# Patient Record
Sex: FEMALE | Race: WHITE | NOT HISPANIC OR LATINO | URBAN - METROPOLITAN AREA
[De-identification: names, ages, dates, MRNs, and addresses within clinical notes are randomized per-mention and may not be internally consistent; named-entity substitution may affect disease eponyms.]

---

## 2018-05-01 ENCOUNTER — OUTPATIENT (OUTPATIENT)
Dept: OUTPATIENT SERVICES | Facility: HOSPITAL | Age: 58
LOS: 1 days | End: 2018-05-01

## 2018-05-01 DIAGNOSIS — D25.9 LEIOMYOMA OF UTERUS, UNSPECIFIED: Chronic | ICD-10-CM

## 2018-05-01 DIAGNOSIS — Z98.89 OTHER SPECIFIED POSTPROCEDURAL STATES: Chronic | ICD-10-CM

## 2018-05-01 DIAGNOSIS — K56.60 UNSPECIFIED INTESTINAL OBSTRUCTION: Chronic | ICD-10-CM

## 2018-06-01 DIAGNOSIS — R69 ILLNESS, UNSPECIFIED: ICD-10-CM

## 2019-02-13 ENCOUNTER — OUTPATIENT (OUTPATIENT)
Dept: OUTPATIENT SERVICES | Facility: HOSPITAL | Age: 59
LOS: 1 days | Discharge: HOME | End: 2019-02-13
Payer: MEDICAID

## 2019-02-13 DIAGNOSIS — K56.60 UNSPECIFIED INTESTINAL OBSTRUCTION: Chronic | ICD-10-CM

## 2019-02-13 DIAGNOSIS — D25.9 LEIOMYOMA OF UTERUS, UNSPECIFIED: Chronic | ICD-10-CM

## 2019-02-13 DIAGNOSIS — Z98.89 OTHER SPECIFIED POSTPROCEDURAL STATES: Chronic | ICD-10-CM

## 2019-02-13 DIAGNOSIS — S91.309A UNSPECIFIED OPEN WOUND, UNSPECIFIED FOOT, INITIAL ENCOUNTER: ICD-10-CM

## 2019-02-13 PROCEDURE — G0463: CPT

## 2019-02-28 DIAGNOSIS — M34.9 SYSTEMIC SCLEROSIS, UNSPECIFIED: ICD-10-CM

## 2019-03-25 ENCOUNTER — OUTPATIENT (OUTPATIENT)
Dept: OUTPATIENT SERVICES | Facility: HOSPITAL | Age: 59
LOS: 1 days | Discharge: HOME | End: 2019-03-25
Payer: MEDICAID

## 2019-03-25 DIAGNOSIS — K56.60 UNSPECIFIED INTESTINAL OBSTRUCTION: Chronic | ICD-10-CM

## 2019-03-25 DIAGNOSIS — Z98.89 OTHER SPECIFIED POSTPROCEDURAL STATES: Chronic | ICD-10-CM

## 2019-03-25 DIAGNOSIS — M34.9 SYSTEMIC SCLEROSIS, UNSPECIFIED: ICD-10-CM

## 2019-03-25 DIAGNOSIS — D25.9 LEIOMYOMA OF UTERUS, UNSPECIFIED: Chronic | ICD-10-CM

## 2019-03-25 PROCEDURE — G0463: CPT

## 2019-03-26 DIAGNOSIS — Z79.899 OTHER LONG TERM (CURRENT) DRUG THERAPY: ICD-10-CM

## 2019-03-26 DIAGNOSIS — L97.511 NON-PRESSURE CHRONIC ULCER OF OTHER PART OF RIGHT FOOT LIMITED TO BREAKDOWN OF SKIN: ICD-10-CM

## 2019-03-26 DIAGNOSIS — Z82.49 FAMILY HISTORY OF ISCHEMIC HEART DISEASE AND OTHER DISEASES OF THE CIRCULATORY SYSTEM: ICD-10-CM

## 2019-03-26 DIAGNOSIS — Z88.8 ALLERGY STATUS TO OTHER DRUGS, MEDICAMENTS AND BIOLOGICAL SUBSTANCES: ICD-10-CM

## 2019-03-26 DIAGNOSIS — K59.00 CONSTIPATION, UNSPECIFIED: ICD-10-CM

## 2019-03-26 DIAGNOSIS — M34.9 SYSTEMIC SCLEROSIS, UNSPECIFIED: ICD-10-CM

## 2019-03-26 DIAGNOSIS — E03.9 HYPOTHYROIDISM, UNSPECIFIED: ICD-10-CM

## 2019-03-26 DIAGNOSIS — L97.411 NON-PRESSURE CHRONIC ULCER OF RIGHT HEEL AND MIDFOOT LIMITED TO BREAKDOWN OF SKIN: ICD-10-CM

## 2019-03-26 DIAGNOSIS — J45.909 UNSPECIFIED ASTHMA, UNCOMPLICATED: ICD-10-CM

## 2019-03-26 DIAGNOSIS — Z87.891 PERSONAL HISTORY OF NICOTINE DEPENDENCE: ICD-10-CM

## 2019-03-26 DIAGNOSIS — Z90.49 ACQUIRED ABSENCE OF OTHER SPECIFIED PARTS OF DIGESTIVE TRACT: ICD-10-CM

## 2019-03-26 DIAGNOSIS — L97.311 NON-PRESSURE CHRONIC ULCER OF RIGHT ANKLE LIMITED TO BREAKDOWN OF SKIN: ICD-10-CM

## 2019-03-26 DIAGNOSIS — E66.3 OVERWEIGHT: ICD-10-CM

## 2019-03-26 DIAGNOSIS — Z88.0 ALLERGY STATUS TO PENICILLIN: ICD-10-CM

## 2019-10-08 ENCOUNTER — OUTPATIENT (OUTPATIENT)
Dept: OUTPATIENT SERVICES | Facility: HOSPITAL | Age: 59
LOS: 1 days | End: 2019-10-08
Payer: MEDICAID

## 2019-10-08 VITALS
SYSTOLIC BLOOD PRESSURE: 124 MMHG | RESPIRATION RATE: 16 BRPM | TEMPERATURE: 98 F | OXYGEN SATURATION: 97 % | HEART RATE: 74 BPM | DIASTOLIC BLOOD PRESSURE: 80 MMHG

## 2019-10-08 DIAGNOSIS — M20.11 HALLUX VALGUS (ACQUIRED), RIGHT FOOT: ICD-10-CM

## 2019-10-08 DIAGNOSIS — Z01.818 ENCOUNTER FOR OTHER PREPROCEDURAL EXAMINATION: ICD-10-CM

## 2019-10-08 DIAGNOSIS — K56.60 UNSPECIFIED INTESTINAL OBSTRUCTION: Chronic | ICD-10-CM

## 2019-10-08 DIAGNOSIS — Z98.89 OTHER SPECIFIED POSTPROCEDURAL STATES: Chronic | ICD-10-CM

## 2019-10-08 DIAGNOSIS — M34.9 SYSTEMIC SCLEROSIS, UNSPECIFIED: ICD-10-CM

## 2019-10-08 DIAGNOSIS — Z88.9 ALLERGY STATUS TO UNSPECIFIED DRUGS, MEDICAMENTS AND BIOLOGICAL SUBSTANCES: ICD-10-CM

## 2019-10-08 DIAGNOSIS — J45.909 UNSPECIFIED ASTHMA, UNCOMPLICATED: ICD-10-CM

## 2019-10-08 DIAGNOSIS — M24.574 CONTRACTURE, RIGHT FOOT: ICD-10-CM

## 2019-10-08 DIAGNOSIS — D25.9 LEIOMYOMA OF UTERUS, UNSPECIFIED: Chronic | ICD-10-CM

## 2019-10-08 DIAGNOSIS — M20.5X1 OTHER DEFORMITIES OF TOE(S) (ACQUIRED), RIGHT FOOT: ICD-10-CM

## 2019-10-08 DIAGNOSIS — E03.9 HYPOTHYROIDISM, UNSPECIFIED: ICD-10-CM

## 2019-10-08 DIAGNOSIS — M20.41 OTHER HAMMER TOE(S) (ACQUIRED), RIGHT FOOT: ICD-10-CM

## 2019-10-08 LAB
ANION GAP SERPL CALC-SCNC: 4 MMOL/L — LOW (ref 5–17)
BUN SERPL-MCNC: 10 MG/DL — SIGNIFICANT CHANGE UP (ref 7–23)
CALCIUM SERPL-MCNC: 9.3 MG/DL — SIGNIFICANT CHANGE UP (ref 8.4–10.5)
CHLORIDE SERPL-SCNC: 103 MMOL/L — SIGNIFICANT CHANGE UP (ref 96–108)
CO2 SERPL-SCNC: 35 MMOL/L — HIGH (ref 22–31)
CREAT SERPL-MCNC: 0.48 MG/DL — LOW (ref 0.5–1.3)
GLUCOSE SERPL-MCNC: 94 MG/DL — SIGNIFICANT CHANGE UP (ref 70–99)
HCT VFR BLD CALC: 52.2 % — HIGH (ref 34.5–45)
HGB BLD-MCNC: 17.2 G/DL — HIGH (ref 11.5–15.5)
MCHC RBC-ENTMCNC: 30.7 PG — SIGNIFICANT CHANGE UP (ref 27–34)
MCHC RBC-ENTMCNC: 33 GM/DL — SIGNIFICANT CHANGE UP (ref 32–36)
MCV RBC AUTO: 93.2 FL — SIGNIFICANT CHANGE UP (ref 80–100)
NRBC # BLD: 0 /100 WBCS — SIGNIFICANT CHANGE UP (ref 0–0)
PLATELET # BLD AUTO: 210 K/UL — SIGNIFICANT CHANGE UP (ref 150–400)
POTASSIUM SERPL-MCNC: 3.7 MMOL/L — SIGNIFICANT CHANGE UP (ref 3.5–5.3)
POTASSIUM SERPL-SCNC: 3.7 MMOL/L — SIGNIFICANT CHANGE UP (ref 3.5–5.3)
RBC # BLD: 5.6 M/UL — HIGH (ref 3.8–5.2)
RBC # FLD: 12.8 % — SIGNIFICANT CHANGE UP (ref 10.3–14.5)
SODIUM SERPL-SCNC: 142 MMOL/L — SIGNIFICANT CHANGE UP (ref 135–145)
WBC # BLD: 5.68 K/UL — SIGNIFICANT CHANGE UP (ref 3.8–10.5)
WBC # FLD AUTO: 5.68 K/UL — SIGNIFICANT CHANGE UP (ref 3.8–10.5)

## 2019-10-08 PROCEDURE — 93005 ELECTROCARDIOGRAM TRACING: CPT

## 2019-10-08 PROCEDURE — G0463: CPT

## 2019-10-08 PROCEDURE — 80048 BASIC METABOLIC PNL TOTAL CA: CPT

## 2019-10-08 PROCEDURE — 36415 COLL VENOUS BLD VENIPUNCTURE: CPT

## 2019-10-08 PROCEDURE — 93010 ELECTROCARDIOGRAM REPORT: CPT | Mod: NC

## 2019-10-08 PROCEDURE — 85027 COMPLETE CBC AUTOMATED: CPT

## 2019-10-08 RX ORDER — HYDROMORPHONE HYDROCHLORIDE 2 MG/ML
1 INJECTION INTRAMUSCULAR; INTRAVENOUS; SUBCUTANEOUS
Qty: 0 | Refills: 0 | DISCHARGE

## 2019-10-08 NOTE — H&P PST ADULT - NSICDXPROBLEM_GEN_ALL_CORE_FT
PROBLEM DIAGNOSES  Problem: Hallux valgus, right  Assessment and Plan: Mikal Andriy Right Foot, Tenotomy & Capsulotomy Digits 2,3,4,5 Right Foot scheduled for 10/18/2019  Pre-op instructions given. Pt verbalized understanding   Pepcid & Chlorhexidine wash instructions given   Pending: Medical clearance - Abnormal EKG + requested by surgeon  Pending: Pain Management clearance + instructions on meds to take day of procedure      Problem: Allergy to multiple drugs  Assessment and Plan: see allergy list    Problem: Chronic joint pain  Assessment and Plan: Pain management clearance requested     Problem: Generalized scleroderma  Assessment and Plan: Pt instructed to take meds as prescribed     Problem: Hypothyroid  Assessment and Plan: Pt instructed to take meds as prescribed     Problem: Asthma  Assessment and Plan: Pt instructed to take meds as prescribed

## 2019-10-08 NOTE — H&P PST ADULT - NEGATIVE GENERAL GENITOURINARY SYMPTOMS
no dysuria/no flank pain R/normal urinary frequency/no bladder infections/no renal colic/no urinary hesitancy/no nocturia/no hematuria/no flank pain L

## 2019-10-08 NOTE — H&P PST ADULT - NSICDXPASTSURGICALHX_GEN_ALL_CORE_FT
PAST SURGICAL HISTORY:  Bowel obstruction multiple surgeries for bowel obstruction    Fibroid     H/O ovarian cystectomy     History of myomectomy     History of ovarian cystectomy     S/P small bowel resection

## 2019-10-08 NOTE — H&P PST ADULT - NSANTHOSAYNRD_GEN_A_CORE
neck 14 1/4 inches/No. GILMER screening performed.  STOP BANG Legend: 0-2 = LOW Risk; 3-4 = INTERMEDIATE Risk; 5-8 = HIGH Risk

## 2019-10-08 NOTE — H&P PST ADULT - NEGATIVE GASTROINTESTINAL SYMPTOMS
no vomiting/no constipation/no abdominal pain/no hiccoughs/no nausea/no diarrhea/no melena/no jaundice

## 2019-10-08 NOTE — H&P PST ADULT - NEGATIVE PSYCHIATRIC SYMPTOMS
no depression/no insomnia/no memory loss/no mood swings/no agitation/no suicidal ideation/no paranoia

## 2019-10-08 NOTE — H&P PST ADULT - NSICDXPASTMEDICALHX_GEN_ALL_CORE_FT
PAST MEDICAL HISTORY:  Asthma     GERD (gastroesophageal reflux disease)     High cholesterol Unable to take STATINS for high cholesterol due to genetic disposition    Hypothyroidism     Multiple drug allergies     Scleroderma     Scleroderma     Shingles     Smoker

## 2019-10-08 NOTE — H&P PST ADULT - HISTORY OF PRESENT ILLNESS
57yo female current everyday smoker with medical h/o Systemic Scleroderma, Asthma, Hypothyroid and Asthma. Pt c/o Chronic pain since 5/2012 due to Scleroderma and Right foot bunion and hammertoes, right foot. Pt pr 57yo female came to dept with home health aid, pt h/o current everyday smoker with medical h/o Systemic Scleroderma, Asthma, Hypothyroid and Asthma. Pt c/o Chronic pain since 5/2012 due to Scleroderma and Right foot bunion and hammertoes, right foot. Pt presents today for PST for Mikal Andriy Right Foot, Tenotomy & Capsulotomy Digits 2,3,4,5 Right Foot scheduled for 10/18/2019  NOTE: Pt was very argumentative during H&P and refuses to have height and weight done. Leah Lee (manager made aware)

## 2019-10-08 NOTE — H&P PST ADULT - OTHER CARE PROVIDERS
Aroldo Briseno ([facility]) Aroldo Briseno ([facility]) [phone]

## 2019-10-08 NOTE — H&P PST ADULT - MUSCULOSKELETAL COMMENTS
h/o scleroderma with chronic pain and right foot bunion and hammertoes right foot bunion and hammertoes, Bandage to both heel not removed (pt refuses to have removed)

## 2019-10-08 NOTE — H&P PST ADULT - NEGATIVE NEUROLOGICAL SYMPTOMS
no transient paralysis/no weakness/no vertigo/no loss of sensation/no focal seizures/no loss of consciousness/no paresthesias/no generalized seizures/no tremors/no headache/no syncope

## 2019-10-08 NOTE — H&P PST ADULT - PRO PAIN LIFE ADAPT
inability to concentrate/inability to enjoy life/inability or reluctance to perform ADLs/inability to sleep/decreased activity level

## 2019-11-04 NOTE — H&P PST ADULT - PULMONARY EMBOLUS
Walking - Outdoors allowed/Showering allowed/Walking - Indoors allowed/Bathing allowed/Stairs allowed
no

## 2020-09-03 PROBLEM — F17.200 NICOTINE DEPENDENCE, UNSPECIFIED, UNCOMPLICATED: Chronic | Status: ACTIVE | Noted: 2019-10-08

## 2020-09-03 PROBLEM — Z88.9 ALLERGY STATUS TO UNSPECIFIED DRUGS, MEDICAMENTS AND BIOLOGICAL SUBSTANCES: Chronic | Status: ACTIVE | Noted: 2019-10-08

## 2020-09-15 ENCOUNTER — APPOINTMENT (OUTPATIENT)
Dept: NEUROLOGY | Facility: CLINIC | Age: 60
End: 2020-09-15

## 2020-10-08 ENCOUNTER — APPOINTMENT (OUTPATIENT)
Dept: PAIN MANAGEMENT | Facility: CLINIC | Age: 60
End: 2020-10-08
Payer: MEDICAID

## 2020-10-08 VITALS
HEIGHT: 69.5 IN | DIASTOLIC BLOOD PRESSURE: 83 MMHG | BODY MASS INDEX: 24.61 KG/M2 | HEART RATE: 87 BPM | WEIGHT: 170 LBS | SYSTOLIC BLOOD PRESSURE: 132 MMHG

## 2020-10-08 VITALS — TEMPERATURE: 96.2 F

## 2020-10-08 PROCEDURE — 99204 OFFICE O/P NEW MOD 45 MIN: CPT

## 2020-10-08 RX ORDER — ALBUTEROL 90 MCG
90 AEROSOL (GRAM) INHALATION
Refills: 0 | Status: ACTIVE | COMMUNITY

## 2020-10-08 RX ORDER — CETIRIZINE HCL 10 MG
10 TABLET ORAL
Refills: 0 | Status: ACTIVE | COMMUNITY

## 2020-10-08 RX ORDER — PREDNISONE 10 MG/1
10 TABLET ORAL
Refills: 0 | Status: ACTIVE | COMMUNITY

## 2020-10-08 RX ORDER — ALBUTEROL SULFATE 2.5 MG/.5ML
2.5 SOLUTION RESPIRATORY (INHALATION)
Refills: 0 | Status: ACTIVE | COMMUNITY

## 2020-10-08 RX ORDER — LEVOTHYROXINE SODIUM 125 UG/1
125 TABLET ORAL
Refills: 0 | Status: ACTIVE | COMMUNITY

## 2020-11-10 ENCOUNTER — APPOINTMENT (OUTPATIENT)
Dept: PAIN MANAGEMENT | Facility: CLINIC | Age: 60
End: 2020-11-10
Payer: MEDICAID

## 2020-11-10 ENCOUNTER — APPOINTMENT (OUTPATIENT)
Dept: NEUROLOGY | Facility: CLINIC | Age: 60
End: 2020-11-10
Payer: MEDICAID

## 2020-11-10 VITALS
SYSTOLIC BLOOD PRESSURE: 144 MMHG | HEART RATE: 88 BPM | DIASTOLIC BLOOD PRESSURE: 84 MMHG | HEIGHT: 69 IN | BODY MASS INDEX: 25.18 KG/M2 | WEIGHT: 170 LBS

## 2020-11-10 VITALS — TEMPERATURE: 97.7 F

## 2020-11-10 DIAGNOSIS — F45.42 PAIN DISORDER WITH RELATED PSYCHOLOGICAL FACTORS: ICD-10-CM

## 2020-11-10 PROCEDURE — 90791 PSYCH DIAGNOSTIC EVALUATION: CPT

## 2020-11-10 PROCEDURE — 99214 OFFICE O/P EST MOD 30 MIN: CPT

## 2020-11-10 PROCEDURE — 99072 ADDL SUPL MATRL&STAF TM PHE: CPT

## 2020-11-15 NOTE — ASSESSMENT
[FreeTextEntry1] : Chronic pain syndrome\par Chronic opioid therapy\par We spent 60 minutes with patient and her friend/aide\par Will develop a plan of action

## 2020-11-15 NOTE — HISTORY OF PRESENT ILLNESS
[FreeTextEntry1] : Patient was seen with Pain fellow and Dr. Purcell\par \par She presented her case in detail with our team

## 2020-11-15 NOTE — PHYSICAL EXAM
[General Appearance - Alert] : alert [FreeTextEntry1] : at times became emotional during our interview [Impaired Insight] : insight and judgment were intact [Person] : oriented to person [Place] : oriented to place [Time] : oriented to time [Sclera] : the sclera and conjunctiva were normal [Outer Ear] : the ears and nose were normal in appearance [] : no respiratory distress

## 2020-11-16 PROBLEM — F45.42 PAIN DISORDER ASSOCIATED WITH PSYCHOLOGICAL AND PHYSICAL FACTORS: Status: ACTIVE | Noted: 2020-11-16

## 2020-11-25 ENCOUNTER — APPOINTMENT (OUTPATIENT)
Dept: PAIN MANAGEMENT | Facility: CLINIC | Age: 60
End: 2020-11-25

## 2020-12-07 NOTE — PHYSICAL EXAM
[General Appearance - Alert] : alert [Affect] : the affect was normal [Person] : oriented to person [Place] : oriented to place [Time] : oriented to time [Cranial Nerves Oculomotor (III)] : extraocular motion intact [Sclera] : the sclera and conjunctiva were normal [Outer Ear] : the ears and nose were normal in appearance [Neck Appearance] : the appearance of the neck was normal [Nail Clubbing] : no clubbing  or cyanosis of the fingernails [] : no rash [FreeTextEntry6] : In wheelchair [FreeTextEntry1] : limired ROM in all extremities

## 2020-12-07 NOTE — ASSESSMENT
[Opioids] : Patient was explained in detail about pain control by using opioids. Patient has signed and fully understands our guidelines for medication and drug screening.  Patient understands the side effects of opioids, including, but not limited to, drug tolerance, dependence, potential for addiction. This class of drugs is habit-forming and KHOA regulated. The sedative effects of opioids can be potentiated by taking alcohol or any sleeping pills, along with opioids. The decision to drive is patient’s responsibility, as opioids can affect his/her driving ability and ability to concentrate. The long-term place is not clear, however, patient understands that once the pain control optimizes, the goal will be to wean off the opioids. All the issues regarding opioid treatment have been addressed satisfactorily.  [FreeTextEntry1] : Chronic pain syndrome\par Scleroderma\par Opioid dependency\par Generalized Anxiety Disorder\par Will discuss with pain team prior to any pain treatment. \par UDT\par ISTOP reviewed.\par \par After a detailed discussion  at the Pain Meeting, we recommend the following:\par 1- Taper the daily opioid dose gradually... consider Belbuca\par 2- Initiate non opioid pain medications, ie. Neurontin \par 3- Will contact patient for fu visit\par 3- FU regularly with our pain psychologist for pain management cognitive behavioral therapy\par 4- Will sp w her prior pain physician

## 2020-12-07 NOTE — HISTORY OF PRESENT ILLNESS
[FreeTextEntry1] : Patient dx with systemic scleroderma 16 years ago with resulting ulcerations in extremities presenting with chronic pain. Patient dx with xolar related for asthma. \par ANNABELLA: 10/10\par She has been on chronic opioid therapy here for second opinion. Dr. Briseno wants her to taper off some of the medications\par The pain involved the entire body 24/7 only able to sleep on back for 15 years. Difficulty moving any extremities.\par She wants to come off analgesics but slowly but feels it is way too fast. \par Meds: Xanax 1 mg qid; Dilaudid 4 mgs qid; MS Contin 30 mgs 1 tid; Duragesic 200 mcg q 2 days to 100 mcg1qd \par Advised to dc Xanax however taking tid; On dilaudid tid; MSC ontin dcd but still has  one month prescription.\par Never tried lyrica, neurontin, cymbalta\par Never tried marijuana due to cost.\par \par aLLERGY TO FENTANYL

## 2020-12-14 ENCOUNTER — APPOINTMENT (OUTPATIENT)
Dept: PAIN MANAGEMENT | Facility: CLINIC | Age: 60
End: 2020-12-14
Payer: MEDICAID

## 2020-12-14 VITALS — SYSTOLIC BLOOD PRESSURE: 130 MMHG | DIASTOLIC BLOOD PRESSURE: 78 MMHG | HEART RATE: 83 BPM

## 2020-12-14 PROCEDURE — 99072 ADDL SUPL MATRL&STAF TM PHE: CPT

## 2020-12-14 PROCEDURE — 99214 OFFICE O/P EST MOD 30 MIN: CPT

## 2020-12-17 NOTE — ASSESSMENT
[FreeTextEntry1] : fentanyl 175 and \par aprazolam \par Narcan done\par MSContin 30 tid\par roxicodone stay - brand\par MS 15 decrease to bid

## 2020-12-23 RX ORDER — OXYCODONE HYDROCHLORIDE 30 MG/1
30 TABLET ORAL
Qty: 90 | Refills: 0 | Status: DISCONTINUED | COMMUNITY
Start: 1900-01-01 | End: 2020-12-23

## 2020-12-23 RX ORDER — HYDROMORPHONE HYDROCHLORIDE 2 MG/1
2 TABLET ORAL 3 TIMES DAILY
Qty: 42 | Refills: 0 | Status: DISCONTINUED | COMMUNITY
Start: 2020-12-01 | End: 2020-12-23

## 2020-12-31 RX ORDER — HYDROMORPHONE HYDROCHLORIDE 4 MG/1
4 TABLET ORAL 3 TIMES DAILY
Qty: 42 | Refills: 0 | Status: DISCONTINUED | COMMUNITY
End: 2020-12-31

## 2021-01-14 ENCOUNTER — APPOINTMENT (OUTPATIENT)
Dept: PAIN MANAGEMENT | Facility: CLINIC | Age: 61
End: 2021-01-14
Payer: MEDICAID

## 2021-01-14 VITALS — DIASTOLIC BLOOD PRESSURE: 74 MMHG | HEART RATE: 91 BPM | SYSTOLIC BLOOD PRESSURE: 116 MMHG

## 2021-01-14 VITALS — TEMPERATURE: 97 F

## 2021-01-14 PROCEDURE — 99072 ADDL SUPL MATRL&STAF TM PHE: CPT

## 2021-01-14 PROCEDURE — 99214 OFFICE O/P EST MOD 30 MIN: CPT

## 2021-01-20 NOTE — HISTORY OF PRESENT ILLNESS
[FreeTextEntry1] : Two and half weeks ago, while sitting on bed decided to get up to walker fell forward hitting head. No LOC. Right eye blackened. Refused to go to hospital. CO head, back and arm pain. No weakness.\par Decided not to decreased fentanyl due to fear of AEs. She reports getting a swollen tongue and trouble breathing about 3 hours after placing fentanyl \par \par Still has inability to move causing severe pain. \par SW patient re discussion with [facility].  \par \par Took gabapentin co bad cramps and vomiting. Patient reports SEs after 6-8 hours. Tried it 6 times. \par \par Current pain\par Roxicodone 4 qd; Fentanyl 200 mcg 3 days\par MS ER 30 TID; MSIR 15 MG 2 d\par Xanax 1 mg 2.5 per month\par Baclofen 10 ng 1 tid\par

## 2021-01-20 NOTE — PHYSICAL EXAM
[General Appearance - Alert] : alert [Affect] : the affect was normal [Person] : oriented to person [Place] : oriented to place [Time] : oriented to time [Cranial Nerves Oculomotor (III)] : extraocular motion intact [Sclera] : the sclera and conjunctiva were normal [Outer Ear] : the ears and nose were normal in appearance [Neck Appearance] : the appearance of the neck was normal [] : no respiratory distress [FreeTextEntry1] : sores on feet bilaterally. as per photos provided by friend/aide

## 2021-01-20 NOTE — ASSESSMENT
[Opioids] : Patient was explained in detail about pain control by using opioids. Patient has signed and fully understands our guidelines for medication and drug screening.  Patient understands the side effects of opioids, including, but not limited to, drug tolerance, dependence, potential for addiction. This class of drugs is habit-forming and KHOA regulated. The sedative effects of opioids can be potentiated by taking alcohol or any sleeping pills, along with opioids. The decision to drive is patient’s responsibility, as opioids can affect his/her driving ability and ability to concentrate. The long-term place is not clear, however, patient understands that once the pain control optimizes, the goal will be to wean off the opioids. All the issues regarding opioid treatment have been addressed satisfactorily.  [FreeTextEntry1] : Chronic pain syndrome\par Spoke with patient at length with friend\par \par Provided information re out Addiction Psychiatrist\par In meantime will maintain current analgesics until we have a plan from psychiatrist.\par Our goal is to slowly taper down her analgesics while being maintained on non opioid therapy and guidance from the psychiatrist.\par No signs of toxicity observed and will continue to monitor.\par

## 2021-01-22 ENCOUNTER — RX RENEWAL (OUTPATIENT)
Age: 61
End: 2021-01-22

## 2021-02-11 ENCOUNTER — APPOINTMENT (OUTPATIENT)
Dept: PAIN MANAGEMENT | Facility: CLINIC | Age: 61
End: 2021-02-11
Payer: MEDICAID

## 2021-02-11 VITALS — DIASTOLIC BLOOD PRESSURE: 72 MMHG | SYSTOLIC BLOOD PRESSURE: 120 MMHG | HEART RATE: 90 BPM

## 2021-02-11 VITALS — TEMPERATURE: 95.1 F

## 2021-02-11 PROCEDURE — 99072 ADDL SUPL MATRL&STAF TM PHE: CPT

## 2021-02-11 PROCEDURE — 99214 OFFICE O/P EST MOD 30 MIN: CPT

## 2021-02-11 NOTE — HISTORY OF PRESENT ILLNESS
[FreeTextEntry1] : Patient reports increasing pain with current analgesic regime. She will be unable to obtain her brand type duragesic patch. Unable to see addiction psychiatrist which does not take insurance. We are awaiting approval from insurance co ... addiction psychiatrist.\par \par

## 2021-02-11 NOTE — ASSESSMENT
[FreeTextEntry1] : Will continue with current analgesic regime.\par Once we have psychiatrist, then we will start to make changes in regime.

## 2021-03-23 ENCOUNTER — APPOINTMENT (OUTPATIENT)
Dept: PAIN MANAGEMENT | Facility: CLINIC | Age: 61
End: 2021-03-23
Payer: MEDICAID

## 2021-03-23 PROCEDURE — 99214 OFFICE O/P EST MOD 30 MIN: CPT

## 2021-03-23 PROCEDURE — 99072 ADDL SUPL MATRL&STAF TM PHE: CPT

## 2021-03-24 RX ORDER — FENTANYL 100 UG/H
100 PATCH, EXTENDED RELEASE TRANSDERMAL
Qty: 10 | Refills: 0 | Status: DISCONTINUED | COMMUNITY
End: 2021-03-24

## 2021-03-28 NOTE — ASSESSMENT
[FreeTextEntry1] : Chronic pain syndrome\par Gradually reducing opioids/Benzos\par HO Sarcoidosis\par Dysfunctional\par Depressed. Not suicidal. Has excellent support from long time friend/aid.\par \par I strongly advised her that she MUST get psychiatrist involved so that we can continue to taper off these medications, and help her anxiety as well as her pain. I provided her info re [facility]. I advised that she must yahir me prior to next visit or prior to continued dosing of her opioid medications if she continues to have difficulty. Her friend was also provided information. and will help in this matter. \par No signs of toxicity observed and will continue to monitor.\par \par FU in one month [Opioids] : Patient was explained in detail about pain control by using opioids. Patient has signed and fully understands our guidelines for medication and drug screening.  Patient understands the side effects of opioids, including, but not limited to, drug tolerance, dependence, potential for addiction. This class of drugs is habit-forming and KHOA regulated. The sedative effects of opioids can be potentiated by taking alcohol or any sleeping pills, along with opioids. The decision to drive is patient’s responsibility, as opioids can affect his/her driving ability and ability to concentrate. The long-term place is not clear, however, patient understands that once the pain control optimizes, the goal will be to wean off the opioids. All the issues regarding opioid treatment have been addressed satisfactorily.

## 2021-03-28 NOTE — PHYSICAL EXAM
[General Appearance - Alert] : alert [Affect] : the affect was normal [Person] : oriented to person [Place] : oriented to place [Time] : oriented to time [FreeTextEntry8] : In wheelchair [Sclera] : the sclera and conjunctiva were normal [Outer Ear] : the ears and nose were normal in appearance [Neck Appearance] : the appearance of the neck was normal [] : no respiratory distress [FreeTextEntry1] : scleroderma noted

## 2021-03-28 NOTE — HISTORY OF PRESENT ILLNESS
[FreeTextEntry1] : Patient present with long time aid/friend.\par Patient continues to co back and diffuse body pain. She reports having fentanyl left over from past and can not use the generic formulation which is the only formulation available. She continues to reports having difficulty in getting psych/addiction specialist that accepts her insurance \par  Denies suicidal ideations. Notes another family member has . \par In past- \par 200 mcg fentanyl\par Dilaudid 4 qid\par MS 15 QID\par MS ER 30 BID\par Xanax 1 MG QID\par Roxicodone 30 mgs 7 x per day\par \par Currently- \par 100 MCG 72 hours\par Off Dilaudid; MRIS 3 QD; er 3 qd\par Roxicodone 4 qd\par Xanax 2.5 mg qd/ \par  \par \par

## 2021-05-18 ENCOUNTER — APPOINTMENT (OUTPATIENT)
Dept: PAIN MANAGEMENT | Facility: CLINIC | Age: 61
End: 2021-05-18
Payer: MEDICAID

## 2021-05-18 VITALS — TEMPERATURE: 95 F

## 2021-05-18 VITALS
DIASTOLIC BLOOD PRESSURE: 81 MMHG | SYSTOLIC BLOOD PRESSURE: 129 MMHG | WEIGHT: 160 LBS | HEART RATE: 86 BPM | HEIGHT: 69 IN | BODY MASS INDEX: 23.7 KG/M2

## 2021-05-18 PROCEDURE — 99214 OFFICE O/P EST MOD 30 MIN: CPT

## 2021-05-18 NOTE — PHYSICAL EXAM
[General Appearance - Alert] : alert [Oriented To Time, Place, And Person] : oriented to person, place, and time [Person] : oriented to person [Place] : oriented to place [Time] : oriented to time [Cranial Nerves Oculomotor (III)] : extraocular motion intact [Sclera] : the sclera and conjunctiva were normal [Outer Ear] : the ears and nose were normal in appearance

## 2021-06-03 NOTE — ASSESSMENT
[Opioids] : Patient was explained in detail about pain control by using opioids. Patient has signed and fully understands our guidelines for medication and drug screening.  Patient understands the side effects of opioids, including, but not limited to, drug tolerance, dependence, potential for addiction. This class of drugs is habit-forming and KHOA regulated. The sedative effects of opioids can be potentiated by taking alcohol or any sleeping pills, along with opioids. The decision to drive is patient’s responsibility, as opioids can affect his/her driving ability and ability to concentrate. The long-term place is not clear, however, patient understands that once the pain control optimizes, the goal will be to wean off the opioids. All the issues regarding opioid treatment have been addressed satisfactorily.  [FreeTextEntry1] : This is a case of a 60-year-old female who is suffering from sarcoidosis and intractable chronic pain as well as chronic anxiety.  She is accompanied by her aide.\par No signs of toxicity and will continue to monitor.\par We have made inroads into having patient see a psychiatrist who will also help coordinate and collaborate with me on tapering her benzodiazepines and manage her chronic anxiety through this process in the process of decreasing some of her chronic opiate medications.\par \par We will proceed with the tapering process of opiates the following month.  She agrees with this plan.

## 2021-06-03 NOTE — HISTORY OF PRESENT ILLNESS
[FreeTextEntry1] : Patient have been trying to get in touch with psych services. I sw Emily her  yesterday for about 30 minutes to help patient get off opioids or at least taper the medications. Patient reports having chest scan which revealed multiple nodules. Patient reports being essentially dysfunctional. Cant sleep on bed. Has difficulty moving joints. She can only ambulate with assistance.  \par \par Patient has enough Duragesic 200 mcg to end of July

## 2021-06-23 ENCOUNTER — APPOINTMENT (OUTPATIENT)
Dept: PAIN MANAGEMENT | Facility: CLINIC | Age: 61
End: 2021-06-23
Payer: MEDICAID

## 2021-06-23 VITALS
HEART RATE: 98 BPM | BODY MASS INDEX: 24.44 KG/M2 | WEIGHT: 165 LBS | DIASTOLIC BLOOD PRESSURE: 72 MMHG | SYSTOLIC BLOOD PRESSURE: 109 MMHG | HEIGHT: 69 IN

## 2021-06-23 PROCEDURE — 99214 OFFICE O/P EST MOD 30 MIN: CPT

## 2021-06-25 NOTE — ASSESSMENT
[FreeTextEntry1] : This is a case of a 60-year-old female who is suffering from sarcoidosis and intractable chronic pain as well as chronic anxiety.  She is accompanied by her aide.\par No signs of toxicity and will continue to monitor.\par We have made inroads into having patient see a psychiatrist who will also help coordinate and collaborate with me on tapering her benzodiazepines and manage her chronic anxiety through this process in the process of decreasing some of her chronic opiate medications.\par \par We will proceed with the tapering process of opiates the following month.  She agrees with this plan. [Opioids] : Patient was explained in detail about pain control by using opioids. Patient has signed and fully understands our guidelines for medication and drug screening.  Patient understands the side effects of opioids, including, but not limited to, drug tolerance, dependence, potential for addiction. This class of drugs is habit-forming and KHOA regulated. The sedative effects of opioids can be potentiated by taking alcohol or any sleeping pills, along with opioids. The decision to drive is patient’s responsibility, as opioids can affect his/her driving ability and ability to concentrate. The long-term place is not clear, however, patient understands that once the pain control optimizes, the goal will be to wean off the opioids. All the issues regarding opioid treatment have been addressed satisfactorily.

## 2021-06-25 NOTE — HISTORY OF PRESENT ILLNESS
[FreeTextEntry1] : NP came to house 2 days ago - can deal with xanax and she said will not deal with coping mechjanism. Princess rec now psych services...have no available for anyone > 50. She has noticed only PCP.\par \par mORPHINE er 30 MGS TIFD; msir 15 3 PER CHEIKH/ rOXICODONE 1 QID\par

## 2021-07-22 ENCOUNTER — APPOINTMENT (OUTPATIENT)
Dept: PAIN MANAGEMENT | Facility: CLINIC | Age: 61
End: 2021-07-22
Payer: MEDICAID

## 2021-07-22 PROCEDURE — 99442: CPT

## 2021-07-22 NOTE — HISTORY OF PRESENT ILLNESS
[FreeTextEntry1] : 51 yo female reports sp fall one week ago... while using walker to go to bathroom on floor for 40 minutes. Notes soreness and bruises all over body. Denies any drowsiness leading to fall.  She has been looking for new rheumatologist since Dr. Meza left.\par \par Patient states Elyssa her insurance company who states, now, there are no psychological services for anxiety. \par \par She states the material on Belbuca i gave her last visit ... she does not want to take it..

## 2021-07-22 NOTE — ASSESSMENT
[FreeTextEntry1] : Patient does not want to try Belbuca. \par Will call Elyssa.  [phone]\par Princess \par \par Advised patient to go to [facility]. since she is going to run out of all her patches and does not want to try the other generic patches.

## 2021-08-17 ENCOUNTER — APPOINTMENT (OUTPATIENT)
Dept: PAIN MANAGEMENT | Facility: CLINIC | Age: 61
End: 2021-08-17
Payer: MEDICAID

## 2021-08-17 VITALS
HEART RATE: 95 BPM | BODY MASS INDEX: 24.44 KG/M2 | HEIGHT: 69 IN | DIASTOLIC BLOOD PRESSURE: 80 MMHG | WEIGHT: 165 LBS | SYSTOLIC BLOOD PRESSURE: 120 MMHG

## 2021-08-17 PROCEDURE — 99215 OFFICE O/P EST HI 40 MIN: CPT

## 2021-08-20 NOTE — PHYSICAL EXAM
[General Appearance - Alert] : alert [Person] : oriented to person [Place] : oriented to place [Time] : oriented to time [Sclera] : the sclera and conjunctiva were normal [Outer Ear] : the ears and nose were normal in appearance [] : no rash [FreeTextEntry1] : In wheelchair.  Appears in distress secondary to pain and anxiety.  Often crying during the evaluation.  No signs of toxicity.

## 2021-08-20 NOTE — HISTORY OF PRESENT ILLNESS
[FreeTextEntry1] : Patient reports she is going to try the different patches and will discuss with her PMD next week. Her aide will be present thought it. She still has one years worth, According to aide she has only 3 months worth. \par \par Patient reports no relief with current plan, cant move neck, arms, standing severe pain in hips. \par  \par

## 2021-08-20 NOTE — ASSESSMENT
[Opioids] : Patient was explained in detail about pain control by using opioids. Patient has signed and fully understands our guidelines for medication and drug screening.  Patient understands the side effects of opioids, including, but not limited to, drug tolerance, dependence, potential for addiction. This class of drugs is habit-forming and KHOA regulated. The sedative effects of opioids can be potentiated by taking alcohol or any sleeping pills, along with opioids. The decision to drive is patient’s responsibility, as opioids can affect his/her driving ability and ability to concentrate. The long-term place is not clear, however, patient understands that once the pain control optimizes, the goal will be to wean off the opioids. All the issues regarding opioid treatment have been addressed satisfactorily.  [FreeTextEntry1] : This is a case of a 60-year-old woman with a longstanding history of sarcoidosis chronic pain opiate dependency benzodiazepine dependency.  We have been trying to slowly taper her off opiates and benzodiazepines.  Initially, her insurance company agreed to provide psychiatric/psychological support for the taper of benzodiazepines.  However, despite numerous attempts to make this happen, this was a futile attempt.  Her examination remains unchanged.  She continues to be anxious depressed and in pain.  However, there are no suicidal ideations and she has very good support with her aide/friend.  Upon review of her pain medications, her pain has not increased despite our taper.  She is going to start a trial of different fentanyl patches in order to slowly taper.  This will be done under the guidance of her primary care physician.\par \par At this time we will continue with her current analgesic regimen.  However, patient has been advised along with her aide that we will continue no matter how slow to taper her down since she is not demonstrating any benefit from this high dose of medication.  I have ensured the patient that I will do everything to support and help her pain control during this process primarily with nonopiate measures.  Follow-up in 1 month.\par \par I stop reviewed urine drug toxicology testing performed.

## 2021-09-21 ENCOUNTER — APPOINTMENT (OUTPATIENT)
Dept: PAIN MANAGEMENT | Facility: CLINIC | Age: 61
End: 2021-09-21
Payer: MEDICAID

## 2021-09-21 VITALS
SYSTOLIC BLOOD PRESSURE: 112 MMHG | BODY MASS INDEX: 23.7 KG/M2 | WEIGHT: 160 LBS | HEART RATE: 82 BPM | DIASTOLIC BLOOD PRESSURE: 72 MMHG | HEIGHT: 69 IN

## 2021-09-21 DIAGNOSIS — F41.1 GENERALIZED ANXIETY DISORDER: ICD-10-CM

## 2021-09-21 PROCEDURE — 99215 OFFICE O/P EST HI 40 MIN: CPT

## 2021-09-21 NOTE — ASSESSMENT
[Opioids] : Patient was explained in detail about pain control by using opioids. Patient has signed and fully understands our guidelines for medication and drug screening.  Patient understands the side effects of opioids, including, but not limited to, drug tolerance, dependence, potential for addiction. This class of drugs is habit-forming and KHOA regulated. The sedative effects of opioids can be potentiated by taking alcohol or any sleeping pills, along with opioids. The decision to drive is patient’s responsibility, as opioids can affect his/her driving ability and ability to concentrate. The long-term place is not clear, however, patient understands that once the pain control optimizes, the goal will be to wean off the opioids. All the issues regarding opioid treatment have been addressed satisfactorily.  [FreeTextEntry1] :  60-year-old woman with Scleroderma chronic pain opiate and benzo dependency. Patient with severe pain, impeding on functioning .  \par \par - Her psychiatrist is no longer prescribing benzos.  She is using Alprazolam 1 mg daily left over from old prescription from 2017.  She has a formal appointment w/  psychiatrist at [facility] - October 25th 2021.\par -She started the new fentanyl patches without AE.  She was prescribed 175 mcg but has been taking 200 mcg . The plan continues to be to continue to gradually taper. \par \par \par Patient and aide have been advised to decrease 175 mcg q 3 days since she is not demonstrating any benefit from this high dose of medication.  She must bring any left over fentanyl patches to [facility].  We have ensured the patient that we will do everything to support and help her pain control during this process primarily with nonopiate measures.  \par \par Follow-up in 1 month.\par \par I stop reviewed urine drug toxicology testing performed.\par  [id number]\par \par Rheum 10/25\par Psych 10/25\par \par Current analgesics\par \par 1- Morphine ER 30 mgs tid \par 2- MSIR 15 mgs 1 bid \par 3- Oxycodone 30 mgs  1 qid \par 4- Fentanyl 200mcg q 3 days\par \par Patient provided me name of her PMD with phone number to discuss plan -  [phone]

## 2021-09-21 NOTE — HISTORY OF PRESENT ILLNESS
[FreeTextEntry1] : 59 y/o F with Systemic Scleroderma, chronic pain on chronic opiate and benzo dependency who presents today for follow up. Currently patient reports her Psychiatrist is no longer prescribing benzos for her and has a formal evaluation with another psychiatrist at [facility] - October 25th 2021. \par \par Severe pain - arms She reports she cannot move neck, difficulty moving arms and severe pain in hips. \par \par Current meds: prescribed Fentanyl 100 and 75 mcg patches total 175 mcg but has been taking 2- 100 mcg patches instead. \par  \par Elyssa Anderson - [phone]\par \par  \par

## 2021-10-19 ENCOUNTER — APPOINTMENT (OUTPATIENT)
Dept: PAIN MANAGEMENT | Facility: CLINIC | Age: 61
End: 2021-10-19

## 2021-10-21 NOTE — HISTORY OF PRESENT ILLNESS
[FreeTextEntry1] : Patient reports injuring her low back 2 days ago while in bathroom. She notes while on 175 duragesic q 3 days having difficulty with increase in pain. She tells me [employer] at her insurance company will NOT approve her admission.\par \par I am placing a call into [employer]. To me, I have been promised by them that they would take care of this for her. And prior to this, they assured me she would help patient with benzo.... this has not happened. \par \par I told her to go to ER or to PMD re acute back

## 2021-11-01 ENCOUNTER — OUTPATIENT (OUTPATIENT)
Dept: OUTPATIENT SERVICES | Facility: HOSPITAL | Age: 61
LOS: 1 days | End: 2021-11-01
Payer: MEDICAID

## 2021-11-01 DIAGNOSIS — Z98.89 OTHER SPECIFIED POSTPROCEDURAL STATES: Chronic | ICD-10-CM

## 2021-11-01 DIAGNOSIS — D25.9 LEIOMYOMA OF UTERUS, UNSPECIFIED: Chronic | ICD-10-CM

## 2021-11-01 DIAGNOSIS — K56.60 UNSPECIFIED INTESTINAL OBSTRUCTION: Chronic | ICD-10-CM

## 2021-11-16 ENCOUNTER — APPOINTMENT (OUTPATIENT)
Dept: PAIN MANAGEMENT | Facility: CLINIC | Age: 61
End: 2021-11-16
Payer: MEDICAID

## 2021-11-16 VITALS
DIASTOLIC BLOOD PRESSURE: 81 MMHG | WEIGHT: 160 LBS | HEIGHT: 69 IN | BODY MASS INDEX: 23.7 KG/M2 | HEART RATE: 98 BPM | SYSTOLIC BLOOD PRESSURE: 109 MMHG

## 2021-11-16 PROCEDURE — 99214 OFFICE O/P EST MOD 30 MIN: CPT

## 2021-11-23 DIAGNOSIS — Z71.89 OTHER SPECIFIED COUNSELING: ICD-10-CM

## 2021-11-24 RX ORDER — MORPHINE SULFATE 15 MG/1
15 TABLET ORAL DAILY
Qty: 30 | Refills: 0 | Status: DISCONTINUED | COMMUNITY
Start: 2020-12-18 | End: 2021-11-24

## 2021-12-01 PROCEDURE — G9005: CPT

## 2021-12-14 ENCOUNTER — APPOINTMENT (OUTPATIENT)
Dept: PAIN MANAGEMENT | Facility: CLINIC | Age: 61
End: 2021-12-14
Payer: MEDICAID

## 2021-12-14 VITALS
BODY MASS INDEX: 31.41 KG/M2 | WEIGHT: 160 LBS | SYSTOLIC BLOOD PRESSURE: 149 MMHG | HEIGHT: 60 IN | HEART RATE: 85 BPM | DIASTOLIC BLOOD PRESSURE: 79 MMHG

## 2021-12-14 PROCEDURE — 99214 OFFICE O/P EST MOD 30 MIN: CPT

## 2021-12-14 NOTE — HISTORY OF PRESENT ILLNESS
[FreeTextEntry1] : Patient saw a psychiatrist whom she did not like and is having an intake survey with another psychiatrist. Her other prior psychiatrist continues to Rx Xanax..\par On Duragesic 150 mcg q 3 days. for past last 3 cycles. Feels like she is going thru some withdrawal sxs on second and third day. She notes increasing headaches as the patch wears off. \par Patient had an appointment with a rheumatologist - according to friend and patient. Has new rheum Dec 16 th at 1130 am.\par \par Patient reports last BM one week ago. chronic issue\par \par

## 2021-12-14 NOTE — ASSESSMENT
[Opioids] : Patient was explained in detail about pain control by using opioids. Patient has signed and fully understands our guidelines for medication and drug screening.  Patient understands the side effects of opioids, including, but not limited to, drug tolerance, dependence, potential for addiction. This class of drugs is habit-forming and KHOA regulated. The sedative effects of opioids can be potentiated by taking alcohol or any sleeping pills, along with opioids. The decision to drive is patient’s responsibility, as opioids can affect his/her driving ability and ability to concentrate. The long-term place is not clear, however, patient understands that once the pain control optimizes, the goal will be to wean off the opioids. All the issues regarding opioid treatment have been addressed satisfactorily.  [FreeTextEntry1] : Chronic pain syndrome\par \par She will be seeing Rheum 12/16\par She has not been able to secure an appointment with Psych yet but has been speaking to a psychologist and S/w and working on obtaining appointment with a psychiatrist to rx Alprazolam  and may have to go to ER if unable to obtain rx. \par Dr. Bernal discussed with Emily Lockett from [facility] . \par \par Meds as follows\par  Fentanyl 150 mcg q 72 hours will be decreased to 125 mcg q 72 at next refill \par continue MSER 30 mg BID, MSIR 15 mg 2-3x/day qty 75 tabs, Oxycodone 20 mg 4x/day \par Increase Baclofen 10 mg 4x/day to a total of 50 mg/day. \par \par I-Stop reviewed,  reference #: [id number]\par No signs of aberrant behavior and will continue to monitor for signs of toxicity.\par Reminded to continue to avoid alcohol.\par \par AYLEEN BOSWELL  was seen and examined with Dr. Bernal who agrees with the assessment and plan.\par \par \par \par \par

## 2021-12-14 NOTE — ASSESSMENT
[Opioids] : Patient was explained in detail about pain control by using opioids. Patient has signed and fully understands our guidelines for medication and drug screening.  Patient understands the side effects of opioids, including, but not limited to, drug tolerance, dependence, potential for addiction. This class of drugs is habit-forming and KHOA regulated. The sedative effects of opioids can be potentiated by taking alcohol or any sleeping pills, along with opioids. The decision to drive is patient’s responsibility, as opioids can affect his/her driving ability and ability to concentrate. The long-term place is not clear, however, patient understands that once the pain control optimizes, the goal will be to wean off the opioids. All the issues regarding opioid treatment have been addressed satisfactorily.  [FreeTextEntry1] : Chronic pain syndrome\par No signs of withdrawal or sings of toxicity\par Will recommend tizanidine 2 mg bid Advised of AEs.\par Will try to taper opioids again next month after we sw Rheumatology and psychiatry. \par \par ISTOP reviewed [id number]

## 2021-12-14 NOTE — REVIEW OF SYSTEMS
[Constipation] : constipation [As Noted in HPI] : as noted in HPI [Anxiety] : anxiety [Negative] : Heme/Lymph

## 2021-12-14 NOTE — HISTORY OF PRESENT ILLNESS
[FreeTextEntry1] : 59 y/o F Pmhx Scleroderma, Anxiety with chronic pain on chronic opioids who presents today for follow up. Today she reports no significant change in her pain levels.  She is anxious and reluctant to decrease her opioids. She will be seeing Rheumatologist Dr. Clancy on 12/16 but has not been able to see a psychiatrist formally yet. She is unsure if her prior prescriber will be writing Xanax further. \par \par Current meds: Fentanyl 150 mcg q 72 hours, MSER 30 mg BID, MSIR 15 mg 2-3x/day qty 75 tabs, Oxycodone 20 mg 4x/day , Baclofen 10 mg 4x/day\par \par

## 2021-12-27 ENCOUNTER — NON-APPOINTMENT (OUTPATIENT)
Age: 61
End: 2021-12-27

## 2022-01-11 ENCOUNTER — APPOINTMENT (OUTPATIENT)
Dept: PAIN MANAGEMENT | Facility: CLINIC | Age: 62
End: 2022-01-11
Payer: MEDICAID

## 2022-01-11 PROCEDURE — 99213 OFFICE O/P EST LOW 20 MIN: CPT | Mod: 95

## 2022-01-11 NOTE — REASON FOR VISIT
[Home] : at home, [unfilled] , at the time of the visit. [Medical Office: ([facility])___] : at the medical office located in  [Verbal consent obtained from patient] : the patient, [unfilled]

## 2022-01-27 NOTE — HISTORY OF PRESENT ILLNESS
[FreeTextEntry1] : Since last visit, patient and aide was unable to get appointment with therapist, and other pain doc. Belén advised me weeks ago that this pain team would take care of her pain care.  \par Her pain remains the same 9/10. She went to see rheumatologist who felt she was not a candidate for his treatment. \par Pain medications\par [id number]\par Duragesic 150 \par Morphine ER 30 ,g bid\par MSIR 15 MGS 3 per day\par Oxycodone 20 mg qid.\par Baclofen 10 mgs 5 times per day \par Xanax 1 mgs prn\par \par Advised patient, we will continue to taper the opioid medicines to provide appropriate pain care.\par \par She will begin to taper off MSIR over next few weeks. \par At next Rx, will decrease Duragesic patch to 125 mcg \par I left a message with Belén [phone]\par \par Called again to speak to Belén today Jan 27. LM

## 2022-01-28 RX ORDER — MORPHINE SULFATE 15 MG/1
15 TABLET ORAL
Qty: 90 | Refills: 0 | Status: DISCONTINUED | COMMUNITY
Start: 2020-12-01 | End: 2022-01-28

## 2022-01-28 RX ORDER — MORPHINE SULFATE 15 MG/1
15 TABLET, EXTENDED RELEASE ORAL
Refills: 0 | Status: DISCONTINUED | COMMUNITY
End: 2022-01-28

## 2022-02-08 ENCOUNTER — APPOINTMENT (OUTPATIENT)
Dept: PAIN MANAGEMENT | Facility: CLINIC | Age: 62
End: 2022-02-08

## 2022-02-10 ENCOUNTER — APPOINTMENT (OUTPATIENT)
Dept: PAIN MANAGEMENT | Facility: CLINIC | Age: 62
End: 2022-02-10
Payer: MEDICAID

## 2022-02-10 PROCEDURE — 99442: CPT | Mod: 95

## 2022-02-10 NOTE — HISTORY OF PRESENT ILLNESS
[Home] : at home, [unfilled] , at the time of the visit. [Verbal consent obtained from patient] : the patient, [unfilled] [FreeTextEntry1] : Duragesic  125 \par Morphine 30 mg bid\par Oxycodone 20 ir mgs qid\par MSIR 15 mgs 2 qd. tapering down...has 18  pills \par Baclofen \par Patient reports having difficulty urinating, patient not eating, drinking fluid. Going to see PMD this Tuesday. She went to see a rheum doc, advised no need to see a rhematologist. \par \par Dr. Julieth sher located in [place], [place]\par \par Patient told me all her psych cancels have been cancelled.   \par Will re try Trial of duloxetine 20 mgs qd. \par \par Will be seeing Dr Anderson her psychiatrist. Advised o taper off MSIR over 18 days.\par Patient advised to have internist call me next Tuesday.\par Consider Belbuca \par

## 2022-03-22 ENCOUNTER — APPOINTMENT (OUTPATIENT)
Dept: PAIN MANAGEMENT | Facility: CLINIC | Age: 62
End: 2022-03-22
Payer: MEDICAID

## 2022-03-22 VITALS
DIASTOLIC BLOOD PRESSURE: 87 MMHG | SYSTOLIC BLOOD PRESSURE: 154 MMHG | BODY MASS INDEX: 28.32 KG/M2 | HEART RATE: 88 BPM | WEIGHT: 170 LBS | HEIGHT: 65 IN | RESPIRATION RATE: 18 BRPM

## 2022-03-22 PROCEDURE — 99215 OFFICE O/P EST HI 40 MIN: CPT

## 2022-03-25 NOTE — HISTORY OF PRESENT ILLNESS
[FreeTextEntry1] : Patient reports she continues to try and see new docs - 3 psychiatry via [employer] of insurance company. Saw Dr. Ebony Suárez wo recently did blood work. Visit prior was 6 months ago. Went to see Rheum Dr. Mccauley  in [place]  told her, she can not do anything for scleroderma. Seeing GI doc specialist, Suzanne Yang [phone]  - bleeding from buttock - dark blood for approx 1.5 years  Workup is pending. \par \par Meds: \par Duragesic 125; MSER 30 mgs bid; Baclofen 10 mgs 5 times per day; Oxycodone 20 mgs qid. \par

## 2022-03-25 NOTE — ASSESSMENT
[Opioids] : Patient was explained in detail about pain control by using opioids. Patient has signed and fully understands our guidelines for medication and drug screening.  Patient understands the side effects of opioids, including, but not limited to, drug tolerance, dependence, potential for addiction. This class of drugs is habit-forming and KHOA regulated. The sedative effects of opioids can be potentiated by taking alcohol or any sleeping pills, along with opioids. The decision to drive is patient’s responsibility, as opioids can affect his/her driving ability and ability to concentrate. The long-term place is not clear, however, patient understands that once the pain control optimizes, the goal will be to wean off the opioids. All the issues regarding opioid treatment have been addressed satisfactorily.  [FreeTextEntry1] : Spoke with patient at length and friend, regarding her condition and treatment. \par She finally was able to see a rheumatologist, GI specialist and her PMD. \par \par We discussed appropriate pain medication treatment. This has been an ongoing problem since I inherited her from another pain management doc. We discuused apropriate pain medicine regime which is what we are attempting to achieve. I feel some of her pain is sign triggered by anxiety. She is to continue to try to fu psychiatry.  She pleaded with me NOT to rduce her this month and I agreed.\par \par Will continue to taper slwly following our next appt next month. In meantine, I will try to contact her PMD and Emily\par

## 2022-03-25 NOTE — PHYSICAL EXAM
[General Appearance - Alert] : alert [Person] : oriented to person [Place] : oriented to place [Time] : oriented to time [FreeTextEntry1] : Crying in wheelchair reporting her pain being so severe. However, when I left the room and at the end of the visit, she was quite calm and not in distress.

## 2022-04-26 ENCOUNTER — APPOINTMENT (OUTPATIENT)
Dept: PAIN MANAGEMENT | Facility: CLINIC | Age: 62
End: 2022-04-26
Payer: MEDICAID

## 2022-04-26 PROCEDURE — 99215 OFFICE O/P EST HI 40 MIN: CPT

## 2022-04-28 RX ORDER — FENTANYL 25 UG/H
25 PATCH, EXTENDED RELEASE TRANSDERMAL
Qty: 10 | Refills: 0 | Status: DISCONTINUED | COMMUNITY
Start: 2021-08-20 | End: 2022-04-28

## 2022-05-24 ENCOUNTER — APPOINTMENT (OUTPATIENT)
Dept: PAIN MANAGEMENT | Facility: CLINIC | Age: 62
End: 2022-05-24

## 2022-05-25 ENCOUNTER — APPOINTMENT (OUTPATIENT)
Dept: PAIN MANAGEMENT | Facility: CLINIC | Age: 62
End: 2022-05-25

## 2022-06-28 ENCOUNTER — APPOINTMENT (OUTPATIENT)
Dept: PAIN MANAGEMENT | Facility: CLINIC | Age: 62
End: 2022-06-28

## 2022-06-28 ENCOUNTER — APPOINTMENT (OUTPATIENT)
Dept: PAIN MANAGEMENT | Facility: CLINIC | Age: 62
End: 2022-06-28
Payer: MEDICAID

## 2022-06-28 VITALS
DIASTOLIC BLOOD PRESSURE: 81 MMHG | HEART RATE: 105 BPM | BODY MASS INDEX: 25.16 KG/M2 | WEIGHT: 151 LBS | HEIGHT: 65 IN | SYSTOLIC BLOOD PRESSURE: 147 MMHG

## 2022-06-28 PROCEDURE — 99215 OFFICE O/P EST HI 40 MIN: CPT

## 2022-06-28 RX ORDER — TIZANIDINE 2 MG/1
2 TABLET ORAL
Qty: 60 | Refills: 0 | Status: DISCONTINUED | COMMUNITY
Start: 2021-01-14 | End: 2022-06-28

## 2022-06-28 RX ORDER — GABAPENTIN 100 MG/1
100 CAPSULE ORAL
Qty: 90 | Refills: 0 | Status: DISCONTINUED | COMMUNITY
Start: 2020-12-01 | End: 2022-06-28

## 2022-06-28 RX ORDER — FENTANYL 100 UG/H
100 PATCH, EXTENDED RELEASE TRANSDERMAL
Qty: 5 | Refills: 0 | Status: DISCONTINUED | COMMUNITY
Start: 2021-08-20 | End: 2022-06-28

## 2022-06-28 RX ORDER — MORPHINE SULFATE 30 MG/1
30 TABLET, EXTENDED RELEASE ORAL
Refills: 0 | Status: DISCONTINUED | COMMUNITY
End: 2022-06-28

## 2022-06-28 NOTE — PHYSICAL EXAM
[FreeTextEntry1] : Constitutional: Crying in office  when PA and attending in [facility]. However, when passing by with door opened, no signs of distress. No signs of toxicity. \par Mental Status: Alert and well oriented. Speech fluent. No aphasia. Fund of knowledge intact. \par Psychiatric: anxious, depressed\par Cranial Nerve: PERRLA: No papilledema; No VFC: No Sheree. V1-3 intact. No facial asymmetry, hearing grossly intact; palate elevates symmetrically, tongue midline\par Motor: 4/5 b/l LE and UE \par Gait: in wheeel chair .\par Eyes: no redness or swelling\par HEENT: intact; no signs of trauma.\par Neck: No masses noted\par Pulmonary: no respiratory distress\par Vascular: no temperature, color change or sudomotor changes.; no edema\par

## 2022-06-28 NOTE — ASSESSMENT
[Opioids] : Patient was explained in detail about pain control by using opioids. Patient has signed and fully understands our guidelines for medication and drug screening.  Patient understands the side effects of opioids, including, but not limited to, drug tolerance, dependence, potential for addiction. This class of drugs is habit-forming and KHOA regulated. The sedative effects of opioids can be potentiated by taking alcohol or any sleeping pills, along with opioids. The decision to drive is patient’s responsibility, as opioids can affect his/her driving ability and ability to concentrate. The long-term place is not clear, however, patient understands that once the pain control optimizes, the goal will be to wean off the opioids. All the issues regarding opioid treatment have been addressed satisfactorily.  [FreeTextEntry1] : 62 y/o F with scleroderma chronic pain on long standing opioid therapy. \par \par We extensively discussed the nature of chronic pain and the need to continue to taper down further on opioids. Longer term plan is to have her is to to have one short acting opioid only as well as additional nonopioid therapy.  In view of her significant anxiety and prior reports of tachycardia, we do not feel comfortable writing rx for antidepressants for pain control. She is still very reluctant to taper down medications and we feel that much of her pain is related to her anxiety and poor coping skills. She denies SI/HI and has a support system with her  who accompanies her to her visits and assists with medication administration. She has missed multiple medical appointments as well psychiatric evaluation. \par \par We provided patient with names and contact information of suboxone providers as she expresses increased difficulty with the gradual tapering protocol of her opioids.  \par \par We advised patient  to go ER for impending withdrawal from Xanax as she is taking an older prescription for Xanax from 2008 that was previously prescribed by her former psychiatrist. We recommend she have the ER attending reach directly to Dr. Bernal to discuss case.  Patient stated that she was willing to go to ER but would not be able to go until Thursday.  She reports she has ample supply of medication to hold her over until then despite being old.  \par \par  I-Stop reviewed,  reference #: [id number]\par No signs of aberrant behavior and will continue to monitor for signs of toxicity.\par Reminded to continue to avoid alcohol.\par \par AYLEEN BOSWELL  was seen and examined with Dr. Bernal who agrees with the assessment and plan.\par \par \par \par

## 2022-07-04 NOTE — ASSESSMENT
[FreeTextEntry1] : \par Spoke with patient at length and friend, regarding her condition and treatment. \par We discussed at length about her pain medication regimen, and our plan moving forward. We want to get her down to one long acting medication and one short acting medication. Much of her pain and anxiety is related to her coping skills about coming off / lowering the opioid medication and I want to have her speak with a psychiatrist. She has an appointment next Wednesday. I offered to speak with this psychiatrist before she sees him so that he can have a good history about her case before speaking with her. The patient was agreeable. \par \par The plan is to lower the fentanyl patch from 125mcg down to 100mcg q72h. \par Patient was explained in detail about pain control by using opioids. Patient has signed and fully understands our guidelines for medication and drug screening. Patient understands the side effects of opioids, including, but not limited to, drug tolerance, dependence, potential for addiction. This class of drugs is habit-forming and KHOA regulated. The sedative effects of opioids can be potentiated by taking alcohol or any sleeping pills, along with opioids. The decision to drive is patient’s responsibility, as opioids can affect his/her driving ability and ability to concentrate. All the issues regarding opioid treatment have been addressed satisfactorily.  \par  \par

## 2022-07-04 NOTE — HISTORY OF PRESENT ILLNESS
[FreeTextEntry1] : This is a 61 year old female with complicated history of scleroderma, for which she suffers from a chronic generalized pain condition treated with high dose opioids for many years with a recent plan for opioid taper. Patient reports that her rheumatologist Dr. Clancy told her that there was not much else to be done from a treatment standpoint. Dr. Mccauley  in [place] told her, she can not do anything for scleroderma. The patient states that she had an apt with a psychologist about a week, for which the televisit was abruptly ended after the patient described suicidal ideation. The patient states today that she is not suicidal and just ineffectively communicated at the time. She now has another apt for a new psychiatrist next Wednesday. The patient states her pain is slightly worse now, but complains mainly of nausea and "withdrawal symptoms." We did not adjust the opioid medications on her last visit. \par \par Meds: \par Duragesic 125; MSER 30 mgs bid; Baclofen 10 mgs 5 times per day; Oxycodone 20 mgs qid, fentanyl patch 125mcg q72h. \par \par

## 2022-07-04 NOTE — PHYSICAL EXAM
[FreeTextEntry1] : Constitutional: Appears very distresssed seconday to anxiety and pain. When we walk in to room she begins to cry. When we leave or pass by room, there is no crying. No signs of toxicity. \par Mental Status: Alert and well oriented. Speech fluent. No aphasia. Fund of knowledge intact. \par Psychiatric: anxious, depressed\par Cranial Nerve: PERRLA: No papilledema; No VFC: No Sheree. V1-3 intact. No facial asymmetry, hearing grossly intact; palate elevates symmetrically, tongue midline\par Motor: 4/5 b/l LE and UE \par Sensory: intact to primary and secondary modalities; \par Gait: in wheelchair.\par Eyes: no redness or swelling\par HEENT: intact; no signs of trauma.\par Neck: No masses noted\par Pulmonary: no respiratory distress\par Vascular: no temperature, color change or sudomotor changes.; no edema\par

## 2022-07-26 ENCOUNTER — APPOINTMENT (OUTPATIENT)
Dept: PAIN MANAGEMENT | Facility: CLINIC | Age: 62
End: 2022-07-26

## 2022-07-26 VITALS
HEART RATE: 102 BPM | SYSTOLIC BLOOD PRESSURE: 120 MMHG | WEIGHT: 151 LBS | HEIGHT: 65 IN | BODY MASS INDEX: 25.16 KG/M2 | DIASTOLIC BLOOD PRESSURE: 82 MMHG

## 2022-07-26 PROCEDURE — 99214 OFFICE O/P EST MOD 30 MIN: CPT

## 2022-07-26 RX ORDER — PREGABALIN 25 MG/1
25 CAPSULE ORAL 3 TIMES DAILY
Qty: 90 | Refills: 0 | Status: ACTIVE | COMMUNITY
Start: 2022-07-26 | End: 1900-01-01

## 2022-08-02 RX ORDER — NALOXONE HYDROCHLORIDE 4 MG/.1ML
4 SPRAY NASAL
Qty: 1 | Refills: 1 | Status: ACTIVE | COMMUNITY
Start: 2022-08-02 | End: 1900-01-01

## 2022-08-10 NOTE — HISTORY OF PRESENT ILLNESS
[FreeTextEntry1] : Since last visit, patient reports no suicidal ideation. She notes moving less and less and \par On fentanyl 75 mcg and decided to use 25 mcg that were leftover from my prior Rx. \par \par Patient aide state no one wants to take her because she os on pain medication. I tried to call Dr. Nikole Choi x2 and no answer. And Princess from her insurance company has not returned call. I asked patient to have her call me. \par \par Patient and aide tell me that she cant get an appointment with psychiatrist. \par I continue to tell patient she is on not on appropriate pain therapy \par Patient is going to hopefully see rheumatologist who specializes in scleroderma. - Patient is going to confirm tomorrow. \par \par Patients aide tells me she did not follow through with our suboxone treatment.  \par Patient bleeding prn from rectum. \par \par Baclofen 10 mg s 5 qd; Off Cymbalta\par Lyrica 25 mgs tid \par

## 2022-08-10 NOTE — PHYSICAL EXAM
[FreeTextEntry1] : Constitutional: No signs of toxicity. \par Mental Status: Alert and well oriented. Speech fluent. No aphasia. Fund of knowledge intact. \par Psychiatric: Crying often.\par Gait: in wheelchair\par Eyes: no redness or swelling\par HEENT: intact; no signs of trauma.\par Neck: No masses noted\par Pulmonary: no respiratory distress\par Skin: No rash.\par \par

## 2022-08-23 ENCOUNTER — APPOINTMENT (OUTPATIENT)
Dept: PAIN MANAGEMENT | Facility: CLINIC | Age: 62
End: 2022-08-23

## 2022-09-01 RX ORDER — FENTANYL 75 UG/H
75 PATCH, EXTENDED RELEASE TRANSDERMAL
Qty: 10 | Refills: 0 | Status: DISCONTINUED | COMMUNITY
Start: 2020-12-16 | End: 2022-09-01

## 2022-09-28 ENCOUNTER — APPOINTMENT (OUTPATIENT)
Dept: PAIN MANAGEMENT | Facility: CLINIC | Age: 62
End: 2022-09-28

## 2022-09-28 VITALS
BODY MASS INDEX: 24.49 KG/M2 | SYSTOLIC BLOOD PRESSURE: 131 MMHG | HEIGHT: 65 IN | WEIGHT: 147 LBS | HEART RATE: 92 BPM | DIASTOLIC BLOOD PRESSURE: 88 MMHG | TEMPERATURE: 98 F

## 2022-09-28 PROCEDURE — 99215 OFFICE O/P EST HI 40 MIN: CPT

## 2022-10-04 NOTE — HISTORY OF PRESENT ILLNESS
[FreeTextEntry1] : Since last visit, patient reports doing the same. \par \par Meds: \par Fentanyl 50\par MS 30 MGS BID\par Oxycodone 15 mgs 4 per day\par Pregabalin 25 mgs - 1 tid for one week - trouble speaking, confusion, shaking, trouble breathing. and was discontinued ans symptoms resolved. \par Xanax 1 mgs tid\par \par [facility] from [employer] or Jono from [employer] - no one will take her.  She said she also tried 2 other companies wo success. Private psychiatrists refuse to see her.  \par \par She went to Rheumatologist who sent her for serology at [facility].\par \par Last BM 6 weeks ago. She states she spoke with her internist about it and is very much aware of her condition. I advised them to go to ER and made aware of obstruction. Both aide and patient refuse.  - Continue on mom and senna\par \par

## 2022-10-04 NOTE — ASSESSMENT
[FreeTextEntry1] : Patient and her aide report " its working" in regards to her detox. She notes how grateful she is for having me as a doctor.  \par Patients aide got in touch with a psychiatrist but not sure if they will take insurance and is considering to pay out of pocket.  \par Patient will continue to taper off fentanyl. Will stay on Fentanyl this month and next month reduce to 37.5 mcg next month.\par Advised patient to go to Health system for xanax and anxiety control today at at least by end of week, Her aide assured me that he will make it happen by end of week. .\par Patient and aide made fully aware of the inappropriate use of all these agents and therefore, we are tapering off these medications in combination. Due to her fear and anxiety, I am try to accommodate her by coming down slowly. They are both aware of the plan.Next month we are decreasing the fentanyl by 25 mcg - patient pain intensity, her examination findings including vss has remained relatively the same. \par Continue MSC/fentanyl/oxycodone as directed. \par \par  [Opioids] : Patient was explained in detail about pain control by using opioids. Patient has signed and fully understands our guidelines for medication and drug screening.  Patient understands the side effects of opioids, including, but not limited to, drug tolerance, dependence, potential for addiction. This class of drugs is habit-forming and KHOA regulated. The sedative effects of opioids can be potentiated by taking alcohol or any sleeping pills, along with opioids. The decision to drive is patient’s responsibility, as opioids can affect his/her driving ability and ability to concentrate. The long-term place is not clear, however, patient understands that once the pain control optimizes, the goal will be to wean off the opioids. All the issues regarding opioid treatment have been addressed satisfactorily.

## 2022-10-04 NOTE — PHYSICAL EXAM
[General Appearance - Alert] : alert [FreeTextEntry1] : anxious often teary and rocking in whhelchair back and forth. [Person] : oriented to person [Place] : oriented to place [Time] : oriented to time [Sclera] : the sclera and conjunctiva were normal [Outer Ear] : the ears and nose were normal in appearance [] : no rash

## 2022-10-25 ENCOUNTER — APPOINTMENT (OUTPATIENT)
Dept: PAIN MANAGEMENT | Facility: CLINIC | Age: 62
End: 2022-10-25

## 2022-10-25 ENCOUNTER — OUTPATIENT (OUTPATIENT)
Dept: OUTPATIENT SERVICES | Facility: HOSPITAL | Age: 62
LOS: 1 days | Discharge: HOME | End: 2022-10-25

## 2022-10-25 DIAGNOSIS — D25.9 LEIOMYOMA OF UTERUS, UNSPECIFIED: Chronic | ICD-10-CM

## 2022-10-25 DIAGNOSIS — K56.60 UNSPECIFIED INTESTINAL OBSTRUCTION: Chronic | ICD-10-CM

## 2022-10-25 DIAGNOSIS — Z98.89 OTHER SPECIFIED POSTPROCEDURAL STATES: Chronic | ICD-10-CM

## 2022-10-25 PROCEDURE — 99214 OFFICE O/P EST MOD 30 MIN: CPT

## 2022-11-09 NOTE — HISTORY OF PRESENT ILLNESS
[FreeTextEntry1] : This is a case of a 60 yo female who is accompanied by her aide/friend. She reports the pain has been unchanged since we have decreased the pain. She went to  [facility], at my request, to obtain a psychiatrist for her psychiatric care.  Patient states that she is taking old Xanax from 2009 and is detoxing by taking half a tablet or quarter tablet once or twice a day.  She reports that the doctor at [facility] refused to have her follow-up and will not write any of the Xanax.  She also states that she had seen Dr. Chelsea Romeo a rheumatologist at the [facility] in [place] who also told her she would not write any prescriptions and will not even see her in treatment.  She did not go for any evaluation for Suboxone at this time.\par \par \par Dr. Sanchez and Johnathan dorman [facility]. \par Chelsea Romeo   at [facility] in [place] . \par Dr. Ebony Suárez. [phone] -according to the patient refused to write any Xanax for the patient.\par \par The patient notes that her last bowel movement was approximately 10 weeks ago and is not overly concerned about this because this has been her standard frequency of bowel movements.  When I asked her does her primary care doctor know about her infrequent bowel movements and the potential risks and the patient responded, "my doctor knows about my infrequent bowel movements and is not concerned".  \par \par \par

## 2022-11-09 NOTE — PHYSICAL EXAM
[FreeTextEntry1] : Rocking in wheelchair and becomes quite anxious when asked questions about her pain.\par \par Shiny skin throughout upper and lower extremities.\par Able to lift upper and lower extremities against gravity while sitting in the wheelchair.  .

## 2022-11-09 NOTE — ASSESSMENT
[FreeTextEntry1] : This is a lady who has history of scleroderma and despite multiple attempts by patient and myself she is unable to get psychiatric car,e unable to get rheumatological care, "refuses to see her", and her primary care doctor, as per patient, refuses to continue the Xanax or at least help taper her off this medication.  I find this extremely alarming and not sure what is the actual dose the patient is taking because she is inconsistent with her response as per the number of medications of Xanax she is taking a day.  Her friend/aide was unable to add any more input regarding this matter.\par \par I attempted to call Dr. Crump at [facility], the same date of the visit, and the only response I got was his  relaying a verbal message stating "I am not going to write the Xanax".  When I asked the  to please have the doctor contact me, despite his response, she told me "I have left in the message" and I never received a call.\par \par I attempted to call her primary care doctor and the rheumatologist who are at the same location and was able unable to get through on the phone or leave a message.\par \par I told the patient that I am extremely concerned and have been concerned about her overall care and recommended that she should go to the emergency room if she cannot get psychiatric treatment.  She refused.  I also recommended that she contact her primary care doctor to have her call me in regards to the Xanax and her bowel movement frequency.\par \par The patient is aware that I am going to continue to taper off the fentanyl patch and then will reevaluate and start tapering off one of the immediate release opioid analgesics because she came to me on inappropriate amounts of opioid analgesics.  In my opinion, her pain has not increased even though she states having "a lot of pain" since we have reduced the medication.  It is my strong recommendation to the patient and her friend/aide that she get psychiatric care because I think the underlying anxiety is the generator of her chronic pain in the setting of underlying scleroderma.

## 2022-11-29 ENCOUNTER — APPOINTMENT (OUTPATIENT)
Dept: PAIN MANAGEMENT | Facility: CLINIC | Age: 62
End: 2022-11-29

## 2022-11-29 VITALS
HEART RATE: 102 BPM | WEIGHT: 150 LBS | HEIGHT: 65 IN | RESPIRATION RATE: 16 BRPM | BODY MASS INDEX: 24.99 KG/M2 | SYSTOLIC BLOOD PRESSURE: 120 MMHG | DIASTOLIC BLOOD PRESSURE: 70 MMHG

## 2022-11-29 PROCEDURE — 99215 OFFICE O/P EST HI 40 MIN: CPT

## 2022-11-29 NOTE — HISTORY OF PRESENT ILLNESS
[FreeTextEntry1] : 60 y/o F Pmhx Scleroderma, Anxiety with chronic pain on chronic opioids who presents today for follow up accompanied by her aide/friend. She has been in touch with  at [facility] as she has not been able to find a provider to assist with her psych needs.  Diffuse pain in all joints and legs 5-10/10 on average.  Today she reports that she has been experiencing increased anxiety since decreasing xanax. She now has Xanax 1 mg tabs 28 pills left left over from rx in  and is on 1.25 tabs/day for the last week and feels she has been getting more anxious" getting worked up".  She reports she has no other benzos or old opioids at homes.  \par Decreased appetite, difficulty sleeping, increased anxiety, visual and auditory hallucination - sees  family members, easily startled with shadows.   She was prescribed Baclofen 10 mg 5x/day but only taking 3x/day and throwing away 2 pills per day. + constipation last BM 2.5-3 weeks \par \par She denies SI/HI and has a support system with her  who accompanies her to her visits and assists with medication administration.\par \par Current meds: Fentanyl 25mcg q 72 hours, MSER 30 mg BID,  Oxycodone 20 mg 4x/day , Baclofen 10 mg 3-5x/day, Xanax ? dose as old rx. \par \par

## 2022-11-29 NOTE — ASSESSMENT
[Opioids] : Patient was explained in detail about pain control by using opioids. Patient has signed and fully understands our guidelines for medication and drug screening.  Patient understands the side effects of opioids, including, but not limited to, drug tolerance, dependence, potential for addiction. This class of drugs is habit-forming and KHOA regulated. The sedative effects of opioids can be potentiated by taking alcohol or any sleeping pills, along with opioids. The decision to drive is patient’s responsibility, as opioids can affect his/her driving ability and ability to concentrate. The long-term place is not clear, however, patient understands that once the pain control optimizes, the goal will be to wean off the opioids. All the issues regarding opioid treatment have been addressed satisfactorily.  [FreeTextEntry1] : 60 y/o F with scleroderma chronic pain on long standing opioid therapy as well as benzos.  Patient appears to be tolerating opioid taper well with no evidence of distress or withdrawal at the time of this visit. She reports that she has been unsuccessful in finding a psychiatrist to assist in the management of her symptoms or for medication management of benzodiazepines.  Patient again reports she continues to taper down medications on her own and now taking Xanax 1.25 mg/day total in divided doses from an old rx prescribed in 2008.  Her aide/friend had bottle with him at the time of this visit and reports there were only 28 total pills left, no other additional supply of benzos at home. Again reports having discarded all older opioid medications in drug disposal bin and only has current active prescriptions of opioids. \par \par Case discussed extensively with Dr. Bernal \par -  Continue Fentanyl 25mcg patch q 72 for 1 more month and d/c \par -Continue Oxycodone 20mg 4x/day for now\par -continue MSER 30 mg 2x/day this month and will taper on subsequent visit. \par -Given she has been unsuccessful in finding a provider to assist managing Xanax rx and dosing recommend decreasing dosage gradually over the coming weeks.  Instructed to go to ER for impending withdrawal and patient declined. \par -UDS repeated today \par Opiate agreement was renewed \par I-Stop reviewed,  reference #: [id number]\par Continue to monitor for aberrant  behavior and for signs of toxicity.\par Reminded to continue to avoid alcohol.\par Patient denies other prescribers.

## 2022-11-29 NOTE — PHYSICAL EXAM
[FreeTextEntry1] : Constitutional: Patient appears comfortable, No signs of distress. No signs of toxicity.\par Neck/spine: Examination of the cervical spine reveals normal range of motion in the neck, no midline tenderness, no paraspinal muscle tenderness, no facet tenderness, negative Spurling's bilaterally. Examination of the lumbar spine reveals normal range of motion including flexion, extension and lateral rotation. No midline tenderness, no paraspinal muscle tenderness, negative facet loading,  no tenderness of sciatic notch, no tenderness of bilateral greater trochanters\par MS: Alert and well oriented. Speech fluent. No aphasia. Fund of knowledge intact. \par Psychiatric: Mood stable.\par Motor: Adequate bulk, tone, strength. 4/5 strength\par Sensory: intact to primary and secondary modalities; \par Cerebellar and gait: intact\par Eyes: no redness or swelling\par Pulmonary: no respiratory distress\par Vascular: no temperature,color changes; no edema\par Musculoskeletal:  no joint deformities ,  no scoliosis, lordosis, kyphosis\par Skin: No rash.\par \par \par '\par

## 2022-12-07 RX ORDER — BACLOFEN 10 MG/1
10 TABLET ORAL
Qty: 150 | Refills: 2 | Status: ACTIVE | COMMUNITY
Start: 1900-01-01 | End: 1900-01-01

## 2022-12-15 ENCOUNTER — NON-APPOINTMENT (OUTPATIENT)
Age: 62
End: 2022-12-15

## 2022-12-27 ENCOUNTER — APPOINTMENT (OUTPATIENT)
Dept: PAIN MANAGEMENT | Facility: CLINIC | Age: 62
End: 2022-12-27

## 2022-12-27 PROCEDURE — 99213 OFFICE O/P EST LOW 20 MIN: CPT | Mod: 95

## 2022-12-27 NOTE — HISTORY OF PRESENT ILLNESS
[FreeTextEntry1] : 61 y/o F Pmhx Scleroderma, Anxiety with chronic pain on chronic opioids who presents today for follow up accompanied by her aide/friend.  Today she reports that she has been unwell for the past few days and thinks she may have the flu. She is having fever, chills, headaches, congestion, cough.  She continues to taper down xanax and reports skipping doses from time to time. On her last visit  she reported only 28 tabs left and now reports 5-6 pills left.  She still reports decreased appetite, difficulty sleeping, increased anxiety, visual and auditory hallucination - sees  family members, easily startled with shadows. \par \par  She was prescribed Baclofen 10 mg 5x/day but only taking 3x/day. + constipation last BM today \par \par Current meds: Fentanyl 25mcg q 72 hours, MSER 30 mg BID,  Oxycodone 20 mg 4x/day , Baclofen 10 mg 3-5x/day, Xanax ? dose as old rx. \par \par

## 2022-12-27 NOTE — REASON FOR VISIT
[Follow-Up: _____] : a [unfilled] follow-up visit [Home] : at home, [unfilled] , at the time of the visit. [Medical Office: ([facility])___] : at the medical office located in  [Patient] : the patient

## 2022-12-27 NOTE — ASSESSMENT
[Opioids] : Patient was explained in detail about pain control by using opioids. Patient has signed and fully understands our guidelines for medication and drug screening.  Patient understands the side effects of opioids, including, but not limited to, drug tolerance, dependence, potential for addiction. This class of drugs is habit-forming and KHOA regulated. The sedative effects of opioids can be potentiated by taking alcohol or any sleeping pills, along with opioids. The decision to drive is patient’s responsibility, as opioids can affect his/her driving ability and ability to concentrate. The long-term place is not clear, however, patient understands that once the pain control optimizes, the goal will be to wean off the opioids. All the issues regarding opioid treatment have been addressed satisfactorily.  [FreeTextEntry1] : 61 y/o F with scleroderma chronic pain on long standing opioid therapy as well as benzos.  Opioid have not been tapered the last few months as we are anticipating. She has an appointment later this week with a physician but refused to \par - She and her aide/friend Sam reports there were only 5-6 total pills left, no other additional supply of benzos at home.\par -Given she has been unsuccessful in finding a provider to assist managing Xanax rx and dosing recommend decreasing dosage gradually over the coming weeks.  Instructed to go to ER for impending withdrawal and patient declined. \par -  Continue Fentanyl 25mcg patch q 72 and decrease on next visit \par -Continue Oxycodone 20mg 4x/day for now\par -continue MSER 30 mg 2x/day this month and will taper on subsequent visit. \par -UDS 11/2022 c/w prescribed includeing alprazolam as well as THC.  \par Opiate agreement was renewed 11/29/2022 \par I-Stop reviewed,  reference #: [id number]\par Continue to monitor for aberrant  behavior and for signs of toxicity.\par Reminded to continue to avoid alcohol.\par Patient denies other prescribers.

## 2023-02-03 ENCOUNTER — APPOINTMENT (OUTPATIENT)
Dept: PAIN MANAGEMENT | Facility: CLINIC | Age: 63
End: 2023-02-03
Payer: MEDICAID

## 2023-02-03 ENCOUNTER — NON-APPOINTMENT (OUTPATIENT)
Age: 63
End: 2023-02-03

## 2023-02-03 PROCEDURE — 99213 OFFICE O/P EST LOW 20 MIN: CPT | Mod: 95

## 2023-02-03 RX ORDER — FENTANYL 25 UG/H
25 PATCH, EXTENDED RELEASE TRANSDERMAL
Qty: 10 | Refills: 0 | Status: DISCONTINUED | COMMUNITY
Start: 2022-07-26 | End: 2023-02-03

## 2023-02-03 RX ORDER — ALPRAZOLAM 1 MG/1
1 TABLET ORAL 3 TIMES DAILY
Qty: 75 | Refills: 0 | Status: DISCONTINUED | COMMUNITY
Start: 2021-01-22 | End: 2023-02-03

## 2023-03-03 ENCOUNTER — APPOINTMENT (OUTPATIENT)
Dept: PAIN MANAGEMENT | Facility: CLINIC | Age: 63
End: 2023-03-03

## 2023-03-08 ENCOUNTER — APPOINTMENT (OUTPATIENT)
Dept: PAIN MANAGEMENT | Facility: CLINIC | Age: 63
End: 2023-03-08
Payer: MEDICAID

## 2023-03-08 DIAGNOSIS — G89.4 CHRONIC PAIN SYNDROME: ICD-10-CM

## 2023-03-08 PROCEDURE — 99212 OFFICE O/P EST SF 10 MIN: CPT | Mod: 95

## 2023-03-08 RX ORDER — OXYCODONE 20 MG/1
20 TABLET ORAL 4 TIMES DAILY
Qty: 120 | Refills: 0 | Status: ACTIVE | COMMUNITY
Start: 2020-12-23 | End: 1900-01-01

## 2023-03-08 NOTE — HISTORY OF PRESENT ILLNESS
[FreeTextEntry1] : 63 y/o F Pmhx Scleroderma, Anxiety with chronic pain on chronic opioids who presents today for follow up accompanied by her aide/friend. Today she reports she has not been feeling well since last week, fevers on and off, cough, sputum. Had appointment planned for w/u for multiple lung nodules and is sending her for additional testing, elevated cholesterol as well as Derm for lesion ear that was bx results pending.  She reports she is still taking Xanax from the pill bottle she found in old suitcase at home and states she has plenty left. \par No changes in pain levels. \par \par + constipation BM \par \par Prior meds include: Fentanyl \par Current meds:  MSER 30 mg BID,  Oxycodone 20 mg 4x/day , Baclofen 10 mg 3-5x/day, Xanax ? dose as old rx. \par \par

## 2023-03-08 NOTE — REASON FOR VISIT
[Follow-Up: _____] : a [unfilled] follow-up visit [Home] : at home, [unfilled] , at the time of the visit. [Medical Office: ([facility])___] : at the medical office located in  [Friend] : friend [Patient] : the patient [Self] : self

## 2023-03-08 NOTE — ASSESSMENT
[FreeTextEntry1] : 61 y/o F with scleroderma chronic pain on long standing opioid therapy as well as benzos.  Patient tapered off benzos x 2 weeks but then reports she again found an old bottle of Xanax in an old suitcase and has restarted Xanax \par \par -decrease MSER 30 mg 2x/day to MSER 15mg am and 30 mg q hs and will continue to gradually tapered off of this. \par -Continue Oxycodone 20mg 4x/day for now\par - Advised she she go tto ER given persistent fever, cough. \par -UDS 11/2022 c/w prescribed including alprazolam as well as THC.  \par Opiate agreement was renewed 11/29/2022 \par I-Stop reviewed,  reference #: [id number]\par Continue to monitor for aberrant  behavior and for signs of toxicity.\par Reminded to continue to avoid alcohol.\par Patient denies other prescribers.

## 2023-03-08 NOTE — ASSESSMENT
[FreeTextEntry1] : 61 y/o F with scleroderma chronic pain on long standing opioid therapy as well as benzos.  Patient tapered off benzos x 2 weeks but then reports she again found an old bottle of Xanax in an old suitcase and has restarted Xanax \par \par -decrease MSER 30 mg 2x/day to MSER 15mg am and 30 mg q hs and will continue to gradually tapered off of this. \par -Continue Oxycodone 20mg 4x/day for now\par - Advised she she go tto ER given persistent fever, cough. \par -UDS 11/2022 c/w prescribed including alprazolam as well as THC.  \par Opiate agreement was renewed 11/29/2022 \par I-Stop reviewed,  reference #: [id number]\par Continue to monitor for aberrant  behavior and for signs of toxicity.\par Reminded to continue to avoid alcohol.\par Patient denies other prescribers.  [Opioids] : Patient was explained in detail about pain control by using opioids. Patient has signed and fully understands our guidelines for medication and drug screening.  Patient understands the side effects of opioids, including, but not limited to, drug tolerance, dependence, potential for addiction. This class of drugs is habit-forming and KHOA regulated. The sedative effects of opioids can be potentiated by taking alcohol or any sleeping pills, along with opioids. The decision to drive is patient’s responsibility, as opioids can affect his/her driving ability and ability to concentrate. The long-term place is not clear, however, patient understands that once the pain control optimizes, the goal will be to wean off the opioids. All the issues regarding opioid treatment have been addressed satisfactorily.

## 2023-03-08 NOTE — HISTORY OF PRESENT ILLNESS
[FreeTextEntry1] : 61 y/o F Pmhx Scleroderma, Anxiety with chronic pain on chronic opioids who presents today for follow up accompanied by her aide/friend. She has seen PCP and noted to have multiple lung nodules and is sending her for additional testing, elevated cholesterol as well as Derm for lesion ear that was bx results pending. \par \par She was off Xanax x 2 weeks and reports she found an old pill bottle of xanax that was filled 150 tabs in an old suitcase and restarted Xanax 1.5 tabs/day.  No changes in pain levels. \par \par + constipation BM \par \par Current meds: Fentanyl  MSER 30 mg BID,  Oxycodone 20 mg 4x/day , Baclofen 10 mg 3-5x/day, Xanax ? dose as old rx. \par \par

## 2023-03-09 RX ORDER — MORPHINE SULFATE 15 MG/1
15 TABLET, FILM COATED, EXTENDED RELEASE ORAL
Qty: 90 | Refills: 0 | Status: ACTIVE | COMMUNITY
Start: 2020-12-18 | End: 1900-01-01

## 2023-04-05 ENCOUNTER — APPOINTMENT (OUTPATIENT)
Dept: PAIN MANAGEMENT | Facility: CLINIC | Age: 63
End: 2023-04-05

## 2023-04-07 ENCOUNTER — APPOINTMENT (OUTPATIENT)
Dept: PAIN MANAGEMENT | Facility: CLINIC | Age: 63
End: 2023-04-07

## 2023-05-09 ENCOUNTER — APPOINTMENT (OUTPATIENT)
Dept: PAIN MANAGEMENT | Facility: CLINIC | Age: 63
End: 2023-05-09

## 2023-10-31 NOTE — HISTORY OF PRESENT ILLNESS
[FreeTextEntry1] : 62 y/o F Pmhx Scleroderma, Anxiety with chronic pain on chronic opioids who presents today for follow up. Today she reports that she has been experiencing increased anxiety since decreasing the fentanyl dosage to 75 mcg patch. She continues to be reluctant to decrease her opioids. Diffuse pain in all joints and legs 8-9/10 on average.  \par Decreased appetite, difficulty sleeping, increased anxiety. Reports not being able to attend multiple medical appointments and was unable to keep her appointment for initial psych eval. \par \par Current meds: Fentanyl 75mcg q 72 hours, MSER 30 mg BID,  Oxycodone 20 mg 4x/day , Baclofen 10 mg 4x/day\par \par PCP- Dr. Ebony Suárez [phone] next appointment scheduled for August 2022
Never

## 2024-12-20 ENCOUNTER — INPATIENT (INPATIENT)
Facility: HOSPITAL | Age: 64
LOS: 10 days | Discharge: HOME | DRG: 316 | End: 2024-12-31
Attending: STUDENT IN AN ORGANIZED HEALTH CARE EDUCATION/TRAINING PROGRAM | Admitting: HOSPITALIST
Payer: MEDICARE

## 2024-12-20 ENCOUNTER — RESULT REVIEW (OUTPATIENT)
Age: 64
End: 2024-12-20

## 2024-12-20 VITALS
RESPIRATION RATE: 20 BRPM | TEMPERATURE: 98 F | HEIGHT: 69 IN | WEIGHT: 147.93 LBS | HEART RATE: 110 BPM | SYSTOLIC BLOOD PRESSURE: 118 MMHG | OXYGEN SATURATION: 90 % | DIASTOLIC BLOOD PRESSURE: 73 MMHG

## 2024-12-20 DIAGNOSIS — Z98.89 OTHER SPECIFIED POSTPROCEDURAL STATES: Chronic | ICD-10-CM

## 2024-12-20 DIAGNOSIS — Z79.899 OTHER LONG TERM (CURRENT) DRUG THERAPY: ICD-10-CM

## 2024-12-20 DIAGNOSIS — I31.39 OTHER PERICARDIAL EFFUSION (NONINFLAMMATORY): ICD-10-CM

## 2024-12-20 DIAGNOSIS — K56.60 UNSPECIFIED INTESTINAL OBSTRUCTION: Chronic | ICD-10-CM

## 2024-12-20 DIAGNOSIS — M34.9 SYSTEMIC SCLEROSIS, UNSPECIFIED: ICD-10-CM

## 2024-12-20 DIAGNOSIS — Z29.9 ENCOUNTER FOR PROPHYLACTIC MEASURES, UNSPECIFIED: ICD-10-CM

## 2024-12-20 DIAGNOSIS — E03.9 HYPOTHYROIDISM, UNSPECIFIED: ICD-10-CM

## 2024-12-20 DIAGNOSIS — D25.9 LEIOMYOMA OF UTERUS, UNSPECIFIED: Chronic | ICD-10-CM

## 2024-12-20 DIAGNOSIS — J45.909 UNSPECIFIED ASTHMA, UNCOMPLICATED: ICD-10-CM

## 2024-12-20 DIAGNOSIS — Z98.890 OTHER SPECIFIED POSTPROCEDURAL STATES: Chronic | ICD-10-CM

## 2024-12-20 LAB
ALBUMIN FLD-MCNC: 4.1 G/DL — SIGNIFICANT CHANGE UP
ALBUMIN SERPL ELPH-MCNC: 4.6 G/DL — SIGNIFICANT CHANGE UP (ref 3.3–5)
ALP SERPL-CCNC: 105 U/L — SIGNIFICANT CHANGE UP (ref 40–120)
ALT FLD-CCNC: 15 U/L — SIGNIFICANT CHANGE UP (ref 10–45)
ANION GAP SERPL CALC-SCNC: 12 MMOL/L — SIGNIFICANT CHANGE UP (ref 5–17)
ANION GAP SERPL CALC-SCNC: 13 MMOL/L — SIGNIFICANT CHANGE UP (ref 5–17)
APTT BLD: 31.2 SEC — SIGNIFICANT CHANGE UP (ref 24.5–35.6)
AST SERPL-CCNC: 22 U/L — SIGNIFICANT CHANGE UP (ref 10–40)
B PERT IGG+IGM PNL SER: ABNORMAL
BASOPHILS # BLD AUTO: 0.06 K/UL — SIGNIFICANT CHANGE UP (ref 0–0.2)
BASOPHILS NFR BLD AUTO: 0.7 % — SIGNIFICANT CHANGE UP (ref 0–2)
BILIRUB SERPL-MCNC: 0.5 MG/DL — SIGNIFICANT CHANGE UP (ref 0.2–1.2)
BLD GP AB SCN SERPL QL: NEGATIVE — SIGNIFICANT CHANGE UP
BUN SERPL-MCNC: 14 MG/DL — SIGNIFICANT CHANGE UP (ref 7–23)
BUN SERPL-MCNC: 16 MG/DL — SIGNIFICANT CHANGE UP (ref 7–23)
CALCIUM SERPL-MCNC: 10.2 MG/DL — SIGNIFICANT CHANGE UP (ref 8.4–10.5)
CALCIUM SERPL-MCNC: 9.3 MG/DL — SIGNIFICANT CHANGE UP (ref 8.4–10.5)
CHLORIDE SERPL-SCNC: 102 MMOL/L — SIGNIFICANT CHANGE UP (ref 96–108)
CHLORIDE SERPL-SCNC: 104 MMOL/L — SIGNIFICANT CHANGE UP (ref 96–108)
CO2 SERPL-SCNC: 23 MMOL/L — SIGNIFICANT CHANGE UP (ref 22–31)
CO2 SERPL-SCNC: 26 MMOL/L — SIGNIFICANT CHANGE UP (ref 22–31)
COLOR FLD: ABNORMAL
CREAT SERPL-MCNC: 0.55 MG/DL — SIGNIFICANT CHANGE UP (ref 0.5–1.3)
CREAT SERPL-MCNC: 0.55 MG/DL — SIGNIFICANT CHANGE UP (ref 0.5–1.3)
EGFR: 102 ML/MIN/1.73M2 — SIGNIFICANT CHANGE UP
EGFR: 102 ML/MIN/1.73M2 — SIGNIFICANT CHANGE UP
EOSINOPHIL # BLD AUTO: 0.15 K/UL — SIGNIFICANT CHANGE UP (ref 0–0.5)
EOSINOPHIL NFR BLD AUTO: 1.8 % — SIGNIFICANT CHANGE UP (ref 0–6)
FLUID INTAKE SUBSTANCE CLASS: SIGNIFICANT CHANGE UP
GAS PNL BLDV: SIGNIFICANT CHANGE UP
GLUCOSE FLD-MCNC: 113 MG/DL — SIGNIFICANT CHANGE UP
GLUCOSE SERPL-MCNC: 108 MG/DL — HIGH (ref 70–99)
GLUCOSE SERPL-MCNC: 136 MG/DL — HIGH (ref 70–99)
HCT VFR BLD CALC: 42 % — SIGNIFICANT CHANGE UP (ref 34.5–45)
HCT VFR BLD CALC: 47.2 % — HIGH (ref 34.5–45)
HGB BLD-MCNC: 13.9 G/DL — SIGNIFICANT CHANGE UP (ref 11.5–15.5)
HGB BLD-MCNC: 15.5 G/DL — SIGNIFICANT CHANGE UP (ref 11.5–15.5)
IMM GRANULOCYTES NFR BLD AUTO: 0.2 % — SIGNIFICANT CHANGE UP (ref 0–0.9)
INR BLD: 1.05 RATIO — SIGNIFICANT CHANGE UP (ref 0.85–1.16)
LDH SERPL L TO P-CCNC: 225 U/L — SIGNIFICANT CHANGE UP
LYMPHOCYTES # BLD AUTO: 2.76 K/UL — SIGNIFICANT CHANGE UP (ref 1–3.3)
LYMPHOCYTES # BLD AUTO: 33.4 % — SIGNIFICANT CHANGE UP (ref 13–44)
LYMPHOCYTES # FLD: 45 % — SIGNIFICANT CHANGE UP
MAGNESIUM SERPL-MCNC: 1.8 MG/DL — SIGNIFICANT CHANGE UP (ref 1.6–2.6)
MAGNESIUM SERPL-MCNC: 2 MG/DL — SIGNIFICANT CHANGE UP (ref 1.6–2.6)
MCHC RBC-ENTMCNC: 30.6 PG — SIGNIFICANT CHANGE UP (ref 27–34)
MCHC RBC-ENTMCNC: 30.6 PG — SIGNIFICANT CHANGE UP (ref 27–34)
MCHC RBC-ENTMCNC: 32.8 G/DL — SIGNIFICANT CHANGE UP (ref 32–36)
MCHC RBC-ENTMCNC: 33.1 G/DL — SIGNIFICANT CHANGE UP (ref 32–36)
MCV RBC AUTO: 92.5 FL — SIGNIFICANT CHANGE UP (ref 80–100)
MCV RBC AUTO: 93.1 FL — SIGNIFICANT CHANGE UP (ref 80–100)
MONOCYTES # BLD AUTO: 0.64 K/UL — SIGNIFICANT CHANGE UP (ref 0–0.9)
MONOCYTES NFR BLD AUTO: 7.7 % — SIGNIFICANT CHANGE UP (ref 2–14)
MONOS+MACROS # FLD: 3 % — SIGNIFICANT CHANGE UP
NEUTROPHILS # BLD AUTO: 4.63 K/UL — SIGNIFICANT CHANGE UP (ref 1.8–7.4)
NEUTROPHILS NFR BLD AUTO: 56.2 % — SIGNIFICANT CHANGE UP (ref 43–77)
NEUTROPHILS-BODY FLUID: 52 % — SIGNIFICANT CHANGE UP
NRBC # BLD: 0 /100 WBCS — SIGNIFICANT CHANGE UP (ref 0–0)
NRBC # BLD: 0 /100 WBCS — SIGNIFICANT CHANGE UP (ref 0–0)
NT-PROBNP SERPL-SCNC: 192 PG/ML — SIGNIFICANT CHANGE UP (ref 0–300)
PLATELET # BLD AUTO: 208 K/UL — SIGNIFICANT CHANGE UP (ref 150–400)
PLATELET # BLD AUTO: 263 K/UL — SIGNIFICANT CHANGE UP (ref 150–400)
POTASSIUM SERPL-MCNC: 3.7 MMOL/L — SIGNIFICANT CHANGE UP (ref 3.5–5.3)
POTASSIUM SERPL-MCNC: 4 MMOL/L — SIGNIFICANT CHANGE UP (ref 3.5–5.3)
POTASSIUM SERPL-SCNC: 3.7 MMOL/L — SIGNIFICANT CHANGE UP (ref 3.5–5.3)
POTASSIUM SERPL-SCNC: 4 MMOL/L — SIGNIFICANT CHANGE UP (ref 3.5–5.3)
PROT FLD-MCNC: 5.8 G/DL — SIGNIFICANT CHANGE UP
PROT SERPL-MCNC: 8 G/DL — SIGNIFICANT CHANGE UP (ref 6–8.3)
PROTHROM AB SERPL-ACNC: 12 SEC — SIGNIFICANT CHANGE UP (ref 9.9–13.4)
RBC # BLD: 4.54 M/UL — SIGNIFICANT CHANGE UP (ref 3.8–5.2)
RBC # BLD: 5.07 M/UL — SIGNIFICANT CHANGE UP (ref 3.8–5.2)
RBC # FLD: 12.1 % — SIGNIFICANT CHANGE UP (ref 10.3–14.5)
RBC # FLD: 12.2 % — SIGNIFICANT CHANGE UP (ref 10.3–14.5)
RCV VOL RI: SIGNIFICANT CHANGE UP CELLS/UL
RH IG SCN BLD-IMP: POSITIVE — SIGNIFICANT CHANGE UP
RH IG SCN BLD-IMP: POSITIVE — SIGNIFICANT CHANGE UP
SODIUM SERPL-SCNC: 140 MMOL/L — SIGNIFICANT CHANGE UP (ref 135–145)
SODIUM SERPL-SCNC: 140 MMOL/L — SIGNIFICANT CHANGE UP (ref 135–145)
TOTAL NUCLEATED CELL COUNT, BODY FLUID: 1000 CELLS/UL — SIGNIFICANT CHANGE UP
TROPONIN T, HIGH SENSITIVITY RESULT: 11 NG/L — SIGNIFICANT CHANGE UP (ref 0–51)
TUBE TYPE: SIGNIFICANT CHANGE UP
WBC # BLD: 5.39 K/UL — SIGNIFICANT CHANGE UP (ref 3.8–10.5)
WBC # BLD: 8.26 K/UL — SIGNIFICANT CHANGE UP (ref 3.8–10.5)
WBC # FLD AUTO: 5.39 K/UL — SIGNIFICANT CHANGE UP (ref 3.8–10.5)
WBC # FLD AUTO: 8.26 K/UL — SIGNIFICANT CHANGE UP (ref 3.8–10.5)

## 2024-12-20 PROCEDURE — 88112 CYTOPATH CELL ENHANCE TECH: CPT | Mod: 26

## 2024-12-20 PROCEDURE — 33016 PERICARDIOCENTESIS W/IMAGING: CPT

## 2024-12-20 PROCEDURE — 99223 1ST HOSP IP/OBS HIGH 75: CPT

## 2024-12-20 PROCEDURE — 88305 TISSUE EXAM BY PATHOLOGIST: CPT | Mod: 26

## 2024-12-20 PROCEDURE — 99152 MOD SED SAME PHYS/QHP 5/>YRS: CPT

## 2024-12-20 PROCEDURE — 93308 TTE F-UP OR LMTD: CPT | Mod: 26,77

## 2024-12-20 PROCEDURE — 71045 X-RAY EXAM CHEST 1 VIEW: CPT | Mod: 26,76

## 2024-12-20 PROCEDURE — 99291 CRITICAL CARE FIRST HOUR: CPT

## 2024-12-20 RX ORDER — IPRATROPIUM BROMIDE AND ALBUTEROL SULFATE .5; 2.5 MG/3ML; MG/3ML
3 SOLUTION RESPIRATORY (INHALATION) EVERY 6 HOURS
Refills: 0 | Status: DISCONTINUED | OUTPATIENT
Start: 2024-12-20 | End: 2024-12-31

## 2024-12-20 RX ORDER — SODIUM CHLORIDE 9 MG/ML
250 INJECTION, SOLUTION INTRAMUSCULAR; INTRAVENOUS; SUBCUTANEOUS ONCE
Refills: 0 | Status: COMPLETED | OUTPATIENT
Start: 2024-12-20 | End: 2024-12-20

## 2024-12-20 RX ORDER — MORPHINE SULFATE 15 MG
30 TABLET, EXTENDED RELEASE ORAL
Refills: 0 | Status: DISCONTINUED | OUTPATIENT
Start: 2024-12-20 | End: 2024-12-24

## 2024-12-20 RX ORDER — IPRATROPIUM BROMIDE AND ALBUTEROL SULFATE .5; 2.5 MG/3ML; MG/3ML
3 SOLUTION RESPIRATORY (INHALATION)
Refills: 0 | Status: COMPLETED | OUTPATIENT
Start: 2024-12-20 | End: 2024-12-20

## 2024-12-20 RX ORDER — DIPHENHYDRAMINE HCL 25 MG
25 TABLET ORAL ONCE
Refills: 0 | Status: COMPLETED | OUTPATIENT
Start: 2024-12-20 | End: 2024-12-20

## 2024-12-20 RX ORDER — FENTANYL 75 UG/H
25 PATCH, EXTENDED RELEASE TRANSDERMAL ONCE
Refills: 0 | Status: DISCONTINUED | OUTPATIENT
Start: 2024-12-20 | End: 2024-12-20

## 2024-12-20 RX ORDER — HEPARIN SODIUM 1000 [USP'U]/ML
5000 INJECTION, SOLUTION INTRAVENOUS; SUBCUTANEOUS EVERY 8 HOURS
Refills: 0 | Status: DISCONTINUED | OUTPATIENT
Start: 2024-12-20 | End: 2024-12-30

## 2024-12-20 RX ORDER — POLYETHYLENE GLYCOL 3350 17 G/DOSE
17 POWDER (GRAM) ORAL DAILY
Refills: 0 | Status: DISCONTINUED | OUTPATIENT
Start: 2024-12-20 | End: 2024-12-24

## 2024-12-20 RX ORDER — BACLOFEN 10 MG/1
10 TABLET ORAL
Refills: 0 | Status: DISCONTINUED | OUTPATIENT
Start: 2024-12-20 | End: 2024-12-31

## 2024-12-20 RX ORDER — ACETAMINOPHEN 80 MG/.8ML
1000 SOLUTION/ DROPS ORAL ONCE
Refills: 0 | Status: COMPLETED | OUTPATIENT
Start: 2024-12-20 | End: 2024-12-20

## 2024-12-20 RX ORDER — SODIUM CHLORIDE 9 MG/ML
1000 INJECTION, SOLUTION INTRAMUSCULAR; INTRAVENOUS; SUBCUTANEOUS ONCE
Refills: 0 | Status: DISCONTINUED | OUTPATIENT
Start: 2024-12-20 | End: 2024-12-20

## 2024-12-20 RX ORDER — SODIUM CHLORIDE 9 MG/ML
1000 INJECTION, SOLUTION INTRAVENOUS ONCE
Refills: 0 | Status: COMPLETED | OUTPATIENT
Start: 2024-12-20 | End: 2024-12-20

## 2024-12-20 RX ORDER — SENNOSIDES 8.6 MG/1
2 TABLET, FILM COATED ORAL AT BEDTIME
Refills: 0 | Status: DISCONTINUED | OUTPATIENT
Start: 2024-12-20 | End: 2024-12-31

## 2024-12-20 RX ORDER — LEVOTHYROXINE SODIUM 175 UG/1
112 TABLET ORAL DAILY
Refills: 0 | Status: DISCONTINUED | OUTPATIENT
Start: 2024-12-20 | End: 2024-12-31

## 2024-12-20 RX ORDER — OXYCODONE HCL 15 MG
20 TABLET ORAL
Refills: 0 | Status: DISCONTINUED | OUTPATIENT
Start: 2024-12-20 | End: 2024-12-27

## 2024-12-20 RX ORDER — OXYCODONE HCL 15 MG
20 TABLET ORAL
Refills: 0 | Status: DISCONTINUED | OUTPATIENT
Start: 2024-12-20 | End: 2024-12-20

## 2024-12-20 RX ORDER — METHYLPREDNISOLONE 4 MG/1
125 TABLET ORAL ONCE
Refills: 0 | Status: COMPLETED | OUTPATIENT
Start: 2024-12-20 | End: 2024-12-20

## 2024-12-20 RX ORDER — OXYCODONE HCL 15 MG
1 TABLET ORAL
Refills: 0 | DISCHARGE

## 2024-12-20 RX ADMIN — FENTANYL 25 MICROGRAM(S): 75 PATCH, EXTENDED RELEASE TRANSDERMAL at 18:42

## 2024-12-20 RX ADMIN — BACLOFEN 10 MILLIGRAM(S): 10 TABLET ORAL at 22:36

## 2024-12-20 RX ADMIN — IPRATROPIUM BROMIDE AND ALBUTEROL SULFATE 3 MILLILITER(S): .5; 2.5 SOLUTION RESPIRATORY (INHALATION) at 15:45

## 2024-12-20 RX ADMIN — IPRATROPIUM BROMIDE AND ALBUTEROL SULFATE 3 MILLILITER(S): .5; 2.5 SOLUTION RESPIRATORY (INHALATION) at 16:05

## 2024-12-20 RX ADMIN — FENTANYL 25 MICROGRAM(S): 75 PATCH, EXTENDED RELEASE TRANSDERMAL at 19:42

## 2024-12-20 RX ADMIN — Medication 30 MILLIGRAM(S): at 22:36

## 2024-12-20 RX ADMIN — ACETAMINOPHEN 400 MILLIGRAM(S): 80 SOLUTION/ DROPS ORAL at 19:12

## 2024-12-20 RX ADMIN — SODIUM CHLORIDE 1000 MILLILITER(S): 9 INJECTION, SOLUTION INTRAVENOUS at 16:05

## 2024-12-20 RX ADMIN — ACETAMINOPHEN 1000 MILLIGRAM(S): 80 SOLUTION/ DROPS ORAL at 19:20

## 2024-12-20 RX ADMIN — Medication 20 MILLIGRAM(S): at 20:55

## 2024-12-20 RX ADMIN — FENTANYL 25 MICROGRAM(S): 75 PATCH, EXTENDED RELEASE TRANSDERMAL at 20:12

## 2024-12-20 RX ADMIN — METHYLPREDNISOLONE 125 MILLIGRAM(S): 4 TABLET ORAL at 15:50

## 2024-12-20 RX ADMIN — Medication 20 MILLIGRAM(S): at 20:25

## 2024-12-20 RX ADMIN — SODIUM CHLORIDE 500 MILLILITER(S): 9 INJECTION, SOLUTION INTRAMUSCULAR; INTRAVENOUS; SUBCUTANEOUS at 20:15

## 2024-12-20 RX ADMIN — IPRATROPIUM BROMIDE AND ALBUTEROL SULFATE 3 MILLILITER(S): .5; 2.5 SOLUTION RESPIRATORY (INHALATION) at 15:35

## 2024-12-20 RX ADMIN — Medication 25 MILLIGRAM(S): at 19:35

## 2024-12-20 NOTE — H&P ADULT - NSHPLABSRESULTS_GEN_ALL_CORE
12-20    140  |  104  |  16  ----------------------------<  136[H]  4.0   |  23  |  0.55  12-20    140  |  102  |  14  ----------------------------<  108[H]  3.7   |  26  |  0.55    Ca    9.3      20 Dec 2024 20:13  Ca    10.2      20 Dec 2024 16:03  Mg     1.8     12-20    TPro  8.0  /  Alb  4.6  /  TBili  0.5  /  DBili  x   /  AST  22  /  ALT  15  /  AlkPhos  105  12-20    Magnesium: 1.8 mg/dL (12-20-24 @ 20:13)  Magnesium: 2.0 mg/dL (12-20-24 @ 16:03)        PT/INR - ( 20 Dec 2024 16:04 )   PT: 12.0 sec;   INR: 1.05 ratio         PTT - ( 20 Dec 2024 16:04 )  PTT:31.2 sec              Urinalysis Basic - ( 20 Dec 2024 20:13 )    Color: x / Appearance: x / SG: x / pH: x  Gluc: 136 mg/dL / Ketone: x  / Bili: x / Urobili: x   Blood: x / Protein: x / Nitrite: x   Leuk Esterase: x / RBC: x / WBC x   Sq Epi: x / Non Sq Epi: x / Bacteria: x                          13.9   5.39  )-----------( 208      ( 20 Dec 2024 20:13 )             42.0                         15.5   8.26  )-----------( 263      ( 20 Dec 2024 16:03 )             47.2     CAPILLARY BLOOD GLUCOSE        Blood Gas Source Venous: Venous (12-20-24 @ 16:03) 12-20    140  |  104  |  16  ----------------------------<  136[H]  4.0   |  23  |  0.55  12-20    140  |  102  |  14  ----------------------------<  108[H]  3.7   |  26  |  0.55    Ca    9.3      20 Dec 2024 20:13  Ca    10.2      20 Dec 2024 16:03  Mg     1.8     12-20    TPro  8.0  /  Alb  4.6  /  TBili  0.5  /  DBili  x   /  AST  22  /  ALT  15  /  AlkPhos  105  12-20    Magnesium: 1.8 mg/dL (12-20-24 @ 20:13)  Magnesium: 2.0 mg/dL (12-20-24 @ 16:03)        PT/INR - ( 20 Dec 2024 16:04 )   PT: 12.0 sec;   INR: 1.05 ratio         PTT - ( 20 Dec 2024 16:04 )  PTT:31.2 sec              Urinalysis Basic - ( 20 Dec 2024 20:13 )    Color: x / Appearance: x / SG: x / pH: x  Gluc: 136 mg/dL / Ketone: x  / Bili: x / Urobili: x   Blood: x / Protein: x / Nitrite: x   Leuk Esterase: x / RBC: x / WBC x   Sq Epi: x / Non Sq Epi: x / Bacteria: x                          13.9   5.39  )-----------( 208      ( 20 Dec 2024 20:13 )             42.0                         15.5   8.26  )-----------( 263      ( 20 Dec 2024 16:03 )             47.2     CAPILLARY BLOOD GLUCOSE    Blood Gas Source Venous: Venous (12-20-24 @ 16:03)

## 2024-12-20 NOTE — H&P ADULT - PROBLEM SELECTOR PLAN 2
No wheezing appreciated on exam. Lower suspicion for asthma exacerbation but cannot exclude. Also possibly due to scleroderma (PAH? ILD?) vs pericardial effusion.   - VBG showing mod hypercapnia, appears chronic.   - Will order duoneb q6h for now given patient still feels SOB and on 4LNC.   - Maintain O2 sat 90-93%  - Will hold off on steroids for now pending rheumatology evaluation

## 2024-12-20 NOTE — CHART NOTE - NSCHARTNOTEFT_GEN_A_CORE
HPI: 63 year old female with hx of scleroderma, asthma (with prior intubations), hypothyroidism, presenting with chest pain and shortness of breath. Patient went to see her cardiologist Dr. Aparicio 10 days ago where an in-office TTE was performed which revealed a pericardial effusion. She was recommended  to come to ED for evaluation but did not present at that time due to personal reasons (recent passing of her son). She came to ED today with worsening symptoms. Pt found to be tachycardic, intermittently hypotensive with MAP in 50s, responsive to IVF. POCUS revealed mod-large pericardial effusion with echographic evidence of tamponade physiology. Pt was taken to the cath lab with cardiology for urgent pericardiocentesis, now with pericardial drain in place.  Patient was examined in the [facility]. On arousal, patient was in moderate discomfort/stress saying she feels pain everywhere. Unable to obtain further history from patient; she continues to express wanting to go back to sleep. Pt admitted for further management.    12/20 s/p pericardial drain placement, drained 650 cc in lab, additional 100 cc in [facility]  Patient c/o of pain s/p drain placement, more so in lower midsternal chest area where drain located  States she is unable to take deep breaths due to the pain, O2 sat low 90s- placed on 4LNC  Fent 25 mcg IV given with some relief x2  Resumed oral pain regimen patient takes at home  I-STOP in chart verified  Also given IV Tylenol however a few minutes into infusion, patient c/o itching left leg with small rash- resolved IV Benadryl, IV Tylenol stopped  Patient denies any reaction to Tylenol in the past and takes without issue  Will continue to monitor patient overnight    Yasmani Leo [facility]  Interventional Cardiology  Ext [phone]    Addendum: Patient c/o of pain again this AM, Fent 25 mcg IV x1 and will given PO pain meds early

## 2024-12-20 NOTE — ED PROVIDER NOTE - CLINICAL SUMMARY MEDICAL DECISION MAKING FREE TEXT BOX
63-year-old female history of scleroderma, asthma with prior intubations, presents with progressively worsening chest pain and shortness of breath for the past few weeks.  Went to her cardiologist 10 days ago Dr. Aparicio, with concerns for a pericardial effusion was come to the ED, the patient was unable to at that time as her son had just passed.  Patient presented today for worsening symptoms.  Denies fevers, abdominal pain, nausea, vomiting, lower extremity swelling or edema.    Patient slightly tachycardic to 110, not hypotensive, on 1 L nasal cannula satting greater than 90%.  End expiratory wheezing noted on exam, no lower extremity edema.  Bedside POCUS shows moderate pericardial effusion with concerns for RV collapse.  Concern for tamponade on POCUS, clinically stable at this time.  Also possible asthma exacerbation.  Will obtain labs, chest x-ray, DuoNebs for asthma and will discuss with cardiology.

## 2024-12-20 NOTE — H&P ADULT - NSHPREVIEWOFSYSTEMS_GEN_ALL_CORE
Review of Systems:   CONSTITUTIONAL: No fever, weight loss  RESPIRATORY: No SOB. No cough, wheezing, chills or hemoptysis  CARDIOVASCULAR: +chest pain, no palpitations, dizziness, or leg swelling  GASTROINTESTINAL: No abdominal or epigastric pain. No nausea, vomiting, or hematemesis; No diarrhea or constipation. No melena or hematochezia.  GENITOURINARY: No dysuria  NEUROLOGICAL: No headaches,   MUSCULOSKELETAL: Diffuse pain in legs B/L   HEME/LYMPH: No easy bruising, or bleeding gums

## 2024-12-20 NOTE — ED PROVIDER NOTE - PHYSICAL EXAMINATION
GEN: NAD. A&Ox3. Non-toxic appearing.  HEENT: normocephalic, atraumatic, EOMI, PERRL, moist MM  CARDIAC: tachycardic, regular rhythm, S1, S2, no murmur. Radial pulses present and symmetric b/l  PULM: CTA B/L no wheeze, rhonchi, rales.   ABD: soft NT, ND, no rebound no guarding  MSK: Moving all extremities, no edema  NEURO:  no focal neurological deficits, CN 2-12 grossly intact  SKIN: warm, dry, no rash.

## 2024-12-20 NOTE — ED ADULT NURSE NOTE - OBJECTIVE STATEMENT
63y Female AOx4 with PMH scleroderma, asthma (hx intubation) presents to the ED c/o SOB. Pt endorses SOB and chest pressure ongoing for about the past week. Pt reports she went to her cardiologist 10 days ago and found to have increased pericardial fluid, was told by her doctor to present to the ED immediately for pericardiocentesis, but pt states due to ongoing family issues she could not at that time. Denies N/V, fever/chills. Spontaneous/tachypneic respirations, speaking in short sentences. Side rails up, bed in lowest position, oriented to call bell, safety maintained.

## 2024-12-20 NOTE — PATIENT PROFILE ADULT - FALL HARM RISK - RISK INTERVENTIONS
Assistance OOB with selected safe patient handling equipment/Assistance with ambulation/Communicate Fall Risk and Risk Factors to all staff, patient, and family/Discuss with provider need for PT consult/Monitor for mental status changes/Monitor gait and stability/Provide patient with walking aids - walker, cane, crutches/Reinforce activity limits and safety measures with patient and family/Sit up slowly, dangle for a short time, stand at bedside before walking/Visual Cue: Yellow wristband/Bed in lowest position, wheels locked, appropriate side rails in place/Call bell, personal items and telephone in reach/Instruct patient to call for assistance before getting out of bed or chair/Non-slip footwear when patient is out of bed/[place] to call system/Physically safe environment - no spills, clutter or unnecessary equipment/Purposeful Proactive Rounding/Room/bathroom lighting operational, light cord in reach

## 2024-12-20 NOTE — H&P ADULT - PROBLEM SELECTOR PLAN 5
Unable to verify medications with patient overnight. Will need full med rec in AM.   - Current medications pulled from [facility]  - ISTOP reviewed for opioids

## 2024-12-20 NOTE — CONSULT NOTE ADULT - SUBJECTIVE AND OBJECTIVE BOX
Cardiology Consult Note  ------------------------------------------------------------------------------------------  SUBJECTIVE: Patient is a 63-year-old female history of scleroderma, asthma with prior intubations, who presented to the ED with progressively worsening chest pain and shortness of breath for the past few weeks.  Went to her cardiologist 10 days ago Dr. Aparicio, in-office TTE was done with concerns for a pericardial effusion. Patient was directed to come to the ED, however the patient was unable to at that time as her son had just passed.  Patient presented today for worsening symptoms.  Denies fevers, abdominal pain, nausea, vomiting, lower extremity swelling or edema. Denies recent illness.     In ED Patient slightly tachycardic to 110, transiently hypotensive to MAPs in the 50s (improved w/ IVF) on 1 L nasal cannula satting greater than 90%.  End expiratory wheezing noted on exam, no lower extremity edema.  Bedside POCUS shows moderate pericardial effusion with concerns for RV collapse. Concern for tamponade on POCUS, corroborated w/ limited TTE. Patient brought to cath lab for urgent pericardiocentesis.       - Tele: NSR    -------------------------------------------------------------------------------------------  VS:  T(F): 97.8 (12-20), Max: 97.8 (12-20)  HR: 104 (12-20) (104 - 110)  BP: 107/94 (12-20) (81/49 - 155/83)  RR: 22 (12-20)  SpO2: 96% (12-20)  I&O's Summary    PHYSICAL EXAM:  GENERAL: NAD  HEAD: Atraumatic, Normocephalic.  ENT: Moist mucous membranes.  NECK: Supple, No JVD.  CHEST/LUNG: Clear to auscultation bilaterally; No rales, rhonchi, wheezing, or rubs. Unlabored respirations.  HEART: Regular rate and rhythm; No murmurs, rubs, or gallops.  ABDOMEN: Bowel sounds present; Soft, Nontender, Nondistended.   EXTREMITIES: No edema. 2+ Peripheral Pulses, brisk capillary refill. No clubbing or cyanosis.     -------------------------------------------------------------------------------------------  LABS:                          15.5   8.26  )-----------( 263      ( 20 Dec 2024 16:03 )             47.2     12-20    140  |  102  |  14  ----------------------------<  108[H]  3.7   |  26  |  0.55    Ca    10.2      20 Dec 2024 16:03  Mg     2.0     12-20    TPro  8.0  /  Alb  4.6  /  TBili  0.5  /  DBili  x   /  AST  22  /  ALT  15  /  AlkPhos  105  12-20    PT/INR - ( 20 Dec 2024 16:04 )   PT: 12.0 sec;   INR: 1.05 ratio         PTT - ( 20 Dec 2024 16:04 )  PTT:31.2 sec  CARDIAC MARKERS ( 20 Dec 2024 16:03 )  11 ng/L / x     / x     / x     / x     / x                -------------------------------------------------------------------------------------------  Meds:    -------------------------------------------------------------------------------------------  Cardiovascular Diagnostic Testing:    ECG: sinus tachy    Echo:      1. There is large circumferential pericardial effusion measuring 2.9 cm adjacent to the right atrium and right ventricle. with evidence of hemodynamic compromise (or echocardiographic evidence of cardiac tamponade) with a blood pressure of 155 mmHg/83 mmHg and a heart rate of 110 bpm.   2. Organized fibrinous vs coagulant material is seen in the pericardial space.   3. No recent prior study for comparison.    -------------------------------------------------------------------------------------------

## 2024-12-20 NOTE — H&P ADULT - PROBLEM SELECTOR PLAN 1
On presentation TTE showing large pericardial effusion with RV collapse. Pt intermittently hypotensive. Concern for tamponade. Pt now s/p urgent pericardiocentesis with pericardial drain in place.   - Cardiology recs appreciated  - Monitor pericardial drain output - consider removal when drainage <50cc/24hr  - Monitor on telemetry  - F/u pericardial fluid analysis  - EKG showing NSR, appears sinus tachycardia on telemetry  - repeat TTE tomorrow for evaluation of resolution of effusion

## 2024-12-20 NOTE — H&P ADULT - PROBLEM SELECTOR PLAN 3
Reported hx of scleroderma. unclear history and progression of disease. Patient not able to provide additional history or information at the time of encounter. Pt does not currently seem to be on any medications. Unclear if on steroids currently.   - Diffuse pain possibly scleroderma flare? Physical and lab findings overall unremarkable.  - Patient on chronic opioids for pain. Will continue for now. ISTOP reviewed reference #: [id number]  - Continue morphine 30mg ER, oxycodone IR 20mg, baclofen 10mg  - Rheumatology consult in AM for recs

## 2024-12-20 NOTE — CONSULT NOTE ADULT - ASSESSMENT
Patient is a 63-year-old female history of scleroderma, asthma with prior intubations, who presented to the ED with progressively worsening chest pain and shortness of breath for the past few weeks.  Went to her cardiologist 10 days ago Dr. Aparicio, in-office TTE was done with concerns for a pericardial effusion. Patient was directed to come to the ED, however the patient was unable to at that time as her son had just passed.  Patient presented today for worsening symptoms.  Denies fevers, abdominal pain, nausea, vomiting, lower extremity swelling or edema. Denies recent illness. In ED Patient slightly tachycardic to 110, transiently hypotensive to MAPs in the 50s (improved w/ IVF) on 1 L nasal cannula satting greater than 90%.  End expiratory wheezing noted on exam, no lower extremity edema.  Bedside POCUS shows moderate pericardial effusion with concerns for RV collapse. Concern for tamponade on POCUS, corroborated w/ limited TTE. Patient brought to cath lab for urgent pericardiocentesis.    #Pericardial effusion  #Echocardiographic tamponade  - Patient w/ large pericardial effusion, evidence of echocardiographic tamponade  - Plan for urgent pericardiocentesis in cath lab  - Would obtain rheumatologic consultation re: steroids for possible disease flare  - Obtain repeat limited TTE tomorrow to assess for resolution of effusion  - Monitor drain output, plan to remove when <50 ccs/24 hour Patient is a 63-year-old female history of scleroderma, asthma with prior intubations, who presented to the ED with progressively worsening chest pain and shortness of breath for the past few weeks.  Went to her cardiologist 10 days ago Dr. Aparicio, in-office TTE was done with concerns for a pericardial effusion. Patient was directed to come to the ED, however the patient was unable to at that time as her son had just passed.  Patient presented today for worsening symptoms.  Denies fevers, abdominal pain, nausea, vomiting, lower extremity swelling or edema. Denies recent illness. In ED Patient slightly tachycardic to 110, transiently hypotensive to MAPs in the 50s (improved w/ IVF) on 1 L nasal cannula satting greater than 90%.  End expiratory wheezing noted on exam, no lower extremity edema.  Bedside POCUS shows moderate pericardial effusion with concerns for RV collapse. Concern for tamponade on POCUS, corroborated w/ limited TTE. Patient brought to cath lab for urgent pericardiocentesis.    #Pericardial effusion  #Echocardiographic tamponade  - Patient w/ large pericardial effusion, evidence of echocardiographic tamponade  - Plan for urgent pericardiocentesis in cath lab  - Can consider obtaining non-emergent rheumatologic consultation re: steroids for possible disease flare  - Obtain repeat limited TTE tomorrow to assess for resolution of effusion  - Follow up pericardial fluid cytology  - Recommend age appropriate cancer screening  - Monitor drain output, plan to remove when <50 ccs/24 hour

## 2024-12-20 NOTE — ED ADULT NURSE NOTE - NSFALLRISKINTERV_ED_ALL_ED

## 2024-12-20 NOTE — ED PROVIDER NOTE - PROGRESS NOTE DETAILS
cards consulted Cards at bedside. Patient initially , now at 90. 1L LR hanging, echo tech at bedside.  Junior Mendoza PGY-3 BP improving, now 132/95.  Junior Mendoza PGY-3

## 2024-12-20 NOTE — CHART NOTE - NSCHARTNOTEFT_GEN_A_CORE
Search Terms: Maryann Wen, 1960  Search Date: 12/20/2024 22:23:18 PM  Searching on behalf of: [address] [place] [address]  The Drug Utilization Report below displays all of the controlled substance prescriptions, if any, that your patient has filled in the last twelve months. The information displayed on this report is compiled from pharmacy submissions to the Department, and accurately reflects the information as submitted by the pharmacies.    This report was requested by: Ricardo Guajardo | Reference #: [id number]    Practitioner Count: 1  Pharmacy Count: 1  Current Opioid Prescriptions: 2  Current Benzodiazepine Prescriptions: 0  Current Stimulant Prescriptions: 0      Patient Demographic Information (PDI)       PDI	First Name	Last Name	Birth Date	Gender	Street Address	[facility]	Zip Code  A	Maryann Wen	1960	Female	[address] [facility] [address] [place] [address]    Prescription Information      PDI Filter:    PDI	Current Rx	Drug Type	Rx Written	Rx Dispensed	Drug	Quantity	Days Supply	Prescriber Name	Prescriber KHOA #	Payment Method	Dispenser  A	Y	O	11/27/2024	12/06/2024	morphine sulf er 30 mg tablet	60	30	Guru Crowe MD	[id number]	Medicare	[id number]  A	Y	O	11/27/2024	12/06/2024	oxycodone hcl (ir) 20 mg tab	90	30	Guru Crowe MD	[id number]	Medicare	[id number]  A	N	O	10/31/2024	11/08/2024	morphine sulf er 30 mg tablet	60	30	Guru Crowe MD	[id number]	Medicare	[id number]  A	N	O	10/31/2024	11/08/2024	oxycodone hcl (ir) 20 mg tab	90	30	Guru Crowe MD	[id number]	Medicare	[id number]  A	N	O	10/03/2024	10/09/2024	oxycodone hcl (ir) 20 mg tab	90	30	Guru Crowe MD	[id number]	Medicare	[id number]  A	N	O	10/03/2024	10/09/2024	morphine sulf er 30 mg tablet	60	30	Guru Crowe MD	[id number]	Medicare	[id number]  A	N	O	09/04/2024	09/10/2024	morphine sulf er 30 mg tablet	60	30	Guru Crowe MD	[id number]	Medicare	[id number]  A	N	O	09/04/2024	09/10/2024	oxycodone hcl (ir) 20 mg tab	90	30	Guru Crowe MD	[id number]	Medicare	[id number]  A	N	O	08/01/2024	08/12/2024	morphine sulf er 30 mg tablet	60	30	Guru Crowe MD	[id number]	Medicare	[id number]  A	N	O	08/01/2024	08/12/2024	oxycodone hcl (ir) 20 mg tab	90	30	Guru Crowe MD	[id number]	Medicare	[id number]  A	N	O	07/02/2024	07/14/2024	morphine sulf er 30 mg tablet	60	30	Guru Crowe MD	[id number]	Medicare	[id number]  A	N	O	07/02/2024	07/14/2024	oxycodone hcl (ir) 20 mg tab	90	30	Guru Crowe MD	[id number]	Medicare	[id number]  A	N	O	06/05/2024	06/15/2024	morphine sulf er 30 mg tablet	60	30	Guru Crowe MD	[id number]	Medicare	[id number]  A	N	O	06/05/2024	06/15/2024	oxycodone hcl (ir) 20 mg tab	120	30	Guru Crowe MD	[id number]	Medicare	[id number]  A	N	O	05/14/2024	05/20/2024	morphine sulf er 30 mg tablet	20	30	Guru Crowe MD	[id number]	Medicare	[id number]  A	N	O	05/14/2024	05/17/2024	morphine sulf er 30 mg tablet	40	20	Guru Crowe MD	[id number]	Medicare	[id number]  A	N	O	05/14/2024	05/17/2024	oxycodone hcl (ir) 20 mg tab	120	30	Guru Crowe MD	[id number]	Medicare	[id number]  A	N	O	04/18/2024	04/18/2024	morphine sulf er 30 mg tablet	60	30	Guru Crowe MD	[id number]	Medicare	[id number]  A	N	O	04/18/2024	04/18/2024	oxycodone hcl (ir) 20 mg tab	120	30	Guru Crowe MD	[id number]	Medicare	[id number]  A	N	O	03/14/2024	03/16/2024	oxycodone hcl (ir) 20 mg tab	120	30	Guru Crowe MD	[id number]	Medicare	[id number]  A	N	O	03/14/2024	03/16/2024	morphine sulf er 30 mg tablet	60	30	Guru Crowe MD	[id number]	Medicare	[id number]  A	N	O	02/12/2024	02/16/2024	oxycodone hcl (ir) 20 mg tab	120	30	Guru Crowe MD	[id number]	Medicare	[id number]  A	N	O	02/13/2024	02/16/2024	morphine sulf er 30 mg tablet	60	30	Guru Crowe MD	[id number]	Medicare	[id number]  A	N	O	02/09/2024	02/09/2024	morphine sulf er 30 mg tablet	14	7	Guru Crowe MD	[id number]	Medicare	[id number]  A	N	O	02/09/2024	02/09/2024	oxycodone hcl (ir) 20 mg tab	28	7	Guru Crowe MD	[id number]	Medicare	[id number]  A	N	O	01/11/2024	01/11/2024	oxycodone hcl (ir) 20 mg tab	120	30	Guru Crowe MD	[id number]	Medicare	[id number]  A	N	O	01/11/2024	01/11/2024	morphine sulf er 30 mg tablet	60	30	uGru Crowe MD	[id number]	Medicare	[id number]

## 2024-12-20 NOTE — H&P ADULT - NSHPPHYSICALEXAM_GEN_ALL_CORE
Vital Signs Last 24 Hrs  T(C): 36.6 (20 Dec 2024 18:30), Max: 36.6 (20 Dec 2024 15:01)  T(F): 97.8 (20 Dec 2024 18:30), Max: 97.8 (20 Dec 2024 15:01)  HR: 79 (20 Dec 2024 22:00) (76 - 110)  BP: 123/63 (20 Dec 2024 22:00) (81/49 - 155/83)  BP(mean): 87 (20 Dec 2024 22:00) (52 - 97)  RR: 20 (20 Dec 2024 20:00) (20 - 25)  SpO2: 92% (20 Dec 2024 22:00) (90% - 96%)    Parameters below as of 20 Dec 2024 21:00  Patient On (Oxygen Delivery Method): nasal cannula  O2 Flow (L/min): 3.5      CONSTITUTIONAL: Well-groomed, in moderate distress  EYES: No conjunctival or scleral injection, non-icteric; PERRLA and symmetric  ENMT: No external nasal lesions; nasal mucosa not inflamed, oral mucosa with moist membranes  RESPIRATORY: Breathing comfortably; lungs CTA without wheeze/rhonchi/rales  CARDIOVASCULAR: +S1S2, tachycardic, no M/G/R; trace lower extremity edema, pericardial drain in place with bloody drainage.   GASTROINTESTINAL: No palpable masses or tenderness, +BS throughout, no rebound/guarding   MUSCULOSKELETAL: No digital clubbing or cyanosis; no malalignment of extremities  SKIN: No rashes or ulcers noted; no subcutaneous nodules or induration palpable  NEUROLOGIC: CN grossly intact; sensation intact in LEs b/l to light touch; normal strength and tone  PSYCHIATRIC: A+O x 3; anxious

## 2024-12-20 NOTE — ED PROVIDER NOTE - ATTENDING CONTRIBUTION TO CARE
63-year-old presents to meet with her aide Sam.  She has a history of scleroderma as well as asthma she has had difficult intubations in the past presenting to the emergency department with shortness of breath patient's her cardiologist 10 days Dr. Sosa's who recommended that she present to the emergency department for a pericardial effusion.  Patient refused transportation at that time as her son had just passed away and she needed to make arrangements.  Patient denies any fevers however reports chills, cough nonproductive, pt w/ +cp, +hough, never had this before.   bedside pocus reveals RV collapse,   Const: mild distress  Eyes: no conjunctival injection and no scleral icterus ENMT: Moist mucus membranes, CVS: +S1/S2, radial pulse 2+ bilaterally RESP: b/l expiratory wheezing GI: Nontender/Nondistended soft abdomen, no CVA tenderness MSK: Extremities w/o deformity or ttp, Psych: Awake, Alert, & Orientedx3;  Appropriate mood and affect, cooperative  Plan for labs imaging and reassessment, findings c/f pericardial effusion/tamponade, plan for admission for further management

## 2024-12-20 NOTE — H&P ADULT - HISTORY OF PRESENT ILLNESS
63 year old female with hx of scleroderma, asthma (with prior intubations and difficult airway access), presenting with chest pain and shortness of breath. Patient went to see her cardiologist Dr. Aparicio 10 days ago where an in-office TTE was performed which revealed a pericardial effusion. She was recommended  to come to ED for evaluation but did not present at that time due to personal reasons (recent passing of her son). She came to ED today with worsening symptoms. Pt found to be tachycardic, intermittently hypotensive with MAP in 50s, responsive to IVF. POCUS revealed mod-large pericardial effusion with echographic evidence of tamponade physiology. Pt was taken to the cath lab with cardiology for urgent pericardiocentesis.  63 year old female with hx of scleroderma, asthma (with prior intubations), hypothyroidism, presenting with chest pain and shortness of breath. Patient went to see her cardiologist Dr. Aparicio 10 days ago where an in-office TTE was performed which revealed a pericardial effusion. She was recommended  to come to ED for evaluation but did not present at that time due to personal reasons (recent passing of her son). She came to ED today with worsening symptoms. Pt found to be tachycardic, intermittently hypotensive with MAP in 50s, responsive to IVF. POCUS revealed mod-large pericardial effusion with echographic evidence of tamponade physiology. Pt was taken to the cath lab with cardiology for urgent pericardiocentesis, now with pericardial drain in place.    Patient was examined in the [facility]. On arousal, patient was in moderate discomfort/stress saying she feels pain everywhere. Unable to obtain further history from patient; she continues to express wanting to go back to sleep. Pt admitted for further management.

## 2024-12-20 NOTE — H&P ADULT - NSICDXPASTSURGICALHX_GEN_ALL_CORE_FT
PAST SURGICAL HISTORY:  Bowel obstruction multiple surgeries for bowel obstruction    Fibroid     H/O ovarian cystectomy     History of myomectomy     History of ovarian cystectomy     S/P pericardiocentesis     S/P small bowel resection

## 2024-12-20 NOTE — H&P ADULT - PROBLEM SELECTOR PLAN 6
DVT ppx - lovenox subq  Diet - regular  Dispo - pending DVT ppx - heparin subq  Diet - regular  Dispo - pending

## 2024-12-20 NOTE — H&P ADULT - ASSESSMENT
63 year old female with hx of scleroderma, asthma, hypothyroidism, presenting with chest pain and shortness of breath, found to have moderate-large pericardial effusion with concerns for tamponade. Pt now s/p pericardiocentesis with pericardial drain in place. Pt admitted for further workup and management.

## 2024-12-20 NOTE — ED PROVIDER NOTE - EKG #1 DATE/TIME
May 28, 2018        Evie Montiel  [address]  [place] [id number]      Dear Ms. Montiel,    [facility] is committed to your overall health.  To help you get the most out of each of your visits, we will review your information to make sure you are up to date on all of your recommended tests and/or procedures.      Braydon Robles MD has found that you may be due for   Health Maintenance Due    Colonoscopy     Lipid Panel       If you have had any of the above done at another facility, please bring the records or information with you so that your record at [facility] will be complete.    I will be happy to assist you with scheduling any necessary appointments or you may contact the [facility] appointment desk at [phone] to schedule at your convenience.     Thank you for choosing [facility] for your healthcare needs,    Stella MILTON LPN,   Care Coordination Supervisor  [facility]  [phone]     20-Dec-2024 16:11

## 2024-12-21 DIAGNOSIS — K59.00 CONSTIPATION, UNSPECIFIED: ICD-10-CM

## 2024-12-21 LAB
ANION GAP SERPL CALC-SCNC: 12 MMOL/L — SIGNIFICANT CHANGE UP (ref 5–17)
ANION GAP SERPL CALC-SCNC: 14 MMOL/L — SIGNIFICANT CHANGE UP (ref 5–17)
APTT BLD: 29.7 SEC — SIGNIFICANT CHANGE UP (ref 24.5–35.6)
BASOPHILS # BLD AUTO: 0.01 K/UL — SIGNIFICANT CHANGE UP (ref 0–0.2)
BASOPHILS NFR BLD AUTO: 0.1 % — SIGNIFICANT CHANGE UP (ref 0–2)
BUN SERPL-MCNC: 14 MG/DL — SIGNIFICANT CHANGE UP (ref 7–23)
BUN SERPL-MCNC: 16 MG/DL — SIGNIFICANT CHANGE UP (ref 7–23)
CALCIUM SERPL-MCNC: 9.2 MG/DL — SIGNIFICANT CHANGE UP (ref 8.4–10.5)
CALCIUM SERPL-MCNC: 9.2 MG/DL — SIGNIFICANT CHANGE UP (ref 8.4–10.5)
CHLORIDE SERPL-SCNC: 103 MMOL/L — SIGNIFICANT CHANGE UP (ref 96–108)
CHLORIDE SERPL-SCNC: 104 MMOL/L — SIGNIFICANT CHANGE UP (ref 96–108)
CO2 SERPL-SCNC: 22 MMOL/L — SIGNIFICANT CHANGE UP (ref 22–31)
CO2 SERPL-SCNC: 25 MMOL/L — SIGNIFICANT CHANGE UP (ref 22–31)
CREAT SERPL-MCNC: 0.48 MG/DL — LOW (ref 0.5–1.3)
CREAT SERPL-MCNC: 0.5 MG/DL — SIGNIFICANT CHANGE UP (ref 0.5–1.3)
EGFR: 104 ML/MIN/1.73M2 — SIGNIFICANT CHANGE UP
EGFR: 105 ML/MIN/1.73M2 — SIGNIFICANT CHANGE UP
EOSINOPHIL # BLD AUTO: 0 K/UL — SIGNIFICANT CHANGE UP (ref 0–0.5)
EOSINOPHIL NFR BLD AUTO: 0 % — SIGNIFICANT CHANGE UP (ref 0–6)
GAS PNL BLDV: SIGNIFICANT CHANGE UP
GLUCOSE BLDC GLUCOMTR-MCNC: 152 MG/DL — HIGH (ref 70–99)
GLUCOSE SERPL-MCNC: 140 MG/DL — HIGH (ref 70–99)
GLUCOSE SERPL-MCNC: 148 MG/DL — HIGH (ref 70–99)
GRAM STN FLD: SIGNIFICANT CHANGE UP
HCT VFR BLD CALC: 41.8 % — SIGNIFICANT CHANGE UP (ref 34.5–45)
HCT VFR BLD CALC: 42.4 % — SIGNIFICANT CHANGE UP (ref 34.5–45)
HGB BLD-MCNC: 13.8 G/DL — SIGNIFICANT CHANGE UP (ref 11.5–15.5)
HGB BLD-MCNC: 13.8 G/DL — SIGNIFICANT CHANGE UP (ref 11.5–15.5)
IMM GRANULOCYTES NFR BLD AUTO: 0.9 % — SIGNIFICANT CHANGE UP (ref 0–0.9)
INR BLD: 1.24 RATIO — HIGH (ref 0.85–1.16)
LYMPHOCYTES # BLD AUTO: 0.65 K/UL — LOW (ref 1–3.3)
LYMPHOCYTES # BLD AUTO: 6 % — LOW (ref 13–44)
MAGNESIUM SERPL-MCNC: 1.8 MG/DL — SIGNIFICANT CHANGE UP (ref 1.6–2.6)
MAGNESIUM SERPL-MCNC: 1.9 MG/DL — SIGNIFICANT CHANGE UP (ref 1.6–2.6)
MCHC RBC-ENTMCNC: 29.9 PG — SIGNIFICANT CHANGE UP (ref 27–34)
MCHC RBC-ENTMCNC: 29.9 PG — SIGNIFICANT CHANGE UP (ref 27–34)
MCHC RBC-ENTMCNC: 32.5 G/DL — SIGNIFICANT CHANGE UP (ref 32–36)
MCHC RBC-ENTMCNC: 33 G/DL — SIGNIFICANT CHANGE UP (ref 32–36)
MCV RBC AUTO: 90.5 FL — SIGNIFICANT CHANGE UP (ref 80–100)
MCV RBC AUTO: 92 FL — SIGNIFICANT CHANGE UP (ref 80–100)
MONOCYTES # BLD AUTO: 0.84 K/UL — SIGNIFICANT CHANGE UP (ref 0–0.9)
MONOCYTES NFR BLD AUTO: 7.7 % — SIGNIFICANT CHANGE UP (ref 2–14)
NEUTROPHILS # BLD AUTO: 9.24 K/UL — HIGH (ref 1.8–7.4)
NEUTROPHILS NFR BLD AUTO: 85.3 % — HIGH (ref 43–77)
NIGHT BLUE STAIN TISS: SIGNIFICANT CHANGE UP
NRBC # BLD: 0 /100 WBCS — SIGNIFICANT CHANGE UP (ref 0–0)
NRBC # BLD: 0 /100 WBCS — SIGNIFICANT CHANGE UP (ref 0–0)
PHOSPHATE SERPL-MCNC: 2.8 MG/DL — SIGNIFICANT CHANGE UP (ref 2.5–4.5)
PHOSPHATE SERPL-MCNC: 3.5 MG/DL — SIGNIFICANT CHANGE UP (ref 2.5–4.5)
PLATELET # BLD AUTO: 225 K/UL — SIGNIFICANT CHANGE UP (ref 150–400)
PLATELET # BLD AUTO: 229 K/UL — SIGNIFICANT CHANGE UP (ref 150–400)
POTASSIUM SERPL-MCNC: 3.9 MMOL/L — SIGNIFICANT CHANGE UP (ref 3.5–5.3)
POTASSIUM SERPL-MCNC: 4.2 MMOL/L — SIGNIFICANT CHANGE UP (ref 3.5–5.3)
POTASSIUM SERPL-SCNC: 3.9 MMOL/L — SIGNIFICANT CHANGE UP (ref 3.5–5.3)
POTASSIUM SERPL-SCNC: 4.2 MMOL/L — SIGNIFICANT CHANGE UP (ref 3.5–5.3)
PROTHROM AB SERPL-ACNC: 14.2 SEC — HIGH (ref 9.9–13.4)
RBC # BLD: 4.61 M/UL — SIGNIFICANT CHANGE UP (ref 3.8–5.2)
RBC # BLD: 4.62 M/UL — SIGNIFICANT CHANGE UP (ref 3.8–5.2)
RBC # FLD: 12.2 % — SIGNIFICANT CHANGE UP (ref 10.3–14.5)
RBC # FLD: 12.3 % — SIGNIFICANT CHANGE UP (ref 10.3–14.5)
SODIUM SERPL-SCNC: 139 MMOL/L — SIGNIFICANT CHANGE UP (ref 135–145)
SODIUM SERPL-SCNC: 141 MMOL/L — SIGNIFICANT CHANGE UP (ref 135–145)
SPECIMEN SOURCE: SIGNIFICANT CHANGE UP
SPECIMEN SOURCE: SIGNIFICANT CHANGE UP
T4 AB SER-ACNC: 8.8 UG/DL — SIGNIFICANT CHANGE UP (ref 4.6–12)
TSH SERPL-MCNC: 0.58 UIU/ML — SIGNIFICANT CHANGE UP (ref 0.27–4.2)
WBC # BLD: 10.84 K/UL — HIGH (ref 3.8–10.5)
WBC # BLD: 11.42 K/UL — HIGH (ref 3.8–10.5)
WBC # FLD AUTO: 10.84 K/UL — HIGH (ref 3.8–10.5)
WBC # FLD AUTO: 11.42 K/UL — HIGH (ref 3.8–10.5)

## 2024-12-21 PROCEDURE — 99232 SBSQ HOSP IP/OBS MODERATE 35: CPT

## 2024-12-21 PROCEDURE — 99223 1ST HOSP IP/OBS HIGH 75: CPT | Mod: GC

## 2024-12-21 RX ORDER — IBUPROFEN 200 MG
600 TABLET ORAL EVERY 8 HOURS
Refills: 0 | Status: DISCONTINUED | OUTPATIENT
Start: 2024-12-21 | End: 2024-12-30

## 2024-12-21 RX ORDER — ONDANSETRON 4 MG/1
4 TABLET ORAL ONCE
Refills: 0 | Status: COMPLETED | OUTPATIENT
Start: 2024-12-21 | End: 2024-12-21

## 2024-12-21 RX ORDER — NICOTINE POLACRILEX 4 MG/1
1 LOZENGE ORAL ONCE
Refills: 0 | Status: COMPLETED | OUTPATIENT
Start: 2024-12-21 | End: 2024-12-21

## 2024-12-21 RX ORDER — IBUPROFEN 200 MG
600 TABLET ORAL ONCE
Refills: 0 | Status: COMPLETED | OUTPATIENT
Start: 2024-12-21 | End: 2024-12-21

## 2024-12-21 RX ORDER — OXYCODONE HCL 15 MG
20 TABLET ORAL ONCE
Refills: 0 | Status: DISCONTINUED | OUTPATIENT
Start: 2024-12-21 | End: 2024-12-21

## 2024-12-21 RX ORDER — METOPROLOL TARTRATE 50 MG
2.5 TABLET ORAL ONCE
Refills: 0 | Status: COMPLETED | OUTPATIENT
Start: 2024-12-21 | End: 2024-12-21

## 2024-12-21 RX ORDER — ALPRAZOLAM 0.25 MG/1
0.5 TABLET ORAL ONCE
Refills: 0 | Status: DISCONTINUED | OUTPATIENT
Start: 2024-12-21 | End: 2024-12-21

## 2024-12-21 RX ORDER — LORAZEPAM 1 MG/1
1 TABLET ORAL ONCE
Refills: 0 | Status: DISCONTINUED | OUTPATIENT
Start: 2024-12-21 | End: 2024-12-21

## 2024-12-21 RX ORDER — DIGOXIN 250 MCG
250 TABLET ORAL EVERY 6 HOURS
Refills: 0 | Status: COMPLETED | OUTPATIENT
Start: 2024-12-22 | End: 2024-12-22

## 2024-12-21 RX ORDER — DILTIAZEM HYDROCHLORIDE 300 MG/1
5 CAPSULE, COATED, EXTENDED RELEASE ORAL ONCE
Refills: 0 | Status: COMPLETED | OUTPATIENT
Start: 2024-12-21 | End: 2024-12-21

## 2024-12-21 RX ORDER — DIGOXIN 250 MCG
500 TABLET ORAL ONCE
Refills: 0 | Status: COMPLETED | OUTPATIENT
Start: 2024-12-21 | End: 2024-12-21

## 2024-12-21 RX ORDER — DIGOXIN 250 MCG
500 TABLET ORAL ONCE
Refills: 0 | Status: DISCONTINUED | OUTPATIENT
Start: 2024-12-21 | End: 2024-12-21

## 2024-12-21 RX ORDER — ALPRAZOLAM 0.25 MG/1
0.25 TABLET ORAL ONCE
Refills: 0 | Status: DISCONTINUED | OUTPATIENT
Start: 2024-12-21 | End: 2024-12-21

## 2024-12-21 RX ORDER — COLCHICINE 0.6 MG/1
0.6 CAPSULE ORAL DAILY
Refills: 0 | Status: DISCONTINUED | OUTPATIENT
Start: 2024-12-21 | End: 2024-12-31

## 2024-12-21 RX ORDER — FENTANYL 75 UG/H
25 PATCH, EXTENDED RELEASE TRANSDERMAL ONCE
Refills: 0 | Status: DISCONTINUED | OUTPATIENT
Start: 2024-12-21 | End: 2024-12-21

## 2024-12-21 RX ADMIN — BACLOFEN 10 MILLIGRAM(S): 10 TABLET ORAL at 21:26

## 2024-12-21 RX ADMIN — Medication 600 MILLIGRAM(S): at 22:47

## 2024-12-21 RX ADMIN — Medication 30 MILLIGRAM(S): at 22:47

## 2024-12-21 RX ADMIN — SENNOSIDES 2 TABLET(S): 8.6 TABLET, FILM COATED ORAL at 21:14

## 2024-12-21 RX ADMIN — HEPARIN SODIUM 5000 UNIT(S): 1000 INJECTION, SOLUTION INTRAVENOUS; SUBCUTANEOUS at 21:15

## 2024-12-21 RX ADMIN — ONDANSETRON 4 MILLIGRAM(S): 4 TABLET ORAL at 18:52

## 2024-12-21 RX ADMIN — DILTIAZEM HYDROCHLORIDE 5 MILLIGRAM(S): 300 CAPSULE, COATED, EXTENDED RELEASE ORAL at 18:52

## 2024-12-21 RX ADMIN — Medication 20 MILLIGRAM(S): at 08:21

## 2024-12-21 RX ADMIN — Medication 600 MILLIGRAM(S): at 18:54

## 2024-12-21 RX ADMIN — LORAZEPAM 1 MILLIGRAM(S): 1 TABLET ORAL at 18:52

## 2024-12-21 RX ADMIN — COLCHICINE 0.6 MILLIGRAM(S): 0.6 CAPSULE ORAL at 18:53

## 2024-12-21 RX ADMIN — NICOTINE POLACRILEX 1 PATCH: 4 LOZENGE ORAL at 19:11

## 2024-12-21 RX ADMIN — Medication 600 MILLIGRAM(S): at 21:16

## 2024-12-21 RX ADMIN — IPRATROPIUM BROMIDE AND ALBUTEROL SULFATE 3 MILLILITER(S): .5; 2.5 SOLUTION RESPIRATORY (INHALATION) at 05:13

## 2024-12-21 RX ADMIN — Medication 20 MILLIGRAM(S): at 00:43

## 2024-12-21 RX ADMIN — Medication 600 MILLIGRAM(S): at 18:53

## 2024-12-21 RX ADMIN — Medication 500 MICROGRAM(S): at 19:47

## 2024-12-21 RX ADMIN — ALPRAZOLAM 0.5 MILLIGRAM(S): 0.25 TABLET ORAL at 22:33

## 2024-12-21 RX ADMIN — NICOTINE POLACRILEX 1 PATCH: 4 LOZENGE ORAL at 15:10

## 2024-12-21 RX ADMIN — LEVOTHYROXINE SODIUM 112 MICROGRAM(S): 175 TABLET ORAL at 05:13

## 2024-12-21 RX ADMIN — IPRATROPIUM BROMIDE AND ALBUTEROL SULFATE 3 MILLILITER(S): .5; 2.5 SOLUTION RESPIRATORY (INHALATION) at 18:52

## 2024-12-21 RX ADMIN — Medication 20 MILLIGRAM(S): at 13:17

## 2024-12-21 RX ADMIN — BACLOFEN 10 MILLIGRAM(S): 10 TABLET ORAL at 10:43

## 2024-12-21 RX ADMIN — Medication 20 MILLIGRAM(S): at 13:29

## 2024-12-21 RX ADMIN — DILTIAZEM HYDROCHLORIDE 5 MILLIGRAM(S): 300 CAPSULE, COATED, EXTENDED RELEASE ORAL at 18:51

## 2024-12-21 RX ADMIN — Medication 20 MILLIGRAM(S): at 08:58

## 2024-12-21 RX ADMIN — FENTANYL 25 MICROGRAM(S): 75 PATCH, EXTENDED RELEASE TRANSDERMAL at 05:13

## 2024-12-21 RX ADMIN — IPRATROPIUM BROMIDE AND ALBUTEROL SULFATE 3 MILLILITER(S): .5; 2.5 SOLUTION RESPIRATORY (INHALATION) at 00:47

## 2024-12-21 RX ADMIN — IPRATROPIUM BROMIDE AND ALBUTEROL SULFATE 3 MILLILITER(S): .5; 2.5 SOLUTION RESPIRATORY (INHALATION) at 10:51

## 2024-12-21 RX ADMIN — Medication 30 MILLIGRAM(S): at 21:26

## 2024-12-21 RX ADMIN — Medication 20 MILLIGRAM(S): at 19:47

## 2024-12-21 RX ADMIN — Medication 20 MILLIGRAM(S): at 01:13

## 2024-12-21 RX ADMIN — Medication 30 MILLIGRAM(S): at 11:37

## 2024-12-21 RX ADMIN — Medication 30 MILLIGRAM(S): at 10:51

## 2024-12-21 RX ADMIN — Medication 20 MILLIGRAM(S): at 21:39

## 2024-12-21 NOTE — PROVIDER CONTACT NOTE (CHANGE IN STATUS NOTIFICATION) - NAME OF MD/NP/PA/DO NOTIFIED:
CCR - Please Garth/Maribel Hernandez/Ileana/Kip Non-Triage  Called and left a voicemail for the patient to call the clinic back, office number and hours were provided.     RRT- Tachcardia, tacypnea, hypoxia, CP

## 2024-12-21 NOTE — PROGRESS NOTE ADULT - ASSESSMENT
63F PMH scleroderma, asthma with prior intubations admitted with cardiac tamponade. Initially presented to outpt cardiologist Dr. Aparicio 10 days PTA for general fatigue, pleuritic chest pain, and SOB. in-office TTE was notable for pericardial effusion. Patient deferred presentation to ED at that time. Presented with tachycardia and hypotension, TTE w/ evidence of caridac tamponade. Now s/p pericardiocentesis (12/20) with 650cc bloody fluid removed in the lab. No evidence of malignant effusion on initial evaluation of pericardial fluid, cultures pending. Most likely effusion in setting of patient's known scleroderma      #Pericardial effusion  #Echocardiographic tamponade  - Can consider obtaining non-emergent rheumatologic consultation re: steroids for possible disease flare  - Repeat limited TTE tomorrow to assess for resolution of effusion  - Follow up pericardial fluid cytology  - Recommend age appropriate cancer screening  - Monitor drain output, plan to remove when <50 ccs/24 hour  - start ibuprofen 600mg PO TID, colchicine 0.6mg PO QDay

## 2024-12-21 NOTE — CONSULT NOTE ADULT - SUBJECTIVE AND OBJECTIVE BOX
AYLEEN BOSWELL  [id number]    HISTORY OF PRESENT ILLNESS:  "63 year old female with hx of scleroderma, asthma (with prior intubations), hypothyroidism, presenting with chest pain and shortness of breath. Patient went to see her cardiologist Dr. Aparicio 10 days ago where an in-office TTE was performed which revealed a pericardial effusion. She was recommended  to come to ED for evaluation but did not present at that time due to personal reasons (recent passing of her son). She came to ED today with worsening symptoms. Pt found to be tachycardic, intermittently hypotensive with MAP in 50s, responsive to IVF. POCUS revealed mod-large pericardial effusion with echographic evidence of tamponade physiology. Pt was taken to the cath lab with cardiology for urgent pericardiocentesis, now with pericardial drain in place.    Patient was examined in the [facility]. On arousal, patient was in moderate discomfort/stress saying she feels pain everywhere. Unable to obtain further history from patient; she continues to express wanting to go back to sleep. Pt admitted for further management. "    Found to have pericardial effusion with tamponade, s/p pericardial drain    Rheum ROS    Denies fevers/chills/weight loss/night sweats/alopecia/sinus disease/asthma history/oral ulcers/dysphagia/vision changes/Raynauds/VTE/miscarraiges/sicca symptoms/urinary changes/edema/SOB/joint pain/swelling/jaw claudication/rash/photosensitivity/morning stiffness    Endorses fevers/chills/weight loss/night sweats/alopecia/sinus disease/asthma history/oral ulcers/dysphagia/vision changes/Raynauds/VTE/miscarraiges/sicca symptoms/urinary changes/edema/SOB/joint pain/swelling/jaw claudication/rash/photosensitivity/morning stiffness      MEDICATIONS  (STANDING):  albuterol/ipratropium for Nebulization 3 milliLiter(s) Nebulizer every 6 hours  baclofen 10 milliGRAM(s) Oral <User Schedule>  heparin   Injectable 5000 Unit(s) SubCutaneous every 8 hours  levothyroxine 112 MICROGram(s) Oral daily  morphine ER Tablet 30 milliGRAM(s) Oral <User Schedule>  oxyCODONE    IR 20 milliGRAM(s) Oral <User Schedule>  polyethylene glycol 3350 17 Gram(s) Oral daily  senna 2 Tablet(s) Oral at bedtime    MEDICATIONS  (PRN):      Allergies    ABIDA dye (Short breath; Hives)  Xolair (Unknown)  penicillin (Anaphylaxis; Hives; Other)  Originally Entered as [Unknown] reaction to [BEE STINGS] (Unknown)  statins (Unknown)  penicillin (Unknown)  IV Contrast (Unknown)  iodine (Anaphylaxis)    Intolerances        PERTINENT MEDICATION HISTORY:    SOCIAL HISTORY:  OCCUPATION:  TRAVEL HISTORY:    FAMILY HISTORY:      Vital Signs Last 24 Hrs  T(C): 36.7 (21 Dec 2024 08:25), Max: 36.7 (21 Dec 2024 05:15)  T(F): 98 (21 Dec 2024 08:25), Max: 98.1 (21 Dec 2024 05:15)  HR: 103 (21 Dec 2024 08:25) (76 - 110)  BP: 118/56 (21 Dec 2024 08:25) (81/49 - 155/83)  BP(mean): 76 (21 Dec 2024 08:25) (52 - 97)  RR: 18 (21 Dec 2024 08:25) (18 - 25)  SpO2: 93% (21 Dec 2024 08:25) (90% - 96%)    Parameters below as of 21 Dec 2024 08:25  Patient On (Oxygen Delivery Method): nasal cannula  O2 Flow (L/min): 4      ROS as noted above     Physical Exam:  General: No apparent distress  HEENT: EOMI, MMM  CVS: +S1/S2, RRR, no murmurs/rubs/gallops  Resp: CTA b/l. No crackles/wheezing  GI: Soft, NT/ND +BS  MSK: no swelling/warmth/erythema of the joints of the UE/LE  Neuro: AAOx3  Skin: no visible rashes    LABS:                        13.8   10.84 )-----------( 225      ( 21 Dec 2024 05:18 )             41.8     12-21    141  |  104  |  16  ----------------------------<  148[H]  4.2   |  25  |  0.50    Ca    9.2      21 Dec 2024 05:18  Phos  3.5     12-21  Mg     1.9     12-21    TPro  8.0  /  Alb  4.6  /  TBili  0.5  /  DBili  x   /  AST  22  /  ALT  15  /  AlkPhos  105  12-20    PT/INR - ( 20 Dec 2024 16:04 )   PT: 12.0 sec;   INR: 1.05 ratio         PTT - ( 20 Dec 2024 16:04 )  PTT:31.2 sec  Urinalysis Basic - ( 21 Dec 2024 05:18 )    Color: x / Appearance: x / SG: x / pH: x  Gluc: 148 mg/dL / Ketone: x  / Bili: x / Urobili: x   Blood: x / Protein: x / Nitrite: x   Leuk Esterase: x / RBC: x / WBC x   Sq Epi: x / Non Sq Epi: x / Bacteria: x          Culture - Body Fluid with Gram Stain (collected 12-20-24 @ 18:36)  Source: Body Fluid  Gram Stain (12-21-24 @ 03:01):    polymorphonuclear leukocytes seen    No organisms seen    by cytocentrifuge    Culture - Fungal, Body Fluid (collected 12-20-24 @ 18:36)  Source: Pericardial  Preliminary Report (12-21-24 @ 08:26):    Testing in progress        Rheumatology Work Up              RADIOLOGY & ADDITIONAL STUDIES:  < from: TTE Limited W or WO Ultrasound Enhancing Agent (12.20.24 @ 15:53) >  CONCLUSIONS:      1. There is large circumferential pericardial effusion measuring 2.9 cm adjacent to the right atrium and right ventricle. with evidence of hemodynamic compromise (or echocardiographic evidence of cardiac tamponade) with a blood pressure of 155 mmHg/83 mmHg and a heart rate of 110 bpm.   2. Organized fibrinous vs coagulant material is seen in the pericardial space.   3. No recent prior study for comparison.   4. Findings were discussed with Calixto Alfonso MD on 12/20/2024 at 4:15 pm.    < end of copied text >     AYLEEN DIANECHEMO  [id number]    HISTORY OF PRESENT ILLNESS:  "63 year old female with hx of scleroderma, asthma (with prior intubations), hypothyroidism, presenting with chest pain and shortness of breath. Patient went to see her cardiologist Dr. Aparicio 10 days ago where an in-office TTE was performed which revealed a pericardial effusion. She was recommended  to come to ED for evaluation but did not present at that time due to personal reasons (recent passing of her son). She came to ED today with worsening symptoms. Pt found to be tachycardic, intermittently hypotensive with MAP in 50s, responsive to IVF. POCUS revealed mod-large pericardial effusion with echographic evidence of tamponade physiology. Pt was taken to the cath lab with cardiology for urgent pericardiocentesis, now with pericardial drain in place.    Patient was examined in the [facility]. On arousal, patient was in moderate discomfort/stress saying she feels pain everywhere. Unable to obtain further history from patient; she continues to express wanting to go back to sleep. Pt admitted for further management. "    Rheum hx:  Found to have pericardial effusion with tamponade, s/p pericardial drain. Pt states she was diagnosed with scleroderma 18yrs ago, with skin changes, whole body pain, and dysphagia. Was following Dr Santillan then now Dr Metzger. Unclear what medications she is taking for scleroderma. States she has hearing loss and vision changes. Has hx of asthma, but denies hx of ILD, denies previous pericardial or pleural effusions. Endorses raynauds, dysphagia to solids and liquids with sensation of food getting stuck, and constipation. CP and SOB is improved after treatment. CP is L sided and substernal, radiating to the back and allieviated with leaning forward. Endorses skin lesions.     Rheum ROS  As above      MEDICATIONS  (STANDING):  albuterol/ipratropium for Nebulization 3 milliLiter(s) Nebulizer every 6 hours  baclofen 10 milliGRAM(s) Oral <User Schedule>  heparin   Injectable 5000 Unit(s) SubCutaneous every 8 hours  levothyroxine 112 MICROGram(s) Oral daily  morphine ER Tablet 30 milliGRAM(s) Oral <User Schedule>  oxyCODONE    IR 20 milliGRAM(s) Oral <User Schedule>  polyethylene glycol 3350 17 Gram(s) Oral daily  senna 2 Tablet(s) Oral at bedtime    MEDICATIONS  (PRN):      Allergies    ABIDA dye (Short breath; Hives)  Xolair (Unknown)  penicillin (Anaphylaxis; Hives; Other)  Originally Entered as [Unknown] reaction to [BEE STINGS] (Unknown)  statins (Unknown)  penicillin (Unknown)  IV Contrast (Unknown)  iodine (Anaphylaxis)    Intolerances        PERTINENT MEDICATION HISTORY: unable to obtain    SOCIAL HISTORY: unable to obtain  OCCUPATION: unable to obtain  TRAVEL HISTORY: unable to obtain    FAMILY HISTORY: unable to obtain      Vital Signs Last 24 Hrs  T(C): 36.7 (21 Dec 2024 08:25), Max: 36.7 (21 Dec 2024 05:15)  T(F): 98 (21 Dec 2024 08:25), Max: 98.1 (21 Dec 2024 05:15)  HR: 103 (21 Dec 2024 08:25) (76 - 110)  BP: 118/56 (21 Dec 2024 08:25) (81/49 - 155/83)  BP(mean): 76 (21 Dec 2024 08:25) (52 - 97)  RR: 18 (21 Dec 2024 08:25) (18 - 25)  SpO2: 93% (21 Dec 2024 08:25) (90% - 96%)    Parameters below as of 21 Dec 2024 08:25  Patient On (Oxygen Delivery Method): nasal cannula  O2 Flow (L/min): 4      ROS as noted above     Physical Exam: limited due to patient in pain and asking to be examined later  General: In distress  MSK: sclerodactyly and contractures of fingers, contracture of feet   Skin: erythematous linear appearing rash on forearms, anterior shins    LABS:                        13.8   10.84 )-----------( 225      ( 21 Dec 2024 05:18 )             41.8     12-21    141  |  104  |  16  ----------------------------<  148[H]  4.2   |  25  |  0.50    Ca    9.2      21 Dec 2024 05:18  Phos  3.5     12-21  Mg     1.9     12-21    TPro  8.0  /  Alb  4.6  /  TBili  0.5  /  DBili  x   /  AST  22  /  ALT  15  /  AlkPhos  105  12-20    PT/INR - ( 20 Dec 2024 16:04 )   PT: 12.0 sec;   INR: 1.05 ratio         PTT - ( 20 Dec 2024 16:04 )  PTT:31.2 sec  Urinalysis Basic - ( 21 Dec 2024 05:18 )    Color: x / Appearance: x / SG: x / pH: x  Gluc: 148 mg/dL / Ketone: x  / Bili: x / Urobili: x   Blood: x / Protein: x / Nitrite: x   Leuk Esterase: x / RBC: x / WBC x   Sq Epi: x / Non Sq Epi: x / Bacteria: x          Culture - Body Fluid with Gram Stain (collected 12-20-24 @ 18:36)  Source: Body Fluid  Gram Stain (12-21-24 @ 03:01):    polymorphonuclear leukocytes seen    No organisms seen    by cytocentrifuge    Culture - Fungal, Body Fluid (collected 12-20-24 @ 18:36)  Source: Pericardial  Preliminary Report (12-21-24 @ 08:26):    Testing in progress        Rheumatology Work Up              RADIOLOGY & ADDITIONAL STUDIES:  < from: TTE Limited W or WO Ultrasound Enhancing Agent (12.20.24 @ 15:53) >  CONCLUSIONS:      1. There is large circumferential pericardial effusion measuring 2.9 cm adjacent to the right atrium and right ventricle. with evidence of hemodynamic compromise (or echocardiographic evidence of cardiac tamponade) with a blood pressure of 155 mmHg/83 mmHg and a heart rate of 110 bpm.   2. Organized fibrinous vs coagulant material is seen in the pericardial space.   3. No recent prior study for comparison.   4. Findings were discussed with Calixto Alfonso MD on 12/20/2024 at 4:15 pm.    < end of copied text >     AYLEEN BOSWELL  [id number]    HISTORY OF PRESENT ILLNESS:  "63 year old female with hx of scleroderma, asthma (with prior intubations), hypothyroidism, presenting with chest pain and shortness of breath. Patient went to see her cardiologist Dr. Aparicio 10 days ago where an in-office TTE was performed which revealed a pericardial effusion. She was recommended  to come to ED for evaluation but did not present at that time due to personal reasons (recent passing of her son). She came to ED today with worsening symptoms. Pt found to be tachycardic, intermittently hypotensive with MAP in 50s, responsive to IVF. POCUS revealed mod-large pericardial effusion with echographic evidence of tamponade physiology. Pt was taken to the cath lab with cardiology for urgent pericardiocentesis, now with pericardial drain in place.      Today (rheum eval)   Found to have pericardial effusion with tamponade, s/p pericardial drain. Pt states she was diagnosed with scleroderma 18yrs ago, with skin changes, whole body pain, and dysphagia. Was following Dr Santillan then now Dr Metzger. Unclear what medications she is taking for scleroderma but notes that she was on multiple medications in the past.  States she has hearing loss and vision changes. Has hx of asthma, but denies hx of ILD, denies previous pericardial or pleural effusions. Endorses raynauds, dysphagia to solids and liquids with sensation of food getting stuck, and constipation. CP and SOB is improved after treatment. CP is L sided and substernal, radiating to the back and allieviated with leaning forward. Endorses skin lesions.   Patient requests to end interview early and speak again tommorrow.     Rheum ROS  As above      MEDICATIONS  (STANDING):  albuterol/ipratropium for Nebulization 3 milliLiter(s) Nebulizer every 6 hours  baclofen 10 milliGRAM(s) Oral <User Schedule>  heparin   Injectable 5000 Unit(s) SubCutaneous every 8 hours  levothyroxine 112 MICROGram(s) Oral daily  morphine ER Tablet 30 milliGRAM(s) Oral <User Schedule>  oxyCODONE    IR 20 milliGRAM(s) Oral <User Schedule>  polyethylene glycol 3350 17 Gram(s) Oral daily  senna 2 Tablet(s) Oral at bedtime    MEDICATIONS  (PRN):      Allergies    ABIDA dye (Short breath; Hives)  Xolair (Unknown)  penicillin (Anaphylaxis; Hives; Other)  Originally Entered as [Unknown] reaction to [BEE STINGS] (Unknown)  statins (Unknown)  penicillin (Unknown)  IV Contrast (Unknown)  iodine (Anaphylaxis)    PERTINENT MEDICATION HISTORY: unable to obtain  SOCIAL HISTORY: unable to obtain  -> only child  last week - increased stress  OCCUPATION: unable to obtain  TRAVEL HISTORY: unable to obtain  FAMILY HISTORY: unable to obtain      Vital Signs Last 24 Hrs  T(C): 36.7 (21 Dec 2024 08:25), Max: 36.7 (21 Dec 2024 05:15)  T(F): 98 (21 Dec 2024 08:25), Max: 98.1 (21 Dec 2024 05:15)  HR: 103 (21 Dec 2024 08:25) (76 - 110)  BP: 118/56 (21 Dec 2024 08:25) (81/49 - 155/83)  BP(mean): 76 (21 Dec 2024 08:25) (52 - 97)  RR: 18 (21 Dec 2024 08:25) (18 - 25)  SpO2: 93% (21 Dec 2024 08:25) (90% - 96%)    Parameters below as of 21 Dec 2024 08:25  Patient On (Oxygen Delivery Method): nasal cannula  O2 Flow (L/min): 4      ROS as noted above     Physical Exam: limited due to patient in pain and asking to be examined tommorrow  General: In distress  MSK: sclerodactyly and contractures of fingers, contracture of feet   Skin: erythematous linear appearing rash on forearms, anterior shins    LABS:                        13.8   10.84 )-----------( 225      ( 21 Dec 2024 05:18 )             41.8     12-    141  |  104  |  16  ----------------------------<  148[H]  4.2   |  25  |  0.50    Ca    9.2      21 Dec 2024 05:18  Phos  3.5     12-  Mg     1.9     -    TPro  8.0  /  Alb  4.6  /  TBili  0.5  /  DBili  x   /  AST  22  /  ALT  15  /  AlkPhos  105  12-20    PT/INR - ( 20 Dec 2024 16:04 )   PT: 12.0 sec;   INR: 1.05 ratio       PTT - ( 20 Dec 2024 16:04 )  PTT:31.2 sec  Urinalysis Basic - ( 21 Dec 2024 05:18 )    Color: x / Appearance: x / SG: x / pH: x  Gluc: 148 mg/dL / Ketone: x  / Bili: x / Urobili: x   Blood: x / Protein: x / Nitrite: x   Leuk Esterase: x / RBC: x / WBC x   Sq Epi: x / Non Sq Epi: x / Bacteria: x          Culture - Body Fluid with Gram Stain (collected 24 @ 18:36)  Source: Body Fluid  Gram Stain (24 @ 03:01):    polymorphonuclear leukocytes seen    No organisms seen    by cytocentrifuge    Culture - Fungal, Body Fluid (collected 24 @ 18:36)  Source: Pericardial  Preliminary Report (24 @ 08:26):    Testing in progress    2012:  ANGELA 1:80   RADIOLOGY & ADDITIONAL STUDIES:  < from: TTE Limited W or WO Ultrasound Enhancing Agent (24 @ 15:53) >  CONCLUSIONS:      1. There is large circumferential pericardial effusion measuring 2.9 cm adjacent to the right atrium and right ventricle. With evidence of hemodynamic compromise (or echocardiographic evidence of cardiac tamponade) with a blood pressure of 155 mmHg/83 mmHg and a heart rate of 110 bpm.   2. Organized fibrinous vs coagulant material is seen in the pericardial space.   3. No recent prior study for comparison.   4. Findings were discussed with Calixto Alfonso MD on 2024 at 4:15 pm.    < end of copied text >

## 2024-12-21 NOTE — PROGRESS NOTE ADULT - PROBLEM SELECTOR PLAN 6
DVT ppx - heparin subq  Diet - regular  Dispo - pending DVT ppx - heparin subq  Diet - regular  Dispo - pending  Discussed with ACP Suzanne Rooney

## 2024-12-21 NOTE — CONSULT NOTE ADULT - ATTENDING COMMENTS
Modifications made to fellows note above     Long standing scleroderma and has been on multiple prior medications (pending details) but non currently for scleroderma and with manifestations of Raynaud, contractures, GI dysmotility and skin disease.  Admitted now for pericardial effusion of unclear etiology.  While scleroderma can  be associated with pericardial effusion it is rare, so a full workup for the cause of pericardial effusion should be pursued.   Patient defers full exam today due to chest pain and personal stress - agrees to further history and exam tomorrow.   Initiate workup as above   monitor BP as scleroderma renal crisis is associated with pericardial effusion and should be consider if elevation in BP. Check urine prot/cr and UA and monitor Cr serially.     Dorota Lopes MD  [facility] Division of Rheumatology   [phone]   [email or web address] Modifications made to fellows note above     Long standing scleroderma and has been on multiple prior medications (pending details) but non currently for scleroderma and with manifestations of Raynaud, contractures, GI dysmotility and skin disease.  Admitted now for pericardial effusion of unclear etiology.  While scleroderma can  be associated with pericardial effusion it is rare, so a full workup for the cause of pericardial effusion should be pursued.   Patient defers full exam today due to chest pain and personal stress - agrees to further history and exam in future - will return to re-eval once patient more amenable to re-evaluation.   Initiate workup as above   monitor BP as scleroderma renal crisis is associated with pericardial effusion and should be consider if elevation in BP. Check urine prot/cr and UA and monitor Cr serially.     Dorota Lopes MD  [facility] Division of Rheumatology   [phone]   [email or web address]

## 2024-12-21 NOTE — PROGRESS NOTE ADULT - SUBJECTIVE AND OBJECTIVE BOX
Patient is a 63y old  Female who presents with a chief complaint of     SUBJECTIVE / OVERNIGHT EVENTS: Patient seen and examined at bedside. Today patient reports chest discomfort after drain placement, general body aches and pains, trouble swallowing and constipation.     MEDICATIONS  (STANDING):  albuterol/ipratropium for Nebulization 3 milliLiter(s) Nebulizer every 6 hours  baclofen 10 milliGRAM(s) Oral <User Schedule>  heparin   Injectable 5000 Unit(s) SubCutaneous every 8 hours  levothyroxine 112 MICROGram(s) Oral daily  morphine ER Tablet 30 milliGRAM(s) Oral <User Schedule>  oxyCODONE    IR 20 milliGRAM(s) Oral <User Schedule>  polyethylene glycol 3350 17 Gram(s) Oral daily  senna 2 Tablet(s) Oral at bedtime    MEDICATIONS  (PRN):      Vital Signs Last 24 Hrs  T(C): 36.8 (21 Dec 2024 11:34), Max: 36.8 (21 Dec 2024 10:55)  T(F): 98.2 (21 Dec 2024 11:34), Max: 98.2 (21 Dec 2024 10:55)  HR: 100 (21 Dec 2024 11:34) (76 - 110)  BP: 106/58 (21 Dec 2024 11:34) (81/49 - 155/83)  BP(mean): 72 (21 Dec 2024 10:55) (52 - 97)  RR: 18 (21 Dec 2024 11:34) (16 - 25)  SpO2: 84% (21 Dec 2024 11:34) (84% - 96%)    Parameters below as of 21 Dec 2024 11:34  Patient On (Oxygen Delivery Method): room air      CAPILLARY BLOOD GLUCOSE        I&O's Summary    20 Dec 2024 07:01  -  21 Dec 2024 07:00  --------------------------------------------------------  IN: 0 mL / OUT: 750 mL / NET: -750 mL    21 Dec 2024 07:01  -  21 Dec 2024 14:15  --------------------------------------------------------  IN: 0 mL / OUT: 300 mL / NET: -300 mL        PHYSICAL EXAM:  GENERAL APPEARANCE: Uncomfortable appearing female resting in bed   HEENT:  PERRL, EOMI. hearing grossly intact.  CARDIAC: Normal S1 and S2. no mrg. RRR  LUNGS: End expiratory wheezing  ABDOMEN: Soft , NTND, bowel sounds normal. No guarding or rebound.   MUSCULOSKELETAL: No swelling/reddness over extremities  EXTREMITIES: No edema. Peripheral pulses intact.   NEUROLOGICAL: Non focal.  SKIN: TOMY drain in place, no erythema or tenderness over site  PSYCHIATRIC: AOx3 ,       LABS:                        13.8   10.84 )-----------( 225      ( 21 Dec 2024 05:18 )             41.8     12-21    141  |  104  |  16  ----------------------------<  148[H]  4.2   |  25  |  0.50    Ca    9.2      21 Dec 2024 05:18  Phos  3.5     12-21  Mg     1.9     12-21    TPro  8.0  /  Alb  4.6  /  TBili  0.5  /  DBili  x   /  AST  22  /  ALT  15  /  AlkPhos  105  12-20    PT/INR - ( 20 Dec 2024 16:04 )   PT: 12.0 sec;   INR: 1.05 ratio         PTT - ( 20 Dec 2024 16:04 )  PTT:31.2 sec      Urinalysis Basic - ( 21 Dec 2024 05:18 )    Color: x / Appearance: x / SG: x / pH: x  Gluc: 148 mg/dL / Ketone: x  / Bili: x / Urobili: x   Blood: x / Protein: x / Nitrite: x   Leuk Esterase: x / RBC: x / WBC x   Sq Epi: x / Non Sq Epi: x / Bacteria: x        RADIOLOGY & ADDITIONAL TESTS:    Imaging Personally Reviewed:    Consultant(s) Notes Reviewed:      Care Discussed with Consultants/Other Providers:

## 2024-12-21 NOTE — PROGRESS NOTE ADULT - ATTENDING COMMENTS
64 yo female with scleroderma presenting with hx of SOB, pleuritic chest pain and fatigue. Had been advised to visit ED by her cardiologist  after pericardial effusion on outpatient TTE. Found with cardiac tamponade s/p pericardiocentesis. She is doing well but seems anxious. Has residual chest pain, headache and neck pain. Vitals stable. Repeat TTE last night showed trace effusion. Agree to start colchicine and NSAIDs. Pending fluid cytology. Rheumatology to see. Limited TTE tomorrow to assess effusion. Continue tele and hemodynamic monitoring.    Please call back if any questions or concerns    Tammie Soto MD  Attending Cardiologist     Non-invasive Cardiology/Advanced Cardiac Imaging  [facility]

## 2024-12-21 NOTE — CONSULT NOTE ADULT - ASSESSMENT
63F PMH scleroderma, asthma, hypothyroidism presenting with CP and SOB found to have pericardial effusion with tamponade, s/p pericardial drain. Rheumatology consulted to evaluate for scleroderma.  63F PMH scleroderma, asthma, hypothyroidism presenting with CP and SOB found to have pericardial effusion with tamponade, s/p pericardial drain. Rheumatology consulted to evaluate for scleroderma.     #scleroderma  -Dx 18 yrs ago, with skin involvement, dysphagia, gastric dysmotility, raynauds. Follows with Dr Metzger, unclear what medications pt is on for scleroderma  -First episode of pericardial effusion, pt denies hx of ILD, previous hx of pericardial or pleural effusions, no renal involvement  -TTE showed large pericardial effusion with fibrinous material, now trace effusion s/p pericardiocentesis. Bloody drainage with cell count 1000. Pt states CP and SOB improved s/p procedure  -Pericardial effusion can be from scleroderma, but would also evaluate for other causes including infection (coxsackie, EBV), malignancy, can also be idiopathic. Less likely related to thyroid as TFT is normal  -Would also evaluate for gastric dysmotility as pt states she has not had a bowel movement in 11 days and has a hx of SBO    Recommendations:  -Please obtain ANGELA, scleroderma ab, centromere, RNA tere 3, dsDNA, C3, C4, FREDI, sjogrens, RF, CCP  -Please obtain coxsackie, EBV, RVP, Hepatitis panel, hep B core ab, quant  -Please obtain abdominal XR and consider bowel regimen  -Please obtain Speech and swallow evaluation  -Please maintain blood pressure <110/70 to avoid scleroderma renal crisis. Please avoid steroids, as steroids can precipitate renal crisis    Case discussed with Dr Marianne Rodriguez MD   Rheumatology Fellow 63F PMH scleroderma, asthma, hypothyroidism presenting with CP and SOB found to have pericardial effusion with tamponade, s/p pericardial drain. Rheumatology consulted to evaluate for scleroderma.     #scleroderma  -Dx 18 yrs ago, with skin involvement, dysphagia, gastric dysmotility, raynauds. Follows with Dr Metzger, unclear what medications pt is on for scleroderma  -First episode of pericardial effusion, pt denies hx of ILD, previous hx of pericardial or pleural effusions, no renal involvement  -TTE showed large pericardial effusion with fibrinous material, now trace effusion s/p pericardiocentesis. Bloody drainage with cell count 1000. Pt states CP and SOB improved s/p procedure  -Pericardial effusion can be from scleroderma, but would also evaluate for other causes including infection (coxsackie, EBV), malignancy, can also be idiopathic. Less likely related to thyroid as TFT is normal  -Would also evaluate for gastric dysmotility as pt states she has not had a bowel movement in 11 days and has a hx of SBO    Recommendations:  -Please obtain ANGELA, scleroderma ab, centromere, RNA tere 3, dsDNA, C3, C4, FREDI, sjogrens, RF, CCP  -Please obtain coxsackie, EBV, RVP, Hepatitis panel, hep B core ab, quant  -NSAIDs and colchicine as per cardiology for pericarditis  -Please obtain abdominal XR and consider bowel regimen  -Please obtain Speech and swallow evaluation  -Please maintain blood pressure <110/70 to avoid scleroderma renal crisis. Please avoid steroids, as steroids can precipitate renal crisis    Case discussed with Dr Marianne Rodriguez MD   Rheumatology Fellow 63F PMH scleroderma, asthma, hypothyroidism presenting with CP and SOB found to have pericardial effusion with tamponade, s/p pericardial drain. Rheumatology consulted to evaluate for scleroderma.     #scleroderma  -Dx 18 yrs ago, with skin involvement, dysphagia, gastric dysmotility, raynauds. Follows with Dr Metzger, unclear what medications pt is on for scleroderma  -First episode of pericardial effusion, pt denies hx of ILD, previous hx of pericardial or pleural effusions, no renal involvement  -TTE showed large pericardial effusion with fibrinous material, now trace effusion s/p pericardiocentesis. Bloody drainage with cell count 1000. Pt states CP and SOB improved s/p procedure  -Pericardial effusion can be from scleroderma, but would also evaluate for other causes including infection (coxsackie, EBV), malignancy, can also be idiopathic. Less likely related to thyroid as TFT is normal  -Would also evaluate for gastric dysmotility as pt states she has not had a bowel movement in 11 days and has a hx of SBO    Recommendations:  -Please obtain ANGELA, scleroderma ab, centromere, RNA tere 3, dsDNA, C3, C4, FREDI, sjogrens, RF, CCP  -Please obtain coxsackie, EBV, RVP, Hepatitis panel, hep B core ab, quant  -NSAIDs and colchicine as per cardiology for pericardial effusion  -Please obtain abdominal XR and consider bowel regimen  -Please obtain Speech and swallow evaluation  -Please maintain blood pressure <110/70 to avoid scleroderma renal crisis. Please avoid steroids, as steroids can precipitate renal crisis    Case discussed with Dr Marianne Rodriguez MD   Rheumatology Fellow 63F PMH scleroderma, asthma, hypothyroidism presenting with CP and SOB found to have pericardial effusion with tamponade, s/p pericardial drain. Rheumatology consulted to evaluate for scleroderma.     #scleroderma  -Dx 18 yrs ago, with skin involvement, dysphagia, gastric dysmotility, raynauds. Follows with Dr Metzger, unclear what medications pt is on for scleroderma  -First episode of pericardial effusion, pt denies hx of ILD, previous hx of pericardial or pleural effusions, no renal involvement  -TTE showed large pericardial effusion with fibrinous material, now trace effusion s/p pericardiocentesis. Bloody drainage with cell count 1000. Pt states CP and SOB improved s/p procedure  -Pericardial effusion can be from scleroderma, but would also evaluate for other causes including infection (coxsackie, EBV), malignancy, can also be idiopathic. Less likely related to thyroid as TFT is normal  -Would also evaluate for gastric dysmotility as pt states she has not had a bowel movement in 11 days and has a hx of SBO (multiple surgical interventions in the past)  - While pericardial effusion in scleroderma is rare, it is a known complication of scleroderma.  Please evaluate for infectious causes of pericardial effusion in addition to autoimmune disease workup for scleroderma.     Recommendations:  -Please obtain ANGELA (multiplex), scleroderma ab, centromere, RNA tere 3, dsDNA, C3, C4, FREDI, sjogrens, RF, CCP, UA, Urine prot/cr   -Please obtain coxsackie, EBV, RVP, Hepatitis panel, hep B core ab, quant  -NSAIDs and colchicine as per cardiology for pericardial effusion  -Please obtain abdominal XR and consider bowel regimen  -Please obtain Speech and swallow evaluation and may require further GI workup.   -Please maintain blood pressure <110/70 to avoid scleroderma renal crisis. Please avoid steroids, as steroids can precipitate renal crisis  - cxr with apical granuloma - please obtain chest ct to furher characterize finding on CXR and also to look for ILD which is a known complication of Scleroderma  - during week will need to obtain records from rheum at [facility].     Case discussed with Dr Marianne Rodriguez MD   Rheumatology Fellow

## 2024-12-21 NOTE — PROGRESS NOTE ADULT - SUBJECTIVE AND OBJECTIVE BOX
Overnight Events: pericardial drain placed, 650cc removed in cath lab, additional 100cc after transfer to [facility]    Review Of Systems: improving chest pain and fatigue, reports persistent SOB.      Current Meds:  albuterol/ipratropium for Nebulization 3 milliLiter(s) Nebulizer every 6 hours  baclofen 10 milliGRAM(s) Oral <User Schedule>  heparin   Injectable 5000 Unit(s) SubCutaneous every 8 hours  levothyroxine 112 MICROGram(s) Oral daily  morphine ER Tablet 30 milliGRAM(s) Oral <User Schedule>  oxyCODONE    IR 20 milliGRAM(s) Oral <User Schedule>  polyethylene glycol 3350 17 Gram(s) Oral daily  senna 2 Tablet(s) Oral at bedtime      Vitals:  T(F): 98 (12-21), Max: 98.1 (12-21)  HR: 103 (12-21) (76 - 110)  BP: 118/56 (12-21) (81/49 - 155/83)  RR: 18 (12-21)  SpO2: 93% (12-21)  I&O's Summary    20 Dec 2024 07:01  -  21 Dec 2024 07:00  --------------------------------------------------------  IN: 0 mL / OUT: 750 mL / NET: -750 mL        Physical Exam:  GEN: comfortable appearing, lying in bed in NAD  HEENT: NCAT, MMM  CV: Regular S1, S2, no m/r/g, drain site c/d/i  RESP: CTAB  ABD: Soft, NTND, +BS  EXT: No LE edema, WWP, pulses palpable throughout  NEURO: No focal deficits, AOx3  SKIN:  No rashes                          13.8   10.84 )-----------( 225      ( 21 Dec 2024 05:18 )             41.8     12-21    141  |  104  |  16  ----------------------------<  148[H]  4.2   |  25  |  0.50    Ca    9.2      21 Dec 2024 05:18  Phos  3.5     12-21  Mg     1.9     12-21    TPro  8.0  /  Alb  4.6  /  TBili  0.5  /  DBili  x   /  AST  22  /  ALT  15  /  AlkPhos  105  12-20    PT/INR - ( 20 Dec 2024 16:04 )   PT: 12.0 sec;   INR: 1.05 ratio         PTT - ( 20 Dec 2024 16:04 )  PTT:31.2 sec  CARDIAC MARKERS ( 20 Dec 2024 16:03 )  11 ng/L / x     / x     / x     / x     / x

## 2024-12-21 NOTE — CHART NOTE - NSCHARTNOTEFT_GEN_A_CORE
Brief Cardiology Fellow Note    Called by RRT team for a-fib with RVR.    Patient unable to tolerate diltiazem pushes due to hypotension. BP improved after fluid bolus. Unable to give amiodarone because of anaphylactic allergy to iodine. Recommended loading with digoxin.    Consult team to see in AM follow-up.    Haily Guajardo MD PGY-4  Cardiology Fellow

## 2024-12-21 NOTE — CHART NOTE - NSCHARTNOTEFT_GEN_A_CORE
HPI: 63 year old female with hx of scleroderma, asthma (with prior intubations), hypothyroidism, presenting with chest pain and shortness of breath. Patient went to see her cardiologist Dr. Aparicio 10 days ago where an in-office TTE was performed which revealed a pericardial effusion. She was recommended  to come to ED for evaluation but did not present at that time due to personal reasons (recent passing of her son). She came to ED today with worsening symptoms. Pt found to be tachycardic, intermittently hypotensive with MAP in 50s, responsive to IVF. POCUS revealed mod-large pericardial effusion with echographic evidence of tamponade physiology. Pt was taken to the cath lab with cardiology for urgent pericardiocentesis, now with pericardial drain in place.  Patient was examined in the [facility]. On arousal, patient was in moderate discomfort/stress saying she feels pain everywhere. Unable to obtain further history from patient; she continues to express wanting to go back to sleep. Pt admitted for further management.    12/20 s/p pericardial drain placement, drained 650 cc in lab, additional 100 cc in [facility]  12/21 pt seen and examined at bedside, stable, in no pain or discomfort, vs wnl, drain remains in place and outpt +300cc ( sanguinous) since last night at 8pm.   house cards following, was seen by cards fellow this am Dr Fritz this am   any questions/ concerns please reach out to her via TEAMS preferred.  pt being transferred to [facility] will sign out to medicine ACP     ICU Vital Signs Last 24 Hrs  T(C): 36.7 (21 Dec 2024 08:25), Max: 36.7 (21 Dec 2024 05:15)  T(F): 98 (21 Dec 2024 08:25), Max: 98.1 (21 Dec 2024 05:15)  HR: 103 (21 Dec 2024 08:25) (76 - 110)  BP: 118/56 (21 Dec 2024 08:25) (81/49 - 155/83)  BP(mean): 76 (21 Dec 2024 08:25) (52 - 97)  RR: 18 (21 Dec 2024 08:25) (18 - 25)  SpO2: 93% (21 Dec 2024 08:25) (90% - 96%)    O2 Parameters below as of 21 Dec 2024 08:25  Patient On (Oxygen Delivery Method): nasal cannula  O2 Flow (L/min): 4                            13.8   10.84 )-----------( 225      ( 21 Dec 2024 05:18 )             41.8       12-21    141  |  104  |  16  ----------------------------<  148[H]  4.2   |  25  |  0.50    Ca    9.2      21 Dec 2024 05:18  Phos  3.5     12-21  Mg     1.9     12-21    TPro  8.0  /  Alb  4.6  /  TBili  0.5  /  DBili  x   /  AST  22  /  ALT  15  /  AlkPhos  105  12-20

## 2024-12-21 NOTE — RAPID RESPONSE TEAM SUMMARY - NSSITUATIONBACKGROUNDRRT_GEN_ALL_CORE
63 year old female with hx of scleroderma, asthma, hypothyroidism, presenting with chest pain and shortness of breath, found to have moderate-large pericardial effusion with concerns for tamponade. Pt now s/p pericardiocentesis with pericardial drain in place.     RRT called for tachycardia and chest pain. On arrival temp 98.2, NP 90/47, , RR 24, SpO2 87% on RA, . On arrival patient is severe distress. Patient was thrashing, reporting left sided chest pain, palpitations, shortness of breath, and feeling like she was having a panic attack. On physical exam lungs sound were clear with no appreciable wheezing and good air movement.  Patient was given 1mg of IV ativan with most of her symptoms resolving other than the palpitations. Following the ativan, patient was relaxed enough for an EKG to be obtained. Additionally, drain from pericardiocentesis was assessed and was draining appropriately. EKG showed afib with RVR in the 150s. Given history of asthma requiring intubations, 5mg of IV Diltiazem were given. Afib with RVR did not break with 5mg of Diltiazem; however, patient became hypotensive with systolics in the 80s. Patient given 500cc of IVF with improvement in BP. Attempted another 5mg of Diltiazem, but patient remained in afib with RVR and was given another 500cc of IVF. Spoke with cardiology who recommended amiodarone, but patient with known anaphylaxis to iodine so decision was made to dig load instead. CBC, CMP, coags, vbg with lactate obtained. Following initial dig load patient's BP was 101/55, HR fluctuating between 130-140s with unsustained periods in the 150s, and SpO2 95% on 2L NC. RRT ended. Primary team to f/u labs and complete dig load

## 2024-12-22 DIAGNOSIS — F32.A DEPRESSION, UNSPECIFIED: ICD-10-CM

## 2024-12-22 DIAGNOSIS — F43.21 ADJUSTMENT DISORDER WITH DEPRESSED MOOD: ICD-10-CM

## 2024-12-22 DIAGNOSIS — I48.0 PAROXYSMAL ATRIAL FIBRILLATION: ICD-10-CM

## 2024-12-22 LAB
ANION GAP SERPL CALC-SCNC: 10 MMOL/L — SIGNIFICANT CHANGE UP (ref 5–17)
APPEARANCE UR: ABNORMAL
BACTERIA # UR AUTO: NEGATIVE /HPF — SIGNIFICANT CHANGE UP
BILIRUB UR-MCNC: ABNORMAL
BUN SERPL-MCNC: 15 MG/DL — SIGNIFICANT CHANGE UP (ref 7–23)
CALCIUM SERPL-MCNC: 9.1 MG/DL — SIGNIFICANT CHANGE UP (ref 8.4–10.5)
CAST: 1 /LPF — SIGNIFICANT CHANGE UP (ref 0–4)
CHLORIDE SERPL-SCNC: 101 MMOL/L — SIGNIFICANT CHANGE UP (ref 96–108)
CO2 SERPL-SCNC: 26 MMOL/L — SIGNIFICANT CHANGE UP (ref 22–31)
COLOR SPEC: SIGNIFICANT CHANGE UP
CREAT ?TM UR-MCNC: 221 MG/DL — SIGNIFICANT CHANGE UP
CREAT SERPL-MCNC: 0.48 MG/DL — LOW (ref 0.5–1.3)
D DIMER BLD IA.RAPID-MCNC: 237 NG/ML DDU — HIGH
DIFF PNL FLD: NEGATIVE — SIGNIFICANT CHANGE UP
EGFR: 105 ML/MIN/1.73M2 — SIGNIFICANT CHANGE UP
GLUCOSE SERPL-MCNC: 167 MG/DL — HIGH (ref 70–99)
GLUCOSE UR QL: 100 MG/DL
HCT VFR BLD CALC: 41 % — SIGNIFICANT CHANGE UP (ref 34.5–45)
HGB BLD-MCNC: 13.4 G/DL — SIGNIFICANT CHANGE UP (ref 11.5–15.5)
KETONES UR-MCNC: 40 MG/DL
LEUKOCYTE ESTERASE UR-ACNC: NEGATIVE — SIGNIFICANT CHANGE UP
MAGNESIUM SERPL-MCNC: 2.2 MG/DL — SIGNIFICANT CHANGE UP (ref 1.6–2.6)
MCHC RBC-ENTMCNC: 30.5 PG — SIGNIFICANT CHANGE UP (ref 27–34)
MCHC RBC-ENTMCNC: 32.7 G/DL — SIGNIFICANT CHANGE UP (ref 32–36)
MCV RBC AUTO: 93.4 FL — SIGNIFICANT CHANGE UP (ref 80–100)
NITRITE UR-MCNC: NEGATIVE — SIGNIFICANT CHANGE UP
NRBC # BLD: 0 /100 WBCS — SIGNIFICANT CHANGE UP (ref 0–0)
PH UR: 5.5 — SIGNIFICANT CHANGE UP (ref 5–8)
PHOSPHATE SERPL-MCNC: 2.1 MG/DL — LOW (ref 2.5–4.5)
PLATELET # BLD AUTO: 196 K/UL — SIGNIFICANT CHANGE UP (ref 150–400)
POTASSIUM SERPL-MCNC: 4.3 MMOL/L — SIGNIFICANT CHANGE UP (ref 3.5–5.3)
POTASSIUM SERPL-SCNC: 4.3 MMOL/L — SIGNIFICANT CHANGE UP (ref 3.5–5.3)
PROT ?TM UR-MCNC: 28 MG/DL — HIGH (ref 0–12)
PROT UR-MCNC: SIGNIFICANT CHANGE UP MG/DL
PROT/CREAT UR-RTO: 0.1 RATIO — SIGNIFICANT CHANGE UP (ref 0–0.2)
RBC # BLD: 4.39 M/UL — SIGNIFICANT CHANGE UP (ref 3.8–5.2)
RBC # FLD: 12.3 % — SIGNIFICANT CHANGE UP (ref 10.3–14.5)
RBC CASTS # UR COMP ASSIST: 3 /HPF — SIGNIFICANT CHANGE UP (ref 0–4)
SODIUM SERPL-SCNC: 137 MMOL/L — SIGNIFICANT CHANGE UP (ref 135–145)
SP GR SPEC: 1.03 — SIGNIFICANT CHANGE UP (ref 1–1.03)
SQUAMOUS # UR AUTO: 1 /HPF — SIGNIFICANT CHANGE UP (ref 0–5)
UROBILINOGEN FLD QL: 1 MG/DL — SIGNIFICANT CHANGE UP (ref 0.2–1)
WBC # BLD: 10.8 K/UL — HIGH (ref 3.8–10.5)
WBC # FLD AUTO: 10.8 K/UL — HIGH (ref 3.8–10.5)
WBC UR QL: 1 /HPF — SIGNIFICANT CHANGE UP (ref 0–5)

## 2024-12-22 PROCEDURE — 99232 SBSQ HOSP IP/OBS MODERATE 35: CPT

## 2024-12-22 PROCEDURE — 71045 X-RAY EXAM CHEST 1 VIEW: CPT | Mod: 26

## 2024-12-22 PROCEDURE — 99223 1ST HOSP IP/OBS HIGH 75: CPT | Mod: GC

## 2024-12-22 PROCEDURE — 93010 ELECTROCARDIOGRAM REPORT: CPT

## 2024-12-22 PROCEDURE — 99233 SBSQ HOSP IP/OBS HIGH 50: CPT

## 2024-12-22 RX ORDER — GINKGO BILOBA 40 MG
5 CAPSULE ORAL ONCE
Refills: 0 | Status: COMPLETED | OUTPATIENT
Start: 2024-12-22 | End: 2024-12-22

## 2024-12-22 RX ORDER — ONDANSETRON 4 MG/1
4 TABLET ORAL ONCE
Refills: 0 | Status: COMPLETED | OUTPATIENT
Start: 2024-12-22 | End: 2024-12-22

## 2024-12-22 RX ORDER — MAG HYDROX/ALUMINUM HYD/SIMETH 200-200-20
30 SUSPENSION, ORAL (FINAL DOSE FORM) ORAL EVERY 4 HOURS
Refills: 0 | Status: DISCONTINUED | OUTPATIENT
Start: 2024-12-22 | End: 2024-12-31

## 2024-12-22 RX ORDER — PREDNISONE 5 MG
50 TABLET ORAL ONCE
Refills: 0 | Status: DISCONTINUED | OUTPATIENT
Start: 2024-12-22 | End: 2024-12-22

## 2024-12-22 RX ORDER — COLCHICINE 0.6 MG/1
1.2 CAPSULE ORAL ONCE
Refills: 0 | Status: COMPLETED | OUTPATIENT
Start: 2024-12-22 | End: 2024-12-22

## 2024-12-22 RX ORDER — MAGNESIUM SULFATE 500 MG/ML
1 INJECTION, SOLUTION INTRAMUSCULAR; INTRAVENOUS ONCE
Refills: 0 | Status: COMPLETED | OUTPATIENT
Start: 2024-12-22 | End: 2024-12-22

## 2024-12-22 RX ORDER — METOPROLOL TARTRATE 50 MG
2.5 TABLET ORAL ONCE
Refills: 0 | Status: COMPLETED | OUTPATIENT
Start: 2024-12-22 | End: 2024-12-22

## 2024-12-22 RX ORDER — SOD PHOS DI, MONO/K PHOS MONO 250 MG
2 TABLET ORAL EVERY 6 HOURS
Refills: 0 | Status: COMPLETED | OUTPATIENT
Start: 2024-12-22 | End: 2024-12-23

## 2024-12-22 RX ORDER — DIPHENHYDRAMINE HCL 25 MG
50 TABLET ORAL ONCE
Refills: 0 | Status: COMPLETED | OUTPATIENT
Start: 2024-12-22 | End: 2024-12-22

## 2024-12-22 RX ORDER — LORAZEPAM 1 MG/1
1 TABLET ORAL
Refills: 0 | Status: DISCONTINUED | OUTPATIENT
Start: 2024-12-22 | End: 2024-12-29

## 2024-12-22 RX ORDER — METOPROLOL TARTRATE 50 MG
25 TABLET ORAL EVERY 12 HOURS
Refills: 0 | Status: DISCONTINUED | OUTPATIENT
Start: 2024-12-22 | End: 2024-12-22

## 2024-12-22 RX ORDER — NICOTINE POLACRILEX 4 MG/1
1 LOZENGE ORAL EVERY 24 HOURS
Refills: 0 | Status: DISCONTINUED | OUTPATIENT
Start: 2024-12-22 | End: 2024-12-31

## 2024-12-22 RX ADMIN — IPRATROPIUM BROMIDE AND ALBUTEROL SULFATE 3 MILLILITER(S): .5; 2.5 SOLUTION RESPIRATORY (INHALATION) at 13:02

## 2024-12-22 RX ADMIN — LORAZEPAM 1 MILLIGRAM(S): 1 TABLET ORAL at 22:48

## 2024-12-22 RX ADMIN — Medication 30 MILLIGRAM(S): at 22:45

## 2024-12-22 RX ADMIN — HEPARIN SODIUM 5000 UNIT(S): 1000 INJECTION, SOLUTION INTRAVENOUS; SUBCUTANEOUS at 21:49

## 2024-12-22 RX ADMIN — Medication 20 MILLIGRAM(S): at 13:02

## 2024-12-22 RX ADMIN — COLCHICINE 1.2 MILLIGRAM(S): 0.6 CAPSULE ORAL at 02:27

## 2024-12-22 RX ADMIN — NICOTINE POLACRILEX 1 PATCH: 4 LOZENGE ORAL at 19:00

## 2024-12-22 RX ADMIN — Medication 20 MILLIGRAM(S): at 01:11

## 2024-12-22 RX ADMIN — IPRATROPIUM BROMIDE AND ALBUTEROL SULFATE 3 MILLILITER(S): .5; 2.5 SOLUTION RESPIRATORY (INHALATION) at 17:40

## 2024-12-22 RX ADMIN — HEPARIN SODIUM 5000 UNIT(S): 1000 INJECTION, SOLUTION INTRAVENOUS; SUBCUTANEOUS at 05:11

## 2024-12-22 RX ADMIN — NICOTINE POLACRILEX 1 PATCH: 4 LOZENGE ORAL at 06:48

## 2024-12-22 RX ADMIN — Medication 20 MILLIGRAM(S): at 14:27

## 2024-12-22 RX ADMIN — Medication 5 MILLIGRAM(S): at 02:27

## 2024-12-22 RX ADMIN — Medication 30 MILLIGRAM(S): at 11:44

## 2024-12-22 RX ADMIN — Medication 20 MILLIGRAM(S): at 01:53

## 2024-12-22 RX ADMIN — BACLOFEN 10 MILLIGRAM(S): 10 TABLET ORAL at 10:13

## 2024-12-22 RX ADMIN — Medication 600 MILLIGRAM(S): at 21:49

## 2024-12-22 RX ADMIN — COLCHICINE 0.6 MILLIGRAM(S): 0.6 CAPSULE ORAL at 13:02

## 2024-12-22 RX ADMIN — MAGNESIUM SULFATE 100 GRAM(S): 500 INJECTION, SOLUTION INTRAMUSCULAR; INTRAVENOUS at 10:15

## 2024-12-22 RX ADMIN — Medication 25 MILLIGRAM(S): at 08:44

## 2024-12-22 RX ADMIN — Medication 30 MILLIGRAM(S): at 21:49

## 2024-12-22 RX ADMIN — NICOTINE POLACRILEX 1 PATCH: 4 LOZENGE ORAL at 13:01

## 2024-12-22 RX ADMIN — Medication 20 MILLIGRAM(S): at 21:00

## 2024-12-22 RX ADMIN — Medication 250 MICROGRAM(S): at 13:02

## 2024-12-22 RX ADMIN — Medication 250 MICROGRAM(S): at 06:56

## 2024-12-22 RX ADMIN — Medication 20 MILLIGRAM(S): at 20:35

## 2024-12-22 RX ADMIN — NICOTINE POLACRILEX 1 PATCH: 4 LOZENGE ORAL at 14:27

## 2024-12-22 RX ADMIN — Medication 30 MILLIGRAM(S): at 10:13

## 2024-12-22 RX ADMIN — Medication 20 MILLIGRAM(S): at 09:21

## 2024-12-22 RX ADMIN — Medication 2 TABLET(S): at 17:40

## 2024-12-22 RX ADMIN — IPRATROPIUM BROMIDE AND ALBUTEROL SULFATE 3 MILLILITER(S): .5; 2.5 SOLUTION RESPIRATORY (INHALATION) at 05:11

## 2024-12-22 RX ADMIN — LEVOTHYROXINE SODIUM 112 MICROGRAM(S): 175 TABLET ORAL at 05:12

## 2024-12-22 RX ADMIN — Medication 2.5 MILLIGRAM(S): at 06:31

## 2024-12-22 RX ADMIN — Medication 20 MILLIGRAM(S): at 08:36

## 2024-12-22 RX ADMIN — BACLOFEN 10 MILLIGRAM(S): 10 TABLET ORAL at 21:49

## 2024-12-22 RX ADMIN — IPRATROPIUM BROMIDE AND ALBUTEROL SULFATE 3 MILLILITER(S): .5; 2.5 SOLUTION RESPIRATORY (INHALATION) at 23:09

## 2024-12-22 RX ADMIN — Medication 600 MILLIGRAM(S): at 22:44

## 2024-12-22 RX ADMIN — IPRATROPIUM BROMIDE AND ALBUTEROL SULFATE 3 MILLILITER(S): .5; 2.5 SOLUTION RESPIRATORY (INHALATION) at 01:12

## 2024-12-22 RX ADMIN — ONDANSETRON 4 MILLIGRAM(S): 4 TABLET ORAL at 18:46

## 2024-12-22 RX ADMIN — Medication 250 MICROGRAM(S): at 01:12

## 2024-12-22 RX ADMIN — ONDANSETRON 4 MILLIGRAM(S): 4 TABLET ORAL at 08:44
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
## 2024-12-22 NOTE — BH CONSULTATION LIAISON ASSESSMENT NOTE - DIFFERENTIAL
Generalized anxiety disorder  Can consider adjustment disorder vs MDD in setting of recent death of son with depressed mood that appears to surpass general grief   Can also consider PTSD (need more collateral and info from pt on hx of trauma and reaction to trauma).

## 2024-12-22 NOTE — CHART NOTE - NSCHARTNOTEFT_GEN_A_CORE
Notified by RN; patient with elevated HR and agitation. Pt seen at bedside; agitated and moderately  uncooperative reporting chest pain and back pain. Pt sitting at bedside refusing to lay back in bed/ physical examination/ and repeat blood pressure reading despite blood pressure low 90/60. Pt requested repeatedly to be left alone becoming increasing agitated if redirected.  Pt is hemodynamically stable however -160's. Low threshold for RRT.  Will administer lopressor 2.5mg IVP and re-evaluate. Notified by RN; patient with elevated HR and agitation. Pt seen at bedside; agitated and moderately  uncooperative reporting chest pain and back pain. Pt sitting at bedside refusing to lay back in bed/ physical examination/ and repeat blood pressure reading despite blood pressure low 90/60. Pt requested repeatedly to be left alone becoming increasing agitated if redirected.  Pt is hemodynamically stable however -160's. Low threshold for RRT.  Will administer lopressor 2.5mg IVP and re-evaluate.      Ann Cardenas NP  Department of Medicine   Spectra [phone]

## 2024-12-22 NOTE — BH CONSULTATION LIAISON ASSESSMENT NOTE - NSBHCONSULTMEDANXIETY_PSY_A_CORE FT
Ativan 1 mg PO BID PRN for panic attack/ acute exacerbation in anxiety   Avoid use of excessive benzo use due to potential hx of benzo misuse  Ativan 1 mg PO BID PRN for panic attack/ acute exacerbation in anxiety   Avoid use of excessive benzo use due desire to avoid dependence
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
## 2024-12-22 NOTE — PROGRESS NOTE ADULT - SUBJECTIVE AND OBJECTIVE BOX
Patient seen and examined at bedside.    Overnight Events: afib RVR and agitation overnight    REVIEW OF SYSTEMS:  CONSTITUTIONAL: No weakness, fevers or chills  EYES/ENT: No visual changes;  No dysphagia  NECK: No pain or stiffness  RESPIRATORY: No cough, wheezing, hemoptysis; No shortness of breath  CARDIOVASCULAR: No chest pain or palpitations; No lower extremity edema  GASTROINTESTINAL: No abdominal or epigastric pain. No nausea, vomiting, or hematemesis; No diarrhea or constipation. No melena or hematochezia.  BACK: No back pain  GENITOURINARY: No dysuria, frequency or hematuria  NEUROLOGICAL: No numbness or weakness  SKIN: No itching, burning, rashes, or lesions   All other review of systems is negative unless indicated above.            Current Meds:  albuterol/ipratropium for Nebulization 3 milliLiter(s) Nebulizer every 6 hours  baclofen 10 milliGRAM(s) Oral <User Schedule>  colchicine 0.6 milliGRAM(s) Oral daily  digoxin  Injectable 250 MICROGram(s) IV Push every 6 hours  heparin   Injectable 5000 Unit(s) SubCutaneous every 8 hours  ibuprofen  Tablet. 600 milliGRAM(s) Oral every 8 hours  levothyroxine 112 MICROGram(s) Oral daily  morphine ER Tablet 30 milliGRAM(s) Oral <User Schedule>  oxyCODONE    IR 20 milliGRAM(s) Oral <User Schedule>  polyethylene glycol 3350 17 Gram(s) Oral daily  senna 2 Tablet(s) Oral at bedtime      Vitals:  T(F): 98 (12-22), Max: 98.2 (12-21)  HR: 104 (12-22) (100 - 130)  BP: 103/55 (12-22) (100/65 - 118/56)  RR: 18 (12-22)  SpO2: 90% (12-22)  I&O's Summary    21 Dec 2024 07:01  -  22 Dec 2024 07:00  --------------------------------------------------------  IN: 0 mL / OUT: 850 mL / NET: -850 mL        Physical Exam:  GEN: NAD  HEENT: EOMI, clear sclera  PULM: CTA b/l, no wheeze  CV: irreg, rapid  ABD: S, NT, ND  EXT: WWP, no edema  PSYCH: normal affect  SKIN: No rash                          13.8   11.42 )-----------( 229      ( 21 Dec 2024 18:09 )             42.4     12-21    139  |  103  |  14  ----------------------------<  140[H]  3.9   |  22  |  0.48[L]    Ca    9.2      21 Dec 2024 18:09  Phos  2.8     12-21  Mg     1.8     12-21    TPro  8.0  /  Alb  4.6  /  TBili  0.5  /  DBili  x   /  AST  22  /  ALT  15  /  AlkPhos  105  12-20    PT/INR - ( 21 Dec 2024 18:09 )   PT: 14.2 sec;   INR: 1.24 ratio         PTT - ( 21 Dec 2024 18:09 )  PTT:29.7 sec

## 2024-12-22 NOTE — BH CONSULTATION LIAISON ASSESSMENT NOTE - CURRENT MEDICATION
MEDICATIONS  (STANDING):  albuterol/ipratropium for Nebulization 3 milliLiter(s) Nebulizer every 6 hours  baclofen 10 milliGRAM(s) Oral <User Schedule>  colchicine 0.6 milliGRAM(s) Oral daily  digoxin  Injectable 250 MICROGram(s) IV Push every 6 hours  heparin   Injectable 5000 Unit(s) SubCutaneous every 8 hours  ibuprofen  Tablet. 600 milliGRAM(s) Oral every 8 hours  levothyroxine 112 MICROGram(s) Oral daily  metoprolol tartrate 25 milliGRAM(s) Oral every 12 hours  morphine ER Tablet 30 milliGRAM(s) Oral <User Schedule>  nicotine - 21 mG/24Hr(s) Patch 1 Patch Transdermal every 24 hours  oxyCODONE    IR 20 milliGRAM(s) Oral <User Schedule>  polyethylene glycol 3350 17 Gram(s) Oral daily  senna 2 Tablet(s) Oral at bedtime    MEDICATIONS  (PRN):  aluminum hydroxide/magnesium hydroxide/simethicone Suspension 30 milliLiter(s) Oral every 4 hours PRN Dyspepsia

## 2024-12-22 NOTE — BH CONSULTATION LIAISON ASSESSMENT NOTE - ADDITIONAL DETAILS / COMMENTS
Further assessment needed to ensure pt completely AAOx4 - pt did know date, where she was, and her name; however was lethargic and unable to state days of week backward due to feeling unwell. Would attempt to do more cognitive testing if possible

## 2024-12-22 NOTE — PROGRESS NOTE ADULT - PROBLEM SELECTOR PLAN 6
Will obtain abdominal XR to evaluate for SBO (pt refused last night, will order repeat)   - will place bowel regimen in place  - monitor for BM

## 2024-12-22 NOTE — PROGRESS NOTE ADULT - ASSESSMENT
63 year old female with hx of scleroderma, asthma, hypothyroidism, presenting with chest pain and shortness of breath, found to have moderate-large pericardial effusion with concerns for tamponade. Pt now s/p pericardiocentesis with pericardial drain in place, s/p RRT on 12/21 for afib w/ RVR unresponsive to diltiazem now being loaded with digoxin, pending repeat TTE and further management of sclerodema/pericardial effusion

## 2024-12-22 NOTE — BH CONSULTATION LIAISON ASSESSMENT NOTE - NSBHCONSULTRECOMMENDOTHER_PSY_A_CORE FT
Consider adding Atarax 25 mg PO q6h PRN or antidepressant in the future pending further conversations with pt  - No indication for inpatient psychiatric admission  - Discussed option of an SSRI/SNRI but patient declines  - Can use Ativan 1mg PO BID PRN for anxiety/panic attacks

## 2024-12-22 NOTE — BH CONSULTATION LIAISON ASSESSMENT NOTE - OTHER
Tearful and frustrated at times  Jessenia at bedside Johnny Vargas  Pt rocking back and forth in bed at times; right leg to be shaking at times not assessed

## 2024-12-22 NOTE — BH CONSULTATION LIAISON ASSESSMENT NOTE - NSBHSACONSEQUENCE_PSY_A_CORE FT
Need further assessment of other substances   Consequence - continued anxiety due to management with benzos
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
## 2024-12-22 NOTE — PROGRESS NOTE ADULT - SUBJECTIVE AND OBJECTIVE BOX
Patient is a 63y old  Female who presents with a chief complaint of     SUBJECTIVE / OVERNIGHT EVENTS: Patient seen and examined at bedside. Overnight patient had RRT for afib with RVR which did not resolve with 5mg IV diltiazem x2, this caused hypotension requiring 500 CC of IVF x2, the patient was unable to be given amiodarone given her history of iodine allergy, so she was started on digoxin. This AM patient expressed having a lot of anxiety and stress overnight, and feels she has had palpitations for a while prior to her hospitalization She refused abdominal XRAY yesterday, however still has not had a bowel  movement.  She notes her chest pain is radiating to her back and she is having abdominal discomfort.         MEDICATIONS  (STANDING):  albuterol/ipratropium for Nebulization 3 milliLiter(s) Nebulizer every 6 hours  baclofen 10 milliGRAM(s) Oral <User Schedule>  colchicine 0.6 milliGRAM(s) Oral daily  digoxin  Injectable 250 MICROGram(s) IV Push every 6 hours  heparin   Injectable 5000 Unit(s) SubCutaneous every 8 hours  ibuprofen  Tablet. 600 milliGRAM(s) Oral every 8 hours  levothyroxine 112 MICROGram(s) Oral daily  morphine ER Tablet 30 milliGRAM(s) Oral <User Schedule>  nicotine - 21 mG/24Hr(s) Patch 1 Patch Transdermal every 24 hours  oxyCODONE    IR 20 milliGRAM(s) Oral <User Schedule>  polyethylene glycol 3350 17 Gram(s) Oral daily  senna 2 Tablet(s) Oral at bedtime    MEDICATIONS  (PRN):  aluminum hydroxide/magnesium hydroxide/simethicone Suspension 30 milliLiter(s) Oral every 4 hours PRN Dyspepsia      Vital Signs Last 24 Hrs  T(C): 36.7 (22 Dec 2024 04:15), Max: 36.8 (21 Dec 2024 11:34)  T(F): 98 (22 Dec 2024 04:15), Max: 98.2 (21 Dec 2024 11:34)  HR: 104 (22 Dec 2024 04:29) (100 - 130)  BP: 103/55 (22 Dec 2024 04:29) (100/65 - 117/72)  BP(mean): --  RR: 18 (22 Dec 2024 04:29) (18 - 20)  SpO2: 90% (22 Dec 2024 04:29) (84% - 92%)    Parameters below as of 22 Dec 2024 04:29  Patient On (Oxygen Delivery Method): nasal cannula      CAPILLARY BLOOD GLUCOSE      POCT Blood Glucose.: 152 mg/dL (21 Dec 2024 17:27)    I&O's Summary    21 Dec 2024 07:01  -  22 Dec 2024 07:00  --------------------------------------------------------  IN: 0 mL / OUT: 850 mL / NET: -850 mL    22 Dec 2024 07:01  -  22 Dec 2024 11:32  --------------------------------------------------------  IN: 100 mL / OUT: 0 mL / NET: 100 mL        PHYSICAL EXAM:  GENERAL APPEARANCE: Uncomfortable appearing female resting in bed   HEENT:  PERRL, EOMI. hearing grossly intact.  CARDIAC: Normal S1 and S2. no mrg. RRR  LUNGS: No wheezing noted today, lungs clear to auscultation b/l  ABDOMEN: tenderness on palpation diffusely   MUSCULOSKELETAL: No swelling/reddness over extremities  EXTREMITIES: No edema. Peripheral pulses intact.   NEUROLOGICAL: Non focal.  SKIN: TOMY drain in place, no erythema or tenderness over site, clot noted in the TOMY drain back   PSYCHIATRIC: AOx3         LABS:                        13.8   11.42 )-----------( 229      ( 21 Dec 2024 18:09 )             42.4     12-    139  |  103  |  14  ----------------------------<  140[H]  3.9   |  22  |  0.48[L]    Ca    9.2      21 Dec 2024 18:09  Phos  2.8     12-  Mg     1.8     12-    TPro  8.0  /  Alb  4.6  /  TBili  0.5  /  DBili  x   /  AST  22  /  ALT  15  /  AlkPhos  105  12-20    PT/INR - ( 21 Dec 2024 18:09 )   PT: 14.2 sec;   INR: 1.24 ratio         PTT - ( 21 Dec 2024 18:09 )  PTT:29.7 sec      Urinalysis Basic - ( 22 Dec 2024 10:40 )    Color: Dark Yellow / Appearance: Cloudy / S.030 / pH: x  Gluc: x / Ketone: 40 mg/dL  / Bili: Small / Urobili: 1.0 mg/dL   Blood: x / Protein: Trace mg/dL / Nitrite: Negative   Leuk Esterase: Negative / RBC: 3 /HPF / WBC 1 /HPF   Sq Epi: x / Non Sq Epi: 1 /HPF / Bacteria: Negative /HPF

## 2024-12-22 NOTE — BH CONSULTATION LIAISON ASSESSMENT NOTE - NSBHATTESTCOMMENTATTENDFT_PSY_A_CORE
Patient is a 63 year old single, domiciled alone, woman with a past psychiatric history of generalized anxiety and depression, and a medical history significant for scleroderma on chronic opioids, asthma, and hypothyroidism who is currently admitted for management of cardiac tamponade with drainage. Hospital course is complicated by episodes of a.fib with RVR including episode requiring rapid response yesterday. Psychiatry consulted for assistance managing patient's anxiety, as well as a safety evaluation after patient reportedly stated to nursing that she wants to die. On exam, patient was awake and alert, but minimally cooperative with no eye contact, and the evaluation was abbreviated due to her not feeling well.  She endorses a long history of chronic depression and anxiety, with a remote history of a suicide attempt by overdose and 2 hospitalizations, but reports she's also currently grieving the recent death of her son, who was her only child. She denies having any suicidal ideations, intent, or plan, and her personal aid/friend who is at the bedside also had no concerns for suicidality.  She does have some neurovegetative symptoms of depression, ongoing anxiety with panic attacks at times, and there is likely a trauma component to her presentation. No symptoms of psychosis or xavi.  She has no current outpatient mental health providers and has not been in treatment for 20+ years. She is not interested in taking any antidepressants, reporting she took them decades ago and did not like them, and generally does not want to be on medications, but is open to taking benzodiazepines. Per records and patient report she has taken Xanax in the past until outpatient providers were no longer willing to prescribe it for her.  Based on her current evaluation and available information, patient does not appear to be at imminent risk of harm to self and does not require inpatient psychiatric admission. As she is not interested in medication that provide longer-term treatment for depression/anxiety, would recommend use of Ativan 1mg BID PRN for any panic attacks while inpatient, holding for sedation give chronic opioid pain medications.  Would be wary of increasing the frequency beyond twice daily given desire to avoid dependence.  Also would recommend patient establish ongoing outpatient mental health treatment at discharge, with [facility] as an option.

## 2024-12-22 NOTE — PROGRESS NOTE ADULT - ASSESSMENT
63F PMH scleroderma, asthma with prior intubations admitted with cardiac tamponade. Initially presented to outpt cardiologist Dr. Aparicio 10 days PTA for general fatigue, pleuritic chest pain, and SOB. in-office TTE was notable for pericardial effusion. Patient deferred presentation to ED at that time. Presented with tachycardia and hypotension, TTE w/ evidence of caridac tamponade. Now s/p pericardiocentesis (12/20) with 650cc bloody fluid removed in the lab. No evidence of malignant effusion on initial evaluation of pericardial fluid, cultures pending. Most likely effusion in setting of patient's known scleroderma.     Patient overnight with afib RVR and agitation, now self converted back to sinus rhythm.    Recs:  - s/p digoxin load, would not continue  - start metop 25mg BID, monitor on tele  - plan for eventual A/C once drain is removed  - f/u rheum recs  - Recommend age appropriate cancer screening  - continue ibuprofen 600mg PO TID, colchicine 0.6mg PO QDay   - Follow up pericardial fluid cytology  - Monitor drain output, plan to remove when <50 ccs/24 hour     63F PMH scleroderma, asthma with prior intubations admitted with cardiac tamponade. Initially presented to outpt cardiologist Dr. Aparicio 10 days PTA for general fatigue, pleuritic chest pain, and SOB. in-office TTE was notable for pericardial effusion. Patient deferred presentation to ED at that time. Presented with tachycardia and hypotension, TTE w/ evidence of caridac tamponade. Now s/p pericardiocentesis (12/20) with 650cc bloody fluid removed in the lab. No evidence of malignant effusion on initial evaluation of pericardial fluid, cultures pending. Most likely effusion in setting of patient's known scleroderma.     Patient overnight with afib RVR and agitation, now self converted back to sinus rhythm.    Recs:  - s/p digoxin load  - consider metop 25mg BID if tolerated  - plan for eventual A/C once drain is removed  - f/u rheum recs  - Recommend age appropriate cancer screening  - continue ibuprofen 600mg PO TID, colchicine 0.6mg PO QDay   - Follow up pericardial fluid cytology  - Monitor drain output, plan to remove when <50 ccs/24 hour

## 2024-12-22 NOTE — BH CONSULTATION LIAISON ASSESSMENT NOTE - NSBHMSETHTPROC_PSY_A_CORE
Times where pt did not want to expand on topics/Linear/Thought blocking Times where pt did not want to expand on topics/Linear
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
## 2024-12-22 NOTE — BH CONSULTATION LIAISON ASSESSMENT NOTE - NSBHCHARTREVIEWVS_PSY_A_CORE FT
Vital Signs Last 24 Hrs  T(C): 36.7 (22 Dec 2024 04:15), Max: 36.8 (21 Dec 2024 11:34)  T(F): 98 (22 Dec 2024 04:15), Max: 98.2 (21 Dec 2024 11:34)  HR: 104 (22 Dec 2024 04:29) (100 - 130)  BP: 103/55 (22 Dec 2024 04:29) (100/65 - 117/72)  BP(mean): --  RR: 18 (22 Dec 2024 04:29) (18 - 20)  SpO2: 90% (22 Dec 2024 04:29) (84% - 92%)    Parameters below as of 22 Dec 2024 04:29  Patient On (Oxygen Delivery Method): nasal cannula

## 2024-12-22 NOTE — PROGRESS NOTE ADULT - ATTENDING COMMENTS
64 yo female with scleroderma presenting with hx of SOB, pleuritic chest pain and fatigue. Had been advised to visit ED by her cardiologist  after pericardial effusion on outpatient TTE. Found with cardiac tamponade s/p pericardiocentesis. She is seems better then yesterday. Had episode of Afib with RVR. Her [id number]-CPFm score is at least 2. Now back in sinus. Continue Metoprolol. Hold AC for now given drain in place. She has chest pain and left sided back pain, nausea and constipation. On colchicine and IBU. Needs breakthrough pain and stool softeners. Agree with CT chest. Rheumatology following.  Continue tele and hemodynamic monitoring. Limited TTE tomorrow.    Please call back if any questions or concerns    Tammie Soto MD  Attending Cardiologist     Non-invasive Cardiology/Advanced Cardiac Imaging  [facility] .

## 2024-12-22 NOTE — BH CONSULTATION LIAISON ASSESSMENT NOTE - NSBHCHARTREVIEWINVESTIGATE_PSY_A_CORE FT
< from: 12 Lead ECG (12.20.24 @ 16:11) >      Ventricular Rate 89 BPM    Atrial Rate 89 BPM    P-R Interval 158 ms    QRS Duration 90 ms    Q-T Interval 380 ms    QTC Calculation(Bazett) 462 ms    P Axis 67 degrees    R Axis 70 degrees    T Axis 67 degrees    Diagnosis Line NORMAL SINUS RHYTHM  ANTERIOR INFARCT (CITED ON OR BEFORE 18-JAN-2012)  ABNORMAL ECG  WHEN COMPARED WITH ECG OF 20-DEC-2024 16:11, (UNCONFIRMED)  NO SIGNIFICANT CHANGE WAS FOUND  Confirmed by MD VISHNU, ORLANDO ([id number]) on 12/21/2024 2:25:13 PM    < end of copied text >

## 2024-12-22 NOTE — BH CONSULTATION LIAISON ASSESSMENT NOTE - NSBHCHARTREVIEWLAB_PSY_A_CORE FT
13.8   11.42 )-----------( 229      ( 21 Dec 2024 18:09 )             42.4     12-    139  |  103  |  14  ----------------------------<  140[H]  3.9   |  22  |  0.48[L]    Ca    9.2      21 Dec 2024 18:09  Phos  2.8     12-  Mg     1.8     -    TPro  8.0  /  Alb  4.6  /  TBili  0.5  /  DBili  x   /  AST  22  /  ALT  15  /  AlkPhos  105  12-     LIVER FUNCTIONS - ( 20 Dec 2024 16:03 )  Alb: 4.6 g/dL / Pro: 8.0 g/dL / ALK PHOS: 105 U/L / ALT: 15 U/L / AST: 22 U/L / GGT: x                Urinalysis Basic - ( 22 Dec 2024 10:40 )    Color: Dark Yellow / Appearance: Cloudy / S.030 / pH: x  Gluc: x / Ketone: 40 mg/dL  / Bili: Small / Urobili: 1.0 mg/dL   Blood: x / Protein: Trace mg/dL / Nitrite: Negative   Leuk Esterase: Negative / RBC: 3 /HPF / WBC 1 /HPF   Sq Epi: x / Non Sq Epi: 1 /HPF / Bacteria: Negative /HPF      PT/INR - ( 21 Dec 2024 18:09 )   PT: 14.2 sec;   INR: 1.24 ratio         PTT - ( 21 Dec 2024 18:09 )  PTT:29.7 sec  Lactate Trend        CAPILLARY BLOOD GLUCOSE      POCT Blood Glucose.: 152 mg/dL (21 Dec 2024 17:27)

## 2024-12-23 LAB
ANION GAP SERPL CALC-SCNC: 9 MMOL/L — SIGNIFICANT CHANGE UP (ref 5–17)
ANTI-RIBONUCLEAR PROTEIN: <0.2 AI — SIGNIFICANT CHANGE UP
BUN SERPL-MCNC: 18 MG/DL — SIGNIFICANT CHANGE UP (ref 7–23)
C3 SERPL-MCNC: 104 MG/DL — SIGNIFICANT CHANGE UP (ref 81–157)
C4 SERPL-MCNC: 20 MG/DL — SIGNIFICANT CHANGE UP (ref 13–39)
C4 SERPL-MCNC: 21 MG/DL — SIGNIFICANT CHANGE UP (ref 13–39)
CALCIUM SERPL-MCNC: 9 MG/DL — SIGNIFICANT CHANGE UP (ref 8.4–10.5)
CCP IGG SERPL-ACNC: <8 U/ML — SIGNIFICANT CHANGE UP
CENTROMERE AB SER-ACNC: <0.2 AI — SIGNIFICANT CHANGE UP
CHLORIDE SERPL-SCNC: 102 MMOL/L — SIGNIFICANT CHANGE UP (ref 96–108)
CK SERPL-CCNC: 34 U/L — SIGNIFICANT CHANGE UP (ref 25–170)
CO2 SERPL-SCNC: 28 MMOL/L — SIGNIFICANT CHANGE UP (ref 22–31)
CREAT SERPL-MCNC: 0.49 MG/DL — LOW (ref 0.5–1.3)
DSDNA AB FLD-ACNC: <0.2 AI — SIGNIFICANT CHANGE UP
DSDNA AB SER-ACNC: <1 IU/ML — SIGNIFICANT CHANGE UP
EGFR: 105 ML/MIN/1.73M2 — SIGNIFICANT CHANGE UP
ENA SCL70 AB SER-ACNC: <0.2 AI — SIGNIFICANT CHANGE UP
ENA SM AB FLD QL: <0.2 AI — SIGNIFICANT CHANGE UP
ENA SS-A AB FLD IA-ACNC: <0.2 AI — SIGNIFICANT CHANGE UP
GLUCOSE BLDC GLUCOMTR-MCNC: 164 MG/DL — HIGH (ref 70–99)
GLUCOSE SERPL-MCNC: 113 MG/DL — HIGH (ref 70–99)
HAV IGM SER-ACNC: SIGNIFICANT CHANGE UP
HBV CORE AB SER-ACNC: SIGNIFICANT CHANGE UP
HBV CORE IGM SER-ACNC: SIGNIFICANT CHANGE UP
HBV SURFACE AB SER-ACNC: <3 MIU/ML — LOW
HBV SURFACE AG SER-ACNC: SIGNIFICANT CHANGE UP
HCT VFR BLD CALC: 40.9 % — SIGNIFICANT CHANGE UP (ref 34.5–45)
HCV AB S/CO SERPL IA: 0.09 S/CO — SIGNIFICANT CHANGE UP (ref 0–0.99)
HCV AB SERPL-IMP: SIGNIFICANT CHANGE UP
HGB BLD-MCNC: 13.2 G/DL — SIGNIFICANT CHANGE UP (ref 11.5–15.5)
MAGNESIUM SERPL-MCNC: 1.9 MG/DL — SIGNIFICANT CHANGE UP (ref 1.6–2.6)
MCHC RBC-ENTMCNC: 30.4 PG — SIGNIFICANT CHANGE UP (ref 27–34)
MCHC RBC-ENTMCNC: 32.3 G/DL — SIGNIFICANT CHANGE UP (ref 32–36)
MCV RBC AUTO: 94.2 FL — SIGNIFICANT CHANGE UP (ref 80–100)
NRBC # BLD: 0 /100 WBCS — SIGNIFICANT CHANGE UP (ref 0–0)
PHOSPHATE SERPL-MCNC: 1.7 MG/DL — LOW (ref 2.5–4.5)
PLATELET # BLD AUTO: 190 K/UL — SIGNIFICANT CHANGE UP (ref 150–400)
POTASSIUM SERPL-MCNC: 4.1 MMOL/L — SIGNIFICANT CHANGE UP (ref 3.5–5.3)
POTASSIUM SERPL-SCNC: 4.1 MMOL/L — SIGNIFICANT CHANGE UP (ref 3.5–5.3)
RBC # BLD: 4.34 M/UL — SIGNIFICANT CHANGE UP (ref 3.8–5.2)
RBC # FLD: 12.2 % — SIGNIFICANT CHANGE UP (ref 10.3–14.5)
RF+CCP IGG SER-IMP: NEGATIVE — SIGNIFICANT CHANGE UP
RHEUMATOID FACT SERPL-ACNC: 15 IU/ML — HIGH (ref 0–13)
SODIUM SERPL-SCNC: 139 MMOL/L — SIGNIFICANT CHANGE UP (ref 135–145)
WBC # BLD: 10.03 K/UL — SIGNIFICANT CHANGE UP (ref 3.8–10.5)
WBC # FLD AUTO: 10.03 K/UL — SIGNIFICANT CHANGE UP (ref 3.8–10.5)

## 2024-12-23 PROCEDURE — 99233 SBSQ HOSP IP/OBS HIGH 50: CPT

## 2024-12-23 PROCEDURE — 74018 RADEX ABDOMEN 1 VIEW: CPT | Mod: 26

## 2024-12-23 PROCEDURE — 99233 SBSQ HOSP IP/OBS HIGH 50: CPT | Mod: GC

## 2024-12-23 PROCEDURE — 99232 SBSQ HOSP IP/OBS MODERATE 35: CPT | Mod: GC

## 2024-12-23 PROCEDURE — 71250 CT THORAX DX C-: CPT | Mod: 26

## 2024-12-23 RX ORDER — SODIUM PHOSPHATE, MONOBASIC, MONOHYDRATE AND SODIUM PHOSPHATE, DIBASIC ANHYDROUS 142; 276 MG/ML; MG/ML
15 INJECTION, SOLUTION INTRAVENOUS ONCE
Refills: 0 | Status: COMPLETED | OUTPATIENT
Start: 2024-12-23 | End: 2024-12-23

## 2024-12-23 RX ORDER — METOPROLOL TARTRATE 50 MG
2.5 TABLET ORAL ONCE
Refills: 0 | Status: DISCONTINUED | OUTPATIENT
Start: 2024-12-23 | End: 2024-12-23

## 2024-12-23 RX ORDER — LORAZEPAM 1 MG/1
1 TABLET ORAL ONCE
Refills: 0 | Status: DISCONTINUED | OUTPATIENT
Start: 2024-12-23 | End: 2024-12-23

## 2024-12-23 RX ORDER — MAGNESIUM SULFATE 500 MG/ML
2 INJECTION, SOLUTION INTRAMUSCULAR; INTRAVENOUS ONCE
Refills: 0 | Status: COMPLETED | OUTPATIENT
Start: 2024-12-23 | End: 2024-12-23

## 2024-12-23 RX ORDER — SODIUM CHLORIDE 9 MG/ML
1000 INJECTION, SOLUTION INTRAVENOUS ONCE
Refills: 0 | Status: COMPLETED | OUTPATIENT
Start: 2024-12-23 | End: 2024-12-23

## 2024-12-23 RX ORDER — ONDANSETRON 4 MG/1
4 TABLET ORAL ONCE
Refills: 0 | Status: COMPLETED | OUTPATIENT
Start: 2024-12-23 | End: 2024-12-23

## 2024-12-23 RX ORDER — METOPROLOL TARTRATE 50 MG
25 TABLET ORAL EVERY 12 HOURS
Refills: 0 | Status: DISCONTINUED | OUTPATIENT
Start: 2024-12-23 | End: 2024-12-30

## 2024-12-23 RX ORDER — DIGOXIN 250 MCG
250 TABLET ORAL ONCE
Refills: 0 | Status: COMPLETED | OUTPATIENT
Start: 2024-12-23 | End: 2024-12-23

## 2024-12-23 RX ORDER — METOPROLOL TARTRATE 50 MG
2.5 TABLET ORAL ONCE
Refills: 0 | Status: COMPLETED | OUTPATIENT
Start: 2024-12-23 | End: 2024-12-23

## 2024-12-23 RX ORDER — METOPROLOL TARTRATE 50 MG
5 TABLET ORAL ONCE
Refills: 0 | Status: DISCONTINUED | OUTPATIENT
Start: 2024-12-23 | End: 2024-12-23

## 2024-12-23 RX ADMIN — Medication 20 MILLIGRAM(S): at 22:00

## 2024-12-23 RX ADMIN — IPRATROPIUM BROMIDE AND ALBUTEROL SULFATE 3 MILLILITER(S): .5; 2.5 SOLUTION RESPIRATORY (INHALATION) at 17:57

## 2024-12-23 RX ADMIN — Medication 30 MILLIGRAM(S): at 21:37

## 2024-12-23 RX ADMIN — LEVOTHYROXINE SODIUM 112 MICROGRAM(S): 175 TABLET ORAL at 05:22

## 2024-12-23 RX ADMIN — Medication 30 MILLIGRAM(S): at 22:00

## 2024-12-23 RX ADMIN — LORAZEPAM 1 MILLIGRAM(S): 1 TABLET ORAL at 15:51

## 2024-12-23 RX ADMIN — NICOTINE POLACRILEX 1 PATCH: 4 LOZENGE ORAL at 07:00

## 2024-12-23 RX ADMIN — Medication 20 MILLIGRAM(S): at 02:32

## 2024-12-23 RX ADMIN — Medication 30 MILLIGRAM(S): at 10:56

## 2024-12-23 RX ADMIN — Medication 600 MILLIGRAM(S): at 05:55

## 2024-12-23 RX ADMIN — NICOTINE POLACRILEX 1 PATCH: 4 LOZENGE ORAL at 19:00

## 2024-12-23 RX ADMIN — ONDANSETRON 4 MILLIGRAM(S): 4 TABLET ORAL at 10:10

## 2024-12-23 RX ADMIN — ONDANSETRON 4 MILLIGRAM(S): 4 TABLET ORAL at 21:55

## 2024-12-23 RX ADMIN — Medication 600 MILLIGRAM(S): at 21:13

## 2024-12-23 RX ADMIN — BACLOFEN 10 MILLIGRAM(S): 10 TABLET ORAL at 21:13

## 2024-12-23 RX ADMIN — Medication 600 MILLIGRAM(S): at 14:32

## 2024-12-23 RX ADMIN — Medication 20 MILLIGRAM(S): at 22:20

## 2024-12-23 RX ADMIN — Medication 20 MILLIGRAM(S): at 09:05

## 2024-12-23 RX ADMIN — Medication 600 MILLIGRAM(S): at 05:22

## 2024-12-23 RX ADMIN — Medication 20 MILLIGRAM(S): at 03:00

## 2024-12-23 RX ADMIN — Medication 250 MICROGRAM(S): at 15:51

## 2024-12-23 RX ADMIN — HEPARIN SODIUM 5000 UNIT(S): 1000 INJECTION, SOLUTION INTRAVENOUS; SUBCUTANEOUS at 21:13

## 2024-12-23 RX ADMIN — SODIUM PHOSPHATE, MONOBASIC, MONOHYDRATE AND SODIUM PHOSPHATE, DIBASIC ANHYDROUS 63.75 MILLIMOLE(S): 142; 276 INJECTION, SOLUTION INTRAVENOUS at 13:35

## 2024-12-23 RX ADMIN — Medication 600 MILLIGRAM(S): at 13:34

## 2024-12-23 RX ADMIN — NICOTINE POLACRILEX 1 PATCH: 4 LOZENGE ORAL at 14:32

## 2024-12-23 RX ADMIN — Medication 20 MILLIGRAM(S): at 13:34

## 2024-12-23 RX ADMIN — NICOTINE POLACRILEX 1 PATCH: 4 LOZENGE ORAL at 13:35

## 2024-12-23 RX ADMIN — IPRATROPIUM BROMIDE AND ALBUTEROL SULFATE 3 MILLILITER(S): .5; 2.5 SOLUTION RESPIRATORY (INHALATION) at 13:36

## 2024-12-23 RX ADMIN — Medication 2 TABLET(S): at 13:34

## 2024-12-23 RX ADMIN — Medication 20 MILLIGRAM(S): at 08:32

## 2024-12-23 RX ADMIN — Medication 2.5 MILLIGRAM(S): at 15:52

## 2024-12-23 RX ADMIN — IPRATROPIUM BROMIDE AND ALBUTEROL SULFATE 3 MILLILITER(S): .5; 2.5 SOLUTION RESPIRATORY (INHALATION) at 05:22

## 2024-12-23 RX ADMIN — HEPARIN SODIUM 5000 UNIT(S): 1000 INJECTION, SOLUTION INTRAVENOUS; SUBCUTANEOUS at 05:22

## 2024-12-23 RX ADMIN — Medication 600 MILLIGRAM(S): at 21:50

## 2024-12-23 RX ADMIN — Medication 30 MILLIGRAM(S): at 10:10

## 2024-12-23 RX ADMIN — Medication 25 MILLIGRAM(S): at 15:54

## 2024-12-23 RX ADMIN — MAGNESIUM SULFATE 25 GRAM(S): 500 INJECTION, SOLUTION INTRAMUSCULAR; INTRAVENOUS at 15:51

## 2024-12-23 RX ADMIN — Medication 2 TABLET(S): at 05:22

## 2024-12-23 RX ADMIN — BACLOFEN 10 MILLIGRAM(S): 10 TABLET ORAL at 10:10

## 2024-12-23 RX ADMIN — Medication 20 MILLIGRAM(S): at 14:32

## 2024-12-23 RX ADMIN — SODIUM CHLORIDE 1000 MILLILITER(S): 9 INJECTION, SOLUTION INTRAVENOUS at 15:52

## 2024-12-23 RX ADMIN — COLCHICINE 0.6 MILLIGRAM(S): 0.6 CAPSULE ORAL at 13:34

## 2024-12-23 RX ADMIN — Medication 20 MILLIGRAM(S): at 21:37

## 2024-12-23 NOTE — RAPID RESPONSE TEAM SUMMARY - NSSITUATIONBACKGROUNDRRT_GEN_ALL_CORE
63 year old female with hx of scleroderma, asthma, hypothyroidism, presenting with chest pain and shortness of breath, found to have moderate-large pericardial effusion with concerns for tamponade. Pt now s/p pericardiocentesis with pericardial drain in place.     RRT called for tachycardia. With known history of new afib. Afib RVR to 150s-170s. Patient reporting dizziness, palpitations and anxiety.     #Afib with RVR  Likely ectopy 2/2 pericardiocentesis/drain  s/p digoxin load  - 250 mcg digoxin  - 5 IV lopressor  - 25 mg PO lopressor  - 2 mg IV magnesium  - 1 mg IV ativan given for anxiety  - 1L IVF  - cardiology at bedside, no evidence of tamponade on POCUS  - At end of RRT HR in 80s SR with PACs, /69 at end of RRT.    Sloan Lomas MD  PGY-3   63 year old female with hx of scleroderma, asthma, hypothyroidism, presenting with chest pain and shortness of breath, found to have moderate-large pericardial effusion with concerns for tamponade. Pt now s/p pericardiocentesis with pericardial drain in place.     RRT called for tachycardia. With known history of new afib. Afib RVR to 150s-170s. Patient reporting dizziness, palpitations and anxiety. BP variable from SBP 60s to 110s during RRT.     #Afib with RVR  Likely ectopy 2/2 pericardiocentesis/drain  s/p digoxin load  - 250 mcg digoxin  - 5 IV lopressor  - 25 mg PO lopressor  - 2 mg IV magnesium  - 1 mg IV ativan given for anxiety  - 1L IVF  - cardiology at bedside, no evidence of tamponade on POCUS  - Not on AC  - At end of HR in 80s SR with PACs, /69 at end of RRT.    Sloan Lomas MD  PGY-3

## 2024-12-23 NOTE — PROGRESS NOTE ADULT - ASSESSMENT
63F PMH scleroderma, asthma, hypothyroidism presenting with CP and SOB found to have pericardial effusion with tamponade, s/p pericardial drain. Rheumatology consulted to evaluate for scleroderma.     #Hx Limited systemic sclerosis  #Pericardial effusion  -Dx 18 yrs ago, with skin involvement, dysphagia, gastric dysmotility, raynauds. Follows with Dr Metzger, unclear what medications pt is on for scleroderma however, in most recent progress note, she has likely trialed MTX,and Cellcept in the past. She received dose of cyclophosphamide ~2005. Pericardial effusion is a rare manifestation of scleroderma and typically would arise from longstanding cardiac/lung disease which she denies having the past. Other considerations would include infectious etiologies, malignancies. Will repeat serologic workup, infectious workup and reassess. management of pericardial effusion by cardiology.   -First episode of pericardial effusion, pt denies hx of ILD, previous hx of pericardial or pleural effusions, no renal involvement  -TTE showed large pericardial effusion with fibrinous material, now trace effusion s/p pericardiocentesis. Bloody drainage with cell count 1000. Pt states CP and SOB improved s/p procedure  -Pericardial effusion can be from scleroderma, but would also evaluate for other causes including infection (coxsackie, EBV), malignancy, can also be idiopathic. Less likely related to thyroid as TFT is normal  - Now s/p pericardial drain    #Hx of SBO  #Constipation  #Gastric dysmotility  - Patient reports she has been having increasing difficult with swallowing food. Suspect this is related to her underlying systemic sclerosis. She has never had prior GI evaluation for this process  - Patient with history of multiple abdominal surgeries and reportedly with adhesions. Has not have BM in multiple days  - Exam not concerning for acute abdm at this time  - Would evaluate stool burden and if no evidence of obstructive process, consider bowel regimen      Recommendations:  -Will follow ANGELA (multiplex), scleroderma ab, centromere, RNA tere 3, dsDNA, C3, C4, FREDI, Sjogren's, RF, CCP, UA, Urine prot/cr   -f/u coxsackie, EBV, RVP, Hepatitis panel, hep B core ab, quant  -NSAIDs and colchicine as per cardiology for pericardial effusion  -Obtain abdominal XR and consider bowel regimen given constipation  -Will follow Speech and swallow evaluation and may require further GI workup.   -Please maintain blood pressure <110/70 to avoid scleroderma renal crisis. Please avoid steroids, as steroids can precipitate renal crisis      Case discussed with Dr. Mele Huston MD, PGY-4  Rheumatology Fellow  Reachable on TEAMS

## 2024-12-23 NOTE — SWALLOW BEDSIDE ASSESSMENT ADULT - PHARYNGEAL PHASE
Within functional limits Complaints of pharyngeal stasis; reduced but not eliminated with alternating liquids

## 2024-12-23 NOTE — PROVIDER CONTACT NOTE (CHANGE IN STATUS NOTIFICATION) - ASSESSMENT
Pt felt light headed and complained of L sided chest pain. Vitals Intial:  /68, , O2: 87% HR jumping 130-180s. Pt got anxious as more people entered room.
Pt o2 sat low 80s, -170s, pt feeling crushing L sided CP. Extremely anxious.

## 2024-12-23 NOTE — SWALLOW BEDSIDE ASSESSMENT ADULT - ADDITIONAL RECOMMENDATIONS
long term goal: long term goal: the patient will tolerate the least restrictive PO diet without showing overt s/s of aspiration

## 2024-12-23 NOTE — SWALLOW BEDSIDE ASSESSMENT ADULT - SLP PERTINENT HISTORY OF CURRENT PROBLEM
Pt is a 63 year old female with hx of scleroderma, asthma (prior intubations), hypothyroidism, presenting with chest pain and shortness of breath, found to have moderate-large pericardial effusion with concerns for tamponade. Pt now s/p pericardiocentesis with pericardial drain in place, s/p RRT on  for afib w/ RVR unresponsive to diltiazem now being loaded with digoxin, pending repeat TTE and further management of sclerodema/pericardial effusion. IMAGIN/20 CXR - Interval placement of pericardial drain. No pneumomediastinum or pneumothorax.  CXR - No focal consolidations, minimal retrocardiac atelectasis, new.

## 2024-12-23 NOTE — PROGRESS NOTE ADULT - SUBJECTIVE AND OBJECTIVE BOX
AYLEEN BOSWELL  [id number]    Interval history  Patient with rapid responses due to afib w/ RVR, medications were adjusted and now in sinus rhythm  Patient reports no acute complaints, still with persistent diffuse pain, shortness of breath, palpitations      Rheum ROS  As above      MEDICATIONS  (STANDING):  albuterol/ipratropium for Nebulization 3 milliLiter(s) Nebulizer every 6 hours  baclofen 10 milliGRAM(s) Oral <User Schedule>  heparin   Injectable 5000 Unit(s) SubCutaneous every 8 hours  levothyroxine 112 MICROGram(s) Oral daily  morphine ER Tablet 30 milliGRAM(s) Oral <User Schedule>  oxyCODONE    IR 20 milliGRAM(s) Oral <User Schedule>  polyethylene glycol 3350 17 Gram(s) Oral daily  senna 2 Tablet(s) Oral at bedtime    MEDICATIONS  (PRN):      Objective:  Vital Signs Last 24 Hrs  T(C): 36.5 (23 Dec 2024 13:11), Max: 37.3 (22 Dec 2024 22:22)  T(F): 97.7 (23 Dec 2024 13:11), Max: 99.1 (22 Dec 2024 22:22)  HR: 88 (23 Dec 2024 13:11) (86 - 103)  BP: 109/69 (23 Dec 2024 13:11) (91/63 - 117/67)  BP(mean): --  RR: 17 (23 Dec 2024 13:11) (17 - 18)  SpO2: 92% (23 Dec 2024 13:11) (91% - 94%)    Parameters below as of 23 Dec 2024 13:11  Patient On (Oxygen Delivery Method): nasal cannula  O2 Flow (L/min): 4      Physical Exam: limited due to patient in pain and asking to be examined tommorrow  General: No acute distress  Cardiac: irregularly irregular rhythm, tachycardic  lungs: CTA b/l  MSK: sclerodactyly and contractures of fingers, contracture of feet   Skin: erythematous linear appearing rash on forearms, anterior shins      LABS:  cret                        13.2   10.03 )-----------( 190      ( 23 Dec 2024 06:54 )             40.9     12-23    139  |  102  |  18  ----------------------------<  113[H]  4.1   |  28  |  0.49[L]    Ca    9.0      23 Dec 2024 06:52  Phos  1.7     12-23  Mg     1.9     12-23      Urinalysis Basic - ( 21 Dec 2024 05:18 )    Color: x / Appearance: x / SG: x / pH: x  Gluc: 148 mg/dL / Ketone: x  / Bili: x / Urobili: x   Blood: x / Protein: x / Nitrite: x   Leuk Esterase: x / RBC: x / WBC x   Sq Epi: x / Non Sq Epi: x / Bacteria: x      Culture - Body Fluid with Gram Stain (collected 12-20-24 @ 18:36)  Source: Body Fluid  Gram Stain (12-21-24 @ 03:01):    polymorphonuclear leukocytes seen    No organisms seen    by cytocentrifuge    Culture - Fungal, Body Fluid (collected 12-20-24 @ 18:36)  Source: Pericardial  Preliminary Report (12-21-24 @ 08:26):    Testing in progress    2012:  ANGELA 1:80   RADIOLOGY & ADDITIONAL STUDIES:  < from: TTE Limited W or WO Ultrasound Enhancing Agent (12.20.24 @ 15:53) >  CONCLUSIONS:      1. There is large circumferential pericardial effusion measuring 2.9 cm adjacent to the right atrium and right ventricle. With evidence of hemodynamic compromise (or echocardiographic evidence of cardiac tamponade) with a blood pressure of 155 mmHg/83 mmHg and a heart rate of 110 bpm.   2. Organized fibrinous vs coagulant material is seen in the pericardial space.   3. No recent prior study for comparison.   4. Findings were discussed with Calixto Alfonso MD on 12/20/2024 at 4:15 pm.    < end of copied text >

## 2024-12-23 NOTE — PROGRESS NOTE ADULT - ATTENDING COMMENTS
62yo F pertinent PMHx scleroderma presented from cardiology office for pleuritic chest pain, dyspnea, and fatigue x 10d and found to have pericardial effusion with cardiac tamponade on outpatient TTE. Patient deferred admission and ultimately presented with cardiogenic shock 2/2 tamponade s/p pericardiocentesis with 650cc sanguinous fluid removed and drain left in place. She reports having fever/chills and sick contacts in the days prior to her outpatient cardiology appt.     Though scleroderma is on the differential for her effusion, it is rare and recommend completing evaluation for pericarditis and pericardial effusion including obtaining ESR, CRP, full RVP, age appropriate cancer screening, and following up on pericardial fluid studies. Pericardial drain still with significant output at this time. Tele with frequent PACs but no recurrence of atrial fibrillation. Would opt to not treat with therapeutic AC given this was an isolated episode around time of pericardiocentesis and the initial sanguinous drainage. Remainder of care as per note above.    Steven Chaudhari MD  [facility] Cardiology 64yo F pertinent PMHx scleroderma presented from cardiology office for pleuritic chest pain, dyspnea, and fatigue x 10d and found to have pericardial effusion with cardiac tamponade on outpatient TTE. Patient deferred admission and ultimately presented with cardiogenic shock 2/2 tamponade s/p pericardiocentesis with 650cc sanguinous fluid removed and drain left in place. She reports having fever/chills and sick contacts in the days prior to her outpatient cardiology appt.     Though scleroderma is on the differential for her effusion, it is rare and recommend completing evaluation for pericarditis and pericardial effusion including obtaining ESR, CRP, full RVP, age appropriate cancer screening, and following up on pericardial fluid studies. Pericardial drain still with significant output at this time. Tele with frequent PACs but no recurrence of atrial fibrillation. Would opt to not treat with therapeutic AC given this was an isolated episode around time of pericardiocentesis and the initial sanguinous drainage. Remainder of care as per note above.    Steven Chaudhari MD  [facility] Cardiology    Addendum 12/23/24 2018: Patient had RRT this afternoon for atrial fibrillation with RVR otherwise hemodynamically stable resolved with digoxin and metoprolol. Agree with standing lopressor initiation for rate control and holding off on therapeutic AC while pericardial drain is in.

## 2024-12-23 NOTE — SWALLOW BEDSIDE ASSESSMENT ADULT - COMMENTS
*Speech & Swallow Hx: Seen by this service 1/2012 with recommendations to continue regular diet. "c/o pharyngeal stasis (mostly with food) which is reduced but not eliminated with alternating liquids. Pt. reports episodes of possible laryngeal penetration/aspiration prior to admit. Objective assessment warranted to further assess swallow mechanism." Objective test not completed during this hospitalization.

## 2024-12-23 NOTE — PROVIDER CONTACT NOTE (CHANGE IN STATUS NOTIFICATION) - SITUATION
Pt rang bell was seen to be in distress extremely anxious unable to calm, Pt reported feeling very bad chest pain on L side. Pt was tachypneic and oxygen sat low 80s. BP low and -170sPA came to bedside and rapid was called.
CHADD Harrison found pt on tele in Afib RVR jumping up to 180s. Vitals taken and RRT initiated.

## 2024-12-23 NOTE — SWALLOW BEDSIDE ASSESSMENT ADULT - SLP GENERAL OBSERVATIONS
Patient received upright in bed; AAOx4; 4L O2 via NC; HHA present; able to make wants/needs known and follow commands for the purpose of this evaluation.

## 2024-12-23 NOTE — SWALLOW BEDSIDE ASSESSMENT ADULT - SWALLOW EVAL: DIAGNOSIS
Order received and chart reviewed. Patient currently off the floor at a test In nuclear medicine, per LILLY Rock. Will re-attempt as schedule permits. Patient is a 63 year old female with pmhx of scleroderma and asthma presenting with chest pain and SOB. Patient now presents with a suspected pharyngeal dysphagia as evidenced by c/o pharyngeal stasis which is reduced but not eliminated with alternating liquids. No overt s/s of aspiration across trials. Per patient, received MBS at [place] in 2012 with recommendations for a modified diet. Patient and HHA reporting following recommendation. Offered repeat MBS to further assess swallow mechanism due to c/o pharyngeal stasis, however patient declining at this time. Patient is currently tolerating baseline diet, therefore this service will sign off. Patient is a 63 year old female with pmhx of scleroderma and asthma presenting with chest pain and SOB. Patient now presents with a suspected pharyngeal dysphagia as evidenced by c/o pharyngeal stasis which is reduced but not eliminated with alternating liquids. No overt s/s of aspiration across trials. Per patient, received MBS at [place] in 2012 with recommendations for a modified diet. Patient and HHA reporting following diet recommendation. Offered repeat MBS to further assess swallow mechanism due to c/o pharyngeal stasis, however patient declining at this time. Patient is currently tolerating baseline diet, therefore this service will sign off.

## 2024-12-23 NOTE — PROVIDER CONTACT NOTE (CHANGE IN STATUS NOTIFICATION) - BACKGROUND
63 year old female with hx of scleroderma, asthma, hypothyroidism, presenting with chest pain and shortness of breath, found to have moderate-large pericardial effusion with concerns for tamponade. Pt now s/p pericardiocentesis with pericardial drain in place. Pt admitted for further workup and management
63 year old female with hx of scleroderma, asthma, hypothyroidism, presenting with chest pain and shortness of breath, found to have moderate-large pericardial effusion with concerns for tamponade. Pt now s/p pericardiocentesis with pericardial drain in place. Pt admitted for further workup and management.

## 2024-12-23 NOTE — PROGRESS NOTE ADULT - ASSESSMENT
63F PMH scleroderma, asthma with prior intubations admitted with cardiac tamponade. Initially presented to outpt cardiologist Dr. Aparicio 10 days PTA for general fatigue, pleuritic chest pain, and SOB. in-office TTE was notable for pericardial effusion. Patient deferred presentation to ED at that time. Presented with tachycardia and hypotension, TTE w/ evidence of caridac tamponade. Now s/p pericardiocentesis (12/20) with 650cc bloody fluid removed in the lab. No evidence of malignant effusion on initial evaluation of pericardial fluid, cultures pending. Most likely effusion in setting of patient's known scleroderma.     Patient overnight 12.21 with afib RVR and agitation.    Recs:  - s/p digoxin load  - consider metop tartrate n25mg BID if tolerated  - plan for eventual A/C once drain is removed  - f/u rheum recs  - Recommend age appropriate cancer screening  - continue ibuprofen 600mg PO TID, colchicine 0.6mg PO QDay   - Follow up pericardial fluid cytology  - Monitor drain output, plan to remove when <50 ccs/24 hour    ***recommendations are not final until attending attestation*** 63F PMH scleroderma, asthma with prior intubations admitted with cardiac tamponade. Initially presented to outpt cardiologist Dr. Aparicio 10 days PTA for general fatigue, pleuritic chest pain, and SOB. in-office TTE was notable for pericardial effusion. Patient deferred presentation to ED at that time. Presented with tachycardia and hypotension, TTE w/ evidence of caridac tamponade. Now s/p pericardiocentesis (12/20) with 650cc bloody fluid removed in the lab. No evidence of malignant effusion on initial evaluation of pericardial fluid, cultures/cytology pending.     Patient overnight 12.21 with afib RVR and agitation.    Recs:  - s/p digoxin load  - consider metop tartrate n25mg BID if tolerated  - plan for eventual A/C once drain is removed  - f/u rheum recs  - Recommend age appropriate cancer screening  - continue ibuprofen 600mg PO TID, colchicine 0.6mg PO QDay   - Follow up pericardial fluid cytology  - Monitor drain output, plan to remove when <50 ccs/24 hour    ***recommendations are not final until attending attestation*** 63F PMH scleroderma, asthma with prior intubations admitted with cardiac tamponade. Initially presented to outpt cardiologist Dr. Aparicio 10 days PTA for general fatigue, pleuritic chest pain, and SOB. in-office TTE was notable for pericardial effusion. Patient deferred presentation to ED at that time. Presented with tachycardia and hypotension, TTE w/ evidence of caridac tamponade. Now s/p pericardiocentesis (12/20) with 650cc bloody fluid removed in the lab. No evidence of malignant effusion on initial evaluation of pericardial fluid, cultures/cytology pending.     Patient overnight 12.21 with afib RVR and agitation.    Recs:  - s/p digoxin load  - Start metop tartrate n25mg BID  - plan for eventual A/C once drain is removed  - f/u rheum recs  - Recommend age appropriate cancer screening  - continue ibuprofen 600mg PO TID, colchicine 0.6mg PO QDay   - Follow up pericardial fluid cytology  - Monitor drain output, plan to remove when <50 ccs/24 hour    ***recommendations are not final until attending attestation***

## 2024-12-23 NOTE — PROGRESS NOTE ADULT - SUBJECTIVE AND OBJECTIVE BOX
Cardiology Progress Note  ------------------------------------------------------------------------------------------  SUBJECTIVE:   - Tele:  - No events overnight. Denies CP, SOB or Palpitations. Pericardial drain output noted at ____ q24 hours.     -------------------------------------------------------------------------------------------  VS:  T(F): 97.5 (12-23), Max: 99.1 (12-22)  HR: 86 (12-23) (70 - 103)  BP: 117/67 (12-23) (91/63 - 117/67)  RR: 18 (12-23)  SpO2: 91% (12-23)  I&O's Summary    22 Dec 2024 07:01  -  23 Dec 2024 07:00  --------------------------------------------------------  IN: 200 mL / OUT: 100 mL / NET: 100 mL      PHYSICAL EXAM:  GENERAL: NAD  HEAD: Atraumatic, Normocephalic.  ENT: Moist mucous membranes.  NECK: Supple, No JVD.  CHEST/LUNG: Clear to auscultation bilaterally; No rales, rhonchi, wheezing, or rubs. Unlabored respirations.  HEART: Regular rate and rhythm; No murmurs, rubs, or gallops.  ABDOMEN: Bowel sounds present; Soft, Nontender, Nondistended.   EXTREMITIES: No edema. 2+ Peripheral Pulses, brisk capillary refill. No clubbing or cyanosis.     -------------------------------------------------------------------------------------------  LABS:                          13.4   10.80 )-----------( 196      ( 22 Dec 2024 15:11 )             41.0     12-22    137  |  101  |  15  ----------------------------<  167[H]  4.3   |  26  |  0.48[L]    Ca    9.1      22 Dec 2024 15:11  Phos  2.1     12-22  Mg     2.2     12-22      PT/INR - ( 21 Dec 2024 18:09 )   PT: 14.2 sec;   INR: 1.24 ratio         PTT - ( 21 Dec 2024 18:09 )  PTT:29.7 sec  CARDIAC MARKERS ( 20 Dec 2024 16:03 )  11 ng/L / x     / x     / x     / x     / x                -------------------------------------------------------------------------------------------  Meds:  albuterol/ipratropium for Nebulization 3 milliLiter(s) Nebulizer every 6 hours  aluminum hydroxide/magnesium hydroxide/simethicone Suspension 30 milliLiter(s) Oral every 4 hours PRN  baclofen 10 milliGRAM(s) Oral <User Schedule>  colchicine 0.6 milliGRAM(s) Oral daily  diphenhydrAMINE Injectable 50 milliGRAM(s) IV Push once  heparin   Injectable 5000 Unit(s) SubCutaneous every 8 hours  ibuprofen  Tablet. 600 milliGRAM(s) Oral every 8 hours  levothyroxine 112 MICROGram(s) Oral daily  LORazepam     Tablet 1 milliGRAM(s) Oral two times a day PRN  morphine ER Tablet 30 milliGRAM(s) Oral <User Schedule>  nicotine - 21 mG/24Hr(s) Patch 1 Patch Transdermal every 24 hours  oxyCODONE    IR 20 milliGRAM(s) Oral <User Schedule>  polyethylene glycol 3350 17 Gram(s) Oral daily  potassium phosphate / sodium phosphate Tablet (K-PHOS No. 2) 2 Tablet(s) Oral every 6 hours  senna 2 Tablet(s) Oral at bedtime    -------------------------------------------------------------------------------------------  Cardiovascular Diagnostic Testing:    ECG:     Echo:     Stress Testing:    Cath:    -------------------------------------------------------------------------------------------   Cardiology Progress Note  ------------------------------------------------------------------------------------------  SUBJECTIVE:   - Tele:  - No events overnight. Denies CP, SOB or Palpitations. Pericardial drain output noted at 150 ccs q24 hours.     -------------------------------------------------------------------------------------------  VS:  T(F): 97.5 (12-23), Max: 99.1 (12-22)  HR: 86 (12-23) (70 - 103)  BP: 117/67 (12-23) (91/63 - 117/67)  RR: 18 (12-23)  SpO2: 91% (12-23)  I&O's Summary    22 Dec 2024 07:01  -  23 Dec 2024 07:00  --------------------------------------------------------  IN: 200 mL / OUT: 100 mL / NET: 100 mL      PHYSICAL EXAM:  GENERAL: NAD  HEAD: Atraumatic, Normocephalic.  ENT: Moist mucous membranes.  NECK: Supple, No JVD.  CHEST/LUNG: Clear to auscultation bilaterally; No rales, rhonchi, wheezing, or rubs. Unlabored respirations.  HEART: Regular rate and rhythm; No murmurs, rubs, or gallops.  ABDOMEN: Bowel sounds present; Soft, Nontender, Nondistended.   EXTREMITIES: No edema. 2+ Peripheral Pulses, brisk capillary refill. No clubbing or cyanosis.     -------------------------------------------------------------------------------------------  LABS:                          13.4   10.80 )-----------( 196      ( 22 Dec 2024 15:11 )             41.0     12-22    137  |  101  |  15  ----------------------------<  167[H]  4.3   |  26  |  0.48[L]    Ca    9.1      22 Dec 2024 15:11  Phos  2.1     12-22  Mg     2.2     12-22      PT/INR - ( 21 Dec 2024 18:09 )   PT: 14.2 sec;   INR: 1.24 ratio         PTT - ( 21 Dec 2024 18:09 )  PTT:29.7 sec  CARDIAC MARKERS ( 20 Dec 2024 16:03 )  11 ng/L / x     / x     / x     / x     / x                -------------------------------------------------------------------------------------------  Meds:  albuterol/ipratropium for Nebulization 3 milliLiter(s) Nebulizer every 6 hours  aluminum hydroxide/magnesium hydroxide/simethicone Suspension 30 milliLiter(s) Oral every 4 hours PRN  baclofen 10 milliGRAM(s) Oral <User Schedule>  colchicine 0.6 milliGRAM(s) Oral daily  diphenhydrAMINE Injectable 50 milliGRAM(s) IV Push once  heparin   Injectable 5000 Unit(s) SubCutaneous every 8 hours  ibuprofen  Tablet. 600 milliGRAM(s) Oral every 8 hours  levothyroxine 112 MICROGram(s) Oral daily  LORazepam     Tablet 1 milliGRAM(s) Oral two times a day PRN  morphine ER Tablet 30 milliGRAM(s) Oral <User Schedule>  nicotine - 21 mG/24Hr(s) Patch 1 Patch Transdermal every 24 hours  oxyCODONE    IR 20 milliGRAM(s) Oral <User Schedule>  polyethylene glycol 3350 17 Gram(s) Oral daily  potassium phosphate / sodium phosphate Tablet (K-PHOS No. 2) 2 Tablet(s) Oral every 6 hours  senna 2 Tablet(s) Oral at bedtime    -------------------------------------------------------------------------------------------  Cardiovascular Diagnostic Testing:    ECG:     Echo:     Stress Testing:    Cath:    -------------------------------------------------------------------------------------------

## 2024-12-23 NOTE — PROVIDER CONTACT NOTE (CHANGE IN STATUS NOTIFICATION) - ACTION/TREATMENT ORDERED:
Cardiology stat consult. 1 mg Ativan for anxiety, 1L LR bolus, 2g Magnesium IV, Dig loaded, 5mg Lopressor IVP, 25mg Lopressor PO. Continue monitoring HR and BP.
Rapid initiated. Pt given Ativan to calm. Pt found to be in new Afib RVR. As pt calmed respirations steadied and O2 sat went up. Cardizem and Digoxin given for RVR as pt cannot take beta blockers.

## 2024-12-23 NOTE — PROGRESS NOTE ADULT - SUBJECTIVE AND OBJECTIVE BOX
feels anxious.    GENERAL: No fevers, no chills.  EYES: No blurry vision,  No photophobia  ENT: No sore throat.  No dysphagia  Cardiovascular: No chest pain, palpitations, orthopnea  Pulmonary: No cough, no wheezing. No shortness of breath  Gastrointestinal: No abdominal pain, no diarrhea, no constipation  Dermatology:  No rashes.  Neuro: No Headache.  No vertigo.  No dizziness.  Psych: No anxiety, no depression.  Denies suicidal thoughts.    MEDICATIONS  (STANDING):  albuterol/ipratropium for Nebulization 3 milliLiter(s) Nebulizer every 6 hours  baclofen 10 milliGRAM(s) Oral <User Schedule>  colchicine 0.6 milliGRAM(s) Oral daily  diphenhydrAMINE Injectable 50 milliGRAM(s) IV Push once  heparin   Injectable 5000 Unit(s) SubCutaneous every 8 hours  ibuprofen  Tablet. 600 milliGRAM(s) Oral every 8 hours  levothyroxine 112 MICROGram(s) Oral daily  morphine ER Tablet 30 milliGRAM(s) Oral <User Schedule>  nicotine - 21 mG/24Hr(s) Patch 1 Patch Transdermal every 24 hours  oxyCODONE    IR 20 milliGRAM(s) Oral <User Schedule>  polyethylene glycol 3350 17 Gram(s) Oral daily  potassium phosphate / sodium phosphate Tablet (K-PHOS No. 2) 2 Tablet(s) Oral every 6 hours  senna 2 Tablet(s) Oral at bedtime  sodium phosphate 15 milliMole(s)/250 mL IVPB 15 milliMole(s) IV Intermittent once    MEDICATIONS  (PRN):  aluminum hydroxide/magnesium hydroxide/simethicone Suspension 30 milliLiter(s) Oral every 4 hours PRN Dyspepsia  LORazepam     Tablet 1 milliGRAM(s) Oral two times a day PRN Anxiety    Vital Signs Last 24 Hrs  T(C): 36.4 (23 Dec 2024 04:25), Max: 37.3 (22 Dec 2024 22:22)  T(F): 97.5 (23 Dec 2024 04:25), Max: 99.1 (22 Dec 2024 22:22)  HR: 86 (23 Dec 2024 04:25) (70 - 103)  BP: 117/67 (23 Dec 2024 04:25) (91/63 - 117/67)  BP(mean): --  RR: 18 (23 Dec 2024 04:25) (18 - 18)  SpO2: 91% (23 Dec 2024 04:25) (91% - 94%)    Parameters below as of 23 Dec 2024 04:25  Patient On (Oxygen Delivery Method): nasal cannula  O2 Flow (L/min): 4    PHYSICAL EXAM:  GENERAL APPEARANCE: Uncomfortable appearing female resting in bed   HEENT:  PERRL, EOMI. hearing grossly intact.  CARDIAC: Normal S1 and S2. no mrg. RRR  LUNGS: No wheezing noted today, lungs clear to auscultation b/l  ABDOMEN: tenderness on palpation diffusely   MUSCULOSKELETAL: No swelling/reddness over extremities  EXTREMITIES: No edema. Peripheral pulses intact.   NEUROLOGICAL: Non focal.  SKIN: TOMY drain in place, no erythema or tenderness over site, clot noted in the TOMY drain back   PSYCHIATRIC: AOx3     .  LABS:                         13.2   10.03 )-----------( 190      ( 23 Dec 2024 06:54 )             40.9     12-23    139  |  102  |  18  ----------------------------<  113[H]  4.1   |  28  |  0.49[L]    Ca    9.0      23 Dec 2024 06:52  Phos  1.7     12-23  Mg     1.9     12-23      PT/INR - ( 21 Dec 2024 18:09 )   PT: 14.2 sec;   INR: 1.24 ratio         PTT - ( 21 Dec 2024 18:09 )  PTT:29.7 sec  Urinalysis Basic - ( 23 Dec 2024 06:52 )    Color: x / Appearance: x / SG: x / pH: x  Gluc: 113 mg/dL / Ketone: x  / Bili: x / Urobili: x   Blood: x / Protein: x / Nitrite: x   Leuk Esterase: x / RBC: x / WBC x   Sq Epi: x / Non Sq Epi: x / Bacteria: x            RADIOLOGY, EKG & ADDITIONAL TESTS: Reviewed.

## 2024-12-24 LAB
ANION GAP SERPL CALC-SCNC: 11 MMOL/L — SIGNIFICANT CHANGE UP (ref 5–17)
BUN SERPL-MCNC: 14 MG/DL — SIGNIFICANT CHANGE UP (ref 7–23)
CALCIUM SERPL-MCNC: 8.7 MG/DL — SIGNIFICANT CHANGE UP (ref 8.4–10.5)
CHLORIDE SERPL-SCNC: 101 MMOL/L — SIGNIFICANT CHANGE UP (ref 96–108)
CO2 SERPL-SCNC: 27 MMOL/L — SIGNIFICANT CHANGE UP (ref 22–31)
CREAT SERPL-MCNC: 0.39 MG/DL — LOW (ref 0.5–1.3)
CREATININE, URINE RESULT: 257 MG/DL — SIGNIFICANT CHANGE UP
CRP SERPL-MCNC: 108 MG/L — HIGH (ref 0–4)
EBV DNA SERPL NAA+PROBE-ACNC: SIGNIFICANT CHANGE UP IU/ML
EBVPCR LOG: SIGNIFICANT CHANGE UP LOG10IU/ML
EGFR: 110 ML/MIN/1.73M2 — SIGNIFICANT CHANGE UP
ERYTHROCYTE [SEDIMENTATION RATE] IN BLOOD: 43 MM/HR — HIGH (ref 0–20)
GLUCOSE SERPL-MCNC: 93 MG/DL — SIGNIFICANT CHANGE UP (ref 70–99)
HCT VFR BLD CALC: 40.7 % — SIGNIFICANT CHANGE UP (ref 34.5–45)
HGB BLD-MCNC: 12.8 G/DL — SIGNIFICANT CHANGE UP (ref 11.5–15.5)
MAGNESIUM SERPL-MCNC: 2 MG/DL — SIGNIFICANT CHANGE UP (ref 1.6–2.6)
MCHC RBC-ENTMCNC: 29.8 PG — SIGNIFICANT CHANGE UP (ref 27–34)
MCHC RBC-ENTMCNC: 31.4 G/DL — LOW (ref 32–36)
MCV RBC AUTO: 94.9 FL — SIGNIFICANT CHANGE UP (ref 80–100)
NON-GYNECOLOGICAL CYTOLOGY STUDY: SIGNIFICANT CHANGE UP
NRBC # BLD: 0 /100 WBCS — SIGNIFICANT CHANGE UP (ref 0–0)
PHOSPHATE SERPL-MCNC: 2.2 MG/DL — LOW (ref 2.5–4.5)
PLATELET # BLD AUTO: 212 K/UL — SIGNIFICANT CHANGE UP (ref 150–400)
POTASSIUM SERPL-MCNC: 3.6 MMOL/L — SIGNIFICANT CHANGE UP (ref 3.5–5.3)
POTASSIUM SERPL-SCNC: 3.6 MMOL/L — SIGNIFICANT CHANGE UP (ref 3.5–5.3)
PROT ?TM UR-MCNC: 56 MG/DL — HIGH (ref 0–12)
RBC # BLD: 4.29 M/UL — SIGNIFICANT CHANGE UP (ref 3.8–5.2)
RBC # FLD: 12.2 % — SIGNIFICANT CHANGE UP (ref 10.3–14.5)
SODIUM SERPL-SCNC: 139 MMOL/L — SIGNIFICANT CHANGE UP (ref 135–145)
WBC # BLD: 5.98 K/UL — SIGNIFICANT CHANGE UP (ref 3.8–10.5)
WBC # FLD AUTO: 5.98 K/UL — SIGNIFICANT CHANGE UP (ref 3.8–10.5)

## 2024-12-24 PROCEDURE — 99223 1ST HOSP IP/OBS HIGH 75: CPT

## 2024-12-24 PROCEDURE — 99233 SBSQ HOSP IP/OBS HIGH 50: CPT

## 2024-12-24 PROCEDURE — 99232 SBSQ HOSP IP/OBS MODERATE 35: CPT

## 2024-12-24 RX ORDER — PANTOPRAZOLE 40 MG/1
40 TABLET, DELAYED RELEASE ORAL DAILY
Refills: 0 | Status: DISCONTINUED | OUTPATIENT
Start: 2024-12-24 | End: 2024-12-31

## 2024-12-24 RX ORDER — LIDOCAINE 50 MG/G
1 OINTMENT TOPICAL DAILY
Refills: 0 | Status: DISCONTINUED | OUTPATIENT
Start: 2024-12-24 | End: 2024-12-24

## 2024-12-24 RX ORDER — METHYLNALTREXONE BROMIDE 12 MG/.6ML
12 INJECTION, SOLUTION SUBCUTANEOUS ONCE
Refills: 0 | Status: COMPLETED | OUTPATIENT
Start: 2024-12-24 | End: 2024-12-24

## 2024-12-24 RX ORDER — POTASSIUM PHOSPHATE, MONOBASIC AND POTASSIUM PHOSPHATE, DIBASIC 224; 236 MG/ML; MG/ML
15 INJECTION, SOLUTION INTRAVENOUS ONCE
Refills: 0 | Status: DISCONTINUED | OUTPATIENT
Start: 2024-12-24 | End: 2024-12-31

## 2024-12-24 RX ORDER — MORPHINE SULFATE 15 MG
30 TABLET, EXTENDED RELEASE ORAL
Refills: 0 | Status: DISCONTINUED | OUTPATIENT
Start: 2024-12-24 | End: 2024-12-25

## 2024-12-24 RX ORDER — POLYETHYLENE GLYCOL 3350 17 G/DOSE
17 POWDER (GRAM) ORAL
Refills: 0 | Status: DISCONTINUED | OUTPATIENT
Start: 2024-12-24 | End: 2024-12-31

## 2024-12-24 RX ORDER — MORPHINE SULFATE 15 MG
30 TABLET, EXTENDED RELEASE ORAL ONCE
Refills: 0 | Status: DISCONTINUED | OUTPATIENT
Start: 2024-12-24 | End: 2024-12-24

## 2024-12-24 RX ORDER — POTASSIUM CHLORIDE 600 MG/1
40 TABLET, FILM COATED, EXTENDED RELEASE ORAL ONCE
Refills: 0 | Status: COMPLETED | OUTPATIENT
Start: 2024-12-24 | End: 2024-12-24

## 2024-12-24 RX ORDER — LIDOCAINE 50 MG/G
1 OINTMENT TOPICAL DAILY
Refills: 0 | Status: DISCONTINUED | OUTPATIENT
Start: 2024-12-24 | End: 2024-12-31

## 2024-12-24 RX ORDER — MORPHINE SULFATE 15 MG
30 TABLET, EXTENDED RELEASE ORAL
Refills: 0 | Status: DISCONTINUED | OUTPATIENT
Start: 2024-12-24 | End: 2024-12-26

## 2024-12-24 RX ADMIN — HEPARIN SODIUM 5000 UNIT(S): 1000 INJECTION, SOLUTION INTRAVENOUS; SUBCUTANEOUS at 16:28

## 2024-12-24 RX ADMIN — Medication 25 MILLIGRAM(S): at 18:13

## 2024-12-24 RX ADMIN — SENNOSIDES 2 TABLET(S): 8.6 TABLET, FILM COATED ORAL at 22:58

## 2024-12-24 RX ADMIN — NICOTINE POLACRILEX 1 PATCH: 4 LOZENGE ORAL at 23:11

## 2024-12-24 RX ADMIN — IPRATROPIUM BROMIDE AND ALBUTEROL SULFATE 3 MILLILITER(S): .5; 2.5 SOLUTION RESPIRATORY (INHALATION) at 05:00

## 2024-12-24 RX ADMIN — IPRATROPIUM BROMIDE AND ALBUTEROL SULFATE 3 MILLILITER(S): .5; 2.5 SOLUTION RESPIRATORY (INHALATION) at 23:47

## 2024-12-24 RX ADMIN — LIDOCAINE 1 PATCH: 50 OINTMENT TOPICAL at 23:11

## 2024-12-24 RX ADMIN — Medication 600 MILLIGRAM(S): at 23:27

## 2024-12-24 RX ADMIN — Medication 20 MILLIGRAM(S): at 21:55

## 2024-12-24 RX ADMIN — Medication 30 MILLIGRAM(S): at 11:45

## 2024-12-24 RX ADMIN — Medication 20 MILLIGRAM(S): at 16:28

## 2024-12-24 RX ADMIN — Medication 25 MILLIGRAM(S): at 05:00

## 2024-12-24 RX ADMIN — IPRATROPIUM BROMIDE AND ALBUTEROL SULFATE 3 MILLILITER(S): .5; 2.5 SOLUTION RESPIRATORY (INHALATION) at 18:14

## 2024-12-24 RX ADMIN — Medication 20 MILLIGRAM(S): at 09:51

## 2024-12-24 RX ADMIN — Medication 20 MILLIGRAM(S): at 02:53

## 2024-12-24 RX ADMIN — Medication 30 MILLIGRAM(S): at 18:46

## 2024-12-24 RX ADMIN — IPRATROPIUM BROMIDE AND ALBUTEROL SULFATE 3 MILLILITER(S): .5; 2.5 SOLUTION RESPIRATORY (INHALATION) at 13:30

## 2024-12-24 RX ADMIN — BACLOFEN 10 MILLIGRAM(S): 10 TABLET ORAL at 11:44

## 2024-12-24 RX ADMIN — Medication 20 MILLIGRAM(S): at 08:54

## 2024-12-24 RX ADMIN — LEVOTHYROXINE SODIUM 112 MICROGRAM(S): 175 TABLET ORAL at 05:00

## 2024-12-24 RX ADMIN — Medication 20 MILLIGRAM(S): at 21:25

## 2024-12-24 RX ADMIN — COLCHICINE 0.6 MILLIGRAM(S): 0.6 CAPSULE ORAL at 11:45

## 2024-12-24 RX ADMIN — Medication 30 MILLIGRAM(S): at 18:16

## 2024-12-24 RX ADMIN — POTASSIUM CHLORIDE 40 MILLIEQUIVALENT(S): 600 TABLET, FILM COATED, EXTENDED RELEASE ORAL at 18:12

## 2024-12-24 RX ADMIN — METHYLNALTREXONE BROMIDE 12 MILLIGRAM(S): 12 INJECTION, SOLUTION SUBCUTANEOUS at 13:30

## 2024-12-24 RX ADMIN — LIDOCAINE 1 PATCH: 50 OINTMENT TOPICAL at 18:13

## 2024-12-24 RX ADMIN — LIDOCAINE 1 PATCH: 50 OINTMENT TOPICAL at 16:29

## 2024-12-24 RX ADMIN — Medication 30 MILLIGRAM(S): at 23:27

## 2024-12-24 RX ADMIN — BACLOFEN 10 MILLIGRAM(S): 10 TABLET ORAL at 22:57

## 2024-12-24 RX ADMIN — HEPARIN SODIUM 5000 UNIT(S): 1000 INJECTION, SOLUTION INTRAVENOUS; SUBCUTANEOUS at 22:58

## 2024-12-24 RX ADMIN — Medication 30 MILLIGRAM(S): at 12:34

## 2024-12-24 RX ADMIN — Medication 30 MILLIGRAM(S): at 22:57

## 2024-12-24 RX ADMIN — NICOTINE POLACRILEX 1 PATCH: 4 LOZENGE ORAL at 13:47

## 2024-12-24 RX ADMIN — LORAZEPAM 1 MILLIGRAM(S): 1 TABLET ORAL at 07:49

## 2024-12-24 RX ADMIN — IPRATROPIUM BROMIDE AND ALBUTEROL SULFATE 3 MILLILITER(S): .5; 2.5 SOLUTION RESPIRATORY (INHALATION) at 00:03

## 2024-12-24 RX ADMIN — Medication 600 MILLIGRAM(S): at 22:57

## 2024-12-24 RX ADMIN — HEPARIN SODIUM 5000 UNIT(S): 1000 INJECTION, SOLUTION INTRAVENOUS; SUBCUTANEOUS at 05:00

## 2024-12-24 RX ADMIN — NICOTINE POLACRILEX 1 PATCH: 4 LOZENGE ORAL at 13:30

## 2024-12-24 RX ADMIN — NICOTINE POLACRILEX 1 PATCH: 4 LOZENGE ORAL at 07:52

## 2024-12-24 NOTE — PROGRESS NOTE ADULT - SUBJECTIVE AND OBJECTIVE BOX
Cardiology Progress Note  ------------------------------------------------------------------------------------------  SUBJECTIVE:   - Tele: NSR w/ frequent APCs  - Yesterday RRT called for Afib w/ RVR to HR is 150s to 170s, patient reported concurrent anxiety, palpitations, dizziness with labile BPs but mostly >100 mmHg systolic. She was given 250 mcg dig, 5 mg IV lopressor, 1 mg IV ativan, 1L IVF, 25 mg PO lopressor with conversion back to NSR  - No events overnight. Denies CP, SOB or Palpitations.   - Pericardial drain output noted at _______    -------------------------------------------------------------------------------------------  VS:  T(F): 97.9 (12-24), Max: 98 (12-23)  HR: 77 (12-24) (77 - 89)  BP: 106/64 (12-24) (106/64 - 126/63)  RR: 18 (12-24)  SpO2: 91% (12-24)  I&O's Summary    22 Dec 2024 07:01  -  23 Dec 2024 07:00  --------------------------------------------------------  IN: 200 mL / OUT: 250 mL / NET: -50 mL    23 Dec 2024 07:01  -  24 Dec 2024 06:30  --------------------------------------------------------  IN: 120 mL / OUT: 100 mL / NET: 20 mL      PHYSICAL EXAM:  GENERAL: NAD  HEAD: Atraumatic, Normocephalic.  ENT: Moist mucous membranes.  NECK: Supple, No JVD.  CHEST/LUNG: Clear to auscultation bilaterally; No rales, rhonchi, wheezing, or rubs. Unlabored respirations.  HEART: Regular rate and rhythm; No murmurs, rubs, or gallops.  ABDOMEN: Bowel sounds present; Soft, Nontender, Nondistended.   EXTREMITIES: No edema. 2+ Peripheral Pulses, brisk capillary refill. No clubbing or cyanosis.     -------------------------------------------------------------------------------------------  LABS:                          13.2   10.03 )-----------( 190      ( 23 Dec 2024 06:54 )             40.9     12-23    139  |  102  |  18  ----------------------------<  113[H]  4.1   |  28  |  0.49[L]    Ca    9.0      23 Dec 2024 06:52  Phos  1.7     12-23  Mg     1.9     12-23        CARDIAC MARKERS ( 20 Dec 2024 16:03 )  11 ng/L / x     / x     / x     / x     / x                -------------------------------------------------------------------------------------------  Meds:  albuterol/ipratropium for Nebulization 3 milliLiter(s) Nebulizer every 6 hours  aluminum hydroxide/magnesium hydroxide/simethicone Suspension 30 milliLiter(s) Oral every 4 hours PRN  baclofen 10 milliGRAM(s) Oral <User Schedule>  colchicine 0.6 milliGRAM(s) Oral daily  diphenhydrAMINE Injectable 50 milliGRAM(s) IV Push once  heparin   Injectable 5000 Unit(s) SubCutaneous every 8 hours  ibuprofen  Tablet. 600 milliGRAM(s) Oral every 8 hours  levothyroxine 112 MICROGram(s) Oral daily  LORazepam     Tablet 1 milliGRAM(s) Oral two times a day PRN  metoprolol tartrate 25 milliGRAM(s) Oral every 12 hours  morphine ER Tablet 30 milliGRAM(s) Oral <User Schedule>  nicotine - 21 mG/24Hr(s) Patch 1 Patch Transdermal every 24 hours  oxyCODONE    IR 20 milliGRAM(s) Oral <User Schedule>  polyethylene glycol 3350 17 Gram(s) Oral daily  senna 2 Tablet(s) Oral at bedtime

## 2024-12-24 NOTE — CONSULT NOTE ADULT - SUBJECTIVE AND OBJECTIVE BOX
Chief Complaint:  Patient is a 63y old  Female who presents with a chief complaint of dyspnea (24 Dec 2024 12:12)    HPI:  63F hx of scleroderma, asthma (with prior intubations), hypothyroidism, abdominal surgeries and SBO. Patient at cardiologist 10 days ago, in-office TTE revealed a pericardial effusion and was sent to ED but pt did not present due to recent passing of her son.     12/20 pt returned to ED with worsening symptoms CP, SOB. Pt tachycardic, hypotensive. POCUS revealed mod-large pericardial effusion with tamponade.  12/20 s/p urgent pericardiocentesis with 650cc bloody fluid removed and pericardial drain placement.  12/21 RRT for afib RVR and agitation.  12/23 RRT for afib w/ RVR    Current out- patient pain regimen: MS ER 30 BID, takes Oxy IR 20 QID but it is ordered TID (pt endorses having many pills left over from the past and has been taking these), Baclofen 10 mg BID.    Out Patient Pain Management provider: Dr. Guru Crowe    [facility] Prescription Monitoring Program: Reference #[id number]    Opioid Risk Tool (ORT-OUD) Score: Low    Pain Score: 8/10    Pt seen sitting up in bed, sleeping, easy to rouse. Tearful at times describing her unbearable pain. Lengthy discussion w/ her and her aide/friend. She takes MS ER 30 BID and Oxy IR 20 QID. Her pain doc Rxs the Oxy TID, she has had extra pills from the past and is running out (2 weeks left for the extra dose). She is aware if he knows she's been taking the extra dose a day he may fire her from the practice for breaking contract. Both instructed on tapering down to TID after discharge to avoid withdrawal. Her pain is all over MSK/joint pain 2/2 scleroderma and neuropathic pain. Has failed gabapentinoids and antidepressants for the neuropathic pain. All of her pain is chronic but she states it is MUCH worse now. Pt also educated re: tolerance and risk of difficulty managing pain in the future.      REVIEW OF SYSTEMS:  CONSTITUTIONAL: No fever, weight loss, falls  NEURO: No headaches, memory loss, (+)chronic loss of strength  RESP: (+) shortness of breath, cough  CV: Admitted w/ chest pain  GI: No abdominal pain, nausea, vomiting, diarrhea, (+)constipation until today  : No urinary incontinence/retention  MSK: Chronic joint, extremity pain and generalized pain. Chronic upper or lower motor strength weakness.    PSYCHIATRIC: (+) depression, anxiety      PHYSICAL EXAM  GENERAL: Seen at bedside, moderate distress, well-groomed, well-developed, appears stated age, no signs of toxicity  NEURO: Alert & Oriented X3, Good concentration; Follows commands   HEENT: Head atraumatic, normocephalic; speech clear and fluent  GI: Appetite fair,  last BM today after Relistor  :  Female urinary device LCWS with yellow urine  EXTREMITIES: No cyanosis or edema; moving all extremities, reports chronic decreased ROM and dexterity 2/2 scleroderma, ambulates w/ walker   PSYCH: affect normal; good eye contact; appears anxious and depressed      PAST MEDICAL & SURGICAL HISTORY:  Scleroderma      Asthma      High cholesterol  Unable to take STATINS for high cholesterol due to genetic disposition      Scleroderma      Shingles      Hypothyroidism      GERD (gastroesophageal reflux disease)      Smoker      Multiple drug allergies      S/P small bowel resection      History of myomectomy      History of ovarian cystectomy      Fibroid      Bowel obstruction  multiple surgeries for bowel obstruction      H/O ovarian cystectomy      S/P pericardiocentesis          FAMILY HISTORY:      Allergies    ABIDA dye (Short breath; Hives)  Xolair (Unknown)  penicillin (Anaphylaxis; Hives; Other)  Originally Entered as [Unknown] reaction to [BEE STINGS] (Unknown)  statins (Unknown)  penicillin (Unknown)  IV Contrast (Unknown)  iodine (Anaphylaxis)        MEDICATIONS  (STANDING):  albuterol/ipratropium for Nebulization 3 milliLiter(s) Nebulizer every 6 hours  baclofen 10 milliGRAM(s) Oral <User Schedule>  colchicine 0.6 milliGRAM(s) Oral daily  diphenhydrAMINE Injectable 50 milliGRAM(s) IV Push once  heparin   Injectable 5000 Unit(s) SubCutaneous every 8 hours  ibuprofen  Tablet. 600 milliGRAM(s) Oral every 8 hours  levothyroxine 112 MICROGram(s) Oral daily  lidocaine   4% Patch 1 Patch Transdermal daily  metoprolol tartrate 25 milliGRAM(s) Oral every 12 hours  morphine ER Tablet 30 milliGRAM(s) Oral once  morphine ER Tablet 30 milliGRAM(s) Oral <User Schedule>  nicotine - 21 mG/24Hr(s) Patch 1 Patch Transdermal every 24 hours  oxyCODONE    IR 20 milliGRAM(s) Oral <User Schedule>  polyethylene glycol 3350 17 Gram(s) Oral two times a day  potassium phosphate IVPB 15 milliMole(s) IV Intermittent once  senna 2 Tablet(s) Oral at bedtime    MEDICATIONS  (PRN):  aluminum hydroxide/magnesium hydroxide/simethicone Suspension 30 milliLiter(s) Oral every 4 hours PRN Dyspepsia  LORazepam     Tablet 1 milliGRAM(s) Oral two times a day PRN Anxiety      Vital Signs:  T(C): 36.6 (12-24-24 @ 11:51)  HR: 66 (12-24-24 @ 11:51)  BP: 99/61 (12-24-24 @ 11:51)  RR: 18 (12-24-24 @ 11:51)  SpO2: 94% (12-24-24 @ 11:51)    Pertinent labs/radiology:  Reviewed                          12.8   5.98  )-----------( 212      ( 24 Dec 2024 06:43 )             40.7       12-24    139  |  101  |  14  ----------------------------<  93  3.6   |  27  |  0.39[L]    Ca    8.7      24 Dec 2024 06:43  Phos  2.2     12-24  Mg     2.0     12-24

## 2024-12-24 NOTE — CONSULT NOTE ADULT - ASSESSMENT
63F hx of scleroderma, asthma (with prior intubations), hypothyroidism, abdominal surgeries and SBO. Patient at cardiologist 10 days ago, in-office TTE revealed a pericardial effusion and was sent to ED but pt did not present due to recent passing of her son.     12/20 pt returned to ED with worsening symptoms CP, SOB. Pt tachycardic, hypotensive. POCUS revealed mod-large pericardial effusion with tamponade.  12/20 s/p urgent pericardiocentesis with 650cc bloody fluid removed and pericardial drain placement.  12/21 RRT for afib RVR and agitation.  12/23 RRT for afib w/ RVR    Current out- patient pain regimen: MS ER 30 BID, takes Oxy IR 20 QID but it is ordered TID (pt endorses having many pills left over from the past and has been taking these), Baclofen 10 mg BID.  Out Patient Pain Management provider: Dr. Guru Crowe    Pain Score: 8/10    Pt tearful describing unbearable pain. Lengthy discussion w/ her and her aide/friend. She takes MS ER 30 BID and Oxy IR 20 QID. Her pain doc Rxs the Oxy TID, she has had extra pills from the past and is running out (2 weeks left for the extra dose). She is aware if he knows she's been taking the extra dose a day he may fire her from the practice for breaking contract. Both instructed on tapering down to TID after discharge to avoid withdrawal. Her pain is all over MSK/joint pain 2/2 scleroderma and neuropathic pain. Has failed gabapentinoids and antidepressants for the neuropathic pain. All of her pain is chronic but she states it is much more severe now, could be worsened 2/2 present clinical condition.    Plan discussed with primary team:  Increase MS ER 30 mg to TID x 2 days, will reassess for continuation or taper (CrCl = 150.3)  Continue Oxycodone 20 mg Q6h (PRN but pt takes scheduled so continue as ordered for now)  Continue Baclofen 10 mg BID  Continue Ibuprofen per primary team    Lidoderm to soles of feet while in bed  Warm/cool packs for comfort  Continue Bowel Regimen  PT per primary team  Monitor for sedation, respiratory depression  Consider GAP consult (Geriatric and Palliative Care) for this patient with advanced illness  Follow up with Dr. Crowe for continued pain management after discharge  Narcan Rescue Kit on discharge (Naloxone 4 mg/0.1 ml nasal spray - 1 spray q 2-3 minutes alternating between nostrils)    Time spent on encounter:    75    Minutes    [facility] Pain [facility]  [phone]

## 2024-12-24 NOTE — PROGRESS NOTE ADULT - ASSESSMENT
63F PMH scleroderma, asthma with prior intubations admitted with cardiac tamponade. Initially presented to outpt cardiologist Dr. Aparicio 10 days PTA for general fatigue, pleuritic chest pain, and SOB. in-office TTE was notable for pericardial effusion. Patient deferred presentation to ED at that time. Presented with tachycardia and hypotension, TTE w/ evidence of caridac tamponade. Now s/p pericardiocentesis (12/20) with 650cc bloody fluid removed in the lab. No evidence of malignant effusion on initial evaluation of pericardial fluid, cultures/cytology pending.     Patient overnight 12.21 with afib RVR and agitation. Had RRT called again on 12/23 for afib w/ RVR. Rhythm is presently NSR w/ frequent APCs    #Pericardial Effusion  - S/p pericardiocentesis on 12/20 w/ 650 ccs of bloody output in the lab  - Monitor drain output, plan to remove when <50 ccs/24 hour  - Will need limited TTE once drain output decreases prior to removal  - f/u rheum recs  - continue ibuprofen 600mg PO TID, colchicine 0.6mg PO QDay   - Recommend age appropriate cancer screening    #Afib w/ RVR  - Seemingly new i/s/o pericardial drain placement  - Start metop tartrate n25mg BID  - plan for eventual A/C once drain is removed    ***recommendations are not final until attending attestation***

## 2024-12-24 NOTE — PROGRESS NOTE ADULT - PROBLEM SELECTOR PLAN 6
- abdominal XR negative for obstruction  - plan to give relistor 12/24  - c/w bowel regimen   - monitor for BM

## 2024-12-24 NOTE — PROGRESS NOTE ADULT - ATTENDING COMMENTS
64yo F pertinent PMHx scleroderma presented from cardiology office for pleuritic chest pain, dyspnea, and fatigue x 10d and found to have pericardial effusion with cardiac tamponade on outpatient TTE. Patient deferred admission and ultimately presented with cardiogenic shock 2/2 tamponade s/p pericardiocentesis with 650cc sanguinous fluid removed and drain left in place. She reports having fever/chills and sick contacts in the days prior to her outpatient cardiology appt.     Though scleroderma is on the differential for her effusion, it is rare and recommend completing evaluation for pericarditis and pericardial effusion including obtaining ESR, CRP, full RVP, age appropriate cancer screening, and following up on pericardial fluid studies. Seems likely that this may ultimately be viral in etiology given her symptoms prior to presentation. Pericardial drain still with significant output at this time. I flushed the drain myself and continues to drain without any significant resistance. Continue with colchicine and NSAIDs and add PPI given high dose NSAIDs and ultimately therapeutic anticoagulation for afib.     For her paroxysmal atrial fibrillation, recommend continue with rate control though can consider rhythm control options if this becomes a recurrence issues with symptomatic RVR (RRT yesterday). Hopeful the drain will come out in the coming days which should help with reducing afib burden. Will eventually need therapeutic AC for atrial fibrillation within 1-2 days post drain removal. Remainder of care as per note above.    Steven Chaudhari MD  [facility] Cardiology 62yo F pertinent PMHx scleroderma presented from cardiology office for pleuritic chest pain, dyspnea, and fatigue x 10d and found to have pericardial effusion with cardiac tamponade on outpatient TTE. Patient deferred admission and ultimately presented with cardiogenic shock 2/2 tamponade s/p pericardiocentesis with 650cc sanguinous fluid removed and drain left in place. She reports having fever/chills and sick contacts in the days prior to her outpatient cardiology appt.     Though scleroderma is on the differential for her effusion, it is rare and recommend completing evaluation for pericarditis and pericardial effusion including obtaining ESR, CRP, full RVP, age appropriate cancer screening, and following up on pericardial fluid studies. Seems likely that this may ultimately be viral in etiology given her symptoms prior to presentation. Pericardial drain still with significant output at this time. I flushed the drain myself and continues to drain without any significant resistance. Continue with colchicine and NSAIDs and add PPI given high dose NSAIDs and ultimately therapeutic anticoagulation for afib.     For her paroxysmal atrial fibrillation, recommend continue with rate control though can consider rhythm control options if this becomes a recurrence issues with symptomatic RVR (RRT yesterday). Hopeful the drain will come out in the coming days which should help with reducing afib burden. Her CHADs-VASc is 2 which is indeterminate in females for the necessity of anticoagulation for stroke prevention and will require a further conversation especially in light of her requiring high dose NSAIDs for pericarditis. Remainder of care as per note above.    Steven Chaudhari MD  [facility] Cardiology 64yo F pertinent PMHx scleroderma presented from cardiology office for pleuritic chest pain, dyspnea, and fatigue x 10d and found to have pericardial effusion with cardiac tamponade on outpatient TTE. Patient deferred admission and ultimately presented with cardiogenic shock 2/2 tamponade s/p pericardiocentesis with 650cc sanguinous fluid removed and drain left in place. She reports having fever/chills and sick contacts in the days prior to her outpatient cardiology appt.     Though scleroderma is on the differential for her effusion, it is rare and recommend completing evaluation for pericarditis and pericardial effusion including obtaining ESR, CRP, full RVP, age appropriate cancer screening, and following up on pericardial fluid studies. Seems likely that this may ultimately be viral in etiology given her symptoms prior to presentation. Pericardial drain still with significant output at this time and will need to keep in place till output <50cc and limited TTE shows no significant reaccumulation. I flushed the drain myself and continues to drain without any significant resistance. Continue with colchicine and NSAIDs and add PPI given high dose NSAIDs and ultimately therapeutic anticoagulation for afib.     For her paroxysmal atrial fibrillation, recommend continue with rate control though can consider rhythm control options if this becomes a recurrence issues with symptomatic RVR (RRT yesterday). Hopeful the drain will come out in the coming days which should help with reducing afib burden. Her CHADs-VASc is 2 which is indeterminate in females for the necessity of anticoagulation for stroke prevention and will require a further conversation especially in light of her requiring high dose NSAIDs for pericarditis. Remainder of care as per note above.    Steven Chaudhari MD  [facility] Cardiology

## 2024-12-24 NOTE — PROGRESS NOTE ADULT - SUBJECTIVE AND OBJECTIVE BOX
no new complaints.     GENERAL: No fevers, no chills.  EYES: No blurry vision,  No photophobia  ENT: No sore throat.  No dysphagia  Cardiovascular: No chest pain, palpitations, orthopnea  Pulmonary: No cough, no wheezing. No shortness of breath  Gastrointestinal: No abdominal pain, no diarrhea, no constipation  Dermatology:  No rashes.  Neuro: No Headache.  No vertigo.  No dizziness.  Psych: No anxiety, no depression.  Denies suicidal thoughts.    MEDICATIONS  (STANDING):  albuterol/ipratropium for Nebulization 3 milliLiter(s) Nebulizer every 6 hours  baclofen 10 milliGRAM(s) Oral <User Schedule>  colchicine 0.6 milliGRAM(s) Oral daily  diphenhydrAMINE Injectable 50 milliGRAM(s) IV Push once  heparin   Injectable 5000 Unit(s) SubCutaneous every 8 hours  ibuprofen  Tablet. 600 milliGRAM(s) Oral every 8 hours  levothyroxine 112 MICROGram(s) Oral daily  methylnaltrexone Injectable 12 milliGRAM(s) SubCutaneous once  metoprolol tartrate 25 milliGRAM(s) Oral every 12 hours  morphine ER Tablet 30 milliGRAM(s) Oral <User Schedule>  nicotine - 21 mG/24Hr(s) Patch 1 Patch Transdermal every 24 hours  oxyCODONE    IR 20 milliGRAM(s) Oral <User Schedule>  polyethylene glycol 3350 17 Gram(s) Oral two times a day  potassium phosphate IVPB 15 milliMole(s) IV Intermittent once  senna 2 Tablet(s) Oral at bedtime    MEDICATIONS  (PRN):  aluminum hydroxide/magnesium hydroxide/simethicone Suspension 30 milliLiter(s) Oral every 4 hours PRN Dyspepsia  LORazepam     Tablet 1 milliGRAM(s) Oral two times a day PRN Anxiety    Vital Signs Last 24 Hrs  T(C): 36.6 (24 Dec 2024 11:51), Max: 36.7 (23 Dec 2024 21:52)  T(F): 97.9 (24 Dec 2024 11:51), Max: 98 (23 Dec 2024 21:52)  HR: 66 (24 Dec 2024 11:51) (66 - 89)  BP: 99/61 (24 Dec 2024 11:51) (99/61 - 126/63)  BP(mean): --  RR: 18 (24 Dec 2024 11:51) (17 - 18)  SpO2: 94% (24 Dec 2024 11:51) (91% - 95%)    Parameters below as of 24 Dec 2024 11:51  Patient On (Oxygen Delivery Method): nasal cannula    PHYSICAL EXAM:  GENERAL APPEARANCE: Uncomfortable appearing female resting in bed   HEENT:  PERRL, EOMI. hearing grossly intact.  CARDIAC: Normal S1 and S2. no mrg. RRR  LUNGS: No wheezing noted today, lungs clear to auscultation b/l  ABDOMEN: tenderness on palpation diffusely   MUSCULOSKELETAL: No swelling/reddness over extremities  EXTREMITIES: No edema. Peripheral pulses intact.   NEUROLOGICAL: Non focal.  PSYCHIATRIC: AOx3     .  LABS:                         12.8   5.98  )-----------( 212      ( 24 Dec 2024 06:43 )             40.7     12-24    139  |  101  |  14  ----------------------------<  93  3.6   |  27  |  0.39[L]    Ca    8.7      24 Dec 2024 06:43  Phos  2.2     12-24  Mg     2.0     12-24        Urinalysis Basic - ( 24 Dec 2024 06:43 )    Color: x / Appearance: x / SG: x / pH: x  Gluc: 93 mg/dL / Ketone: x  / Bili: x / Urobili: x   Blood: x / Protein: x / Nitrite: x   Leuk Esterase: x / RBC: x / WBC x   Sq Epi: x / Non Sq Epi: x / Bacteria: x            RADIOLOGY, EKG & ADDITIONAL TESTS: Reviewed.

## 2024-12-25 LAB
ANION GAP SERPL CALC-SCNC: 11 MMOL/L — SIGNIFICANT CHANGE UP (ref 5–17)
BUN SERPL-MCNC: 16 MG/DL — SIGNIFICANT CHANGE UP (ref 7–23)
CALCIUM SERPL-MCNC: 8.6 MG/DL — SIGNIFICANT CHANGE UP (ref 8.4–10.5)
CHLORIDE SERPL-SCNC: 103 MMOL/L — SIGNIFICANT CHANGE UP (ref 96–108)
CO2 SERPL-SCNC: 27 MMOL/L — SIGNIFICANT CHANGE UP (ref 22–31)
CREAT SERPL-MCNC: 0.47 MG/DL — LOW (ref 0.5–1.3)
CULTURE RESULTS: NO GROWTH — SIGNIFICANT CHANGE UP
CV B1 AB TITR FLD: HIGH
CV B2 AB TITR FLD: HIGH
CV B3 AB TITR FLD: HIGH
CV B4 AB TITR FLD: HIGH
CV B5 AB TITR FLD: HIGH
CV B6 AB TITR FLD: HIGH
EGFR: 106 ML/MIN/1.73M2 — SIGNIFICANT CHANGE UP
GLUCOSE SERPL-MCNC: 84 MG/DL — SIGNIFICANT CHANGE UP (ref 70–99)
PHOSPHATE SERPL-MCNC: 2.1 MG/DL — LOW (ref 2.5–4.5)
POTASSIUM SERPL-MCNC: 3.5 MMOL/L — SIGNIFICANT CHANGE UP (ref 3.5–5.3)
POTASSIUM SERPL-SCNC: 3.5 MMOL/L — SIGNIFICANT CHANGE UP (ref 3.5–5.3)
SODIUM SERPL-SCNC: 141 MMOL/L — SIGNIFICANT CHANGE UP (ref 135–145)
SPECIMEN SOURCE: SIGNIFICANT CHANGE UP

## 2024-12-25 PROCEDURE — 99233 SBSQ HOSP IP/OBS HIGH 50: CPT

## 2024-12-25 RX ORDER — LIDOCAINE 50 MG/G
1 OINTMENT TOPICAL DAILY
Refills: 0 | Status: DISCONTINUED | OUTPATIENT
Start: 2024-12-25 | End: 2024-12-31

## 2024-12-25 RX ADMIN — IPRATROPIUM BROMIDE AND ALBUTEROL SULFATE 3 MILLILITER(S): .5; 2.5 SOLUTION RESPIRATORY (INHALATION) at 11:34

## 2024-12-25 RX ADMIN — HEPARIN SODIUM 5000 UNIT(S): 1000 INJECTION, SOLUTION INTRAVENOUS; SUBCUTANEOUS at 13:46

## 2024-12-25 RX ADMIN — HEPARIN SODIUM 5000 UNIT(S): 1000 INJECTION, SOLUTION INTRAVENOUS; SUBCUTANEOUS at 06:18

## 2024-12-25 RX ADMIN — Medication 30 MILLIGRAM(S): at 21:38

## 2024-12-25 RX ADMIN — Medication 20 MILLIGRAM(S): at 11:34

## 2024-12-25 RX ADMIN — Medication 17 GRAM(S): at 17:38

## 2024-12-25 RX ADMIN — NICOTINE POLACRILEX 1 PATCH: 4 LOZENGE ORAL at 13:00

## 2024-12-25 RX ADMIN — Medication 17 GRAM(S): at 06:18

## 2024-12-25 RX ADMIN — LIDOCAINE 1 PATCH: 50 OINTMENT TOPICAL at 19:00

## 2024-12-25 RX ADMIN — Medication 30 MILLIGRAM(S): at 06:47

## 2024-12-25 RX ADMIN — PANTOPRAZOLE 40 MILLIGRAM(S): 40 TABLET, DELAYED RELEASE ORAL at 11:36

## 2024-12-25 RX ADMIN — Medication 600 MILLIGRAM(S): at 06:47

## 2024-12-25 RX ADMIN — HEPARIN SODIUM 5000 UNIT(S): 1000 INJECTION, SOLUTION INTRAVENOUS; SUBCUTANEOUS at 21:39

## 2024-12-25 RX ADMIN — BACLOFEN 10 MILLIGRAM(S): 10 TABLET ORAL at 11:35

## 2024-12-25 RX ADMIN — Medication 20 MILLIGRAM(S): at 23:10

## 2024-12-25 RX ADMIN — Medication 25 MILLIGRAM(S): at 06:18

## 2024-12-25 RX ADMIN — Medication 30 MILLIGRAM(S): at 06:17

## 2024-12-25 RX ADMIN — COLCHICINE 0.6 MILLIGRAM(S): 0.6 CAPSULE ORAL at 11:35

## 2024-12-25 RX ADMIN — NICOTINE POLACRILEX 1 PATCH: 4 LOZENGE ORAL at 19:00

## 2024-12-25 RX ADMIN — Medication 600 MILLIGRAM(S): at 06:17

## 2024-12-25 RX ADMIN — Medication 30 MILLIGRAM(S): at 13:49

## 2024-12-25 RX ADMIN — Medication 20 MILLIGRAM(S): at 23:40

## 2024-12-25 RX ADMIN — LEVOTHYROXINE SODIUM 112 MICROGRAM(S): 175 TABLET ORAL at 06:17

## 2024-12-25 RX ADMIN — BACLOFEN 10 MILLIGRAM(S): 10 TABLET ORAL at 21:39

## 2024-12-25 RX ADMIN — Medication 20 MILLIGRAM(S): at 02:23

## 2024-12-25 RX ADMIN — LIDOCAINE 1 PATCH: 50 OINTMENT TOPICAL at 04:29

## 2024-12-25 RX ADMIN — Medication 20 MILLIGRAM(S): at 17:37

## 2024-12-25 RX ADMIN — Medication 20 MILLIGRAM(S): at 18:07

## 2024-12-25 RX ADMIN — Medication 25 MILLIGRAM(S): at 17:37

## 2024-12-25 RX ADMIN — IPRATROPIUM BROMIDE AND ALBUTEROL SULFATE 3 MILLILITER(S): .5; 2.5 SOLUTION RESPIRATORY (INHALATION) at 06:18

## 2024-12-25 RX ADMIN — Medication 30 MILLIGRAM(S): at 22:08

## 2024-12-25 RX ADMIN — Medication 20 MILLIGRAM(S): at 02:53

## 2024-12-25 RX ADMIN — Medication 20 MILLIGRAM(S): at 12:10

## 2024-12-25 RX ADMIN — NICOTINE POLACRILEX 1 PATCH: 4 LOZENGE ORAL at 07:52

## 2024-12-25 RX ADMIN — IPRATROPIUM BROMIDE AND ALBUTEROL SULFATE 3 MILLILITER(S): .5; 2.5 SOLUTION RESPIRATORY (INHALATION) at 18:39

## 2024-12-25 RX ADMIN — LIDOCAINE 1 PATCH: 50 OINTMENT TOPICAL at 11:35

## 2024-12-25 RX ADMIN — LIDOCAINE 1 PATCH: 50 OINTMENT TOPICAL at 06:00

## 2024-12-25 RX ADMIN — Medication 20 MILLIGRAM(S): at 16:59

## 2024-12-25 RX ADMIN — NICOTINE POLACRILEX 1 PATCH: 4 LOZENGE ORAL at 13:45

## 2024-12-25 NOTE — PROGRESS NOTE ADULT - SUBJECTIVE AND OBJECTIVE BOX
significant pain in back, discomfort along pericardial drain insertion site.     GENERAL: No fevers, no chills.  EYES: No blurry vision,  No photophobia  ENT: No sore throat.  No dysphagia  Cardiovascular: No chest pain, palpitations, orthopnea  Pulmonary: No cough, no wheezing. No shortness of breath  Gastrointestinal: No abdominal pain, no diarrhea, no constipation  Dermatology:  No rashes.  Neuro: No Headache.  No vertigo.  No dizziness.  Psych: No anxiety, no depression.  Denies suicidal thoughts.    MEDICATIONS  (STANDING):  albuterol/ipratropium for Nebulization 3 milliLiter(s) Nebulizer every 6 hours  baclofen 10 milliGRAM(s) Oral <User Schedule>  colchicine 0.6 milliGRAM(s) Oral daily  diphenhydrAMINE Injectable 50 milliGRAM(s) IV Push once  heparin   Injectable 5000 Unit(s) SubCutaneous every 8 hours  ibuprofen  Tablet. 600 milliGRAM(s) Oral every 8 hours  levothyroxine 112 MICROGram(s) Oral daily  lidocaine   4% Patch 1 Patch Transdermal daily  metoprolol tartrate 25 milliGRAM(s) Oral every 12 hours  morphine ER Tablet 30 milliGRAM(s) Oral <User Schedule>  morphine ER Tablet 30 milliGRAM(s) Oral <User Schedule>  nicotine - 21 mG/24Hr(s) Patch 1 Patch Transdermal every 24 hours  oxyCODONE    IR 20 milliGRAM(s) Oral <User Schedule>  pantoprazole    Tablet 40 milliGRAM(s) Oral daily  polyethylene glycol 3350 17 Gram(s) Oral two times a day  potassium phosphate IVPB 15 milliMole(s) IV Intermittent once  senna 2 Tablet(s) Oral at bedtime    MEDICATIONS  (PRN):  aluminum hydroxide/magnesium hydroxide/simethicone Suspension 30 milliLiter(s) Oral every 4 hours PRN Dyspepsia  LORazepam     Tablet 1 milliGRAM(s) Oral two times a day PRN Anxiety    Vital Signs Last 24 Hrs  T(C): 36.7 (25 Dec 2024 04:05), Max: 36.7 (24 Dec 2024 15:00)  T(F): 98 (25 Dec 2024 04:05), Max: 98.1 (24 Dec 2024 15:00)  HR: 65 (25 Dec 2024 04:05) (64 - 71)  BP: 122/75 (25 Dec 2024 04:05) (99/61 - 122/75)  BP(mean): --  RR: 18 (25 Dec 2024 04:05) (18 - 18)  SpO2: 95% (25 Dec 2024 04:05) (92% - 95%)    Parameters below as of 25 Dec 2024 04:05  Patient On (Oxygen Delivery Method): nasal cannula  O2 Flow (L/min): 4    PHYSICAL EXAM:  GENERAL APPEARANCE: Uncomfortable appearing female resting in bed   HEENT:  PERRL, EOMI. hearing grossly intact.  CARDIAC: Normal S1 and S2. no mrg. RRR  LUNGS: No wheezing noted today, lungs clear to auscultation b/l  ABDOMEN: tenderness on palpation diffusely   MUSCULOSKELETAL: No swelling/reddness over extremities  EXTREMITIES: No edema. Peripheral pulses intact.   NEUROLOGICAL: Non focal.  PSYCHIATRIC: AOx3     .  LABS:                         12.8   5.98  )-----------( 212      ( 24 Dec 2024 06:43 )             40.7     12-25    141  |  103  |  16  ----------------------------<  84  3.5   |  27  |  0.47[L]    Ca    8.6      25 Dec 2024 07:06  Phos  2.1     12-25  Mg     2.0     12-24        Urinalysis Basic - ( 25 Dec 2024 07:06 )    Color: x / Appearance: x / SG: x / pH: x  Gluc: 84 mg/dL / Ketone: x  / Bili: x / Urobili: x   Blood: x / Protein: x / Nitrite: x   Leuk Esterase: x / RBC: x / WBC x   Sq Epi: x / Non Sq Epi: x / Bacteria: x            RADIOLOGY, EKG & ADDITIONAL TESTS: Reviewed.

## 2024-12-25 NOTE — PROGRESS NOTE ADULT - PROBLEM SELECTOR PLAN 6
- resolved  - abdominal XR negative for obstruction  - s/p relistor 12/24  - c/w bowel regimen   - monitor for BM

## 2024-12-26 ENCOUNTER — RESULT REVIEW (OUTPATIENT)
Age: 64
End: 2024-12-26

## 2024-12-26 LAB
% GAMMA, URINE: 7.2 % — SIGNIFICANT CHANGE UP
ALBUMIN 24H MFR UR ELPH: 19.3 % — SIGNIFICANT CHANGE UP
ALPHA1 GLOB 24H MFR UR ELPH: 41.9 % — SIGNIFICANT CHANGE UP
ALPHA2 GLOB 24H MFR UR ELPH: 16.1 % — SIGNIFICANT CHANGE UP
B-GLOBULIN 24H MFR UR ELPH: 15.5 % — SIGNIFICANT CHANGE UP
CK MB BLD-MCNC: NEGATIVE TITER — SIGNIFICANT CHANGE UP
COLLECT DURATION TIME UR: 24 HR — SIGNIFICANT CHANGE UP
COXSACKIE TYPE A-24: NEGATIVE TITER — SIGNIFICANT CHANGE UP
CV A24 IGG TITR SER IF: NEGATIVE TITER — SIGNIFICANT CHANGE UP
CV A7 AB SER-ACNC: NEGATIVE TITER — SIGNIFICANT CHANGE UP
CV A9 AB TITR FLD: NEGATIVE TITER — SIGNIFICANT CHANGE UP
EBV EA AB SER IA-ACNC: 9.07 U/ML — HIGH
EBV EA AB TITR SER IF: POSITIVE
EBV EA IGG SER-ACNC: ABNORMAL
EBV NA IGG SER IA-ACNC: >600 U/ML — HIGH
EBV PATRN SPEC IB-IMP: SIGNIFICANT CHANGE UP
EBV VCA IGG AVIDITY SER QL IA: POSITIVE
EBV VCA IGM SER IA-ACNC: 708 U/ML — HIGH
EBV VCA IGM SER IA-ACNC: <10 U/ML — SIGNIFICANT CHANGE UP
EBV VCA IGM TITR FLD: NEGATIVE — SIGNIFICANT CHANGE UP
INTERPRETATION 24H UR IFE-IMP: SIGNIFICANT CHANGE UP
M PROTEIN 24H UR ELPH-MRATE: 0 % — SIGNIFICANT CHANGE UP
M PROTEIN 24H UR ELPH-MRATE: 0 MG/24HR — SIGNIFICANT CHANGE UP (ref 0–0)
M PROTEIN 24H UR ELPH-MRATE: 0 MG/DL — SIGNIFICANT CHANGE UP
PROT ?TM UR-MCNC: 56 MG/DL — HIGH (ref 0–12)
PROT PATTERN 24H UR ELPH-IMP: SIGNIFICANT CHANGE UP
PROTEIN QUANT CALC, URINE: 112 MG/24 H — HIGH (ref 50–100)
RNAP III AB SER-ACNC: 8 UNITS — SIGNIFICANT CHANGE UP (ref 0–19)
TOTAL VOLUME - 24 HOUR: 200 ML — SIGNIFICANT CHANGE UP
URINE CREATININE CALCULATION: 0.5 G/24 H — LOW (ref 0.8–1.8)

## 2024-12-26 PROCEDURE — 99232 SBSQ HOSP IP/OBS MODERATE 35: CPT

## 2024-12-26 PROCEDURE — 71045 X-RAY EXAM CHEST 1 VIEW: CPT | Mod: 26

## 2024-12-26 PROCEDURE — 93308 TTE F-UP OR LMTD: CPT | Mod: 26

## 2024-12-26 PROCEDURE — 99232 SBSQ HOSP IP/OBS MODERATE 35: CPT | Mod: GC

## 2024-12-26 PROCEDURE — 99233 SBSQ HOSP IP/OBS HIGH 50: CPT

## 2024-12-26 PROCEDURE — 99223 1ST HOSP IP/OBS HIGH 75: CPT

## 2024-12-26 RX ORDER — MORPHINE SULFATE 15 MG
30 TABLET, EXTENDED RELEASE ORAL
Refills: 0 | Status: DISCONTINUED | OUTPATIENT
Start: 2024-12-26 | End: 2024-12-27

## 2024-12-26 RX ADMIN — Medication 20 MILLIGRAM(S): at 23:39

## 2024-12-26 RX ADMIN — LIDOCAINE 1 PATCH: 50 OINTMENT TOPICAL at 22:54

## 2024-12-26 RX ADMIN — Medication 20 MILLIGRAM(S): at 09:03

## 2024-12-26 RX ADMIN — Medication 600 MILLIGRAM(S): at 22:37

## 2024-12-26 RX ADMIN — NICOTINE POLACRILEX 1 PATCH: 4 LOZENGE ORAL at 12:17

## 2024-12-26 RX ADMIN — Medication 20 MILLIGRAM(S): at 04:49

## 2024-12-26 RX ADMIN — Medication 30 MILLIGRAM(S): at 06:43

## 2024-12-26 RX ADMIN — Medication 30 MILLIGRAM(S): at 14:19

## 2024-12-26 RX ADMIN — Medication 20 MILLIGRAM(S): at 09:50

## 2024-12-26 RX ADMIN — SENNOSIDES 2 TABLET(S): 8.6 TABLET, FILM COATED ORAL at 22:37

## 2024-12-26 RX ADMIN — Medication 30 MILLIGRAM(S): at 06:37

## 2024-12-26 RX ADMIN — NICOTINE POLACRILEX 1 PATCH: 4 LOZENGE ORAL at 11:47

## 2024-12-26 RX ADMIN — HEPARIN SODIUM 5000 UNIT(S): 1000 INJECTION, SOLUTION INTRAVENOUS; SUBCUTANEOUS at 22:38

## 2024-12-26 RX ADMIN — IPRATROPIUM BROMIDE AND ALBUTEROL SULFATE 3 MILLILITER(S): .5; 2.5 SOLUTION RESPIRATORY (INHALATION) at 17:08

## 2024-12-26 RX ADMIN — NICOTINE POLACRILEX 1 PATCH: 4 LOZENGE ORAL at 16:52

## 2024-12-26 RX ADMIN — LIDOCAINE 1 PATCH: 50 OINTMENT TOPICAL at 12:14

## 2024-12-26 RX ADMIN — COLCHICINE 0.6 MILLIGRAM(S): 0.6 CAPSULE ORAL at 12:16

## 2024-12-26 RX ADMIN — Medication 20 MILLIGRAM(S): at 22:39

## 2024-12-26 RX ADMIN — Medication 25 MILLIGRAM(S): at 06:08

## 2024-12-26 RX ADMIN — LIDOCAINE 1 PATCH: 50 OINTMENT TOPICAL at 00:11

## 2024-12-26 RX ADMIN — PANTOPRAZOLE 40 MILLIGRAM(S): 40 TABLET, DELAYED RELEASE ORAL at 12:15

## 2024-12-26 RX ADMIN — Medication 20 MILLIGRAM(S): at 15:10

## 2024-12-26 RX ADMIN — Medication 30 MILLIGRAM(S): at 06:07

## 2024-12-26 RX ADMIN — BACLOFEN 10 MILLIGRAM(S): 10 TABLET ORAL at 22:37

## 2024-12-26 RX ADMIN — BACLOFEN 10 MILLIGRAM(S): 10 TABLET ORAL at 09:04

## 2024-12-26 RX ADMIN — Medication 20 MILLIGRAM(S): at 16:50

## 2024-12-26 RX ADMIN — Medication 600 MILLIGRAM(S): at 23:37

## 2024-12-26 RX ADMIN — LEVOTHYROXINE SODIUM 112 MICROGRAM(S): 175 TABLET ORAL at 06:08

## 2024-12-26 RX ADMIN — Medication 25 MILLIGRAM(S): at 17:07

## 2024-12-26 RX ADMIN — Medication 20 MILLIGRAM(S): at 04:19

## 2024-12-26 RX ADMIN — HEPARIN SODIUM 5000 UNIT(S): 1000 INJECTION, SOLUTION INTRAVENOUS; SUBCUTANEOUS at 06:08

## 2024-12-26 RX ADMIN — HEPARIN SODIUM 5000 UNIT(S): 1000 INJECTION, SOLUTION INTRAVENOUS; SUBCUTANEOUS at 15:10

## 2024-12-26 NOTE — CHART NOTE - NSCHARTNOTEFT_GEN_A_CORE
Pericardial drain output noted at <50 ccs in the preceding 24 hour period. Pericardial drain was removed at bedside under sterile technique, sterile dressing applied

## 2024-12-26 NOTE — PROGRESS NOTE ADULT - ASSESSMENT
63F hx of scleroderma, asthma (with prior intubations), hypothyroidism, abdominal surgeries and SBO. Patient at cardiologist 10 days ago, in-office TTE revealed a pericardial effusion and was sent to ED but pt did not present due to recent passing of her son.   12/20 pt returned to ED with worsening symptoms CP, SOB. Pt tachycardic, hypotensive. POCUS revealed mod-large pericardial effusion with tamponade.  12/20 s/p urgent pericardiocentesis with 650cc bloody fluid removed and pericardial drain placement.  12/21 RRT for afib RVR and agitation.  12/23 RRT for afib w/ RVR    Current out- patient pain regimen: MS ER 30 BID, takes Oxy IR 20 QID but it is ordered TID (pt endorses having many pills left over from the past and has been taking these), Baclofen 10 mg BID.  Out Patient Pain Management provider: Dr. Guru Crowe    Pain Score: 6/10 down to 1/10.  States pain is better controlled with increase in MS Contin.    Continue MS ER 30 mg Q 8 hours.  Continue Oxycodone 20 mg Q6h (PRN but pt takes scheduled so continue as ordered).  Continue Baclofen 10 mg BID.  Continue Ibuprofen per primary team.  Lidoderm to soles of feet while in bed.    Monitor for sedation, respiratory depression.  Continue Bowel Regimen.  PT/ OOB per primary team.    Follow up with Dr. Crowe for continued pain management after discharge; decrease MS Contin to 30 mg Q 12 hours.  Narcan Rescue Kit on discharge (Naloxone 4 mg/0.1 ml nasal spray - 1 spray q 2-3 minutes alternating between nostrils).      Time spent on encounter:  35 minutes      [facility]  [phone]

## 2024-12-26 NOTE — PROGRESS NOTE ADULT - ASSESSMENT
63F PMH scleroderma, asthma with prior intubations admitted with cardiac tamponade. Initially presented to outpt cardiologist Dr. Aparicio 10 days PTA for general fatigue, pleuritic chest pain, and SOB. in-office TTE was notable for pericardial effusion. Patient deferred presentation to ED at that time. Presented with tachycardia and hypotension, TTE w/ evidence of caridac tamponade. Now s/p pericardiocentesis (12/20) with 650cc bloody fluid removed in the lab. No evidence of malignant effusion on initial evaluation of pericardial fluid, cultures/cytology pending.     Patient overnight 12.21 with afib RVR and agitation. Had RRT called again on 12/23 for afib w/ RVR. Rhythm is presently NSR w/ frequent APCs    #Pericardial Effusion  - S/p pericardiocentesis on 12/20 w/ 650 ccs of bloody output in the lab  - Monitor drain output, plan to remove when <50 ccs/24 hour  - Drain OP noted at 45 cc's/25 hours  - Please obtain limited TTE, if no reaccumulation of fluid will plan for drain removal  - f/u rheum recs  - continue ibuprofen 600mg PO TID, colchicine 0.6mg PO QDay   - Recommend age appropriate cancer screening    #Afib w/ RVR  - Seemingly new i/s/o pericardial drain placement  - C/w metop tartrate 25mg BID  - plan for eventual A/C once drain is removed    ***recommendations are not final until attending attestation*** 63F PMH scleroderma, asthma with prior intubations admitted with cardiac tamponade. Initially presented to outpt cardiologist Dr. Aparicio 10 days PTA for general fatigue, pleuritic chest pain, and SOB. in-office TTE was notable for pericardial effusion. Patient deferred presentation to ED at that time. Presented with tachycardia and hypotension, TTE w/ evidence of caridac tamponade. Now s/p pericardiocentesis (12/20) with 650cc bloody fluid removed in the lab. No evidence of malignant effusion on initial evaluation of pericardial fluid, cultures/cytology pending.     Patient overnight 12.21 with afib RVR and agitation. Had RRT called again on 12/23 for afib w/ RVR. Rhythm is presently NSR w/ frequent APCs    #Pericardial Effusion  - S/p pericardiocentesis on 12/20 w/ 650 ccs of bloody output in the lab  - Monitor drain output, plan to remove when <50 ccs/24 hour  - Drain OP noted at 45 cc's/25 hours  - Please obtain limited TTE, if no reaccumulation of fluid will plan for drain removal  - f/u rheum recs  - continue ibuprofen 600mg PO TID, colchicine 0.6mg PO QDay   - Recommend age appropriate cancer screening    #Afib w/ RVR  - Seemingly new i/s/o pericardial drain placement  - C/w metop tartrate 25mg BID  - plan for eventual A/C once drain is removed    ***recommendations are not final until attending attestation***    This patient was seen and examined personally by me and the plan was discussed with the fellow and/or resident above. Amendments were made as necessary to the above. Agree with the excellent note and plan above. Limited TTE pend.    30 minutes were spent on this encounter for extensive review of medical record details including labs and/or imaging studies and/or adjacent care team and consultant records, as well as review and reconciliation of current medications. Time was spent on obtaining a history, performing physical examination of patient, and answering patient and/or family questions regarding plan of care. Time was also spent discussing plan of care with patient’s other care team members including primary and consulting teams. Time also was spent on documentation of this encounter into the EHR.    Kyle Stanford MD, MPhil, [facility]  Cardiologist, [facility]  ; [facility] and [facility]  Email: [email or web address] on-service contact/call information, go to [email or web address] and use [email or web address]" to login.  Outpatient Cardiology appointments, call [phone] to arrange with a colleague; I do not have outpatient Cardiology clinic.

## 2024-12-26 NOTE — PROGRESS NOTE ADULT - SUBJECTIVE AND OBJECTIVE BOX
Chief Complaint:  Patient is a 63y old  Female who presents with a chief complaint of dyspnea (24 Dec 2024 12:12)    HPI:  63F hx of scleroderma, asthma (with prior intubations), hypothyroidism, abdominal surgeries and SBO. Patient at cardiologist 10 days ago, in-office TTE revealed a pericardial effusion and was sent to ED but pt did not present due to recent passing of her son.   12/20 pt returned to ED with worsening symptoms CP, SOB. Pt tachycardic, hypotensive. POCUS revealed mod-large pericardial effusion with tamponade.  12/20 s/p urgent pericardiocentesis with 650cc bloody fluid removed and pericardial drain placement.  12/21 RRT for afib RVR and agitation.  12/23 RRT for afib w/ RVR    Current out- patient pain regimen: MS ER 30 BID, takes Oxy IR 20 QID but it is ordered TID (pt endorses having many pills left over from the past and has been taking these), Baclofen 10 mg BID.  Out Patient Pain Management provider: Dr. Guru Crowe  [facility] Prescription Monitoring Program: Reference #[id number]    Pain Score: 6/10 down to 1/10  Pain better controlled with increase in MS Contin.      Current in-patient pain therapy:  MEDICATIONS  (STANDING):  albuterol/ipratropium for Nebulization 3 milliLiter(s) Nebulizer every 6 hours  baclofen 10 milliGRAM(s) Oral <User Schedule>  colchicine 0.6 milliGRAM(s) Oral daily  diphenhydrAMINE Injectable 50 milliGRAM(s) IV Push once  heparin   Injectable 5000 Unit(s) SubCutaneous every 8 hours  ibuprofen  Tablet. 600 milliGRAM(s) Oral every 8 hours  levothyroxine 112 MICROGram(s) Oral daily  lidocaine   4% Patch 1 Patch Transdermal daily  lidocaine   4% Patch 1 Patch Transdermal daily  metoprolol tartrate 25 milliGRAM(s) Oral every 12 hours  morphine ER Tablet 30 milliGRAM(s) Oral <User Schedule>  nicotine - 21 mG/24Hr(s) Patch 1 Patch Transdermal every 24 hours  oxyCODONE    IR 20 milliGRAM(s) Oral <User Schedule>  pantoprazole    Tablet 40 milliGRAM(s) Oral daily  polyethylene glycol 3350 17 Gram(s) Oral two times a day  potassium phosphate IVPB 15 milliMole(s) IV Intermittent once  senna 2 Tablet(s) Oral at bedtime    MEDICATIONS  (PRN):  aluminum hydroxide/magnesium hydroxide/simethicone Suspension 30 milliLiter(s) Oral every 4 hours PRN Dyspepsia  LORazepam     Tablet 1 milliGRAM(s) Oral two times a day PRN Anxiety      PHYSICAL EXAM  GENERAL: Seen at bedside, minimal distress; speech clear and fluent  NEURO: Alert & Oriented X3, Good concentration; Follows commands   GI: Abdomen non tender non distended; + BM; appetite good  :  External female urinary device LCWS with yellow urine  EXTREMITIES: BALDERAS; no cyanosis, clubbing or edema; chronic decreased ROM and dexterity 2/2 scleroderma; ambulates w/ walker   PSYCH: affect normal; good eye contact; appears anxious and depressed  T(C): 36.7 (12-26-24 @ 14:01)  HR: 61 (12-26-24 @ 14:01)  BP: 112/62 (12-26-24 @ 14:01)  RR: 17 (12-26-24 @ 14:01)  SpO2: 95% (12-26-24 @ 14:01)      Pertinent labs, radiology, additional studies:  12-25    141  |  103  |  16  ----------------------------<  84  3.5   |  27  |  0.47[L]    Ca    8.6      25 Dec 2024 07:06  Phos  2.1     12-25    < from: CT Chest No Cont (12.23.24 @ 12:23) >  IMPRESSION:  Pulmonary nodules measuring up to 4 mm. According to Fleischner Society   guideline for management of incidental pulmonary nodules, follow-up CT   chest in 12 months is recommended in a high risk patient.  Small bibasilar pleural effusions with atelectasis of lower lobes.  Indeterminant expansile sclerotic lesion in the sternum, which is new   since 2012.

## 2024-12-26 NOTE — CONSULT NOTE ADULT - ASSESSMENT
63 year old female with hx of scleroderma, asthma (with prior intubations), hypothyroidism, presenting with chest pain and shortness of breath.     Patient found to have tamponade likely serositis 2/2 to Cox[place] B Ct with ILD, non specific LAD and nodules. Pulmonary consulted for effusion.     # pleural Effusions  # serositis  # scleroderma  # acute hypoxemic respiratory failure  # Mediastinal LAD  # Abnormal CT chest  - Effusions were small on CT, pocus today with small left effusions and trace right. No chest xray. Size not determined by TTE.  - Likely same process which is occurring in the pericardial most likely 2/2 to viral infection leading to serositis. Stilling having chest pain.   - On Colchicine and NSAID No steroids per rheum 2/2 to scleroderma  - Patient also with hx of smoking and asthma. Can start advair BID. C/w nebs. Will need pulm and PFT on discharge.  - LAD and GGo nonspecific, needs repeat CT in 12 months, likely 2/2 to acute infection. No signs of CTrILD  - Wean o2 as tolerated, OOB to chair. incentive kiran.   - Please order CXR. currently effusion is too small to tap  - F/U quant. Reported may have been on immunosuppression  - Pulmonary will follow.

## 2024-12-26 NOTE — CONSULT NOTE ADULT - SUBJECTIVE AND OBJECTIVE BOX
CHIEF COMPLAINT: SOB    HPI:    63 year old female with hx of scleroderma, asthma (with prior intubations), hypothyroidism, presenting with chest pain and shortness of breath.     The patient was found to have outpatient pericardial effusions, but did not come to the ED as her son passed away recently. No hx of ILD, PHTN from scleroderma which was diagnosed 18 years ago with CREST like symptoms.     Has a lung doctor but does not recall his name or know what meds she is on at home.    Patient in the ED was found to have tampanade, s/p pericardial drain. CT scan without ILD, some LAD and small nodules. Coxsackie B positive. Also with non-specific LAD in the chest as well as small b/l effusion. Still having symptoms of serositis. CT 3 days ago noted for small b/l effusion.     Pulmonary consulted for pleural effusion.     PAST MEDICAL & SURGICAL HISTORY:  Scleroderma      Asthma      High cholesterol  Unable to take STATINS for high cholesterol due to genetic disposition      Scleroderma      Shingles      Hypothyroidism      GERD (gastroesophageal reflux disease)      Smoker      Multiple drug allergies      S/P small bowel resection      History of myomectomy      History of ovarian cystectomy      Fibroid      Bowel obstruction  multiple surgeries for bowel obstruction      H/O ovarian cystectomy      S/P pericardiocentesis          FAMILY HISTORY:      SOCIAL HISTORY:  Smoking: [ x] Former Smoker 15 year pack history, recently restarted after son .   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      Allergies    ABIDA dye (Short breath; Hives)  Xolair (Unknown)  penicillin (Anaphylaxis; Hives; Other)  Originally Entered as [Unknown] reaction to [BEE STINGS] (Unknown)  statins (Unknown)  penicillin (Unknown)  IV Contrast (Unknown)  iodine (Anaphylaxis)    Intolerances        HOME MEDICATIONS:  Home Medications:  albuterol 2.5 mg/3 mL (0.083%) inhalation solution: 2 puff(s) inhaled every 6 hours, As Needed (21 Dec 2024 14:22)  morphine 30 mg oral capsule, extended release:  orally 3 times a day (21 Dec 2024 14:22)  oxyCODONE 20 mg oral tablet: 1 tab(s) orally every 6 hours as needed for  severe pain (20 Dec 2024 22:48)  Synthroid 112 mcg (0.112 mg) oral tablet: 1 tab(s) orally once a day (21 Dec 2024 14:22)  Ventolin 90 mcg/inh inhalation aerosol: 2 puff(s) inhaled every 6 hours, As Needed (21 Dec 2024 14:22)      REVIEW OF SYSTEMS:  Constitutional: [ x negative [ - fevers [-] chills [ - weight loss [-] weight gain  HEENT: [ x negative [ - dry eyes [-] eye irritation [-] postnasal drip [-] nasal congestion  CV: [ x negative  [ - chest pain [-] orthopnea [-] palpitations [ - murmur  Resp: [x] negative [ ] cough [ ] shortness of breath [ ] dyspnea [ ] wheezing [ ] sputum [ ] hemoptysis  GI: [x] negative [ ] nausea [ ] vomiting [ ] diarrhea [ ] constipation [ ] abd pain [ ] dysphagia   : [ x negative [ ] dysuria [ ] nocturia [ ] hematuria [ ] increased urinary frequency  Musculoskeletal: [ x negative [ ] back pain [ ] myalgias [ ] arthralgias [ ] fracture  Skin: [ x negative [ ] rash [ ] itch  Neurological: [ x negative [ ] headache [ ] dizziness [ ] syncope [ ] weakness [ ] numbness  Psychiatric: [x] negative [ ] anxiety [ ] depression  Endocrine: [ x negative [ ] diabetes [ ] thyroid problem  Hematologic/Lymphatic: [x] negative [ ] anemia [ ] bleeding problem  Allergic/Immunologic: [ x negative [ ] itchy eyes [ ] nasal discharge [ ] hives [ ] angioedema  [ x All other systems negative  [ ] Unable to assess ROS because ________    OBJECTIVE:  ICU Vital Signs Last 24 Hrs  T(C): 36.7 (26 Dec 2024 14:01), Max: 36.9 (25 Dec 2024 20:29)  T(F): 98 (26 Dec 2024 14:01), Max: 98.4 (25 Dec 2024 20:29)  HR: 61 (26 Dec 2024 14:) (61 - 67)  BP: 112/62 (26 Dec 2024 14:) (112/62 - 134/69)  BP(mean): --  ABP: --  ABP(mean): --  RR: 17 (26 Dec 2024 14:) (17 - 18)  SpO2: 95% (26 Dec 2024 14:) (92% - 96%)    O2 Parameters below as of 26 Dec 2024 14:01  Patient On (Oxygen Delivery Method): nasal cannula  O2 Flow (L/min): 4            - @ 07:01  -   @ 07:00  --------------------------------------------------------  IN: 720 mL / OUT: 945 mL / NET: -225 mL      CAPILLARY BLOOD GLUCOSE    PHYSICAL EXAM:    Constitutional: well-developed; well-groomed; well-nourished; no distress,  Eyes: PERRL; EOMI  ENMT: Normal oropharnxy,   Neck:  Supple; no JVD  Respiratory: airway patent; breath sounds equal; good air movement, no wheezing, no crackles, no rhonchi. no increase in WOB  Cardiovascular: regular rate and rhythm  no rub , no murmur, no gallops.   Gastrointestinal: soft; no distention, normal BS,  Extremities: no clubbing; no cyanosis; no pedal edema,   Neurological: alert and oriented x 3; responds to pain; responds to verbal commands; sensation intact: CN nerves grossly intact.   Skin: warm and dry; thickend skin  Psych Calm, no SI/HI        LINES:     HOSPITAL MEDICATIONS:  Standing Meds:  albuterol/ipratropium for Nebulization 3 milliLiter(s) Nebulizer every 6 hours  baclofen 10 milliGRAM(s) Oral <User Schedule>  colchicine 0.6 milliGRAM(s) Oral daily  diphenhydrAMINE Injectable 50 milliGRAM(s) IV Push once  heparin   Injectable 5000 Unit(s) SubCutaneous every 8 hours  ibuprofen  Tablet. 600 milliGRAM(s) Oral every 8 hours  levothyroxine 112 MICROGram(s) Oral daily  lidocaine   4% Patch 1 Patch Transdermal daily  lidocaine   4% Patch 1 Patch Transdermal daily  metoprolol tartrate 25 milliGRAM(s) Oral every 12 hours  morphine ER Tablet 30 milliGRAM(s) Oral <User Schedule>  nicotine - 21 mG/24Hr(s) Patch 1 Patch Transdermal every 24 hours  oxyCODONE    IR 20 milliGRAM(s) Oral <User Schedule>  pantoprazole    Tablet 40 milliGRAM(s) Oral daily  polyethylene glycol 3350 17 Gram(s) Oral two times a day  potassium phosphate IVPB 15 milliMole(s) IV Intermittent once  senna 2 Tablet(s) Oral at bedtime      PRN Meds:  aluminum hydroxide/magnesium hydroxide/simethicone Suspension 30 milliLiter(s) Oral every 4 hours PRN  LORazepam     Tablet 1 milliGRAM(s) Oral two times a day PRN      LABS:    Hgb Trend: 12.8<--, 13.2<--, 13.4<--, 13.8<--, 13.8<--  12-25    141  |  103  |  16  ----------------------------<  84  3.5   |  27  |  0.47[L]    Ca    8.6      25 Dec 2024 07:06  Phos  2.1           Creatinine Trend: 0.47<--, 0.39<--, 0.49<--, 0.48<--, 0.48<--, 0.50<--    Urinalysis Basic - ( 25 Dec 2024 07:06 )    Color: x / Appearance: x / SG: x / pH: x  Gluc: 84 mg/dL / Ketone: x  / Bili: x / Urobili: x   Blood: x / Protein: x / Nitrite: x   Leuk Esterase: x / RBC: x / WBC x   Sq Epi: x / Non Sq Epi: x / Bacteria: x            MICROBIOLOGY:       RADIOLOGY:  [ x] Reviewed and interpreted by me    PULMONARY FUNCTION TESTS:    EKG:

## 2024-12-26 NOTE — PROGRESS NOTE ADULT - SUBJECTIVE AND OBJECTIVE BOX
AYLEEN BOSWELL  [id number]    Interval history:  - Patient still reports some dyspnea, diffuse pain is more management after adjustment of pain medication       Rheum ROS  As above      MEDICATIONS  (STANDING):  albuterol/ipratropium for Nebulization 3 milliLiter(s) Nebulizer every 6 hours  baclofen 10 milliGRAM(s) Oral <User Schedule>  heparin   Injectable 5000 Unit(s) SubCutaneous every 8 hours  levothyroxine 112 MICROGram(s) Oral daily  morphine ER Tablet 30 milliGRAM(s) Oral <User Schedule>  oxyCODONE    IR 20 milliGRAM(s) Oral <User Schedule>  polyethylene glycol 3350 17 Gram(s) Oral daily  senna 2 Tablet(s) Oral at bedtime    MEDICATIONS  (PRN):      Objective:  Vital Signs Last 24 Hrs  T(C): 36.7 (26 Dec 2024 04:30), Max: 36.9 (25 Dec 2024 20:29)  T(F): 98 (26 Dec 2024 04:30), Max: 98.4 (25 Dec 2024 20:29)  HR: 67 (26 Dec 2024 04:30) (65 - 67)  BP: 134/69 (26 Dec 2024 04:30) (115/67 - 134/69)  BP(mean): --  RR: 18 (26 Dec 2024 04:30) (18 - 18)  SpO2: 96% (26 Dec 2024 04:30) (92% - 96%)    Parameters below as of 26 Dec 2024 04:30  Patient On (Oxygen Delivery Method): nasal cannula  O2 Flow (L/min): 4      Physical Exam:  General: No acute distress  Cardiac: NSR  Lungs: CTA b/l  MSK: sclerodactyly and contractures of fingers, contracture of feet   Skin: erythematous linear appearing rash on forearms, anterior shins    LABS:  cret    12-25    141  |  103  |  16  ----------------------------<  84  3.5   |  27  |  0.47[L]    Ca    8.6      25 Dec 2024 07:06  Phos  2.1     12-25      Urinalysis Basic - ( 21 Dec 2024 05:18 )    Color: x / Appearance: x / SG: x / pH: x  Gluc: 148 mg/dL / Ketone: x  / Bili: x / Urobili: x   Blood: x / Protein: x / Nitrite: x   Leuk Esterase: x / RBC: x / WBC x   Sq Epi: x / Non Sq Epi: x / Bacteria: x      Culture - Body Fluid with Gram Stain (collected 12-20-24 @ 18:36)  Source: Body Fluid  Gram Stain (12-21-24 @ 03:01):    polymorphonuclear leukocytes seen    No organisms seen    by cytocentrifuge    Culture - Fungal, Body Fluid (collected 12-20-24 @ 18:36)  Source: Pericardial  Preliminary Report (12-21-24 @ 08:26):    Testing in progress    2012:  ANGELA 1:80   RADIOLOGY & ADDITIONAL STUDIES:  < from: TTE Limited W or WO Ultrasound Enhancing Agent (12.20.24 @ 15:53) >  CONCLUSIONS:      1. There is large circumferential pericardial effusion measuring 2.9 cm adjacent to the right atrium and right ventricle. With evidence of hemodynamic compromise (or echocardiographic evidence of cardiac tamponade) with a blood pressure of 155 mmHg/83 mmHg and a heart rate of 110 bpm.   2. Organized fibrinous vs coagulant material is seen in the pericardial space.   3. No recent prior study for comparison.   4. Findings were discussed with Calixto Alfonso MD on 12/20/2024 at 4:15 pm.    < end of copied text >     AYLEEN BOSWELL  [id number]    Interval history:  - Patient still reports some dyspnea, diffuse pain is more management after adjustment of pain medication       Rheum ROS  As above      MEDICATIONS  (STANDING):  albuterol/ipratropium for Nebulization 3 milliLiter(s) Nebulizer every 6 hours  baclofen 10 milliGRAM(s) Oral <User Schedule>  heparin   Injectable 5000 Unit(s) SubCutaneous every 8 hours  levothyroxine 112 MICROGram(s) Oral daily  morphine ER Tablet 30 milliGRAM(s) Oral <User Schedule>  oxyCODONE    IR 20 milliGRAM(s) Oral <User Schedule>  polyethylene glycol 3350 17 Gram(s) Oral daily  senna 2 Tablet(s) Oral at bedtime    MEDICATIONS  (PRN):      Objective:  Vital Signs Last 24 Hrs  T(C): 36.7 (26 Dec 2024 04:30), Max: 36.9 (25 Dec 2024 20:29)  T(F): 98 (26 Dec 2024 04:30), Max: 98.4 (25 Dec 2024 20:29)  HR: 67 (26 Dec 2024 04:30) (65 - 67)  BP: 134/69 (26 Dec 2024 04:30) (115/67 - 134/69)  RR: 18 (26 Dec 2024 04:30) (18 - 18)  SpO2: 96% (26 Dec 2024 04:30) (92% - 96%)    Parameters below as of 26 Dec 2024 04:30  Patient On (Oxygen Delivery Method): nasal cannula  O2 Flow (L/min): 4      Physical Exam:  General: No acute distress  Cardiac: NSR  Lungs: CTA b/l  MSK: sclerodactyly and contractures of fingers, contracture of feet   Skin: erythematous linear appearing rash on forearms, anterior shins    LABS:  cret    12-25    141  |  103  |  16  ----------------------------<  84  3.5   |  27  |  0.47[L]    Ca    8.6      25 Dec 2024 07:06  Phos  2.1     12-25      Urinalysis Basic - ( 21 Dec 2024 05:18 )    Color: x / Appearance: x / SG: x / pH: x  Gluc: 148 mg/dL / Ketone: x  / Bili: x / Urobili: x   Blood: x / Protein: x / Nitrite: x   Leuk Esterase: x / RBC: x / WBC x   Sq Epi: x / Non Sq Epi: x / Bacteria: x      Culture - Body Fluid with Gram Stain (collected 12-20-24 @ 18:36)  Source: Body Fluid  Gram Stain (12-21-24 @ 03:01):    polymorphonuclear leukocytes seen    No organisms seen    by cytocentrifuge    Culture - Fungal, Body Fluid (collected 12-20-24 @ 18:36)  Source: Pericardial  Preliminary Report (12-21-24 @ 08:26):    Testing in progress    2012:  ANGELA 1:80   RADIOLOGY & ADDITIONAL STUDIES:  < from: TTE Limited W or WO Ultrasound Enhancing Agent (12.20.24 @ 15:53) >  CONCLUSIONS:      1. There is large circumferential pericardial effusion measuring 2.9 cm adjacent to the right atrium and right ventricle. With evidence of hemodynamic compromise (or echocardiographic evidence of cardiac tamponade) with a blood pressure of 155 mmHg/83 mmHg and a heart rate of 110 bpm.   2. Organized fibrinous vs coagulant material is seen in the pericardial space.   3. No recent prior study for comparison.   4. Findings were discussed with Calixto Alfonso MD on 12/20/2024 at 4:15 pm.    < end of copied text >

## 2024-12-26 NOTE — PROGRESS NOTE ADULT - SUBJECTIVE AND OBJECTIVE BOX
significant pain in back, discomfort along pericardial drain insertion site.     GENERAL: No fevers, no chills.  EYES: No blurry vision,  No photophobia  ENT: No sore throat.  No dysphagia  Cardiovascular: No chest pain, palpitations, orthopnea  Pulmonary: No cough, no wheezing. No shortness of breath  Gastrointestinal: No abdominal pain, no diarrhea, no constipation  Dermatology:  No rashes.  Neuro: No Headache.  No vertigo.  No dizziness.  Psych: No anxiety, no depression.  Denies suicidal thoughts.    MEDICATIONS  (STANDING):  albuterol/ipratropium for Nebulization 3 milliLiter(s) Nebulizer every 6 hours  baclofen 10 milliGRAM(s) Oral <User Schedule>  colchicine 0.6 milliGRAM(s) Oral daily  diphenhydrAMINE Injectable 50 milliGRAM(s) IV Push once  heparin   Injectable 5000 Unit(s) SubCutaneous every 8 hours  ibuprofen  Tablet. 600 milliGRAM(s) Oral every 8 hours  levothyroxine 112 MICROGram(s) Oral daily  lidocaine   4% Patch 1 Patch Transdermal daily  lidocaine   4% Patch 1 Patch Transdermal daily  metoprolol tartrate 25 milliGRAM(s) Oral every 12 hours  morphine ER Tablet 30 milliGRAM(s) Oral <User Schedule>  nicotine - 21 mG/24Hr(s) Patch 1 Patch Transdermal every 24 hours  oxyCODONE    IR 20 milliGRAM(s) Oral <User Schedule>  pantoprazole    Tablet 40 milliGRAM(s) Oral daily  polyethylene glycol 3350 17 Gram(s) Oral two times a day  potassium phosphate IVPB 15 milliMole(s) IV Intermittent once  senna 2 Tablet(s) Oral at bedtime    MEDICATIONS  (PRN):  aluminum hydroxide/magnesium hydroxide/simethicone Suspension 30 milliLiter(s) Oral every 4 hours PRN Dyspepsia  LORazepam     Tablet 1 milliGRAM(s) Oral two times a day PRN Anxiety    Vital Signs Last 24 Hrs  T(C): 36.7 (26 Dec 2024 04:30), Max: 36.9 (25 Dec 2024 20:29)  T(F): 98 (26 Dec 2024 04:30), Max: 98.4 (25 Dec 2024 20:29)  HR: 67 (26 Dec 2024 04:30) (65 - 69)  BP: 134/69 (26 Dec 2024 04:30) (100/56 - 134/69)  BP(mean): --  RR: 18 (26 Dec 2024 04:30) (18 - 18)  SpO2: 96% (26 Dec 2024 04:30) (90% - 96%)    Parameters below as of 26 Dec 2024 04:30  Patient On (Oxygen Delivery Method): nasal cannula  O2 Flow (L/min): 4    PHYSICAL EXAM:  GENERAL APPEARANCE: Uncomfortable appearing female resting in bed   HEENT:  PERRL, EOMI. hearing grossly intact.  CARDIAC: Normal S1 and S2. no mrg. RRR  LUNGS: No wheezing noted today, lungs clear to auscultation b/l  ABDOMEN: tenderness on palpation diffusely   MUSCULOSKELETAL: No swelling/reddness over extremities  EXTREMITIES: No edema. Peripheral pulses intact.   NEUROLOGICAL: Non focal.  PSYCHIATRIC: AOx3     .  LABS:     12-25    141  |  103  |  16  ----------------------------<  84  3.5   |  27  |  0.47[L]    Ca    8.6      25 Dec 2024 07:06  Phos  2.1     12-25        Urinalysis Basic - ( 25 Dec 2024 07:06 )    Color: x / Appearance: x / SG: x / pH: x  Gluc: 84 mg/dL / Ketone: x  / Bili: x / Urobili: x   Blood: x / Protein: x / Nitrite: x   Leuk Esterase: x / RBC: x / WBC x   Sq Epi: x / Non Sq Epi: x / Bacteria: x            RADIOLOGY, EKG & ADDITIONAL TESTS: Reviewed.

## 2024-12-26 NOTE — PROGRESS NOTE ADULT - ATTENDING COMMENTS
Modifications made to fellows note above   Scleroderma (seronegative)   + cocsackie  + pericardial effusion   ->  continue mgmt of pericardial effusion per cardiology   ->  outpatient rheum follow up with her rheumatologist (non [facility])   ->  monitor BP given scleroderm   -> GI follow up for dysmotility (likely secondary to scleroderma)   Dorota Lopes MD  [facility] Division of Rheumatology   [phone]   [email or web address]

## 2024-12-26 NOTE — PROGRESS NOTE ADULT - SUBJECTIVE AND OBJECTIVE BOX
Cardiology Progress Note  ------------------------------------------------------------------------------------------  SUBJECTIVE:   - Tele: NSR  - No events overnight. Denies CP, SOB or Palpitations.   - Drain OP q24 hours noted at 45 mL    -------------------------------------------------------------------------------------------  VS:  T(F): 98 (12-26), Max: 98.4 (12-25)  HR: 67 (12-26) (65 - 69)  BP: 134/69 (12-26) (100/56 - 134/69)  RR: 18 (12-26)  SpO2: 96% (12-26)  I&O's Summary    25 Dec 2024 07:01  -  26 Dec 2024 07:00  --------------------------------------------------------  IN: 600 mL / OUT: 645 mL / NET: -45 mL      PHYSICAL EXAM:  GENERAL: NAD  HEAD: Atraumatic, Normocephalic.  ENT: Moist mucous membranes.  NECK: Supple, No JVD.  CHEST/LUNG: Clear to auscultation bilaterally; No rales, rhonchi, wheezing, or rubs. Unlabored respirations.  HEART: Regular rate and rhythm; No murmurs, rubs, or gallops.  ABDOMEN: Bowel sounds present; Soft, Nontender, Nondistended.   EXTREMITIES: No edema. 2+ Peripheral Pulses, brisk capillary refill. No clubbing or cyanosis.     -------------------------------------------------------------------------------------------  LABS:      12-25    141  |  103  |  16  ----------------------------<  84  3.5   |  27  |  0.47[L]    Ca    8.6      25 Dec 2024 07:06  Phos  2.1     12-25        CARDIAC MARKERS ( 20 Dec 2024 16:03 )  11 ng/L / x     / x     / x     / x     / x                -------------------------------------------------------------------------------------------  Meds:  albuterol/ipratropium for Nebulization 3 milliLiter(s) Nebulizer every 6 hours  aluminum hydroxide/magnesium hydroxide/simethicone Suspension 30 milliLiter(s) Oral every 4 hours PRN  baclofen 10 milliGRAM(s) Oral <User Schedule>  colchicine 0.6 milliGRAM(s) Oral daily  diphenhydrAMINE Injectable 50 milliGRAM(s) IV Push once  heparin   Injectable 5000 Unit(s) SubCutaneous every 8 hours  ibuprofen  Tablet. 600 milliGRAM(s) Oral every 8 hours  levothyroxine 112 MICROGram(s) Oral daily  lidocaine   4% Patch 1 Patch Transdermal daily  lidocaine   4% Patch 1 Patch Transdermal daily  LORazepam     Tablet 1 milliGRAM(s) Oral two times a day PRN  metoprolol tartrate 25 milliGRAM(s) Oral every 12 hours  morphine ER Tablet 30 milliGRAM(s) Oral <User Schedule>  nicotine - 21 mG/24Hr(s) Patch 1 Patch Transdermal every 24 hours  oxyCODONE    IR 20 milliGRAM(s) Oral <User Schedule>  pantoprazole    Tablet 40 milliGRAM(s) Oral daily  polyethylene glycol 3350 17 Gram(s) Oral two times a day  potassium phosphate IVPB 15 milliMole(s) IV Intermittent once  senna 2 Tablet(s) Oral at bedtime

## 2024-12-26 NOTE — PROGRESS NOTE ADULT - ASSESSMENT
63F PMH scleroderma, asthma, hypothyroidism presenting with CP and SOB found to have pericardial effusion with tamponade, s/p pericardial drain. Rheumatology consulted to evaluate for scleroderma.     #Hx Limited systemic sclerosis  #Pericardial effusion  -Dx 18 yrs ago, with skin involvement, dysphagia, gastric dysmotility, Raynaud's Follows with Dr Metzger, unclear what medications pt is on for scleroderma however, in most recent progress note, she has likely trialed MTX,and Cellcept in the past. She received dose of cyclophosphamide ~2005. Pericardial effusion is a rare manifestation of scleroderma and typically would arise from longstanding cardiac/lung disease which she denies having the past. Workup performed here showed grossly unrevealing autoimmune serologies and positive Coxsackie B titers. Given this, suspecting pericardial effusion most likely due to acute Coxsackie B infection.  - First episode of pericardial effusion, pt denies hx of ILD, previous hx of pericardial or pleural effusions, no renal involvement  - TTE showed large pericardial effusion with fibrinous material, now trace effusion s/p pericardiocentesis. Bloody drainage with cell count 1000. Pt states CP and SOB improved s/p procedure  - Serologies: borderline elevated RF, otherwise negative: CCP, scleroderma ab, dsDNA, lee, RNP, sjogren's ab.   - UA without proteinuria  - Coxakie B titers elevated  - Now s/p pericardial drain    #Hx of SBO  #Constipation  #Gastric dysmotility  - Patient reports she has been having increasing difficult with swallowing food. Suspect this is related to her underlying systemic sclerosis. She has never had prior GI evaluation for this process  - Patient with history of multiple abdominal surgeries and reportedly with adhesions. Has not have BM in multiple days  - Exam not concerning for acute abdm at this time  - abdm XR shows non-obstructive pattern       Recommendations:  Incomplete  -NSAIDs and Colchicine as per cardiology for pericardial effusion  -Would obtain Speech and swallow evaluation and may require further GI workup.   -Please maintain blood pressure <110/70 to avoid scleroderma renal crisis. Please avoid steroids, as steroids can precipitate renal crisis  -On discharge, patient will need close follow up with Rheumatology, Cardiology, and Pulmonology to closely follow scleroderma disease activity    Case discussed with Dr. Marianne Huston MD, PGY-4  Rheumatology Fellow  Reachable on TEAMS 63F PMH scleroderma, asthma, hypothyroidism presenting with CP and SOB found to have pericardial effusion with tamponade, s/p pericardial drain. Rheumatology consulted to evaluate for scleroderma.     #Hx Limited systemic sclerosis  #Pericardial effusion  -Dx 18 yrs ago, with skin involvement, dysphagia, gastric dysmotility, Raynaud's Follows with Dr Metzger, unclear what medications pt is on for scleroderma however, in most recent progress note, she has likely trialed MTX,and Cellcept in the past. She received dose of cyclophosphamide ~2005. Pericardial effusion is a rare manifestation of scleroderma and typically would arise from longstanding cardiac/lung disease which she denies having the past. Workup performed here showed grossly unrevealing autoimmune serologies and positive Coxsackie B titers. Given this, suspecting pericardial effusion most likely due to acute Coxsackie B infection.  - First episode of pericardial effusion, pt denies hx of ILD, previous hx of pericardial or pleural effusions, no renal involvement  - TTE showed large pericardial effusion with fibrinous material, now trace effusion s/p pericardiocentesis. Bloody drainage with cell count 1000. Pt states CP and SOB improved s/p procedure  - Serologies: borderline elevated RF, otherwise negative: CCP, scleroderma ab, dsDNA, lee, RNP, sjogren's ab.   - UA without proteinuria  - Coxakie B titers elevated;, EBV IgG positive  - Now s/p pericardial drain    #Hx of SBO  #Constipation  #Gastric dysmotility  - Patient reports she has been having increasing difficult with swallowing food. Suspect this is related to her underlying systemic sclerosis. She has never had prior GI evaluation for this process  - Patient with history of multiple abdominal surgeries and reportedly with adhesions. Has not have BM in multiple days  - Exam not concerning for acute abdm at this time  - abdm XR shows non-obstructive pattern       Recommendations:  -At this time, would favor infectious etiology of pericardial effusion; NSAIDs and Colchicine as per cardiology/primary  -Please maintain blood pressure <110/70 to avoid scleroderma renal crisis. Please avoid steroids, as steroids can precipitate renal crisis  -On discharge, patient will need close follow up with Rheumatology, Cardiology, Pulmonology and GI to closely follow scleroderma disease activity    Rheumatology service to sign off at this time, please do not hesitate to call back if there are any other questions or concerns    Case discussed with Dr. Marianne Huston MD, PGY-4  Rheumatology Fellow  Reachable on TEAMS 63F PMH scleroderma, asthma, hypothyroidism presenting with CP and SOB found to have pericardial effusion with tamponade, s/p pericardial drain. Rheumatology consulted to evaluate for scleroderma.     #Hx Limited systemic sclerosis  #Pericardial effusion  -Dx 18 yrs ago, with skin involvement, dysphagia, gastric dysmotility, Raynaud's Follows with Dr Metzger, unclear what medications pt is on for scleroderma however, in most recent progress note, she has likely trialed MTX,and Cellcept in the past. She received dose of cyclophosphamide ~2005. Pericardial effusion is a rare manifestation of scleroderma and typically would arise from longstanding cardiac/lung disease which she denies having the past. Workup performed here showed grossly unrevealing autoimmune serologies and positive Coxsackie B titers. Given this, suspecting pericardial effusion most likely due to acute Coxsackie B infection.  - First episode of pericardial effusion, pt denies hx of ILD, previous hx of pericardial or pleural effusions, no renal involvement  - TTE showed large pericardial effusion with fibrinous material, now trace effusion s/p pericardiocentesis. Bloody drainage with cell count 1000. Pt states CP and SOB improved s/p procedure  - Serologies: borderline elevated RF, otherwise negative: CCP, scleroderma ab, dsDNA, lee, RNP, sjogren's ab.   - UA without proteinuria  - Coxakie B titers elevated;, EBV IgG positive  - Now s/p pericardial drain    #Hx of SBO  #Constipation  #Gastric dysmotility  - Patient reports she has been having increasing difficult with swallowing food. Suspect this is related to her underlying systemic sclerosis. She has never had prior GI evaluation for this process  - Patient with history of multiple abdominal surgeries and reportedly with adhesions. Has not have BM in multiple days  - Exam not concerning for acute abdomen at this time  - abdm XR shows non-obstructive pattern       Recommendations:  -At this time, would favor infectious etiology of pericardial effusion; NSAIDs and Colchicine as per cardiology/primary  -Please maintain blood pressure <110/70 to avoid scleroderma renal crisis. Please avoid steroids, as steroids can precipitate renal crisis  -On discharge, patient will need close follow up with Rheumatology, Cardiology, Pulmonology and GI to closely follow scleroderma disease activity    Rheumatology service to sign off at this time, please do not hesitate to call back if there are any other questions or concerns    Case discussed with Dr. Marianne Huston MD, PGY-4  Rheumatology Fellow  Reachable on TEAMS

## 2024-12-27 LAB
GAMMA INTERFERON BACKGROUND BLD IA-ACNC: 0.04 IU/ML — SIGNIFICANT CHANGE UP
GLUCOSE BLDC GLUCOMTR-MCNC: 108 MG/DL — HIGH (ref 70–99)
GLUCOSE BLDC GLUCOMTR-MCNC: 94 MG/DL — SIGNIFICANT CHANGE UP (ref 70–99)
M TB IFN-G BLD-IMP: NEGATIVE — SIGNIFICANT CHANGE UP
M TB IFN-G CD4+ BCKGRND COR BLD-ACNC: 0 IU/ML — SIGNIFICANT CHANGE UP
M TB IFN-G CD4+CD8+ BCKGRND COR BLD-ACNC: 0 IU/ML — SIGNIFICANT CHANGE UP
QUANT TB PLUS MITOGEN MINUS NIL: 1.47 IU/ML — SIGNIFICANT CHANGE UP

## 2024-12-27 PROCEDURE — 99233 SBSQ HOSP IP/OBS HIGH 50: CPT

## 2024-12-27 PROCEDURE — 99232 SBSQ HOSP IP/OBS MODERATE 35: CPT

## 2024-12-27 RX ORDER — OXYCODONE HCL 15 MG
20 TABLET ORAL
Refills: 0 | Status: DISCONTINUED | OUTPATIENT
Start: 2024-12-27 | End: 2024-12-31

## 2024-12-27 RX ORDER — MORPHINE SULFATE 15 MG
30 TABLET, EXTENDED RELEASE ORAL
Refills: 0 | Status: DISCONTINUED | OUTPATIENT
Start: 2024-12-27 | End: 2024-12-31

## 2024-12-27 RX ORDER — ONDANSETRON 4 MG/1
4 TABLET ORAL ONCE
Refills: 0 | Status: COMPLETED | OUTPATIENT
Start: 2024-12-27 | End: 2024-12-27

## 2024-12-27 RX ADMIN — LIDOCAINE 1 PATCH: 50 OINTMENT TOPICAL at 23:16

## 2024-12-27 RX ADMIN — HEPARIN SODIUM 5000 UNIT(S): 1000 INJECTION, SOLUTION INTRAVENOUS; SUBCUTANEOUS at 13:16

## 2024-12-27 RX ADMIN — Medication 30 MILLIGRAM(S): at 01:06

## 2024-12-27 RX ADMIN — Medication 30 MILLIGRAM(S): at 13:55

## 2024-12-27 RX ADMIN — Medication 30 MILLIGRAM(S): at 21:27

## 2024-12-27 RX ADMIN — HEPARIN SODIUM 5000 UNIT(S): 1000 INJECTION, SOLUTION INTRAVENOUS; SUBCUTANEOUS at 21:27

## 2024-12-27 RX ADMIN — Medication 600 MILLIGRAM(S): at 21:27

## 2024-12-27 RX ADMIN — Medication 20 MILLIGRAM(S): at 21:26

## 2024-12-27 RX ADMIN — NICOTINE POLACRILEX 1 PATCH: 4 LOZENGE ORAL at 12:56

## 2024-12-27 RX ADMIN — Medication 20 MILLIGRAM(S): at 13:55

## 2024-12-27 RX ADMIN — Medication 20 MILLIGRAM(S): at 11:18

## 2024-12-27 RX ADMIN — Medication 600 MILLIGRAM(S): at 06:43

## 2024-12-27 RX ADMIN — LIDOCAINE 1 PATCH: 50 OINTMENT TOPICAL at 19:26

## 2024-12-27 RX ADMIN — Medication 20 MILLIGRAM(S): at 02:30

## 2024-12-27 RX ADMIN — NICOTINE POLACRILEX 1 PATCH: 4 LOZENGE ORAL at 12:55

## 2024-12-27 RX ADMIN — Medication 600 MILLIGRAM(S): at 13:55

## 2024-12-27 RX ADMIN — Medication 30 MILLIGRAM(S): at 02:06

## 2024-12-27 RX ADMIN — Medication 30 MILLIGRAM(S): at 22:27

## 2024-12-27 RX ADMIN — Medication 20 MILLIGRAM(S): at 22:27

## 2024-12-27 RX ADMIN — LIDOCAINE 1 PATCH: 50 OINTMENT TOPICAL at 11:35

## 2024-12-27 RX ADMIN — Medication 17 GRAM(S): at 17:41

## 2024-12-27 RX ADMIN — IPRATROPIUM BROMIDE AND ALBUTEROL SULFATE 3 MILLILITER(S): .5; 2.5 SOLUTION RESPIRATORY (INHALATION) at 17:42

## 2024-12-27 RX ADMIN — PANTOPRAZOLE 40 MILLIGRAM(S): 40 TABLET, DELAYED RELEASE ORAL at 11:32

## 2024-12-27 RX ADMIN — HEPARIN SODIUM 5000 UNIT(S): 1000 INJECTION, SOLUTION INTRAVENOUS; SUBCUTANEOUS at 05:43

## 2024-12-27 RX ADMIN — BACLOFEN 10 MILLIGRAM(S): 10 TABLET ORAL at 11:31

## 2024-12-27 RX ADMIN — Medication 600 MILLIGRAM(S): at 13:16

## 2024-12-27 RX ADMIN — Medication 30 MILLIGRAM(S): at 13:16

## 2024-12-27 RX ADMIN — Medication 30 MILLIGRAM(S): at 05:43

## 2024-12-27 RX ADMIN — Medication 20 MILLIGRAM(S): at 11:36

## 2024-12-27 RX ADMIN — Medication 600 MILLIGRAM(S): at 05:43

## 2024-12-27 RX ADMIN — BACLOFEN 10 MILLIGRAM(S): 10 TABLET ORAL at 21:27

## 2024-12-27 RX ADMIN — SENNOSIDES 2 TABLET(S): 8.6 TABLET, FILM COATED ORAL at 21:27

## 2024-12-27 RX ADMIN — COLCHICINE 0.6 MILLIGRAM(S): 0.6 CAPSULE ORAL at 11:33

## 2024-12-27 RX ADMIN — Medication 25 MILLIGRAM(S): at 11:31

## 2024-12-27 RX ADMIN — Medication 600 MILLIGRAM(S): at 22:27

## 2024-12-27 RX ADMIN — Medication 25 MILLIGRAM(S): at 17:42

## 2024-12-27 RX ADMIN — Medication 20 MILLIGRAM(S): at 13:16

## 2024-12-27 RX ADMIN — LEVOTHYROXINE SODIUM 112 MICROGRAM(S): 175 TABLET ORAL at 05:43

## 2024-12-27 RX ADMIN — IPRATROPIUM BROMIDE AND ALBUTEROL SULFATE 3 MILLILITER(S): .5; 2.5 SOLUTION RESPIRATORY (INHALATION) at 11:30

## 2024-12-27 RX ADMIN — NICOTINE POLACRILEX 1 PATCH: 4 LOZENGE ORAL at 19:26

## 2024-12-27 NOTE — PHYSICAL THERAPY INITIAL EVALUATION ADULT - IMPAIRMENTS CONTRIBUTING TO GAIT DEVIATIONS, PT EVAL
impaired balance/decreased flexibility/impaired motor control/impaired sensory feedback/decreased strength

## 2024-12-27 NOTE — PROGRESS NOTE ADULT - ASSESSMENT
63 year old female with hx of scleroderma, asthma (with prior intubations), hypothyroidism, presenting with chest pain and shortness of breath.     Patient found to have tamponade likely serositis 2/2 to Cox[place]ie B Ct without ILD, non specific LAD and nodules. Pulmonary consulted for effusion.     # pleural Effusions  # serositis  # scleroderma  # acute hypoxemic respiratory failure  # Mediastinal LAD  # Abnormal CT chest  - Effusions were small on CT, pocus with small left effusions and trace right. Currently too small for tap.   - Please repeat CXR in the AM.   - Likely same process which is occurring in the pericardium most likely 2/2 to viral infection leading to serositis. Stilling having chest pain.   - On Colchicine and NSAID No steroids per rheum 2/2 to scleroderma  - Patient also with hx of smoking and asthma. Can start advair BID. C/w nebs. Will need pulm and PFT on discharge.  - LAD and GGo nonspecific, needs repeat CT scan as outapatient, likely 2/2 to acute infection. No signs of CTrILD  - Wean o2 as tolerated, OOB to chair. incentive kiran.   - F/U quant. Reported may have been on immunosuppression  - Pulmonary will follow.

## 2024-12-27 NOTE — PROGRESS NOTE ADULT - SUBJECTIVE AND OBJECTIVE BOX
Interval Events: Patient was seen and examined at bedside.    Patient still with signifincant SOB. little effusions on POCUS, too small for tap.   Nausea, vomiting today.     REVIEW OF SYSTEMS:-  Constitutional: [- ] fevers [- ] chills [ ]- weight loss [ ] weight gain  CV: [- ] chest pain [ -] orthopnea [ ]- palpitations [ -] murmur  Resp: [ +] cough [+ ] shortness of breath [+ ] dyspnea [- ] wheezing [-] sputum [- ] hemoptysis  [x ] All other systems negative  [ ] Unable to assess ROS because ________    OBJECTIVE:  ICU Vital Signs Last 24 Hrs  T(C): 36.7 (27 Dec 2024 11:40), Max: 36.7 (27 Dec 2024 11:40)  T(F): 98 (27 Dec 2024 11:40), Max: 98 (27 Dec 2024 11:40)  HR: 59 (27 Dec 2024 11:40) (51 - 59)  BP: 122/70 (27 Dec 2024 11:40) (106/63 - 122/70)  BP(mean): --  ABP: --  ABP(mean): --  RR: 18 (27 Dec 2024 11:40) (17 - 18)  SpO2: 92% (27 Dec 2024 11:40) (92% - 98%)    O2 Parameters below as of 27 Dec 2024 11:40  Patient On (Oxygen Delivery Method): nasal cannula  O2 Flow (L/min): 4            12-26 @ 07:01 - 12-27 @ 07:00  --------------------------------------------------------  IN: 0 mL / OUT: 175 mL / NET: -175 mL    12-27 @ 07:01  -  12-27 @ 16:01  --------------------------------------------------------  IN: 360 mL / OUT: 250 mL / NET: 110 mL      CAPILLARY BLOOD GLUCOSE      POCT Blood Glucose.: 94 mg/dL (27 Dec 2024 12:35)      PHYSICAL EXAM:    Constitutional: well-developed; well-groomed; thin frail appearing; no distress,  Eyes: PERRL; EOMI  ENMT: Normal oropharnxy,   Neck:  Supple; no JVD  Respiratory: airway patent; breath sounds equal; decreased at the bases  Cardiovascular: regular rate and rhythm  no rub , no murmur, no gallops.   Gastrointestinal: soft; no distention, normal BS,  Extremities: no clubbing; no cyanosis; no pedal edema,   Neurological: alert and oriented x 3; responds to pain; responds to verbal commands; sensation intact: CN nerves grossly intact.   Skin: warm and dry; thickend skin  Psych Calm, no SI/HI          HOSPITAL MEDICATIONS:  MEDICATIONS  (STANDING):  albuterol/ipratropium for Nebulization 3 milliLiter(s) Nebulizer every 6 hours  baclofen 10 milliGRAM(s) Oral <User Schedule>  colchicine 0.6 milliGRAM(s) Oral daily  diphenhydrAMINE Injectable 50 milliGRAM(s) IV Push once  heparin   Injectable 5000 Unit(s) SubCutaneous every 8 hours  ibuprofen  Tablet. 600 milliGRAM(s) Oral every 8 hours  levothyroxine 112 MICROGram(s) Oral daily  lidocaine   4% Patch 1 Patch Transdermal daily  lidocaine   4% Patch 1 Patch Transdermal daily  metoprolol tartrate 25 milliGRAM(s) Oral every 12 hours  morphine ER Tablet 30 milliGRAM(s) Oral <User Schedule>  nicotine - 21 mG/24Hr(s) Patch 1 Patch Transdermal every 24 hours  ondansetron Injectable 4 milliGRAM(s) IV Push once  oxyCODONE    IR 20 milliGRAM(s) Oral <User Schedule>  pantoprazole    Tablet 40 milliGRAM(s) Oral daily  polyethylene glycol 3350 17 Gram(s) Oral two times a day  potassium phosphate IVPB 15 milliMole(s) IV Intermittent once  senna 2 Tablet(s) Oral at bedtime    MEDICATIONS  (PRN):  aluminum hydroxide/magnesium hydroxide/simethicone Suspension 30 milliLiter(s) Oral every 4 hours PRN Dyspepsia  LORazepam     Tablet 1 milliGRAM(s) Oral two times a day PRN Anxiety      LABS:    Hgb Trend: 12.8<--, 13.2<--, 13.4<--, 13.8<--, 13.8<--        Creatinine Trend: 0.47<--, 0.39<--, 0.49<--, 0.48<--, 0.48<--, 0.50<--          MICROBIOLOGY:     RADIOLOGY & EKG:  [x ] Reviewed and interpreted by me

## 2024-12-27 NOTE — PROVIDER CONTACT NOTE (OTHER) - ASSESSMENT
patient asleep but arousable, no complaints offered
Pt complain of sob stating in the low 90's on continues pulse ox and back pain. Pt is Sinus tach on tele in the 120's. NP notified and assessed pt at bedside.
Pt VSS and light headedness not present

## 2024-12-27 NOTE — PROGRESS NOTE ADULT - SUBJECTIVE AND OBJECTIVE BOX
pain improving.     GENERAL: No fevers, no chills.  EYES: No blurry vision,  No photophobia  ENT: No sore throat.  No dysphagia  Cardiovascular: No chest pain, palpitations, orthopnea  Pulmonary: No cough, no wheezing. No shortness of breath  Gastrointestinal: No abdominal pain, no diarrhea, no constipation  Dermatology:  No rashes.  Neuro: No Headache.  No vertigo.  No dizziness.  Psych: No anxiety, no depression.  Denies suicidal thoughts.    MEDICATIONS  (STANDING):  albuterol/ipratropium for Nebulization 3 milliLiter(s) Nebulizer every 6 hours  baclofen 10 milliGRAM(s) Oral <User Schedule>  colchicine 0.6 milliGRAM(s) Oral daily  diphenhydrAMINE Injectable 50 milliGRAM(s) IV Push once  heparin   Injectable 5000 Unit(s) SubCutaneous every 8 hours  ibuprofen  Tablet. 600 milliGRAM(s) Oral every 8 hours  levothyroxine 112 MICROGram(s) Oral daily  lidocaine   4% Patch 1 Patch Transdermal daily  lidocaine   4% Patch 1 Patch Transdermal daily  metoprolol tartrate 25 milliGRAM(s) Oral every 12 hours  morphine ER Tablet 30 milliGRAM(s) Oral <User Schedule>  nicotine - 21 mG/24Hr(s) Patch 1 Patch Transdermal every 24 hours  oxyCODONE    IR 20 milliGRAM(s) Oral <User Schedule>  pantoprazole    Tablet 40 milliGRAM(s) Oral daily  polyethylene glycol 3350 17 Gram(s) Oral two times a day  potassium phosphate IVPB 15 milliMole(s) IV Intermittent once  senna 2 Tablet(s) Oral at bedtime    MEDICATIONS  (PRN):  aluminum hydroxide/magnesium hydroxide/simethicone Suspension 30 milliLiter(s) Oral every 4 hours PRN Dyspepsia  LORazepam     Tablet 1 milliGRAM(s) Oral two times a day PRN Anxiety    Vital Signs Last 24 Hrs  T(C): 36.6 (27 Dec 2024 04:58), Max: 36.7 (26 Dec 2024 14:01)  T(F): 97.9 (27 Dec 2024 04:58), Max: 98 (26 Dec 2024 14:01)  HR: 54 (27 Dec 2024 04:58) (51 - 61)  BP: 111/69 (27 Dec 2024 04:58) (106/63 - 122/69)  BP(mean): --  RR: 18 (27 Dec 2024 04:58) (17 - 18)  SpO2: 95% (27 Dec 2024 04:58) (95% - 98%)    Parameters below as of 27 Dec 2024 04:58  Patient On (Oxygen Delivery Method): nasal cannula    PHYSICAL EXAM:  GENERAL APPEARANCE: Uncomfortable appearing female resting in bed   HEENT:  PERRL, EOMI. hearing grossly intact.  CARDIAC: Normal S1 and S2. no mrg. RRR  LUNGS: No wheezing noted today, lungs clear to auscultation b/l  ABDOMEN: tenderness on palpation diffusely   MUSCULOSKELETAL: No swelling/reddness over extremities  EXTREMITIES: No edema. Peripheral pulses intact.   NEUROLOGICAL: Non focal.  PSYCHIATRIC: AOx3     .  LABS:                     RADIOLOGY, EKG & ADDITIONAL TESTS: Reviewed.

## 2024-12-27 NOTE — PHYSICAL THERAPY INITIAL EVALUATION ADULT - PERTINENT HX OF CURRENT PROBLEM, REHAB EVAL
63 year old female with hx of scleroderma, asthma, hypothyroidism, presenting with chest pain and shortness of breath, found to have moderate-large pericardial effusion with concerns for tamponade. Pt now s/p pericardiocentesis with pericardial drain in place. Pt admitted for further workup and management.     Dx	Pericardial effusion; c/f tamponade s/p pericardiocentesis w/ drain 12/20-12/26,   	Hx scleroderma; c/w pain control, rheum following  	Asthma; not in exacerbation,   	New afib w/ RVR;  i/s/o pericardial drain placement, RRT on 12/23    CXR IMPRESSION:Interval placement of pericardial drain. No pneumomediastinum or pneumothorax.

## 2024-12-27 NOTE — PROGRESS NOTE ADULT - ASSESSMENT
63F hx of scleroderma, asthma (with prior intubations), hypothyroidism, abdominal surgeries and SBO. Patient at cardiologist 10 days ago, in-office TTE revealed a pericardial effusion and was sent to ED but pt did not present due to recent passing of her son.   12/20 pt returned to ED with worsening symptoms CP, SOB. Pt tachycardic, hypotensive. POCUS revealed mod-large pericardial effusion with tamponade.  12/20 s/p urgent pericardiocentesis with 650cc bloody fluid removed and pericardial drain placement.  12/21 RRT for afib RVR and agitation.  12/23 RRT for afib w/ RVR    Current out- patient pain regimen: MS ER 30 BID, takes Oxy IR 20 QID but it is ordered TID (pt endorses having many pills left over from the past and has been taking these), Baclofen 10 mg BID.  Out Patient Pain Management provider: Dr. Guru Crowe    Pt reports significant improvement in pain. Agreeable to return to home regimen given improvement in clinical condition. Pt reports taking MS ER 30 BID and Oxy IR 20 QID. Her pain doc Rxs the Oxy TID, she has had extra pills from the past and is running out (2 weeks left for the extra dose). She is aware if he knows she's been taking the extra dose a day he may fire her from the practice for breaking contract. Pt instructed that on day after discharge to alternate the Oxy TID and QID x 1 week then decrease to TID to avoid withdrawal.     Pt w/ chronic, generalized, MSK/joint pain 2/2 scleroderma and neuropathic pain. Has failed gabapentinoids and antidepressants for the neuropathic pain.     Decrease MS ER 30 mg to Q 12 hours.  Continue Oxycodone 20 mg Q6h (PRN but pt takes scheduled so continue as ordered).  Continue Baclofen 10 mg BID.  Continue Ibuprofen per primary team.    Monitor for sedation, respiratory depression.  Continue Bowel Regimen.  PT/ OOB per primary team.    Follow up with Dr. Crowe for continued pain management after discharge  Out patient pain practice list provided as pt would like to find a different provider. Suggested Dr. Inder Rodriguez as he is comfortable w/ high dose opioids.  Narcan Rescue Kit on discharge (Naloxone 4 mg/0.1 ml nasal spray - 1 spray q 2-3 minutes alternating between nostrils).    Time spent on encounter:  35 minutes    Chronic Pain Service  [phone]   63F hx of scleroderma, asthma (with prior intubations), hypothyroidism, abdominal surgeries and SBO. Patient at cardiologist 10 days ago, in-office TTE revealed a pericardial effusion and was sent to ED but pt did not present due to recent passing of her son.   12/20 pt returned to ED with worsening symptoms CP, SOB. Pt tachycardic, hypotensive. POCUS revealed mod-large pericardial effusion with tamponade.  12/20 s/p urgent pericardiocentesis with 650cc bloody fluid removed and pericardial drain placement.  12/21 RRT for afib RVR and agitation.  12/23 RRT for afib w/ RVR    Current out- patient pain regimen: MS ER 30 BID, takes Oxy IR 20 QID but it is ordered TID (pt endorses having many pills left over from the past and has been taking these), Baclofen 10 mg BID.  Out Patient Pain Management provider: Dr. Guru Crowe    Pt reports significant improvement in pain. Agreeable to return to home regimen given improvement in clinical condition. Pt reports taking MS ER 30 BID and Oxy IR 20 QID. Her pain doc Rxs the Oxy TID, she has had extra pills from the past and is running out (2 weeks left for the extra dose). She is aware if he knows she's been taking the extra dose a day he may fire her from the practice for breaking contract. Pt instructed that on day after discharge to alternate the Oxy TID and QID x 1 week then decrease to TID to avoid withdrawal.     Pt w/ chronic, generalized, MSK/joint pain 2/2 scleroderma and neuropathic pain. Has failed gabapentinoids and antidepressants for the neuropathic pain.     Decrease MS ER 30 mg to Q 12 hours.  Continue Oxycodone 20 mg Q6h (PRN but pt takes scheduled so continue as ordered).  Continue Baclofen 10 mg BID.  Continue Ibuprofen per primary team.    Monitor for sedation, respiratory depression.  Continue Bowel Regimen.  PT/ OOB per primary team.    Follow up with Dr. Crowe for continued pain management after discharge  Out patient pain practice list provided as pt would like to find a different provider. Suggested Dr. Inder Rodriguez as he is comfortable w/ high dose opioids.  Narcan Rescue Kit on discharge (Naloxone 4 mg/0.1 ml nasal spray - 1 spray q 2-3 minutes alternating between nostrils).    Signing off, reconsult as needed    Time spent on encounter:  35 minutes    Chronic Pain Service  [phone]

## 2024-12-27 NOTE — PROGRESS NOTE ADULT - SUBJECTIVE AND OBJECTIVE BOX
Chief Complaint:  Patient is a 63y old  Female who presents with a chief complaint of dyspnea (24 Dec 2024 12:12)    Interval HPI:  63F hx of scleroderma, asthma (with prior intubations), hypothyroidism, abdominal surgeries and SBO. Patient at cardiologist 10 days ago, in-office TTE revealed a pericardial effusion and was sent to ED but pt did not present due to recent passing of her son.   12/20 pt returned to ED with worsening symptoms CP, SOB. Pt tachycardic, hypotensive. POCUS revealed mod-large pericardial effusion with tamponade.  12/20 s/p urgent pericardiocentesis with 650cc bloody fluid removed and pericardial drain placement.  12/21 RRT for afib RVR and agitation.  12/23 RRT for afib w/ RVR    Current out- patient pain regimen: MS ER 30 BID, takes Oxy IR 20 QID but it is ordered TID (pt endorses having many pills left over from the past and has been taking these), Baclofen 10 mg BID.  Out Patient Pain Management provider: Dr. Guru Crowe  [facility] Prescription Monitoring Program: Reference #[id number]    Pain Score: 6/10 down to 1/10    Pt seen sitting up in bed, appears much more comfortable and reports significant improvement in pain. Agreeable to return to home regimen given improvement in clinical condition. Pt reports taking MS ER 30 BID and Oxy IR 20 QID. Her pain doc Rxs the Oxy TID, she has had extra pills from the past and is running out (2 weeks left for the extra dose). She is aware if he knows she's been taking the extra dose a day he may fire her from the practice for breaking contract. Pt instructed that on day after discharge to alternate the Oxy TID and QID x 1 week then decrease to TID to avoid withdrawal.     Pt w/ chronic, generalized, MSK/joint pain 2/2 scleroderma and neuropathic pain. Has failed gabapentinoids and antidepressants for the neuropathic pain.     PHYSICAL EXAM  GENERAL: Seen at bedside, NAD, well-groomed, well-developed, appears stated age, no signs of toxicity  NEURO: Alert & Oriented X3, Good concentration; Follows commands   GI: appetite good, last BM 3 days ago  :  External female urinary device LCWS with yellow urine  EXTREMITIES: BALDERAS; no cyanosis, clubbing or edema; chronic decreased ROM and dexterity 2/2 scleroderma; ambulates w/ walker   PSYCH: affect normal; good eye contact; no signs of anxiety or depression      T(C): 36.7 (12-27-24 @ 11:40)  HR: 59 (12-27-24 @ 11:40)  BP: 122/70 (12-27-24 @ 11:40)  RR: 18 (12-27-24 @ 11:40)  SpO2: 92% (12-27-24 @ 11:40)      MEDICATIONS  (STANDING):  albuterol/ipratropium for Nebulization 3 milliLiter(s) Nebulizer every 6 hours  baclofen 10 milliGRAM(s) Oral <User Schedule>  colchicine 0.6 milliGRAM(s) Oral daily  diphenhydrAMINE Injectable 50 milliGRAM(s) IV Push once  heparin   Injectable 5000 Unit(s) SubCutaneous every 8 hours  ibuprofen  Tablet. 600 milliGRAM(s) Oral every 8 hours  levothyroxine 112 MICROGram(s) Oral daily  lidocaine   4% Patch 1 Patch Transdermal daily  lidocaine   4% Patch 1 Patch Transdermal daily  metoprolol tartrate 25 milliGRAM(s) Oral every 12 hours  morphine ER Tablet 30 milliGRAM(s) Oral <User Schedule>  nicotine - 21 mG/24Hr(s) Patch 1 Patch Transdermal every 24 hours  oxyCODONE    IR 20 milliGRAM(s) Oral <User Schedule>  pantoprazole    Tablet 40 milliGRAM(s) Oral daily  polyethylene glycol 3350 17 Gram(s) Oral two times a day  potassium phosphate IVPB 15 milliMole(s) IV Intermittent once  senna 2 Tablet(s) Oral at bedtime    MEDICATIONS  (PRN):  aluminum hydroxide/magnesium hydroxide/simethicone Suspension 30 milliLiter(s) Oral every 4 hours PRN Dyspepsia  LORazepam     Tablet 1 milliGRAM(s) Oral two times a day PRN Anxiety      Allergies    ABIDA dye (Short breath; Hives)  Xolair (Unknown)  penicillin (Anaphylaxis; Hives; Other)  Originally Entered as [Unknown] reaction to [BEE STINGS] (Unknown)  statins (Unknown)  penicillin (Unknown)  IV Contrast (Unknown)  iodine (Anaphylaxis)        Pertinent labs, radiology, additional studies:  Reviewed      < from: CT Chest No Cont (12.23.24 @ 12:23) >  IMPRESSION:  Pulmonary nodules measuring up to 4 mm. According to Fleischner Society   guideline for management of incidental pulmonary nodules, follow-up CT   chest in 12 months is recommended in a high risk patient.  Small bibasilar pleural effusions with atelectasis of lower lobes.  Indeterminant expansile sclerotic lesion in the sternum, which is new   since 2012.       Quality 131: Pain Assessment And Follow-Up: Pain assessment using a standardized tool is documented as negative, no follow-up plan required Detail Level: Detailed Quality 111:Pneumonia Vaccination Status For Older Adults: Pneumococcal Vaccination Previously Received Quality 226: Preventive Care And Screening: Tobacco Use: Screening And Cessation Intervention: Patient screened for tobacco use and is an ex/non-smoker Quality 130: Documentation Of Current Medications In The Medical Record: Current Medications Documented Quality 265: Biopsy Follow-Up: Biopsy results reviewed, communicated, tracked, and documented Quality 431: Preventive Care And Screening: Unhealthy Alcohol Use - Screening: Patient screened for unhealthy alcohol use using a single question and scores less than 2 times per year Quality 110: Preventive Care And Screening: Influenza Immunization: Influenza Immunization previously received during influenza season

## 2024-12-27 NOTE — PROGRESS NOTE ADULT - SUBJECTIVE AND OBJECTIVE BOX
Cardiology Progress Note  ------------------------------------------------------------------------------------------  SUBJECTIVE:   - Tele: NSR, sinus bradycardia to 50s while asleep  - Pericardial drain removed in interval period after significant decline in output  - Pulm consulted due to pleural effusion(s), hypoxia    -------------------------------------------------------------------------------------------  VS:  T(F): 97.9 (12-27), Max: 98 (12-26)  HR: 54 (12-27) (51 - 61)  BP: 111/69 (12-27) (106/63 - 122/69)  RR: 18 (12-27)  SpO2: 95% (12-27)  I&O's Summary    25 Dec 2024 07:01  -  26 Dec 2024 07:00  --------------------------------------------------------  IN: 720 mL / OUT: 945 mL / NET: -225 mL    26 Dec 2024 07:01  -  27 Dec 2024 06:35  --------------------------------------------------------  IN: 0 mL / OUT: 175 mL / NET: -175 mL      PHYSICAL EXAM:  GENERAL: NAD  HEAD: Atraumatic, Normocephalic.  ENT: Moist mucous membranes.  NECK: Supple, No JVD.  CHEST/LUNG: Clear to auscultation bilaterally; No rales, rhonchi, wheezing, or rubs. Unlabored respirations.  HEART: Regular rate and rhythm; No murmurs, rubs, or gallops.  ABDOMEN: Bowel sounds present; Soft, Nontender, Nondistended.   EXTREMITIES: No edema. 2+ Peripheral Pulses, brisk capillary refill. No clubbing or cyanosis.     -------------------------------------------------------------------------------------------  LABS:      12-25    141  |  103  |  16  ----------------------------<  84  3.5   |  27  |  0.47[L]    Ca    8.6      25 Dec 2024 07:06  Phos  2.1     12-25        CARDIAC MARKERS ( 20 Dec 2024 16:03 )  11 ng/L / x     / x     / x     / x     / x                -------------------------------------------------------------------------------------------  Meds:  albuterol/ipratropium for Nebulization 3 milliLiter(s) Nebulizer every 6 hours  aluminum hydroxide/magnesium hydroxide/simethicone Suspension 30 milliLiter(s) Oral every 4 hours PRN  baclofen 10 milliGRAM(s) Oral <User Schedule>  colchicine 0.6 milliGRAM(s) Oral daily  diphenhydrAMINE Injectable 50 milliGRAM(s) IV Push once  heparin   Injectable 5000 Unit(s) SubCutaneous every 8 hours  ibuprofen  Tablet. 600 milliGRAM(s) Oral every 8 hours  levothyroxine 112 MICROGram(s) Oral daily  lidocaine   4% Patch 1 Patch Transdermal daily  lidocaine   4% Patch 1 Patch Transdermal daily  LORazepam     Tablet 1 milliGRAM(s) Oral two times a day PRN  metoprolol tartrate 25 milliGRAM(s) Oral every 12 hours  morphine ER Tablet 30 milliGRAM(s) Oral <User Schedule>  nicotine - 21 mG/24Hr(s) Patch 1 Patch Transdermal every 24 hours  oxyCODONE    IR 20 milliGRAM(s) Oral <User Schedule>  pantoprazole    Tablet 40 milliGRAM(s) Oral daily  polyethylene glycol 3350 17 Gram(s) Oral two times a day  potassium phosphate IVPB 15 milliMole(s) IV Intermittent once  senna 2 Tablet(s) Oral at bedtime

## 2024-12-27 NOTE — PROVIDER CONTACT NOTE (OTHER) - BACKGROUND
Pt admitted for pericardial effusion. Pericardial drain in place, draining by gravity
admitted for pericardial effusion
64 y/o w/ hx of scleroderma, asthma, hypothyroidism, presenting w/ chest pain & SOB, found mod/large pericardial effusion w/ concerns tamponade. Pt s/p pericardiocentesis w/ pericardial drain

## 2024-12-27 NOTE — PROVIDER CONTACT NOTE (OTHER) - ACTION/TREATMENT ORDERED:
Xanax, Colchicine, and melatonin ordered and given
continue to monitor
No new orders. Continue w/ plan of care.

## 2024-12-27 NOTE — PROGRESS NOTE ADULT - ASSESSMENT
63F PMH scleroderma, asthma with prior intubations admitted with cardiac tamponade. Initially presented to outpt cardiologist Dr. Aparicio 10 days PTA for general fatigue, pleuritic chest pain, and SOB. in-office TTE was notable for pericardial effusion. Patient deferred presentation to ED at that time. Presented with tachycardia and hypotension, TTE w/ evidence of caridac tamponade. Now s/p pericardiocentesis (12/20) with 650cc bloody fluid removed in the lab. No evidence of malignant effusion on initial evaluation of pericardial fluid, cultures/cytology pending.     Patient overnight 12.21 with afib RVR and agitation. Had RRT called again on 12/23 for afib w/ RVR. Rhythm is presently NSR w/ frequent APCs    #Pericardial Effusion  - S/p pericardiocentesis on 12/20 w/ 650 ccs of bloody output in the lab  - Pericardial drain removed on 12/26 after output had slowed to ~45ccs in a 36 hour period  - F/u  rheum recs  - continue ibuprofen 600mg PO TID, colchicine 0.6mg PO QDay   - Age appropriate cancer screening    #Afib w/ RVR  - Seemingly new i/s/o pericardial drain placement  - C/w metop tartrate 25mg BID  - plan for eventual A/C once drain is removed    ***recommendations are not final until attending attestation*** 63F PMH scleroderma, asthma with prior intubations admitted with cardiac tamponade. Initially presented to outpt cardiologist Dr. Aparicio 10 days PTA for general fatigue, pleuritic chest pain, and SOB. in-office TTE was notable for pericardial effusion. Patient deferred presentation to ED at that time. Presented with tachycardia and hypotension, TTE w/ evidence of caridac tamponade. Now s/p pericardiocentesis (12/20) with 650cc bloody fluid removed in the lab. No evidence of malignant effusion on initial evaluation of pericardial fluid, cultures/cytology pending.     Patient overnight 12.21 with afib RVR and agitation. Had RRT called again on 12/23 for afib w/ RVR. Rhythm is presently NSR w/ frequent APCs    #Pericardial Effusion  - S/p pericardiocentesis on 12/20 w/ 650 ccs of bloody output in the lab  - Pericardial drain removed on 12/26 after output had slowed to ~45ccs in a 36 hour period  - F/u  rheum recs  - continue ibuprofen 600mg PO TID, colchicine 0.6mg PO QDay   - Age appropriate cancer screening    #Afib w/ RVR  - Seemingly new i/s/o pericardial drain placement  - C/w metop tartrate 25mg BID  - plan for eventual A/C once drain is removed    ***recommendations are not final until attending attestation***    This patient was seen and examined personally by me and the plan was discussed with the fellow and/or resident above. Amendments were made as necessary to the above. Agree with the excellent note and plan above. Rpt TTE monday or sooner if new sx or hemodynamic changes.    30 minutes were spent on this encounter for extensive review of medical record details including labs and/or imaging studies and/or adjacent care team and consultant records, as well as review and reconciliation of current medications. Time was spent on obtaining a history, performing physical examination of patient, and answering patient and/or family questions regarding plan of care. Time was also spent discussing plan of care with patient’s other care team members including primary and consulting teams. Time also was spent on documentation of this encounter into the EHR.    Kyle Stanford MD, MPhil, [facility]  Cardiologist, [facility]  ; [facility] and [facility]  Email: [email or web address] Cardiology and Cardiovascular Surgery on-service contact/call information, go to [email or web address] and use [email or web address]" to login.  Outpatient Cardiology appointments, call [phone] to arrange with a colleague; I do not have outpatient Cardiology clinic. 63F PMH scleroderma, asthma with prior intubations admitted with cardiac tamponade. Initially presented to outpt cardiologist Dr. Aparicio 10 days PTA for general fatigue, pleuritic chest pain, and SOB. in-office TTE was notable for pericardial effusion. Patient deferred presentation to ED at that time. Presented with tachycardia and hypotension, TTE w/ evidence of caridac tamponade. Now s/p pericardiocentesis (12/20) with 650cc bloody fluid removed in the lab. No evidence of malignant effusion on initial evaluation of pericardial fluid, cultures/cytology pending.     Patient overnight 12.21 with afib RVR and agitation. Had RRT called again on 12/23 for afib w/ RVR. Rhythm is presently NSR w/ frequent APCs    #Pericardial Effusion  - S/p pericardiocentesis on 12/20 w/ 650 ccs of bloody output in the lab  - Pericardial drain removed on 12/26 after output had slowed to ~45ccs in a 36 hour period  - Tentatively plan for repeat limited TTE on 12/30. Obtain sooner if there are any hemodynamic changes  - F/u rheum recs  - continue ibuprofen 600mg PO TID, colchicine 0.6mg PO QDay   - Age appropriate cancer screening    #Afib w/ RVR  - Seemingly new i/s/o pericardial drain placement  - C/w metop tartrate 25mg BID  - [id number] score 2 | HAS-BLED score 2. Afib was in the setting of pericardial drain placement. Additionally, patient with history of multiple falls at home. As such, would defer introduction of anticoagulation at this time. Patient should follow up with her cardiologist outpatient for ambulatory cardiac monitoring to assess for recurrence of atrial fibrillation    ***recommendations are not final until attending attestation***    This patient was seen and examined personally by me and the plan was discussed with the fellow and/or resident above. Amendments were made as necessary to the above. Agree with the excellent note and plan above. Rpt TTE monday or sooner if new sx or hemodynamic changes.    30 minutes were spent on this encounter for extensive review of medical record details including labs and/or imaging studies and/or adjacent care team and consultant records, as well as review and reconciliation of current medications. Time was spent on obtaining a history, performing physical examination of patient, and answering patient and/or family questions regarding plan of care. Time was also spent discussing plan of care with patient’s other care team members including primary and consulting teams. Time also was spent on documentation of this encounter into the EHR.    Kyle Stanford MD, MPhil, Located within [facility]  Cardiologist, [facility]  ; [facility] and [facility]  Email: [email or web address] Cardiology and Cardiovascular Surgery on-service contact/call information, go to [email or web address] and use "[email or web address]" to login.  Outpatient Cardiology appointments, call [phone] to arrange with a colleague; I do not have outpatient Cardiology clinic.

## 2024-12-27 NOTE — PHYSICAL THERAPY INITIAL EVALUATION ADULT - GAIT DEVIATIONS NOTED, PT EVAL
decreased kofi/decreased velocity of limb motion/decreased step length/decreased stride length/decreased weight-shifting ability

## 2024-12-27 NOTE — PROVIDER CONTACT NOTE (OTHER) - SITUATION
After RRT, pt found to converted from Afib to SR 80s.
HR down to 45-46
Pt complaining of SOB, back pain and aniety

## 2024-12-28 PROCEDURE — 71045 X-RAY EXAM CHEST 1 VIEW: CPT | Mod: 26

## 2024-12-28 PROCEDURE — 99233 SBSQ HOSP IP/OBS HIGH 50: CPT

## 2024-12-28 RX ADMIN — Medication 600 MILLIGRAM(S): at 07:11

## 2024-12-28 RX ADMIN — Medication 20 MILLIGRAM(S): at 03:56

## 2024-12-28 RX ADMIN — Medication 20 MILLIGRAM(S): at 14:15

## 2024-12-28 RX ADMIN — Medication 600 MILLIGRAM(S): at 23:30

## 2024-12-28 RX ADMIN — SENNOSIDES 2 TABLET(S): 8.6 TABLET, FILM COATED ORAL at 22:30

## 2024-12-28 RX ADMIN — LEVOTHYROXINE SODIUM 112 MICROGRAM(S): 175 TABLET ORAL at 06:11

## 2024-12-28 RX ADMIN — Medication 600 MILLIGRAM(S): at 14:15

## 2024-12-28 RX ADMIN — Medication 20 MILLIGRAM(S): at 20:55

## 2024-12-28 RX ADMIN — NICOTINE POLACRILEX 1 PATCH: 4 LOZENGE ORAL at 09:00

## 2024-12-28 RX ADMIN — HEPARIN SODIUM 5000 UNIT(S): 1000 INJECTION, SOLUTION INTRAVENOUS; SUBCUTANEOUS at 06:11

## 2024-12-28 RX ADMIN — Medication 20 MILLIGRAM(S): at 08:20

## 2024-12-28 RX ADMIN — NICOTINE POLACRILEX 1 PATCH: 4 LOZENGE ORAL at 11:16

## 2024-12-28 RX ADMIN — Medication 25 MILLIGRAM(S): at 06:11

## 2024-12-28 RX ADMIN — LIDOCAINE 1 PATCH: 50 OINTMENT TOPICAL at 22:28

## 2024-12-28 RX ADMIN — IPRATROPIUM BROMIDE AND ALBUTEROL SULFATE 3 MILLILITER(S): .5; 2.5 SOLUTION RESPIRATORY (INHALATION) at 11:09

## 2024-12-28 RX ADMIN — PANTOPRAZOLE 40 MILLIGRAM(S): 40 TABLET, DELAYED RELEASE ORAL at 11:08

## 2024-12-28 RX ADMIN — COLCHICINE 0.6 MILLIGRAM(S): 0.6 CAPSULE ORAL at 11:08

## 2024-12-28 RX ADMIN — Medication 20 MILLIGRAM(S): at 02:56

## 2024-12-28 RX ADMIN — Medication 20 MILLIGRAM(S): at 08:19

## 2024-12-28 RX ADMIN — Medication 600 MILLIGRAM(S): at 22:29

## 2024-12-28 RX ADMIN — NICOTINE POLACRILEX 1 PATCH: 4 LOZENGE ORAL at 18:02

## 2024-12-28 RX ADMIN — Medication 30 MILLIGRAM(S): at 08:18

## 2024-12-28 RX ADMIN — Medication 20 MILLIGRAM(S): at 19:57

## 2024-12-28 RX ADMIN — Medication 600 MILLIGRAM(S): at 06:11

## 2024-12-28 RX ADMIN — NICOTINE POLACRILEX 1 PATCH: 4 LOZENGE ORAL at 11:08

## 2024-12-28 RX ADMIN — HEPARIN SODIUM 5000 UNIT(S): 1000 INJECTION, SOLUTION INTRAVENOUS; SUBCUTANEOUS at 13:48

## 2024-12-28 RX ADMIN — HEPARIN SODIUM 5000 UNIT(S): 1000 INJECTION, SOLUTION INTRAVENOUS; SUBCUTANEOUS at 22:29

## 2024-12-28 RX ADMIN — Medication 30 MILLIGRAM(S): at 20:55

## 2024-12-28 RX ADMIN — Medication 600 MILLIGRAM(S): at 13:47

## 2024-12-28 RX ADMIN — Medication 25 MILLIGRAM(S): at 17:58

## 2024-12-28 RX ADMIN — Medication 30 MILLIGRAM(S): at 19:56

## 2024-12-28 RX ADMIN — Medication 30 MILLIGRAM(S): at 08:20

## 2024-12-28 RX ADMIN — BACLOFEN 10 MILLIGRAM(S): 10 TABLET ORAL at 22:28

## 2024-12-28 RX ADMIN — Medication 20 MILLIGRAM(S): at 13:47

## 2024-12-28 RX ADMIN — BACLOFEN 10 MILLIGRAM(S): 10 TABLET ORAL at 11:08

## 2024-12-28 NOTE — PROGRESS NOTE ADULT - ASSESSMENT
63 year old female with hx of scleroderma, asthma (with prior intubations), hypothyroidism, presenting with chest pain and shortness of breath.     Patient found to have tamponade likely serositis 2/2 to [address] without ILD, non specific LAD and nodules. Pulmonary consulted for effusion. Pleural effusion is small on the left and trace on right.  IT does not appear to be increasing.  Likely secondary to inflammation from viral infection.      Plan:  Continue to monitor symptoms.    No indication for thoracentesis at this time.

## 2024-12-28 NOTE — PROGRESS NOTE ADULT - SUBJECTIVE AND OBJECTIVE BOX
Personal review of data, imaging and discussion with medical team.  [facility] DIVISION OF PULMONARY, CRITICAL CARE and SLEEP MEDICINE  PULMONARY PROGRESS NOTE  OFFICE NUMBER: [phone]    PATIENT INFORMATION:  NAME: AYLEEN BOSWELL:  MRN: [id number]    CHIEF COMPLAINT: Patient is a 64y old  Female who presents with a chief complaint of dyspnea (28 Dec 2024 12:12)      [x] INITIAL CONSULT, H&P, FAMILY HISTORY and PAST MEDICAL AND SURGICAL HISTORY REVIEWED    OVERNIGHT EVENTS or CHANGES TO HPI:   no overnight events  ========================REVIEW OF SYSTEMS========================  CONSTITUTIONAL: feels achy due to weather  CARDIOVASCULAR: denies CP  PULMONARY: denies SOB  [x] REMAINING REVIEW OF SYSTEMS NEGATIVE  [] UNABLE TO OBTAIN REVIEW OF SYSTEMS DUE TO _______________    ========================MEDICATIONS=============================  MEDICATIONS  (STANDING):  albuterol/ipratropium for Nebulization 3 milliLiter(s) Nebulizer every 6 hours  baclofen 10 milliGRAM(s) Oral <User Schedule>  colchicine 0.6 milliGRAM(s) Oral daily  diphenhydrAMINE Injectable 50 milliGRAM(s) IV Push once  heparin   Injectable 5000 Unit(s) SubCutaneous every 8 hours  ibuprofen  Tablet. 600 milliGRAM(s) Oral every 8 hours  levothyroxine 112 MICROGram(s) Oral daily  lidocaine   4% Patch 1 Patch Transdermal daily  lidocaine   4% Patch 1 Patch Transdermal daily  metoprolol tartrate 25 milliGRAM(s) Oral every 12 hours  morphine ER Tablet 30 milliGRAM(s) Oral <User Schedule>  nicotine - 21 mG/24Hr(s) Patch 1 Patch Transdermal every 24 hours  ondansetron Injectable 4 milliGRAM(s) IV Push once  oxyCODONE    IR 20 milliGRAM(s) Oral <User Schedule>  pantoprazole    Tablet 40 milliGRAM(s) Oral daily  polyethylene glycol 3350 17 Gram(s) Oral two times a day  potassium phosphate IVPB 15 milliMole(s) IV Intermittent once  senna 2 Tablet(s) Oral at bedtime      MEDICATIONS  (PRN):  aluminum hydroxide/magnesium hydroxide/simethicone Suspension 30 milliLiter(s) Oral every 4 hours PRN Dyspepsia  LORazepam     Tablet 1 milliGRAM(s) Oral two times a day PRN Anxiety      ========================PHYSICAL EXAM============================    VITALS: ICU Vital Signs Last 24 Hrs  T(C): 36.3 (28 Dec 2024 11:00), Max: 36.7 (28 Dec 2024 04:21)  T(F): 97.4 (28 Dec 2024 11:00), Max: 98 (28 Dec 2024 04:21)  HR: 57 (28 Dec 2024 11:00) (53 - 80)  BP: 128/60 (28 Dec 2024 11:00) (101/63 - 128/60)  BP(mean): --  ABP: --  ABP(mean): --  RR: 18 (28 Dec 2024 11:00) (18 - 18)  SpO2: 96% (28 Dec 2024 11:00) (91% - 97%)    O2 Parameters below as of 28 Dec 2024 11:00  Patient On (Oxygen Delivery Method): nasal cannula  O2 Flow (L/min): 3          INTAKE and OUTPUT: I&O's Summary    27 Dec 2024 07:01  -  28 Dec 2024 07:00  --------------------------------------------------------  IN: 360 mL / OUT: 500 mL / NET: -140 mL        GENERAL: awake, alert appears comfortably  CARDIOVASCULAR: RRR  RESPIRATORY: clear to ausculation bilaterally  EXTREMITIES without edema  PSYCHIATRIC alert, oriented, calm    ========================LABORATORY RESULTS AND IMAGING=============                                                               Creatinine Trend: 0.47<--, 0.39<--, 0.49<--, 0.48<--, 0.48<--, 0.50<--    CXR- 12/28 small pleural effusion on left, essentially unchanged    [x] RADIOLOGY REVIEWED AND INTERPRETED BY ME      THANK YOU FOR ALLOWING US TO PARTICIPATE IN THE CARE OF THIS PATIENT

## 2024-12-28 NOTE — PROGRESS NOTE ADULT - SUBJECTIVE AND OBJECTIVE BOX
no complaints.     GENERAL: No fevers, no chills.  EYES: No blurry vision,  No photophobia  ENT: No sore throat.  No dysphagia  Cardiovascular: No chest pain, palpitations, orthopnea  Pulmonary: No cough, no wheezing. No shortness of breath  Gastrointestinal: No abdominal pain, no diarrhea, no constipation  Dermatology:  No rashes.  Neuro: No Headache.  No vertigo.  No dizziness.  Psych: No anxiety, no depression.  Denies suicidal thoughts.    MEDICATIONS  (STANDING):  albuterol/ipratropium for Nebulization 3 milliLiter(s) Nebulizer every 6 hours  baclofen 10 milliGRAM(s) Oral <User Schedule>  colchicine 0.6 milliGRAM(s) Oral daily  diphenhydrAMINE Injectable 50 milliGRAM(s) IV Push once  heparin   Injectable 5000 Unit(s) SubCutaneous every 8 hours  ibuprofen  Tablet. 600 milliGRAM(s) Oral every 8 hours  levothyroxine 112 MICROGram(s) Oral daily  lidocaine   4% Patch 1 Patch Transdermal daily  lidocaine   4% Patch 1 Patch Transdermal daily  metoprolol tartrate 25 milliGRAM(s) Oral every 12 hours  morphine ER Tablet 30 milliGRAM(s) Oral <User Schedule>  nicotine - 21 mG/24Hr(s) Patch 1 Patch Transdermal every 24 hours  ondansetron Injectable 4 milliGRAM(s) IV Push once  oxyCODONE    IR 20 milliGRAM(s) Oral <User Schedule>  pantoprazole    Tablet 40 milliGRAM(s) Oral daily  polyethylene glycol 3350 17 Gram(s) Oral two times a day  potassium phosphate IVPB 15 milliMole(s) IV Intermittent once  senna 2 Tablet(s) Oral at bedtime    MEDICATIONS  (PRN):  aluminum hydroxide/magnesium hydroxide/simethicone Suspension 30 milliLiter(s) Oral every 4 hours PRN Dyspepsia  LORazepam     Tablet 1 milliGRAM(s) Oral two times a day PRN Anxiety    Vital Signs Last 24 Hrs  T(C): 36.3 (28 Dec 2024 11:00), Max: 36.7 (28 Dec 2024 04:21)  T(F): 97.4 (28 Dec 2024 11:00), Max: 98 (28 Dec 2024 04:21)  HR: 57 (28 Dec 2024 11:00) (53 - 80)  BP: 128/60 (28 Dec 2024 11:00) (101/63 - 128/60)  BP(mean): --  RR: 18 (28 Dec 2024 11:00) (18 - 18)  SpO2: 96% (28 Dec 2024 11:00) (91% - 97%)    Parameters below as of 28 Dec 2024 11:00  Patient On (Oxygen Delivery Method): nasal cannula  O2 Flow (L/min): 3    PHYSICAL EXAM:  GENERAL APPEARANCE: Uncomfortable appearing female resting in bed   HEENT:  PERRL, EOMI. hearing grossly intact.  CARDIAC: Normal S1 and S2. no mrg. RRR  LUNGS: No wheezing noted today, lungs clear to auscultation b/l  ABDOMEN: tenderness on palpation diffusely   MUSCULOSKELETAL: No swelling/reddness over extremities  EXTREMITIES: No edema. Peripheral pulses intact.   NEUROLOGICAL: Non focal.  PSYCHIATRIC: AOx3     .  LABS:                     RADIOLOGY, EKG & ADDITIONAL TESTS: Reviewed.

## 2024-12-29 PROCEDURE — 99233 SBSQ HOSP IP/OBS HIGH 50: CPT

## 2024-12-29 RX ORDER — LORAZEPAM 1 MG/1
1 TABLET ORAL
Refills: 0 | Status: DISCONTINUED | OUTPATIENT
Start: 2024-12-29 | End: 2024-12-31

## 2024-12-29 RX ADMIN — Medication 30 MILLIGRAM(S): at 10:12

## 2024-12-29 RX ADMIN — Medication 20 MILLIGRAM(S): at 21:06

## 2024-12-29 RX ADMIN — Medication 600 MILLIGRAM(S): at 05:16

## 2024-12-29 RX ADMIN — HEPARIN SODIUM 5000 UNIT(S): 1000 INJECTION, SOLUTION INTRAVENOUS; SUBCUTANEOUS at 21:07

## 2024-12-29 RX ADMIN — Medication 600 MILLIGRAM(S): at 07:57

## 2024-12-29 RX ADMIN — NICOTINE POLACRILEX 1 PATCH: 4 LOZENGE ORAL at 10:24

## 2024-12-29 RX ADMIN — HEPARIN SODIUM 5000 UNIT(S): 1000 INJECTION, SOLUTION INTRAVENOUS; SUBCUTANEOUS at 05:17

## 2024-12-29 RX ADMIN — COLCHICINE 0.6 MILLIGRAM(S): 0.6 CAPSULE ORAL at 15:02

## 2024-12-29 RX ADMIN — Medication 20 MILLIGRAM(S): at 15:08

## 2024-12-29 RX ADMIN — Medication 20 MILLIGRAM(S): at 01:52

## 2024-12-29 RX ADMIN — Medication 600 MILLIGRAM(S): at 15:02

## 2024-12-29 RX ADMIN — Medication 20 MILLIGRAM(S): at 16:00

## 2024-12-29 RX ADMIN — HEPARIN SODIUM 5000 UNIT(S): 1000 INJECTION, SOLUTION INTRAVENOUS; SUBCUTANEOUS at 15:08

## 2024-12-29 RX ADMIN — LIDOCAINE 1 PATCH: 50 OINTMENT TOPICAL at 08:22

## 2024-12-29 RX ADMIN — Medication 20 MILLIGRAM(S): at 09:56

## 2024-12-29 RX ADMIN — BACLOFEN 10 MILLIGRAM(S): 10 TABLET ORAL at 09:50

## 2024-12-29 RX ADMIN — LIDOCAINE 1 PATCH: 50 OINTMENT TOPICAL at 21:07

## 2024-12-29 RX ADMIN — Medication 600 MILLIGRAM(S): at 16:00

## 2024-12-29 RX ADMIN — NICOTINE POLACRILEX 1 PATCH: 4 LOZENGE ORAL at 12:53

## 2024-12-29 RX ADMIN — Medication 30 MILLIGRAM(S): at 21:06

## 2024-12-29 RX ADMIN — Medication 20 MILLIGRAM(S): at 10:12

## 2024-12-29 RX ADMIN — Medication 600 MILLIGRAM(S): at 21:06

## 2024-12-29 RX ADMIN — BACLOFEN 10 MILLIGRAM(S): 10 TABLET ORAL at 21:08

## 2024-12-29 RX ADMIN — LIDOCAINE 1 PATCH: 50 OINTMENT TOPICAL at 10:23

## 2024-12-29 RX ADMIN — Medication 30 MILLIGRAM(S): at 09:50

## 2024-12-29 RX ADMIN — NICOTINE POLACRILEX 1 PATCH: 4 LOZENGE ORAL at 18:37

## 2024-12-29 RX ADMIN — LEVOTHYROXINE SODIUM 112 MICROGRAM(S): 175 TABLET ORAL at 05:17

## 2024-12-29 RX ADMIN — NICOTINE POLACRILEX 1 PATCH: 4 LOZENGE ORAL at 08:23

## 2024-12-29 RX ADMIN — Medication 20 MILLIGRAM(S): at 02:50

## 2024-12-29 NOTE — PROGRESS NOTE ADULT - SUBJECTIVE AND OBJECTIVE BOX
no complaints.     GENERAL: No fevers, no chills.  EYES: No blurry vision,  No photophobia  ENT: No sore throat.  No dysphagia  Cardiovascular: No chest pain, palpitations, orthopnea  Pulmonary: No cough, no wheezing. No shortness of breath  Gastrointestinal: No abdominal pain, no diarrhea, no constipation  Dermatology:  No rashes.  Neuro: No Headache.  No vertigo.  No dizziness.  Psych: No anxiety, no depression.  Denies suicidal thoughts.    MEDICATIONS  (STANDING):  albuterol/ipratropium for Nebulization 3 milliLiter(s) Nebulizer every 6 hours  baclofen 10 milliGRAM(s) Oral <User Schedule>  colchicine 0.6 milliGRAM(s) Oral daily  diphenhydrAMINE Injectable 50 milliGRAM(s) IV Push once  heparin   Injectable 5000 Unit(s) SubCutaneous every 8 hours  ibuprofen  Tablet. 600 milliGRAM(s) Oral every 8 hours  levothyroxine 112 MICROGram(s) Oral daily  lidocaine   4% Patch 1 Patch Transdermal daily  lidocaine   4% Patch 1 Patch Transdermal daily  metoprolol tartrate 25 milliGRAM(s) Oral every 12 hours  morphine ER Tablet 30 milliGRAM(s) Oral <User Schedule>  nicotine - 21 mG/24Hr(s) Patch 1 Patch Transdermal every 24 hours  ondansetron Injectable 4 milliGRAM(s) IV Push once  oxyCODONE    IR 20 milliGRAM(s) Oral <User Schedule>  pantoprazole    Tablet 40 milliGRAM(s) Oral daily  polyethylene glycol 3350 17 Gram(s) Oral two times a day  potassium phosphate IVPB 15 milliMole(s) IV Intermittent once  senna 2 Tablet(s) Oral at bedtime    MEDICATIONS  (PRN):  aluminum hydroxide/magnesium hydroxide/simethicone Suspension 30 milliLiter(s) Oral every 4 hours PRN Dyspepsia  LORazepam     Tablet 1 milliGRAM(s) Oral two times a day PRN Anxiety    Vital Signs Last 24 Hrs  T(C): 36.6 (29 Dec 2024 04:30), Max: 36.6 (28 Dec 2024 19:32)  T(F): 97.8 (29 Dec 2024 04:30), Max: 97.8 (28 Dec 2024 19:32)  HR: 57 (29 Dec 2024 04:30) (57 - 58)  BP: 118/62 (29 Dec 2024 04:30) (112/69 - 118/62)  BP(mean): --  RR: 18 (29 Dec 2024 04:30) (18 - 18)  SpO2: 91% (29 Dec 2024 04:30) (91% - 91%)    Parameters below as of 29 Dec 2024 04:30  Patient On (Oxygen Delivery Method): room air    PHYSICAL EXAM:  GENERAL APPEARANCE: NAD  HEENT:  PERRL, EOMI. hearing grossly intact.  CARDIAC: Normal S1 and S2. no mrg. RRR  LUNGS: No wheezing noted today, lungs clear to auscultation b/l  ABDOMEN: tenderness on palpation diffusely   MUSCULOSKELETAL: No swelling/ redness over extremities  EXTREMITIES: No edema. Peripheral pulses intact.   NEUROLOGICAL: Non focal.  PSYCHIATRIC: AOx3     .  LABS:                     RADIOLOGY, EKG & ADDITIONAL TESTS: Reviewed.

## 2024-12-29 NOTE — PROGRESS NOTE ADULT - NSPROGADDITIONALINFOA_GEN_ALL_CORE
disposition: tte pending, plan for drain removal; assess for home o2  discussed with acp  discussed with patients  12/23 and 12/25    Jazzy Burden D.O.  Division of Hospital Medicine  Available on MS Teams
disposition: tte pending 12/30; assess for home o2  discussed with acp  discussed with patients  12/23 and 12/25 and 12/26    Jazzy Burden D.O.  Division of Hospital Medicine  Available on MS Teams
disposition: monitoring drain output; tte and ct chest and abd xr pending
disposition: tte pending 12/30  discussed with acp  discussed with patients  12/23 and 12/25 and 12/26    Jazzy Burden D.O.  Division of Hospital Medicine  Available on MS Teams
disposition: monitoring drain output; tte prior to drain removal; will need home o2  discussed with acp  discussed with patients dena Burden D.O.  Division of Hospital Medicine  Available on MS Teams
disposition: tte pending 12/30, needs home o2-- DC planning 12/31  discussed with acp  discussed with patients  12/23 and 12/25 and 12/26 and 12/28    Jazzy Burden D.O.  Division of Hospital Medicine  Available on MS Teams
disposition: monitoring drain output; tte prior to drain removal; will likely need home o2  discussed with acp  discussed with patients  12/23    Jazzy Burden D.O.  Division of Hospital Medicine  Available on MS Teams

## 2024-12-29 NOTE — PROGRESS NOTE ADULT - REASON FOR ADMISSION
progressive dyspnea
Dyspnea
Pericardial effusion
Tamponade
SOB
dyspnea

## 2024-12-30 ENCOUNTER — TRANSCRIPTION ENCOUNTER (OUTPATIENT)
Age: 64
End: 2024-12-30

## 2024-12-30 ENCOUNTER — RESULT REVIEW (OUTPATIENT)
Age: 64
End: 2024-12-30

## 2024-12-30 PROCEDURE — 99233 SBSQ HOSP IP/OBS HIGH 50: CPT

## 2024-12-30 PROCEDURE — 93308 TTE F-UP OR LMTD: CPT | Mod: 26

## 2024-12-30 PROCEDURE — 99233 SBSQ HOSP IP/OBS HIGH 50: CPT | Mod: GC

## 2024-12-30 RX ORDER — METOPROLOL TARTRATE 50 MG
50 TABLET ORAL
Refills: 0 | Status: DISCONTINUED | OUTPATIENT
Start: 2024-12-30 | End: 2024-12-31

## 2024-12-30 RX ORDER — APIXABAN 5 MG/1
5 TABLET, FILM COATED ORAL EVERY 12 HOURS
Refills: 0 | Status: DISCONTINUED | OUTPATIENT
Start: 2024-12-30 | End: 2024-12-31

## 2024-12-30 RX ADMIN — APIXABAN 5 MILLIGRAM(S): 5 TABLET, FILM COATED ORAL at 18:19

## 2024-12-30 RX ADMIN — IPRATROPIUM BROMIDE AND ALBUTEROL SULFATE 3 MILLILITER(S): .5; 2.5 SOLUTION RESPIRATORY (INHALATION) at 13:41

## 2024-12-30 RX ADMIN — Medication 20 MILLIGRAM(S): at 08:24

## 2024-12-30 RX ADMIN — Medication 600 MILLIGRAM(S): at 13:52

## 2024-12-30 RX ADMIN — LIDOCAINE 1 PATCH: 50 OINTMENT TOPICAL at 07:52

## 2024-12-30 RX ADMIN — NICOTINE POLACRILEX 1 PATCH: 4 LOZENGE ORAL at 13:52

## 2024-12-30 RX ADMIN — Medication 20 MILLIGRAM(S): at 02:24

## 2024-12-30 RX ADMIN — NICOTINE POLACRILEX 1 PATCH: 4 LOZENGE ORAL at 07:53

## 2024-12-30 RX ADMIN — Medication 600 MILLIGRAM(S): at 13:42

## 2024-12-30 RX ADMIN — LIDOCAINE 1 PATCH: 50 OINTMENT TOPICAL at 07:53

## 2024-12-30 RX ADMIN — Medication 25 MILLIGRAM(S): at 10:03

## 2024-12-30 RX ADMIN — PANTOPRAZOLE 40 MILLIGRAM(S): 40 TABLET, DELAYED RELEASE ORAL at 05:00

## 2024-12-30 RX ADMIN — Medication 20 MILLIGRAM(S): at 20:55

## 2024-12-30 RX ADMIN — LIDOCAINE 1 PATCH: 50 OINTMENT TOPICAL at 08:11

## 2024-12-30 RX ADMIN — Medication 20 MILLIGRAM(S): at 14:13

## 2024-12-30 RX ADMIN — Medication 17 GRAM(S): at 05:04

## 2024-12-30 RX ADMIN — Medication 20 MILLIGRAM(S): at 08:55

## 2024-12-30 RX ADMIN — HEPARIN SODIUM 5000 UNIT(S): 1000 INJECTION, SOLUTION INTRAVENOUS; SUBCUTANEOUS at 13:42

## 2024-12-30 RX ADMIN — Medication 30 MILLIGRAM(S): at 08:55

## 2024-12-30 RX ADMIN — Medication 600 MILLIGRAM(S): at 05:00

## 2024-12-30 RX ADMIN — LEVOTHYROXINE SODIUM 112 MICROGRAM(S): 175 TABLET ORAL at 05:00

## 2024-12-30 RX ADMIN — Medication 30 MILLIGRAM(S): at 20:55

## 2024-12-30 RX ADMIN — Medication 20 MILLIGRAM(S): at 21:55

## 2024-12-30 RX ADMIN — HEPARIN SODIUM 5000 UNIT(S): 1000 INJECTION, SOLUTION INTRAVENOUS; SUBCUTANEOUS at 05:00

## 2024-12-30 RX ADMIN — COLCHICINE 0.6 MILLIGRAM(S): 0.6 CAPSULE ORAL at 13:41

## 2024-12-30 RX ADMIN — Medication 30 MILLIGRAM(S): at 08:24

## 2024-12-30 RX ADMIN — NICOTINE POLACRILEX 1 PATCH: 4 LOZENGE ORAL at 13:41

## 2024-12-30 RX ADMIN — Medication 20 MILLIGRAM(S): at 13:44

## 2024-12-30 RX ADMIN — SENNOSIDES 2 TABLET(S): 8.6 TABLET, FILM COATED ORAL at 20:55

## 2024-12-30 RX ADMIN — BACLOFEN 10 MILLIGRAM(S): 10 TABLET ORAL at 10:03

## 2024-12-30 RX ADMIN — IPRATROPIUM BROMIDE AND ALBUTEROL SULFATE 3 MILLILITER(S): .5; 2.5 SOLUTION RESPIRATORY (INHALATION) at 05:04

## 2024-12-30 RX ADMIN — BACLOFEN 10 MILLIGRAM(S): 10 TABLET ORAL at 20:56

## 2024-12-30 RX ADMIN — IPRATROPIUM BROMIDE AND ALBUTEROL SULFATE 3 MILLILITER(S): .5; 2.5 SOLUTION RESPIRATORY (INHALATION) at 18:19

## 2024-12-30 RX ADMIN — Medication 30 MILLIGRAM(S): at 21:55

## 2024-12-30 RX ADMIN — LIDOCAINE 1 PATCH: 50 OINTMENT TOPICAL at 20:53

## 2024-12-30 NOTE — PROGRESS NOTE ADULT - PROBLEM SELECTOR PLAN 3
No wheezing appreciated on exam. Lower suspicion for asthma exacerbation but cannot exclude. Also possibly due to scleroderma (PAH? ILD?) vs pericardial effusion.   - VBG showing mod hypercapnia, appears chronic.   - Will order duoneb q6h for now given patient still feels SOB and on 4LNC.   - Maintain O2 sat 90-93%  - Per rheumatology no steroids due to risk of renal crisis
- not in acute exacerbation  - Maintain O2 sat 90-93%- currently 91% on room air   - Per rheumatology, no steroids due to risk of renal crisis
- not in acute exacerbation  - Maintain O2 sat 90-93%  - Per rheumatology, no steroids due to risk of renal crisis
- not in acute exacerbation  - Maintain O2 sat 90-93%  - Per rheumatology, no steroids due to risk of renal crisis
Reported hx of scleroderma. unclear history and progression of disease. Patient not able to provide additional history or information at the time of encounter. Pt does not currently seem to be on any medications. Unclear if on steroids currently.   - Diffuse pain possibly scleroderma flare? Physical and lab findings overall unremarkable.  - Patient on chronic opioids for pain. Will continue for now. ISTOP reviewed reference #: [id number]  - Continue morphine 30mg ER, oxycodone IR 20mg, baclofen 10mg  - Will obtain ANGELA (multiplex), scleroderma ab, centromere, RNA tere 3, dsDNA, C3, C4, FREDI, sjogrens, RF, CCP, UA, Urine prot/cr   -Will obtain coxsackie, EBV, RVP, Hepatitis panel, hep B core ab, quant  - Speech and swallow eval for reported dysphagia   - will obtain Chest CY to further characterize apical granuloma finding on CXR/evaluate for ILD
- not in acute exacerbation  - Maintain O2 sat 90-93%  - Per rheumatology, no steroids due to risk of renal crisis
- not in acute exacerbation  - Maintain O2 sat 90-93%  - Per rheumatology, no steroids due to risk of renal crisis
- not in acute exacerbation  - Maintain O2 sat 90-93%- will need home o2 at discharge   - Per rheumatology, no steroids due to risk of renal crisis
- not in acute exacerbation    noted to be hypoxic requiring NC likely due to similar process going on in pericardium   - CT chest with pulm nodules, small b/l pleural effusions not amenable to tap per pulm  - repeat CT chest outpatient  - will arrange for home O2
- not in acute exacerbation  - Maintain O2 sat 90-93%  - Per rheumatology, no steroids due to risk of renal crisis

## 2024-12-30 NOTE — PROGRESS NOTE ADULT - PROBLEM SELECTOR PLAN 7
DVT ppx - heparin subq
DVT ppx - on eliquis    Dispo- d/c plan home 12/31 when home O2 set up- discussed with patient. will need close outpatient follow up with cards, rheum, and pain management.
DVT ppx - heparin subq
DVT ppx - heparin subq  Diet - regular  Dispo - pending  Discussed with ACP Edilma Cooley
DVT ppx - heparin subq

## 2024-12-30 NOTE — PROGRESS NOTE ADULT - TIME BILLING
The necessity of the time spent during the encounter on this date of service was due to:     - Ordering, reviewing, and interpreting labs, testing, and imaging.  - Independently obtaining a review of systems and performing a physical exam  - Reviewing prior hospitalization and where necessary, outpatient records.  - Counselling and educating patient and family regarding interpretation of aforementioned items and plan of care.
The necessity of the time spent during the encounter on this date of service was due to:     - Ordering, reviewing, and interpreting labs, testing, and imaging.  - Independently obtaining a review of systems and performing a physical exam  - Reviewing prior hospitalization and where necessary, outpatient records.  - Counselling and educating patient and family regarding interpretation of aforementioned items and plan of care.
Reviewing the EMR, vitals, imaging, medication list, recent labs, prior records and coordinating care with medical providers. This time excludes procedures and teaching.
Personal review of data, imaging and discussion with medical team.
The necessity of the time spent during the encounter on this date of service was due to:     - Ordering, reviewing, and interpreting labs, testing, and imaging.  - Independently obtaining a review of systems and performing a physical exam  - Reviewing prior hospitalization and where necessary, outpatient records.  - Counselling and educating patient and family regarding interpretation of aforementioned items and plan of care.
The necessity of the time spent during the encounter on this date of service was due to:     - Ordering, reviewing, and interpreting labs, testing, and imaging.  - Independently obtaining a review of systems and performing a physical exam  - Reviewing prior hospitalization and where necessary, outpatient records.  - Counselling and educating patient and family regarding interpretation of aforementioned items and plan of care.
reviewing medical record, evaluating the patient, discussing plan of care with patient, documenting in EMR.
The necessity of the time spent during the encounter on this date of service was due to:     - Ordering, reviewing, and interpreting labs, testing, and imaging.  - Independently obtaining a review of systems and performing a physical exam  - Reviewing prior hospitalization and where necessary, outpatient records.  - Counselling and educating patient and family regarding interpretation of aforementioned items and plan of care.

## 2024-12-30 NOTE — PROGRESS NOTE ADULT - PROBLEM SELECTOR PLAN 6
Was the patient seen in the last year in this department? Yes 12/23/19    Does patient have an active prescription for medications requested? Yes    Received Request Via: Patient   Pt stated that Joe forte has Olanzepine-Fluoxetine in a 6-50mg pill and would like it.   - resolved  - abdominal XR negative for obstruction  - s/p relistor 12/24  - c/w bowel regimen   - monitor for BM

## 2024-12-30 NOTE — PROGRESS NOTE ADULT - PROBLEM SELECTOR PROBLEM 6
Constipated
Prophylactic measure
Constipated

## 2024-12-30 NOTE — DISCHARGE NOTE PROVIDER - HOSPITAL COURSE
HPI:  63 year old female with hx of scleroderma, asthma (with prior intubations), hypothyroidism, presenting with chest pain and shortness of breath. Patient went to see her cardiologist Dr. Aparicio 10 days ago where an in-office TTE was performed which revealed a pericardial effusion. She was recommended  to come to ED for evaluation but did not present at that time due to personal reasons (recent passing of her son). She came to ED today with worsening symptoms. Pt found to be tachycardic, intermittently hypotensive with MAP in 50s, responsive to IVF. POCUS revealed mod-large pericardial effusion with echographic evidence of tamponade physiology. Pt was taken to the cath lab with cardiology for urgent pericardiocentesis, now with pericardial drain in place.    Patient was examined in the [facility]. On arousal, patient was in moderate discomfort/stress saying she feels pain everywhere. Unable to obtain further history from patient; she continues to express wanting to go back to sleep. Pt admitted for further management.  (20 Dec 2024 22:11)    Hospital Course: Patient was admitted for further medical management s/p pericardiocentesis with placement of a pericardial drain, which was removed on 12/26. A repeat TTE was obtained on 12/30 to assess the pericardial effusion. She was started on ibuprofen and colchicine for pericarditis, and followed by Cardiology and Rheumatology.Ibuprofen was discontinued and colchicine 0.6mg PO was continued QDay, hough duration of therapy 3 months     The patient also experienced an episode of paroxysmal atrial fibrillation with rapid ventricular response (RVR) that was unresponsive to diltiazem. RRT was called on 12/21, and she was subsequently loaded with digoxin. Her heart rate is now controlled, and she is continuing metoprolol tartrate. [id number] score noted to be 2 | patient noted with runs of pAF even after pericardial drain has been removed. Patient was started on eliquis 5 mg BID; she will follow up with Cardiology as an outpatient.      Her asthma was not in acute exacerbation during this admission. She will require home oxygen to maintain oxygen saturation between 90-93%. Steroids were avoided due to the risk of renal crisis in the setting of her scleroderma.    Her scleroderma contributes to diffuse pain, for which she is on chronic opioid therapy. This was reviewed and will be continued, consisting of morphine, oxycodone, and baclofen. A chest x-ray revealed an apical granuloma, and a chest CT has been ordered to further characterize this finding and assess for ILD - which revealed pulmonary nodules measuring up to 4 mm as well as Indeterminant expansile sclerotic lesion in the sternum, which is new since 2012. Patient is recommending for a follow-up CT chest in 12 months     Patient experienced constipation during her admission, which resolved after treatment with methylnaltrexone (Relistor) and a bowel regimen. An abdominal x-ray was negative for obstruction.    Discharge/Dispo/Med rec discussed with attending Dr. ____. Patient medically cleared for discharge ____ with outpatient follow up  .      Important Medication Changes and Reason:    Active or Pending Issues Requiring Follow-up:  - CT chest revealed pulmonary nodules measuring up to 4 mm as well as Indeterminant expansile sclerotic lesion in the sternum, which is new since 2012. Patient is recommending for a follow-up CT chest in 12 months   - On discharge, patient will need close follow up with Rheumatology, Cardiology, Pulmonology and GI to closely follow scleroderma disease activity  - Follow up with Dr. Crowe for continued pain management after discharge; Out patient pain practice list provided as pt would like to find a different provider. Suggested Dr. Inder Rodriguez as he is comfortable w/ high dose opioids.    Advanced Directives:   [ ] Full code  [ ] DNR  [ ] Hospice    Discharge Diagnoses:         HPI:  63 year old female with hx of scleroderma, asthma (with prior intubations), hypothyroidism, presenting with chest pain and shortness of breath. Patient went to see her cardiologist Dr. Aparicio 10 days ago where an in-office TTE was performed which revealed a pericardial effusion. She was recommended  to come to ED for evaluation but did not present at that time due to personal reasons (recent passing of her son). She came to ED today with worsening symptoms. Pt found to be tachycardic, intermittently hypotensive with MAP in 50s, responsive to IVF. POCUS revealed mod-large pericardial effusion with echographic evidence of tamponade physiology. Pt was taken to the cath lab with cardiology for urgent pericardiocentesis, now with pericardial drain in place.    Patient was examined in the [facility]. On arousal, patient was in moderate discomfort/stress saying she feels pain everywhere. Unable to obtain further history from patient; she continues to express wanting to go back to sleep. Pt admitted for further management.  (20 Dec 2024 22:11)    Hospital Course: Patient was admitted for further medical management s/p pericardiocentesis with placement of a pericardial drain, which was removed on 12/26. A repeat TTE was obtained on 12/30 to assess the pericardial effusion. She was started on ibuprofen and colchicine for pericarditis, and followed by Cardiology and Rheumatology.Ibuprofen was discontinued and colchicine 0.6mg PO was continued QDay, hough duration of therapy 3 months     The patient also experienced an episode of paroxysmal atrial fibrillation with rapid ventricular response (RVR) that was unresponsive to diltiazem. RRT was called on 12/21, and she was subsequently loaded with digoxin. Her heart rate is now controlled, and she is continuing metoprolol tartrate. [id number] score noted to be 2 | patient noted with runs of pAF even after pericardial drain has been removed. Patient was started on eliquis 5 mg BID; she will follow up with Cardiology as an outpatient.      Her asthma was not in acute exacerbation during this admission. She will require home oxygen to maintain oxygen saturation between 90-93%. Steroids were avoided due to the risk of renal crisis in the setting of her scleroderma.    Her scleroderma contributes to diffuse pain, for which she is on chronic opioid therapy. This was reviewed and will be continued, consisting of morphine, oxycodone, and baclofen. A chest x-ray revealed an apical granuloma, and a chest CT has been ordered to further characterize this finding and assess for ILD - which revealed pulmonary nodules measuring up to 4 mm as well as Indeterminant expansile sclerotic lesion in the sternum, which is new since 2012. Patient is recommending for a follow-up CT chest in 12 months     Patient experienced constipation during her admission, which resolved after treatment with methylnaltrexone (Relistor) and a bowel regimen. An abdominal x-ray was negative for obstruction.    Discharge/Dispo/Med rec discussed with attending Dr. Medina. Patient medically cleared for discharge home with outpatient follow up  .      Important Medication Changes and Reason:    Active or Pending Issues Requiring Follow-up:  - CT chest revealed pulmonary nodules measuring up to 4 mm as well as Indeterminant expansile sclerotic lesion in the sternum, which is new since 2012. Patient is recommending for a follow-up CT chest in 12 months   - On discharge, patient will need close follow up with Rheumatology, Cardiology, Pulmonology and GI to closely follow scleroderma disease activity  - Follow up with Dr. Crowe for continued pain management after discharge; Out patient pain practice list provided as pt would like to find a different provider. Suggested Dr. Inder Rodriguez as he is comfortable w/ high dose opioids.    Advanced Directives:   [ x] Full code  [ ] DNR  [ ] Hospice    Discharge Diagnoses:  pericardial tamponade  acute hypoxic respiratory failure  pafib  chronic pain  scleroderma   asthma (with prior intubations)   hypothyroidism     HPI:  63 year old female with hx of scleroderma, asthma (with prior intubations), hypothyroidism, presenting with chest pain and shortness of breath. Patient went to see her cardiologist Dr. Aparicio 10 days ago where an in-office TTE was performed which revealed a pericardial effusion. She was recommended  to come to ED for evaluation but did not present at that time due to personal reasons (recent passing of her son). She came to ED today with worsening symptoms. Pt found to be tachycardic, intermittently hypotensive with MAP in 50s, responsive to IVF. POCUS revealed mod-large pericardial effusion with echographic evidence of tamponade physiology. Pt was taken to the cath lab with cardiology for urgent pericardiocentesis, now with pericardial drain in place.    Patient was examined in the [facility]. On arousal, patient was in moderate discomfort/stress saying she feels pain everywhere. Unable to obtain further history from patient; she continues to express wanting to go back to sleep. Pt admitted for further management.  (20 Dec 2024 22:11)    Hospital Course:     Patient was admitted for further medical management of large pericardial effusion with concerns for tamponade s/p pericardiocentesis with placement of a pericardial drain, which was removed on 12/26. This was felt to be due to viral etiology A repeat TTE was obtained on 12/30 to assess the pericardial effusion which showed stable changes. She was started on ibuprofen and colchicine for pericarditis, and followed by Cardiology and Rheumatology. Ibuprofen was discontinued and colchicine 0.6mg PO was continued daily for 3 months. Steroids were avoided due to the risk of renal crisis in the setting of her scleroderma.    The patient also experienced an episode of paroxysmal atrial fibrillation with rapid ventricular response (RVR) that was unresponsive to diltiazem. RRT was called on 12/21, and she was subsequently loaded with digoxin. Her heart rate is now controlled, and she was continued metoprolol tartrate 50mg BID (will transition to metoprolol succinate 100mg daily on discharge). [id number] score noted to be 2 | patient noted with runs of pAF even after pericardial drain has been removed. Patient was started on eliquis 5 mg BID; she will follow up with her cardiologist as an outpatient.    Her asthma remained stable during this admission. She was noted to be hypoxic likely due to similar process going on in pericardium, seen by pulmonary on consult, and will require home oxygen on discharge.    Patient has chronic, generalized, MSK/joint pain 2/2 scleroderma and neuropathic pain for which she is on chronic opioid therapy. This was reviewed and will be continued, consisting of morphine, oxycodone, and baclofen. A chest x-ray revealed an apical granuloma, and chest CT revealed pulmonary nodules measuring up to 4 mm as well as Indeterminant expansile sclerotic lesion in the sternum, which is new since 2012. Patient should have a follow-up CT chest in 12 months.    Patient experienced constipation during her admission, which resolved after treatment with methylnaltrexone (Relistor) and a bowel regimen. An abdominal x-ray was negative for obstruction.    Discharge/Dispo/Med rec discussed with attending Dr. Medina. Patient medically cleared for discharge home with outpatient follow up  .      Important Medication Changes and Reason:    Active or Pending Issues Requiring Follow-up:  - CT chest revealed pulmonary nodules measuring up to 4 mm as well as Indeterminant expansile sclerotic lesion in the sternum, which is new since 2012. Follow-up CT chest in 12 months.  - On discharge, patient will need close follow up with Rheumatology, Cardiology, Pulmonology and GI to closely follow scleroderma disease activity  - Follow up with Dr. Guru Crowe for continued pain management after discharge; Outpatient pain practice list provided as patient would like to find a different provider. Suggested Dr. Inder Rodriguez as he is comfortable with high dose opioids.    Advanced Directives:   [ x] Full code  [ ] DNR  [ ] Hospice    Discharge Diagnoses:  pericardial effusion with tamponade  acute pericarditis likely due to viral etiology  acute hypoxic respiratory failure  paroxysmal afib with episodes of RVR  chronic pain syndrome  scleroderma  asthma (with prior intubations)  hypothyroidism     HPI:  63 year old female with hx of scleroderma, asthma (with prior intubations), hypothyroidism, presenting with chest pain and shortness of breath. Patient went to see her cardiologist Dr. Aparicio 10 days ago where an in-office TTE was performed which revealed a pericardial effusion. She was recommended  to come to ED for evaluation but did not present at that time due to personal reasons (recent passing of her son). She came to ED today with worsening symptoms. Pt found to be tachycardic, intermittently hypotensive with MAP in 50s, responsive to IVF. POCUS revealed mod-large pericardial effusion with echographic evidence of tamponade physiology. Pt was taken to the cath lab with cardiology for urgent pericardiocentesis, now with pericardial drain in place.    Patient was examined in the [facility]. On arousal, patient was in moderate discomfort/stress saying she feels pain everywhere. Unable to obtain further history from patient; she continues to express wanting to go back to sleep. Pt admitted for further management.  (20 Dec 2024 22:11)    Hospital Course:     Patient was admitted for further medical management of large pericardial effusion with concerns for tamponade s/p pericardiocentesis with placement of a pericardial drain, which was removed on 12/26. This was felt to be due to viral etiology A repeat TTE was obtained on 12/30 to assess the pericardial effusion which showed stable changes. She was started on ibuprofen and colchicine for pericarditis, and followed by Cardiology and Rheumatology. Ibuprofen was discontinued and colchicine 0.6mg PO was continued daily for 3 months. Steroids were avoided due to the risk of renal crisis in the setting of her scleroderma.    The patient also experienced an episode of paroxysmal atrial fibrillation with rapid ventricular response (RVR) that was unresponsive to diltiazem. RRT was called on 12/21, and she was subsequently loaded with digoxin. Her heart rate is now controlled, and she was continued metoprolol tartrate 50mg BID (will transition to metoprolol succinate 100mg daily on discharge). [id number] score noted to be 2 | patient noted with runs of pAF even after pericardial drain has been removed. Patient was started on eliquis 5 mg BID; she will follow up with her cardiologist as an outpatient.    Her asthma remained stable during this admission. She was noted to be hypoxic likely due to similar process going on in pericardium, seen by pulmonary on consult, and will require home oxygen on discharge.    Patient has chronic, generalized, MSK/joint pain 2/2 scleroderma and neuropathic pain for which she is on chronic opioid therapy. This was reviewed and will be continued, consisting of morphine, oxycodone, and baclofen. A chest x-ray revealed an apical granuloma, and chest CT revealed pulmonary nodules measuring up to 4 mm as well as Indeterminant expansile sclerotic lesion in the sternum, which is new since 2012. Patient should have a follow-up CT chest in 12 months.    Patient experienced constipation during her admission, which resolved after treatment with methylnaltrexone (Relistor) and a bowel regimen. An abdominal x-ray was negative for obstruction.    Discharge/Dispo/Med rec discussed with attending Dr. Medina. Patient medically cleared for discharge home with outpatient follow up  .      Important Medication Changes and Reason:  Colchicine 0.6mg daily  Eliquis 5mg BID  Metoprolol succinate 100mg daily      Active or Pending Issues Requiring Follow-up:  - CT chest revealed pulmonary nodules measuring up to 4 mm as well as Indeterminant expansile sclerotic lesion in the sternum, which is new since 2012. Follow-up CT chest in 12 months.  - On discharge, patient will need close follow up with Rheumatology, Cardiology, Pulmonology and GI to closely follow scleroderma disease activity  - Follow up with Dr. Guru Crowe for continued pain management after discharge; Outpatient pain practice list provided as patient would like to find a different provider. Suggested Dr. Inder Rodriguez as he is comfortable with high dose opioids.    Advanced Directives:   [ x] Full code  [ ] DNR  [ ] Hospice    Discharge Diagnoses:  pericardial effusion with tamponade  acute pericarditis likely due to viral etiology  acute hypoxic respiratory failure  paroxysmal afib with episodes of RVR  chronic pain syndrome  scleroderma  asthma (with prior intubations)  hypothyroidism

## 2024-12-30 NOTE — PROGRESS NOTE ADULT - ASSESSMENT
63F PMH scleroderma, asthma with prior intubations admitted with cardiac tamponade. Initially presented to outpt cardiologist Dr. Aparicio 10 days PTA for general fatigue, pleuritic chest pain, and SOB. in-office TTE was notable for pericardial effusion. Patient deferred presentation to ED at that time. Presented with tachycardia and hypotension, TTE w/ evidence of caridac tamponade. Now s/p pericardiocentesis (12/20) with 650cc bloody fluid removed in the lab. No evidence of malignant effusion on initial evaluation of pericardial fluid, cultures/cytology pending.     Patient overnight 12.21 with afib RVR and agitation. Had RRT called again on 12/23 for afib w/ RVR. Rhythm is presently NSR w/ frequent APCs    #Pericardial Effusion  - S/p pericardiocentesis on 12/20 w/ 650 ccs of bloody output in the lab  - Pericardial drain removed on 12/26   - Obtain repeat limited TTE on 12/30  - F/u rheum recs  - continue ibuprofen 600mg PO TID, colchicine 0.6mg PO QDay   - Age appropriate cancer screening    #Afib w/ RVR  - Seemingly new i/s/o pericardial drain placement  - C/w metop tartrate 25mg BID  - [id number] score 2 | patient has now shown runs of pAF even after pericardial drain has been removed    ***recommendations are not final until attending attestation*** 63F PMH scleroderma, asthma with prior intubations admitted with cardiac tamponade. Initially presented to outpt cardiologist Dr. Aparicio 10 days PTA for general fatigue, pleuritic chest pain, and SOB. in-office TTE was notable for pericardial effusion. Patient deferred presentation to ED at that time. Presented with tachycardia and hypotension, TTE w/ evidence of caridac tamponade. Now s/p pericardiocentesis (12/20) with 650cc bloody fluid removed in the lab. No evidence of malignant effusion on initial evaluation of pericardial fluid, cultures/cytology pending.     Patient overnight 12.21 with afib RVR and agitation. Had RRT called again on 12/23 for afib w/ RVR. Rhythm is presently NSR w/ frequent APCs    #Pericardial Effusion  - S/p pericardiocentesis on 12/20 w/ 650 ccs of bloody output in the lab  - Pericardial drain removed on 12/26   - Obtain repeat limited TTE on 12/30  - F/u rheum recs  - Please stop ibuprofen 600mg PO TID  - With NSAIDs D/C'd no indicaiton for PPI from cardiology perspective, though other indications may exist at discretion of primary team  - C/w colchicine 0.6mg PO QDay, duration of therapy 3 months   - Age appropriate cancer screening    #Afib w/ RVR  - Seemingly new i/s/o pericardial drain placement  - Can increase metop tartrate to 50mg BID, hold for HR <50 BPM, SBP <100 mmHg  - [id number] score 2 | patient has now shown runs of pAF even after pericardial drain has been removed  - Please start eliquis 5 mg BID  - Patient advised of increased risk of bleeding with introduction of eliquis    ***recommendations are not final until attending attestation*** 62 yo F PMH scleroderma, asthma with prior intubations. now admitted with cardiac tamponade.   Initially presented to outpt cardiologist, Dr. Aparicio, 10 days PTA for general fatigue, pleuritic chest pain, and SOB.  In-office TTE was notable for pericardial effusion.   Patient deferred presentation to ED at that time. Evenually presented with tachycardia and hypotension, TTE w/ evidence of cardiac tamponade.  Now s/p pericardiocentesis (12/20) with 650cc bloody fluid removed in the lab. No evidence of malignant effusion on initial evaluation of pericardial fluid, cultures/cytology pending.     Patient overnight 12/21 with A. fib RVR and agitation. Had RRT called again on 12/23 for afib w/ RVR.   Rhythm is presently NSR w/ frequent APCs      REC:  #Pericardial Effusion  - S/P pericardiocentesis on 12/20 w/ 650 ccs of bloody output in the lab  - Pericardial drain removed on 12/26   - Obtain repeat limited TTE on 12/30  - F/u rheum recs  - Please stop ibuprofen 600mg PO TID  - With NSAIDs D/C'd no indicaiton for PPI from cardiology perspective, though other indications may exist at discretion of primary team  - C/w colchicine 0.6mg PO QDay, duration of therapy 3 months   - Age appropriate cancer screening    #Afib w/ RVR  - Seemingly new i/s/o pericardial drain placement  - Can increase metop tartrate to 50mg BID, hold for HR <50 BPM, SBP <100 mmHg  - [id number] score 2 | patient has now shown runs of pAF even after pericardial drain has been removed  - Please start Eliquis 5 mg BID  - Patient advised of increased risk of bleeding with introduction of Eliquis      Calixto Alfonso M.D.  Cardiology fellow     Plan discussed with cardiology fellow.  Patient seen and examined.  Hx., exam and labs as above.  I agree with the assessment and recommendations, which I have reviewed and edited where appropriate.  Carlos Marcus M.D.  Cardiology Attending, Consult Service    For Cardiology consults and questions, all Cardiology service information can be found 24/7 on [email or web address] - use password: [email or web address] to log in.

## 2024-12-30 NOTE — PROGRESS NOTE ADULT - PROBLEM SELECTOR PROBLEM 2
Paroxysmal atrial fibrillation
Asthma
Paroxysmal atrial fibrillation

## 2024-12-30 NOTE — PROGRESS NOTE ADULT - PROBLEM SELECTOR PLAN 1
- with pericarditis  - large pericardial effusion s/p urgent pericardiocentesis  - pericardial drain removed 12/26  - per cards, repeat TTE 12/30  - crp 104, ESR 43  - c/w ibuprofen 600mg PO TID, colchicine 0.6mg PO QDay   - cards following  - rheum following
- with pericarditis  - large pericardial effusion s/p urgent pericardiocentesis  - felt to be due to viral etiology  - pericardial drain removed 12/26  - repeat TTE 12/30 noted "There is an echo free space visualized proximal to the right atrial free wall and basal portion of the right ventricular free wall anteriorly which most likely represents a small pericardial effusion (<1.0 cm). There is a minimal to small pericardial effusion posteriorly. Otherwise, elsewhere there is trivial or no pericardial effusion visualized. There is no evidence of RA or RV diastolic collapse....Compared to the transthoracic echocardiogram performed on 12/26/2024, by direct visual comparison, there is probably no substantial change in the size of the pericardial effusion within the technical limitations of both studies."  - d/c ibuprofen 600mg PO TID, continue colchicine 0.6mg PO daily for 3 months  - Per rheumatology, no steroids due to risk of renal crisis  - cards following  - rheum following
On presentation TTE showing large pericardial effusion with RV now s/p urgent pericardiocentesis with pericardial drain in place.   - Cardiology recs appreciated  - Monitor pericardial drain output - consider removal when drainage <50cc/24hr  - c/w ibuprofen 600mg PO TID, colchicine 0.6mg PO QDay   - Monitor on telemetry  - F/u pericardial fluid analysis  - repeat TTE for evaluation of resolution of effusion
- large pericardial effusion with RV now s/p urgent pericardiocentesis with pericardial drain in place  - pericardial drain to be removed once output <50 cc in 24 hours- will repeat TTE prior  - c/w ibuprofen 600mg PO TID, colchicine 0.6mg PO QDay   - cards following
On presentation TTE showing large pericardial effusion with RV collapse. Pt intermittently hypotensive. Concern for tamponade. Pt now s/p urgent pericardiocentesis with pericardial drain in place.   - Cardiology recs appreciated  - Monitor pericardial drain output - consider removal when drainage <50cc/24hr  - start ibuprofen 600mg PO TID, colchicine 0.6mg PO QDay   - Monitor on telemetry  - F/u pericardial fluid analysis  - EKG showing NSR, appears sinus tachycardia on telemetry  - repeat TTE 12/22 for evaluation of resolution of effusion
- large pericardial effusion with RV now s/p urgent pericardiocentesis with pericardial drain in place  - pericardial drain to be removed once output <50 cc in 24 hours- will repeat TTE prior  - c/w ibuprofen 600mg PO TID, colchicine 0.6mg PO QDay   - cards following
- with pericarditis  - large pericardial effusion s/p urgent pericardiocentesis  - pericardial drain removed 12/26  - per cards, repeat TTE 12/30 to f/u for pericardial effusion   - crp 104, ESR 43  - c/w ibuprofen 600mg PO TID, colchicine 0.6mg PO QDay   - cards following  - rheum following
- with pericarditis  - large pericardial effusion s/p urgent pericardiocentesis with pericardial drain in place  - pericardial drain with 45 cc of output, plan for TTE and then likely drain removal   - crp 104, ESR 43  - c/w ibuprofen 600mg PO TID, colchicine 0.6mg PO QDay   - cards following  - rheum following
- with pericarditis  - large pericardial effusion s/p urgent pericardiocentesis with pericardial drain in place  - pericardial drain to be removed once output <50 cc in 24 hours- will repeat TTE prior  - crp 104, ESR 43  - c/w ibuprofen 600mg PO TID, colchicine 0.6mg PO QDay   - cards following  - rheum following
- with pericarditis  - large pericardial effusion s/p urgent pericardiocentesis  - pericardial drain removed 12/26  - per cards, repeat TTE 12/30  - crp 104, ESR 43  - c/w ibuprofen 600mg PO TID, colchicine 0.6mg PO QDay   - cards following  - rheum following

## 2024-12-30 NOTE — DISCHARGE NOTE PROVIDER - PROVIDER TOKENS
PROVIDER:[TOKEN:[[id number]]],PROVIDER:[TOKEN:[[id number]]],PROVIDER:[TOKEN:[[id number]]],PROVIDER:[TOKEN:[[id number]]] PROVIDER:[TOKEN:[[id number]],FOLLOWUP:[2 weeks]],PROVIDER:[TOKEN:[[id number]],FOLLOWUP:[2 weeks]],PROVIDER:[TOKEN:[[id number]],FOLLOWUP:[2 weeks]],PROVIDER:[TOKEN:[[id number]],FOLLOWUP:[1 week]] PROVIDER:[TOKEN:[[id number]],FOLLOWUP:[2 weeks]],PROVIDER:[TOKEN:[[id number]],FOLLOWUP:[2 weeks]],PROVIDER:[TOKEN:[[id number]],FOLLOWUP:[2 weeks]],PROVIDER:[TOKEN:[[id number]],FOLLOWUP:[1 week]],PROVIDER:[TOKEN:[[id number]],FOLLOWUP:[2 weeks]]

## 2024-12-30 NOTE — DISCHARGE NOTE PROVIDER - ATTENDING DISCHARGE PHYSICAL EXAMINATION:
Patient seen and examined. No new complaints. Ready to go home today. Last BM 12/26 after relistor. She has chronic constipation which is not new.     - continue current medical management  - switch metoprolol tartrate to succinate 100mg daily  - home O2 has been arranged

## 2024-12-30 NOTE — DISCHARGE NOTE PROVIDER - NSDCFUADDAPPT_GEN_ALL_CORE_FT
APPTS ARE READY TO BE MADE: [X] YES    Best Family or Patient Contact (if needed):    Additional Information about above appointments (if needed):    1:   2:   3:     Other comments or requests:    APPTS ARE READY TO BE MADE: [X] YES    Best Family or Patient Contact (if needed):    Additional Information about above appointments (if needed):    1: Pulmonary HOME program in 1-3 days  2: Cardiologist Dr. Nina Aparicio at [facility] in [place]  3:     Other comments or requests:    APPTS ARE READY TO BE MADE: [X] YES    Best Family or Patient Contact (if needed):    Additional Information about above appointments (if needed):    1: Pulmonary HOME program in 1-3 days  2: Cardiologist Dr. Nina Aparicio at [facility] in [place]  3:     Other comments or requests:   Patient was outreached and advised they prefer to discuss when their aid is with them. Patient took our number and will return our call once she is back.    Patient was left two additional messages as they did not return our call however, patient did not answer. Voicemails were left for patient to return the call.  APPTS ARE READY TO BE MADE: [X] YES    Best Family or Patient Contact (if needed):    Additional Information about above appointments (if needed):    1: Pulmonary HOME program in 1-3 days  2: Cardiologist Dr. Nina Aparicio at [facility] in [place]  3:     Other comments or requests:   Patient was outreached and advised they prefer to discuss when their aid is with them. Patient took our number and will return our call once she is back.    Patient was left two additional messages as they did not return our call however, patient did not answer. Voicemails were left for patient to return the call.     Provided patient with provider referral information, however patient prefers to schedule the appointments on their own.  Sam her aide makes appointments

## 2024-12-30 NOTE — DISCHARGE NOTE PROVIDER - NSDCMRMEDTOKEN_GEN_ALL_CORE_FT
albuterol 2.5 mg/3 mL (0.083%) inhalation solution: 2 puff(s) inhaled every 6 hours, As Needed  morphine 30 mg oral capsule, extended release:  orally 3 times a day  oxyCODONE 20 mg oral tablet: 1 tab(s) orally every 6 hours as needed for  severe pain  Synthroid 112 mcg (0.112 mg) oral tablet: 1 tab(s) orally once a day  Ventolin 90 mcg/inh inhalation aerosol: 2 puff(s) inhaled every 6 hours, As Needed   apixaban 5 mg oral tablet: 1 tab(s) orally every 12 hours  baclofen 10 mg oral tablet: 1 tab(s) orally 2 times a day  colchicine 0.6 mg oral tablet: 1 tab(s) orally once a day  metoprolol succinate 100 mg oral tablet, extended release: 1 tab(s) orally once a day  morphine 30 mg/8 to 12 hr oral tablet, extended release: 1 tab(s) orally 2 times a day  nicotine 21 mg/24 hr transdermal film, extended release: 1 patch transdermal once a day  oxyCODONE 20 mg oral tablet: 1 tab(s) orally every 6 hours as needed for  severe pain  polyethylene glycol 3350 oral powder for reconstitution: 17 gram(s) orally 2 times a day  senna leaf extract oral tablet: 2 tab(s) orally once a day (at bedtime)  Synthroid 112 mcg (0.112 mg) oral tablet: 1 tab(s) orally once a day  Ventolin 90 mcg/inh inhalation aerosol: 2 puff(s) inhaled every 6 hours, As Needed   Advair Diskus 250 mcg-50 mcg inhalation powder: 1 inhaled 2 times a day  apixaban 5 mg oral tablet: 1 tab(s) orally every 12 hours  baclofen 10 mg oral tablet: 1 tab(s) orally 2 times a day  colchicine 0.6 mg oral tablet: 1 tab(s) orally once a day  metoprolol succinate 100 mg oral tablet, extended release: 1 tab(s) orally once a day  morphine 30 mg/8 to 12 hr oral tablet, extended release: 1 tab(s) orally 2 times a day  Narcan 4 mg/0.1 mL nasal spray: 1 spray(s) intranasally once a day  nicotine 21 mg/24 hr transdermal film, extended release: 1 patch transdermal once a day  oxyCODONE 20 mg oral tablet: 1 tab(s) orally every 6 hours as needed for  severe pain  polyethylene glycol 3350 oral powder for reconstitution: 17 gram(s) orally 2 times a day  senna leaf extract oral tablet: 2 tab(s) orally once a day (at bedtime)  Synthroid 112 mcg (0.112 mg) oral tablet: 1 tab(s) orally once a day  Ventolin 90 mcg/inh inhalation aerosol: 2 puff(s) inhaled every 6 hours, As Needed   Advair Diskus 250 mcg-50 mcg inhalation powder: 1 inhaled 2 times a day  apixaban 5 mg oral tablet: 1 tab(s) orally every 12 hours  baclofen 10 mg oral tablet: 1 tab(s) orally 2 times a day  colchicine 0.6 mg oral tablet: 1 tab(s) orally once a day  LORazepam 1 mg oral tablet: 1 tab(s) orally 2 times a day as needed for Anxiety MDD: 2  metoprolol succinate 100 mg oral tablet, extended release: 1 tab(s) orally once a day  morphine 30 mg/8 to 12 hr oral tablet, extended release: 1 tab(s) orally 2 times a day  Narcan 4 mg/0.1 mL nasal spray: 1 spray(s) intranasally once a day  nicotine 21 mg/24 hr transdermal film, extended release: 1 patch transdermal once a day  oxyCODONE 20 mg oral tablet: 1 tab(s) orally every 6 hours as needed for  severe pain  polyethylene glycol 3350 oral powder for reconstitution: 17 gram(s) orally 2 times a day  senna leaf extract oral tablet: 2 tab(s) orally once a day (at bedtime)  Synthroid 112 mcg (0.112 mg) oral tablet: 1 tab(s) orally once a day  Ventolin 90 mcg/inh inhalation aerosol: 2 puff(s) inhaled every 6 hours, As Needed

## 2024-12-30 NOTE — PROGRESS NOTE ADULT - PROBLEM SELECTOR PROBLEM 5
Hypothyroidism
Constipated
Hypothyroidism

## 2024-12-30 NOTE — PROGRESS NOTE ADULT - SUBJECTIVE AND OBJECTIVE BOX
[facility] Division of Hospital Medicine  Bri Medina MD  Available via MS Teams      SUBJECTIVE / OVERNIGHT EVENTS: patient seen and examined this morning. felt palpitation when she learned that she was nearing discharge. had a run of PAF up to 130's on tele. Breathing is ok and having bowel movement.     ADDITIONAL REVIEW OF SYSTEMS:    MEDICATIONS  (STANDING):  albuterol/ipratropium for Nebulization 3 milliLiter(s) Nebulizer every 6 hours  apixaban 5 milliGRAM(s) Oral every 12 hours  baclofen 10 milliGRAM(s) Oral <User Schedule>  colchicine 0.6 milliGRAM(s) Oral daily  levothyroxine 112 MICROGram(s) Oral daily  lidocaine   4% Patch 1 Patch Transdermal daily  lidocaine   4% Patch 1 Patch Transdermal daily  metoprolol tartrate 50 milliGRAM(s) Oral two times a day  morphine ER Tablet 30 milliGRAM(s) Oral <User Schedule>  nicotine - 21 mG/24Hr(s) Patch 1 Patch Transdermal every 24 hours  oxyCODONE    IR 20 milliGRAM(s) Oral <User Schedule>  pantoprazole    Tablet 40 milliGRAM(s) Oral daily  polyethylene glycol 3350 17 Gram(s) Oral two times a day  potassium phosphate IVPB 15 milliMole(s) IV Intermittent once  senna 2 Tablet(s) Oral at bedtime    MEDICATIONS  (PRN):  aluminum hydroxide/magnesium hydroxide/simethicone Suspension 30 milliLiter(s) Oral every 4 hours PRN Dyspepsia  LORazepam     Tablet 1 milliGRAM(s) Oral two times a day PRN Anxiety      I&O's Summary    29 Dec 2024 07:01  -  30 Dec 2024 07:00  --------------------------------------------------------  IN: 275 mL / OUT: 1000 mL / NET: -725 mL        PHYSICAL EXAM:  Vital Signs Last 24 Hrs  T(C): 36.1 (30 Dec 2024 12:04), Max: 36.7 (30 Dec 2024 04:48)  T(F): 97 (30 Dec 2024 12:04), Max: 98.1 (30 Dec 2024 04:48)  HR: 66 (30 Dec 2024 15:16) (46 - 99)  BP: 117/74 (30 Dec 2024 12:04) (100/57 - 153/85)  BP(mean): --  RR: 18 (30 Dec 2024 13:51) (18 - 18)  SpO2: 88% (30 Dec 2024 15:16) (88% - 99%)    Parameters below as of 30 Dec 2024 15:16  Patient On (Oxygen Delivery Method): room air      CONSTITUTIONAL: NAD, tearful at times  RESPIRATORY: Normal respiratory effort; lungs are clear to auscultation bilaterally  CARDIOVASCULAR: normal S1 and S2; No lower extremity edema  ABDOMEN: Nontender to palpation, normoactive bowel sounds, no rebound/guarding  PSYCH: A+O to person, place, and time; calm, tearful at times        LABS:

## 2024-12-30 NOTE — DISCHARGE NOTE PROVIDER - NPI NUMBER (FOR SYSADMIN USE ONLY) :
[[id number]],[[id number] [[id number]],[[id number]],[[id number]],[[id number] [[id number]],[[id number]],[[id number]],[[id number]],[[id number]]

## 2024-12-30 NOTE — DISCHARGE NOTE PROVIDER - CARE PROVIDER_API CALL
Carlos Marcus  Cardiovascular Disease  [address], [address]  [place] [address]  Phone: [phone]  Fax: [phone]  Follow Up Time:     Sahara Aguilera  Pulmonary Disease  [address], [address]  [place] [address]  Phone: [phone]  Fax: [phone]  Follow Up Time:     Inder Rodriguez  Anesthesiology  [address] [place] [address]  Phone: [phone]  Fax: [phone]  Follow Up Time:     Dorota Lopes  Rheumatology  [address] [facility] [address], [facility] [address]  [place] [address]  Phone: [phone]  Fax: [phone]  Follow Up Time:    Sahara Aguilera  Pulmonary Disease  [address] [facility] [facility], [address]  [place] [address]  Phone: [phone]  Fax: [phone]  Follow Up Time: 2 weeks    Inder Rodriguez  Anesthesiology  [address] [place] [address]  Phone: [phone]  Fax: [phone]  Follow Up Time: 2 weeks    Josselyn Metzger  Rheumatology  Follow Up Time: 2 weeks    Guru Crowe  Pain Medicine  [address] [place] [address], [facility] [address]  [place] [address]  Phone: [phone]  Fax: [phone]  Follow Up Time: 1 week   Sahara Aguilera  Pulmonary Disease  [address] [facility], [address]  [place] [address]  Phone: [phone]  Fax: [phone]  Follow Up Time: 2 weeks    Inder Rodriguez  Anesthesiology  [address] [place] [address]  Phone: [phone]  Fax: [phone]  Follow Up Time: 2 weeks    Josselyn Metzger  Rheumatology  Follow Up Time: 2 weeks    Guru Crowe  Pain Medicine  [address] [facility], [address]  [place] [address]  Phone: [phone]  Fax: [phone]  Follow Up Time: 1 week    Nina Aparicio  Cardiology  [address] [place] [address]  Phone: [phone]  Fax: [phone]  Follow Up Time: 2 weeks

## 2024-12-30 NOTE — PROGRESS NOTE ADULT - ASSESSMENT
63 year old female with hx of scleroderma, asthma, hypothyroidism, chronic pain on opioid, SBO, presented with chest pain and shortness of breath, found to have moderate-large pericardial effusion with concerns for tamponade. Patient underwent pericardiocentesis with pericardial drain (removed 12/26), s/p RRT on 12/21 for new onset afib w/ RVR.

## 2024-12-30 NOTE — PROGRESS NOTE ADULT - PROBLEM SELECTOR PLAN 5
- c/w levothyroxine 112 mcg qd
- c/w levothyroxine 112 mcg qd
- Continue levothyroxine 112 mcg qd  - TSH wnl
- c/w levothyroxine 112 mcg qd
Will obtain abdominal XR to evaluate for SBO  - will place bowel regimen in place  - monitor for BM
- c/w levothyroxine 112 mcg qd

## 2024-12-30 NOTE — DISCHARGE NOTE PROVIDER - NSDCCPCAREPLAN_GEN_ALL_CORE_FT
PRINCIPAL DISCHARGE DIAGNOSIS  Diagnosis: Pericardial effusion with cardiac tamponade  Assessment and Plan of Treatment: s/p pericardiocentesis with placement of a pericardial drain, which was removed on 12/26.  Continue colchicine for pericarditis  Continue close follow up with Cardiology and Rheumatology.        SECONDARY DISCHARGE DIAGNOSES  Diagnosis: Paroxysmal atrial fibrillation  Assessment and Plan of Treatment: During hospital course, you went into an abornal heart rhythm called atrial fibrillation (A-fib) A-fib is a common heart rhythm problem which increases the risk of stroke and heat attack.  It helps if you control your blood pressure, avoid alcohol, cut down on caffeine, get treatment for your thyroid if it is overactive, and perform moderate exercise in consultation with your Primary Care Provider.  Call your doctor if you experience chest tightness/pain, lightheadedness, loss of consciousness, shortness of breath (especially with exercise), feel your heart racing or beating unusually, frequent or abnormal bleeding.  It is important to take all your heart medications as prescribed.      Diagnosis: Imaging abnormality  Assessment and Plan of Treatment: CT chest revealed pulmonary nodules measuring up to 4 mm as well as Indeterminant expansile sclerotic lesion in the sternum, which is new since 2012.   You will need a  follow-up CT chest in 12 months     PRINCIPAL DISCHARGE DIAGNOSIS  Diagnosis: Pericardial effusion with cardiac tamponade  Assessment and Plan of Treatment: s/p pericardiocentesis with placement of a pericardial drain, which was removed on 12/26.  Continue colchicine for pericarditis  Continue close follow up with Cardiology and Rheumatology.        SECONDARY DISCHARGE DIAGNOSES  Diagnosis: Imaging abnormality  Assessment and Plan of Treatment: CT chest revealed pulmonary nodules measuring up to 4 mm as well as Indeterminant expansile sclerotic lesion in the sternum, which is new since 2012.   You will need a  follow-up CT chest in 12 months    Diagnosis: Paroxysmal atrial fibrillation  Assessment and Plan of Treatment: During hospital course, you went into an abornal heart rhythm called atrial fibrillation (A-fib) A-fib is a common heart rhythm problem which increases the risk of stroke and heat attack.  It helps if you control your blood pressure, avoid alcohol, cut down on caffeine, get treatment for your thyroid if it is overactive, and perform moderate exercise in consultation with your Primary Care Provider.  Call your doctor if you experience chest tightness/pain, lightheadedness, loss of consciousness, shortness of breath (especially with exercise), feel your heart racing or beating unusually, frequent or abnormal bleeding.  It is important to take all your heart medications as prescribed.      Diagnosis: Scleroderma  Assessment and Plan of Treatment:     Diagnosis: Acute hypoxic respiratory failure  Assessment and Plan of Treatment:

## 2024-12-30 NOTE — PROGRESS NOTE ADULT - SUBJECTIVE AND OBJECTIVE BOX
Cardiology Progress Note  ------------------------------------------------------------------------------------------  SUBJECTIVE:   - Tele: Predominantly NSR w/ APCs. Had run of pAF this morning  - No events overnight. Denies CP, SOB or Palpitations  - S/p pericardial drain removal on 12/26    -------------------------------------------------------------------------------------------  VS:  T(F): 98.1 (12-30), Max: 98.1 (12-30)  HR: 53 (12-30) (46 - 60)  BP: 153/85 (12-30) (100/57 - 153/85)  RR: 18 (12-30)  SpO2: 99% (12-30)  I&O's Summary    29 Dec 2024 07:01  -  30 Dec 2024 07:00  --------------------------------------------------------  IN: 275 mL / OUT: 1000 mL / NET: -725 mL      PHYSICAL EXAM:  GENERAL: NAD  HEAD: Atraumatic, Normocephalic.  ENT: Moist mucous membranes.  NECK: Supple, No JVD.  CHEST/LUNG: Clear to auscultation bilaterally; No rales, rhonchi, wheezing, or rubs. Unlabored respirations.  HEART: Regular rate and rhythm; No murmurs, rubs, or gallops.  ABDOMEN: Bowel sounds present; Soft, Nontender, Nondistended.   EXTREMITIES: No edema. 2+ Peripheral Pulses, brisk capillary refill. No clubbing or cyanosis.     -------------------------------------------------------------------------------------------  LABS:                        -------------------------------------------------------------------------------------------  Meds:  albuterol/ipratropium for Nebulization 3 milliLiter(s) Nebulizer every 6 hours  aluminum hydroxide/magnesium hydroxide/simethicone Suspension 30 milliLiter(s) Oral every 4 hours PRN  baclofen 10 milliGRAM(s) Oral <User Schedule>  colchicine 0.6 milliGRAM(s) Oral daily  heparin   Injectable 5000 Unit(s) SubCutaneous every 8 hours  ibuprofen  Tablet. 600 milliGRAM(s) Oral every 8 hours  levothyroxine 112 MICROGram(s) Oral daily  lidocaine   4% Patch 1 Patch Transdermal daily  lidocaine   4% Patch 1 Patch Transdermal daily  LORazepam     Tablet 1 milliGRAM(s) Oral two times a day PRN  metoprolol tartrate 25 milliGRAM(s) Oral every 12 hours  morphine ER Tablet 30 milliGRAM(s) Oral <User Schedule>  nicotine - 21 mG/24Hr(s) Patch 1 Patch Transdermal every 24 hours  oxyCODONE    IR 20 milliGRAM(s) Oral <User Schedule>  pantoprazole    Tablet 40 milliGRAM(s) Oral daily  polyethylene glycol 3350 17 Gram(s) Oral two times a day  potassium phosphate IVPB 15 milliMole(s) IV Intermittent once  senna 2 Tablet(s) Oral at bedtime   Cardiology Progress Note  ------------------------------------------------------------------------------------------  SUBJECTIVE:   - Tele: Predominantly NSR w/ APCs. Had run of pAF this morning  - No events overnight. Denies CP, SOB or Palpitations  - S/p pericardial drain removal on 12/26    MEDICATIONS  (STANDING):  albuterol/ipratropium for Nebulization 3 milliLiter(s) Nebulizer every 6 hours  baclofen 10 milliGRAM(s) Oral <User Schedule>  colchicine 0.6 milliGRAM(s) Oral daily  heparin   Injectable 5000 Unit(s) SubCutaneous every 8 hours  ibuprofen  Tablet. 600 milliGRAM(s) Oral every 8 hours  levothyroxine 112 MICROGram(s) Oral daily  lidocaine   4% Patch 1 Patch Transdermal daily  lidocaine   4% Patch 1 Patch Transdermal daily  metoprolol tartrate 25 milliGRAM(s) Oral every 12 hours  morphine ER Tablet 30 milliGRAM(s) Oral <User Schedule>  nicotine - 21 mG/24Hr(s) Patch 1 Patch Transdermal every 24 hours  oxyCODONE    IR 20 milliGRAM(s) Oral <User Schedule>  pantoprazole    Tablet 40 milliGRAM(s) Oral daily  polyethylene glycol 3350 17 Gram(s) Oral two times a day  potassium phosphate IVPB 15 milliMole(s) IV Intermittent once  senna 2 Tablet(s) Oral at bedtime      ROS:  Unchanged    -------------------------------------------------------------------------------------------  VS:  T(F): 98.1 (12-30), Max: 98.1 (12-30)  HR: 53 (12-30) (46 - 60)  BP: 153/85 (12-30) (100/57 - 153/85)  RR: 18 (12-30)  SpO2: 99% (12-30)    I&O's Summary  29 Dec 2024 07:01  -  30 Dec 2024 07:00  --------------------------------------------------------  IN: 275 mL / OUT: 1000 mL / NET: -725 mL      PHYSICAL EXAM:  GENERAL: NAD  HEAD: Atraumatic, Normocephalic.  ENT: Moist mucous membranes.  NECK: Supple, No JVD.  CHEST/LUNG: Clear to auscultation bilaterally; No rales, rhonchi, wheezing, or rubs. Unlabored respirations.  HEART: Regular rate and rhythm; No murmurs, rubs, or gallops.  ABDOMEN: Bowel sounds present; Soft, Nontender, Nondistended.   EXTREMITIES: No edema. 2+ Peripheral Pulses, brisk capillary refill. No clubbing or cyanosis. No

## 2024-12-30 NOTE — DISCHARGE NOTE PROVIDER - NSFOLLOWUPCLINICS_GEN_ALL_ED_FT
Cardiology at [facility]  Cardiology  [address] [place] [address]  Phone: [phone]  Fax:   Follow Up Time: 2 weeks     Home Program  Pulmonary  [place]   Phone:   Fax:   Follow Up Time: 1-3 days

## 2024-12-30 NOTE — PROGRESS NOTE ADULT - PROBLEM SELECTOR PLAN 2
- episodes of PAF with RVR  - increase metoprolol to 50mg BID  - start eliquis 5mg BID per cards
- rate controlled  - s/p digoxin load  - c/w metoprolol tartrate 25mg BID  - plan for possible AC after drain removal, if benefit > risk  - TTE pending- prior to drain removal   - cards following
- rate control improving  - s/p digoxin load  - start metop tartrate 25mg BID  - start AC once drain removed  - TTE pending  - cards following
- rate controlled  - s/p digoxin load  - c/w metoprolol tartrate 25mg BID  - no plan for DOAC per cards, outpatient f/u   - TTE 12/30   - cards following
RRT called 12/21 due to afib with RVR, unresponsive to diltiazem 5mg x2, concomittant hypotension requiring fluid rescuscitation, pt now being loaded with digoxin  - Overnight pt with sinus tachycardia 130s-140s  - Given 1 dose of metoprolol 25 on 12/22, however given history of asthma exacerbations requiring intubation and patient generally not wanting more pills--will hold off on further metoprolol, digoxin should take full effect decreasing requirement for further rate control.  - given magnesium 1g (level 1.8 during rapid)  - if patient goes into afib with RVR can trial diltiazem 5mg again
- rate controlled  - s/p digoxin load  - c/w metoprolol tartrate 25mg BID  - no plan for DOAC per cards, outpatient f/u   - TTE 12/30   - cards following
- rate controlled  - s/p digoxin load  - c/w metoprolol tartrate 25mg BID  - plan for possible AC after drain removal, if benefit > risk  - TTE pending- prior to drain removal   - cards following
No wheezing appreciated on exam. Lower suspicion for asthma exacerbation but cannot exclude. Also possibly due to scleroderma (PAH? ILD?) vs pericardial effusion.   - VBG showing mod hypercapnia, appears chronic.   - Will order duoneb q6h for now given patient still feels SOB and on 4LNC.   - Maintain O2 sat 90-93%  - Per rheumatology no steroids due to risk of renal crisis
- rate controlled  - s/p digoxin load  - c/w metoprolol tartrate 25mg BID  - no plan for DOAC per cards, outpatient f/u   - cards following
- rate controlled  - s/p digoxin load  - c/w metop tartrate 25mg BID  - start AC once drain removed  - TTE pending- prior to drain removal   - cards following

## 2024-12-30 NOTE — DISCHARGE NOTE PROVIDER - CARE PROVIDERS DIRECT ADDRESSES
,[email or web address],DirectAddress_Unknown,[email or web address] ,[email or web address],DirectAddress_Unknown,[email or web address] [email or web address],DirectAddress_Unknown ,[email or web address],DirectAddress_Unknown,[email or web address],DirectAddress_Unknown,[email or web address]

## 2024-12-30 NOTE — PROGRESS NOTE ADULT - PROBLEM SELECTOR PROBLEM 4
Scleroderma
Hypothyroidism
Scleroderma

## 2024-12-31 ENCOUNTER — TRANSCRIPTION ENCOUNTER (OUTPATIENT)
Age: 64
End: 2024-12-31

## 2024-12-31 VITALS
OXYGEN SATURATION: 93 % | RESPIRATION RATE: 18 BRPM | SYSTOLIC BLOOD PRESSURE: 101 MMHG | DIASTOLIC BLOOD PRESSURE: 58 MMHG | TEMPERATURE: 98 F | HEART RATE: 58 BPM

## 2024-12-31 LAB
ANION GAP SERPL CALC-SCNC: 12 MMOL/L — SIGNIFICANT CHANGE UP (ref 5–17)
BASOPHILS # BLD AUTO: 0.01 K/UL — SIGNIFICANT CHANGE UP (ref 0–0.2)
BASOPHILS NFR BLD AUTO: 0.3 % — SIGNIFICANT CHANGE UP (ref 0–2)
BUN SERPL-MCNC: 15 MG/DL — SIGNIFICANT CHANGE UP (ref 7–23)
CALCIUM SERPL-MCNC: 8.9 MG/DL — SIGNIFICANT CHANGE UP (ref 8.4–10.5)
CHLORIDE SERPL-SCNC: 104 MMOL/L — SIGNIFICANT CHANGE UP (ref 96–108)
CO2 SERPL-SCNC: 26 MMOL/L — SIGNIFICANT CHANGE UP (ref 22–31)
CREAT SERPL-MCNC: 0.5 MG/DL — SIGNIFICANT CHANGE UP (ref 0.5–1.3)
EGFR: 103 ML/MIN/1.73M2 — SIGNIFICANT CHANGE UP
EOSINOPHIL # BLD AUTO: 0.07 K/UL — SIGNIFICANT CHANGE UP (ref 0–0.5)
EOSINOPHIL NFR BLD AUTO: 1.9 % — SIGNIFICANT CHANGE UP (ref 0–6)
GLUCOSE SERPL-MCNC: 114 MG/DL — HIGH (ref 70–99)
HCT VFR BLD CALC: 37.2 % — SIGNIFICANT CHANGE UP (ref 34.5–45)
HGB BLD-MCNC: 12.1 G/DL — SIGNIFICANT CHANGE UP (ref 11.5–15.5)
IMM GRANULOCYTES NFR BLD AUTO: 0.3 % — SIGNIFICANT CHANGE UP (ref 0–0.9)
LYMPHOCYTES # BLD AUTO: 1.02 K/UL — SIGNIFICANT CHANGE UP (ref 1–3.3)
LYMPHOCYTES # BLD AUTO: 27 % — SIGNIFICANT CHANGE UP (ref 13–44)
MCHC RBC-ENTMCNC: 30.9 PG — SIGNIFICANT CHANGE UP (ref 27–34)
MCHC RBC-ENTMCNC: 32.5 G/DL — SIGNIFICANT CHANGE UP (ref 32–36)
MCV RBC AUTO: 94.9 FL — SIGNIFICANT CHANGE UP (ref 80–100)
MONOCYTES # BLD AUTO: 0.43 K/UL — SIGNIFICANT CHANGE UP (ref 0–0.9)
MONOCYTES NFR BLD AUTO: 11.4 % — SIGNIFICANT CHANGE UP (ref 2–14)
NEUTROPHILS # BLD AUTO: 2.24 K/UL — SIGNIFICANT CHANGE UP (ref 1.8–7.4)
NEUTROPHILS NFR BLD AUTO: 59.1 % — SIGNIFICANT CHANGE UP (ref 43–77)
NRBC # BLD: 0 /100 WBCS — SIGNIFICANT CHANGE UP (ref 0–0)
PLATELET # BLD AUTO: 216 K/UL — SIGNIFICANT CHANGE UP (ref 150–400)
POTASSIUM SERPL-MCNC: 3.3 MMOL/L — LOW (ref 3.5–5.3)
POTASSIUM SERPL-SCNC: 3.3 MMOL/L — LOW (ref 3.5–5.3)
RBC # BLD: 3.92 M/UL — SIGNIFICANT CHANGE UP (ref 3.8–5.2)
RBC # FLD: 12.6 % — SIGNIFICANT CHANGE UP (ref 10.3–14.5)
RNAP III AB SER-ACNC: 6 UNITS — SIGNIFICANT CHANGE UP (ref 0–19)
SODIUM SERPL-SCNC: 142 MMOL/L — SIGNIFICANT CHANGE UP (ref 135–145)
WBC # BLD: 3.78 K/UL — LOW (ref 3.8–10.5)
WBC # FLD AUTO: 3.78 K/UL — LOW (ref 3.8–10.5)

## 2024-12-31 PROCEDURE — 80048 BASIC METABOLIC PNL TOTAL CA: CPT

## 2024-12-31 PROCEDURE — 86480 TB TEST CELL IMMUN MEASURE: CPT

## 2024-12-31 PROCEDURE — 82435 ASSAY OF BLOOD CHLORIDE: CPT

## 2024-12-31 PROCEDURE — 97162 PT EVAL MOD COMPLEX 30 MIN: CPT

## 2024-12-31 PROCEDURE — 33016 PERICARDIOCENTESIS W/IMAGING: CPT

## 2024-12-31 PROCEDURE — 83605 ASSAY OF LACTIC ACID: CPT

## 2024-12-31 PROCEDURE — 82803 BLOOD GASES ANY COMBINATION: CPT

## 2024-12-31 PROCEDURE — 84156 ASSAY OF PROTEIN URINE: CPT

## 2024-12-31 PROCEDURE — 93308 TTE F-UP OR LMTD: CPT

## 2024-12-31 PROCEDURE — 86255 FLUORESCENT ANTIBODY SCREEN: CPT

## 2024-12-31 PROCEDURE — 82945 GLUCOSE OTHER FLUID: CPT

## 2024-12-31 PROCEDURE — 87015 SPECIMEN INFECT AGNT CONCNTJ: CPT

## 2024-12-31 PROCEDURE — C1769: CPT

## 2024-12-31 PROCEDURE — 89051 BODY FLUID CELL COUNT: CPT

## 2024-12-31 PROCEDURE — 86200 CCP ANTIBODY: CPT

## 2024-12-31 PROCEDURE — 97116 GAIT TRAINING THERAPY: CPT

## 2024-12-31 PROCEDURE — 87205 SMEAR GRAM STAIN: CPT

## 2024-12-31 PROCEDURE — 83735 ASSAY OF MAGNESIUM: CPT

## 2024-12-31 PROCEDURE — 85018 HEMOGLOBIN: CPT

## 2024-12-31 PROCEDURE — 86900 BLOOD TYPING SEROLOGIC ABO: CPT

## 2024-12-31 PROCEDURE — 97530 THERAPEUTIC ACTIVITIES: CPT

## 2024-12-31 PROCEDURE — 71250 CT THORAX DX C-: CPT | Mod: MC

## 2024-12-31 PROCEDURE — 86704 HEP B CORE ANTIBODY TOTAL: CPT

## 2024-12-31 PROCEDURE — 86160 COMPLEMENT ANTIGEN: CPT

## 2024-12-31 PROCEDURE — 86663 EPSTEIN-BARR ANTIBODY: CPT

## 2024-12-31 PROCEDURE — 87799 DETECT AGENT NOS DNA QUANT: CPT

## 2024-12-31 PROCEDURE — 99233 SBSQ HOSP IP/OBS HIGH 50: CPT | Mod: GC

## 2024-12-31 PROCEDURE — 84100 ASSAY OF PHOSPHORUS: CPT

## 2024-12-31 PROCEDURE — 93005 ELECTROCARDIOGRAM TRACING: CPT

## 2024-12-31 PROCEDURE — 85025 COMPLETE CBC W/AUTO DIFF WBC: CPT

## 2024-12-31 PROCEDURE — 85652 RBC SED RATE AUTOMATED: CPT

## 2024-12-31 PROCEDURE — 82947 ASSAY GLUCOSE BLOOD QUANT: CPT

## 2024-12-31 PROCEDURE — 82570 ASSAY OF URINE CREATININE: CPT

## 2024-12-31 PROCEDURE — 84436 ASSAY OF TOTAL THYROXINE: CPT

## 2024-12-31 PROCEDURE — 86140 C-REACTIVE PROTEIN: CPT

## 2024-12-31 PROCEDURE — C1729: CPT

## 2024-12-31 PROCEDURE — 87070 CULTURE OTHR SPECIMN AEROBIC: CPT

## 2024-12-31 PROCEDURE — 86431 RHEUMATOID FACTOR QUANT: CPT

## 2024-12-31 PROCEDURE — 86335 IMMUNFIX E-PHORSIS/URINE/CSF: CPT

## 2024-12-31 PROCEDURE — 80074 ACUTE HEPATITIS PANEL: CPT

## 2024-12-31 PROCEDURE — 83516 IMMUNOASSAY NONANTIBODY: CPT

## 2024-12-31 PROCEDURE — 84484 ASSAY OF TROPONIN QUANT: CPT

## 2024-12-31 PROCEDURE — 71045 X-RAY EXAM CHEST 1 VIEW: CPT

## 2024-12-31 PROCEDURE — 83880 ASSAY OF NATRIURETIC PEPTIDE: CPT

## 2024-12-31 PROCEDURE — 82550 ASSAY OF CK (CPK): CPT

## 2024-12-31 PROCEDURE — 82962 GLUCOSE BLOOD TEST: CPT

## 2024-12-31 PROCEDURE — 84166 PROTEIN E-PHORESIS/URINE/CSF: CPT

## 2024-12-31 PROCEDURE — 80053 COMPREHEN METABOLIC PANEL: CPT

## 2024-12-31 PROCEDURE — 81001 URINALYSIS AUTO W/SCOPE: CPT

## 2024-12-31 PROCEDURE — 86665 EPSTEIN-BARR CAPSID VCA: CPT

## 2024-12-31 PROCEDURE — 82330 ASSAY OF CALCIUM: CPT

## 2024-12-31 PROCEDURE — 99285 EMERGENCY DEPT VISIT HI MDM: CPT

## 2024-12-31 PROCEDURE — 84295 ASSAY OF SERUM SODIUM: CPT

## 2024-12-31 PROCEDURE — 86706 HEP B SURFACE ANTIBODY: CPT

## 2024-12-31 PROCEDURE — 87116 MYCOBACTERIA CULTURE: CPT

## 2024-12-31 PROCEDURE — 87075 CULTR BACTERIA EXCEPT BLOOD: CPT

## 2024-12-31 PROCEDURE — 87206 SMEAR FLUORESCENT/ACID STAI: CPT

## 2024-12-31 PROCEDURE — 86235 NUCLEAR ANTIGEN ANTIBODY: CPT

## 2024-12-31 PROCEDURE — 85610 PROTHROMBIN TIME: CPT

## 2024-12-31 PROCEDURE — 84443 ASSAY THYROID STIM HORMONE: CPT

## 2024-12-31 PROCEDURE — 86901 BLOOD TYPING SEROLOGIC RH(D): CPT

## 2024-12-31 PROCEDURE — 99239 HOSP IP/OBS DSCHRG MGMT >30: CPT

## 2024-12-31 PROCEDURE — 92610 EVALUATE SWALLOWING FUNCTION: CPT

## 2024-12-31 PROCEDURE — 86850 RBC ANTIBODY SCREEN: CPT

## 2024-12-31 PROCEDURE — 85014 HEMATOCRIT: CPT

## 2024-12-31 PROCEDURE — 86664 EPSTEIN-BARR NUCLEAR ANTIGEN: CPT

## 2024-12-31 PROCEDURE — 85730 THROMBOPLASTIN TIME PARTIAL: CPT

## 2024-12-31 PROCEDURE — 87102 FUNGUS ISOLATION CULTURE: CPT

## 2024-12-31 PROCEDURE — 86658 ENTEROVIRUS ANTIBODY: CPT

## 2024-12-31 PROCEDURE — 88112 CYTOPATH CELL ENHANCE TECH: CPT

## 2024-12-31 PROCEDURE — 84132 ASSAY OF SERUM POTASSIUM: CPT

## 2024-12-31 PROCEDURE — 83615 LACTATE (LD) (LDH) ENZYME: CPT

## 2024-12-31 PROCEDURE — 96374 THER/PROPH/DIAG INJ IV PUSH: CPT

## 2024-12-31 PROCEDURE — 74018 RADEX ABDOMEN 1 VIEW: CPT

## 2024-12-31 PROCEDURE — 85027 COMPLETE CBC AUTOMATED: CPT

## 2024-12-31 PROCEDURE — 84157 ASSAY OF PROTEIN OTHER: CPT

## 2024-12-31 PROCEDURE — 36415 COLL VENOUS BLD VENIPUNCTURE: CPT

## 2024-12-31 PROCEDURE — 94640 AIRWAY INHALATION TREATMENT: CPT

## 2024-12-31 PROCEDURE — 88305 TISSUE EXAM BY PATHOLOGIST: CPT

## 2024-12-31 PROCEDURE — 85379 FIBRIN DEGRADATION QUANT: CPT

## 2024-12-31 PROCEDURE — 86225 DNA ANTIBODY NATIVE: CPT

## 2024-12-31 PROCEDURE — 82042 OTHER SOURCE ALBUMIN QUAN EA: CPT

## 2024-12-31 RX ORDER — NICOTINE POLACRILEX 4 MG/1
1 LOZENGE ORAL
Qty: 30 | Refills: 0
Start: 2024-12-31 | End: 2025-01-29

## 2024-12-31 RX ORDER — BACLOFEN 10 MG/1
1 TABLET ORAL
Qty: 60 | Refills: 0
Start: 2024-12-31 | End: 2025-01-29

## 2024-12-31 RX ORDER — APIXABAN 5 MG/1
1 TABLET, FILM COATED ORAL
Qty: 60 | Refills: 0
Start: 2024-12-31 | End: 2025-01-29

## 2024-12-31 RX ORDER — LORAZEPAM 1 MG/1
1 TABLET ORAL
Qty: 10 | Refills: 0
Start: 2024-12-31 | End: 2025-01-04

## 2024-12-31 RX ORDER — MORPHINE SULFATE 15 MG
1 TABLET, EXTENDED RELEASE ORAL
Qty: 0 | Refills: 0 | DISCHARGE
Start: 2024-12-31

## 2024-12-31 RX ORDER — POLYETHYLENE GLYCOL 3350 17 G/DOSE
17 POWDER (GRAM) ORAL
Qty: 0 | Refills: 0 | DISCHARGE
Start: 2024-12-31

## 2024-12-31 RX ORDER — SENNOSIDES 8.6 MG/1
2 TABLET, FILM COATED ORAL
Qty: 0 | Refills: 0 | DISCHARGE
Start: 2024-12-31

## 2024-12-31 RX ORDER — COLCHICINE 0.6 MG/1
1 CAPSULE ORAL
Qty: 80 | Refills: 0
Start: 2024-12-31 | End: 2025-03-20

## 2024-12-31 RX ORDER — NALOXONE HCL 0.4 MG/ML
1 VIAL (ML) INJECTION
Qty: 1 | Refills: 0
Start: 2024-12-31 | End: 2024-12-31

## 2024-12-31 RX ORDER — POTASSIUM CHLORIDE 600 MG/1
40 TABLET, FILM COATED, EXTENDED RELEASE ORAL EVERY 4 HOURS
Refills: 0 | Status: DISCONTINUED | OUTPATIENT
Start: 2024-12-31 | End: 2024-12-31

## 2024-12-31 RX ORDER — FLUTICASONE PROPIONATE AND SALMETEROL 50; 500 UG/1; UG/1
1 POWDER ORAL; RESPIRATORY (INHALATION)
Qty: 1 | Refills: 0
Start: 2024-12-31 | End: 2025-01-29

## 2024-12-31 RX ORDER — METOPROLOL TARTRATE 50 MG
1 TABLET ORAL
Qty: 30 | Refills: 0
Start: 2024-12-31 | End: 2025-01-29

## 2024-12-31 RX ADMIN — NICOTINE POLACRILEX 1 PATCH: 4 LOZENGE ORAL at 07:12

## 2024-12-31 RX ADMIN — Medication 20 MILLIGRAM(S): at 05:14

## 2024-12-31 RX ADMIN — LIDOCAINE 1 PATCH: 50 OINTMENT TOPICAL at 07:12

## 2024-12-31 RX ADMIN — Medication 20 MILLIGRAM(S): at 09:43

## 2024-12-31 RX ADMIN — APIXABAN 5 MILLIGRAM(S): 5 TABLET, FILM COATED ORAL at 05:30

## 2024-12-31 RX ADMIN — BACLOFEN 10 MILLIGRAM(S): 10 TABLET ORAL at 10:18

## 2024-12-31 RX ADMIN — NICOTINE POLACRILEX 1 PATCH: 4 LOZENGE ORAL at 13:38

## 2024-12-31 RX ADMIN — Medication 20 MILLIGRAM(S): at 13:40

## 2024-12-31 RX ADMIN — Medication 30 MILLIGRAM(S): at 09:42

## 2024-12-31 RX ADMIN — Medication 20 MILLIGRAM(S): at 04:14

## 2024-12-31 RX ADMIN — LEVOTHYROXINE SODIUM 112 MICROGRAM(S): 175 TABLET ORAL at 05:29

## 2024-12-31 RX ADMIN — Medication 30 MILLIGRAM(S): at 08:26

## 2024-12-31 RX ADMIN — LIDOCAINE 1 PATCH: 50 OINTMENT TOPICAL at 09:42

## 2024-12-31 RX ADMIN — NICOTINE POLACRILEX 1 PATCH: 4 LOZENGE ORAL at 13:41

## 2024-12-31 RX ADMIN — POTASSIUM CHLORIDE 40 MILLIEQUIVALENT(S): 600 TABLET, FILM COATED, EXTENDED RELEASE ORAL at 13:40

## 2024-12-31 RX ADMIN — Medication 20 MILLIGRAM(S): at 15:11

## 2024-12-31 RX ADMIN — COLCHICINE 0.6 MILLIGRAM(S): 0.6 CAPSULE ORAL at 10:18

## 2024-12-31 RX ADMIN — PANTOPRAZOLE 40 MILLIGRAM(S): 40 TABLET, DELAYED RELEASE ORAL at 05:29

## 2024-12-31 RX ADMIN — Medication 20 MILLIGRAM(S): at 08:26

## 2024-12-31 RX ADMIN — Medication 50 MILLIGRAM(S): at 05:31

## 2024-12-31 NOTE — DISCHARGE NOTE NURSING/CASE MANAGEMENT/SOCIAL WORK - PATIENT PORTAL LINK FT
You can access the FollowMyHealth Patient Portal offered by [facility] by registering at the following website: [email or web address]. By joining [facility]’s FollowMyHealth portal, you will also be able to view your health information using other applications (apps) compatible with our system.

## 2024-12-31 NOTE — DISCHARGE NOTE NURSING/CASE MANAGEMENT/SOCIAL WORK - FINANCIAL ASSISTANCE
[facility] provides services at a reduced cost to those who are determined to be eligible through [facility]’s financial assistance program. Information regarding [facility]’s financial assistance program can be found by going to [email or web address] or by calling [phone].

## 2024-12-31 NOTE — DISCHARGE NOTE NURSING/CASE MANAGEMENT/SOCIAL WORK - NSDCPEEMAIL_GEN_ALL_CORE
[facility] for Tobacco Control email [email or web address]

## 2024-12-31 NOTE — DISCHARGE NOTE NURSING/CASE MANAGEMENT/SOCIAL WORK - NSDCPEFALRISK_GEN_ALL_CORE
For information on Fall & Injury Prevention, visit: [email or web address] OR  [email or web address] OR  https://www.cdc.gov/steadi/patient.html

## 2024-12-31 NOTE — DISCHARGE NOTE NURSING/CASE MANAGEMENT/SOCIAL WORK - NSDCPEWEB_GEN_ALL_CORE
[facility] for Tobacco Control website --- [email or web address] website --- [email or web address]

## 2024-12-31 NOTE — CHART NOTE - NSCHARTNOTEFT_GEN_A_CORE
Necessity for HOme 02      Patient presents with a diagnosis of R91.1/J45.909/J90. Acute exacerbation is currently resolved and patient will require 3 liters of continuous oxygen at home.. Room air at rest oxygen saturation is 93%. OR IF NEEDED - Room air oxygen saturation on ambulation is 88%; oxygen saturation on 3 liters on ambulation is 95%.

## 2024-12-31 NOTE — PROGRESS NOTE ADULT - SUBJECTIVE AND OBJECTIVE BOX
Cardiology Progress Note  ------------------------------------------------------------------------------------------  SUBJECTIVE:   - Tele: NSR, sinus karen overnight in the 40s. Occasional APCs  - No events overnight. Denies CP, SOB or Palpitations.     -------------------------------------------------------------------------------------------  VS:  T(F): 97.8 (12-30), Max: 97.8 (12-30)  HR: 53 (12-31) (53 - 99)  BP: 107/60 (12-31) (101/59 - 172/83)  RR: 20 (12-31)  SpO2: 93% (12-31)  I&O's Summary    30 Dec 2024 07:01  -  31 Dec 2024 07:00  --------------------------------------------------------  IN: 0 mL / OUT: 650 mL / NET: -650 mL      PHYSICAL EXAM:  GENERAL: NAD  HEAD: Atraumatic, Normocephalic.  ENT: Moist mucous membranes.  NECK: Supple, No JVD.  CHEST/LUNG: Clear to auscultation bilaterally; No rales, rhonchi, wheezing, or rubs. Unlabored respirations.  HEART: Regular rate and rhythm; No murmurs, rubs, or gallops.  ABDOMEN: Bowel sounds present; Soft, Nontender, Nondistended.   EXTREMITIES: No edema. 2+ Peripheral Pulses, brisk capillary refill. No clubbing or cyanosis.     -------------------------------------------------------------------------------------------  LABS:                        -------------------------------------------------------------------------------------------  Meds:  albuterol/ipratropium for Nebulization 3 milliLiter(s) Nebulizer every 6 hours  aluminum hydroxide/magnesium hydroxide/simethicone Suspension 30 milliLiter(s) Oral every 4 hours PRN  apixaban 5 milliGRAM(s) Oral every 12 hours  baclofen 10 milliGRAM(s) Oral <User Schedule>  colchicine 0.6 milliGRAM(s) Oral daily  levothyroxine 112 MICROGram(s) Oral daily  lidocaine   4% Patch 1 Patch Transdermal daily  lidocaine   4% Patch 1 Patch Transdermal daily  LORazepam     Tablet 1 milliGRAM(s) Oral two times a day PRN  metoprolol tartrate 50 milliGRAM(s) Oral two times a day  morphine ER Tablet 30 milliGRAM(s) Oral <User Schedule>  nicotine - 21 mG/24Hr(s) Patch 1 Patch Transdermal every 24 hours  oxyCODONE    IR 20 milliGRAM(s) Oral <User Schedule>  pantoprazole    Tablet 40 milliGRAM(s) Oral daily  polyethylene glycol 3350 17 Gram(s) Oral two times a day  potassium phosphate IVPB 15 milliMole(s) IV Intermittent once  senna 2 Tablet(s) Oral at bedtime    -------------------------------------------------------------------------------------------  Cardiovascular Diagnostic Testing:    Echo:   TTE 12/30  1. This wasa limited study to assess for pericardial effusion.   2. Technically difficult image quality.   3. Left ventricular systolic function is grossly normal with an ejection fraction visually estimated at 60 to 65 %. There is poor visualization of the endocardial borders to determine the presence of wall motion abnormalities.   4. Normal right ventricular systolic function.   5. There is an echofree space visualized proximal to the right atrial free wall and basal portion of the right ventricular freewall anteriorly which most likely represents a small pericardial effusion (<1.0 cm). There is a minimal to small pericardial effusion posteriorly. Otherwise, elsewhere there is trivial or no pericardial effusion visualized. There is no evidence of RAor RV diastolic collapse.    -------------------------------------------------------------------------------------------   Cardiology Progress Note  ------------------------------------------------------------------------------------------  SUBJECTIVE:   - No events overnight. Denies CP, SOB or Palpitations.     - Tele: NSR, sinus karen overnight in the 40s. Occasional APCs  -------------------------------------------------------------------------------------------  VS:  T(F): 97.8 (12-30), Max: 97.8 (12-30)  HR: 53 (12-31) (53 - 99)  BP: 107/60 (12-31) (101/59 - 172/83)  RR: 20 (12-31)  SpO2: 93% (12-31)    I&O's Summary  30 Dec 2024 07:01  -  31 Dec 2024 07:00  --------------------------------------------------------  IN: 0 mL / OUT: 650 mL / NET: -650 mL    PHYSICAL EXAM:  GENERAL: NAD  HEAD: Atraumatic, Normocephalic.  ENT: Moist mucous membranes.  NECK: Supple, No JVD.  CHEST/LUNG: Clear to auscultation bilaterally; No rales, rhonchi, wheezing, or rubs. Unlabored respirations.  HEART: Regular rate and rhythm; No murmurs, rubs, or gallops.  ABDOMEN: Bowel sounds present; Soft, Nontender, Nondistended.   EXTREMITIES: No edema. 2+ Peripheral Pulses, brisk capillary refill. No clubbing or cyanosis.     -------------------------------------------------------------------------------------------  Cardiovascular Diagnostic Testing:    Echo:   TTE 12/30  1. This was a limited study to assess for pericardial effusion.  2. Technically difficult image quality.  3. Left ventricular systolic function is grossly normal with an ejection fraction visually estimated at 60 to 65 %. There is poor visualization of the endocardial borders to determine the presence of wall motion abnormalities.  4. Normal right ventricular systolic function.  5. There is an echo free space visualized proximal to the right atrial free wall and basal portion of the right ventricular free wall anteriorly, which most likely represents a small pericardial effusion (<1.0 cm). There is a minimal to small pericardial effusion posteriorly. Otherwise, elsewhere there is trivial or no pericardial effusion visualized. There is no evidence of RAor RV diastolic collapse.

## 2024-12-31 NOTE — PROGRESS NOTE ADULT - PROVIDER SPECIALTY LIST ADULT
Cardiology
Internal Medicine
Cardiology
Rheumatology
Pain Medicine
Pulmonology
Rheumatology
Internal Medicine
Pain Medicine
Pulmonology
Hospitalist
Internal Medicine
Hospitalist

## 2024-12-31 NOTE — PROGRESS NOTE ADULT - ASSESSMENT
64 yo F PMH of pAF, scleroderma, asthma with prior intubations. now admitted with cardiac tamponade.   Initially presented to outpt cardiologist, Dr. Aparicio, 10 days PTA for general fatigue, pleuritic chest pain, and SOB.  In-office TTE was notable for pericardial effusion.   Patient deferred presentation to ED at that time. Evenually presented with tachycardia and hypotension, TTE w/ evidence of cardiac tamponade.  Now s/p pericardiocentesis (12/20) with 650cc bloody fluid removed in the lab. No evidence of malignant effusion on initial evaluation of pericardial fluid, cultures/cytology pending.     Patient overnight 12/21 with A. fib RVR and agitation. Had RRT called again on 12/23 for afib w/ RVR.   Rhythm is presently NSR w/ frequent APCs      REC:  #Pericardial Effusion  - S/P pericardiocentesis on 12/20 w/ 650 ccs of bloody output in the lab  - Pericardial drain removed on 12/26   - F/u rheum recs  - With NSAIDs D/C'd no indicaiton for PPI from cardiology perspective, though other indications may exist at discretion of primary team  - C/w colchicine 0.6mg PO QDay, duration of therapy 3 months   - Age appropriate cancer screening    #Afib w/ RVR  - Seemingly new i/s/o pericardial drain placement  - Can transition metoprolol tartrate 50 mg BID to metoprolol succinate 100 mg QD  - [id number] score 2 | patient has now shown runs of pAF even after pericardial drain has been removed  - C/w Eliquis 5 mg BID  - Patient advised of increased risk of bleeding with introduction of Eliquis      Calixto Alfonso M.D.  Cardiology fellow     ***recommendations are not final until attending attestation*** 64 yo F PMH of pAF, scleroderma, asthma with prior intubations; now admitted with cardiac tamponade.   Initially presented to outpt cardiologist, Dr. Aparicio, 10 days PTA for general fatigue, pleuritic chest pain, and SOB.  In-office TTE was notable for pericardial effusion.   Patient deferred presentation to ED at that time. Evenually presented with tachycardia and hypotension, TTE w/ evidence of cardiac tamponade.  Now s/p pericardiocentesis (12/20) with 650cc bloody fluid removed in the lab. No evidence of malignant effusion on initial evaluation of pericardial fluid, cultures/cytology pending.     Patient overnight 12/21 with A. fib RVR and agitation. Had RRT called again on 12/23 for afib w/ RVR.   Rhythm is presently NSR w/ frequent APCs      REC:  #Pericardial Effusion  - S/P pericardiocentesis on 12/20 w/ 650 ccs of bloody output in the lab  - Pericardial drain removed on 12/26   - F/u rheum recs  - With NSAIDs D/C'd, no indicaiton for PPI from cardiology perspective, though other indications may exist at discretion of primary team  - C/w colchicine 0.6mg PO QDay, duration of therapy 3 months   - Age appropriate cancer screening    #Afib w/ RVR  - Seemingly new i/s/o pericardial drain placement  - Can transition metoprolol tartrate 50 mg BID to metoprolol succinate 100 mg QD  - [id number] score 2 | patient has now shown runs of pAF even after pericardial drain has been removed  - C/w Eliquis 5 mg BID  - Patient advised of increased risk of bleeding with introduction of Eliquis    After discharge, should f/u with her private cardiologist, Dr. Aparicio.    Calixto Alfonso M.D.  Cardiology fellow     Plan discussed with cardiology fellow.  Patient seen and examined.  Hx., exam and labs as above.  I agree with the assessment and recommendations, which I have reviewed and edited where appropriate.  Carlos Marcus M.D.  Cardiology Attending, Consult Service    For Cardiology consults and questions, all Cardiology service information can be found 24/7 on [email or web address] - use password: [email or web address] to log in.

## 2025-01-03 NOTE — CHART NOTE - NSCHARTNOTEFT_GEN_A_CORE
Patient was outreached and advised they prefer to discuss when their aid is with them. Patient took our number and will return our call once she is back.

## 2025-01-06 NOTE — CHART NOTE - NSCHARTNOTESELECT_GEN_ALL_CORE
Event Note
S/p Pericardial Drain Placement/Event Note
dc appt/Event Note
Cardiology
ISTOP/Event Note
Pericardial drain removal/Event Note
dc appt/Event Note

## 2025-01-06 NOTE — CHART NOTE - NSCHARTNOTEFT_GEN_A_CORE
1/3 - Patient was outreached and advised they prefer to discuss when their aid is with them. Patient took our number and will return our call once she is back.    1/6 - Patient advised she would call back on 1/3, however, Patient was outreached again but did not answer. A voicemail was left for the patient to return our call.

## 2025-01-18 LAB
CULTURE RESULTS: SIGNIFICANT CHANGE UP
SPECIMEN SOURCE: SIGNIFICANT CHANGE UP

## 2025-02-13 ENCOUNTER — INPATIENT (INPATIENT)
Facility: HOSPITAL | Age: 65
LOS: 6 days | Discharge: ANOTHER HOSPITAL | DRG: 313 | End: 2025-02-20
Attending: INTERNAL MEDICINE | Admitting: INTERNAL MEDICINE
Payer: MEDICARE

## 2025-02-13 VITALS
SYSTOLIC BLOOD PRESSURE: 122 MMHG | HEIGHT: 69 IN | OXYGEN SATURATION: 96 % | TEMPERATURE: 98 F | WEIGHT: 169.98 LBS | RESPIRATION RATE: 18 BRPM | HEART RATE: 62 BPM | DIASTOLIC BLOOD PRESSURE: 60 MMHG

## 2025-02-13 DIAGNOSIS — Z98.89 OTHER SPECIFIED POSTPROCEDURAL STATES: Chronic | ICD-10-CM

## 2025-02-13 DIAGNOSIS — R07.9 CHEST PAIN, UNSPECIFIED: ICD-10-CM

## 2025-02-13 DIAGNOSIS — D25.9 LEIOMYOMA OF UTERUS, UNSPECIFIED: Chronic | ICD-10-CM

## 2025-02-13 DIAGNOSIS — K56.60 UNSPECIFIED INTESTINAL OBSTRUCTION: Chronic | ICD-10-CM

## 2025-02-13 DIAGNOSIS — Z98.890 OTHER SPECIFIED POSTPROCEDURAL STATES: Chronic | ICD-10-CM

## 2025-02-13 LAB
ALBUMIN SERPL ELPH-MCNC: 4 G/DL — SIGNIFICANT CHANGE UP (ref 3.3–5)
ALP SERPL-CCNC: 112 U/L — SIGNIFICANT CHANGE UP (ref 40–120)
ALT FLD-CCNC: 10 U/L — SIGNIFICANT CHANGE UP (ref 10–45)
ANION GAP SERPL CALC-SCNC: 11 MMOL/L — SIGNIFICANT CHANGE UP (ref 5–17)
APTT BLD: 29.7 SEC — SIGNIFICANT CHANGE UP (ref 24.5–35.6)
AST SERPL-CCNC: 17 U/L — SIGNIFICANT CHANGE UP (ref 10–40)
BASOPHILS # BLD AUTO: 0.03 K/UL — SIGNIFICANT CHANGE UP (ref 0–0.2)
BASOPHILS NFR BLD AUTO: 0.4 % — SIGNIFICANT CHANGE UP (ref 0–2)
BILIRUB SERPL-MCNC: 0.7 MG/DL — SIGNIFICANT CHANGE UP (ref 0.2–1.2)
BUN SERPL-MCNC: 12 MG/DL — SIGNIFICANT CHANGE UP (ref 7–23)
CALCIUM SERPL-MCNC: 9.4 MG/DL — SIGNIFICANT CHANGE UP (ref 8.4–10.5)
CHLORIDE SERPL-SCNC: 91 MMOL/L — LOW (ref 96–108)
CO2 SERPL-SCNC: 28 MMOL/L — SIGNIFICANT CHANGE UP (ref 22–31)
CREAT SERPL-MCNC: 0.48 MG/DL — LOW (ref 0.5–1.3)
CRP SERPL-MCNC: 23 MG/L — HIGH (ref 0–4)
D DIMER BLD IA.RAPID-MCNC: 499 NG/ML DDU — HIGH
EGFR: 104 ML/MIN/1.73M2 — SIGNIFICANT CHANGE UP
EOSINOPHIL # BLD AUTO: 0.17 K/UL — SIGNIFICANT CHANGE UP (ref 0–0.5)
EOSINOPHIL NFR BLD AUTO: 2.3 % — SIGNIFICANT CHANGE UP (ref 0–6)
ERYTHROCYTE [SEDIMENTATION RATE] IN BLOOD: 50 MM/HR — HIGH (ref 0–20)
GAS PNL BLDV: SIGNIFICANT CHANGE UP
GLUCOSE SERPL-MCNC: 105 MG/DL — HIGH (ref 70–99)
HCT VFR BLD CALC: 39.8 % — SIGNIFICANT CHANGE UP (ref 34.5–45)
HGB BLD-MCNC: 13.4 G/DL — SIGNIFICANT CHANGE UP (ref 11.5–15.5)
IMM GRANULOCYTES NFR BLD AUTO: 0.4 % — SIGNIFICANT CHANGE UP (ref 0–0.9)
INR BLD: 1.13 RATIO — SIGNIFICANT CHANGE UP (ref 0.85–1.16)
LYMPHOCYTES # BLD AUTO: 1.01 K/UL — SIGNIFICANT CHANGE UP (ref 1–3.3)
LYMPHOCYTES # BLD AUTO: 13.9 % — SIGNIFICANT CHANGE UP (ref 13–44)
MAGNESIUM SERPL-MCNC: 1.8 MG/DL — SIGNIFICANT CHANGE UP (ref 1.6–2.6)
MCHC RBC-ENTMCNC: 29.6 PG — SIGNIFICANT CHANGE UP (ref 27–34)
MCHC RBC-ENTMCNC: 33.7 G/DL — SIGNIFICANT CHANGE UP (ref 32–36)
MCV RBC AUTO: 88.1 FL — SIGNIFICANT CHANGE UP (ref 80–100)
MONOCYTES # BLD AUTO: 0.52 K/UL — SIGNIFICANT CHANGE UP (ref 0–0.9)
MONOCYTES NFR BLD AUTO: 7.2 % — SIGNIFICANT CHANGE UP (ref 2–14)
NEUTROPHILS # BLD AUTO: 5.49 K/UL — SIGNIFICANT CHANGE UP (ref 1.8–7.4)
NEUTROPHILS NFR BLD AUTO: 75.8 % — SIGNIFICANT CHANGE UP (ref 43–77)
NRBC BLD AUTO-RTO: 0 /100 WBCS — SIGNIFICANT CHANGE UP (ref 0–0)
NT-PROBNP SERPL-SCNC: 193 PG/ML — SIGNIFICANT CHANGE UP (ref 0–300)
PHOSPHATE SERPL-MCNC: 3.8 MG/DL — SIGNIFICANT CHANGE UP (ref 2.5–4.5)
PLATELET # BLD AUTO: 240 K/UL — SIGNIFICANT CHANGE UP (ref 150–400)
POTASSIUM SERPL-MCNC: 4.3 MMOL/L — SIGNIFICANT CHANGE UP (ref 3.5–5.3)
POTASSIUM SERPL-SCNC: 4.3 MMOL/L — SIGNIFICANT CHANGE UP (ref 3.5–5.3)
PROT SERPL-MCNC: 7.4 G/DL — SIGNIFICANT CHANGE UP (ref 6–8.3)
PROTHROM AB SERPL-ACNC: 13 SEC — SIGNIFICANT CHANGE UP (ref 9.9–13.4)
RBC # BLD: 4.52 M/UL — SIGNIFICANT CHANGE UP (ref 3.8–5.2)
RBC # FLD: 12 % — SIGNIFICANT CHANGE UP (ref 10.3–14.5)
SODIUM SERPL-SCNC: 130 MMOL/L — LOW (ref 135–145)
T3 SERPL-MCNC: 89 NG/DL — SIGNIFICANT CHANGE UP (ref 80–200)
T4 AB SER-ACNC: 10.6 UG/DL — SIGNIFICANT CHANGE UP (ref 4.6–12)
TROPONIN T, HIGH SENSITIVITY RESULT: 7 NG/L — SIGNIFICANT CHANGE UP (ref 0–51)
TSH SERPL-MCNC: 0.98 UIU/ML — SIGNIFICANT CHANGE UP (ref 0.27–4.2)
WBC # BLD: 7.25 K/UL — SIGNIFICANT CHANGE UP (ref 3.8–10.5)
WBC # FLD AUTO: 7.25 K/UL — SIGNIFICANT CHANGE UP (ref 3.8–10.5)

## 2025-02-13 PROCEDURE — 99223 1ST HOSP IP/OBS HIGH 75: CPT

## 2025-02-13 PROCEDURE — 99285 EMERGENCY DEPT VISIT HI MDM: CPT | Mod: GC

## 2025-02-13 PROCEDURE — 71045 X-RAY EXAM CHEST 1 VIEW: CPT | Mod: 26

## 2025-02-13 RX ORDER — NICOTINE POLACRILEX 4 MG/1
1 GUM, CHEWING ORAL DAILY
Refills: 0 | Status: DISCONTINUED | OUTPATIENT
Start: 2025-02-13 | End: 2025-02-20

## 2025-02-13 RX ORDER — ALBUTEROL SULFATE 2.5 MG/3ML
2 VIAL, NEBULIZER (ML) INHALATION EVERY 6 HOURS
Refills: 0 | Status: DISCONTINUED | OUTPATIENT
Start: 2025-02-13 | End: 2025-02-20

## 2025-02-13 RX ORDER — IPRATROPIUM BROMIDE AND ALBUTEROL SULFATE .5; 2.5 MG/3ML; MG/3ML
3 SOLUTION RESPIRATORY (INHALATION) ONCE
Refills: 0 | Status: COMPLETED | OUTPATIENT
Start: 2025-02-13 | End: 2025-02-13

## 2025-02-13 RX ORDER — METOPROLOL SUCCINATE 50 MG/1
100 TABLET, EXTENDED RELEASE ORAL DAILY
Refills: 0 | Status: DISCONTINUED | OUTPATIENT
Start: 2025-02-13 | End: 2025-02-17

## 2025-02-13 RX ORDER — LEVOTHYROXINE SODIUM 300 MCG
112 TABLET ORAL DAILY
Refills: 0 | Status: DISCONTINUED | OUTPATIENT
Start: 2025-02-13 | End: 2025-02-20

## 2025-02-13 RX ORDER — ACETAMINOPHEN 500 MG/5ML
1000 LIQUID (ML) ORAL ONCE
Refills: 0 | Status: COMPLETED | OUTPATIENT
Start: 2025-02-13 | End: 2025-02-13

## 2025-02-13 RX ORDER — LIDOCAINE HYDROCHLORIDE 20 MG/ML
1 JELLY TOPICAL ONCE
Refills: 0 | Status: COMPLETED | OUTPATIENT
Start: 2025-02-13 | End: 2025-02-13

## 2025-02-13 RX ORDER — BACLOFEN 10 MG/20ML
5 INJECTION INTRATHECAL EVERY 8 HOURS
Refills: 0 | Status: DISCONTINUED | OUTPATIENT
Start: 2025-02-13 | End: 2025-02-20

## 2025-02-13 RX ORDER — OXYCODONE HYDROCHLORIDE 30 MG/1
20 TABLET ORAL EVERY 8 HOURS
Refills: 0 | Status: DISCONTINUED | OUTPATIENT
Start: 2025-02-13 | End: 2025-02-20

## 2025-02-13 RX ORDER — COLCHICINE 0.6 MG/1
0.6 TABLET, FILM COATED ORAL DAILY
Refills: 0 | Status: DISCONTINUED | OUTPATIENT
Start: 2025-02-13 | End: 2025-02-20

## 2025-02-13 RX ORDER — HYDROMORPHONE/SOD CHLOR,ISO/PF 2 MG/10 ML
1 SYRINGE (ML) INJECTION ONCE
Refills: 0 | Status: DISCONTINUED | OUTPATIENT
Start: 2025-02-13 | End: 2025-02-13

## 2025-02-13 RX ORDER — APIXABAN 2.5 MG/1
5 TABLET, FILM COATED ORAL EVERY 12 HOURS
Refills: 0 | Status: DISCONTINUED | OUTPATIENT
Start: 2025-02-13 | End: 2025-02-16

## 2025-02-13 RX ADMIN — Medication 1 MILLIGRAM(S): at 16:45

## 2025-02-13 RX ADMIN — OXYCODONE HYDROCHLORIDE 20 MILLIGRAM(S): 30 TABLET ORAL at 23:00

## 2025-02-13 RX ADMIN — Medication 1 MILLIGRAM(S): at 17:13

## 2025-02-13 RX ADMIN — Medication 400 MILLIGRAM(S): at 11:07

## 2025-02-13 RX ADMIN — Medication 30 MILLIGRAM(S): at 20:57

## 2025-02-13 RX ADMIN — Medication 30 MILLIGRAM(S): at 23:00

## 2025-02-13 RX ADMIN — OXYCODONE HYDROCHLORIDE 20 MILLIGRAM(S): 30 TABLET ORAL at 20:57

## 2025-02-13 RX ADMIN — LIDOCAINE HYDROCHLORIDE 1 PATCH: 20 JELLY TOPICAL at 19:00

## 2025-02-13 RX ADMIN — LIDOCAINE HYDROCHLORIDE 1 PATCH: 20 JELLY TOPICAL at 23:10

## 2025-02-13 RX ADMIN — LIDOCAINE HYDROCHLORIDE 1 PATCH: 20 JELLY TOPICAL at 11:07

## 2025-02-13 NOTE — CONSULT NOTE ADULT - SUBJECTIVE AND OBJECTIVE BOX
CHIEF COMPLAINT:    HPI:  64F w/ Afib, scleroderma, asthma w/ prior intubation, prior smoker, chronic hypoxic respiratory failure on 3L NC, recent pericardial effusion s/p pericardiocentesis and drainage in setting of coxsackie. Presents w/ L back pain that radiates to front of chest underneath the breast. Worse w/ inspiration and when bending forward. It started yesterday and got better and then got worse overnight and was unrelenting so she came to ED. She feels SOB only because of the pain. No wheezing and no other symptoms of asthma exacerbation. She did not have much pain at the time of the pericardial effusion so it feels worse. No other symptoms otherwise. Pt states she does not muscular pain with scleroderma, which this could be but feels worse than usual and not relieved by pain meds at home.     In ED, pt received tylenol 1 g and lidocaine patch.       PAST MEDICAL & SURGICAL HISTORY:  Scleroderma      Asthma      High cholesterol  Unable to take STATINS for high cholesterol due to genetic disposition      Scleroderma      Shingles      Hypothyroidism      GERD (gastroesophageal reflux disease)      Smoker      Multiple drug allergies      S/P small bowel resection      History of myomectomy      History of ovarian cystectomy      Fibroid      Bowel obstruction  multiple surgeries for bowel obstruction      H/O ovarian cystectomy      S/P pericardiocentesis          FAMILY HISTORY:      SOCIAL HISTORY:  Smoking: former  Substance Use:  EtOH Use:  Occupation:  Recent Travel:  Country of Birth:      Allergies    penicillin (Unknown)  Originally Entered as [Unknown] reaction to [BEE STINGS] (Unknown)  IV Contrast (Unknown)  iodine (Anaphylaxis)  penicillin (Anaphylaxis; Hives; Other)  ABIDA dye (Short breath; Hives)  statins (Unknown)  Xolair (Unknown)    Intolerances        HOME MEDICATIONS:    REVIEW OF SYSTEMS:  Constitutional: No fevers or chills. No weight loss. No fatigue or generalised malaise.  Eyes: No itching or discharge from the eyes  ENT: No ear pain. No ear discharge. No nasal congestion. No post nasal drip. No epistaxis. No throat pain. No sore throat. No difficulty swallowing.   CV: + chest pain. No palpitations. No lightheadedness or dizziness.   Resp: + dyspnea at rest. No dyspnea on exertion. No orthopnea. No wheezing. No cough. No stridor. No sputum production. No chest pain with respiration.  GI: No nausea. No vomiting. No diarrhea.  MSK: No joint pain or pain in any extremities  Integumentary: No skin lesions. No pedal edema.  Neurological: No gross motor weakness. No sensory changes.  [ ] All other systems negative  [ ] Unable to assess ROS because ________    OBJECTIVE:  ICU Vital Signs Last 24 Hrs  T(C): 36.4 (13 Feb 2025 14:25), Max: 36.4 (13 Feb 2025 10:29)  T(F): 97.5 (13 Feb 2025 14:25), Max: 97.6 (13 Feb 2025 10:29)  HR: 50 (13 Feb 2025 14:25) (50 - 62)  BP: 96/60 (13 Feb 2025 14:25) (96/60 - 122/60)  BP(mean): 71 (13 Feb 2025 12:43) (71 - 80)  ABP: --  ABP(mean): --  RR: 18 (13 Feb 2025 14:25) (18 - 21)  SpO2: 95% (13 Feb 2025 14:25) (94% - 96%)    O2 Parameters below as of 13 Feb 2025 14:25  Patient On (Oxygen Delivery Method): nasal cannula  O2 Flow (L/min): 2            CAPILLARY BLOOD GLUCOSE          PHYSICAL EXAM:  General: NAD, non toxic appearing, able to speak in full sentences  HEENT: MMM, EOMI  Neck: supple  Respiratory: rales at L base, otherwise CTA b/l  Cardiovascular: s1s2 RRR, no rubs   Abdomen: soft, non tender, non distended  Extremities: warm, swelling of LLE  Skin: warm, no edema or clubbing  Neurological: no focal deficits  Psychiatry: [place]    \[facility] [place] [facility]\"" MEDICATIONS:  MEDICATIONS  (STANDING):  albuterol/ipratropium for Nebulization. 3 milliLiter(s) Nebulizer once    MEDICATIONS  (PRN):      LABS:                        13.4   7.25  )-----------( 240      ( 13 Feb 2025 11:15 )             39.8     Hgb Trend: 13.4<--  02-13    130[L]  |  91[L]  |  12  ----------------------------<  105[H]  4.3   |  28  |  0.48[L]    Ca    9.4      13 Feb 2025 11:15  Phos  3.8     02-13  Mg     1.8     02-13    TPro  7.4  /  Alb  4.0  /  TBili  0.7  /  DBili  x   /  AST  17  /  ALT  10  /  AlkPhos  112  02-13    PT/INR - ( 13 Feb 2025 11:15 )   PT: 13.0 sec;   INR: 1.13 ratio         PTT - ( 13 Feb 2025 11:15 )  PTT:29.7 sec  Urinalysis Basic - ( 13 Feb 2025 11:15 )    Color: x / Appearance: x / SG: x / pH: x  Gluc: 105 mg/dL / Ketone: x  / Bili: x / Urobili: x   Blood: x / Protein: x / Nitrite: x   Leuk Esterase: x / RBC: x / WBC x   Sq Epi: x / Non Sq Epi: x / Bacteria: x        Venous Blood Gas:  02-13 @ 11:00  7.35/58/37/32/46.0  VBG Lactate: 0.8      MICROBIOLOGY:     RADIOLOGY:  [ ] Reviewed and interpreted by me    Bedside US:    PULMONARY FUNCTION TESTS: CHIEF COMPLAINT:    HPI:  64F w/ Afib, scleroderma, asthma w/ prior intubation, prior smoker, chronic hypoxic respiratory failure on 3L NC, recent pericardial effusion s/p pericardiocentesis and drainage in setting of coxsackie. Presents w/ L back pain that radiates to front of chest underneath the breast. Worse w/ inspiration and when bending forward. It started yesterday and got better and then got worse overnight and was unrelenting so she came to ED. She feels SOB only because of the pain. No wheezing and no other symptoms of asthma exacerbation. She did not have much pain at the time of the pericardial effusion so it feels worse. No other symptoms otherwise. Pt states she does not muscular pain with scleroderma, which this could be but feels worse than usual and not relieved by pain meds at home.     In ED, pt received tylenol 1 g and lidocaine patch.       PAST MEDICAL & SURGICAL HISTORY:  Scleroderma      Asthma      High cholesterol  Unable to take STATINS for high cholesterol due to genetic disposition      Scleroderma      Shingles      Hypothyroidism      GERD (gastroesophageal reflux disease)      Smoker      Multiple drug allergies      S/P small bowel resection      History of myomectomy      History of ovarian cystectomy      Fibroid      Bowel obstruction  multiple surgeries for bowel obstruction      H/O ovarian cystectomy      S/P pericardiocentesis          FAMILY HISTORY:      SOCIAL HISTORY:  Smoking: former  Substance Use:  EtOH Use:  Occupation:  Recent Travel:  Country of Birth:      Allergies    penicillin (Unknown)  Originally Entered as [Unknown] reaction to [BEE STINGS] (Unknown)  IV Contrast (Unknown)  iodine (Anaphylaxis)  penicillin (Anaphylaxis; Hives; Other)  ABIDA dye (Short breath; Hives)  statins (Unknown)  Xolair (Unknown)    Intolerances        HOME MEDICATIONS:    REVIEW OF SYSTEMS:  Constitutional: No fevers or chills. No weight loss. No fatigue or generalised malaise.  Eyes: No itching or discharge from the eyes  ENT: No ear pain. No ear discharge. No nasal congestion. No post nasal drip. No epistaxis. No throat pain. No sore throat. No difficulty swallowing.   CV: + chest pain. No palpitations. No lightheadedness or dizziness.   Resp: + dyspnea at rest. No dyspnea on exertion. No orthopnea. No wheezing. No cough. No stridor. No sputum production. No chest pain with respiration.  GI: No nausea. No vomiting. No diarrhea.  MSK: No joint pain or pain in any extremities  Integumentary: No skin lesions. No pedal edema.  Neurological: No gross motor weakness. No sensory changes.  [ ] All other systems negative  [ ] Unable to assess ROS because ________    OBJECTIVE:  ICU Vital Signs Last 24 Hrs  T(C): 36.4 (13 Feb 2025 14:25), Max: 36.4 (13 Feb 2025 10:29)  T(F): 97.5 (13 Feb 2025 14:25), Max: 97.6 (13 Feb 2025 10:29)  HR: 50 (13 Feb 2025 14:25) (50 - 62)  BP: 96/60 (13 Feb 2025 14:25) (96/60 - 122/60)  BP(mean): 71 (13 Feb 2025 12:43) (71 - 80)  ABP: --  ABP(mean): --  RR: 18 (13 Feb 2025 14:25) (18 - 21)  SpO2: 95% (13 Feb 2025 14:25) (94% - 96%)    O2 Parameters below as of 13 Feb 2025 14:25  Patient On (Oxygen Delivery Method): nasal cannula  O2 Flow (L/min): 2            CAPILLARY BLOOD GLUCOSE          PHYSICAL EXAM:  General: NAD, non toxic appearing, able to speak in full sentences  HEENT: MMM, EOMI  Neck: supple  Respiratory: rales at L base, otherwise CTA b/l  Cardiovascular: s1s2 RRR, no rubs; chest pain is reproducible on exam  Abdomen: soft, non tender, non distended  Extremities: warm, swelling of LLE  Skin: warm, no edema or clubbing  Neurological: no focal deficits  Psychiatry: TERRE[place]3    [facility] MEDICATIONS:  MEDICATIONS  (STANDING):  albuterol/ipratropium for Nebulization. 3 milliLiter(s) Nebulizer once    MEDICATIONS  (PRN):      LABS:                        13.4   7.25  )-----------( 240      ( 13 Feb 2025 11:15 )             39.8     Hgb Trend: 13.4<--  02-13    130[L]  |  91[L]  |  12  ----------------------------<  105[H]  4.3   |  28  |  0.48[L]    Ca    9.4      13 Feb 2025 11:15  Phos  3.8     02-13  Mg     1.8     02-13    TPro  7.4  /  Alb  4.0  /  TBili  0.7  /  DBili  x   /  AST  17  /  ALT  10  /  AlkPhos  112  02-13    PT/INR - ( 13 Feb 2025 11:15 )   PT: 13.0 sec;   INR: 1.13 ratio         PTT - ( 13 Feb 2025 11:15 )  PTT:29.7 sec  Urinalysis Basic - ( 13 Feb 2025 11:15 )    Color: x / Appearance: x / SG: x / pH: x  Gluc: 105 mg/dL / Ketone: x  / Bili: x / Urobili: x   Blood: x / Protein: x / Nitrite: x   Leuk Esterase: x / RBC: x / WBC x   Sq Epi: x / Non Sq Epi: x / Bacteria: x        Venous Blood Gas:  02-13 @ 11:00  7.35/58/37/32/46.0  VBG Lactate: 0.8      MICROBIOLOGY:     RADIOLOGY:  [ ] Reviewed and interpreted by me    Bedside US:    PULMONARY FUNCTION TESTS:

## 2025-02-13 NOTE — PATIENT PROFILE ADULT - FALL HARM RISK - HARM RISK INTERVENTIONS

## 2025-02-13 NOTE — CONSULT NOTE ADULT - ASSESSMENT
64F w/ afib, scleroderma, asthma, chronic hypoxic respiratory failure, pericardial effusion w/ tamponade s/p pericardiocentesis in setting of coxsackie. Presents w/ pleuritic L chest and back pain.     - this is is not asthma exacerbation - she has no signs/symptoms to suggest asthma  - pt is on baseline 3LNC w/ good SpO2  - POCUS shows small pleural effusion w/ consolidation pattern on the L - it is possible that whatever this is can be causing some pleuritic chest pain - consider CT chest and would consider r/o PE  - get LE dopplers at LLE is more swollen than right  - this pain can also be related to recent pericarditis/pericardial effusion - awaiting repeat TTE  - pt needs pain control and incentive spirometer   - can start advair as pt has a hx of smoking for possible COPD; albuterol PRN    Plan of care discussed w/ patient and friend at bedside, all questions answered     64F w/ afib, scleroderma, asthma, chronic hypoxic respiratory failure, pericardial effusion w/ tamponade s/p pericardiocentesis in setting of coxsackie. Presents w/ pleuritic L chest and back pain.     - this is is not asthma exacerbation - she has no signs/symptoms to suggest asthma  - pt is on baseline 3LNC w/ good SpO2  - POCUS shows small pleural effusion w/ consolidation pattern on the L - it is possible that whatever this is can be causing some pleuritic chest pain - consider CT chest and would consider r/o PE  - get LE dopplers at LLE is more swollen than right  - this pain can also be related to recent pericarditis/pericardial effusion - awaiting repeat TTE  - etiology could also be costocondritis  - pt needs pain control and incentive spirometer   - can start advair as pt has a hx of smoking for possible COPD; albuterol PRN    Plan of care discussed w/ patient and friend at bedside, all questions answered

## 2025-02-13 NOTE — ED PROVIDER NOTE - ATTENDING CONTRIBUTION TO CARE
------------ATTENDING NOTE------------  pt w/  brought to ED by EMS c/o 24 hrs of stabbing L chest wall pain, worse w/ bending/twisting, similar recently R chest wall that resolved after several hours, complicated as scleroderma, no new/worse sob/dyspnea (pt on baseline 3L NC), no numbness/weakness, no new/worse dysphagia, pt took Morphine and Oxycodone prior to EMS, awaiting labs/imaging and close reassessments -->  - Yuan Griffin MD   --------------------------------------------------------- ------------ATTENDING NOTE------------  pt w/  brought to ED by EMS c/o 24 hrs of stabbing L chest wall pain, worse w/ bending/twisting, similar recently R chest wall that resolved after several hours, complicated as scleroderma, no new/worse sob/dyspnea (pt on baseline 3L NC), no numbness/weakness, no new/worse dysphagia, pt took Morphine and Oxycodone prior to EMS, awaiting labs/imaging and close reassessments -->  d/w PCP, admitting tele/cards for continued javier, tx, EP, optimize medical mgmt.  - Yuan Griffin MD   --------------------------------------------------------- ------------ATTENDING NOTE------------  pt w/  brought to ED by EMS c/o 24 hrs of stabbing L chest wall pain, worse w/ bending/twisting, similar recently R chest wall that resolved after several hours, complicated as scleroderma, no new/worse sob/dyspnea (pt on baseline 3L NC), no numbness/weakness, no new/worse dysphagia, pt took Morphine and Oxycodone prior to EMS, awaiting labs/imaging and close reassessments -->    - Yuan Griffin MD   ---------------------------------------------------------

## 2025-02-13 NOTE — ED PROVIDER NOTE - OBJECTIVE STATEMENT
64-year-old female past medical history of A-fib, scleroderma, asthma with prior intubations, patient admission for pericardial effusion status post pericardiocentesis with drain on colchicine presents ED complaining of sudden onset left flank pain that woke her from sleep this morning.  Had similar symptoms 1 week ago on right side, now with left flank worse with inspiration with mild shortness of breath.  Took inhaler with no relief prompting visit to ED.  Denies fever chills nausea vomiting diarrhea abdominal pain dysuria hematuria.  Patient on 3 L nasal cannula baseline at home.  Uncomfortable with laying flat.

## 2025-02-13 NOTE — ED PROVIDER NOTE - PHYSICAL EXAMINATION
Gen: AAOx3, non-toxic, uncomfortable appearing   Head: NCAT  HEENT: EOMI, oral mucosa moist, normal conjunctiva  Lung: R sided crackles/wheezing inspiratory/expiratory, no respiratory distress, no wheezes/rhonchi/rales B/L, L lateral chest wall ttp  CV: RRR, no murmurs, rubs or gallops  Abd: soft, NTND, no guarding, L sided CVA ttp,   MSK: no visible deformities  Neuro: No focal sensory or motor deficits  Skin: Warm, well perfused, no rash  Psych: normal affect.

## 2025-02-13 NOTE — H&P ADULT - ASSESSMENT
64-year-old female past medical history of A-fib, scleroderma, asthma with prior intubations, patient admission for pericardial effusion status post pericardiocentesis with drain on colchicine presents ED complaining of sudden onset left flank pain that woke her from sleep this morning.  Had similar symptoms 1 week ago on right side, now with left flank worse with inspiration with mild shortness of breath.  Took inhaler with no relief prompting visit to ED.  Denies fever chills nausea vomiting diarrhea abdominal pain dysuria hematuria.  Patient on 3 L nasal cannula baseline at home.  Uncomfortable with laying flat.     Chest Pain  - p/w left sided chest pain radiating to the back, non-exertional and sometimes worse with inspiration  - Had admission 1 month ago for pericardial effusion s/p drain on home maintenance with colchicine  - ECG NSR  - Trop and BNP wnl  - CXR pending  - Prior TTE showed preserved EF, no WMA, small pericardial effusion; Will repeat.   - Check ESR, CRP  - Patient with contrast allergy therefore unable to urgently get CT r/o PE. Check d-dimer to risk stratify.  - Possible ischemic eval with NST pending the above workup.  - doubt PE as pt is on eliquis and not tachycardic  - check odalys lower ext dopller     Pericardial Effusion  - s/p drain with 650ml removed last admission in December 2024  - OP Cards did TTE which shows small increase from prior  - Repeat TTE pending  - As noted above, check ESR, CRP    Atrial Fibrillation  - c/w Toprol 100mg PO daily  - c/w Eliquis 5mg PO BID  - Will monitor HR closely. Currently karen in 50s. May need dose adjustment of Toprol.  - Monitor on tele.    : Asthma  - Reports lifelong hx  - c/w advair and ventolin

## 2025-02-13 NOTE — H&P ADULT - NSHPLABSRESULTS_GEN_ALL_CORE
LABS:                        13.4   7.25  )-----------( 240      ( 13 Feb 2025 11:15 )             39.8     02-13    130[L]  |  91[L]  |  12  ----------------------------<  105[H]  4.3   |  28  |  0.48[L]    Ca    9.4      13 Feb 2025 11:15  Phos  3.8     02-13  Mg     1.8     02-13    TPro  7.4  /  Alb  4.0  /  TBili  0.7  /  DBili  x   /  AST  17  /  ALT  10  /  AlkPhos  112  02-13    PT/INR - ( 13 Feb 2025 11:15 )   PT: 13.0 sec;   INR: 1.13 ratio         PTT - ( 13 Feb 2025 11:15 )  PTT:29.7 sec  Urinalysis Basic - ( 13 Feb 2025 11:15 )    Color: x / Appearance: x / SG: x / pH: x  Gluc: 105 mg/dL / Ketone: x  / Bili: x / Urobili: x   Blood: x / Protein: x / Nitrite: x   Leuk Esterase: x / RBC: x / WBC x   Sq Epi: x / Non Sq Epi: x / Bacteria: x            RADIOLOGY & ADDITIONAL TESTS:

## 2025-02-13 NOTE — ED PROVIDER NOTE - PROGRESS NOTE DETAILS
SG   62 yo F PMH of pAF, scleroderma, asthma with prior intubations; recent adm dec 2024 for pericardial effusion s/p pericardiocentesis and drain, on colchicine,  a fib rvr, preseenting for l flank pain woke her up from sleep. last week had it on the right side of her chest, but last night desribes it as a sharp pain, worse with inspiration, associated with mild difficulty breathing for which she took her inhaler with no relief. no fevers, n/v. has been taking her colcichine. on arrival on 3L [facility] she is on at home , presents sitting forward with b/l wheezing and L sided crackles on exam, no m/r/g, abdomen soft non tender. bedside US showing small posterior pericardial effusion w no signs of RV ED collapse, IVC 1.76 but with resp variation, concern for asthma exacerbation vs pleurisy from pericardial effusion vs costochondritis given pain worse w movement. pending labs xr, will consult ct surgeon; low suspicion for pe but also on ddx given recent hospitalization and pleuritic cp, if initial work up unremarkable will proceed w ct chest, but prior to that ensure pt is not in sclerodermal renal crisis given contrast load SG cardiology fellow consulted regarding pericardial effusion. pt has had a recent echo post drain removal where she was told she still had an effusion, so unclear if this is interval change. pt at this tiem refusing albuterol treatment. will speak with patient SG spoke with Dr. Keenan who wants CTA for PE, pt has contrast allergy. pt still refusing asthma treatment.

## 2025-02-13 NOTE — ED ADULT NURSE NOTE - NSFALLHARMRISKINTERV_ED_ALL_ED

## 2025-02-13 NOTE — CONSULT NOTE ADULT - SUBJECTIVE AND OBJECTIVE BOX
DATE OF SERVICE: 02-13-25 @ 13:43    CHIEF COMPLAINT:Patient is a 64y old  Female who presents with a chief complaint of chest pain    HISTORY OF PRESENT ILLNESS: 64-year-old female past medical history of A-fib, scleroderma, asthma with prior intubations, patient admission for pericardial effusion status post pericardiocentesis with drain on colchicine presents ED complaining of sudden onset left flank pain that woke her from sleep this morning.  Had similar symptoms 1 week ago on right side, now with left flank worse with inspiration with mild shortness of breath.  Took inhaler with no relief prompting visit to ED.  Denies fever chills nausea vomiting diarrhea abdominal pain dysuria hematuria.  Patient on 3 L nasal cannula baseline at home.  Uncomfortable with laying flat.      PAST MEDICAL & SURGICAL HISTORY:  Scleroderma      Asthma      High cholesterol  Unable to take STATINS for high cholesterol due to genetic disposition      Scleroderma      Shingles      Hypothyroidism      GERD (gastroesophageal reflux disease)      Smoker      Multiple drug allergies      S/P small bowel resection      History of myomectomy      History of ovarian cystectomy      Fibroid      Bowel obstruction  multiple surgeries for bowel obstruction      H/O ovarian cystectomy      S/P pericardiocentesis              MEDICATIONS:      albuterol/ipratropium for Nebulization. 3 milliLiter(s) Nebulizer once  · 	Narcan 4 mg/0.1 mL nasal spray: 1 spray(s) intranasally once a day  · 	Advair Diskus 250 mcg-50 mcg inhalation powder: 1 inhaled 2 times a day  · 	metoprolol succinate 100 mg oral tablet, extended release: 1 tab(s) orally once a day  · 	colchicine 0.6 mg oral tablet: 1 tab(s) orally once a day  · 	nicotine 21 mg/24 hr transdermal film, extended release: 1 patch transdermal once a day  · 	baclofen 10 mg oral tablet: 1 tab(s) orally 2 times a day  · 	apixaban 5 mg oral tablet: 1 tab(s) orally every 12 hours  · 	senna leaf extract oral tablet: 2 tab(s) orally once a day (at bedtime)  · 	polyethylene glycol 3350 oral powder for reconstitution: 17 gram(s) orally 2 times a day  · 	morphine 30 mg/8 to 12 hr oral tablet, extended release: 1 tab(s) orally 2 times a day  · 	Synthroid 112 mcg (0.112 mg) oral tablet: 1 tab(s) orally once a day  · 	oxyCODONE 20 mg oral tablet: 1 tab(s) orally every 6 hours as needed for  severe pain  · 	Ventolin 90 mcg/inh inhalation aerosol: 2 puff(s) inhaled every 6 hours, As Needed            FAMILY HISTORY:      Non-contributory    SOCIAL HISTORY:    [ ] Tobacco  [ ] Drugs  [ ] Alcohol    Allergies    penicillin (Unknown)  Originally Entered as [Unknown] reaction to [BEE STINGS] (Unknown)  IV Contrast (Unknown)  iodine (Anaphylaxis)  penicillin (Anaphylaxis; Hives; Other)  ABIDA dye (Short breath; Hives)  statins (Unknown)  Xolair (Unknown)    Intolerances    	    REVIEW OF SYSTEMS:  CONSTITUTIONAL: No fever  EYES: No eye pain, visual disturbances, or discharge  ENMT:  No difficulty hearing, tinnitus  NECK: No pain or stiffness  RESPIRATORY: No cough, wheezing,  CARDIOVASCULAR:   + chest pain, palpitations, passing out, dizziness, or leg swelling  GASTROINTESTINAL:  No nausea, vomiting, diarrhea or constipation. No melena.  GENITOURINARY: No dysuria, hematuria  NEUROLOGICAL: No stroke like symptoms  SKIN: No burning or lesions   ENDOCRINE: No heat or cold intolerance  MUSCULOSKELETAL: No joint pain or swelling  PSYCHIATRIC: No  anxiety, mood swings  HEME/LYMPH: No bleeding gums  ALLERGY AND IMMUNOLOGIC: No hives or eczema	    All other ROS negative    PHYSICAL EXAM:  T(C): 36.4 (02-13-25 @ 10:29), Max: 36.4 (02-13-25 @ 10:29)  HR: 57 (02-13-25 @ 12:43) (55 - 62)  BP: 100/51 (02-13-25 @ 12:43) (100/51 - 122/60)  RR: 21 (02-13-25 @ 12:43) (18 - 21)  SpO2: 94% (02-13-25 @ 12:43) (94% - 96%)  Wt(kg): --  I&O's Summary      Appearance: Normal	  HEENT:   Normal oral mucosa, EOMI	  Cardiovascular:  S1 S2, No JVD,    Respiratory: Lungs clear to auscultation	  Psychiatry: Alert  Gastrointestinal:  Soft, Non-tender, + BS	  Skin: No rashes   Neurologic: Non-focal  Extremities:  No edema  Vascular: Peripheral pulses palpable    	    	  	  CARDIAC MARKERS:  Labs personally reviewed by me                                  13.4   7.25  )-----------( 240      ( 13 Feb 2025 11:15 )             39.8     02-13    130[L]  |  91[L]  |  12  ----------------------------<  105[H]  4.3   |  28  |  0.48[L]    Ca    9.4      13 Feb 2025 11:15  Phos  3.8     02-13  Mg     1.8     02-13    TPro  7.4  /  Alb  4.0  /  TBili  0.7  /  DBili  x   /  AST  17  /  ALT  10  /  AlkPhos  112  02-13          EKG: Personally reviewed by me -   Radiology: Personally reviewed by me -     < from: TTE Limited W or WO Ultrasound Enhancing Agent (12.30.24 @ 11:03) >   1. This wasa limited study to assess for pericardial effusion.   2. Technically difficult image quality.   3. Left ventricular systolic function is grossly normal with an ejection fraction visually estimated at 60 to 65 %. There is poor visualization of the endocardial borders to determine the presence of wall motion abnormalities.   4. Normal right ventricular systolic function.   5. There is an echofree space visualized proximal to the right atrial free wall and basal portion of the right ventricular freewall anteriorly which most likely represents a small pericardial effusion (<1.0 cm). There is a minimal to small pericardial effusion posteriorly. Otherwise, elsewhere there is trivial or no pericardial effusion visualized. There is no evidence of RAor RV diastolic collapse.   6. Compared to the transthoracic echocardiogram performed on 12/26/2024, by direct visual comparison, there is probably no substantial change in the size of the pericardial effusion within the technical limitations of bothstudies. The IVC is again noted to be dilated. A left pleural effusion is again present.      Assessment /Plan:        64-year-old female past medical history of A-fib, scleroderma, asthma with prior intubations, patient admission for pericardial effusion status post pericardiocentesis with drain on colchicine presents ED complaining of sudden onset left flank pain that woke her from sleep this morning.  Had similar symptoms 1 week ago on right side, now with left flank worse with inspiration with mild shortness of breath.  Took inhaler with no relief prompting visit to ED.  Denies fever chills nausea vomiting diarrhea abdominal pain dysuria hematuria.  Patient on 3 L nasal cannula baseline at home.  Uncomfortable with laying flat.      Differential diagnosis and plan of care discussed with patient after the evaluation. Counseling on diet, nutritional counseling, weight management, exercise and medication compliance was done.   Advanced care planning/advanced directives discussed with patient/family. DNR status including forceful chest compressions to attempt to restart the heart, ventilator support/artificial breathing, electric shock, artificial nutrition, health care proxy, Molst form all discussed with pt. Pt wishes to consider. More than fifteen minutes spent on discussing advanced directives.       Brandon Verduzco DO [facility]  Cardiovascular Medicine  [address] [place] [address] Dr, [address]  Available for call or text via [employer]  Office [phone]   DATE OF SERVICE: 02-13-25 @ 13:43    CHIEF COMPLAINT:Patient is a 64y old  Female who presents with a chief complaint of chest pain    HISTORY OF PRESENT ILLNESS: 64-year-old female past medical history of A-fib, scleroderma, asthma with prior intubations, patient admission for pericardial effusion status post pericardiocentesis with drain on colchicine presents ED complaining of sudden onset left flank pain that woke her from sleep this morning.  Had similar symptoms 1 week ago on right side, now with left flank worse with inspiration with mild shortness of breath.  Took inhaler with no relief prompting visit to ED.  Denies fever chills nausea vomiting diarrhea abdominal pain dysuria hematuria.  Patient on 3 L nasal cannula baseline at home.  Uncomfortable with laying flat.      PAST MEDICAL & SURGICAL HISTORY:  Scleroderma      Asthma      High cholesterol  Unable to take STATINS for high cholesterol due to genetic disposition      Scleroderma      Shingles      Hypothyroidism      GERD (gastroesophageal reflux disease)      Smoker      Multiple drug allergies      S/P small bowel resection      History of myomectomy      History of ovarian cystectomy      Fibroid      Bowel obstruction  multiple surgeries for bowel obstruction      H/O ovarian cystectomy      S/P pericardiocentesis              MEDICATIONS:      albuterol/ipratropium for Nebulization. 3 milliLiter(s) Nebulizer once  · 	Narcan 4 mg/0.1 mL nasal spray: 1 spray(s) intranasally once a day  · 	Advair Diskus 250 mcg-50 mcg inhalation powder: 1 inhaled 2 times a day  · 	metoprolol succinate 100 mg oral tablet, extended release: 1 tab(s) orally once a day  · 	colchicine 0.6 mg oral tablet: 1 tab(s) orally once a day  · 	nicotine 21 mg/24 hr transdermal film, extended release: 1 patch transdermal once a day  · 	baclofen 10 mg oral tablet: 1 tab(s) orally 2 times a day  · 	apixaban 5 mg oral tablet: 1 tab(s) orally every 12 hours  · 	senna leaf extract oral tablet: 2 tab(s) orally once a day (at bedtime)  · 	polyethylene glycol 3350 oral powder for reconstitution: 17 gram(s) orally 2 times a day  · 	morphine 30 mg/8 to 12 hr oral tablet, extended release: 1 tab(s) orally 2 times a day  · 	Synthroid 112 mcg (0.112 mg) oral tablet: 1 tab(s) orally once a day  · 	oxyCODONE 20 mg oral tablet: 1 tab(s) orally every 6 hours as needed for  severe pain  · 	Ventolin 90 mcg/inh inhalation aerosol: 2 puff(s) inhaled every 6 hours, As Needed            FAMILY HISTORY:  + FHX of CAD    Non-contributory    SOCIAL HISTORY:    [- ] Tobacco--> quit 1 month ago, extensive smoking history  [- ] Drugs  [- ] Alcohol    Allergies    penicillin (Unknown)  Originally Entered as [Unknown] reaction to [BEE STINGS] (Unknown)  IV Contrast (Unknown)  iodine (Anaphylaxis)  penicillin (Anaphylaxis; Hives; Other)  ABIDA dye (Short breath; Hives)  statins (Unknown)  Xolair (Unknown)    Intolerances    	    REVIEW OF SYSTEMS:  CONSTITUTIONAL: No fever  EYES: No eye pain, visual disturbances, or discharge  ENMT:  No difficulty hearing, tinnitus  NECK: No pain or stiffness  RESPIRATORY: No cough, wheezing,  CARDIOVASCULAR:   + chest pain, palpitations, passing out, dizziness, or leg swelling  GASTROINTESTINAL:  No nausea, vomiting, diarrhea or constipation. No melena.  GENITOURINARY: No dysuria, hematuria  NEUROLOGICAL: No stroke like symptoms  SKIN: No burning or lesions   ENDOCRINE: No heat or cold intolerance  MUSCULOSKELETAL: No joint pain or swelling  PSYCHIATRIC: No  anxiety, mood swings  HEME/LYMPH: No bleeding gums  ALLERGY AND IMMUNOLOGIC: No hives or eczema	    All other ROS negative    PHYSICAL EXAM:  T(C): 36.4 (02-13-25 @ 10:29), Max: 36.4 (02-13-25 @ 10:29)  HR: 57 (02-13-25 @ 12:43) (55 - 62)  BP: 100/51 (02-13-25 @ 12:43) (100/51 - 122/60)  RR: 21 (02-13-25 @ 12:43) (18 - 21)  SpO2: 94% (02-13-25 @ 12:43) (94% - 96%)  Wt(kg): --  I&O's Summary      Appearance: Normal	  HEENT:   Normal oral mucosa, EOMI	  Cardiovascular:  S1 S2, No JVD,    Respiratory: Lungs clear to auscultation	  Psychiatry: Alert  Gastrointestinal:  Soft, Non-tender, + BS	  Skin: No rashes   Neurologic: Non-focal  Extremities:  No edema  Vascular: Peripheral pulses palpable    	    	  	  CARDIAC MARKERS:  Labs personally reviewed by me                                  13.4   7.25  )-----------( 240      ( 13 Feb 2025 11:15 )             39.8     02-13    130[L]  |  91[L]  |  12  ----------------------------<  105[H]  4.3   |  28  |  0.48[L]    Ca    9.4      13 Feb 2025 11:15  Phos  3.8     02-13  Mg     1.8     02-13    TPro  7.4  /  Alb  4.0  /  TBili  0.7  /  DBili  x   /  AST  17  /  ALT  10  /  AlkPhos  112  02-13          EKG: Personally reviewed by me - NSR  Radiology: Personally reviewed by me -     < from: TTE Limited W or WO Ultrasound Enhancing Agent (12.30.24 @ 11:03) >   1. This wasa limited study to assess for pericardial effusion.   2. Technically difficult image quality.   3. Left ventricular systolic function is grossly normal with an ejection fraction visually estimated at 60 to 65 %. There is poor visualization of the endocardial borders to determine the presence of wall motion abnormalities.   4. Normal right ventricular systolic function.   5. There is an echofree space visualized proximal to the right atrial free wall and basal portion of the right ventricular freewall anteriorly which most likely represents a small pericardial effusion (<1.0 cm). There is a minimal to small pericardial effusion posteriorly. Otherwise, elsewhere there is trivial or no pericardial effusion visualized. There is no evidence of RAor RV diastolic collapse.   6. Compared to the transthoracic echocardiogram performed on 12/26/2024, by direct visual comparison, there is probably no substantial change in the size of the pericardial effusion within the technical limitations of bothstudies. The IVC is again noted to be dilated. A left pleural effusion is again present.      Assessment /Plan:        64-year-old female past medical history of A-fib, scleroderma, asthma with prior intubations, patient admission for pericardial effusion status post pericardiocentesis with drain on colchicine presents ED complaining of sudden onset left flank pain that woke her from sleep this morning.  Had similar symptoms 1 week ago on right side, now with left flank worse with inspiration with mild shortness of breath.    Denies fever chills nausea vomiting diarrhea abdominal pain dysuria hematuria.  Patient on 3 L nasal cannula baseline at home.  Uncomfortable with laying flat.    Problem/Plan #1: Chest Pain  - p/w left sided chest pain radiating to the back, non-exertional and sometimes worse with inspiration  - Had admission 1 month ago for pericardial effusion s/p drain on home maintenance with colchicine  - ECG NSR  - Trop and BNP wnl  - CXR pending  - Prior TTE showed preserved EF, no WMA, small pericardial effusion; Will repeat.   - Check ESR, CRP  - Patient with contrast allergy therefore unable to urgently get CT r/o PE. Check d-dimer to risk stratify.  - Plan for CT cors given extensive smoking history, autoimmune hx and FHX of CAD iso left sided chest pain. (Will need pre-medication)    Problem/Plan #2: Pericardial Effusion  - s/p drain with 650ml removed last admission in December 2024  - OP Cards did TTE which shows small increase from prior  - Repeat TTE pending  - As noted above, check ESR, CRP    Problem/Plan #3: Atrial Fibrillation  - c/w Toprol 100mg PO daily  - c/w Eliquis 5mg PO BID  - Will monitor HR closely. Currently karen in 50s. May need dose adjustment of Toprol.  - Monitor on tele.    Problem/Plan #4: Asthma  - Reports lifelong hx  - c/w advair and ventolin        Differential diagnosis and plan of care discussed with patient after the evaluation. Counseling on diet, nutritional counseling, weight management, exercise and medication compliance was done.   Advanced care planning/advanced directives discussed with patient/family. DNR status including forceful chest compressions to attempt to restart the heart, ventilator support/artificial breathing, electric shock, artificial nutrition, health care proxy, Molst form all discussed with pt. Pt wishes to consider. More than fifteen minutes spent on discussing advanced directives.     Dory Francisco [facility]  Brandon Verduzco, DO [facility]  Cardiovascular Medicine  [address], [address]  Available for call or text via [employer]  Office [phone]   DATE OF SERVICE: 02-13-25 @ 13:43    CHIEF COMPLAINT:Patient is a 64y old  Female who presents with a chief complaint of chest pain    HISTORY OF PRESENT ILLNESS: 64-year-old female past medical history of A-fib, scleroderma, asthma with prior intubations, patient admission for pericardial effusion status post pericardiocentesis with drain on colchicine presents ED complaining of sudden onset left flank pain that woke her from sleep this morning.  Had similar symptoms 1 week ago on right side, now with left flank worse with inspiration with mild shortness of breath.  Took inhaler with no relief prompting visit to ED.  Denies fever chills nausea vomiting diarrhea abdominal pain dysuria hematuria.  Patient on 3 L nasal cannula baseline at home.  Uncomfortable with laying flat.      PAST MEDICAL & SURGICAL HISTORY:  Scleroderma      Asthma      High cholesterol  Unable to take STATINS for high cholesterol due to genetic disposition      Scleroderma      Shingles      Hypothyroidism      GERD (gastroesophageal reflux disease)      Smoker      Multiple drug allergies      S/P small bowel resection      History of myomectomy      History of ovarian cystectomy      Fibroid      Bowel obstruction  multiple surgeries for bowel obstruction      H/O ovarian cystectomy      S/P pericardiocentesis              MEDICATIONS:      albuterol/ipratropium for Nebulization. 3 milliLiter(s) Nebulizer once  · 	Narcan 4 mg/0.1 mL nasal spray: 1 spray(s) intranasally once a day  · 	Advair Diskus 250 mcg-50 mcg inhalation powder: 1 inhaled 2 times a day  · 	metoprolol succinate 100 mg oral tablet, extended release: 1 tab(s) orally once a day  · 	colchicine 0.6 mg oral tablet: 1 tab(s) orally once a day  · 	nicotine 21 mg/24 hr transdermal film, extended release: 1 patch transdermal once a day  · 	baclofen 10 mg oral tablet: 1 tab(s) orally 2 times a day  · 	apixaban 5 mg oral tablet: 1 tab(s) orally every 12 hours  · 	senna leaf extract oral tablet: 2 tab(s) orally once a day (at bedtime)  · 	polyethylene glycol 3350 oral powder for reconstitution: 17 gram(s) orally 2 times a day  · 	morphine 30 mg/8 to 12 hr oral tablet, extended release: 1 tab(s) orally 2 times a day  · 	Synthroid 112 mcg (0.112 mg) oral tablet: 1 tab(s) orally once a day  · 	oxyCODONE 20 mg oral tablet: 1 tab(s) orally every 6 hours as needed for  severe pain  · 	Ventolin 90 mcg/inh inhalation aerosol: 2 puff(s) inhaled every 6 hours, As Needed            FAMILY HISTORY:  + FHX of CAD    Non-contributory    SOCIAL HISTORY:    [- ] Tobacco--> quit 1 month ago, extensive smoking history  [- ] Drugs  [- ] Alcohol    Allergies    penicillin (Unknown)  Originally Entered as [Unknown] reaction to [BEE STINGS] (Unknown)  IV Contrast (Unknown)  iodine (Anaphylaxis)  penicillin (Anaphylaxis; Hives; Other)  ABIDA dye (Short breath; Hives)  statins (Unknown)  Xolair (Unknown)    Intolerances    	    REVIEW OF SYSTEMS:  CONSTITUTIONAL: No fever  EYES: No eye pain, visual disturbances, or discharge  ENMT:  No difficulty hearing, tinnitus  NECK: No pain or stiffness  RESPIRATORY: No cough, wheezing,  CARDIOVASCULAR:   + chest pain, palpitations, passing out, dizziness, or leg swelling  GASTROINTESTINAL:  No nausea, vomiting, diarrhea or constipation. No melena.  GENITOURINARY: No dysuria, hematuria  NEUROLOGICAL: No stroke like symptoms  SKIN: No burning or lesions   ENDOCRINE: No heat or cold intolerance  MUSCULOSKELETAL: No joint pain or swelling  PSYCHIATRIC: No  anxiety, mood swings  HEME/LYMPH: No bleeding gums  ALLERGY AND IMMUNOLOGIC: No hives or eczema	    All other ROS negative    PHYSICAL EXAM:  T(C): 36.4 (02-13-25 @ 10:29), Max: 36.4 (02-13-25 @ 10:29)  HR: 57 (02-13-25 @ 12:43) (55 - 62)  BP: 100/51 (02-13-25 @ 12:43) (100/51 - 122/60)  RR: 21 (02-13-25 @ 12:43) (18 - 21)  SpO2: 94% (02-13-25 @ 12:43) (94% - 96%)  Wt(kg): --  I&O's Summary      Appearance: Normal	  HEENT:   Normal oral mucosa, EOMI	  Cardiovascular:  S1 S2, No JVD,    Respiratory: Lungs clear to auscultation	  Psychiatry: Alert  Gastrointestinal:  Soft, Non-tender, + BS	  Skin: No rashes   Neurologic: Non-focal  Extremities:  No edema  Vascular: Peripheral pulses palpable    	    	  	  CARDIAC MARKERS:  Labs personally reviewed by me                                  13.4   7.25  )-----------( 240      ( 13 Feb 2025 11:15 )             39.8     02-13    130[L]  |  91[L]  |  12  ----------------------------<  105[H]  4.3   |  28  |  0.48[L]    Ca    9.4      13 Feb 2025 11:15  Phos  3.8     02-13  Mg     1.8     02-13    TPro  7.4  /  Alb  4.0  /  TBili  0.7  /  DBili  x   /  AST  17  /  ALT  10  /  AlkPhos  112  02-13          EKG: Personally reviewed by me - NSR  Radiology: Personally reviewed by me -     < from: TTE Limited W or WO Ultrasound Enhancing Agent (12.30.24 @ 11:03) >   1. This wasa limited study to assess for pericardial effusion.   2. Technically difficult image quality.   3. Left ventricular systolic function is grossly normal with an ejection fraction visually estimated at 60 to 65 %. There is poor visualization of the endocardial borders to determine the presence of wall motion abnormalities.   4. Normal right ventricular systolic function.   5. There is an echofree space visualized proximal to the right atrial free wall and basal portion of the right ventricular freewall anteriorly which most likely represents a small pericardial effusion (<1.0 cm). There is a minimal to small pericardial effusion posteriorly. Otherwise, elsewhere there is trivial or no pericardial effusion visualized. There is no evidence of RAor RV diastolic collapse.   6. Compared to the transthoracic echocardiogram performed on 12/26/2024, by direct visual comparison, there is probably no substantial change in the size of the pericardial effusion within the technical limitations of bothstudies. The IVC is again noted to be dilated. A left pleural effusion is again present.      Assessment /Plan:        64-year-old female past medical history of A-fib, scleroderma, asthma with prior intubations, patient admission for pericardial effusion status post pericardiocentesis with drain on colchicine presents ED complaining of sudden onset left flank pain that woke her from sleep this morning.  Had similar symptoms 1 week ago on right side, now with left flank worse with inspiration with mild shortness of breath.    Denies fever chills nausea vomiting diarrhea abdominal pain dysuria hematuria.  Patient on 3 L nasal cannula baseline at home.  Uncomfortable with laying flat.    Problem/Plan #1: Chest Pain  - p/w left sided chest pain radiating to the back, non-exertional and sometimes worse with inspiration  - Had admission 1 month ago for pericardial effusion s/p drain on home maintenance with colchicine  - ECG NSR  - Trop and BNP wnl  - CXR pending  - Prior TTE showed preserved EF, no WMA, small pericardial effusion; Will repeat.   - Check ESR, CRP  - Patient with contrast allergy therefore unable to urgently get CT r/o PE. Check d-dimer to risk stratify.  - If D-Dimer is negtative, will plan for CT cors given extensive smoking history, autoimmune hx and FHX of CAD iso left sided chest pain. (Will need pre-medication)    Problem/Plan #2: Pericardial Effusion  - s/p drain with 650ml removed last admission in December 2024  - OP Cards did TTE which shows small increase from prior  - Repeat TTE pending  - As noted above, check ESR, CRP    Problem/Plan #3: Atrial Fibrillation  - c/w Toprol 100mg PO daily  - c/w Eliquis 5mg PO BID  - Will monitor HR closely. Currently karen in 50s. May need dose adjustment of Toprol.  - Monitor on tele.    Problem/Plan #4: Asthma  - Reports lifelong hx  - c/w advair and ventolin        Differential diagnosis and plan of care discussed with patient after the evaluation. Counseling on diet, nutritional counseling, weight management, exercise and medication compliance was done.   Advanced care planning/advanced directives discussed with patient/family. DNR status including forceful chest compressions to attempt to restart the heart, ventilator support/artificial breathing, electric shock, artificial nutrition, health care proxy, Molst form all discussed with pt. Pt wishes to consider. More than fifteen minutes spent on discussing advanced directives.     Dory Francisco [facility]  Brandon Verduzco, DO [facility]  Cardiovascular Medicine  [address], [address]  Available for call or text via [employer] TEAMs  Office [phone]   DATE OF SERVICE: 02-13-25 @ 13:43    CHIEF COMPLAINT:Patient is a 64y old  Female who presents with a chief complaint of chest pain    HISTORY OF PRESENT ILLNESS: 64-year-old female past medical history of A-fib, scleroderma, asthma with prior intubations, patient admission for pericardial effusion status post pericardiocentesis with drain on colchicine presents ED complaining of sudden onset left flank pain that woke her from sleep this morning.  Had similar symptoms 1 week ago on right side, now with left flank worse with inspiration with mild shortness of breath.  Took inhaler with no relief prompting visit to ED.  Denies fever chills nausea vomiting diarrhea abdominal pain dysuria hematuria.  Patient on 3 L nasal cannula baseline at home.  Uncomfortable with laying flat.      PAST MEDICAL & SURGICAL HISTORY:  Scleroderma      Asthma      High cholesterol  Unable to take STATINS for high cholesterol due to genetic disposition      Scleroderma      Shingles      Hypothyroidism      GERD (gastroesophageal reflux disease)      Smoker      Multiple drug allergies      S/P small bowel resection      History of myomectomy      History of ovarian cystectomy      Fibroid      Bowel obstruction  multiple surgeries for bowel obstruction      H/O ovarian cystectomy      S/P pericardiocentesis              MEDICATIONS:      albuterol/ipratropium for Nebulization. 3 milliLiter(s) Nebulizer once  · 	Narcan 4 mg/0.1 mL nasal spray: 1 spray(s) intranasally once a day  · 	Advair Diskus 250 mcg-50 mcg inhalation powder: 1 inhaled 2 times a day  · 	metoprolol succinate 100 mg oral tablet, extended release: 1 tab(s) orally once a day  · 	colchicine 0.6 mg oral tablet: 1 tab(s) orally once a day  · 	nicotine 21 mg/24 hr transdermal film, extended release: 1 patch transdermal once a day  · 	baclofen 10 mg oral tablet: 1 tab(s) orally 2 times a day  · 	apixaban 5 mg oral tablet: 1 tab(s) orally every 12 hours  · 	senna leaf extract oral tablet: 2 tab(s) orally once a day (at bedtime)  · 	polyethylene glycol 3350 oral powder for reconstitution: 17 gram(s) orally 2 times a day  · 	morphine 30 mg/8 to 12 hr oral tablet, extended release: 1 tab(s) orally 2 times a day  · 	Synthroid 112 mcg (0.112 mg) oral tablet: 1 tab(s) orally once a day  · 	oxyCODONE 20 mg oral tablet: 1 tab(s) orally every 6 hours as needed for  severe pain  · 	Ventolin 90 mcg/inh inhalation aerosol: 2 puff(s) inhaled every 6 hours, As Needed            FAMILY HISTORY:  + FHX of CAD    Non-contributory    SOCIAL HISTORY:    [- ] Tobacco--> quit 1 month ago, extensive smoking history  [- ] Drugs  [- ] Alcohol    Allergies    penicillin (Unknown)  Originally Entered as [Unknown] reaction to [BEE STINGS] (Unknown)  IV Contrast (Unknown)  iodine (Anaphylaxis)  penicillin (Anaphylaxis; Hives; Other)  ABIDA dye (Short breath; Hives)  statins (Unknown)  Xolair (Unknown)    Intolerances    	    REVIEW OF SYSTEMS:  CONSTITUTIONAL: No fever  EYES: No eye pain, visual disturbances, or discharge  ENMT:  No difficulty hearing, tinnitus  NECK: No pain or stiffness  RESPIRATORY: No cough, wheezing,  CARDIOVASCULAR:   + chest pain, palpitations, passing out, dizziness, or leg swelling  GASTROINTESTINAL:  No nausea, vomiting, diarrhea or constipation. No melena.  GENITOURINARY: No dysuria, hematuria  NEUROLOGICAL: No stroke like symptoms  SKIN: No burning or lesions   ENDOCRINE: No heat or cold intolerance  MUSCULOSKELETAL: No joint pain or swelling  PSYCHIATRIC: No  anxiety, mood swings  HEME/LYMPH: No bleeding gums  ALLERGY AND IMMUNOLOGIC: No hives or eczema	    All other ROS negative    PHYSICAL EXAM:  T(C): 36.4 (02-13-25 @ 10:29), Max: 36.4 (02-13-25 @ 10:29)  HR: 57 (02-13-25 @ 12:43) (55 - 62)  BP: 100/51 (02-13-25 @ 12:43) (100/51 - 122/60)  RR: 21 (02-13-25 @ 12:43) (18 - 21)  SpO2: 94% (02-13-25 @ 12:43) (94% - 96%)  Wt(kg): --  I&O's Summary      Appearance: Normal	  HEENT:   Normal oral mucosa, EOMI	  Cardiovascular:  S1 S2, No JVD,    Respiratory: Lungs clear to auscultation	  Psychiatry: Alert  Gastrointestinal:  Soft, Non-tender, + BS	  Skin: No rashes   Neurologic: Non-focal  Extremities:  No edema  Vascular: Peripheral pulses palpable    	    	  	  CARDIAC MARKERS:  Labs personally reviewed by me                                  13.4   7.25  )-----------( 240      ( 13 Feb 2025 11:15 )             39.8     02-13    130[L]  |  91[L]  |  12  ----------------------------<  105[H]  4.3   |  28  |  0.48[L]    Ca    9.4      13 Feb 2025 11:15  Phos  3.8     02-13  Mg     1.8     02-13    TPro  7.4  /  Alb  4.0  /  TBili  0.7  /  DBili  x   /  AST  17  /  ALT  10  /  AlkPhos  112  02-13          EKG: Personally reviewed by me - NSR  Radiology: Personally reviewed by me -     < from: TTE Limited W or WO Ultrasound Enhancing Agent (12.30.24 @ 11:03) >   1. This wasa limited study to assess for pericardial effusion.   2. Technically difficult image quality.   3. Left ventricular systolic function is grossly normal with an ejection fraction visually estimated at 60 to 65 %. There is poor visualization of the endocardial borders to determine the presence of wall motion abnormalities.   4. Normal right ventricular systolic function.   5. There is an echofree space visualized proximal to the right atrial free wall and basal portion of the right ventricular freewall anteriorly which most likely represents a small pericardial effusion (<1.0 cm). There is a minimal to small pericardial effusion posteriorly. Otherwise, elsewhere there is trivial or no pericardial effusion visualized. There is no evidence of RAor RV diastolic collapse.   6. Compared to the transthoracic echocardiogram performed on 12/26/2024, by direct visual comparison, there is probably no substantial change in the size of the pericardial effusion within the technical limitations of bothstudies. The IVC is again noted to be dilated. A left pleural effusion is again present.      Assessment /Plan:        64-year-old female past medical history of A-fib, scleroderma, asthma with prior intubations, patient admission for pericardial effusion status post pericardiocentesis with drain on colchicine presents ED complaining of sudden onset left flank pain that woke her from sleep this morning.  Had similar symptoms 1 week ago on right side, now with left flank worse with inspiration with mild shortness of breath.    Denies fever chills nausea vomiting diarrhea abdominal pain dysuria hematuria.  Patient on 3 L nasal cannula baseline at home.  Uncomfortable with laying flat.    Problem/Plan #1: Chest Pain  - p/w left sided chest pain radiating to the back, non-exertional and sometimes worse with inspiration  - Had admission 1 month ago for pericardial effusion s/p drain on home maintenance with colchicine  - ECG NSR  - Trop and BNP wnl  - CXR pending  - Prior TTE showed preserved EF, no WMA, small pericardial effusion; Will repeat.   - Check ESR, CRP  - Patient with contrast allergy therefore unable to urgently get CT r/o PE. Check d-dimer to risk stratify.  - Possible ischemic eval with NST pending the above workup.    Problem/Plan #2: Pericardial Effusion  - s/p drain with 650ml removed last admission in December 2024  - OP Cards did TTE which shows small increase from prior  - Repeat TTE pending  - As noted above, check ESR, CRP    Problem/Plan #3: Atrial Fibrillation  - c/w Toprol 100mg PO daily  - c/w Eliquis 5mg PO BID  - Will monitor HR closely. Currently karen in 50s. May need dose adjustment of Toprol.  - Monitor on tele.    Problem/Plan #4: Asthma  - Reports lifelong hx  - c/w advair and ventolin        Differential diagnosis and plan of care discussed with patient after the evaluation. Counseling on diet, nutritional counseling, weight management, exercise and medication compliance was done.   Advanced care planning/advanced directives discussed with patient/family. DNR status including forceful chest compressions to attempt to restart the heart, ventilator support/artificial breathing, electric shock, artificial nutrition, health care proxy, Molst form all discussed with pt. Pt wishes to consider. More than fifteen minutes spent on discussing advanced directives.     Dory Francisco [facility]  Brandon Verduzco DO [facility]  Cardiovascular Medicine  [address], [address]  Available for call or text via [employer] TEAMs  Office [phone]   DATE OF SERVICE: 02-13-25 @ 13:43    CHIEF COMPLAINT:Patient is a 64y old  Female who presents with a chief complaint of chest pain    HISTORY OF PRESENT ILLNESS: 64-year-old female past medical history of A-fib, scleroderma, asthma with prior intubations, patient admission for pericardial effusion status post pericardiocentesis with drain on colchicine presents ED complaining of sudden onset left flank pain that woke her from sleep this morning.  Had similar symptoms 1 week ago on right side, now with left flank worse with inspiration with mild shortness of breath.  Took inhaler with no relief prompting visit to ED.  Denies fever chills nausea vomiting diarrhea abdominal pain dysuria hematuria.  Patient on 3 L nasal cannula baseline at home.  Uncomfortable with laying flat.      PAST MEDICAL & SURGICAL HISTORY:  Scleroderma      Asthma      High cholesterol  Unable to take STATINS for high cholesterol due to genetic disposition      Scleroderma      Shingles      Hypothyroidism      GERD (gastroesophageal reflux disease)      Smoker      Multiple drug allergies      S/P small bowel resection      History of myomectomy      History of ovarian cystectomy      Fibroid      Bowel obstruction  multiple surgeries for bowel obstruction      H/O ovarian cystectomy      S/P pericardiocentesis              MEDICATIONS:      albuterol/ipratropium for Nebulization. 3 milliLiter(s) Nebulizer once  · 	Narcan 4 mg/0.1 mL nasal spray: 1 spray(s) intranasally once a day  · 	Advair Diskus 250 mcg-50 mcg inhalation powder: 1 inhaled 2 times a day  · 	metoprolol succinate 100 mg oral tablet, extended release: 1 tab(s) orally once a day  · 	colchicine 0.6 mg oral tablet: 1 tab(s) orally once a day  · 	nicotine 21 mg/24 hr transdermal film, extended release: 1 patch transdermal once a day  · 	baclofen 10 mg oral tablet: 1 tab(s) orally 2 times a day  · 	apixaban 5 mg oral tablet: 1 tab(s) orally every 12 hours  · 	senna leaf extract oral tablet: 2 tab(s) orally once a day (at bedtime)  · 	polyethylene glycol 3350 oral powder for reconstitution: 17 gram(s) orally 2 times a day  · 	morphine 30 mg/8 to 12 hr oral tablet, extended release: 1 tab(s) orally 2 times a day  · 	Synthroid 112 mcg (0.112 mg) oral tablet: 1 tab(s) orally once a day  · 	oxyCODONE 20 mg oral tablet: 1 tab(s) orally every 6 hours as needed for  severe pain  · 	Ventolin 90 mcg/inh inhalation aerosol: 2 puff(s) inhaled every 6 hours, As Needed            FAMILY HISTORY:  + FHX of CAD    Non-contributory    SOCIAL HISTORY:    [- ] Tobacco--> quit 1 month ago, extensive smoking history  [- ] Drugs  [- ] Alcohol    Allergies    penicillin (Unknown)  Originally Entered as [Unknown] reaction to [BEE STINGS] (Unknown)  IV Contrast (Unknown)  iodine (Anaphylaxis)  penicillin (Anaphylaxis; Hives; Other)  ABIDA dye (Short breath; Hives)  statins (Unknown)  Xolair (Unknown)    Intolerances    	    REVIEW OF SYSTEMS:  CONSTITUTIONAL: No fever  EYES: No eye pain, visual disturbances, or discharge  ENMT:  No difficulty hearing, tinnitus  NECK: No pain or stiffness  RESPIRATORY: No cough, wheezing,  CARDIOVASCULAR:   + chest pain, palpitations, passing out, dizziness, or leg swelling  GASTROINTESTINAL:  No nausea, vomiting, diarrhea or constipation. No melena.  GENITOURINARY: No dysuria, hematuria  NEUROLOGICAL: No stroke like symptoms  SKIN: No burning or lesions   ENDOCRINE: No heat or cold intolerance  MUSCULOSKELETAL: No joint pain or swelling  PSYCHIATRIC: No  anxiety, mood swings  HEME/LYMPH: No bleeding gums  ALLERGY AND IMMUNOLOGIC: No hives or eczema	    All other ROS negative    PHYSICAL EXAM:  T(C): 36.4 (02-13-25 @ 10:29), Max: 36.4 (02-13-25 @ 10:29)  HR: 57 (02-13-25 @ 12:43) (55 - 62)  BP: 100/51 (02-13-25 @ 12:43) (100/51 - 122/60)  RR: 21 (02-13-25 @ 12:43) (18 - 21)  SpO2: 94% (02-13-25 @ 12:43) (94% - 96%)  Wt(kg): --  I&O's Summary      Appearance: Normal	  HEENT:   Normal oral mucosa, EOMI	  Cardiovascular:  S1 S2, No JVD,    Respiratory: Lungs clear to auscultation	  Psychiatry: Alert  Gastrointestinal:  Soft, Non-tender, + BS	  Skin: No rashes   Neurologic: Non-focal  Extremities:  No edema  Vascular: Peripheral pulses palpable    	    	  	  CARDIAC MARKERS:  Labs personally reviewed by me                                  13.4   7.25  )-----------( 240      ( 13 Feb 2025 11:15 )             39.8     02-13    130[L]  |  91[L]  |  12  ----------------------------<  105[H]  4.3   |  28  |  0.48[L]    Ca    9.4      13 Feb 2025 11:15  Phos  3.8     02-13  Mg     1.8     02-13    TPro  7.4  /  Alb  4.0  /  TBili  0.7  /  DBili  x   /  AST  17  /  ALT  10  /  AlkPhos  112  02-13          EKG: Personally reviewed by me - NSR  Radiology: Personally reviewed by me -     < from: TTE Limited W or WO Ultrasound Enhancing Agent (12.30.24 @ 11:03) >   1. This wasa limited study to assess for pericardial effusion.   2. Technically difficult image quality.   3. Left ventricular systolic function is grossly normal with an ejection fraction visually estimated at 60 to 65 %. There is poor visualization of the endocardial borders to determine the presence of wall motion abnormalities.   4. Normal right ventricular systolic function.   5. There is an echofree space visualized proximal to the right atrial free wall and basal portion of the right ventricular freewall anteriorly which most likely represents a small pericardial effusion (<1.0 cm). There is a minimal to small pericardial effusion posteriorly. Otherwise, elsewhere there is trivial or no pericardial effusion visualized. There is no evidence of RAor RV diastolic collapse.   6. Compared to the transthoracic echocardiogram performed on 12/26/2024, by direct visual comparison, there is probably no substantial change in the size of the pericardial effusion within the technical limitations of bothstudies. The IVC is again noted to be dilated. A left pleural effusion is again present.      Assessment /Plan:        64-year-old female past medical history of A-fib, scleroderma, asthma with prior intubations, patient admission for pericardial effusion status post pericardiocentesis with drain on colchicine presents ED complaining of sudden onset left flank pain that woke her from sleep this morning.  Had similar symptoms 1 week ago on right side, now with left flank worse with inspiration with mild shortness of breath.    Denies fever chills nausea vomiting diarrhea abdominal pain dysuria hematuria.  Patient on 3 L nasal cannula baseline at home.  Uncomfortable with laying flat.    Problem/Plan #1: Chest Pain  - p/w left sided chest pain radiating to the back, non-exertional and sometimes worse with inspiration  - Had admission 1 month ago for pericardial effusion s/p drain on home maintenance with colchicine  - ECG NSR  - Trop and BNP wnl  - CXR pending  - Prior TTE showed preserved EF, no WMA, small pericardial effusion; Will repeat.   - Check ESR, CRP  - PE less likely as on Eliquis, but will check d-dimer to risk stratify.  - Possible ischemic eval with NST pending the above workup.    Problem/Plan #2: Pericardial Effusion  - s/p drain with 650ml removed last admission in December 2024  - OP Cards did TTE which shows small increase from prior  - Repeat TTE pending  - As noted above, check ESR, CRP    Problem/Plan #3: Atrial Fibrillation  - c/w Toprol 100mg PO daily  - c/w Eliquis 5mg PO BID  - Will monitor HR closely. Currently karen in 50s. May need dose adjustment of Toprol.  - Monitor on tele.    Problem/Plan #4: Asthma  - Reports lifelong hx  - c/w advair and ventolin             Dory Nolan [facility]  Brandon Verduzco, DO [facility]  Cardiovascular Medicine  [address], [address]  Available for call or text via [employer] TE[employer]  Office [phone]

## 2025-02-13 NOTE — PATIENT PROFILE ADULT - NSPROGENBLOODRESTRICT_GEN_A_NUR
Spoke with patient.  She stated that the she does not use the patches anymore because they do not work.  At this time she is only taking Ibuprofen.      Please advise   none

## 2025-02-13 NOTE — ED ADULT NURSE NOTE - OBJECTIVE STATEMENT
63 y/o F with PMH of A-fib, scleroderma, on 3 L NC baseline, asthma with prior intubations, patient admission for pericardial effusion status post pericardiocentesis with drain on colchicine presents to ED complaining flank pain. Pt reports she had a sudden onset of left flank pain that woke her up this morning. Pt reports had a similar situation 1 week ago but now the pain is worse with inspiration. Pt took 20 mg of oxy and 30 mg of extended release morphine at home with an inhaler prior to coming. Upon arrival, pt is A&OX4, satting well on 3 L NC, pt is tachypneic. Left chest/flank is tender to palpation. afebrile orally. Denies headache, dizziness, vision changes, chest pain,  abdominal pain, nausea, vomiting, diarrhea, fevers, chills, dysuria, hematuria, recent illness travel or fall.

## 2025-02-14 ENCOUNTER — RESULT REVIEW (OUTPATIENT)
Age: 65
End: 2025-02-14

## 2025-02-14 LAB
ANION GAP SERPL CALC-SCNC: 13 MMOL/L — SIGNIFICANT CHANGE UP (ref 5–17)
BUN SERPL-MCNC: 10 MG/DL — SIGNIFICANT CHANGE UP (ref 7–23)
CALCIUM SERPL-MCNC: 9 MG/DL — SIGNIFICANT CHANGE UP (ref 8.4–10.5)
CHLORIDE SERPL-SCNC: 94 MMOL/L — LOW (ref 96–108)
CO2 SERPL-SCNC: 23 MMOL/L — SIGNIFICANT CHANGE UP (ref 22–31)
CREAT SERPL-MCNC: 0.41 MG/DL — LOW (ref 0.5–1.3)
EGFR: 108 ML/MIN/1.73M2 — SIGNIFICANT CHANGE UP
ERYTHROCYTE [SEDIMENTATION RATE] IN BLOOD: 44 MM/HR — HIGH (ref 0–20)
FOLATE SERPL-MCNC: 8.9 NG/ML — SIGNIFICANT CHANGE UP
GLUCOSE BLDC GLUCOMTR-MCNC: 110 MG/DL — HIGH (ref 70–99)
GLUCOSE SERPL-MCNC: 106 MG/DL — HIGH (ref 70–99)
HCT VFR BLD CALC: 37 % — SIGNIFICANT CHANGE UP (ref 34.5–45)
HGB BLD-MCNC: 12.5 G/DL — SIGNIFICANT CHANGE UP (ref 11.5–15.5)
MAGNESIUM SERPL-MCNC: 1.7 MG/DL — SIGNIFICANT CHANGE UP (ref 1.6–2.6)
MCHC RBC-ENTMCNC: 30.3 PG — SIGNIFICANT CHANGE UP (ref 27–34)
MCHC RBC-ENTMCNC: 33.8 G/DL — SIGNIFICANT CHANGE UP (ref 32–36)
MCV RBC AUTO: 89.6 FL — SIGNIFICANT CHANGE UP (ref 80–100)
NRBC BLD AUTO-RTO: 0 /100 WBCS — SIGNIFICANT CHANGE UP (ref 0–0)
PHOSPHATE SERPL-MCNC: 3.4 MG/DL — SIGNIFICANT CHANGE UP (ref 2.5–4.5)
PLATELET # BLD AUTO: 206 K/UL — SIGNIFICANT CHANGE UP (ref 150–400)
POTASSIUM SERPL-MCNC: 4.2 MMOL/L — SIGNIFICANT CHANGE UP (ref 3.5–5.3)
POTASSIUM SERPL-SCNC: 4.2 MMOL/L — SIGNIFICANT CHANGE UP (ref 3.5–5.3)
RBC # BLD: 4.13 M/UL — SIGNIFICANT CHANGE UP (ref 3.8–5.2)
RBC # FLD: 12.1 % — SIGNIFICANT CHANGE UP (ref 10.3–14.5)
SODIUM SERPL-SCNC: 130 MMOL/L — LOW (ref 135–145)
TSH SERPL-MCNC: 1.23 UIU/ML — SIGNIFICANT CHANGE UP (ref 0.27–4.2)
VIT B12 SERPL-MCNC: 231 PG/ML — LOW (ref 232–1245)
WBC # BLD: 6.04 K/UL — SIGNIFICANT CHANGE UP (ref 3.8–10.5)
WBC # FLD AUTO: 6.04 K/UL — SIGNIFICANT CHANGE UP (ref 3.8–10.5)

## 2025-02-14 PROCEDURE — 99221 1ST HOSP IP/OBS SF/LOW 40: CPT

## 2025-02-14 PROCEDURE — 99223 1ST HOSP IP/OBS HIGH 75: CPT

## 2025-02-14 PROCEDURE — 93308 TTE F-UP OR LMTD: CPT | Mod: 26

## 2025-02-14 PROCEDURE — 99232 SBSQ HOSP IP/OBS MODERATE 35: CPT

## 2025-02-14 RX ORDER — ACETAMINOPHEN 500 MG/5ML
1000 LIQUID (ML) ORAL ONCE
Refills: 0 | Status: COMPLETED | OUTPATIENT
Start: 2025-02-14 | End: 2025-02-14

## 2025-02-14 RX ORDER — LIDOCAINE HYDROCHLORIDE 20 MG/ML
1 JELLY TOPICAL ONCE
Refills: 0 | Status: COMPLETED | OUTPATIENT
Start: 2025-02-14 | End: 2025-02-14

## 2025-02-14 RX ADMIN — NICOTINE POLACRILEX 1 PATCH: 4 GUM, CHEWING ORAL at 19:00

## 2025-02-14 RX ADMIN — OXYCODONE HYDROCHLORIDE 20 MILLIGRAM(S): 30 TABLET ORAL at 15:56

## 2025-02-14 RX ADMIN — Medication 30 MILLIGRAM(S): at 10:18

## 2025-02-14 RX ADMIN — LIDOCAINE HYDROCHLORIDE 1 PATCH: 20 JELLY TOPICAL at 18:50

## 2025-02-14 RX ADMIN — Medication 30 MILLIGRAM(S): at 10:48

## 2025-02-14 RX ADMIN — OXYCODONE HYDROCHLORIDE 20 MILLIGRAM(S): 30 TABLET ORAL at 05:36

## 2025-02-14 RX ADMIN — Medication 112 MICROGRAM(S): at 05:35

## 2025-02-14 RX ADMIN — OXYCODONE HYDROCHLORIDE 20 MILLIGRAM(S): 30 TABLET ORAL at 15:26

## 2025-02-14 RX ADMIN — OXYCODONE HYDROCHLORIDE 20 MILLIGRAM(S): 30 TABLET ORAL at 06:00

## 2025-02-14 RX ADMIN — APIXABAN 5 MILLIGRAM(S): 2.5 TABLET, FILM COATED ORAL at 18:21

## 2025-02-14 RX ADMIN — Medication 30 MILLIGRAM(S): at 18:23

## 2025-02-14 RX ADMIN — Medication 1000 MILLIGRAM(S): at 21:45

## 2025-02-14 RX ADMIN — METOPROLOL SUCCINATE 100 MILLIGRAM(S): 50 TABLET, EXTENDED RELEASE ORAL at 05:43

## 2025-02-14 RX ADMIN — LIDOCAINE HYDROCHLORIDE 1 PATCH: 20 JELLY TOPICAL at 08:26

## 2025-02-14 RX ADMIN — NICOTINE POLACRILEX 1 PATCH: 4 GUM, CHEWING ORAL at 08:26

## 2025-02-14 RX ADMIN — NICOTINE POLACRILEX 1 PATCH: 4 GUM, CHEWING ORAL at 05:37

## 2025-02-14 RX ADMIN — Medication 400 MILLIGRAM(S): at 20:45

## 2025-02-14 RX ADMIN — COLCHICINE 0.6 MILLIGRAM(S): 0.6 TABLET, FILM COATED ORAL at 13:18

## 2025-02-14 RX ADMIN — APIXABAN 5 MILLIGRAM(S): 2.5 TABLET, FILM COATED ORAL at 05:35

## 2025-02-14 RX ADMIN — LIDOCAINE HYDROCHLORIDE 1 PATCH: 20 JELLY TOPICAL at 06:53

## 2025-02-14 RX ADMIN — Medication 30 MILLIGRAM(S): at 18:53

## 2025-02-14 RX ADMIN — Medication 40 MILLIGRAM(S): at 18:23

## 2025-02-14 NOTE — PROGRESS NOTE ADULT - SUBJECTIVE AND OBJECTIVE BOX
DATE OF SERVICE: 02-14-25 @ 09:43    Patient is a 64y old  Female who presents with a chief complaint of chest pain (14 Feb 2025 08:47)      INTERVAL HISTORY: In no acute distress.     REVIEW OF SYSTEMS:  CONSTITUTIONAL: No weakness  EYES/ENT: No visual changes;  No throat pain   NECK: No pain or stiffness  RESPIRATORY: No cough, wheezing; No shortness of breath  CARDIOVASCULAR: No chest pain or palpitations  GASTROINTESTINAL: No abdominal  pain. No nausea, vomiting, or hematemesis  GENITOURINARY: No dysuria, frequency or hematuria  NEUROLOGICAL: No stroke like symptoms  SKIN: No rashes    TELEMETRY Personally reviewed: SB/SR 50-80  	  MEDICATIONS:  metoprolol succinate  milliGRAM(s) Oral daily        PHYSICAL EXAM:  T(C): 36.7 (02-14-25 @ 05:20), Max: 36.7 (02-13-25 @ 20:25)  HR: 67 (02-14-25 @ 05:20) (50 - 83)  BP: 106/58 (02-14-25 @ 05:20) (96/60 - 122/60)  RR: 18 (02-14-25 @ 05:20) (18 - 21)  SpO2: 99% (02-14-25 @ 05:20) (94% - 99%)  Wt(kg): --  I&O's Summary    13 Feb 2025 07:01  -  14 Feb 2025 07:00  --------------------------------------------------------  IN: 0 mL / OUT: 250 mL / NET: -250 mL      Height (cm): 175.3 (02-13 @ 10:29)  Weight (kg): 77.1 (02-13 @ 10:29)  BMI (kg/m2): 25.1 (02-13 @ 10:29)  BSA (m2): 1.93 (02-13 @ 10:29)    Appearance: In no distress	  HEENT:    PERRL, EOMI	  Cardiovascular:  S1 S2, No JVD  Respiratory: Lungs clear to auscultation	  Gastrointestinal:  Soft, Non-tender, + BS	  Vascularature:  No edema of LE  Psychiatric: Appropriate affect   Neuro: no acute focal deficits                               12.5   6.04  )-----------( 206      ( 14 Feb 2025 07:17 )             37.0     02-14    130[L]  |  94[L]  |  10  ----------------------------<  106[H]  4.2   |  23  |  0.41[L]    Ca    9.0      14 Feb 2025 07:17  Phos  3.4     02-14  Mg     1.7     02-14    TPro  7.4  /  Alb  4.0  /  TBili  0.7  /  DBili  x   /  AST  17  /  ALT  10  /  AlkPhos  112  02-13        Labs personally reviewed      ASSESSMENT/PLAN: 	   64-year-old female past medical history of A-fib, scleroderma, asthma with prior intubations, patient admission for pericardial effusion status post pericardiocentesis with drain on colchicine presents ED complaining of sudden onset left flank pain that woke her from sleep this morning.  Had similar symptoms 1 week ago on right side, now with left flank worse with inspiration with mild shortness of breath.    Denies fever chills nausea vomiting diarrhea abdominal pain dysuria hematuria.  Patient on 3 L nasal cannula baseline at home.  Uncomfortable with laying flat.    Problem/Plan #1: Chest Pain  - p/w left sided chest pain radiating to the back, non-exertional and sometimes worse with inspiration  - Had admission 1 month ago for pericardial effusion s/p drain on home maintenance with colchicine  - ECG NSR  - Trop and BNP wnl  - CXR wnl  - Prior TTE showed preserved EF, no WMA, small pericardial effusion; Repeat pending  - ESR, CRP elevated  - D-dimer elevated.   - Possible ischemic eval with NST pending the above workup.    Problem/Plan #2: Pericardial Effusion  - s/p drain with 650ml removed last admission in December 2024  - OP Cards did TTE which shows small increase from prior  - Repeat TTE pending  - ESR, CRP elevated. Continue colchicine.    Problem/Plan #3: Atrial Fibrillation  - c/w Toprol 100mg PO daily  - c/w Eliquis 5mg PO BID  - Will monitor HR closely. Currently karen in 50s. May need dose adjustment of Toprol.  - Monitor on tele.    Problem/Plan #4: Asthma  - Reports lifelong hx  - c/w advair and giorgio Francisco, AG-NP   Brandon Verduzco, DO [facility]  Cardiovascular Medicine  [address] [place] [address], [address]  Available through call or text on [employer] TE[employer]  Office: [phone]   DATE OF SERVICE: 02-14-25 @ 09:43    Patient is a 64y old  Female who presents with a chief complaint of chest pain (14 Feb 2025 08:47)      INTERVAL HISTORY: In no acute distress.     REVIEW OF SYSTEMS:  CONSTITUTIONAL: No weakness  EYES/ENT: No visual changes;  No throat pain   NECK: No pain or stiffness  RESPIRATORY: No cough, wheezing; No shortness of breath  CARDIOVASCULAR: No chest pain or palpitations  GASTROINTESTINAL: No abdominal  pain. No nausea, vomiting, or hematemesis  GENITOURINARY: No dysuria, frequency or hematuria  NEUROLOGICAL: No stroke like symptoms  SKIN: No rashes    TELEMETRY Personally reviewed: SB/SR 50-80  	  MEDICATIONS:  metoprolol succinate  milliGRAM(s) Oral daily        PHYSICAL EXAM:  T(C): 36.7 (02-14-25 @ 05:20), Max: 36.7 (02-13-25 @ 20:25)  HR: 67 (02-14-25 @ 05:20) (50 - 83)  BP: 106/58 (02-14-25 @ 05:20) (96/60 - 122/60)  RR: 18 (02-14-25 @ 05:20) (18 - 21)  SpO2: 99% (02-14-25 @ 05:20) (94% - 99%)  Wt(kg): --  I&O's Summary    13 Feb 2025 07:01  -  14 Feb 2025 07:00  --------------------------------------------------------  IN: 0 mL / OUT: 250 mL / NET: -250 mL      Height (cm): 175.3 (02-13 @ 10:29)  Weight (kg): 77.1 (02-13 @ 10:29)  BMI (kg/m2): 25.1 (02-13 @ 10:29)  BSA (m2): 1.93 (02-13 @ 10:29)    Appearance: In no distress	  HEENT:    PERRL, EOMI	  Cardiovascular:  S1 S2, No JVD  Respiratory: Lungs clear to auscultation	  Gastrointestinal:  Soft, Non-tender, + BS	  Vascularature:  No edema of LE  Psychiatric: Appropriate affect   Neuro: no acute focal deficits                               12.5   6.04  )-----------( 206      ( 14 Feb 2025 07:17 )             37.0     02-14    130[L]  |  94[L]  |  10  ----------------------------<  106[H]  4.2   |  23  |  0.41[L]    Ca    9.0      14 Feb 2025 07:17  Phos  3.4     02-14  Mg     1.7     02-14    TPro  7.4  /  Alb  4.0  /  TBili  0.7  /  DBili  x   /  AST  17  /  ALT  10  /  AlkPhos  112  02-13        Labs personally reviewed      ASSESSMENT/PLAN: 	   64-year-old female past medical history of A-fib, scleroderma, asthma with prior intubations, patient admission for pericardial effusion status post pericardiocentesis with drain on colchicine presents ED complaining of sudden onset left flank pain that woke her from sleep this morning.  Had similar symptoms 1 week ago on right side, now with left flank worse with inspiration with mild shortness of breath.    Denies fever chills nausea vomiting diarrhea abdominal pain dysuria hematuria.  Patient on 3 L nasal cannula baseline at home.  Uncomfortable with laying flat.    Problem/Plan #1: Chest Pain  - p/w left sided chest pain radiating to the back, non-exertional and sometimes worse with inspiration  - Had admission 1 month ago for pericardial effusion s/p drain on home maintenance with colchicine  - ECG NSR  - Trop and BNP wnl  - CXR wnl  - Prior TTE showed preserved EF, no WMA, small pericardial effusion; Repeat pending  - ESR, CRP elevated  - D-dimer elevated, PE less likely as on Eliquis/full dose AC  - Possible ischemic eval with NST pending the above workup.    Problem/Plan #2: Pericardial Effusion  - ? 2/2 scleroderma   - s/p drain with 650ml removed last admission in December 2024  - OP Cards did TTE which shows small increase from prior  - Repeat TTE with recurrent large effusion, will consult thoracic surgery for window   - ESR, CRP elevated. Continue colchicine.    Problem/Plan #3: Atrial Fibrillation  - c/w Toprol 100mg PO daily  - c/w Eliquis 5mg PO BID  - Will monitor HR closely. Currently karen in 50s. May need dose adjustment of Toprol.  - Monitor on tele.    Problem/Plan #4: Asthma  - Reports lifelong hx  - c/w advair and ventneda Francisco, BRENT-NP   Brandon Verduzco,  [facility]  Cardiovascular Medicine  [address] [place] [address], [address]  Available through call or text on [employer] TE[employer]  Office: [phone]

## 2025-02-14 NOTE — PHYSICAL THERAPY INITIAL EVALUATION ADULT - PLANNED THERAPY INTERVENTIONS, PT EVAL
bed mobility training/gait training balance training/bed mobility training/gait training/transfer training

## 2025-02-14 NOTE — PROGRESS NOTE ADULT - SUBJECTIVE AND OBJECTIVE BOX
Interval Events:    REVIEW OF SYSTEMS:  Constitutional: No fevers or chills. No weight loss. No fatigue or generalised malaise.  Eyes: No itching or discharge from the eyes  ENT: No ear pain. No ear discharge. No nasal congestion. No post nasal drip. No epistaxis. No throat pain. No sore throat. No difficulty swallowing.   CV: No chest pain. No palpitations. No lightheadedness or dizziness.   Resp: No dyspnea at rest. No dyspnea on exertion. No orthopnea. No wheezing. No cough. No stridor. No sputum production. No chest pain with respiration.  GI: No nausea. No vomiting. No diarrhea.  MSK: No joint pain or pain in any extremities  Integumentary: No skin lesions. No pedal edema.  Neurological: No gross motor weakness. No sensory changes.  [ ] All other systems negative  [ ] Unable to assess ROS because ________    OBJECTIVE:  ICU Vital Signs Last 24 Hrs  T(C): 36.7 (14 Feb 2025 05:20), Max: 36.7 (13 Feb 2025 20:25)  T(F): 98.1 (14 Feb 2025 05:20), Max: 98.1 (14 Feb 2025 05:20)  HR: 67 (14 Feb 2025 05:20) (50 - 83)  BP: 106/58 (14 Feb 2025 05:20) (96/60 - 122/60)  BP(mean): 71 (13 Feb 2025 12:43) (71 - 80)  ABP: --  ABP(mean): --  RR: 18 (14 Feb 2025 05:20) (18 - 21)  SpO2: 99% (14 Feb 2025 05:20) (94% - 99%)    O2 Parameters below as of 14 Feb 2025 05:20  Patient On (Oxygen Delivery Method): nasal cannula  O2 Flow (L/min): 3            02-13 @ 07:01  -  02-14 @ 07:00  --------------------------------------------------------  IN: 0 mL / OUT: 250 mL / NET: -250 mL      CAPILLARY BLOOD GLUCOSE          PHYSICAL EXAM:  General:   HEENT:   Neck:   Respiratory:   Cardiovascular:   Abdomen:   Extremities:   Skin:   Neurological:  Psychiatry:    HOSPITAL MEDICATIONS:  MEDICATIONS  (STANDING):  apixaban 5 milliGRAM(s) Oral every 12 hours  colchicine 0.6 milliGRAM(s) Oral daily  levothyroxine 112 MICROGram(s) Oral daily  metoprolol succinate  milliGRAM(s) Oral daily  morphine ER Tablet 30 milliGRAM(s) Oral every 12 hours  nicotine - 21 mG/24Hr(s) Patch 1 Patch Transdermal daily    MEDICATIONS  (PRN):  albuterol    90 MICROgram(s) HFA Inhaler 2 Puff(s) Inhalation every 6 hours PRN Shortness of Breath and/or Wheezing  baclofen 5 milliGRAM(s) Oral every 8 hours PRN Musculoskeletal Pain  oxyCODONE    IR 20 milliGRAM(s) Oral every 8 hours PRN for severe pain      LABS:                        12.5   6.04  )-----------( 206      ( 14 Feb 2025 07:17 )             37.0     Hgb Trend: 12.5<--, 13.4<--  02-14    130[L]  |  94[L]  |  10  ----------------------------<  106[H]  4.2   |  23  |  0.41[L]    Ca    9.0      14 Feb 2025 07:17  Phos  3.4     02-14  Mg     1.7     02-14    TPro  7.4  /  Alb  4.0  /  TBili  0.7  /  DBili  x   /  AST  17  /  ALT  10  /  AlkPhos  112  02-13    PT/INR - ( 13 Feb 2025 11:15 )   PT: 13.0 sec;   INR: 1.13 ratio         PTT - ( 13 Feb 2025 11:15 )  PTT:29.7 sec  Urinalysis Basic - ( 14 Feb 2025 07:17 )    Color: x / Appearance: x / SG: x / pH: x  Gluc: 106 mg/dL / Ketone: x  / Bili: x / Urobili: x   Blood: x / Protein: x / Nitrite: x   Leuk Esterase: x / RBC: x / WBC x   Sq Epi: x / Non Sq Epi: x / Bacteria: x        Venous Blood Gas:  02-13 @ 11:00  7.35/58/37/32/46.0  VBG Lactate: 0.8      MICROBIOLOGY:     RADIOLOGY:  [ ] Reviewed and interpreted by me    Bedside US: Interval Events:  still having pleuritic reproducible chest pain  no other complaints    REVIEW OF SYSTEMS:  Constitutional: No fevers or chills. No weight loss. No fatigue or generalised malaise.  Eyes: No itching or discharge from the eyes  ENT: No ear pain. No ear discharge. No nasal congestion. No post nasal drip. No epistaxis. No throat pain. No sore throat. No difficulty swallowing.   CV: + chest pain. No palpitations. No lightheadedness or dizziness.   Resp: No dyspnea at rest. No dyspnea on exertion. No orthopnea. No wheezing. No cough. No stridor. No sputum production. No chest pain with respiration.  GI: No nausea. No vomiting. No diarrhea.  MSK: No joint pain or pain in any extremities  Integumentary: No skin lesions. No pedal edema.  Neurological: No gross motor weakness. No sensory changes.  [x ] All other systems negative  [ ] Unable to assess ROS because ________    OBJECTIVE:  ICU Vital Signs Last 24 Hrs  T(C): 36.7 (14 Feb 2025 05:20), Max: 36.7 (13 Feb 2025 20:25)  T(F): 98.1 (14 Feb 2025 05:20), Max: 98.1 (14 Feb 2025 05:20)  HR: 67 (14 Feb 2025 05:20) (50 - 83)  BP: 106/58 (14 Feb 2025 05:20) (96/60 - 122/60)  BP(mean): 71 (13 Feb 2025 12:43) (71 - 80)  ABP: --  ABP(mean): --  RR: 18 (14 Feb 2025 05:20) (18 - 21)  SpO2: 99% (14 Feb 2025 05:20) (94% - 99%)    O2 Parameters below as of 14 Feb 2025 05:20  Patient On (Oxygen Delivery Method): nasal cannula  O2 Flow (L/min): 3            02-13 @ 07:01  -  02-14 @ 07:00  --------------------------------------------------------  IN: 0 mL / OUT: 250 mL / NET: -250 mL      CAPILLARY BLOOD GLUCOSE          PHYSICAL EXAM:  General: NAD, non toxic appearing, able to speak in full sentences  HEENT: MMM, EOMI  Neck: supple  Respiratory: rales at L base, otherwise CTA b/l  Cardiovascular: s1s2 RRR, no rubs; chest pain is reproducible on exam  Abdomen: soft, non tender, non distended  Extremities: warm, swelling of LLE  Skin: warm, no edema or clubbing  Neurological: no focal deficits  Psychiatry: [place]      [facility] MEDICATIONS:  MEDICATIONS  (STANDING):  apixaban 5 milliGRAM(s) Oral every 12 hours  colchicine 0.6 milliGRAM(s) Oral daily  levothyroxine 112 MICROGram(s) Oral daily  metoprolol succinate  milliGRAM(s) Oral daily  morphine ER Tablet 30 milliGRAM(s) Oral every 12 hours  nicotine - 21 mG/24Hr(s) Patch 1 Patch Transdermal daily    MEDICATIONS  (PRN):  albuterol    90 MICROgram(s) HFA Inhaler 2 Puff(s) Inhalation every 6 hours PRN Shortness of Breath and/or Wheezing  baclofen 5 milliGRAM(s) Oral every 8 hours PRN Musculoskeletal Pain  oxyCODONE    IR 20 milliGRAM(s) Oral every 8 hours PRN for severe pain      LABS:                        12.5   6.04  )-----------( 206      ( 14 Feb 2025 07:17 )             37.0     Hgb Trend: 12.5<--, 13.4<--  02-14    130[L]  |  94[L]  |  10  ----------------------------<  106[H]  4.2   |  23  |  0.41[L]    Ca    9.0      14 Feb 2025 07:17  Phos  3.4     02-14  Mg     1.7     02-14    TPro  7.4  /  Alb  4.0  /  TBili  0.7  /  DBili  x   /  AST  17  /  ALT  10  /  AlkPhos  112  02-13    PT/INR - ( 13 Feb 2025 11:15 )   PT: 13.0 sec;   INR: 1.13 ratio         PTT - ( 13 Feb 2025 11:15 )  PTT:29.7 sec  Urinalysis Basic - ( 14 Feb 2025 07:17 )    Color: x / Appearance: x / SG: x / pH: x  Gluc: 106 mg/dL / Ketone: x  / Bili: x / Urobili: x   Blood: x / Protein: x / Nitrite: x   Leuk Esterase: x / RBC: x / WBC x   Sq Epi: x / Non Sq Epi: x / Bacteria: x        Venous Blood Gas:  02-13 @ 11:00  7.35/58/37/32/46.0  VBG Lactate: 0.8      MICROBIOLOGY:     RADIOLOGY:  [ ] Reviewed and interpreted by me    Bedside US:

## 2025-02-14 NOTE — CONSULT NOTE ADULT - ATTENDING COMMENTS
64 y.o. with pericardial effusion.    No surgical intervention is warranted at this time.  Repeat CT chest in 4-6 weeks

## 2025-02-14 NOTE — CONSULT NOTE ADULT - ASSESSMENT
64 year old female with scleroderma presenting with chest pain, GI consulted for dysphagia    1. Dysphagia. 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 CT of chest shows uneremarkable esophagus to my eye.  -obtain esophagram (if possible, otherwise can defer to outpatient)  -scleroderma esophagus is exacerbated by GERD given lack of LES tone, therefore high dose PPI is beneficial    2. Scleroderma    3. Constipation  also related to scleroderma  can given miralax 34 BID      Advanced care planning forms were discussed. Code status including forceful chest compressions, defibrillation and intubation were discussed. The risks benefits and alternatives to pertinent gastrointestinal procedures and interventions were discussed in detail and all questions were answered. Duration: 15 Minutes.    [facility], [place]  Arnel Monteiro M.D.   [address] [place] [address] [address] [place] [address]  Office: [phone]

## 2025-02-14 NOTE — CONSULT NOTE ADULT - SUBJECTIVE AND OBJECTIVE BOX
THORACIC SURGERY CONSULT NOTE    HPI:  64F with history of scleroderma, afib on Eliquis, asthma with prior intubations, and pericardial effusion on colchicine outpatient admitted for pleuritic CP/SOB. She was previously admitted in December and underwent pericardiocentesis 12/20/24 for 650mL bloody output. Pericardial drain removed 12/26. Repeat TTE obtained today demonstrating interval increase in pericardial effusion, now moderate-large adjacent to RA, no evidence of tamponade. Thoracic surgery consulted for possible pericardial window. Patient continues to note CP worse with breathing but states it is improved since she was admitted, on her home 3L NC.       PAST MEDICAL HISTORY:  Scleroderma  Asthma  High cholesterol  Scleroderma  Shingles  Hypothyroidism  GERD (gastroesophageal reflux disease)  Smoker  Multiple drug allergies    PAST SURGICAL HISTORY:  S/P small bowel resection  History of myomectomy  History of ovarian cystectomy  Fibroid  Bowel obstruction  H/O ovarian cystectomy  S/P pericardiocentesis    SOCIAL HISTORY:  - Denies EtOH abuse, smoking, IVDA    MEDICATIONS:  albuterol    90 MICROgram(s) HFA Inhaler 2 Puff(s) Inhalation every 6 hours PRN  apixaban 5 milliGRAM(s) Oral every 12 hours  baclofen 5 milliGRAM(s) Oral every 8 hours PRN  colchicine 0.6 milliGRAM(s) Oral daily  levothyroxine 112 MICROGram(s) Oral daily  metoprolol succinate  milliGRAM(s) Oral daily  morphine ER Tablet 30 milliGRAM(s) Oral every 12 hours  nicotine - 21 mG/24Hr(s) Patch 1 Patch Transdermal daily  oxyCODONE    IR 20 milliGRAM(s) Oral every 8 hours PRN  pantoprazole   Suspension 40 milliGRAM(s) Oral two times a day      ALLERGIES:  penicillin (Unknown)  Originally Entered as [Unknown] reaction to [BEE STINGS] (Unknown)  IV Contrast (Unknown)  iodine (Anaphylaxis)  penicillin (Anaphylaxis; Hives; Other)  ABIDA dye (Short breath; Hives)  statins (Unknown)  Xolair (Unknown)      VITALS & I/Os:  Vital Signs Last 24 Hrs  T(C): 36.8 (14 Feb 2025 20:18), Max: 36.8 (14 Feb 2025 20:18)  T(F): 98.2 (14 Feb 2025 20:18), Max: 98.2 (14 Feb 2025 20:18)  HR: 64 (14 Feb 2025 20:18) (62 - 70)  BP: 118/72 (14 Feb 2025 20:18) (100/59 - 118/72)  BP(mean): --  RR: 18 (14 Feb 2025 20:18) (18 - 18)  SpO2: 91% (14 Feb 2025 20:18) (91% - 99%)    Parameters below as of 14 Feb 2025 20:18  Patient On (Oxygen Delivery Method): nasal cannula  O2 Flow (L/min): 3      I&O's Summary    13 Feb 2025 07:01  -  14 Feb 2025 07:00  --------------------------------------------------------  IN: 0 mL / OUT: 250 mL / NET: -250 mL    14 Feb 2025 07:01  -  14 Feb 2025 23:25  --------------------------------------------------------  IN: 540 mL / OUT: 0 mL / NET: 540 mL        PHYSICAL EXAM:  GEN: NAD  CHEST: Nonlabored breathing on 3L NC  CV: Hemodynamically stable, palp radial pulses  EXT: Grossly symmetric, WWP. No significant LE edema  NEURO: AAOx4, no focal neuro deficits    LABS:                        12.5   6.04  )-----------( 206      ( 14 Feb 2025 07:17 )             37.0     02-14    130[L]  |  94[L]  |  10  ----------------------------<  106[H]  4.2   |  23  |  0.41[L]    Ca    9.0      14 Feb 2025 07:17  Phos  3.4     02-14  Mg     1.7     02-14    TPro  7.4  /  Alb  4.0  /  TBili  0.7  /  DBili  x   /  AST  17  /  ALT  10  /  AlkPhos  112  02-13    Lactate:    PT/INR - ( 13 Feb 2025 11:15 )   PT: 13.0 sec;   INR: 1.13 ratio         PTT - ( 13 Feb 2025 11:15 )  PTT:29.7 sec          Urinalysis Basic - ( 14 Feb 2025 07:17 )    Color: x / Appearance: x / SG: x / pH: x  Gluc: 106 mg/dL / Ketone: x  / Bili: x / Urobili: x   Blood: x / Protein: x / Nitrite: x   Leuk Esterase: x / RBC: x / WBC x   Sq Epi: x / Non Sq Epi: x / Bacteria: x        IMAGING:  < from: TTE W or WO Ultrasound Enhancing Agent (02.14.25 @ 10:36) >  CONCLUSIONS:      1. Small pericardial effusion noted adjacent to the posterior left ventricle and moderate to large effusion adjacent to the right atrium with no echocardiographic evidence of tamponade physiology.   2. Compared to the transthoracic echocardiogram performed on 12/30/2024, the effusion is bigger on today's study.    < end of copied text >

## 2025-02-14 NOTE — PHYSICAL THERAPY INITIAL EVALUATION ADULT - GAIT DEVIATIONS NOTED, PT EVAL
forward flexed posture/decreased kofi/increased time in double stance/decreased step length/decreased stride length/decreased weight-shifting ability

## 2025-02-14 NOTE — PHYSICAL THERAPY INITIAL EVALUATION ADULT - ADDITIONAL COMMENTS
Pt lives in a two level townhouse with 4 entry steps (+ rail) & flight to 2nd level (+ rail); pt resides on main level. Pt states that she lives alone however her friend (Sam) does come to visit. Pt has home health aide services 8 hours/day, 7 days/week; no family to assist at this time. PTA pt was receiving home PT & home OT services both 2x/week. Pt states she needs assist for all functional mobility including short household ambulation with RW (bedroom to bathroom). Pt has hospital bed, RW, straight cane, commode, shower chair, & wheelchair. Pt lives in a two level townhouse with 4 entry steps (+ rail) & flight to 2nd level (+ rail); pt resides on main level. Pt states that she lives alone however her friend/aide (Sam) works as her home health aide 8 hours/day, 7 days/week; no family to assist at this time. PTA pt was receiving home PT & home OT services both 2x/week a few months ago. Pt states she needs assist for all functional mobility including short household ambulation with RW (bedroom to bathroom). Pt has hospital bed, RW, straight cane, commode, shower chair, & wheelchair.

## 2025-02-14 NOTE — PROGRESS NOTE ADULT - ASSESSMENT
64F w/ afib, scleroderma, asthma, chronic hypoxic respiratory failure, pericardial effusion w/ tamponade s/p pericardiocentesis in setting of coxsackie. Presents w/ pleuritic L chest and back pain.     - this is is not asthma exacerbation - she has no signs/symptoms to suggest asthma  - pt is on baseline 3LNC w/ good SpO2  - POCUS yesterday showed small pleural effusion w/ consolidation pattern on the L - it is possible that whatever this is can be causing some pleuritic chest pain - consider CT chest and would consider r/o PE  - get LE dopplers at LLE is more swollen than right  - this pain can also be related to recent pericarditis/pericardial effusion - repeat TTE shows persistent pericardial effusion that is larger than prior but no tamponade  - etiology could also be costocondritis  - continue pain control  - incentive spirometer   - can start advair as pt has a hx of smoking for possible COPD; albuterol PRN  - pulmonary to sign off, please call back with any further questions

## 2025-02-14 NOTE — PHYSICAL THERAPY INITIAL EVALUATION ADULT - WORK/LEISURE ACTIVITY, REHAB EVAL
Contacted patient's mother due to missing today's make-up appointment. Pt was 45 minutes late to yesterday's session and a makeup appointment was scheduled for today at 9:30-10:15. Mother told clinician she overslept and missed the appointment. Clinician explained that due to habitually violating the attendance policy with no call/no shows and tardiness Arthur will be taken off the schedule and placed on the waitlist. Mother thought today was the first violation of the attendance policy, but clinician explained that tardiness and missed appointments all count towards violation of the attendance policy. Mother verbalized understanding of all discussed.    needs device and assist

## 2025-02-14 NOTE — CONSULT NOTE ADULT - ASSESSMENT
64F with history of scleroderma, afib on Eliquis, asthma with prior intubations, and pericardial effusion s/p pericardiocentesis 12/20/24 for 650mL, drain removed 12/26, on colchicine outpatient admitted for pleuritic CP/SOB. Repeat TTE 12/14 with interval increase in pericardial effusion, now moderate-large adjacent to RA, no evidence of tamponade. Thoracic surgery consulted for possible pericardial window.     Recs:  - Hemodynamically stable, on home O2, no evidence of tamponade on TTE  - Pending discussion with attending    Thoracic Surgery  [id number] 64F with history of scleroderma, afib on Eliquis, asthma with prior intubations, and pericardial effusion s/p pericardiocentesis 12/20/24 for 650mL, drain removed 12/26, on colchicine outpatient admitted for pleuritic CP/SOB. Repeat TTE 12/14 with interval increase in pericardial effusion, now moderate-large adjacent to RA, no evidence of tamponade. Thoracic surgery consulted for possible pericardial window.     Recs:  - Hemodynamically stable, on home O2, no evidence of tamponade on TTE  - Final recs pending review of imaging and evaluation by attending    Discussed with Dr. Núñez     Thoracic Surgery  [id number]

## 2025-02-14 NOTE — CONSULT NOTE ADULT - ASSESSMENT
64-year-old female, well known to our service from last admission,  past medical history of A-fib, scleroderma, asthma with prior intubations, patient admission for pericardial effusion status post pericardiocentesis with drain on colchicine presents ED complaining of sudden onset left flank pain that woke her from sleep this morning.  Had similar symptoms 1 week ago on right side, now with left flank worse with inspiration with mild shortness of breath.  Took inhaler with no relief prompting visit to ED. Patient on 3 L nasal cannula baseline at home.  Uncomfortable with laying flat.      Current out- patient pain regimen: morphine ER 30 mg Q 12 hours; Oxy IR 20 mg TID PRN; baclofen 5 mg TID PRN  Out Patient Pain Management provider: Guru Crowe MD  [facility] Prescription Monitoring Program: Reference #[id number]    Current Pain Score: 10/10    Pt with chronic pain.    Continue home regimen:  MS Contin 30 mg Q 12 hours.  Oxy IR 20 mg TID PRN.  Baclofen 5 mg Q 8 hours PRN.    Monitor for sedation and respiratory depression.  Bowel regimen.  Incentive spirometer.  OOB/ PT per primary team.    Discharge with a narcan rescue kit (naloxone 4 mg/ 0.1 ml nasal spray- 1 spray Q 2-3 minutes alternating between nostrils).  Follow up with Dr Crowe form continued pain management after discharge.       Minutes spent on total encounter: 45 minutes      Chronic Pain Service  [phone]

## 2025-02-14 NOTE — CONSULT NOTE ADULT - TIME BILLING
Medical management as above, review of results/records, discussion with patient and primary team, documentation.
high complexity of this case

## 2025-02-14 NOTE — PROGRESS NOTE ADULT - ASSESSMENT
64-year-old female past medical history of A-fib, scleroderma, asthma with prior intubations, patient admission for pericardial effusion status post pericardiocentesis with drain on colchicine presents ED complaining of sudden onset left flank pain that woke her from sleep this morning.  Had similar symptoms 1 week ago on right side, now with left flank worse with inspiration with mild shortness of breath.  Took inhaler with no relief prompting visit to ED.  Denies fever chills nausea vomiting diarrhea abdominal pain dysuria hematuria.  Patient on 3 L nasal cannula baseline at home.  Uncomfortable with laying flat.     Chest Pain  - p/w left sided chest pain radiating to the back, non-exertional and sometimes worse with inspiration  - Had admission 1 month ago for pericardial effusion s/p drain on home maintenance with colchicine  - ECG NSR  - Trop and BNP wnl  - CXR wnl  - Prior TTE showed preserved EF, no WMA, small pericardial effusion; Repeat pending  - ESR, CRP elevated  - D-dimer elevated.   - Possible ischemic eval with NST pending the above workup.    - doubt PE as pt is on eliquis and not tachycardic  - check odalys lower ext doppler     Pericardial Effusion  - s/p drain with 650ml removed last admission in December 2024  - OP Cards did TTE which shows small increase from prior  - Repeat TTE pending  - As noted above, check ESR, CRP    Atrial Fibrillation  - c/w Toprol 100mg PO daily  - c/w Eliquis 5mg PO BID  - Will monitor HR closely. Currently karen in 50s. May need dose adjustment of Toprol.  - Monitor on tele.    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

## 2025-02-14 NOTE — CONSULT NOTE ADULT - SUBJECTIVE AND OBJECTIVE BOX
Chief Complaint:  Patient is a 64y old  Female who presents with a chief complaint of chest pain (14 Feb 2025 09:43)      Date of service: 02-14-25 @ 11:02    HPI:    The patient is a 63 yo female with scleroderma who presented with chest pain.  GI is consulted for dysphagia.  She feels it both in her throat and chest.  She has never had an endoscopy as far as she can recall    Allergies:  penicillin (Unknown)  Originally Entered as [Unknown] reaction to [BEE STINGS] (Unknown)  IV Contrast (Unknown)  iodine (Anaphylaxis)  penicillin (Anaphylaxis; Hives; Other)  ABIDA dye (Short breath; Hives)  statins (Unknown)  Xolair (Unknown)      Home Medications:    Hospital Medications:  albuterol    90 MICROgram(s) HFA Inhaler 2 Puff(s) Inhalation every 6 hours PRN  apixaban 5 milliGRAM(s) Oral every 12 hours  baclofen 5 milliGRAM(s) Oral every 8 hours PRN  colchicine 0.6 milliGRAM(s) Oral daily  levothyroxine 112 MICROGram(s) Oral daily  metoprolol succinate  milliGRAM(s) Oral daily  morphine ER Tablet 30 milliGRAM(s) Oral every 12 hours  nicotine - 21 mG/24Hr(s) Patch 1 Patch Transdermal daily  oxyCODONE    IR 20 milliGRAM(s) Oral every 8 hours PRN  pantoprazole   Suspension 40 milliGRAM(s) Oral two times a day      PMHX/PSHX:  Scleroderma    Asthma    High cholesterol    Scleroderma    Shingles    Hypothyroidism    GERD (gastroesophageal reflux disease)    Smoker    Multiple drug allergies    S/P small bowel resection    History of myomectomy    History of ovarian cystectomy    Fibroid    Bowel obstruction    H/O ovarian cystectomy    S/P pericardiocentesis        Family history:  No pertinent family history in first degree relatives        Social History:   Denies ethanol use.  Denies illicit drug use.    ROS:     General:  No wt loss, fevers, chills, night sweats, fatigue,   Eyes:  Good vision, no reported pain  ENT:  No sore throat, pain, runny nose, dysphagia  CV:  No pain, palpitations, hypo/hypertension  Resp:  No dyspnea, cough, tachypnea, wheezing  GI:  See HPI  :  No pain, bleeding, incontinence, nocturia  Muscle:  No pain, weakness  Neuro:  No weakness, tingling, memory problems  Psych:  No fatigue, insomnia, mood problems, depression  Endocrine:  No polyuria, polydipsia, cold/heat intolerance  Heme:  No petechiae, ecchymosis, easy bruisability  Integumentary:  No rash, edema      PHYSICAL EXAM:     GENERAL:  Appears stated age, well-groomed, well-nourished, no distress  HEENT:  NC/AT,  conjunctivae anicteric, clear and pink,   NECK: supple, trachea midline  CHEST:  Full & symmetric excursion, no increased effort, breath sounds clear  HEART:  Regular rhythm, no JVD  ABDOMEN:  Soft, non-tender, non-distended, normoactive bowel sounds,  no masses , no hepatosplenomegaly  EXTREMITIES:  no cyanosis,clubbing or edema  SKIN:  No rash, erythema, or, ecchymoses, no jaundice  NEURO:  Alert, non-focal, no asterixis  PSYCH: Appropriate affect, oriented to place and time  RECTAL: Deferred      Vital Signs:  Vital Signs Last 24 Hrs  T(C): 36.7 (14 Feb 2025 09:30), Max: 36.7 (13 Feb 2025 20:25)  T(F): 98 (14 Feb 2025 09:30), Max: 98.1 (14 Feb 2025 05:20)  HR: 70 (14 Feb 2025 09:30) (50 - 83)  BP: 107/68 (14 Feb 2025 09:30) (96/60 - 109/61)  BP(mean): 71 (13 Feb 2025 12:43) (71 - 80)  RR: 18 (14 Feb 2025 09:30) (18 - 21)  SpO2: 98% (14 Feb 2025 09:30) (94% - 99%)    Parameters below as of 14 Feb 2025 09:30  Patient On (Oxygen Delivery Method): nasal cannula  O2 Flow (L/min): 3    Daily     Daily     LABS: Labs personally reviewed by me:                        12.5   6.04  )-----------( 206      ( 14 Feb 2025 07:17 )             37.0     02-14    130[L]  |  94[L]  |  10  ----------------------------<  106[H]  4.2   |  23  |  0.41[L]    Ca    9.0      14 Feb 2025 07:17  Phos  3.4     02-14  Mg     1.7     02-14    TPro  7.4  /  Alb  4.0  /  TBili  0.7  /  DBili  x   /  AST  17  /  ALT  10  /  AlkPhos  112  02-13    LIVER FUNCTIONS - ( 13 Feb 2025 11:15 )  Alb: 4.0 g/dL / Pro: 7.4 g/dL / ALK PHOS: 112 U/L / ALT: 10 U/L / AST: 17 U/L / GGT: x           PT/INR - ( 13 Feb 2025 11:15 )   PT: 13.0 sec;   INR: 1.13 ratio         PTT - ( 13 Feb 2025 11:15 )  PTT:29.7 sec  Urinalysis Basic - ( 14 Feb 2025 07:17 )    Color: x / Appearance: x / SG: x / pH: x  Gluc: 106 mg/dL / Ketone: x  / Bili: x / Urobili: x   Blood: x / Protein: x / Nitrite: x   Leuk Esterase: x / RBC: x / WBC x   Sq Epi: x / Non Sq Epi: x / Bacteria: x          Imaging personally reviewed by me:           Male

## 2025-02-14 NOTE — PHYSICAL THERAPY INITIAL EVALUATION ADULT - PERTINENT HX OF CURRENT PROBLEM, REHAB EVAL
64-year-old female past medical history of A-fib, scleroderma, asthma with prior intubations, patient admission for pericardial effusion status post pericardiocentesis with drain on colchicine presents ED complaining of sudden onset left flank pain that woke her from sleep this morning.  Had similar symptoms 1 week ago on right side, now with left flank worse with inspiration with mild shortness of breath.  Took inhaler with no relief prompting visit to ED.  Denies fever chills nausea vomiting diarrhea abdominal pain dysuria hematuria.  Patient on 3 L nasal cannula baseline at home.  Uncomfortable with laying flat. Hospital course: (2/13) CXR: No acute pathology. Resolved left pleural effusion. 64-year-old female past medical history of A-fib, scleroderma, asthma with prior intubations, patient admission for pericardial effusion status post pericardiocentesis with drain on colchicine presents ED complaining of sudden onset left flank pain that woke her from sleep this morning.  Had similar symptoms 1 week ago on right side, now with left flank worse with inspiration with mild shortness of breath.  Took inhaler with no relief prompting visit to ED.  Denies fever chills nausea vomiting diarrhea abdominal pain dysuria hematuria.  Patient on 3 L nasal cannula baseline at home.  Uncomfortable with laying flat. Hospital course:   (2/13) CXR: No acute pathology. Resolved left pleural effusion.  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 There is a new peripheral ill-defined 2.4 x 2.2 cm opacity in the left upper lobe, differential includes infection/inflammation/neoplasm.

## 2025-02-14 NOTE — PROGRESS NOTE ADULT - SUBJECTIVE AND OBJECTIVE BOX
DATE OF SERVICE: 02-14-25 @ 13:57    Patient is a 64y old  Female who presents with a chief complaint of chest pain (14 Feb 2025 10:59)      SUBJECTIVE / OVERNIGHT EVENTS:  left sided chest pain. No shortness of breath. . No events overnight. c/o dysphagia    MEDICATIONS  (STANDING):  apixaban 5 milliGRAM(s) Oral every 12 hours  colchicine 0.6 milliGRAM(s) Oral daily  levothyroxine 112 MICROGram(s) Oral daily  metoprolol succinate  milliGRAM(s) Oral daily  morphine ER Tablet 30 milliGRAM(s) Oral every 12 hours  nicotine - 21 mG/24Hr(s) Patch 1 Patch Transdermal daily  pantoprazole   Suspension 40 milliGRAM(s) Oral two times a day    MEDICATIONS  (PRN):  albuterol    90 MICROgram(s) HFA Inhaler 2 Puff(s) Inhalation every 6 hours PRN Shortness of Breath and/or Wheezing  baclofen 5 milliGRAM(s) Oral every 8 hours PRN Musculoskeletal Pain  oxyCODONE    IR 20 milliGRAM(s) Oral every 8 hours PRN for severe pain      Vital Signs Last 24 Hrs  T(C): 36.3 (14 Feb 2025 11:41), Max: 36.7 (13 Feb 2025 20:25)  T(F): 97.3 (14 Feb 2025 11:41), Max: 98.1 (14 Feb 2025 05:20)  HR: 62 (14 Feb 2025 11:41) (50 - 83)  BP: 111/62 (14 Feb 2025 11:41) (96/60 - 111/62)  BP(mean): --  RR: 18 (14 Feb 2025 11:41) (18 - 18)  SpO2: 95% (14 Feb 2025 11:41) (94% - 99%)    Parameters below as of 14 Feb 2025 11:41  Patient On (Oxygen Delivery Method): nasal cannula  O2 Flow (L/min): 3    CAPILLARY BLOOD GLUCOSE        I&O's Summary    13 Feb 2025 07:01  -  14 Feb 2025 07:00  --------------------------------------------------------  IN: 0 mL / OUT: 250 mL / NET: -250 mL    14 Feb 2025 07:01  -  14 Feb 2025 13:57  --------------------------------------------------------  IN: 120 mL / OUT: 0 mL / NET: 120 mL        PHYSICAL EXAM:  GENERAL: NAD, well-developed  HEAD:  Atraumatic, Normocephalic  EYES: EOMI, PERRLA, conjunctiva and sclera clear  NECK: Supple, No JVD  CHEST/LUNG: Clear to auscultation bilaterally; No wheeze  HEART: Regular rate and rhythm; No murmurs, rubs, or gallops  ABDOMEN: Soft, Nontender, Nondistended; Bowel sounds present  EXTREMITIES:  2+ Peripheral Pulses, No clubbing, cyanosis, or edema  PSYCH: AAOx3  NEUROLOGY: non-focal  SKIN: No rashes or lesions    LABS:                        12.5   6.04  )-----------( 206      ( 14 Feb 2025 07:17 )             37.0     02-14    130[L]  |  94[L]  |  10  ----------------------------<  106[H]  4.2   |  23  |  0.41[L]    Ca    9.0      14 Feb 2025 07:17  Phos  3.4     02-14  Mg     1.7     02-14    TPro  7.4  /  Alb  4.0  /  TBili  0.7  /  DBili  x   /  AST  17  /  ALT  10  /  AlkPhos  112  02-13    PT/INR - ( 13 Feb 2025 11:15 )   PT: 13.0 sec;   INR: 1.13 ratio         PTT - ( 13 Feb 2025 11:15 )  PTT:29.7 sec      Urinalysis Basic - ( 14 Feb 2025 07:17 )    Color: x / Appearance: x / SG: x / pH: x  Gluc: 106 mg/dL / Ketone: x  / Bili: x / Urobili: x   Blood: x / Protein: x / Nitrite: x   Leuk Esterase: x / RBC: x / WBC x   Sq Epi: x / Non Sq Epi: x / Bacteria: x    < from: Xray Chest 1 View- PORTABLE-Urgent (Xray Chest 1 View- PORTABLE-Urgent .) (02.13.25 @ 12:29) >  IMPRESSION:  No acute pathology.  Resolved left pleural effusion.      < end of copied text >      RADIOLOGY & ADDITIONAL TESTS:    Imaging Personally Reviewed:    Consultant(s) Notes Reviewed:      Care Discussed with Consultants/Other Providers:

## 2025-02-14 NOTE — PHYSICAL THERAPY INITIAL EVALUATION ADULT - GAIT TRAINING, PT EVAL
GOAL: Patient will ambulate 300 feet independently with SC GOAL: Patient will ambulate 25 feet min assist with SC

## 2025-02-15 DIAGNOSIS — I31.39 OTHER PERICARDIAL EFFUSION (NONINFLAMMATORY): ICD-10-CM

## 2025-02-15 PROCEDURE — 71250 CT THORAX DX C-: CPT | Mod: 26

## 2025-02-15 PROCEDURE — 99232 SBSQ HOSP IP/OBS MODERATE 35: CPT

## 2025-02-15 PROCEDURE — 93970 EXTREMITY STUDY: CPT | Mod: 26

## 2025-02-15 RX ORDER — CYANOCOBALAMIN 1000 UG/ML
1000 INJECTION INTRAMUSCULAR; SUBCUTANEOUS DAILY
Refills: 0 | Status: COMPLETED | OUTPATIENT
Start: 2025-02-15 | End: 2025-02-17

## 2025-02-15 RX ADMIN — Medication 30 MILLIGRAM(S): at 06:07

## 2025-02-15 RX ADMIN — NICOTINE POLACRILEX 1 PATCH: 4 GUM, CHEWING ORAL at 19:00

## 2025-02-15 RX ADMIN — OXYCODONE HYDROCHLORIDE 20 MILLIGRAM(S): 30 TABLET ORAL at 10:30

## 2025-02-15 RX ADMIN — NICOTINE POLACRILEX 1 PATCH: 4 GUM, CHEWING ORAL at 12:02

## 2025-02-15 RX ADMIN — COLCHICINE 0.6 MILLIGRAM(S): 0.6 TABLET, FILM COATED ORAL at 12:02

## 2025-02-15 RX ADMIN — Medication 30 MILLIGRAM(S): at 05:07

## 2025-02-15 RX ADMIN — NICOTINE POLACRILEX 1 PATCH: 4 GUM, CHEWING ORAL at 05:00

## 2025-02-15 RX ADMIN — CYANOCOBALAMIN 1000 MICROGRAM(S): 1000 INJECTION INTRAMUSCULAR; SUBCUTANEOUS at 12:01

## 2025-02-15 RX ADMIN — OXYCODONE HYDROCHLORIDE 20 MILLIGRAM(S): 30 TABLET ORAL at 09:51

## 2025-02-15 RX ADMIN — APIXABAN 5 MILLIGRAM(S): 2.5 TABLET, FILM COATED ORAL at 05:07

## 2025-02-15 RX ADMIN — Medication 40 MILLIGRAM(S): at 17:59

## 2025-02-15 RX ADMIN — Medication 112 MICROGRAM(S): at 05:07

## 2025-02-15 RX ADMIN — Medication 30 MILLIGRAM(S): at 17:59

## 2025-02-15 RX ADMIN — APIXABAN 5 MILLIGRAM(S): 2.5 TABLET, FILM COATED ORAL at 17:59

## 2025-02-15 RX ADMIN — Medication 40 MILLIGRAM(S): at 05:08

## 2025-02-15 NOTE — PROGRESS NOTE ADULT - ASSESSMENT
64-year-old female past medical history of A-fib, scleroderma, asthma with prior intubations, patient admission for pericardial effusion status post pericardiocentesis with drain on colchicine presents ED complaining of sudden onset left flank pain that woke her from sleep this morning.  Had similar symptoms 1 week ago on right side, now with left flank worse with inspiration with mild shortness of breath.  Took inhaler with no relief prompting visit to ED.  Denies fever chills nausea vomiting diarrhea abdominal pain dysuria hematuria.  Patient on 3 L nasal cannula baseline at home.  Uncomfortable with laying flat.     Chest Pain  - p/w left sided chest pain radiating to the back, non-exertional and sometimes worse with inspiration  - Had admission 1 month ago for pericardial effusion s/p drain on home maintenance with colchicine  - ECG NSR  - Trop and BNP wnl  - CXR wnl  - Prior TTE showed preserved EF, no WMA, small pericardial effusion; Repeat pending  - ESR, CRP elevated  - D-dimer elevated.   - Possible ischemic eval with NST pending the above workup.    - doubt PE as pt is on eliquis and not tachycardic  - check odalys lower ext doppler     Pericardial Effusion  - s/p drain with 650ml removed last admission in December 2024  - OP Cards did TTE which shows small increase from prior  - Repeat TTE - effusion is bigger  - ct surg consult saw pt    Atrial Fibrillation  - c/w Toprol 100mg PO daily  - c/w Eliquis 5mg PO BID  - Will monitor HR closely. Currently karen in 50s. May need dose adjustment of Toprol.  - Monitor on tele.    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

## 2025-02-15 NOTE — PROGRESS NOTE ADULT - SUBJECTIVE AND OBJECTIVE BOX
pt seen and examined, no complaints, ROS - .        albuterol    90 MICROgram(s) HFA Inhaler 2 Puff(s) Inhalation every 6 hours PRN  apixaban 5 milliGRAM(s) Oral every 12 hours  baclofen 5 milliGRAM(s) Oral every 8 hours PRN  colchicine 0.6 milliGRAM(s) Oral daily  cyanocobalamin Injectable 1000 MICROGram(s) IntraMuscular daily  levothyroxine 112 MICROGram(s) Oral daily  metoprolol succinate  milliGRAM(s) Oral daily  morphine ER Tablet 30 milliGRAM(s) Oral every 12 hours  nicotine - 21 mG/24Hr(s) Patch 1 Patch Transdermal daily  oxyCODONE    IR 20 milliGRAM(s) Oral every 8 hours PRN  pantoprazole   Suspension 40 milliGRAM(s) Oral two times a day                            12.5   6.04  )-----------( 206      ( 14 Feb 2025 07:17 )             37.0       Hemoglobin: 12.5 g/dL (02-14 @ 07:17)  Hemoglobin: 13.4 g/dL (02-13 @ 11:15)      02-14    130[L]  |  94[L]  |  10  ----------------------------<  106[H]  4.2   |  23  |  0.41[L]    Ca    9.0      14 Feb 2025 07:17  Phos  3.4     02-14  Mg     1.7     02-14      Creatinine Trend: 0.41<--, 0.48<--    COAGS:           T(C): 36.2 (02-15-25 @ 05:30), Max: 36.8 (02-14-25 @ 20:18)  HR: 54 (02-15-25 @ 05:30) (54 - 64)  BP: 106/64 (02-15-25 @ 05:30) (106/64 - 118/72)  RR: 18 (02-15-25 @ 05:30) (18 - 18)  SpO2: 93% (02-15-25 @ 05:30) (91% - 93%)  Wt(kg): --    I&O's Summary    14 Feb 2025 07:01  -  15 Feb 2025 07:00  --------------------------------------------------------  IN: 540 mL / OUT: 300 mL / NET: 240 mL      ROS:     General:  No wt loss, fevers, chills, night sweats, fatigue,   Eyes:  Good vision, no reported pain  ENT:  No sore throat, pain, runny nose, dysphagia  CV:  No pain, palpitations, hypo/hypertension  Resp:  No dyspnea, cough, tachypnea, wheezing  GI:  See HPI  :  No pain, bleeding, incontinence, nocturia  Muscle:  No pain, weakness  Neuro:  No weakness, tingling, memory problems  Psych:  No fatigue, insomnia, mood problems, depression  Endocrine:  No polyuria, polydipsia, cold/heat intolerance  Heme:  No petechiae, ecchymosis, easy bruisability  Integumentary:  No rash, edema      PHYSICAL EXAM:     GENERAL:  Appears stated age, well-groomed, well-nourished, no distress  HEENT:  NC/AT,  conjunctivae anicteric, clear and pink,   NECK: supple, trachea midline  CHEST:  Full & symmetric excursion, no increased effort, breath sounds clear  HEART:  Regular rhythm, no JVD  ABDOMEN:  Soft, non-tender, non-distended, normoactive bowel sounds,  no masses , no hepatosplenomegaly  EXTREMITIES:  no cyanosis,clubbing or edema  SKIN:  No rash, erythema, or, ecchymoses, no jaundice  NEURO:  Alert, non-focal, no asterixis  PSYCH: Appropriate affect, oriented to place and time  RECTAL: Deferred

## 2025-02-15 NOTE — PROGRESS NOTE ADULT - SUBJECTIVE AND OBJECTIVE BOX
VITAL SIGNS    Telemetry:      Vital Signs Last 24 Hrs  T(C): 36.2 (02-15-25 @ 05:30), Max: 36.8 (25 @ 20:18)  T(F): 97.2 (02-15-25 @ 05:30), Max: 98.2 (25 @ 20:18)  HR: 54 (02-15-25 @ 05:30) (54 - 64)  BP: 106/64 (02-15-25 @ 05:30) (106/64 - 118/72)  RR: 18 (02-15-25 @ 05:30) (18 - 18)  SpO2: 93% (02-15-25 @ 05:30) (91% - 95%)                   Daily     Daily Weight in k.4 (15 Feb 2025 05:30)        CAPILLARY BLOOD GLUCOSE      POCT Blood Glucose.: 110 mg/dL (2025 20:38)                        PHYSICAL EXAM  s   some SOB  No CP  Neurology: alert and oriented x 3, moves all extremities with no defecits  CV :  RRR    Lungs:   CTA B/L  Abdomen: soft, nontender, nondistended, positive bowel sound  Extremities:     no edema

## 2025-02-15 NOTE — PROGRESS NOTE ADULT - ASSESSMENT
64 year old female with scleroderma presenting with chest pain, GI consulted for dysphagia    1. Dysphagia. 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 CT of chest shows uneremarkable esophagus to my eye.  -obtain esophagram (if possible, otherwise can defer to outpatient)  -scleroderma esophagus is exacerbated by GERD given lack of LES tone, therefore high dose PPI is beneficial    2. Scleroderma    3. Constipation  also related to scleroderma  can given miralax 34 BID

## 2025-02-15 NOTE — PROGRESS NOTE ADULT - SUBJECTIVE AND OBJECTIVE BOX
DATE OF SERVICE: 02-15-25 @ 10:27    Patient is a 64y old  Female who presents with a chief complaint of chest pain (14 Feb 2025 19:24)      SUBJECTIVE / OVERNIGHT EVENTS:  No chest pain. No shortness of breath. No complaints. No events overnight.     MEDICATIONS  (STANDING):  apixaban 5 milliGRAM(s) Oral every 12 hours  colchicine 0.6 milliGRAM(s) Oral daily  cyanocobalamin Injectable 1000 MICROGram(s) IntraMuscular daily  levothyroxine 112 MICROGram(s) Oral daily  metoprolol succinate  milliGRAM(s) Oral daily  morphine ER Tablet 30 milliGRAM(s) Oral every 12 hours  nicotine - 21 mG/24Hr(s) Patch 1 Patch Transdermal daily  pantoprazole   Suspension 40 milliGRAM(s) Oral two times a day    MEDICATIONS  (PRN):  albuterol    90 MICROgram(s) HFA Inhaler 2 Puff(s) Inhalation every 6 hours PRN Shortness of Breath and/or Wheezing  baclofen 5 milliGRAM(s) Oral every 8 hours PRN Musculoskeletal Pain  oxyCODONE    IR 20 milliGRAM(s) Oral every 8 hours PRN for severe pain      Vital Signs Last 24 Hrs  T(C): 36.2 (15 Feb 2025 05:30), Max: 36.8 (14 Feb 2025 20:18)  T(F): 97.2 (15 Feb 2025 05:30), Max: 98.2 (14 Feb 2025 20:18)  HR: 54 (15 Feb 2025 05:30) (54 - 64)  BP: 106/64 (15 Feb 2025 05:30) (106/64 - 118/72)  BP(mean): --  RR: 18 (15 Feb 2025 05:30) (18 - 18)  SpO2: 93% (15 Feb 2025 05:30) (91% - 95%)    Parameters below as of 15 Feb 2025 05:30  Patient On (Oxygen Delivery Method): nasal cannula  O2 Flow (L/min): 3    CAPILLARY BLOOD GLUCOSE      POCT Blood Glucose.: 110 mg/dL (14 Feb 2025 20:38)    I&O's Summary    14 Feb 2025 07:01  -  15 Feb 2025 07:00  --------------------------------------------------------  IN: 540 mL / OUT: 300 mL / NET: 240 mL        PHYSICAL EXAM:  GENERAL: NAD, well-developed  HEAD:  Atraumatic, Normocephalic  EYES: EOMI, PERRLA, conjunctiva and sclera clear  NECK: Supple, No JVD  CHEST/LUNG: Clear to auscultation bilaterally; No wheeze  HEART: Regular rate and rhythm; No murmurs, rubs, or gallops  ABDOMEN: Soft, Nontender, Nondistended; Bowel sounds present  EXTREMITIES:  2+ Peripheral Pulses, No clubbing, cyanosis, or edema  PSYCH: AAOx3  NEUROLOGY: non-focal  SKIN: No rashes or lesions    LABS:                        12.5   6.04  )-----------( 206      ( 14 Feb 2025 07:17 )             37.0     02-14    130[L]  |  94[L]  |  10  ----------------------------<  106[H]  4.2   |  23  |  0.41[L]    Ca    9.0      14 Feb 2025 07:17  Phos  3.4     02-14  Mg     1.7     02-14    TPro  7.4  /  Alb  4.0  /  TBili  0.7  /  DBili  x   /  AST  17  /  ALT  10  /  AlkPhos  112  02-13    PT/INR - ( 13 Feb 2025 11:15 )   PT: 13.0 sec;   INR: 1.13 ratio         PTT - ( 13 Feb 2025 11:15 )  PTT:29.7 sec      Urinalysis Basic - ( 14 Feb 2025 07:17 )    Color: x / Appearance: x / SG: x / pH: x  Gluc: 106 mg/dL / Ketone: x  / Bili: x / Urobili: x   Blood: x / Protein: x / Nitrite: x   Leuk Esterase: x / RBC: x / WBC x   Sq Epi: x / Non Sq Epi: x / Bacteria: x    < from: TTE W or WO Ultrasound Enhancing Agent (02.14.25 @ 10:36) >    CONCLUSIONS:      1. Small pericardial effusion noted adjacent to the posterior left ventricle and moderate to large effusion adjacent to the right atrium with no echocardiographic evidence of tamponade physiology.   2. Compared to the transthoracic echocardiogram performed on 12/30/2024, the effusion is bigger on today's study.    _________    < end of copied text >      RADIOLOGY & ADDITIONAL TESTS:    Imaging Personally Reviewed:    Consultant(s) Notes Reviewed:      Care Discussed with Consultants/Other Providers:

## 2025-02-15 NOTE — PROGRESS NOTE ADULT - ASSESSMENT
64F with history of scleroderma, afib on Eliquis, asthma with prior intubations, and pericardial effusion on colchicine outpatient admitted for pleuritic CP/SOB. She was previously admitted in December and underwent pericardiocentesis 12/20/24 for 650mL bloody output. Pericardial drain removed 12/26. Repeat TTE obtained today demonstrating interval increase in pericardial effusion, now moderate-large adjacent to RA, no evidence of tamponade. Thoracic surgery consulted for possible pericardial window. Patient continues to note CP worse with breathing but states it is improved since she was admitted, on her home 3L NC.     2/15       vss   SOB   No  CP,  + eliquis

## 2025-02-16 LAB
BLD GP AB SCN SERPL QL: NEGATIVE — SIGNIFICANT CHANGE UP
RH IG SCN BLD-IMP: POSITIVE — SIGNIFICANT CHANGE UP

## 2025-02-16 PROCEDURE — 99233 SBSQ HOSP IP/OBS HIGH 50: CPT

## 2025-02-16 PROCEDURE — 99232 SBSQ HOSP IP/OBS MODERATE 35: CPT

## 2025-02-16 RX ORDER — APIXABAN 2.5 MG/1
5 TABLET, FILM COATED ORAL EVERY 12 HOURS
Refills: 0 | Status: DISCONTINUED | OUTPATIENT
Start: 2025-02-16 | End: 2025-02-18

## 2025-02-16 RX ADMIN — Medication 30 MILLIGRAM(S): at 06:18

## 2025-02-16 RX ADMIN — METOPROLOL SUCCINATE 100 MILLIGRAM(S): 50 TABLET, EXTENDED RELEASE ORAL at 06:18

## 2025-02-16 RX ADMIN — APIXABAN 5 MILLIGRAM(S): 2.5 TABLET, FILM COATED ORAL at 23:33

## 2025-02-16 RX ADMIN — OXYCODONE HYDROCHLORIDE 20 MILLIGRAM(S): 30 TABLET ORAL at 10:37

## 2025-02-16 RX ADMIN — CYANOCOBALAMIN 1000 MICROGRAM(S): 1000 INJECTION INTRAMUSCULAR; SUBCUTANEOUS at 13:11

## 2025-02-16 RX ADMIN — Medication 112 MICROGRAM(S): at 06:18

## 2025-02-16 RX ADMIN — OXYCODONE HYDROCHLORIDE 20 MILLIGRAM(S): 30 TABLET ORAL at 11:15

## 2025-02-16 RX ADMIN — Medication 30 MILLIGRAM(S): at 06:46

## 2025-02-16 RX ADMIN — Medication 30 MILLIGRAM(S): at 19:00

## 2025-02-16 RX ADMIN — APIXABAN 5 MILLIGRAM(S): 2.5 TABLET, FILM COATED ORAL at 13:10

## 2025-02-16 RX ADMIN — NICOTINE POLACRILEX 1 PATCH: 4 GUM, CHEWING ORAL at 08:00

## 2025-02-16 RX ADMIN — NICOTINE POLACRILEX 1 PATCH: 4 GUM, CHEWING ORAL at 13:11

## 2025-02-16 RX ADMIN — NICOTINE POLACRILEX 1 PATCH: 4 GUM, CHEWING ORAL at 13:10

## 2025-02-16 RX ADMIN — OXYCODONE HYDROCHLORIDE 20 MILLIGRAM(S): 30 TABLET ORAL at 23:34

## 2025-02-16 RX ADMIN — NICOTINE POLACRILEX 1 PATCH: 4 GUM, CHEWING ORAL at 19:00

## 2025-02-16 RX ADMIN — COLCHICINE 0.6 MILLIGRAM(S): 0.6 TABLET, FILM COATED ORAL at 13:11

## 2025-02-16 RX ADMIN — Medication 30 MILLIGRAM(S): at 18:13

## 2025-02-16 NOTE — PROGRESS NOTE ADULT - PROBLEM SELECTOR PLAN 1
no surgical intervention,   having pericardial window at this time will not help pts SOB  please have pulmonary follow up as inpt   pt may follow up as oupt in one month with Dr Núñez  Thoracic to follow

## 2025-02-16 NOTE — DISCHARGE NOTE PROVIDER - CARE PROVIDERS DIRECT ADDRESSES
[email or web address],DirectAddress_Unknown

## 2025-02-16 NOTE — DISCHARGE NOTE PROVIDER - HOSPITAL COURSE
HPI:  64-year-old female past medical history of A-fib, scleroderma, asthma with prior intubations, patient admission for pericardial effusion status post pericardiocentesis with drain on colchicine presents ED complaining of sudden onset left flank pain that woke her from sleep this morning.  Had similar symptoms 1 week ago on right side, now with left flank worse with inspiration with mild shortness of breath.  Took inhaler with no relief prompting visit to ED.  Denies fever chills nausea vomiting diarrhea abdominal pain dysuria hematuria.  Patient on 3 L nasal cannula baseline at home.  Uncomfortable with laying flat. (13 Feb 2025 18:20)    Hospital Course:  The patient was admitted and underwent extensive evaluation for her chest pain and dyspnea.    Pericardial Effusion: Repeat TTE showed an interval increase in the size of her pericardial effusion, now moderate-large, adjacent to the right atrium. Notably, there was no evidence of tamponade. Thoracic surgery was consulted, and a pericardial window was considered but deemed not necessary at this time. Close outpatient follow-up with cardiology and thoracic surgery was arranged.  Pleuritic Chest Pain: Differential diagnosis included pulmonary embolism, pneumonia/pleuritis, musculoskeletal pain, and exacerbation of prior pericarditis. CT chest was performed. D-dimer was elevated.  CT Chest showed Left suprahilar mediastinal mass in the prevascular space, extending into the aortopulmonary window and mediastinal adenopathy,   and a  new peripheral ill-defined 2.4 x 2.2 cm opacity in the left upper lobe, differential includes infection/inflammation/neoplasm and Dilated ascending aorta measuring up to 4 cm,   No DVT in either lower extremity  Atrial Fibrillation: Patient's atrial fibrillation remained stable throughout her admission. Eliquis was temporarily held due to consideration of a pericardial window. Toprol dose was adjusted due to bradycardia.  Dysphagia: Evaluated for dysphagia, attributed to scleroderma-related esophageal dysmotility.  Recommendations were made for outpatient GI follow-up. Patient was started on a high-dose PPI for presumed GERD exacerbating her dysmotility.  Constipation: Addressed with Miralax.  Asthma/COPD: Patient did not experience an acute exacerbation during this admission. Advair was started given her smoking history and possible COPD component. Albuterol was prescribed as needed. Pulmonary appreciated.       Important Medication Changes and Reason:    Active or Pending Issues Requiring Follow-up:  Follow up with cardiology and thoracic surgery as directed.  Follow up with gastroenterology for dysphagia.  Follow up with primary care physician.    Advanced Directives:   [ ] Full code  [ ] DNR  [ ] Hospice    Discharge Diagnoses:  Pericardial effusion  Atrial fibrillation  Scleroderma with esophageal dysmotility and constipation  Chronic hypoxic respiratory failure on home oxygen  History of asthma/COPD  Chronic pain         HPI:  64-year-old female past medical history of A-fib, scleroderma, asthma with prior intubations, patient admission for pericardial effusion status post pericardiocentesis with drain on colchicine presents ED complaining of sudden onset left flank pain that woke her from sleep this morning.  Had similar symptoms 1 week ago on right side, now with left flank worse with inspiration with mild shortness of breath.  Took inhaler with no relief prompting visit to ED.  Denies fever chills nausea vomiting diarrhea abdominal pain dysuria hematuria.  Patient on 3 L nasal cannula baseline at home.  Uncomfortable with laying flat. (13 Feb 2025 18:20)    Hospital Course:  The patient was admitted and underwent extensive evaluation for her chest pain and dyspnea.    Pericardial Effusion: Repeat TTE showed an interval increase in the size of her pericardial effusion, now moderate-large, adjacent to the right atrium. Notably, there was no evidence of tamponade. Thoracic surgery was consulted, and a pericardial window was considered but deemed not necessary at this time. Close outpatient follow-up with cardiology and thoracic surgery was arranged.  Pleuritic Chest Pain: Differential diagnosis included pulmonary embolism, pneumonia/pleuritis, musculoskeletal pain, and exacerbation of prior pericarditis. CT chest was performed. D-dimer was elevated.  CT Chest showed Left suprahilar mediastinal mass in the prevascular space, extending into the aortopulmonary window and mediastinal adenopathy,   and a  new peripheral ill-defined 2.4 x 2.2 cm opacity in the left upper lobe, differential includes infection/inflammation/neoplasm and Dilated ascending aorta measuring up to 4 cm,   No DVT in either lower extremity  Atrial Fibrillation: Patient's atrial fibrillation remained stable throughout her admission. Eliquis was temporarily held due to consideration of a pericardial window and resumed once Thoracic  surgery planned for  OP follow up    Toprol dose was adjusted due to bradycardia.  Dysphagia: Evaluated for dysphagia, attributed to scleroderma-related esophageal dysmotility.  Recommendations were made for outpatient GI follow-up. Patient was started on a high-dose PPI for presumed GERD exacerbating her dysmotility.  Constipation: Addressed with Miralax.  Asthma/COPD: Patient did not experience an acute exacerbation during this admission. Advair was started given her smoking history and possible COPD component. Albuterol was prescribed as needed. Pulmonary appreciated.       Important Medication Changes and Reason:    Active or Pending Issues Requiring Follow-up:  Follow up with cardiology  Pulmonary  and thoracic surgery as directed.  Follow up with gastroenterology for dysphagia.  Follow up with primary care physician.    Advanced Directives:   [ ] Full code  [ ] DNR  [ ] Hospice    Discharge Diagnoses:  Pericardial effusion  Atrial fibrillation  Scleroderma with esophageal dysmotility and constipation  Chronic hypoxic respiratory failure on home oxygen  History of asthma/COPD  Chronic pain         HPI:  64-year-old female past medical history of A-fib, scleroderma, asthma with prior intubations, patient admission for pericardial effusion status post pericardiocentesis with drain on colchicine presents ED complaining of sudden onset left flank pain that woke her from sleep this morning.  Had similar symptoms 1 week ago on right side, now with left flank worse with inspiration with mild shortness of breath.  Took inhaler with no relief prompting visit to ED.  Denies fever chills nausea vomiting diarrhea abdominal pain dysuria hematuria.  Patient on 3 L nasal cannula baseline at home.  Uncomfortable with laying flat. (13 Feb 2025 18:20)    Hospital Course:  The patient was admitted and underwent extensive evaluation for her chest pain and dyspnea.    Pericardial Effusion: Repeat TTE showed an interval increase in the size of her pericardial effusion, now moderate-large, adjacent to the right atrium. Notably, there was no evidence of tamponade. Thoracic surgery was consulted, and a pericardial window was considered but deemed not necessary at this time. Close outpatient follow-up with cardiology and thoracic surgery was arranged.  Pleuritic Chest Pain: Differential diagnosis included pulmonary embolism, pneumonia/pleuritis, musculoskeletal pain, and exacerbation of prior pericarditis. CT chest was performed. D-dimer was elevated.  CT Chest showed Left suprahilar mediastinal mass in the prevascular space, extending into the aortopulmonary window and mediastinal adenopathy,   and a  new peripheral ill-defined 2.4 x 2.2 cm opacity in the left upper lobe, differential includes infection/inflammation/neoplasm and Dilated ascending aorta measuring up to 4 cm,   No DVT in either lower extremity  Atrial Fibrillation: Patient's atrial fibrillation remained stable throughout her admission. Eliquis was temporarily held due to consideration of a pericardial window and resumed once Thoracic  surgery planned for  OP follow up    Toprol dose was adjusted due to bradycardia.  Dysphagia: Evaluated for dysphagia, attributed to scleroderma-related esophageal dysmotility.  Recommendations were made for outpatient GI follow-up. Patient was started on a high-dose PPI for presumed GERD exacerbating her dysmotility.  Constipation: Addressed with Miralax.  Asthma/COPD: Patient did not experience an acute exacerbation during this admission. Advair was started given her smoking history and possible COPD component. Albuterol was prescribed as needed. Pulmonary consulted.     Pt is being transferred to [facility] for bronchoscopy/ EBUS/ and hiliar mass biopsy.     Important Medication Changes and Reason:    Active or Pending Issues Requiring Follow-up:  Follow up with cardiology  Pulmonary  and thoracic surgery as directed.  Follow up with gastroenterology for dysphagia.  Follow up with primary care physician.    Advanced Directives:   [ ] Full code  [ ] DNR  [ ] Hospice    Discharge Diagnoses:  Pericardial effusion  Left hiliar mediastinal mass  Atrial fibrillation  Scleroderma with esophageal dysmotility and constipation  Chronic hypoxic respiratory failure on home oxygen  History of asthma/COPD  Chronic pain         HPI:  64-year-old female past medical history of A-fib, scleroderma, asthma with prior intubations, patient admission for pericardial effusion status post pericardiocentesis with drain on colchicine presents ED complaining of sudden onset left flank pain that woke her from sleep this morning.  Had similar symptoms 1 week ago on right side, now with left flank worse with inspiration with mild shortness of breath.  Took inhaler with no relief prompting visit to ED.  Denies fever chills nausea vomiting diarrhea abdominal pain dysuria hematuria.  Patient on 3 L nasal cannula baseline at home.  Uncomfortable with laying flat. (13 Feb 2025 18:20)    Hospital Course:  The patient was admitted and underwent extensive evaluation for her chest pain and dyspnea.    Pericardial Effusion: Repeat TTE showed an interval increase in the size of her pericardial effusion, now moderate-large, adjacent to the right atrium. Notably, there was no evidence of tamponade. Thoracic surgery was consulted, and a pericardial window was considered but deemed not necessary at this time. Close outpatient follow-up with cardiology and thoracic surgery was arranged.  Pleuritic Chest Pain: Differential diagnosis included pulmonary embolism, pneumonia/pleuritis, musculoskeletal pain, and exacerbation of prior pericarditis. CT chest was performed. D-dimer was elevated.  CT Chest showed Left suprahilar mediastinal mass in the prevascular space, extending into the aortopulmonary window and mediastinal adenopathy,   and a  new peripheral ill-defined 2.4 x 2.2 cm opacity in the left upper lobe, differential includes infection/inflammation/neoplasm and Dilated ascending aorta measuring up to 4 cm,   No DVT in either lower extremity  Atrial Fibrillation: Patient's atrial fibrillation remained stable throughout her admission. Eliquis was temporarily held due to consideration of a pericardial window and resumed once Thoracic  surgery planned for  OP follow up    Toprol dose was adjusted due to bradycardia.  Dysphagia: Evaluated for dysphagia, attributed to scleroderma-related esophageal dysmotility.  Recommendations were made for outpatient GI follow-up. Patient was started on a high-dose PPI for presumed GERD exacerbating her dysmotility.  Constipation: Addressed with Miralax.  Asthma/COPD: Patient did not experience an acute exacerbation during this admission. Advair was started given her smoking history and possible COPD component. Albuterol was prescribed as needed. Pulmonary consulted.     Pt is being transferred to [facility] for bronchoscopy/ EBUS/ and hiliar mass biopsy.     Important Medication Changes and Reason:    Active or Pending Issues Requiring Follow-up:  Follow up with cardiology  Pulmonary  and thoracic surgery as directed.  Follow up with gastroenterology for dysphagia.  Follow up with primary care physician.    Advanced Directives:   [ x] Full code  [ ] DNR  [ ] Hospice    Discharge Diagnoses:  Pericardial effusion  Left hiliar mediastinal mass  Atrial fibrillation  Scleroderma with esophageal dysmotility and constipation  Chronic hypoxic respiratory failure on home oxygen  History of asthma/COPD  Chronic pain

## 2025-02-16 NOTE — PROGRESS NOTE ADULT - ASSESSMENT
64-year-old female past medical history of A-fib, scleroderma, asthma with prior intubations, patient admission for pericardial effusion status post pericardiocentesis with drain on colchicine presents ED complaining of sudden onset left flank pain that woke her from sleep this morning.  Had similar symptoms 1 week ago on right side, now with left flank worse with inspiration with mild shortness of breath.  Took inhaler with no relief prompting visit to ED.  Denies fever chills nausea vomiting diarrhea abdominal pain dysuria hematuria.  Patient on 3 L nasal cannula baseline at home.  Uncomfortable with laying flat.     Chest Pain  - p/w left sided chest pain radiating to the back, non-exertional and sometimes worse with inspiration  - Had admission 1 month ago for pericardial effusion s/p drain on home maintenance with colchicine  - ECG NSR  - Trop and BNP wnl  - CXR wnl  - Prior TTE showed preserved EF, no WMA, small pericardial effusion; Repeat pending  - ESR, CRP elevated  - D-dimer elevated.   - Possible ischemic eval with NST pending the above workup.     Pericardial Effusion  - s/p drain with 650ml removed last admission in December 2024  - OP Cards did TTE which shows small increase from prior  - Repeat TTE - effusion is bigger  - CT chest done  - ct surg consult following    Atrial Fibrillation  - c/w Toprol 100mg PO daily  - hold eliquis Eliquis 5mg PO BID for poss surgery  - Will monitor HR closely. Currently karen in 50s. May need dose adjustment of Toprol.  - Monitor on tele.    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

## 2025-02-16 NOTE — DISCHARGE NOTE PROVIDER - NSDCCPCAREPLAN_GEN_ALL_CORE_FT
PRINCIPAL DISCHARGE DIAGNOSIS  Diagnosis: Pleuritic chest pain  Assessment and Plan of Treatment: you were evaluated by pulmonology and cardiology  you had normal ECG and work up was negtive for coronary event        SECONDARY DISCHARGE DIAGNOSES  Diagnosis: Pericardial effusion  Assessment and Plan of Treatment: Repeat TTE showed an interval increase in the size of her pericardial effusion, now moderate-large, adjacent to the right atrium. Notably, there was no evidence of tamponade. Thoracic surgery was consulted, and a pericardial window was considered but deemed not necessary at this time. Close outpatient follow-up with cardiology and thoracic surgery has been arranged.    Diagnosis: Dysphagia  Assessment and Plan of Treatment: Dysphagia  attributed to scleroderma-related esophageal dysmotility.  follow up outpatient for GI follow-up.   you were started on a high-dose PPI for presumed GERD exacerbating her dysmotility.    Diagnosis: Chronic pain  Assessment and Plan of Treatment: you were evaluated by pain management  Continue home regimen:  MS Contin 30 mg Q 12 hours.  Oxy IR 20 mg TID PRN.  Baclofen 5 mg Q 8 hours PRN.  Follow up with pain specialist     PRINCIPAL DISCHARGE DIAGNOSIS  Diagnosis: Pleuritic chest pain  Assessment and Plan of Treatment: you were evaluated by pulmonology and cardiology  you had normal ECG and work up was negtive for coronary event        SECONDARY DISCHARGE DIAGNOSES  Diagnosis: Pericardial effusion  Assessment and Plan of Treatment: Repeat TTE showed an interval increase in the size of her pericardial effusion, now moderate-large, adjacent to the right atrium. Notably, there was no evidence of tamponade. Thoracic surgery was consulted, and a pericardial window was considered but deemed not necessary at this time. Close outpatient follow-up with cardiology and thoracic surgery has been arranged.    Diagnosis: Dysphagia  Assessment and Plan of Treatment: Dysphagia  attributed to scleroderma-related esophageal dysmotility.  follow up outpatient for GI follow-up.   you were started on a high-dose PPI for presumed GERD exacerbating her dysmotility.    Diagnosis: Chronic pain  Assessment and Plan of Treatment: you were evaluated by pain management  Continue home regimen:  MS Contin 30 mg Q 12 hours.  Oxy IR 20 mg TID PRN.  Baclofen 5 mg Q 8 hours PRN.  Follow up with pain specialist    Diagnosis: Mass of upper lobe of left lung  Assessment and Plan of Treatment: CT  scan with  new peripheral ill-defined 2.4 x 2.2 cm opacity in the left upper lobe, differential includes infection/inflammation/neoplasm    No clinical s/s of infection   Please follow up with Outpatient   Pulmonologist  for a referral for    PET scan     PRINCIPAL DISCHARGE DIAGNOSIS  Diagnosis: Pleuritic chest pain  Assessment and Plan of Treatment: you were evaluated by pulmonology and cardiology  you had normal ECG and work up was negtive for coronary event        SECONDARY DISCHARGE DIAGNOSES  Diagnosis: Pericardial effusion  Assessment and Plan of Treatment: Repeat TTE showed an interval increase in the size of her pericardial effusion, now moderate-large, adjacent to the right atrium. Notably, there was no evidence of tamponade. Thoracic surgery was consulted, and a pericardial window was considered but deemed not necessary at this time. Close outpatient follow-up with cardiology and thoracic surgery has been arranged.    Diagnosis: Dysphagia  Assessment and Plan of Treatment: Dysphagia  attributed to scleroderma-related esophageal dysmotility.  follow up outpatient for GI follow-up.   you were started on a high-dose PPI for presumed GERD exacerbating her dysmotility.    Diagnosis: Chronic pain  Assessment and Plan of Treatment: you were evaluated by pain management  Continue home regimen:  MS Contin 30 mg Q 12 hours.  Oxy IR 20 mg TID PRN.  Baclofen 5 mg Q 8 hours PRN.  Follow up with pain specialist    Diagnosis: Mass of upper lobe of left lung  Assessment and Plan of Treatment: CT  scan with  new peripheral ill-defined 2.4 x 2.2 cm opacity in the left upper lobe, differential includes infection/inflammation/neoplasm    No clinical s/s of infection   Pt is being transferred  to [facility] for EBUS

## 2025-02-16 NOTE — PROGRESS NOTE ADULT - ASSESSMENT
64F with history of scleroderma, afib on Eliquis, asthma with prior intubations, and pericardial effusion on colchicine outpatient admitted for pleuritic CP/SOB. She was previously admitted in December and underwent pericardiocentesis 12/20/24 for 650mL bloody output. Pericardial drain removed 12/26. Repeat TTE obtained today demonstrating interval increase in pericardial effusion, now moderate-large adjacent to RA, no evidence of tamponade. Thoracic surgery consulted for possible pericardial window. Patient continues to note CP worse with breathing but states it is improved since she was admitted, on her home 3L NC.     2/15       vss   SOB   No  CP,  + eliquis  2/16  Dr Núñez reviewd inages,  small pericardial effusion slightly increaSEd in size no tamponade

## 2025-02-16 NOTE — PROGRESS NOTE ADULT - SUBJECTIVE AND OBJECTIVE BOX
DATE OF SERVICE: 02-16-25 @ 10:56    Patient is a 64y old  Female who presents with a chief complaint of chest pain (15 Feb 2025 11:40)      SUBJECTIVE / OVERNIGHT EVENTS:  no chest pain, sob unchanged. on 3 l NC. wants to go home before procedure.    MEDICATIONS  (STANDING):  colchicine 0.6 milliGRAM(s) Oral daily  cyanocobalamin Injectable 1000 MICROGram(s) IntraMuscular daily  levothyroxine 112 MICROGram(s) Oral daily  metoprolol succinate  milliGRAM(s) Oral daily  morphine ER Tablet 30 milliGRAM(s) Oral every 12 hours  nicotine - 21 mG/24Hr(s) Patch 1 Patch Transdermal daily  pantoprazole   Suspension 40 milliGRAM(s) Oral two times a day    MEDICATIONS  (PRN):  albuterol    90 MICROgram(s) HFA Inhaler 2 Puff(s) Inhalation every 6 hours PRN Shortness of Breath and/or Wheezing  baclofen 5 milliGRAM(s) Oral every 8 hours PRN Musculoskeletal Pain  oxyCODONE    IR 20 milliGRAM(s) Oral every 8 hours PRN for severe pain      Vital Signs Last 24 Hrs  T(C): 36.6 (16 Feb 2025 06:14), Max: 36.6 (16 Feb 2025 06:14)  T(F): 97.9 (16 Feb 2025 06:14), Max: 97.9 (16 Feb 2025 06:14)  HR: 61 (16 Feb 2025 06:14) (58 - 68)  BP: 109/66 (16 Feb 2025 06:14) (105/64 - 116/75)  BP(mean): --  RR: 18 (16 Feb 2025 06:14) (18 - 18)  SpO2: 99% (16 Feb 2025 06:14) (94% - 99%)    Parameters below as of 16 Feb 2025 06:14  Patient On (Oxygen Delivery Method): nasal cannula  O2 Flow (L/min): 3    CAPILLARY BLOOD GLUCOSE        I&O's Summary    15 Feb 2025 07:01  -  16 Feb 2025 07:00  --------------------------------------------------------  IN: 720 mL / OUT: 250 mL / NET: 470 mL        PHYSICAL EXAM:  GENERAL: NAD, well-developed  HEAD:  Atraumatic, Normocephalic  EYES: EOMI, PERRLA, conjunctiva and sclera clear  NECK: Supple, No JVD  CHEST/LUNG: Clear to auscultation bilaterally; No wheeze  HEART: Regular rate and rhythm; No murmurs, rubs, or gallops  ABDOMEN: Soft, Nontender, Nondistended; Bowel sounds present  EXTREMITIES:  2+ Peripheral Pulses, No clubbing, cyanosis, or edema  PSYCH: AAOx3  NEUROLOGY: non-focal  SKIN: No rashes or lesions    LABS:      < from: [facility] Duplex Lower Ext Vein Scan, Bilat (02.15.25 @ 10:33) >  FINDINGS:    RIGHT:  Normal compressibility of the RIGHT common femoral, femoral and popliteal   veins.  Doppler examination shows normal spontaneous and phasic flow.  No RIGHT calf vein thrombosis is detected.    LEFT:  Normal compressibility of the LEFT common femoral, femoral and popliteal   veins.  Doppler examination shows normal spontaneous and phasic flow.  No LEFT calf vein thrombosis is detected.    IMPRESSION:  No evidence of deep venous thrombosis in either lower extremity.      < end of copied text >  < from: CT Chest No Cont (02.15.25 @ 18:28) >  FINDINGS:    LUNGS :There is a peripheral ill-defined and rounded opacity measuring   2.4 x 2.2 cm in the left upper lobe, which was notseen previously.   Atelectasis is seen in both lower lobes and there is a small left   effusion.  VESSELS: The ascending aorta measures up to 4 cm, similar.  HEART: The heart is normal in size. There are calcifications of the   mitral valve annulus,,and the aortic annulus. There is a small   circumferential pericardial effusion, which is larger on today's study.  MEDIASTINUM AND JEREMIAS: Anterior subcarinal lymph node measures 1.8 cm in   short axis, stable. There is a soft tissue density, measuring up to 4.2   cm in the prevascular space extending into the subcutaneous left main   pulmonary artery region, this is more pronounced on today's study. CHEST   WALL AND LOWER NECK: Within normal limits.  VISUALIZED UPPER ABDOMEN: Cholecystectomy.  BONES: Degenerative changes.    IMPRESSION:    Small circumferential pericardial effusion, mildly increased on today's   study when compared to the prior.    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    There is a new peripheral ill-defined 2.4 x 2.2 cm opacity in the left   upper lobe, differential includes infection/inflammation/neoplasm.    Dilated ascending aorta measuring up to 4 cm, similar to the prior study.    Consider contrast-enhanced CT of the chest if patient is able to tolerate   intravenous contrast.            --- End of Report ---      < end of copied text >                RADIOLOGY & ADDITIONAL TESTS:    Imaging Personally Reviewed:    Consultant(s) Notes Reviewed:      Care Discussed with Consultants/Other Providers:

## 2025-02-16 NOTE — DISCHARGE NOTE PROVIDER - NSDCMRMEDTOKEN_GEN_ALL_CORE_FT
apixaban 5 mg oral tablet: 1 tab(s) orally every 12 hours  baclofen 10 mg oral tablet: 1 tab(s) orally 2 times a day  colchicine 0.6 mg oral tablet: 1 tab(s) orally once a day  hydrocortisone 2.5% rectal cream with applicator: insert rectally as needed  metoprolol succinate 100 mg oral tablet, extended release: 1 tab(s) orally once a day  morphine 30 mg/8 to 12 hr oral tablet, extended release: 1 tab(s) orally 2 times a day  nicotine 21 mg/24 hr transdermal film, extended release: 1 patch transdermal once a day  OTC Zyrtec 10mg Tablet: 1 tablet orally once a day  oxyCODONE 20 mg oral tablet: 1 tab(s) orally every 8 hours as needed for  severe pain  Synthroid 112 mcg (0.112 mg) oral tablet: 1 tab(s) orally once a day  Ventolin 90 mcg/inh inhalation aerosol: 2 puff(s) inhaled every 6 hours, As Needed   albuterol 90 mcg/inh inhalation aerosol: 2 puff(s) inhaled every 6 hours As needed Shortness of Breath and/or Wheezing  baclofen 5 mg oral tablet: 1 tab(s) orally every 8 hours As needed Musculoskeletal Pain  colchicine 0.6 mg oral tablet: 1 tab(s) orally once a day  cyanocobalamin: 1,000 milligram(s) intramuscular once a day for 4 more days  fluticasone-salmeterol: 1 inhaler(s) inhaled 2 times a day  levothyroxine 112 mcg (0.112 mg) oral tablet: 1 tab(s) orally once a day  lidocaine 4% topical film: Apply topically to affected area once a day  metoprolol succinate 100 mg oral tablet, extended release: 1 tab(s) orally once a day  morphine 30 mg/8 to 12 hr oral tablet, extended release: 1 tab(s) orally every 12 hours  naloxegol 25 mg oral tablet: 1 tab(s) orally once a day  nicotine 21 mg/24 hr transdermal film, extended release: 1 patch transdermal once a day  oxyCODONE 20 mg oral tablet: 1 tab(s) orally every 8 hours As needed for severe pain  pantoprazole 40 mg oral delayed release tablet: 1 tab(s) orally every 12 hours  traMADol 50 mg oral tablet: 0.5 tab(s) orally 3 times a day   acetaminophen 325 mg oral tablet: 2 tab(s) orally every 6 hours As needed Temp greater or equal to 38C (100.4F), Mild Pain (1 - 3)  albuterol 90 mcg/inh inhalation aerosol: 2 puff(s) inhaled every 6 hours As needed Shortness of Breath and/or Wheezing  apixaban 5 mg oral tablet: 1 tab(s) orally every 12 hours  baclofen 5 mg oral tablet: 1 tab(s) orally every 8 hours As needed Musculoskeletal Pain  colchicine 0.6 mg oral tablet: 1 tab(s) orally once a day  cyanocobalamin: 1,000 milligram(s) intramuscular once a day for 4 more days  fluticasone-salmeterol: 1 inhaler(s) inhaled 2 times a day  levothyroxine 112 mcg (0.112 mg) oral tablet: 1 tab(s) orally once a day  lidocaine 4% topical film: Apply topically to affected area once a day  morphine 30 mg/8 to 12 hr oral tablet, extended release: 1 tab(s) orally every 12 hours  naloxegol 25 mg oral tablet: 1 tab(s) orally once a day  nicotine 21 mg/24 hr transdermal film, extended release: 1 patch transdermal once a day  oxyCODONE 20 mg oral tablet: 1 tab(s) orally every 8 hours As needed for severe pain  pantoprazole 40 mg oral delayed release tablet: 1 tab(s) orally every 12 hours  traMADol 50 mg oral tablet: 0.5 tab(s) orally 3 times a day

## 2025-02-16 NOTE — DISCHARGE NOTE PROVIDER - NSDCFUADDAPPT_GEN_ALL_CORE_FT
APPTS ARE READY TO BE MADE: [X] YES    Best Family or Patient Contact (if needed):    Additional Information about above appointments (if needed):    1: Please follow up with Pulmonologist : for a   referral  for   Pet scan :    you were found to have   a new  peripheral ill-defined 2.4 x 2.2 cm opacity in the left upper lobe, concerning for inflammation/neoplasm  2:Please follow up with DR Núñez Thoracic  surgeon   in 1 month       3:     Other comments or requests:    APPTS ARE READY TO BE MADE: [X] YES    Best Family or Patient Contact (if needed):    Additional Information about above appointments (if needed):    1: Please follow up with Pulmonologist : for a   referral  for   Pet scan :    you were found to have   a new  peripheral ill-defined 2.4 x 2.2 cm opacity in the left upper lobe, concerning for inflammation/neoplasm  2:Please follow up with DR Núñez Thoracic  surgeon   in 1 month       3: [facility] in 1 week for management of care.     Other comments or requests:    APPTS ARE READY TO BE MADE: [X] YES    Best Family or Patient Contact (if needed):    Additional Information about above appointments (if needed):    1:  Please follow up with  DR Kaufman   for  further management  and plan  for your mediastinal mass    2:Please follow up with DR Núñez Thoracic  surgeon   in 1 month       3: [facility] in 1 week for management of care.     Other comments or requests:    APPTS ARE READY TO BE MADE: [X] YES    Best Family or Patient Contact (if needed):    Additional Information about above appointments (if needed):    1:  Please follow up with  DR Kaufman   for  further management  and plan regarding   your mediastinal mass    2:Please follow up with DR Núñez Thoracic  surgeon   in 1 month       3: [facility] in 1 week for management of care.     Other comments or requests:    APPTS ARE READY TO BE MADE: [X] YES    Best Family or Patient Contact (if needed):    Additional Information about above appointments (if needed):    1:  Please follow up with  DR Kaufman   for  further management  and plan regarding   your mediastinal mass    2:Please follow up with DR Núñez Thoracic  surgeon   in 1 month       3: [facility] in 1 week for management of care.     Other comments or requests:       Patient was outreached but did not answer. A voicemail was left for the patient to return our call.

## 2025-02-16 NOTE — PROGRESS NOTE ADULT - SUBJECTIVE AND OBJECTIVE BOX
VITAL SIGNS      Vital Signs Last 24 Hrs  T(C): 36.9 (02-16-25 @ 12:09), Max: 36.9 (02-16-25 @ 12:09)  T(F): 98.5 (02-16-25 @ 12:09), Max: 98.5 (02-16-25 @ 12:09)  HR: 63 (02-16-25 @ 12:09) (60 - 68)  BP: 122/78 (02-16-25 @ 12:09) (105/64 - 122/78)  RR: 18 (02-16-25 @ 12:09) (18 - 18)  SpO2: 92% (02-16-25 @ 12:09) (92% - 99%)                   Daily     Daily         CAPILLARY BLOOD GLUCOSE                              PHYSICAL EXAM  s  Occasional SOB"  Neurology: alert and oriented x 3, moves all extremities with no defecits  CV :  RRR  Lungs:   CTA B/L  Abdomen: soft, nontender, nondistended, positive bowel sounds,  Extremities:     no edema

## 2025-02-16 NOTE — PROGRESS NOTE ADULT - SUBJECTIVE AND OBJECTIVE BOX
Cardiology Attending Follow-up Note     Patient seen and examined at bedside.    Overnight Events:     no events   seen by TC no intervention   on 3L nc     REVIEW OF SYSTEMS:  Constitutional:     [x ] negative [ ] fevers [ ] chills [ ] weight loss [ ] weight gain  HEENT:                  [x ] negative [ ] dry eyes [ ] eye irritation [ ] postnasal drip [ ] nasal congestion  CV:                         [ x] negative  [ ] chest pain [ ] orthopnea [ ] palpitations [ ] murmur  Resp:                     [ x] negative [ ] cough [ ] shortness of breath [ ] dyspnea [ ] wheezing [ ] sputum [ ]hemoptysis  GI:                          [ x] negative [ ] nausea [ ] vomiting [ ] diarrhea [ ] constipation [ ] abd pain [ ] dysphagia   :                        [ x] negative [ ] dysuria [ ] nocturia [ ] hematuria [ ] increased urinary frequency  Musculoskeletal: [ x] negative [ ] back pain [ ] myalgias [ ] arthralgias [ ] fracture  Skin:                       [ x] negative [ ] rash [ ] itch  Neurological:        [x ] negative [ ] headache [ ] dizziness [ ] syncope [ ] weakness [ ] numbness  Psychiatric:           [ x] negative [ ] anxiety [ ] depression  Endocrine:            [ x] negative [ ] diabetes [ ] thyroid problem  Heme/Lymph:      [ x] negative [ ] anemia [ ] bleeding problem  Allergic/Immune: [ x] negative [ ] itchy eyes [ ] nasal discharge [ ] hives [ ] angioedema    [ x] All other systems negative  [ ] Unable to assess ROS due to    Current Meds:  albuterol    90 MICROgram(s) HFA Inhaler 2 Puff(s) Inhalation every 6 hours PRN  apixaban 5 milliGRAM(s) Oral every 12 hours  baclofen 5 milliGRAM(s) Oral every 8 hours PRN  colchicine 0.6 milliGRAM(s) Oral daily  cyanocobalamin Injectable 1000 MICROGram(s) IntraMuscular daily  fluticasone propionate/ salmeterol 250-50 MICROgram(s) Diskus 1 Dose(s) Inhalation two times a day  levothyroxine 112 MICROGram(s) Oral daily  metoprolol succinate  milliGRAM(s) Oral daily  morphine ER Tablet 30 milliGRAM(s) Oral every 12 hours  nicotine - 21 mG/24Hr(s) Patch 1 Patch Transdermal daily  oxyCODONE    IR 20 milliGRAM(s) Oral every 8 hours PRN  pantoprazole    Tablet 40 milliGRAM(s) Oral every 12 hours      PAST MEDICAL & SURGICAL HISTORY:  Scleroderma      Asthma      High cholesterol  Unable to take STATINS for high cholesterol due to genetic disposition      Scleroderma      Shingles      Hypothyroidism      GERD (gastroesophageal reflux disease)      Smoker      Multiple drug allergies      S/P small bowel resection      History of myomectomy      History of ovarian cystectomy      Fibroid      Bowel obstruction  multiple surgeries for bowel obstruction      H/O ovarian cystectomy      S/P pericardiocentesis          Vitals:  T(F): 97.9 (02-16), Max: 98.5 (02-16)  HR: 60 (02-16) (60 - 68)  BP: 126/66 (02-16) (105/64 - 126/66)  RR: 18 (02-16)  SpO2: 94% (02-16)  I&O's Summary    15 Feb 2025 07:01  -  16 Feb 2025 07:00  --------------------------------------------------------  IN: 720 mL / OUT: 250 mL / NET: 470 mL    16 Feb 2025 07:01  -  16 Feb 2025 22:00  --------------------------------------------------------  IN: 350 mL / OUT: 500 mL / NET: -150 mL        Physical Exam:  Appearance: No acute distress  HENT: No JVD   Cardiovascular: RRR, S1/S2, no murmurs  Respiratory: CTABL  Gastrointestinal: soft, NT ND, +BS  Musculoskeletal: No clubbing, no edema   Neurologic: Non-focal  Skin: No rashes, ecchymoses, or cyanosis                          Cardiovascular Testings:

## 2025-02-16 NOTE — DISCHARGE NOTE PROVIDER - CARE PROVIDER_API CALL
Yuan Núñez  Thoracic Surgery  [address] [address] [place] [address], [address] [place] [address]  Phone: [phone]  Fax: [phone]  Follow Up Time: 1 week    Ebony Suárez  Internal Medicine  [address] [place] [address]  Phone: [phone]  Fax: [phone]  Follow Up Time: 2 weeks    Guru Crowe  Pain Medicine  [address] [place] [address] [address]  [place] [address]  Phone: [phone]  Fax: [phone]  Follow Up Time: 2 weeks   Yuan Núñez  Thoracic Surgery  [address] [address] [place] [address], [address] [place] [address]  Phone: [phone]  Fax: [phone]  Follow Up Time: 1 month    Ebony Suárez  Internal Medicine  [address] [place] [address]  Phone: [phone]  Fax: [phone]  Follow Up Time: 2 weeks    Guru Crowe  Pain Medicine  [address] [place] [address] [address]  [place] [address]  Phone: [phone]  Fax: [phone]  Follow Up Time: 2 weeks   Yuan Núñez  Thoracic Surgery  [address] [place] [address], [address] [place] [address]  Phone: [phone]  Fax: [phone]  Follow Up Time: 1 month    Ebony Suárez  Internal Medicine  [address] [place] [address]  Phone: [phone]  Fax: [phone]  Follow Up Time: 2 weeks    Guru Crowe  Pain Medicine  [address] [place] [address], [address]  [place] [address]  Phone: [phone]  Fax: [phone]  Follow Up Time: 2 weeks    Calin Kaufman  Pulmonary Disease  [address] [place] [address]  Phone: [phone]  Fax: [phone]  Follow Up Time:

## 2025-02-16 NOTE — PROGRESS NOTE ADULT - ASSESSMENT
64-year-old female past medical history of A-fib, scleroderma, asthma with prior intubations, patient admission for pericardial effusion status post pericardiocentesis with drain on colchicine presents ED complaining of sudden onset left flank pain that woke her from sleep this morning.  Had similar symptoms 1 week ago on right side, now with left flank worse with inspiration with mild shortness of breath.    Denies fever chills nausea vomiting diarrhea abdominal pain dysuria hematuria.  Patient on 3 L nasal cannula baseline at home.     Problem/Plan #1: Chest Pain  - p/w left sided chest pain radiating to the back, non-exertional and sometimes worse with inspiration  - Had admission 1 month ago for pericardial effusion s/p drain on home maintenance with colchicine  - ECG NSR  - Trop and BNP wnl  - CXR wnl  - Prior TTE showed preserved EF, no WMA, small pericardial effusion; Repeat pending  - ESR, CRP elevated  - D-dimer elevated, PE less likely as on Eliquis/full dose AC  - Possible ischemic eval with NST pending the above workup, can be done as OP     Problem/Plan #2: Pericardial Effusion  - ? 2/2 scleroderma   - s/p drain with 650ml removed last admission in December 2024  - OP Cards did TTE which shows small increase from prior  - Repeat TTE with recurrent large effusion, CT w/ small circumferential effusion, seen by TC no acute intervention   - ESR, CRP elevated. Continue colchicine.    Problem/Plan #3: Atrial Fibrillation  - c/w Toprol 100mg PO daily  - c/w Eliquis 5mg PO BID  - Will monitor HR closely.   -HR in the 60s     Problem/Plan #4: Asthma  - Reports lifelong hx  - c/w advair and ventolin    Covering for Dr. Dax Steward MD [facility]  Attending Cardiologist, Barbie-NS/EDITH.   [employer] on [employer] Team

## 2025-02-16 NOTE — PROGRESS NOTE ADULT - ASSESSMENT
64 year old female with scleroderma presenting with chest pain, GI consulted for dysphagia    1. Dysphagia.  pt declining workup at this time  continue PPI BID    2. Scleroderma    3. Constipation  also related to scleroderma  can given miralax 34 BID

## 2025-02-16 NOTE — DISCHARGE NOTE PROVIDER - PROVIDER TOKENS
PROVIDER:[[id number]],FOLLOWUP:[1 week]],PROVIDER:[TOKEN:[[id number]],FOLLOWUP:[2 weeks]],PROVIDER:[TOKEN:[[id number]],FOLLOWUP:[2 weeks]] PROVIDER:[TOKEN:[[id number]],FOLLOWUP:[1 month]],PROVIDER:[TOKEN:[[id number]],FOLLOWUP:[2 weeks]],PROVIDER:[TOKEN:[[id number]],FOLLOWUP:[2 weeks]] PROVIDER:[TOKEN:[[id number]],FOLLOWUP:[1 month]],PROVIDER:[TOKEN:[[id number]],FOLLOWUP:[2 weeks]],PROVIDER:[TOKEN:[[id number]],FOLLOWUP:[2 weeks]],PROVIDER:[TOKEN:[[id number]]]

## 2025-02-16 NOTE — CHART NOTE - NSCHARTNOTEFT_GEN_A_CORE
Ct scan of chest with There is a new peripheral ill-defined 2.4 x 2.2 cm opacity in the left upper lobe, differential includes infection/inflammation/neoplasm , reviewed with DR Paradise [place], recommended no antibiotics , Pt with no clinical s/s of active Infection,  advises  Pt to follow up with her Pulmonologist OP for PET scan  .  Pt could not be discharged today ,  resumption of HHA and possible change in O2 system  please follow up in JESSICA Elam NP-C   [id number] Ct scan of chest with  a new peripheral ill-defined 2.4 x 2.2 cm opacity in the left upper lobe, differential includes infection/inflammation/neoplasm , reviewed with DR Paradise [place], recommended no antibiotics , Pt with no clinical s/s of active Infection,  advises  Pt to follow up with her Pulmonologist OP for PET scan  .  Pt could not be discharged today ,  resumption of HHA and possible change in O2 system  please follow up in AM    Taurus Elam NP-C   [id number]

## 2025-02-17 PROCEDURE — 99232 SBSQ HOSP IP/OBS MODERATE 35: CPT

## 2025-02-17 RX ORDER — LIDOCAINE HYDROCHLORIDE 20 MG/ML
1 JELLY TOPICAL DAILY
Refills: 0 | Status: DISCONTINUED | OUTPATIENT
Start: 2025-02-17 | End: 2025-02-20

## 2025-02-17 RX ORDER — TRAMADOL HYDROCHLORIDE 50 MG/1
25 TABLET, FILM COATED ORAL THREE TIMES A DAY
Refills: 0 | Status: DISCONTINUED | OUTPATIENT
Start: 2025-02-17 | End: 2025-02-20

## 2025-02-17 RX ORDER — METOPROLOL SUCCINATE 50 MG/1
100 TABLET, EXTENDED RELEASE ORAL DAILY
Refills: 0 | Status: DISCONTINUED | OUTPATIENT
Start: 2025-02-17 | End: 2025-02-20

## 2025-02-17 RX ADMIN — Medication 40 MILLIGRAM(S): at 17:35

## 2025-02-17 RX ADMIN — OXYCODONE HYDROCHLORIDE 20 MILLIGRAM(S): 30 TABLET ORAL at 11:45

## 2025-02-17 RX ADMIN — APIXABAN 5 MILLIGRAM(S): 2.5 TABLET, FILM COATED ORAL at 09:27

## 2025-02-17 RX ADMIN — Medication 30 MILLIGRAM(S): at 17:35

## 2025-02-17 RX ADMIN — Medication 112 MICROGRAM(S): at 06:30

## 2025-02-17 RX ADMIN — Medication 40 MILLIGRAM(S): at 06:30

## 2025-02-17 RX ADMIN — LIDOCAINE HYDROCHLORIDE 1 PATCH: 20 JELLY TOPICAL at 17:36

## 2025-02-17 RX ADMIN — OXYCODONE HYDROCHLORIDE 20 MILLIGRAM(S): 30 TABLET ORAL at 22:09

## 2025-02-17 RX ADMIN — COLCHICINE 0.6 MILLIGRAM(S): 0.6 TABLET, FILM COATED ORAL at 09:27

## 2025-02-17 RX ADMIN — Medication 30 MILLIGRAM(S): at 06:30

## 2025-02-17 RX ADMIN — NICOTINE POLACRILEX 1 PATCH: 4 GUM, CHEWING ORAL at 09:00

## 2025-02-17 RX ADMIN — LIDOCAINE HYDROCHLORIDE 1 PATCH: 20 JELLY TOPICAL at 19:00

## 2025-02-17 RX ADMIN — OXYCODONE HYDROCHLORIDE 20 MILLIGRAM(S): 30 TABLET ORAL at 00:34

## 2025-02-17 RX ADMIN — NICOTINE POLACRILEX 1 PATCH: 4 GUM, CHEWING ORAL at 19:00

## 2025-02-17 RX ADMIN — APIXABAN 5 MILLIGRAM(S): 2.5 TABLET, FILM COATED ORAL at 22:09

## 2025-02-17 RX ADMIN — Medication 30 MILLIGRAM(S): at 18:05

## 2025-02-17 RX ADMIN — NICOTINE POLACRILEX 1 PATCH: 4 GUM, CHEWING ORAL at 09:29

## 2025-02-17 RX ADMIN — CYANOCOBALAMIN 1000 MICROGRAM(S): 1000 INJECTION INTRAMUSCULAR; SUBCUTANEOUS at 09:28

## 2025-02-17 RX ADMIN — OXYCODONE HYDROCHLORIDE 20 MILLIGRAM(S): 30 TABLET ORAL at 23:10

## 2025-02-17 RX ADMIN — OXYCODONE HYDROCHLORIDE 20 MILLIGRAM(S): 30 TABLET ORAL at 12:15

## 2025-02-17 NOTE — PROGRESS NOTE ADULT - ASSESSMENT
64 year old female with scleroderma presenting with chest pain, GI consulted for dysphagia    1. Dysphagia.  pt declining workup at this time (egd or esophagram)  continue PPI BID    2. Scleroderma    3. Constipation  also related to scleroderma  can given miralax 34 BID

## 2025-02-17 NOTE — PROGRESS NOTE ADULT - SUBJECTIVE AND OBJECTIVE BOX
DATE OF SERVICE: 02-17-25      Patient is a 64y old  Female who presents with a chief complaint of chest pain (17 Feb 2025 18:56)      INTERVAL HISTORY: feels ok    TELEMETRY Personally reviewed: no events  	  MEDICATIONS:  metoprolol succinate  milliGRAM(s) Oral daily        PHYSICAL EXAM:  T(C): 36.7 (02-17-25 @ 20:13), Max: 36.7 (02-17-25 @ 20:13)  HR: 63 (02-17-25 @ 20:13) (54 - 63)  BP: 116/68 (02-17-25 @ 20:13) (97/56 - 116/68)  RR: 18 (02-17-25 @ 20:13) (18 - 18)  SpO2: 95% (02-17-25 @ 20:13) (95% - 99%)  Wt(kg): --  I&O's Summary    16 Feb 2025 07:01  -  17 Feb 2025 07:00  --------------------------------------------------------  IN: 350 mL / OUT: 500 mL / NET: -150 mL    17 Feb 2025 07:01  -  17 Feb 2025 21:28  --------------------------------------------------------  IN: 180 mL / OUT: 200 mL / NET: -20 mL          Appearance: In no distress	  HEENT:    PERRL, EOMI	  Cardiovascular:  S1 S2, No JVD  Respiratory: Lungs clear to auscultation	  Gastrointestinal:  Soft, Non-tender, + BS	  Vascularature:  No edema of LE  Psychiatric: Appropriate affect   Neuro: no acute focal deficits           Labs personally reviewed      Assessment and Plan:      64-year-old female past medical history of A-fib, scleroderma, asthma with prior intubations, patient admission for pericardial effusion status post pericardiocentesis with drain on colchicine presents ED complaining of sudden onset left flank pain that woke her from sleep this morning.  Had similar symptoms 1 week ago on right side, now with left flank worse with inspiration with mild shortness of breath.    Denies fever chills nausea vomiting diarrhea abdominal pain dysuria hematuria.  Patient on 3 L nasal cannula baseline at home.     Problem/Plan #1: Chest Pain  - p/w left sided chest pain radiating to the back, non-exertional and sometimes worse with inspiration  - Had admission 1 month ago for pericardial effusion s/p drain on home maintenance with colchicine  - ECG NSR  - Trop and BNP wnl  - CXR wnl  - Prior TTE showed preserved EF, no WMA, small pericardial effusion  - ESR, CRP elevated  - D-dimer elevated, PE less likely as on Eliquis/full dose AC  - Possible ischemic eval with NST pending the above workup, can be done as OP     Problem/Plan #2: Pericardial Effusion  - ? 2/2 scleroderma   - s/p drain with 650ml removed last admission in December 2024  - OP Cards did TTE which shows small increase from prior  - Repeat TTE with recurrent large effusion, CT w/ small circumferential effusion, seen by TC no acute intervention   - ESR, CRP elevated. Continue colchicine.    Problem/Plan #3: Atrial Fibrillation  - c/w Toprol 100mg PO daily  - c/w Eliquis 5mg PO BID  - Will monitor HR closely.   -HR in the 60s     Problem/Plan #4: Asthma  - Reports lifelong hx  - c/w advair and giorgio Verduzco,  [facility]  Cardiovascular Medicine  [address] [place] [address], [address]  Office: [phone]  Available via Text/call on [employer] Teams

## 2025-02-17 NOTE — PROGRESS NOTE ADULT - ASSESSMENT
64F with history of scleroderma, afib on Eliquis, asthma with prior intubations, and pericardial effusion on colchicine outpatient admitted for pleuritic CP/SOB. She was previously admitted in December and underwent pericardiocentesis 12/20/24 for 650mL bloody output. Pericardial drain removed 12/26. Repeat TTE obtained today demonstrating interval increase in pericardial effusion, now moderate-large adjacent to RA, no evidence of tamponade. Thoracic surgery consulted for possible pericardial window. Patient continues to note CP worse with breathing but states it is improved since she was admitted, on her home 3L NC.     2/15       vss   SOB   No  CP,  + eliquis  2/16  Dr Núñez reviewd inages,  small pericardial effusion slightly increaSEd in size no tamponade  2/17     vss   Thoracic signed off 64F with history of scleroderma, afib on Eliquis, asthma with prior intubations, and pericardial effusion on colchicine outpatient admitted for pleuritic CP/SOB. She was previously admitted in December and underwent pericardiocentesis 12/20/24 for 650mL bloody output. Pericardial drain removed 12/26. Repeat TTE obtained today demonstrating interval increase in pericardial effusion, now moderate-large adjacent to RA, no evidence of tamponade. Thoracic surgery consulted for possible pericardial window. Patient continues to note CP worse with breathing but states it is improved since she was admitted, on her home 3L NC.     2/15       vss   SOB   No  CP,  + eliquis  2/16  Dr Núñez reviewd inages,  small pericardial effusion slightly increaSEd in size no tamponade  2/17     vss    Chest CT : suggest IR cslt for biopsy of lt lung mass and or subcarinal Lymph node .

## 2025-02-17 NOTE — PROGRESS NOTE ADULT - ASSESSMENT
64-year-old female past medical history of A-fib, scleroderma, asthma with prior intubations, patient admission for pericardial effusion status post pericardiocentesis with drain on colchicine presents ED complaining of sudden onset left flank pain that woke her from sleep this morning.  Had similar symptoms 1 week ago on right side, now with left flank worse with inspiration with mild shortness of breath.  Took inhaler with no relief prompting visit to ED.  Denies fever chills nausea vomiting diarrhea abdominal pain dysuria hematuria.  Patient on 3 L nasal cannula baseline at home.  Uncomfortable with laying flat.     Chest Pain.   - : ptn con' t to complain of Left sided CP, Left sided upper back pain, has a pleuritic component. CT CHEST from 2/15: new DEISI peripheral 2.4 cm x2.2 cm mass, left suprahilar mediastinal mass w mediastinal adenopathy, pericardial effusion, left pleural effusion, dilated ascending aorta 4 cm, this was d/w thoracic, recommended IR consult for biopsy. this was d/w ptn, will cont pain control:  Lidocaine patch, cont Morphine ER, prn oxycodone, prn baclofen,  - Had admission 1 month ago for pericardial effusion s/p drain on home maintenance with colchicine  - ECG NSR  - Trop and BNP wnl  - CXR wnl  - Prior TTE showed preserved EF, no WMA, small pericardial effusion; Repeat pending  - ESR, CRP elevated, h/o scleroderma  - D-dimer elevated.        Pericardial Effusion  - s/p drain with 650ml removed last admission in December 2024  - OP Cards did TTE which shows small increase from prior  - Repeat TTE - effusion is bigger  - CT chest done  - ct surg consult following    Atrial Fibrillation  - c/w Toprol 100mg PO daily  - hold eliquis Eliquis 5mg PO BID for poss surgery  - Will monitor HR closely. Currently karen in 50s. May need dose adjustment of Toprol.  - Monitor on tele.    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

## 2025-02-17 NOTE — CHART NOTE - NSCHARTNOTEFT_GEN_A_CORE
64-year-old female, well known to our service from last admission,  past medical history of A-fib, scleroderma, asthma with prior intubations, patient admission for pericardial effusion status post pericardiocentesis with drain on colchicine presents ED complaining of sudden onset left flank pain that woke her from sleep this morning.  Had similar symptoms 1 week ago on right side, now with left flank worse with inspiration with mild shortness of breath.  Took inhaler with no relief prompting visit to ED. Patient on 3 L nasal cannula baseline at home.  Uncomfortable with laying flat.      Current out- patient pain regimen: morphine ER 30 mg Q 12 hours; Oxy IR 20 mg TID PRN; baclofen 5 mg TID PRN  Out Patient Pain Management provider: Guru Crowe MD  [facility] Prescription Monitoring Program: Reference #[id number]    Current Pain Score: 8/10 down to 0/10.  Pt with chronic pain.    Continue home regimen:  MS Contin 30 mg Q 12 hours.  Oxy IR 20 mg TID PRN.  Baclofen 5 mg Q 8 hours PRN.    Monitor for sedation and respiratory depression.  Bowel regimen.  Incentive spirometer.  OOB/ PT per primary team.    Pending discharge home.  Discharge with a narcan rescue kit (naloxone 4 mg/ 0.1 ml nasal spray- 1 spray Q 2-3 minutes alternating between nostrils).  Follow up with Dr Crowe form continued pain management after discharge.     Signing off.      Chronic Pain Service  [phone]

## 2025-02-17 NOTE — PROGRESS NOTE ADULT - PROBLEM SELECTOR PLAN 1
no surgical intervention,   having pericardial window at this time will not help pts SOB  please have pulmonary follow up as inpt   pt may follow up as oupt in one month with Dr Núñez  thoracic signing off no surgical intervention,   having pericardial window at this time will not help pts SOB  please have pulmonary follow up as inpt   Suggest IR cslt for left lung opacity/ mediastinal subcarinal  Lymph node   pt may follow up as oupt in one month with Dr Núñez  thoracic signing off

## 2025-02-17 NOTE — PROGRESS NOTE ADULT - TIME BILLING
[place]
Medical management as above, review of results/records, discussion with patient and primary team, documentation.
[place]

## 2025-02-17 NOTE — PROGRESS NOTE ADULT - SUBJECTIVE AND OBJECTIVE BOX
VITAL SI    Vital Signs Last 24 Hrs  T(C): 36.5 (02-17-25 @ 12:02), Max: 36.6 (02-16-25 @ 20:52)  T(F): 97.7 (02-17-25 @ 12:02), Max: 97.9 (02-16-25 @ 20:52)  HR: 58 (02-17-25 @ 12:02) (54 - 60)  BP: 99/60 (02-17-25 @ 12:02) (97/56 - 126/66)  RR: 18 (02-17-25 @ 12:02) (18 - 18)  SpO2: 97% (02-17-25 @ 12:02) (94% - 99%)                   Daily     Daily         CAPILLARY BLOOD GLUCOSE                             PHYSICAL EXAM  s  no sob  occasional Lt side pain"  Neurology: alert and oriented x 3, moves all extremities with no defecits  CV :  RRR    Lungs:   CTA B/L  Abdomen: soft, nontender, nondistended, positive bowel sounds,   Extremities:     no edema

## 2025-02-17 NOTE — PROGRESS NOTE ADULT - SUBJECTIVE AND OBJECTIVE BOX
Patient is a 64y old  Female who presents with a chief complaint of chest pain (17 Feb 2025 16:28)      SUBJECTIVE / OVERNIGHT EVENTS: ptn con' t to complain of Left sided CP, Left sided upper back pain, has a pleuritic component. CT CHEST from 2/15: new DEISI peripheral 2.4 cm x2.2 cm mass, left suprahilar mediastinal mass w mediastinal adenopathy, pericardial effusion, left pleural effusion, dilated ascending aorta 4 cm, this was d/w thoracic, recommended IR consult for biopsy. this was d/w ptn, will cont pain control:  Lidocaine patch, cont Morphine ER, prn oxycodone, prn baclofen,     MEDICATIONS  (STANDING):  apixaban 5 milliGRAM(s) Oral every 12 hours  colchicine 0.6 milliGRAM(s) Oral daily  fluticasone propionate/ salmeterol 250-50 MICROgram(s) Diskus 1 Dose(s) Inhalation two times a day  levothyroxine 112 MICROGram(s) Oral daily  lidocaine   4% Patch 1 Patch Transdermal daily  metoprolol succinate  milliGRAM(s) Oral daily  morphine ER Tablet 30 milliGRAM(s) Oral every 12 hours  nicotine - 21 mG/24Hr(s) Patch 1 Patch Transdermal daily  pantoprazole    Tablet 40 milliGRAM(s) Oral every 12 hours  traMADol 25 milliGRAM(s) Oral three times a day    MEDICATIONS  (PRN):  albuterol    90 MICROgram(s) HFA Inhaler 2 Puff(s) Inhalation every 6 hours PRN Shortness of Breath and/or Wheezing  baclofen 5 milliGRAM(s) Oral every 8 hours PRN Musculoskeletal Pain  oxyCODONE    IR 20 milliGRAM(s) Oral every 8 hours PRN for severe pain      Vital Signs Last 24 Hrs  T(F): 97.7 (02-17-25 @ 12:02), Max: 97.9 (02-16-25 @ 20:52)  HR: 58 (02-17-25 @ 12:02) (54 - 60)  BP: 99/60 (02-17-25 @ 12:02) (97/56 - 126/66)  RR: 18 (02-17-25 @ 12:02) (18 - 18)  SpO2: 97% (02-17-25 @ 12:02) (94% - 99%)  Telemetry:   CAPILLARY BLOOD GLUCOSE        I&O's Summary    16 Feb 2025 07:01  -  17 Feb 2025 07:00  --------------------------------------------------------  IN: 350 mL / OUT: 500 mL / NET: -150 mL    17 Feb 2025 07:01  -  17 Feb 2025 18:56  --------------------------------------------------------  IN: 180 mL / OUT: 200 mL / NET: -20 mL        PHYSICAL EXAM:  GENERAL: NAD, well-developed  HEAD:  Atraumatic, Normocephalic  EYES: EOMI, PERRLA, conjunctiva and sclera clear  NECK: Supple, No JVD  CHEST/LUNG: Clear to auscultation bilaterally; No wheeze  HEART: Regular rate and rhythm; No murmurs, rubs, or gallops  ABDOMEN: Soft, Nontender, Nondistended; Bowel sounds present  EXTREMITIES:  2+ Peripheral Pulses, No clubbing, cyanosis, or edema  PSYCH: AAOx3  NEUROLOGY: non-focal  SKIN: No rashes or lesions    LABS:                    RADIOLOGY & ADDITIONAL TESTS:    Imaging Personally Reviewed:    Consultant(s) Notes Reviewed:      Care Discussed with Consultants/Other Providers:

## 2025-02-17 NOTE — CONSULT NOTE ADULT - ASSESSMENT
-----------------------------------------------------------  Interventional Radiology Brief Consult Note  -----------------------------------------------------------    Reason for Referral: hilar mass biopsy    Clinical Summary: 64y Female with history of scleroderma, afib on Eliquis, asthma with prior intubations, and pericardial effusion requiring pericardial drain placement who is now admitted for pleuritic chest pain. Patient is found to have a hilar mass that IR is consulted to biopsy.     Vitals:  T(F): 97.7 (02-17-25 @ 12:02), Max: 97.9 (02-16-25 @ 20:52)  HR: 58 (02-17-25 @ 12:02) (54 - 60)  BP: 99/60 (02-17-25 @ 12:02) (97/56 - 126/66)  RR: 18 (02-17-25 @ 12:02) (18 - 18)  SpO2: 97% (02-17-25 @ 12:02) (94% - 99%)    Labs:           12.5  6.04)-----(206     (02-14-25 @ 07:17)         37.0     130 | 94 | 10  --------------------< 106     (02-14-25 @ 07:17)  4.2 | 23 | 0.41       PT: 13.0 02-13-25 @ 11:15  aPTT: 29.7 02-13-25 @ 11:15   INR: 1.13 02-13-25 @ 11:15    Imaging: CT chest 2/15/25 was reviewed      Assessment: 64y Female with history of afib on Eliquis and pericardial effusion who presented with sob/cp and was found to have a hilar mass that IR is consulted to biopsy.     Recommendations:  -  The mediastinal lymph nodes are inaccessible for biopsy. The hilar mass is central in location and percutaneous biopsy of it would be a very high risk procedure.   - Recommend interventional pulmonary consult.     --  Abril Varner MD  Interventional Radiology Resident (PGY-6)  Available on [employer] TEAMS    For EMERGENT inquiries/questions:  IR Pager ([facility]): [phone]  IR Pager ([facility]): [phone] ; [phone]    For non-emergent consults/questions:   Please place a sunrise order "Consult- Interventional Radiology" with an appropriate callback number    For questions about scheduling during appropriate work hours, call IR :  [facility]: [phone]  [facility]: [phone]    For outpatient IR booking:  [facility]: [phone]  [facility]: [phone]

## 2025-02-18 PROCEDURE — 99232 SBSQ HOSP IP/OBS MODERATE 35: CPT | Mod: GC

## 2025-02-18 RX ORDER — NALOXEGOL OXALATE 12.5 MG/1
12.5 TABLET, FILM COATED ORAL DAILY
Refills: 0 | Status: DISCONTINUED | OUTPATIENT
Start: 2025-02-18 | End: 2025-02-20

## 2025-02-18 RX ORDER — ONDANSETRON HCL/PF 4 MG/2 ML
4 VIAL (ML) INJECTION ONCE
Refills: 0 | Status: COMPLETED | OUTPATIENT
Start: 2025-02-18 | End: 2025-02-18

## 2025-02-18 RX ADMIN — LIDOCAINE HYDROCHLORIDE 1 PATCH: 20 JELLY TOPICAL at 13:31

## 2025-02-18 RX ADMIN — APIXABAN 5 MILLIGRAM(S): 2.5 TABLET, FILM COATED ORAL at 09:06

## 2025-02-18 RX ADMIN — NICOTINE POLACRILEX 1 PATCH: 4 GUM, CHEWING ORAL at 06:30

## 2025-02-18 RX ADMIN — Medication 30 MILLIGRAM(S): at 06:38

## 2025-02-18 RX ADMIN — METOPROLOL SUCCINATE 100 MILLIGRAM(S): 50 TABLET, EXTENDED RELEASE ORAL at 05:36

## 2025-02-18 RX ADMIN — OXYCODONE HYDROCHLORIDE 20 MILLIGRAM(S): 30 TABLET ORAL at 21:00

## 2025-02-18 RX ADMIN — LIDOCAINE HYDROCHLORIDE 1 PATCH: 20 JELLY TOPICAL at 05:47

## 2025-02-18 RX ADMIN — NICOTINE POLACRILEX 1 PATCH: 4 GUM, CHEWING ORAL at 09:07

## 2025-02-18 RX ADMIN — OXYCODONE HYDROCHLORIDE 20 MILLIGRAM(S): 30 TABLET ORAL at 21:40

## 2025-02-18 RX ADMIN — Medication 30 MILLIGRAM(S): at 05:35

## 2025-02-18 RX ADMIN — NALOXEGOL OXALATE 12.5 MILLIGRAM(S): 12.5 TABLET, FILM COATED ORAL at 13:30

## 2025-02-18 RX ADMIN — TRAMADOL HYDROCHLORIDE 25 MILLIGRAM(S): 50 TABLET, FILM COATED ORAL at 23:26

## 2025-02-18 RX ADMIN — COLCHICINE 0.6 MILLIGRAM(S): 0.6 TABLET, FILM COATED ORAL at 09:06

## 2025-02-18 RX ADMIN — Medication 30 MILLIGRAM(S): at 18:01

## 2025-02-18 RX ADMIN — NICOTINE POLACRILEX 1 PATCH: 4 GUM, CHEWING ORAL at 19:30

## 2025-02-18 RX ADMIN — LIDOCAINE HYDROCHLORIDE 1 PATCH: 20 JELLY TOPICAL at 19:30

## 2025-02-18 RX ADMIN — TRAMADOL HYDROCHLORIDE 25 MILLIGRAM(S): 50 TABLET, FILM COATED ORAL at 22:26

## 2025-02-18 RX ADMIN — TRAMADOL HYDROCHLORIDE 25 MILLIGRAM(S): 50 TABLET, FILM COATED ORAL at 14:00

## 2025-02-18 RX ADMIN — TRAMADOL HYDROCHLORIDE 25 MILLIGRAM(S): 50 TABLET, FILM COATED ORAL at 13:30

## 2025-02-18 RX ADMIN — Medication 112 MICROGRAM(S): at 05:36

## 2025-02-18 RX ADMIN — Medication 40 MILLIGRAM(S): at 18:02

## 2025-02-18 RX ADMIN — NICOTINE POLACRILEX 1 PATCH: 4 GUM, CHEWING ORAL at 09:00

## 2025-02-18 RX ADMIN — Medication 40 MILLIGRAM(S): at 05:35

## 2025-02-18 RX ADMIN — Medication 4 MILLIGRAM(S): at 06:26

## 2025-02-18 RX ADMIN — TRAMADOL HYDROCHLORIDE 25 MILLIGRAM(S): 50 TABLET, FILM COATED ORAL at 07:24

## 2025-02-18 RX ADMIN — Medication 30 MILLIGRAM(S): at 18:31

## 2025-02-18 NOTE — PROGRESS NOTE ADULT - SUBJECTIVE AND OBJECTIVE BOX
INTERVAL HPI/OVERNIGHT EVENTS:    +constipation    MEDICATIONS  (STANDING):  apixaban 5 milliGRAM(s) Oral every 12 hours  colchicine 0.6 milliGRAM(s) Oral daily  fluticasone propionate/ salmeterol 250-50 MICROgram(s) Diskus 1 Dose(s) Inhalation two times a day  levothyroxine 112 MICROGram(s) Oral daily  lidocaine   4% Patch 1 Patch Transdermal daily  metoprolol succinate  milliGRAM(s) Oral daily  morphine ER Tablet 30 milliGRAM(s) Oral every 12 hours  naloxegol 12.5 milliGRAM(s) Oral daily  nicotine - 21 mG/24Hr(s) Patch 1 Patch Transdermal daily  pantoprazole    Tablet 40 milliGRAM(s) Oral every 12 hours  traMADol 25 milliGRAM(s) Oral three times a day    MEDICATIONS  (PRN):  albuterol    90 MICROgram(s) HFA Inhaler 2 Puff(s) Inhalation every 6 hours PRN Shortness of Breath and/or Wheezing  baclofen 5 milliGRAM(s) Oral every 8 hours PRN Musculoskeletal Pain  oxyCODONE    IR 20 milliGRAM(s) Oral every 8 hours PRN for severe pain      Allergies    penicillin (Unknown)  Originally Entered as [Unknown] reaction to [BEE STINGS] (Unknown)  IV Contrast (Unknown)  iodine (Anaphylaxis)  penicillin (Anaphylaxis; Hives; Other)  ABIDA dye (Short breath; Hives)  statins (Unknown)  Xolair (Unknown)    Intolerances        Review of Systems:    General:  No wt loss, fevers, chills, night sweats, fatigue   Eyes:  Good vision, no reported pain  ENT:  No sore throat, pain, runny nose, dysphagia  CV:  No pain, palpitations, hypo/hypertension  Resp:  No dyspnea, cough, tachypnea, wheezing  GI:  No pain, No nausea, No vomiting, No diarrhea, No constipation, No weight loss, No fever, No pruritis, No rectal bleeding, No melena, No dysphagia  :  No pain, bleeding, incontinence, nocturia  Muscle:  No pain, weakness  Neuro:  No weakness, tingling, memory problems  Psych:  No fatigue, insomnia, mood problems, depression  Endocrine:  No polyuria, polydypsia, cold/heat intolerance  Heme:  No petechiae, ecchymosis, easy bruisability  Skin:  No rash, tattoos, scars, edema      Vital Signs Last 24 Hrs  T(C): 36.3 (18 Feb 2025 04:00), Max: 36.7 (17 Feb 2025 20:13)  T(F): 97.4 (18 Feb 2025 04:00), Max: 98 (17 Feb 2025 20:13)  HR: 62 (18 Feb 2025 04:00) (58 - 63)  BP: 110/56 (18 Feb 2025 04:00) (99/60 - 116/68)  BP(mean): --  RR: 18 (18 Feb 2025 04:00) (18 - 18)  SpO2: 96% (18 Feb 2025 04:00) (95% - 97%)    Parameters below as of 18 Feb 2025 04:00  Patient On (Oxygen Delivery Method): nasal cannula  O2 Flow (L/min): 3      PHYSICAL EXAM:    Constitutional: NAD  HEENT: EOMI, throat clear  Neck: No LAD, supple  Respiratory: CTA and P  Cardiovascular: S1 and S2, RRR, no M  Gastrointestinal: BS+, soft, NT/ND, neg HSM,  Extremities: No peripheral edema, neg clubbing, cyanosis  Vascular: 2+ peripheral pulses  Neurological: A/O   Psychiatric: Normal mood, normal affect  Skin: No rashes      LABS:                RADIOLOGY & ADDITIONAL TESTS:

## 2025-02-18 NOTE — PROGRESS NOTE ADULT - ASSESSMENT
64-year-old female past medical history of A-fib, scleroderma, asthma with prior intubations, patient admission for pericardial effusion status post pericardiocentesis with drain on colchicine presents ED complaining of sudden onset left flank pain that woke her from sleep this morning.  Had similar symptoms 1 week ago on right side, now with left flank worse with inspiration with mild shortness of breath.  Took inhaler with no relief prompting visit to ED.  Denies fever chills nausea vomiting diarrhea abdominal pain dysuria hematuria.  Patient on 3 L nasal cannula baseline at home.  Uncomfortable with laying flat.     Chest Pain.   - : ptn con' t to complain of Left sided CP, Left sided upper back pain, pleuritic CP has subsided. CT CHEST from 2/15: new DEISI peripheral 2.4 cm x2.2 cm mass, left suprahilar mediastinal mass w mediastinal adenopathy, pericardial effusion, left pleural effusion, dilated ascending aorta 4 cm, this was d/w thoracic, recommended IR consult for biopsy. this was d/w ptn, will cont pain control:  Lidocaine patch, cont Morphine ER, prn oxycodone, prn baclofen,  - 2/18: ptn c/o Left upper CP, states started having hemoptysis today. HGB is stable, the nurse was not aware of this, IR consult reviewed recommend Interventional Pulm consult , will hold ELIQUIS  - Had admission 1 month ago for pericardial effusion s/p drain on home maintenance with colchicine  - ECG NSR  - Trop and BNP wnl  - CXR wnl  - Prior TTE showed preserved EF, no WMA, small pericardial effusion; Repeat pending  - ESR, CRP elevated, h/o scleroderma  - D-dimer elevated.        Pericardial Effusion  - s/p drain with 650ml removed last admission in December 2024  - OP Cards did TTE which shows small increase from prior  - Repeat TTE - effusion is bigger  - CT chest done  - ct surg consult following    Atrial Fibrillation  - c/w Toprol 100mg PO daily  - hold eliquis Eliquis 5mg PO BID for poss surgery  - Will monitor HR closely. Currently karen in 50s. May need dose adjustment of Toprol.  - Monitor on tele.    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

## 2025-02-18 NOTE — CONSULT NOTE ADULT - ASSESSMENT
64F w/ scleroderma, Afib on eliquis (last dose 2/18 AM), asthma previously requiring intubation, pericardial effusion s/p pericardial drain, admitted w/ pleuritic chest pain. Interventional pulmonary consulted to evaluate patient for hilar mass biopsy. Course c/b hemoptysis.     CT chest - L suprahilar mediastinal mass, mediastinal adenopathy; 2 x 2cm DEISI opacity   Describes hemoptysis as sputum streaked with dark red; no bright red blood, or clots    Recommendations:   - hold eliquis  - pls initiate transfer to [facility] for bronchoscopy, EBUS, hilar mass biopsy with IP  - procedure will be scheduled and booked once patient arrives at [facility]   - patient is agreeable to transfer, once completed, can return to [facility]  - pls quantify hemoptysis --> have patient expectorate every time into a graded cup to quantify hemoptysis.   - please notify pulmonary or MICU for increase in amount of blood or increase in respiratory symptoms. There is not any established definition of massive hemoptysis; any level of hemoptysis can result in airway compromise, pls monitor for changes in quality or amount of hemoptysis     D/w Dr. Kaufman

## 2025-02-18 NOTE — CONSULT NOTE ADULT - CONSULT REQUESTED DATE/TIME
14-Feb-2025 18:00
13-Feb-2025 13:43
14-Feb-2025 11:02
13-Feb-2025 16:35
18-Feb-2025 17:11
14-Feb-2025 17:49
17-Feb-2025 16:29

## 2025-02-18 NOTE — CONSULT NOTE ADULT - SUBJECTIVE AND OBJECTIVE BOX
INTERVENTIONAL PULMONARY CONSULTATION NOTE    NAME: AYLEEN BOSWELL  MEDICAL RECORD NUMBER: [id number]    CHIEF COMPLAINT: Patient is a 64y old  Female who presents with a chief complaint of chest pain (18 Feb 2025 15:44)      HISTORY OF PRESENT ILLNESS:   HPI:  64-year-old female past medical history of A-fib, scleroderma, asthma with prior intubations, patient admission for pericardial effusion status post pericardiocentesis with drain on colchicine presents ED complaining of sudden onset left flank pain that woke her from sleep this morning.  Had similar symptoms 1 week ago on right side, now with left flank worse with inspiration with mild shortness of breath.  Took inhaler with no relief prompting visit to ED.  Denies fever chills nausea vomiting diarrhea abdominal pain dysuria hematuria.  Patient on 3 L nasal cannula baseline at home.  Uncomfortable with laying flat. (13 Feb 2025 18:20)      ====================BACKGROUND INFORMATION====================    FAMILY HISTORY: FAMILY HISTORY:      PAST MEDICAL AND SURGICAL HISTORY: PAST MEDICAL & SURGICAL HISTORY:  Scleroderma      Asthma      High cholesterol  Unable to take STATINS for high cholesterol due to genetic disposition      Scleroderma      Shingles      Hypothyroidism      GERD (gastroesophageal reflux disease)      Smoker      Multiple drug allergies      S/P small bowel resection      History of myomectomy      History of ovarian cystectomy      Fibroid      Bowel obstruction  multiple surgeries for bowel obstruction      H/O ovarian cystectomy      S/P pericardiocentesis          ALLERGIES:Allergies    penicillin (Unknown)  Originally Entered as [Unknown] reaction to [BEE STINGS] (Unknown)  IV Contrast (Unknown)  iodine (Anaphylaxis)  penicillin (Anaphylaxis; Hives; Other)  ABIDA dye (Short breath; Hives)  statins (Unknown)  Xolair (Unknown)    Intolerances        HOME MEDICATIONS:     OUTPATIENT PULMONARY DOCTOR:     PFTs:     SOCIAL HISTORY:  TOBACCO USE:  ALCOHOL:  DRUGS:  MARITAL STATUS:  EMPLOYMENT:  EXPOSURES:  RECENT TRAVELS:  PETS:  CODE STATUS:    ========================REVIEW OF SYSTEMS========================  [x] ROS negative except as per HPI    ========================MEDICATIONS=============================  NEURO  morphine ER Tablet 30 milliGRAM(s) Oral every 12 hours  traMADol 25 milliGRAM(s) Oral three times a day    CARDIO  metoprolol succinate  milliGRAM(s) Oral daily    PULM  fluticasone propionate/ salmeterol 250-50 MICROgram(s) Diskus 1 Dose(s) Inhalation two times a day    FEN/GI  naloxegol 12.5 milliGRAM(s) Oral daily  pantoprazole    Tablet 40 milliGRAM(s) Oral every 12 hours    HEME/ONC  apixaban 5 milliGRAM(s) Oral every 12 hours    ANTIMICROBIALS    ENDO  colchicine 0.6 milliGRAM(s) Oral daily  levothyroxine 112 MICROGram(s) Oral daily    OTHER  lidocaine   4% Patch 1 Patch Transdermal daily  nicotine - 21 mG/24Hr(s) Patch 1 Patch Transdermal daily      PRN      Drips      ========================PHYSICAL EXAM============================    VITALS: Vital Signs Last 24 Hrs  T(C): 36.4 (18 Feb 2025 11:40), Max: 36.7 (17 Feb 2025 20:13)  T(F): 97.6 (18 Feb 2025 11:40), Max: 98 (17 Feb 2025 20:13)  HR: 63 (18 Feb 2025 11:40) (62 - 63)  BP: 107/61 (18 Feb 2025 11:40) (107/61 - 116/68)  BP(mean): --  RR: 18 (18 Feb 2025 11:40) (18 - 18)  SpO2: 95% (18 Feb 2025 11:40) (95% - 96%)    Parameters below as of 18 Feb 2025 11:40  Patient On (Oxygen Delivery Method): room air        INTAKE and OUTPUT: I&O's Detail    17 Feb 2025 07:01  -  18 Feb 2025 07:00  --------------------------------------------------------  IN:    Oral Fluid: 680 mL  Total IN: 680 mL    OUT:    Voided (mL): 625 mL  Total OUT: 625 mL    Total NET: 55 mL          WEIGHT    VENTILATOR SETTINGS:     Physical Exam  CONST:       ENMT:         RESP :         CHEST:        CARDS:      VASC:           ABD:            MSK:            NEURO:       SKIN:           ======================LABORATORY RESULTS AND IMAGING=============                                         Ref-range: <3 normal, >9 elevated, >18 very elevated    ABG Trend    VBG Trend  02-13-25 @ 11:00 FiO2--  - 7.35/58/37/32/46.0 Lactate 0.8  12-21-24 @ 18:12 FiO2--  - 7.38/48/53/28/83.1 Lactate 1.1  12-20-24 @ 16:03 FiO2--  - 7.38/56/35/33/60.5 Lactate 1.5      Creatinine Trend: 0.41<--, 0.48<--    [x] RADIOLOGY REVIEWED AND INTERPRETED BY ME     INTERVENTIONAL PULMONARY CONSULTATION NOTE    NAME: AYLEEN BOSWELL  MEDICAL RECORD NUMBER: [id number]    CHIEF COMPLAINT: Patient is a 64y old  Female who presents with a chief complaint of chest pain (18 Feb 2025 15:44)      HISTORY OF PRESENT ILLNESS:   HPI:  64-year-old female past medical history of A-fib, scleroderma, asthma with prior intubations, patient admission for pericardial effusion status post pericardiocentesis with drain on colchicine presents ED complaining of sudden onset left flank pain that woke her from sleep this morning.  Had similar symptoms 1 week ago on right side, now with left flank worse with inspiration with mild shortness of breath.  Took inhaler with no relief prompting visit to ED.  Denies fever chills nausea vomiting diarrhea abdominal pain dysuria hematuria.  Patient on 3 L nasal cannula baseline at home.  Uncomfortable with laying flat. (13 Feb 2025 18:20)      ====================BACKGROUND INFORMATION====================    FAMILY HISTORY: FAMILY HISTORY:      PAST MEDICAL AND SURGICAL HISTORY: PAST MEDICAL & SURGICAL HISTORY:  Scleroderma      Asthma      High cholesterol  Unable to take STATINS for high cholesterol due to genetic disposition      Scleroderma      Shingles      Hypothyroidism      GERD (gastroesophageal reflux disease)      Smoker      Multiple drug allergies      S/P small bowel resection      History of myomectomy      History of ovarian cystectomy      Fibroid      Bowel obstruction  multiple surgeries for bowel obstruction      H/O ovarian cystectomy      S/P pericardiocentesis          ALLERGIES:Allergies    penicillin (Unknown)  Originally Entered as [Unknown] reaction to [BEE STINGS] (Unknown)  IV Contrast (Unknown)  iodine (Anaphylaxis)  penicillin (Anaphylaxis; Hives; Other)  ABIDA dye (Short breath; Hives)  statins (Unknown)  Xolair (Unknown)    Intolerances        HOME MEDICATIONS:     OUTPATIENT PULMONARY DOCTOR:     PFTs:     SOCIAL HISTORY:  TOBACCO USE:  ALCOHOL:  DRUGS:  MARITAL STATUS:  EMPLOYMENT:  EXPOSURES:  RECENT TRAVELS:  PETS:  CODE STATUS:    ========================REVIEW OF SYSTEMS========================  [x] ROS negative except as per HPI    ========================MEDICATIONS=============================  NEURO  morphine ER Tablet 30 milliGRAM(s) Oral every 12 hours  traMADol 25 milliGRAM(s) Oral three times a day    CARDIO  metoprolol succinate  milliGRAM(s) Oral daily    PULM  fluticasone propionate/ salmeterol 250-50 MICROgram(s) Diskus 1 Dose(s) Inhalation two times a day    FEN/GI  naloxegol 12.5 milliGRAM(s) Oral daily  pantoprazole    Tablet 40 milliGRAM(s) Oral every 12 hours    HEME/ONC  apixaban 5 milliGRAM(s) Oral every 12 hours    ANTIMICROBIALS    ENDO  colchicine 0.6 milliGRAM(s) Oral daily  levothyroxine 112 MICROGram(s) Oral daily    OTHER  lidocaine   4% Patch 1 Patch Transdermal daily  nicotine - 21 mG/24Hr(s) Patch 1 Patch Transdermal daily      PRN      Drips      ========================PHYSICAL EXAM============================    VITALS: Vital Signs Last 24 Hrs  T(C): 36.4 (18 Feb 2025 11:40), Max: 36.7 (17 Feb 2025 20:13)  T(F): 97.6 (18 Feb 2025 11:40), Max: 98 (17 Feb 2025 20:13)  HR: 63 (18 Feb 2025 11:40) (62 - 63)  BP: 107/61 (18 Feb 2025 11:40) (107/61 - 116/68)  BP(mean): --  RR: 18 (18 Feb 2025 11:40) (18 - 18)  SpO2: 95% (18 Feb 2025 11:40) (95% - 96%)    Parameters below as of 18 Feb 2025 11:40  Patient On (Oxygen Delivery Method): room air        INTAKE and OUTPUT: I&O's Detail    17 Feb 2025 07:01  -  18 Feb 2025 07:00  --------------------------------------------------------  IN:    Oral Fluid: 680 mL  Total IN: 680 mL    OUT:    Voided (mL): 625 mL  Total OUT: 625 mL    Total NET: 55 mL          WEIGHT    VENTILATOR SETTINGS:     Physical Exam  CONST:     resting comfortably  ENMT:       MMM  RESP :       normal respiratory effort, no accessory muscle use, decreased BS b/l bases, no supplemental O2  CARDS:    RRR, no m/g/r  VASC:         no LE edema              NEURO:     AOx3, answering questions appropriately         ======================LABORATORY RESULTS AND IMAGING=============                                         Ref-range: <3 normal, >9 elevated, >18 very elevated    ABG Trend    VBG Trend  02-13-25 @ 11:00 FiO2--  - 7.35/58/37/32/46.0 Lactate 0.8  12-21-24 @ 18:12 FiO2--  - 7.38/48/53/28/83.1 Lactate 1.1  12-20-24 @ 16:03 FiO2--  - 7.38/56/35/33/60.5 Lactate 1.5      Creatinine Trend: 0.41<--, 0.48<--    [x] RADIOLOGY REVIEWED AND INTERPRETED BY ME

## 2025-02-18 NOTE — POST DISCHARGE NOTE - NOTIFICATION:
Notified Dr. Terry Keenan of patient's CT findings.  Place an order in [place] to "[facility]" for assistance in outpatient care.

## 2025-02-18 NOTE — CONSULT NOTE ADULT - CONSULT REASON
hilar mass bx
chest pain
Chest Pain, Pericardial Effusion
chronic pain
hilar mass
Dysphagia
pericardial window

## 2025-02-18 NOTE — PROGRESS NOTE ADULT - SUBJECTIVE AND OBJECTIVE BOX
Patient is a 64y old  Female who presents with a chief complaint of chest pain (18 Feb 2025 10:54)      SUBJECTIVE / OVERNIGHT EVENTS: ptn c/o Left upper CP, states started having hemoptysis today. HGB is stable, the nurse was not aware of this, awaiting IR consult, will hold ELIQUIS    MEDICATIONS  (STANDING):  apixaban 5 milliGRAM(s) Oral every 12 hours  colchicine 0.6 milliGRAM(s) Oral daily  fluticasone propionate/ salmeterol 250-50 MICROgram(s) Diskus 1 Dose(s) Inhalation two times a day  levothyroxine 112 MICROGram(s) Oral daily  lidocaine   4% Patch 1 Patch Transdermal daily  metoprolol succinate  milliGRAM(s) Oral daily  morphine ER Tablet 30 milliGRAM(s) Oral every 12 hours  naloxegol 12.5 milliGRAM(s) Oral daily  nicotine - 21 mG/24Hr(s) Patch 1 Patch Transdermal daily  pantoprazole    Tablet 40 milliGRAM(s) Oral every 12 hours  traMADol 25 milliGRAM(s) Oral three times a day    MEDICATIONS  (PRN):  albuterol    90 MICROgram(s) HFA Inhaler 2 Puff(s) Inhalation every 6 hours PRN Shortness of Breath and/or Wheezing  baclofen 5 milliGRAM(s) Oral every 8 hours PRN Musculoskeletal Pain  oxyCODONE    IR 20 milliGRAM(s) Oral every 8 hours PRN for severe pain      Vital Signs Last 24 Hrs  T(F): 97.6 (02-18-25 @ 11:40), Max: 98 (02-17-25 @ 20:13)  HR: 63 (02-18-25 @ 11:40) (62 - 63)  BP: 107/61 (02-18-25 @ 11:40) (107/61 - 116/68)  RR: 18 (02-18-25 @ 11:40) (18 - 18)  SpO2: 95% (02-18-25 @ 11:40) (95% - 96%)  Telemetry:   CAPILLARY BLOOD GLUCOSE        I&O's Summary    17 Feb 2025 07:01  -  18 Feb 2025 07:00  --------------------------------------------------------  IN: 680 mL / OUT: 625 mL / NET: 55 mL        PHYSICAL EXAM:  GENERAL: NAD, well-developed  HEAD:  Atraumatic, Normocephalic  EYES: EOMI, PERRLA, conjunctiva and sclera clear  NECK: Supple, No JVD  CHEST/LUNG: Clear to auscultation bilaterally; No wheeze  HEART: Regular rate and rhythm; No murmurs, rubs, or gallops  ABDOMEN: Soft, Nontender, Nondistended; Bowel sounds present  EXTREMITIES:  2+ Peripheral Pulses, No clubbing, cyanosis, or edema  PSYCH: AAOx3  NEUROLOGY: non-focal  SKIN: No rashes or lesions    LABS:                    RADIOLOGY & ADDITIONAL TESTS:    Imaging Personally Reviewed:    Consultant(s) Notes Reviewed:      Care Discussed with Consultants/Other Providers:

## 2025-02-18 NOTE — CONSULT NOTE ADULT - PROVIDER SPECIALTY LIST ADULT
[facility] Pulmonology
Pulmonology
Thoracic Surgery
Cardiology
Pain Medicine
Gastroenterology
[facility] Radiology

## 2025-02-18 NOTE — PROGRESS NOTE ADULT - ASSESSMENT
64 year old female with scleroderma presenting with chest pain, GI consulted for dysphagia    1. Dysphagia.  pt declining workup at this time (egd or esophagram)  continue PPI BID    2. Scleroderma    3. Constipation  also related to scleroderma and opioid use  can given miralax 34 BID  Movantik started - cont while on chronic opioids (can increase to 25mg if needed)

## 2025-02-18 NOTE — PROGRESS NOTE ADULT - SUBJECTIVE AND OBJECTIVE BOX
DATE OF SERVICE: 02-18-25      Patient is a 64y old  Female who presents with a chief complaint of chest pain (18 Feb 2025 17:11)      INTERVAL HISTORY: feels ok    TELEMETRY Personally reviewed: no events  	  MEDICATIONS:  metoprolol succinate  milliGRAM(s) Oral daily        PHYSICAL EXAM:  T(C): 36.4 (02-18-25 @ 11:40), Max: 36.4 (02-18-25 @ 11:40)  HR: 68 (02-18-25 @ 18:05) (62 - 68)  BP: 128/70 (02-18-25 @ 18:05) (107/61 - 128/70)  RR: 18 (02-18-25 @ 18:05) (18 - 18)  SpO2: 95% (02-18-25 @ 18:05) (95% - 96%)  Wt(kg): --  I&O's Summary    17 Feb 2025 07:01  -  18 Feb 2025 07:00  --------------------------------------------------------  IN: 680 mL / OUT: 625 mL / NET: 55 mL          Appearance: In no distress	  HEENT:    PERRL, EOMI	  Cardiovascular:  S1 S2, No JVD  Respiratory: Lungs clear to auscultation	  Gastrointestinal:  Soft, Non-tender, + BS	  Vascularature:  No edema of LE  Psychiatric: Appropriate affect   Neuro: no acute focal deficits             Labs personally reviewed      Assessment and Plan:      64-year-old female past medical history of A-fib, scleroderma, asthma with prior intubations, patient admission for pericardial effusion status post pericardiocentesis with drain on colchicine presents ED complaining of sudden onset left flank pain that woke her from sleep this morning.  Had similar symptoms 1 week ago on right side, now with left flank worse with inspiration with mild shortness of breath.    Denies fever chills nausea vomiting diarrhea abdominal pain dysuria hematuria.  Patient on 3 L nasal cannula baseline at home.     Problem/Plan #1: Chest Pain  - p/w left sided chest pain radiating to the back, non-exertional and sometimes worse with inspiration  - Had admission 1 month ago for pericardial effusion s/p drain on home maintenance with colchicine  - ECG NSR  - Trop and BNP wnl  - CXR wnl  - Prior TTE showed preserved EF, no WMA, small pericardial effusion  - ESR, CRP elevated  - D-dimer elevated, PE less likely as on Eliquis/full dose AC     Problem/Plan #2: Pericardial Effusion  - ? 2/2 scleroderma   - s/p drain with 650ml removed last admission in December 2024  - OP Cards did TTE which shows small increase from prior  - Repeat TTE with recurrent large effusion, CT w/ small circumferential effusion, seen by TC no acute intervention   - ESR, CRP elevated. Continue colchicine.    Problem/Plan #3: Atrial Fibrillation  - c/w Toprol 100mg PO daily  - c/w Eliquis 5mg PO BID  - Will monitor HR closely.   -HR in the 60s     Problem/Plan #4: Hilar lymph nodes  - Planned for bronchoscopy at [facility]  Cardiovascular Medicine  [address] [place] [address], [address]  Office: [phone]  Available via Text/call on [employer] Teams

## 2025-02-19 PROCEDURE — 99232 SBSQ HOSP IP/OBS MODERATE 35: CPT | Mod: GC

## 2025-02-19 RX ADMIN — OXYCODONE HYDROCHLORIDE 20 MILLIGRAM(S): 30 TABLET ORAL at 08:34

## 2025-02-19 RX ADMIN — TRAMADOL HYDROCHLORIDE 25 MILLIGRAM(S): 50 TABLET, FILM COATED ORAL at 22:30

## 2025-02-19 RX ADMIN — TRAMADOL HYDROCHLORIDE 25 MILLIGRAM(S): 50 TABLET, FILM COATED ORAL at 14:20

## 2025-02-19 RX ADMIN — OXYCODONE HYDROCHLORIDE 20 MILLIGRAM(S): 30 TABLET ORAL at 10:00

## 2025-02-19 RX ADMIN — TRAMADOL HYDROCHLORIDE 25 MILLIGRAM(S): 50 TABLET, FILM COATED ORAL at 13:58

## 2025-02-19 RX ADMIN — Medication 1 DOSE(S): at 18:46

## 2025-02-19 RX ADMIN — Medication 30 MILLIGRAM(S): at 06:12

## 2025-02-19 RX ADMIN — NICOTINE POLACRILEX 1 PATCH: 4 GUM, CHEWING ORAL at 16:42

## 2025-02-19 RX ADMIN — OXYCODONE HYDROCHLORIDE 20 MILLIGRAM(S): 30 TABLET ORAL at 16:29

## 2025-02-19 RX ADMIN — OXYCODONE HYDROCHLORIDE 20 MILLIGRAM(S): 30 TABLET ORAL at 17:30

## 2025-02-19 RX ADMIN — NICOTINE POLACRILEX 1 PATCH: 4 GUM, CHEWING ORAL at 13:59

## 2025-02-19 RX ADMIN — Medication 40 MILLIGRAM(S): at 06:13

## 2025-02-19 RX ADMIN — TRAMADOL HYDROCHLORIDE 25 MILLIGRAM(S): 50 TABLET, FILM COATED ORAL at 21:52

## 2025-02-19 RX ADMIN — Medication 112 MICROGRAM(S): at 06:13

## 2025-02-19 RX ADMIN — COLCHICINE 0.6 MILLIGRAM(S): 0.6 TABLET, FILM COATED ORAL at 13:16

## 2025-02-19 RX ADMIN — Medication 1 DOSE(S): at 06:13

## 2025-02-19 RX ADMIN — METOPROLOL SUCCINATE 100 MILLIGRAM(S): 50 TABLET, EXTENDED RELEASE ORAL at 06:13

## 2025-02-19 RX ADMIN — TRAMADOL HYDROCHLORIDE 25 MILLIGRAM(S): 50 TABLET, FILM COATED ORAL at 07:10

## 2025-02-19 RX ADMIN — LIDOCAINE HYDROCHLORIDE 1 PATCH: 20 JELLY TOPICAL at 13:16

## 2025-02-19 RX ADMIN — Medication 30 MILLIGRAM(S): at 18:06

## 2025-02-19 RX ADMIN — Medication 40 MILLIGRAM(S): at 16:28

## 2025-02-19 RX ADMIN — LIDOCAINE HYDROCHLORIDE 1 PATCH: 20 JELLY TOPICAL at 16:17

## 2025-02-19 RX ADMIN — NICOTINE POLACRILEX 1 PATCH: 4 GUM, CHEWING ORAL at 07:43

## 2025-02-19 RX ADMIN — Medication 30 MILLIGRAM(S): at 07:12

## 2025-02-19 RX ADMIN — TRAMADOL HYDROCHLORIDE 25 MILLIGRAM(S): 50 TABLET, FILM COATED ORAL at 06:12

## 2025-02-19 RX ADMIN — Medication 30 MILLIGRAM(S): at 18:46

## 2025-02-19 RX ADMIN — NICOTINE POLACRILEX 1 PATCH: 4 GUM, CHEWING ORAL at 14:20

## 2025-02-19 NOTE — PROGRESS NOTE ADULT - ASSESSMENT
64-year-old female past medical history of A-fib, scleroderma, asthma with prior intubations, patient admission for pericardial effusion status post pericardiocentesis with drain on colchicine presents ED complaining of sudden onset left flank pain that woke her from sleep this morning.  Had similar symptoms 1 week ago on right side, now with left flank worse with inspiration with mild shortness of breath.  Took inhaler with no relief prompting visit to ED.  Denies fever chills nausea vomiting diarrhea abdominal pain dysuria hematuria.  Patient on 3 L nasal cannula baseline at home.  Uncomfortable with laying flat.     L suprahilar mediastinal mass, mediastinal adenopathy; 2 x 2cm DEISI opacity   - Describes hemoptysis as sputum streaked with dark red; no bright red blood, or clots  - hold eliquis  - pls initiate transfer to [facility] for bronchoscopy, EBUS, hilar mass biopsy with IP  - procedure will be scheduled and booked once patient arrives at [facility]   - patient is agreeable to transfer, once completed, can return to [facility]  - pls quantify hemoptysis --> have patient expectorate every time into a graded cup to quantify hemoptysis.   - please notify pulmonary or MICU for increase in amount of blood or increase in respiratory symptoms. There is not any established definition of massive hemoptysis; any level of hemoptysis can result in airway compromise, pls monitor for changes in quality or amount of hemoptysis      Chest Pain.   - Had admission 1 month ago for pericardial effusion s/p drain on home maintenance with colchicine  - ECG NSR  - Trop and BNP wnl  - CXR wnl  - Prior TTE showed preserved EF, no WMA, small pericardial effusion; Repeat pending  - ESR, CRP elevated, h/o scleroderma  - D-dimer elevated.        Pericardial Effusion  - s/p drain with 650ml removed last admission in December 2024  - OP Cards did TTE which shows small increase from prior  - Repeat TTE - effusion is bigger  - CT chest done  - ct surg consult following    Atrial Fibrillation  - c/w Toprol 100mg PO daily  - hold eliquis Eliquis 5mg PO BID for poss surgery  - Will monitor HR closely. Currently karen in 50s. May need dose adjustment of Toprol.  - Monitor on tele.    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

## 2025-02-19 NOTE — PROGRESS NOTE ADULT - SUBJECTIVE AND OBJECTIVE BOX
INTERVENTIONAL PULMONARY PROGRESS NOTE    PATIENT INFORMATION:  NAME: AYLEEN BOSWELL:  MRN: [id number]    CHIEF COMPLAINT: Patient is a 64y old  Female who presents with a chief complaint of chest pain (19 Feb 2025 14:04)      [x] INITIAL CONSULT, H&P, FAMILY HISTORY and PAST MEDICAL AND SURGICAL HISTORY REVIEWED    OVERNIGHT EVENTS, CHANGES TO HPI, SUBJECTIVE:   - Patient seen and examined at bedside  - No overnight events  Still w/ blood streaked sputum  Amenable to transfer to [facility]     ========================REVIEW OF SYSTEMS========================  [x] ROS negative except as per HPI    ========================MEDICATIONS=============================  NEURO  morphine ER Tablet 30 milliGRAM(s) Oral every 12 hours  traMADol 25 milliGRAM(s) Oral three times a day    CARDIO  metoprolol succinate  milliGRAM(s) Oral daily    PULM  fluticasone propionate/ salmeterol 250-50 MICROgram(s) Diskus 1 Dose(s) Inhalation two times a day    FEN/GI  naloxegol 12.5 milliGRAM(s) Oral daily  pantoprazole    Tablet 40 milliGRAM(s) Oral every 12 hours    HEME/ONC    ANTIMICROBIALS    ENDO  colchicine 0.6 milliGRAM(s) Oral daily  levothyroxine 112 MICROGram(s) Oral daily    OTHER  lidocaine   4% Patch 1 Patch Transdermal daily  nicotine - 21 mG/24Hr(s) Patch 1 Patch Transdermal daily      PRN      Drips      ========================PHYSICAL EXAM============================    VITALS: Vital Signs Last 24 Hrs  T(C): 37 (19 Feb 2025 20:16), Max: 37 (19 Feb 2025 20:16)  T(F): 98.6 (19 Feb 2025 20:16), Max: 98.6 (19 Feb 2025 20:16)  HR: 59 (19 Feb 2025 20:16) (54 - 76)  BP: 115/74 (19 Feb 2025 20:16) (105/64 - 178/78)  BP(mean): --  RR: 18 (19 Feb 2025 20:16) (18 - 18)  SpO2: 95% (19 Feb 2025 20:16) (94% - 100%)    Parameters below as of 19 Feb 2025 20:16  Patient On (Oxygen Delivery Method): nasal cannula  O2 Flow (L/min): 3      INTAKE and OUTPUT: I&O's Detail    18 Feb 2025 07:01  -  19 Feb 2025 07:00  --------------------------------------------------------  IN:    Oral Fluid: 200 mL  Total IN: 200 mL    OUT:    Voided (mL): 300 mL  Total OUT: 300 mL    Total NET: -100 mL      19 Feb 2025 07:01  -  19 Feb 2025 21:15  --------------------------------------------------------  IN:    Oral Fluid: 240 mL  Total IN: 240 mL    OUT:    Voided (mL): 250 mL  Total OUT: 250 mL    Total NET: -10 mL          VENTILATOR SETTINGS:     Physical Exam  CONST:     resting comfortably  ENMT:       MMM  RESP :       normal respiratory effort, no accessory muscle use, decreased BS b/l bases, no supplemental O2  CARDS:    RRR, no m/g/r  VASC:         no LE edema              NEURO:     AOx3, answering questions appropriately         ======================LABORATORY RESULTS AND IMAGING=============                                         Ref-range: <3 normal, >9 elevated, >18 very elevated          ABG Trend    VBG Trend  02-13-25 @ 11:00 FiO2--  - 7.35/58/37/32/46.0 Lactate 0.8  12-21-24 @ 18:12 FiO2--  - 7.38/48/53/28/83.1 Lactate 1.1  12-20-24 @ 16:03 FiO2--  - 7.38/56/35/33/60.5 Lactate 1.5      Creatinine Trend: 0.41<--, 0.48<--    [x] RADIOLOGY REVIEWED AND INTERPRETED BY ME

## 2025-02-19 NOTE — PROGRESS NOTE ADULT - ASSESSMENT
64 year old female with scleroderma presenting with chest pain, GI consulted for dysphagia    1. Dysphagia.  pt declining workup at this time (egd or esophagram)  continue PPI BID    2. Scleroderma    3. Constipation - improving   multifactorial; scleroderma and opioid use  cont miralax 34 BID  cont Movantik while on chronic opioids (can increase to 25mg if needed)

## 2025-02-19 NOTE — PROGRESS NOTE ADULT - SUBJECTIVE AND OBJECTIVE BOX
DATE OF SERVICE: 02-19-25 @ 14:04    Patient is a 64y old  Female who presents with a chief complaint of chest pain (19 Feb 2025 11:13)      SUBJECTIVE / OVERNIGHT EVENTS:  No chest pain. No shortness of breath. No complaints. No events overnight.     MEDICATIONS  (STANDING):  colchicine 0.6 milliGRAM(s) Oral daily  fluticasone propionate/ salmeterol 250-50 MICROgram(s) Diskus 1 Dose(s) Inhalation two times a day  levothyroxine 112 MICROGram(s) Oral daily  lidocaine   4% Patch 1 Patch Transdermal daily  metoprolol succinate  milliGRAM(s) Oral daily  morphine ER Tablet 30 milliGRAM(s) Oral every 12 hours  naloxegol 12.5 milliGRAM(s) Oral daily  nicotine - 21 mG/24Hr(s) Patch 1 Patch Transdermal daily  pantoprazole    Tablet 40 milliGRAM(s) Oral every 12 hours  traMADol 25 milliGRAM(s) Oral three times a day    MEDICATIONS  (PRN):  albuterol    90 MICROgram(s) HFA Inhaler 2 Puff(s) Inhalation every 6 hours PRN Shortness of Breath and/or Wheezing  baclofen 5 milliGRAM(s) Oral every 8 hours PRN Musculoskeletal Pain  oxyCODONE    IR 20 milliGRAM(s) Oral every 8 hours PRN for severe pain      Vital Signs Last 24 Hrs  T(C): 36.4 (19 Feb 2025 12:16), Max: 36.5 (18 Feb 2025 20:30)  T(F): 97.5 (19 Feb 2025 12:16), Max: 97.7 (18 Feb 2025 20:30)  HR: 76 (19 Feb 2025 12:16) (54 - 76)  BP: 113/73 (19 Feb 2025 12:16) (105/64 - 128/70)  BP(mean): --  RR: 18 (19 Feb 2025 12:16) (18 - 18)  SpO2: 94% (19 Feb 2025 12:16) (94% - 96%)    Parameters below as of 19 Feb 2025 12:16  Patient On (Oxygen Delivery Method): room air      CAPILLARY BLOOD GLUCOSE        I&O's Summary    18 Feb 2025 07:01  -  19 Feb 2025 07:00  --------------------------------------------------------  IN: 200 mL / OUT: 300 mL / NET: -100 mL    19 Feb 2025 07:01  -  19 Feb 2025 14:04  --------------------------------------------------------  IN: 120 mL / OUT: 250 mL / NET: -130 mL        PHYSICAL EXAM:  GENERAL: NAD, well-developed  HEAD:  Atraumatic, Normocephalic  EYES: EOMI, PERRLA, conjunctiva and sclera clear  NECK: Supple, No JVD  CHEST/LUNG: Clear to auscultation bilaterally; No wheeze  HEART: Regular rate and rhythm; No murmurs, rubs, or gallops  ABDOMEN: Soft, Nontender, Nondistended; Bowel sounds present  EXTREMITIES:  2+ Peripheral Pulses, No clubbing, cyanosis, or edema  PSYCH: AAOx3  NEUROLOGY: non-focal  SKIN: No rashes or lesions    LABS:        < from: TTE W or WO Ultrasound Enhancing Agent (02.14.25 @ 10:36) >     CONCLUSIONS:      1. Small pericardial effusion noted adjacent to the posterior left ventricle and moderate to large effusion adjacent to the right atrium with no echocardiographic evidence of tamponade physiology.   2. Compared to the transthoracic echocardiogram performed on 12/30/2024, the effusion is bigger on today's study.    ________________________________________________________________________________________    < end of copied text >              RADIOLOGY & ADDITIONAL TESTS:    Imaging Personally Reviewed:    Consultant(s) Notes Reviewed:      Care Discussed with Consultants/Other Providers:

## 2025-02-19 NOTE — PROGRESS NOTE ADULT - SUBJECTIVE AND OBJECTIVE BOX
INTERVAL HPI/OVERNIGHT EVENTS:    events noted, hemoptysis, pending transfer to [facility]   (+) Bowel Movement yesterday evening     MEDICATIONS  (STANDING):  colchicine 0.6 milliGRAM(s) Oral daily  fluticasone propionate/ salmeterol 250-50 MICROgram(s) Diskus 1 Dose(s) Inhalation two times a day  levothyroxine 112 MICROGram(s) Oral daily  lidocaine   4% Patch 1 Patch Transdermal daily  metoprolol succinate  milliGRAM(s) Oral daily  morphine ER Tablet 30 milliGRAM(s) Oral every 12 hours  naloxegol 12.5 milliGRAM(s) Oral daily  nicotine - 21 mG/24Hr(s) Patch 1 Patch Transdermal daily  pantoprazole    Tablet 40 milliGRAM(s) Oral every 12 hours  traMADol 25 milliGRAM(s) Oral three times a day    MEDICATIONS  (PRN):  albuterol    90 MICROgram(s) HFA Inhaler 2 Puff(s) Inhalation every 6 hours PRN Shortness of Breath and/or Wheezing  baclofen 5 milliGRAM(s) Oral every 8 hours PRN Musculoskeletal Pain  oxyCODONE    IR 20 milliGRAM(s) Oral every 8 hours PRN for severe pain      Allergies    penicillin (Unknown)  Originally Entered as [Unknown] reaction to [BEE STINGS] (Unknown)  IV Contrast (Unknown)  iodine (Anaphylaxis)  penicillin (Anaphylaxis; Hives; Other)  ABIDA dye (Short breath; Hives)  statins (Unknown)  Xolair (Unknown)    Intolerances        Review of Systems:    General:  No wt loss, fevers, chills, night sweats, fatigue   Eyes:  Good vision, no reported pain  ENT:  No sore throat, pain, runny nose, dysphagia  CV:  No pain, palpitations, hypo/hypertension  Resp:  No dyspnea, cough, tachypnea, wheezing  GI:  No pain, No nausea, No vomiting, No diarrhea, No constipation, No weight loss, No fever, No pruritis, No rectal bleeding, No melena, No dysphagia  :  No pain, bleeding, incontinence, nocturia  Muscle:  No pain, weakness  Neuro:  No weakness, tingling, memory problems  Psych:  No fatigue, insomnia, mood problems, depression  Endocrine:  No polyuria, polydypsia, cold/heat intolerance  Heme:  No petechiae, ecchymosis, easy bruisability  Skin:  No rash, tattoos, scars, edema      Vital Signs Last 24 Hrs  T(C): 36.3 (19 Feb 2025 04:38), Max: 36.5 (18 Feb 2025 20:30)  T(F): 97.3 (19 Feb 2025 04:38), Max: 97.7 (18 Feb 2025 20:30)  HR: 54 (19 Feb 2025 09:50) (54 - 68)  BP: 105/64 (19 Feb 2025 04:38) (105/64 - 128/70)  BP(mean): --  RR: 18 (19 Feb 2025 09:50) (18 - 18)  SpO2: 96% (19 Feb 2025 09:50) (95% - 96%)    Parameters below as of 19 Feb 2025 09:50  Patient On (Oxygen Delivery Method): nasal cannula  O2 Flow (L/min): 3      PHYSICAL EXAM:    Constitutional: NAD  HEENT: EOMI, throat clear  Neck: No LAD, supple  Respiratory: CTA and P  Cardiovascular: S1 and S2, RRR, no M  Gastrointestinal: BS+, soft, NT/ND, neg HSM,  Extremities: No peripheral edema, neg clubbing, cyanosis  Vascular: 2+ peripheral pulses  Neurological: A/O   Psychiatric: Normal mood, normal affect  Skin: No rashes      LABS:                RADIOLOGY & ADDITIONAL TESTS:

## 2025-02-19 NOTE — PROGRESS NOTE ADULT - SUBJECTIVE AND OBJECTIVE BOX
DATE OF SERVICE: 02-19-25      Patient is a 64y old  Female who presents with a chief complaint of chest pain (19 Feb 2025 21:14)      INTERVAL HISTORY: feels ok    TELEMETRY Personally reviewed: no events  	  MEDICATIONS:  metoprolol succinate  milliGRAM(s) Oral daily        PHYSICAL EXAM:  T(C): 37 (02-19-25 @ 20:16), Max: 37 (02-19-25 @ 20:16)  HR: 59 (02-19-25 @ 20:16) (54 - 76)  BP: 115/74 (02-19-25 @ 20:16) (105/64 - 178/78)  RR: 18 (02-19-25 @ 20:16) (18 - 18)  SpO2: 95% (02-19-25 @ 20:16) (94% - 100%)  Wt(kg): --  I&O's Summary    18 Feb 2025 07:01  -  19 Feb 2025 07:00  --------------------------------------------------------  IN: 200 mL / OUT: 300 mL / NET: -100 mL    19 Feb 2025 07:01  -  19 Feb 2025 22:04  --------------------------------------------------------  IN: 240 mL / OUT: 250 mL / NET: -10 mL          Appearance: In no distress	  HEENT:    PERRL, EOMI	  Cardiovascular:  S1 S2, No JVD  Respiratory: Lungs clear to auscultation	  Gastrointestinal:  Soft, Non-tender, + BS	  Vascularature:  No edema of LE  Psychiatric: Appropriate affect   Neuro: no acute focal deficits            Labs personally reviewed      Assessment and Plan:      64-year-old female past medical history of A-fib, scleroderma, asthma with prior intubations, patient admission for pericardial effusion status post pericardiocentesis with drain on colchicine presents ED complaining of sudden onset left flank pain that woke her from sleep this morning.  Had similar symptoms 1 week ago on right side, now with left flank worse with inspiration with mild shortness of breath.    Denies fever chills nausea vomiting diarrhea abdominal pain dysuria hematuria.  Patient on 3 L nasal cannula baseline at home.     Problem/Plan #1: Chest Pain  - p/w left sided chest pain radiating to the back, non-exertional and sometimes worse with inspiration  - Had admission 1 month ago for pericardial effusion s/p drain on home maintenance with colchicine  - ECG NSR  - Trop and BNP wnl  - CXR wnl  - Prior TTE showed preserved EF, no WMA, small pericardial effusion  - ESR, CRP elevated  - D-dimer elevated, PE less likely as on Eliquis/full dose AC     Problem/Plan #2: Pericardial Effusion  - ? 2/2 scleroderma   - s/p drain with 650ml removed last admission in December 2024  - OP Cards did TTE which shows small increase from prior  - Repeat TTE with recurrent large effusion, CT w/ small circumferential effusion, seen by TC no acute intervention   - ESR, CRP elevated. Continue colchicine.    Problem/Plan #3: Atrial Fibrillation  - c/w Toprol 100mg PO daily  - c/w Eliquis 5mg PO BID  - Will monitor HR closely.   -HR in the 60s     Problem/Plan #4: Hilar lymph nodes  - Planned for bronchoscopy at EDITH Verduzco DO [facility]  Cardiovascular Medicine  [address] [place] [address], [address]  Office: [phone]  Available via Text/call on Microsoft Teams

## 2025-02-19 NOTE — PROGRESS NOTE ADULT - ASSESSMENT
64F w/ scleroderma, Afib on eliquis (last dose 2/18 AM), asthma previously requiring intubation, pericardial effusion s/p pericardial drain, admitted w/ pleuritic chest pain. Interventional pulmonary consulted to evaluate patient for hilar mass biopsy. Course c/b hemoptysis.     CT chest - L suprahilar mediastinal mass, mediastinal adenopathy; 2 x 2cm DEISI opacity   Describes hemoptysis as sputum streaked with dark red; no bright red blood, or clots    Recommendations:   - hold eliquis - last dose 2/18 AM, can use heparin gtt bridge if needed  - pls initiate transfer to [facility] for bronchoscopy, EBUS, hilar mass biopsy with IP  - procedure will be scheduled and booked once patient arrives at [facility]   - patient is agreeable to transfer, once completed, can return to [facility]  - pls quantify hemoptysis --> have patient expectorate every time into a graded cup to quantify hemoptysis  - please notify pulmonary or MICU for increase in amount of blood or increase in respiratory symptoms. There is not any established definition of massive hemoptysis; any level of hemoptysis can result in airway compromise, pls monitor for changes in quality or amount of hemoptysis     D/w Dr. Kaufman

## 2025-02-20 ENCOUNTER — INPATIENT (INPATIENT)
Facility: HOSPITAL | Age: 65
LOS: 3 days | Discharge: HOME | End: 2025-02-24
Attending: INTERNAL MEDICINE | Admitting: INTERNAL MEDICINE
Payer: MEDICARE

## 2025-02-20 VITALS
TEMPERATURE: 99 F | DIASTOLIC BLOOD PRESSURE: 97 MMHG | OXYGEN SATURATION: 95 % | RESPIRATION RATE: 17 BRPM | HEART RATE: 70 BPM | SYSTOLIC BLOOD PRESSURE: 143 MMHG

## 2025-02-20 VITALS
HEART RATE: 57 BPM | SYSTOLIC BLOOD PRESSURE: 140 MMHG | TEMPERATURE: 97 F | RESPIRATION RATE: 20 BRPM | DIASTOLIC BLOOD PRESSURE: 73 MMHG | OXYGEN SATURATION: 94 %

## 2025-02-20 DIAGNOSIS — Z98.89 OTHER SPECIFIED POSTPROCEDURAL STATES: Chronic | ICD-10-CM

## 2025-02-20 DIAGNOSIS — K56.60 UNSPECIFIED INTESTINAL OBSTRUCTION: Chronic | ICD-10-CM

## 2025-02-20 DIAGNOSIS — C38.3 MALIGNANT NEOPLASM OF MEDIASTINUM, PART UNSPECIFIED: ICD-10-CM

## 2025-02-20 DIAGNOSIS — Z98.890 OTHER SPECIFIED POSTPROCEDURAL STATES: Chronic | ICD-10-CM

## 2025-02-20 DIAGNOSIS — D25.9 LEIOMYOMA OF UTERUS, UNSPECIFIED: Chronic | ICD-10-CM

## 2025-02-20 PROCEDURE — 96374 THER/PROPH/DIAG INJ IV PUSH: CPT

## 2025-02-20 PROCEDURE — 84436 ASSAY OF TOTAL THYROXINE: CPT

## 2025-02-20 PROCEDURE — 84443 ASSAY THYROID STIM HORMONE: CPT

## 2025-02-20 PROCEDURE — 84480 ASSAY TRIIODOTHYRONINE (T3): CPT

## 2025-02-20 PROCEDURE — 85014 HEMATOCRIT: CPT

## 2025-02-20 PROCEDURE — 84295 ASSAY OF SERUM SODIUM: CPT

## 2025-02-20 PROCEDURE — 86900 BLOOD TYPING SEROLOGIC ABO: CPT

## 2025-02-20 PROCEDURE — 86140 C-REACTIVE PROTEIN: CPT

## 2025-02-20 PROCEDURE — 84484 ASSAY OF TROPONIN QUANT: CPT

## 2025-02-20 PROCEDURE — 83605 ASSAY OF LACTIC ACID: CPT

## 2025-02-20 PROCEDURE — 97530 THERAPEUTIC ACTIVITIES: CPT

## 2025-02-20 PROCEDURE — 83880 ASSAY OF NATRIURETIC PEPTIDE: CPT

## 2025-02-20 PROCEDURE — 85730 THROMBOPLASTIN TIME PARTIAL: CPT

## 2025-02-20 PROCEDURE — 85610 PROTHROMBIN TIME: CPT

## 2025-02-20 PROCEDURE — 84100 ASSAY OF PHOSPHORUS: CPT

## 2025-02-20 PROCEDURE — 82330 ASSAY OF CALCIUM: CPT

## 2025-02-20 PROCEDURE — 85652 RBC SED RATE AUTOMATED: CPT

## 2025-02-20 PROCEDURE — 71045 X-RAY EXAM CHEST 1 VIEW: CPT

## 2025-02-20 PROCEDURE — 85379 FIBRIN DEGRADATION QUANT: CPT

## 2025-02-20 PROCEDURE — 99285 EMERGENCY DEPT VISIT HI MDM: CPT

## 2025-02-20 PROCEDURE — 93308 TTE F-UP OR LMTD: CPT

## 2025-02-20 PROCEDURE — 86901 BLOOD TYPING SEROLOGIC RH(D): CPT

## 2025-02-20 PROCEDURE — 71250 CT THORAX DX C-: CPT | Mod: MC

## 2025-02-20 PROCEDURE — 80053 COMPREHEN METABOLIC PANEL: CPT

## 2025-02-20 PROCEDURE — 82435 ASSAY OF BLOOD CHLORIDE: CPT

## 2025-02-20 PROCEDURE — 93970 EXTREMITY STUDY: CPT

## 2025-02-20 PROCEDURE — 97112 NEUROMUSCULAR REEDUCATION: CPT

## 2025-02-20 PROCEDURE — 80048 BASIC METABOLIC PNL TOTAL CA: CPT

## 2025-02-20 PROCEDURE — 97161 PT EVAL LOW COMPLEX 20 MIN: CPT

## 2025-02-20 PROCEDURE — 82607 VITAMIN B-12: CPT

## 2025-02-20 PROCEDURE — 83735 ASSAY OF MAGNESIUM: CPT

## 2025-02-20 PROCEDURE — 36415 COLL VENOUS BLD VENIPUNCTURE: CPT

## 2025-02-20 PROCEDURE — 94640 AIRWAY INHALATION TREATMENT: CPT

## 2025-02-20 PROCEDURE — 97116 GAIT TRAINING THERAPY: CPT

## 2025-02-20 PROCEDURE — 82803 BLOOD GASES ANY COMBINATION: CPT

## 2025-02-20 PROCEDURE — 85025 COMPLETE CBC W/AUTO DIFF WBC: CPT

## 2025-02-20 PROCEDURE — 82746 ASSAY OF FOLIC ACID SERUM: CPT

## 2025-02-20 PROCEDURE — 82962 GLUCOSE BLOOD TEST: CPT

## 2025-02-20 PROCEDURE — 86850 RBC ANTIBODY SCREEN: CPT

## 2025-02-20 PROCEDURE — 85018 HEMOGLOBIN: CPT

## 2025-02-20 PROCEDURE — 82947 ASSAY GLUCOSE BLOOD QUANT: CPT

## 2025-02-20 PROCEDURE — 84132 ASSAY OF SERUM POTASSIUM: CPT

## 2025-02-20 PROCEDURE — 85027 COMPLETE CBC AUTOMATED: CPT

## 2025-02-20 RX ORDER — NALOXEGOL OXALATE 12.5 MG/1
25 TABLET, FILM COATED ORAL DAILY
Refills: 0 | Status: DISCONTINUED | OUTPATIENT
Start: 2025-02-21 | End: 2025-02-24

## 2025-02-20 RX ORDER — NALOXEGOL OXALATE 12.5 MG/1
1 TABLET, FILM COATED ORAL
Qty: 0 | Refills: 0 | DISCHARGE
Start: 2025-02-20

## 2025-02-20 RX ORDER — NALOXEGOL OXALATE 12.5 MG/1
25 TABLET, FILM COATED ORAL DAILY
Refills: 0 | Status: DISCONTINUED | OUTPATIENT
Start: 2025-02-20 | End: 2025-02-20

## 2025-02-20 RX ORDER — OXYCODONE HYDROCHLORIDE 30 MG/1
1 TABLET ORAL
Qty: 0 | Refills: 0 | DISCHARGE
Start: 2025-02-20

## 2025-02-20 RX ORDER — HYDROCORTISONE 10 MG/G
0 CREAM TOPICAL
Refills: 0 | DISCHARGE

## 2025-02-20 RX ORDER — NICOTINE POLACRILEX 4 MG/1
1 GUM, CHEWING ORAL DAILY
Refills: 0 | Status: DISCONTINUED | OUTPATIENT
Start: 2025-02-20 | End: 2025-02-24

## 2025-02-20 RX ORDER — COLCHICINE 0.6 MG/1
1 TABLET, FILM COATED ORAL
Qty: 0 | Refills: 0 | DISCHARGE
Start: 2025-02-20

## 2025-02-20 RX ORDER — CYANOCOBALAMIN 1000 UG/ML
1000 INJECTION INTRAMUSCULAR; SUBCUTANEOUS
Qty: 0 | Refills: 0 | DISCHARGE
Start: 2025-02-20

## 2025-02-20 RX ORDER — OXYCODONE HYDROCHLORIDE 30 MG/1
10 TABLET ORAL EVERY 8 HOURS
Refills: 0 | Status: DISCONTINUED | OUTPATIENT
Start: 2025-02-20 | End: 2025-02-21

## 2025-02-20 RX ORDER — COLCHICINE 0.6 MG/1
0.6 TABLET, FILM COATED ORAL DAILY
Refills: 0 | Status: DISCONTINUED | OUTPATIENT
Start: 2025-02-21 | End: 2025-02-24

## 2025-02-20 RX ORDER — ALBUTEROL SULFATE 2.5 MG/3ML
2 VIAL, NEBULIZER (ML) INHALATION
Qty: 0 | Refills: 0 | DISCHARGE
Start: 2025-02-20

## 2025-02-20 RX ORDER — LEVOTHYROXINE SODIUM 300 MCG
112 TABLET ORAL DAILY
Refills: 0 | Status: DISCONTINUED | OUTPATIENT
Start: 2025-02-20 | End: 2025-02-24

## 2025-02-20 RX ORDER — LEVOTHYROXINE SODIUM 300 MCG
1 TABLET ORAL
Qty: 0 | Refills: 0 | DISCHARGE
Start: 2025-02-20

## 2025-02-20 RX ORDER — BACLOFEN 10 MG/20ML
1 INJECTION INTRATHECAL
Qty: 0 | Refills: 0 | DISCHARGE
Start: 2025-02-20

## 2025-02-20 RX ORDER — CYANOCOBALAMIN 1000 UG/ML
1000 INJECTION INTRAMUSCULAR; SUBCUTANEOUS DAILY
Refills: 0 | Status: DISCONTINUED | OUTPATIENT
Start: 2025-02-21 | End: 2025-02-24

## 2025-02-20 RX ORDER — ALBUTEROL SULFATE 2.5 MG/3ML
2 VIAL, NEBULIZER (ML) INHALATION EVERY 6 HOURS
Refills: 0 | Status: DISCONTINUED | OUTPATIENT
Start: 2025-02-20 | End: 2025-02-24

## 2025-02-20 RX ORDER — TRAMADOL HYDROCHLORIDE 50 MG/1
0.5 TABLET, FILM COATED ORAL
Qty: 0 | Refills: 0 | DISCHARGE
Start: 2025-02-20

## 2025-02-20 RX ORDER — CYANOCOBALAMIN 1000 UG/ML
1000 INJECTION INTRAMUSCULAR; SUBCUTANEOUS DAILY
Refills: 0 | Status: DISCONTINUED | OUTPATIENT
Start: 2025-02-20 | End: 2025-02-20

## 2025-02-20 RX ORDER — LIDOCAINE HYDROCHLORIDE 20 MG/ML
1 JELLY TOPICAL
Qty: 0 | Refills: 0 | DISCHARGE
Start: 2025-02-20

## 2025-02-20 RX ORDER — BACLOFEN 10 MG/20ML
5 INJECTION INTRATHECAL EVERY 8 HOURS
Refills: 0 | Status: DISCONTINUED | OUTPATIENT
Start: 2025-02-20 | End: 2025-02-24

## 2025-02-20 RX ORDER — METOPROLOL SUCCINATE 50 MG/1
1 TABLET, EXTENDED RELEASE ORAL
Qty: 0 | Refills: 0 | DISCHARGE
Start: 2025-02-20

## 2025-02-20 RX ORDER — NICOTINE POLACRILEX 4 MG/1
1 GUM, CHEWING ORAL
Qty: 0 | Refills: 0 | DISCHARGE
Start: 2025-02-20

## 2025-02-20 RX ORDER — METOPROLOL SUCCINATE 50 MG/1
100 TABLET, EXTENDED RELEASE ORAL DAILY
Refills: 0 | Status: DISCONTINUED | OUTPATIENT
Start: 2025-02-21 | End: 2025-02-24

## 2025-02-20 RX ADMIN — LIDOCAINE HYDROCHLORIDE 1 PATCH: 20 JELLY TOPICAL at 11:16

## 2025-02-20 RX ADMIN — LIDOCAINE HYDROCHLORIDE 1 PATCH: 20 JELLY TOPICAL at 01:00

## 2025-02-20 RX ADMIN — COLCHICINE 0.6 MILLIGRAM(S): 0.6 TABLET, FILM COATED ORAL at 11:15

## 2025-02-20 RX ADMIN — OXYCODONE HYDROCHLORIDE 20 MILLIGRAM(S): 30 TABLET ORAL at 00:41

## 2025-02-20 RX ADMIN — TRAMADOL HYDROCHLORIDE 25 MILLIGRAM(S): 50 TABLET, FILM COATED ORAL at 06:00

## 2025-02-20 RX ADMIN — Medication 30 MILLIGRAM(S): at 06:00

## 2025-02-20 RX ADMIN — NALOXEGOL OXALATE 25 MILLIGRAM(S): 12.5 TABLET, FILM COATED ORAL at 11:43

## 2025-02-20 RX ADMIN — Medication 112 MICROGRAM(S): at 05:26

## 2025-02-20 RX ADMIN — TRAMADOL HYDROCHLORIDE 25 MILLIGRAM(S): 50 TABLET, FILM COATED ORAL at 05:26

## 2025-02-20 RX ADMIN — Medication 1 DOSE(S): at 05:27

## 2025-02-20 RX ADMIN — OXYCODONE HYDROCHLORIDE 20 MILLIGRAM(S): 30 TABLET ORAL at 01:12

## 2025-02-20 RX ADMIN — CYANOCOBALAMIN 1000 MICROGRAM(S): 1000 INJECTION INTRAMUSCULAR; SUBCUTANEOUS at 11:15

## 2025-02-20 RX ADMIN — Medication 30 MILLIGRAM(S): at 18:11

## 2025-02-20 RX ADMIN — Medication 30 MILLIGRAM(S): at 05:26

## 2025-02-20 RX ADMIN — Medication 30 MILLIGRAM(S): at 19:00

## 2025-02-20 RX ADMIN — NICOTINE POLACRILEX 1 PATCH: 4 GUM, CHEWING ORAL at 11:15

## 2025-02-20 RX ADMIN — METOPROLOL SUCCINATE 100 MILLIGRAM(S): 50 TABLET, EXTENDED RELEASE ORAL at 05:30

## 2025-02-20 RX ADMIN — Medication 40 MILLIGRAM(S): at 05:26

## 2025-02-20 NOTE — PROGRESS NOTE ADULT - SUBJECTIVE AND OBJECTIVE BOX
DATE OF SERVICE: 02-20-25 @ 12:42    Patient is a 64y old  Female who presents with a chief complaint of chest pain (20 Feb 2025 12:26)      INTERVAL HISTORY: in no acute distress, awaiting transfer to [facility] for bronchoscopy     REVIEW OF SYSTEMS:  CONSTITUTIONAL: No weakness  EYES/ENT: No visual changes;  No throat pain   NECK: No pain or stiffness  RESPIRATORY: No cough, wheezing; No shortness of breath; + hemoptysis  CARDIOVASCULAR: No chest pain or palpitations  GASTROINTESTINAL: No abdominal  pain. No nausea, vomiting, or hematemesis  GENITOURINARY: No dysuria, frequency or hematuria  NEUROLOGICAL: No stroke like symptoms  SKIN: No rashes    TELEMETRY Personally reviewed: SB 45 - 55, PVCs & PACs  	  MEDICATIONS:  metoprolol succinate  milliGRAM(s) Oral daily        PHYSICAL EXAM:  T(C): 36.6 (02-20-25 @ 11:36), Max: 37 (02-19-25 @ 20:16)  HR: 72 (02-20-25 @ 11:36) (58 - 72)  BP: 125/69 (02-20-25 @ 11:36) (115/74 - 178/78)  RR: 18 (02-20-25 @ 11:36) (18 - 18)  SpO2: 94% (02-20-25 @ 11:36) (94% - 100%)  Wt(kg): --  I&O's Summary    19 Feb 2025 07:01  -  20 Feb 2025 07:00  --------------------------------------------------------  IN: 740 mL / OUT: 550 mL / NET: 190 mL    20 Feb 2025 07:01  -  20 Feb 2025 12:42  --------------------------------------------------------  IN: 180 mL / OUT: 0 mL / NET: 180 mL          Appearance: In no distress	  HEENT:    PERRL, EOMI	  Cardiovascular:  S1 S2, No JVD  Respiratory: Lungs clear to auscultation	  Gastrointestinal:  Soft, Non-tender, + BS	  Vascularature:  No edema of LE  Psychiatric: Appropriate affect   Neuro: no acute focal deficits                     Labs personally reviewed      ASSESSMENT/PLAN: 	       64-year-old female past medical history of A-fib, scleroderma, asthma with prior intubations, patient admission for pericardial effusion status post pericardiocentesis with drain on colchicine presents ED complaining of sudden onset left flank pain that woke her from sleep this morning.  Had similar symptoms 1 week ago on right side, now with left flank worse with inspiration with mild shortness of breath.    Denies fever chills nausea vomiting diarrhea abdominal pain dysuria hematuria.  Patient on 3 L nasal cannula baseline at home.     Problem/Plan #1: Chest Pain  - p/w left sided chest pain radiating to the back, non-exertional and sometimes worse with inspiration  - Had admission 1 month ago for pericardial effusion s/p drain on home maintenance with colchicine  - ECG NSR  - Trop and BNP wnl  - CXR wnl  - Prior TTE showed preserved EF, no WMA, small pericardial effusion  - ESR, CRP elevated  - D-dimer elevated, PE less likely as on Eliquis/full dose AC     Problem/Plan #2: Pericardial Effusion  - ? 2/2 scleroderma   - s/p drain with 650ml removed last admission in December 2024  - OP Cards did TTE which shows small increase from prior  - Repeat TTE with recurrent large effusion, CT w/ small circumferential effusion, seen by TC no acute intervention   - ESR, CRP elevated. Continue colchicine.    Problem/Plan #3: Atrial Fibrillation  - c/w Toprol 100mg PO daily  - c/w Eliquis 5mg PO BID  - Will monitor HR closely.   -HR in the 60s     Problem/Plan #4: Hilar lymph nodes  - Planned for bronchoscopy at [facility] - awaiting transfer        VICKIE Rubin DO [facility]  Cardiovascular Medicine  [address], [address]  Available through call or text on [employer] TEAMs  Office: [phone]

## 2025-02-20 NOTE — PROGRESS NOTE ADULT - ASSESSMENT
64 year old female with scleroderma presenting with chest pain, GI consulted for dysphagia    1. Dysphagia.  pt declining workup at this time (egd or esophagram)  continue PPI BID    2. Scleroderma    3. Constipation - improving   multifactorial; scleroderma and opioid use  cont miralax 34 BID  cont Movantik while on chronic opioids

## 2025-02-20 NOTE — PROGRESS NOTE ADULT - NS ATTEND BILL GEN_ALL_CORE
Attending to bill
Quality 131: Pain Assessment And Follow-Up: Pain assessment using a standardized tool is documented as negative, no follow-up plan required
Quality 130: Documentation Of Current Medications In The Medical Record: Current Medications Documented
Quality 474: Zoster Vaccination Status: Shingrix Vaccination Administered or Previously Received
Quality 111:Pneumonia Vaccination Status For Older Adults: Pneumococcal Vaccination Previously Received
Quality 110: Preventive Care And Screening: Influenza Immunization: Influenza Immunization Administered during Influenza season
Detail Level: Detailed
Quality 137: Melanoma: Continuity Of Care - Recall System: Patient information entered into a recall system that includes: target date for the next exam specified AND a process to follow up with patients regarding missed or unscheduled appointments

## 2025-02-20 NOTE — PROGRESS NOTE ADULT - SUBJECTIVE AND OBJECTIVE BOX
DATE OF SERVICE: 02-20-25 @ 12:26    Patient is a 64y old  Female who presents with a chief complaint of chest pain (19 Feb 2025 21:14)      SUBJECTIVE / OVERNIGHT EVENTS:  No chest pain. No shortness of breath. No complaints. No events overnight.     MEDICATIONS  (STANDING):  colchicine 0.6 milliGRAM(s) Oral daily  cyanocobalamin Injectable 1000 MICROGram(s) IntraMuscular daily  fluticasone propionate/ salmeterol 250-50 MICROgram(s) Diskus 1 Dose(s) Inhalation two times a day  levothyroxine 112 MICROGram(s) Oral daily  lidocaine   4% Patch 1 Patch Transdermal daily  metoprolol succinate  milliGRAM(s) Oral daily  morphine ER Tablet 30 milliGRAM(s) Oral every 12 hours  naloxegol 25 milliGRAM(s) Oral daily  nicotine - 21 mG/24Hr(s) Patch 1 Patch Transdermal daily  pantoprazole    Tablet 40 milliGRAM(s) Oral every 12 hours  traMADol 25 milliGRAM(s) Oral three times a day    MEDICATIONS  (PRN):  albuterol    90 MICROgram(s) HFA Inhaler 2 Puff(s) Inhalation every 6 hours PRN Shortness of Breath and/or Wheezing  baclofen 5 milliGRAM(s) Oral every 8 hours PRN Musculoskeletal Pain  oxyCODONE    IR 20 milliGRAM(s) Oral every 8 hours PRN for severe pain      Vital Signs Last 24 Hrs  T(C): 36.6 (20 Feb 2025 11:36), Max: 37 (19 Feb 2025 20:16)  T(F): 97.8 (20 Feb 2025 11:36), Max: 98.6 (19 Feb 2025 20:16)  HR: 72 (20 Feb 2025 11:36) (58 - 72)  BP: 125/69 (20 Feb 2025 11:36) (115/74 - 178/78)  BP(mean): --  RR: 18 (20 Feb 2025 11:36) (18 - 18)  SpO2: 94% (20 Feb 2025 11:36) (94% - 100%)    Parameters below as of 20 Feb 2025 11:36  Patient On (Oxygen Delivery Method): nasal cannula      CAPILLARY BLOOD GLUCOSE        I&O's Summary    19 Feb 2025 07:01  -  20 Feb 2025 07:00  --------------------------------------------------------  IN: 740 mL / OUT: 550 mL / NET: 190 mL    20 Feb 2025 07:01  -  20 Feb 2025 12:26  --------------------------------------------------------  IN: 180 mL / OUT: 0 mL / NET: 180 mL        PHYSICAL EXAM:  GENERAL: NAD, well-developed  HEAD:  Atraumatic, Normocephalic  EYES: EOMI, PERRLA, conjunctiva and sclera clear  NECK: Supple, No JVD  CHEST/LUNG: Clear to auscultation bilaterally; No wheeze  HEART: Regular rate and rhythm; No murmurs, rubs, or gallops  ABDOMEN: Soft, Nontender, Nondistended; Bowel sounds present  EXTREMITIES:  2+ Peripheral Pulses, No clubbing, cyanosis, or edema  PSYCH: AAOx3  NEUROLOGY: non-focal  SKIN: No rashes or lesions    LABS:                    RADIOLOGY & ADDITIONAL TESTS:    Imaging Personally Reviewed:    Consultant(s) Notes Reviewed:      Care Discussed with Consultants/Other Providers:

## 2025-02-20 NOTE — PROGRESS NOTE ADULT - SUBJECTIVE AND OBJECTIVE BOX
INTERVAL HPI/OVERNIGHT EVENTS:    seen earlier this morning, offered no gi complaints     MEDICATIONS  (STANDING):  colchicine 0.6 milliGRAM(s) Oral daily  cyanocobalamin Injectable 1000 MICROGram(s) IntraMuscular daily  fluticasone propionate/ salmeterol 250-50 MICROgram(s) Diskus 1 Dose(s) Inhalation two times a day  levothyroxine 112 MICROGram(s) Oral daily  lidocaine   4% Patch 1 Patch Transdermal daily  metoprolol succinate  milliGRAM(s) Oral daily  morphine ER Tablet 30 milliGRAM(s) Oral every 12 hours  naloxegol 25 milliGRAM(s) Oral daily  nicotine - 21 mG/24Hr(s) Patch 1 Patch Transdermal daily  pantoprazole    Tablet 40 milliGRAM(s) Oral every 12 hours  traMADol 25 milliGRAM(s) Oral three times a day    MEDICATIONS  (PRN):  albuterol    90 MICROgram(s) HFA Inhaler 2 Puff(s) Inhalation every 6 hours PRN Shortness of Breath and/or Wheezing  baclofen 5 milliGRAM(s) Oral every 8 hours PRN Musculoskeletal Pain  oxyCODONE    IR 20 milliGRAM(s) Oral every 8 hours PRN for severe pain      Allergies    penicillin (Unknown)  Originally Entered as [Unknown] reaction to [BEE STINGS] (Unknown)  IV Contrast (Unknown)  iodine (Anaphylaxis)  penicillin (Anaphylaxis; Hives; Other)  ABIDA dye (Short breath; Hives)  statins (Unknown)  Xolair (Unknown)    Intolerances        Review of Systems:    General:  No wt loss, fevers, chills, night sweats, fatigue   Eyes:  Good vision, no reported pain  ENT:  No sore throat, pain, runny nose, dysphagia  CV:  No pain, palpitations, hypo/hypertension  Resp:  No dyspnea, cough, tachypnea, wheezing  GI:  No pain, No nausea, No vomiting, No diarrhea, No constipation, No weight loss, No fever, No pruritis, No rectal bleeding, No melena, No dysphagia  :  No pain, bleeding, incontinence, nocturia  Muscle:  No pain, weakness  Neuro:  No weakness, tingling, memory problems  Psych:  No fatigue, insomnia, mood problems, depression  Endocrine:  No polyuria, polydypsia, cold/heat intolerance  Heme:  No petechiae, ecchymosis, easy bruisability  Skin:  No rash, tattoos, scars, edema      Vital Signs Last 24 Hrs  T(C): 36.3 (20 Feb 2025 13:00), Max: 37 (19 Feb 2025 20:16)  T(F): 97.4 (20 Feb 2025 13:00), Max: 98.6 (19 Feb 2025 20:16)  HR: 57 (20 Feb 2025 13:00) (57 - 72)  BP: 140/73 (20 Feb 2025 13:00) (115/74 - 178/78)  BP(mean): --  RR: 20 (20 Feb 2025 13:00) (18 - 20)  SpO2: 94% (20 Feb 2025 13:00) (94% - 100%)    Parameters below as of 20 Feb 2025 13:00  Patient On (Oxygen Delivery Method): room air        PHYSICAL EXAM:    Constitutional: NAD  HEENT: EOMI, throat clear  Neck: No LAD, supple  Respiratory: CTA and P  Cardiovascular: S1 and S2, RRR, no M  Gastrointestinal: BS+, soft, NT/ND, neg HSM,  Extremities: No peripheral edema, neg clubbing, cyanosis  Vascular: 2+ peripheral pulses  Neurological: A/O   Psychiatric: Normal mood, normal affect  Skin: No rashes      LABS:                RADIOLOGY & ADDITIONAL TESTS:

## 2025-02-20 NOTE — PROGRESS NOTE ADULT - NS ATTEND AMEND GEN_ALL_CORE FT
Patient care and plan discussed and reviewed with Advanced Care Provider. Plan as outlined above edited by me to reflect our discussion.   In addition, I participated in    - Ordering, reviewing, and interpreting labs, testing, and imaging.  - Reviewing prior hospitalization and where necessary, outpatient records.  - Counselling and educating patient and/or family regarding interpretation of aforementioned items and plan of care.  - Communicating with other health professionals (when not separately reported), and documenting clinical information in the electronic health record.
Patient care and plan discussed and reviewed with Advanced Care Provider. Plan as outlined above edited by me to reflect our discussion.   In addition, I participated in    - Ordering, reviewing, and interpreting labs, testing, and imaging.  - Reviewing prior hospitalization and where necessary, outpatient records.  - Counselling and educating patient and/or family regarding interpretation of aforementioned items and plan of care.
Patient care and plan discussed and reviewed with Advanced Care Provider. Plan as outlined above edited by me to reflect our discussion.   In addition, I participated in    - Ordering, reviewing, and interpreting labs, testing, and imaging.  - Reviewing prior hospitalization and where necessary, outpatient records.  - Counselling and educating patient and/or family regarding interpretation of aforementioned items and plan of care.  - Communicating with other health professionals (when not separately reported), and documenting clinical information in the electronic health record.

## 2025-02-20 NOTE — H&P ADULT - VTE RISK ASSESSMENT
Postop Progress Note    Subjective    Jose A Bingham presents to the office for postop follow up. No bleeding since hysteroscopy, D&C, polypectomy  Objective    Vitals:    06/02/22 0818   BP: (!) 179/79   Pulse: 74     General: alert, cooperative and no distress      Assessment  Doing well postoperatively. Plan  1. Continue any current medications    3. Pt is to increase activities as tolerated  4. Usual diet  5.  Follow up: as needed    Electronically signed by Addis Dowling MD on 6/2/2022 at 8:30 AM <<--- Click to launch

## 2025-02-20 NOTE — PROGRESS NOTE ADULT - PROVIDER SPECIALTY LIST ADULT
Cardiology
Cardiology
Internal Medicine
Intervent Pulmonology
Pulmonology
Thoracic Surgery
Cardiology
Gastroenterology
Gastroenterology
Internal Medicine
Internal Medicine
Thoracic Surgery
Cardiology
Gastroenterology
Gastroenterology
Internal Medicine
Gastroenterology
Gastroenterology
Internal Medicine
Thoracic Surgery

## 2025-02-20 NOTE — PROGRESS NOTE ADULT - REASON FOR ADMISSION
chest pain
tsering eff

## 2025-02-20 NOTE — CHART NOTE - NSCHARTNOTEFT_GEN_A_CORE
Patient scheduled for tomorrow Flexible Bronchoscopy with EBUS, please ensure;  NPO after midnight  Coags, CBC, CMP in AM  Hold DVT prophylaxis, continue to hold Eliquis

## 2025-02-20 NOTE — PATIENT PROFILE ADULT - FALL HARM RISK - HARM RISK INTERVENTIONS
Cardiology Initial Consultation Note    Date of note:    6/12/2023    Primary Care Provider: Alfredo Gomez M.D.  Referring Provider: Alfredo Gomez M*    Patient Name: Jamilah Olivares   YOB: 1970  MRN:              [id number]    Chief Complaint   Patient presents with    Palpitations       History of Present Illness: Ms. Jamilah Martinez is a 53-year-old woman with past medical history significant for anxiety who presents to the cardiovascular office for further evaluation of palpitations.    Patient has been having palpitations for the past 1-2 years. Palpitations are intermittent in nature.  Will last several seconds to a minute before resolving on its own.  With palpitations she has associated shortness of breath/dyspnea.  Can also have associated lightheadedness/dizziness but denies having episodes of syncope.  No episodes associated chest pain. She was seen by her PCP in June 2022 for palpitations. Was subsequently referred to cardiology but never evaluated until today.    She states that these symptoms tend to occur when she is resting or relaxing.  Has never experienced this with exercise.  She goes on frequent walks and does Gama on a regular basis.  Denies having exertional chest pain, palpitations or dyspnea with exercise.  Denies having lower extremity edema, orthopnea.  She has no other major complaints today    Of note, patient states that she has had an echocardiogram performed in [place] in the past.  She was noted to have aortic valve sclerosis and diastolic dysfunction.     From a social history standpoint, she denies alcohol use, tobacco use.  Drinks about 2 cups of coffee a day.  She has been trying to cut back on caffeine consumption and has been drinking decaffeinated coffee which appears to have helped with some of her palpitation symptoms. Family history is negative for premature CAD in first-degree relatives.  However, she notes that her mother has a history  of suspected dilated cardiomyopathy that was diagnosed in her mid to late 50s.    Cardiovascular Risk Factors:  1. Smoking status: Denies  2. Type II Diabetes Mellitus: Denies   Lab Results   Component Value Date/Time    HBA1C 5.6 07/03/2021 08:58 AM     3. Hypertension: Denies  4. Dyslipidemia: Not on medications.   Cholesterol,Tot   Date Value Ref Range Status   07/15/2022 182 100 - 199 mg/dL Final     LDL   Date Value Ref Range Status   07/15/2022 117 (H) <100 mg/dL Final     HDL   Date Value Ref Range Status   07/15/2022 55 >=40 mg/dL Final     Triglycerides   Date Value Ref Range Status   07/15/2022 52 0 - 149 mg/dL Final     5. Family history of early Coronary Artery Disease in a first degree relative (Male less than 55 years of age; Female less than 65 years of age): Denies      Review of Systems:  As per HPI. All other systems reviewed and are negative.      Past Medical History:   Diagnosis Date    Carpal tunnel syndrome     Depression 5/25/2021    GERD (gastroesophageal reflux disease)     at times with greasy food, tums or zantac,    Heart burn     Indigestion     Pure hypercholesterolemia 8/23/2016         Past Surgical History:   Procedure Laterality Date    CO ANTER COLPORRHAPHY,BLAD/VAGINA N/A 12/3/2021    Procedure: COLPORRHAPHY, ANTERIOR;  Surgeon: Micaela Valdes M.D.;  Location: SURGERY SAME DAY [facility];  Service: Gynecology    CO SLING OPER STRES INCONTINENCE N/A 12/3/2021    Procedure: BLADDER SLING, FEMALE - RECTROPUBIC SLING;  Surgeon: Micaela Valdes M.D.;  Location: SURGERY SAME DAY [facility];  Service: Gynecology    CO CYSTOURETHROSCOPY N/A 12/3/2021    Procedure: CYSTOSCOPY;  Surgeon: Micaela Valdes M.D.;  Location: SURGERY SAME DAY [facility];  Service: Gynecology    HYSTERECTOMY, VAGINAL  3/2015    Marilee with BSO.  heavy menses with ovarian cysts=benign    CHOLECYSTECTOMY      PELVISCOPY      endometriosis         Current Outpatient Medications   Medication Sig Dispense  "Refill    sertraline (ZOLOFT) 50 MG Tab TAKE ONE TABLET BY MOUTH DAILY 90 Tablet 0    hydrOXYzine HCl (ATARAX) 25 MG Tab Take 1 Tablet by mouth 3 times a day as needed for Anxiety. 30 Tablet 0    estradiol (ESTRACE) 0.1 MG/GM vaginal cream        No current facility-administered medications for this visit.         No Known Allergies      Family History   Problem Relation Age of Onset    Heart Disease Mother         CHF/MIx2    Hyperlipidemia Father     Cancer Sister         BR CA    Heart Disease Maternal Grandmother         pacemaker=placed very late in life     Other Maternal Grandfather         parkinson    Stroke Maternal Grandfather     Cancer Paternal Grandfather         bone CA         Social History     Socioeconomic History    Marital status:      Spouse name: Not on file    Number of children: Not on file    Years of education: Not on file    Highest education level: Not on file   Occupational History    Not on file   Tobacco Use    Smoking status: Never    Smokeless tobacco: Never   Vaping Use    Vaping Use: Never used   Substance and Sexual Activity    Alcohol use: No     Alcohol/week: 0.0 oz    Drug use: No    Sexual activity: Never     Partners: Male     Comment:  x 28 years   Other Topics Concern    Not on file   Social History Narrative    Not on file     Social Determinants of Health     Financial Resource Strain: Not on file   Food Insecurity: Not on file   Transportation Needs: Not on file   Physical Activity: Not on file   Stress: Not on file   Social Connections: Not on file   Intimate Partner Violence: Not on file   Housing Stability: Not on file         Physical Exam:  Ambulatory Vitals  /80 (BP Location: Left arm, Patient Position: Sitting, BP Cuff Size: Adult)   Pulse 64   Resp 12   Ht 1.626 m (5' 4\")   Wt 70.8 kg (156 lb)   SpO2 96%    Oxygen Therapy:  Pulse Oximetry: 96 %  BP Readings from Last 4 Encounters:   06/12/23 122/80   06/23/22 110/68   06/06/22 118/80 "   02/17/22 120/78       Weight/BMI: Body mass index is 26.78 kg/m².  Wt Readings from Last 4 Encounters:   06/12/23 70.8 kg (156 lb)   06/23/22 69.9 kg (154 lb)   06/06/22 71.7 kg (158 lb)   04/27/22 69.9 kg (154 lb)         General: Not in acute distress, appears comfortable  HEENT: OP clear   Neck:  No carotid bruits, No JVD appreciated  CVS:  RRR, Normal S1, S2. No murmurs, rubs or gallops  Resp: Normal respiratory effort, lungs CTA bilaterally. No rales or rhonchi  Abdomen: Soft, non-distended, non-tender to palpation, no guarding or rigidity  Skin: No obvious rashes, no cyanosis  Neurological: Alert and oriented x3, moves all extremities, no focal neurologic deficits  Psychiatric: Appropriate affect  Extremities:   Extremities warm, pulses intact, no edema      Lab Data Review:  Lab Results   Component Value Date/Time    CHOLSTRLTOT 182 07/15/2022 11:29 AM     (H) 07/15/2022 11:29 AM    HDL 55 07/15/2022 11:29 AM    TRIGLYCERIDE 52 07/15/2022 11:29 AM       Lab Results   Component Value Date/Time    SODIUM 140 11/23/2021 12:04 PM    POTASSIUM 4.2 11/23/2021 12:04 PM    CHLORIDE 100 11/23/2021 12:04 PM    CO2 28 11/23/2021 12:04 PM    GLUCOSE 72 11/23/2021 12:04 PM    BUN 12 11/23/2021 12:04 PM    CREATININE 0.69 11/23/2021 12:04 PM     Lab Results   Component Value Date/Time    ALKPHOSPHAT 64 11/23/2021 12:04 PM    ASTSGOT 18 11/23/2021 12:04 PM    ALTSGPT 21 11/23/2021 12:04 PM    TBILIRUBIN 0.3 11/23/2021 12:04 PM      Lab Results   Component Value Date/Time    WBC 2.6 (L) 07/15/2022 11:29 AM     Lab Results   Component Value Date/Time    HBA1C 5.6 07/03/2021 08:58 AM         Cardiac Imaging and Procedures Review:    EKG dated 6/12/23:   My personal interpretation is sinus bradycardia rate 59 bpm, normal intervals, no ST-T changes suggestive of ischemia.      Assessment & Plan     1. Palpitations  EKG    Cardiac Event Monitor    EC-ECHOCARDIOGRAM COMPLETE W/O CONT      2. Shortness of breath  Cardiac  Event Monitor    EC-ECHOCARDIOGRAM COMPLETE W/O CONT      3. Aortic valve sclerosis  EC-ECHOCARDIOGRAM COMPLETE W/O CONT      4. Dyslipidemia              Medical Decision Making:  Ms. Jamilah Martinez is a 53-year-old woman with past medical history significant for anxiety who presents to the cardiovascular office for further evaluation of palpitations.    1. Palpitations  2. Shortness of breath  - Patient's symptoms of palpitations w/associated dyspnea have been ongoing for the 1-2  years. Has not had red flag symptoms of exertional chest pain or syncope.  Overall she appears stable from a cardiovascular standpoint.  No abnormal exam findings.  Suspect that her symptoms may be due to increased ectopy i.e. PACs/PVCs or short runs of paroxysmal SVT.  I have counseled her on cutting back on caffeine consumption.  Arrangements for outpatient cardiac monitoring will be made to rule out any sustained tachyarrhythmias.  Check echocardiogram to rule out major structural abnormalities.    3. Aortic valve sclerosis  -Has been told of prior aortic valve sclerosis as well as diastolic dysfunction on prior echocardiogram in [place].  At this time, we will check repeat echo to ensure no progression in valve disease or structural abnormalities.    4. Dyslipidemia  -Last lipid panel shows mildly elevated LDL.  She is not currently on lipid-lowering therapy.  Calculation of her 10-year ASCVD risk score is only 1.6%.  At this time, hold off on statin therapy.  Encouraged lifestyle modifications including 150 minutes of moderate intensity activity on a weekly basis and dietary changes to help encourage weight loss including AHA/Mediterranean/heart healthy diet.    It was a pleasure seeing Ms. Jamilah Martinez in the office today. Return if symptoms worsen or fail to improve. Patient is aware to call the cardiology clinic with any questions or concerns.      A total of 46 minutes of time was spent on day of encounter reviewing medical  record, performing history and examination, counseling, ordering medication/test/consults and documentation.      Giovanni Zuluaga MD, [facility]  Cardiologist, [facility], [facility]  [address] [place] [address]  [place] [address]  Phone: [phone]  Fax: [phone]    Please note that this dictation was created using voice recognition software. I have made every reasonable attempt to correct obvious errors, but it is possible there are errors of grammar and possibly content that I did not discover before finalizing the note.   Assistance with ambulation/Assistance OOB with selected safe patient handling equipment/Communicate Risk of Fall with Harm to all staff/Discuss with provider need for PT consult/Monitor gait and stability/Provide patient with walking aids - walker, cane, crutches/Reinforce activity limits and safety measures with patient and family/Tailored Fall Risk Interventions/Visual Cue: Yellow wristband and red socks/Bed in lowest position, wheels locked, appropriate side rails in place/Call bell, personal items and telephone in reach/Instruct patient to call for assistance before getting out of bed or chair/Non-slip footwear when patient is out of bed/[place] to call system/Physically safe environment - no spills, clutter or unnecessary equipment/Purposeful Proactive Rounding/Room/bathroom lighting operational, light cord in reach

## 2025-02-20 NOTE — PROGRESS NOTE ADULT - ASSESSMENT
64-year-old female past medical history of A-fib, scleroderma, asthma with prior intubations, patient admission for pericardial effusion status post pericardiocentesis with drain on colchicine presents ED complaining of sudden onset left flank pain that woke her from sleep this morning.  Had similar symptoms 1 week ago on right side, now with left flank worse with inspiration with mild shortness of breath.  Took inhaler with no relief prompting visit to ED.  Denies fever chills nausea vomiting diarrhea abdominal pain dysuria hematuria.  Patient on 3 L nasal cannula baseline at home.  Uncomfortable with laying flat.     L suprahilar mediastinal mass, mediastinal adenopathy; 2 x 2cm DEISI opacity   - Describes hemoptysis as sputum streaked with dark red; no bright red blood, or clots  - hold eliquis  - pls initiate transfer to [facility] for bronchoscopy, EBUS, hilar mass biopsy with IP  - procedure will be scheduled and booked once patient arrives at [facility]   - patient is agreeable to transfer, once completed, can return to NS  - pls quantify hemoptysis --> have patient expectorate every time into a graded cup to quantify hemoptysis.   - please notify pulmonary or MICU for increase in amount of blood or increase in respiratory symptoms. There is not any established definition of massive hemoptysis; any level of hemoptysis can result in airway compromise, pls monitor for changes in quality or amount of hemoptysis      Chest Pain.   - Had admission 1 month ago for pericardial effusion s/p drain on home maintenance with colchicine  - ECG NSR  - Trop and BNP wnl  - CXR wnl  - Prior TTE showed preserved EF, no WMA, small pericardial effusion; Repeat pending  - ESR, CRP elevated, h/o scleroderma  - D-dimer elevated.        Pericardial Effusion  - s/p drain with 650ml removed last admission in December 2024  - OP Cards did TTE which shows small increase from prior  - Repeat TTE - effusion is bigger  - CT chest done  - ct surg consult following    Atrial Fibrillation  - c/w Toprol 100mg PO daily  - hold eliquis Eliquis 5mg PO BID for poss surgery  - Will monitor HR closely. Currently karen in 50s. May need dose adjustment of Toprol.  - Monitor on tele.    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

## 2025-02-20 NOTE — H&P ADULT - ASSESSMENT
64-year-old female past medical history of A-fib, scleroderma, asthma with prior intubations, patient admission for pericardial effusion status post pericardiocentesis with drain on colchicine presents ED complaining of sudden onset left flank pain that woke her from sleep this morning.  Had similar symptoms 1 week ago on right side, now with left flank worse with inspiration with mild shortness of breath.  Took inhaler with no relief prompting visit to ED.  Denies fever chills nausea vomiting diarrhea abdominal pain dysuria hematuria.  Patient on 3 L nasal cannula baseline at home.  Uncomfortable with laying flat.     L suprahilar mediastinal mass, mediastinal adenopathy; 2 x 2cm DEISI opacity   - Describes hemoptysis as sputum streaked with dark red; no bright red blood, or clots  - hold eliquis  - pls initiate transfer to [facility] for bronchoscopy, EBUS, hilar mass biopsy with IP  - procedure will be scheduled and booked once patient arrives at [facility]   - patient is agreeable to transfer, once completed, can return to NS  - pls quantify hemoptysis --> have patient expectorate every time into a graded cup to quantify hemoptysis.   - please notify pulmonary or MICU for increase in amount of blood or increase in respiratory symptoms. There is not any established definition of massive hemoptysis; any level of hemoptysis can result in airway compromise, pls monitor for changes in quality or amount of hemoptysis      Chest Pain.   - Had admission 1 month ago for pericardial effusion s/p drain on home maintenance with colchicine  - ECG NSR  - Trop and BNP wnl  - CXR wnl  - Prior TTE showed preserved EF, no WMA, small pericardial effusion; Repeat pending  - ESR, CRP elevated, h/o scleroderma  - D-dimer elevated.        Pericardial Effusion  - s/p drain with 650ml removed last admission in December 2024  - OP Cards did TTE which shows small increase from prior  - Repeat TTE - effusion is bigger  - CT chest done  - ct surg consult following    Atrial Fibrillation  - c/w Toprol 100mg PO daily  - hold eliquis Eliquis 5mg PO BID for poss surgery  - Will monitor HR closely. Currently karen in 50s. May need dose adjustment of Toprol.  - Monitor on tele.    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

## 2025-02-21 LAB
ALBUMIN SERPL ELPH-MCNC: 3.6 G/DL — SIGNIFICANT CHANGE UP (ref 3.3–5)
ALP SERPL-CCNC: 97 U/L — SIGNIFICANT CHANGE UP (ref 40–120)
ALT FLD-CCNC: 11 U/L — SIGNIFICANT CHANGE UP (ref 4–33)
ANION GAP SERPL CALC-SCNC: 12 MMOL/L — SIGNIFICANT CHANGE UP (ref 7–14)
APTT BLD: 28.7 SEC — SIGNIFICANT CHANGE UP (ref 24.5–35.6)
AST SERPL-CCNC: 22 U/L — SIGNIFICANT CHANGE UP (ref 4–32)
B PERT IGG+IGM PNL SER: ABNORMAL
BILIRUB SERPL-MCNC: 0.3 MG/DL — SIGNIFICANT CHANGE UP (ref 0.2–1.2)
BUN SERPL-MCNC: 10 MG/DL — SIGNIFICANT CHANGE UP (ref 7–23)
CALCIUM SERPL-MCNC: 9.1 MG/DL — SIGNIFICANT CHANGE UP (ref 8.4–10.5)
CHLORIDE SERPL-SCNC: 95 MMOL/L — LOW (ref 98–107)
CO2 SERPL-SCNC: 26 MMOL/L — SIGNIFICANT CHANGE UP (ref 22–31)
COLOR FLD: ABNORMAL
CREAT SERPL-MCNC: 0.47 MG/DL — LOW (ref 0.5–1.3)
EGFR: 105 ML/MIN/1.73M2 — SIGNIFICANT CHANGE UP
EOSINOPHIL # FLD: 0 % — SIGNIFICANT CHANGE UP
FLUID INTAKE SUBSTANCE CLASS: SIGNIFICANT CHANGE UP
FOLATE+VIT B12 SERBLD-IMP: 0 % — SIGNIFICANT CHANGE UP
GLUCOSE SERPL-MCNC: 92 MG/DL — SIGNIFICANT CHANGE UP (ref 70–99)
HCT VFR BLD CALC: 36.5 % — SIGNIFICANT CHANGE UP (ref 34.5–45)
HGB BLD-MCNC: 12 G/DL — SIGNIFICANT CHANGE UP (ref 11.5–15.5)
INR BLD: 1 RATIO — SIGNIFICANT CHANGE UP (ref 0.85–1.16)
LYMPHOCYTES # FLD: 6 % — SIGNIFICANT CHANGE UP
MAGNESIUM SERPL-MCNC: 1.9 MG/DL — SIGNIFICANT CHANGE UP (ref 1.6–2.6)
MCHC RBC-ENTMCNC: 29.4 PG — SIGNIFICANT CHANGE UP (ref 27–34)
MCHC RBC-ENTMCNC: 32.9 G/DL — SIGNIFICANT CHANGE UP (ref 32–36)
MCV RBC AUTO: 89.5 FL — SIGNIFICANT CHANGE UP (ref 80–100)
MESOTHL CELL # FLD: 0 % — SIGNIFICANT CHANGE UP
MONOS+MACROS # FLD: 69 % — SIGNIFICANT CHANGE UP
NEUTROPHILS-BODY FLUID: 13 % — SIGNIFICANT CHANGE UP
NRBC # BLD AUTO: 0 K/UL — SIGNIFICANT CHANGE UP (ref 0–0)
NRBC # FLD: 0 K/UL — SIGNIFICANT CHANGE UP (ref 0–0)
NRBC BLD AUTO-RTO: 0 /100 WBCS — SIGNIFICANT CHANGE UP (ref 0–0)
OTHER CELLS FLD MANUAL: 12 % — SIGNIFICANT CHANGE UP
PHOSPHATE SERPL-MCNC: 4.1 MG/DL — SIGNIFICANT CHANGE UP (ref 2.5–4.5)
PLATELET # BLD AUTO: 277 K/UL — SIGNIFICANT CHANGE UP (ref 150–400)
POTASSIUM SERPL-MCNC: 4.3 MMOL/L — SIGNIFICANT CHANGE UP (ref 3.5–5.3)
POTASSIUM SERPL-SCNC: 4.3 MMOL/L — SIGNIFICANT CHANGE UP (ref 3.5–5.3)
PROT SERPL-MCNC: 6.6 G/DL — SIGNIFICANT CHANGE UP (ref 6–8.3)
PROTHROM AB SERPL-ACNC: 11.6 SEC — SIGNIFICANT CHANGE UP (ref 9.9–13.4)
RBC # BLD: 4.08 M/UL — SIGNIFICANT CHANGE UP (ref 3.8–5.2)
RBC # FLD: 12.2 % — SIGNIFICANT CHANGE UP (ref 10.3–14.5)
RCV VOL RI: SIGNIFICANT CHANGE UP CELLS/UL (ref 0–5)
SODIUM SERPL-SCNC: 133 MMOL/L — LOW (ref 135–145)
SPECIMEN SOURCE FLD: SIGNIFICANT CHANGE UP
TOTAL CELLS COUNTED, BODY FLUID: 100 CELLS — SIGNIFICANT CHANGE UP
TOTAL NUCLEATED CELL COUNT, BODY FLUID: SIGNIFICANT CHANGE UP CELLS/UL (ref 0–5)
TUBE TYPE: SIGNIFICANT CHANGE UP
WBC # BLD: 5.2 K/UL — SIGNIFICANT CHANGE UP (ref 3.8–10.5)
WBC # FLD AUTO: 5.2 K/UL — SIGNIFICANT CHANGE UP (ref 3.8–10.5)

## 2025-02-21 PROCEDURE — 88108 CYTOPATH CONCENTRATE TECH: CPT | Mod: 26,59

## 2025-02-21 PROCEDURE — 88173 CYTOPATH EVAL FNA REPORT: CPT | Mod: 26

## 2025-02-21 PROCEDURE — 88341 IMHCHEM/IMCYTCHM EA ADD ANTB: CPT | Mod: 26,59

## 2025-02-21 PROCEDURE — 31624 DX BRONCHOSCOPE/LAVAGE: CPT | Mod: GC

## 2025-02-21 PROCEDURE — 88342 IMHCHEM/IMCYTCHM 1ST ANTB: CPT | Mod: 26,59

## 2025-02-21 PROCEDURE — 99233 SBSQ HOSP IP/OBS HIGH 50: CPT | Mod: GC,25

## 2025-02-21 PROCEDURE — 88360 TUMOR IMMUNOHISTOCHEM/MANUAL: CPT | Mod: 26

## 2025-02-21 PROCEDURE — 31628 BRONCHOSCOPY/LUNG BX EACH: CPT | Mod: GC

## 2025-02-21 PROCEDURE — 31625 BRONCHOSCOPY W/BIOPSY(S): CPT | Mod: GC,59

## 2025-02-21 PROCEDURE — 31653 BRONCH EBUS SAMPLNG 3/> NODE: CPT | Mod: GC

## 2025-02-21 PROCEDURE — 31645 BRNCHSC W/THER ASPIR 1ST: CPT | Mod: GC

## 2025-02-21 PROCEDURE — 88305 TISSUE EXAM BY PATHOLOGIST: CPT | Mod: 26

## 2025-02-21 PROCEDURE — 88112 CYTOPATH CELL ENHANCE TECH: CPT | Mod: 26,59

## 2025-02-21 PROCEDURE — 88189 FLOWCYTOMETRY/READ 16 & >: CPT | Mod: 59

## 2025-02-21 PROCEDURE — 71045 X-RAY EXAM CHEST 1 VIEW: CPT | Mod: 26

## 2025-02-21 PROCEDURE — 31629 BRONCHOSCOPY/NEEDLE BX EACH: CPT | Mod: GC

## 2025-02-21 RX ORDER — FENTANYL CITRATE-0.9 % NACL/PF 100MCG/2ML
25 SYRINGE (ML) INTRAVENOUS
Refills: 0 | Status: DISCONTINUED | OUTPATIENT
Start: 2025-02-21 | End: 2025-02-21

## 2025-02-21 RX ORDER — OXYCODONE HYDROCHLORIDE 30 MG/1
20 TABLET ORAL EVERY 8 HOURS
Refills: 0 | Status: DISCONTINUED | OUTPATIENT
Start: 2025-02-21 | End: 2025-02-24

## 2025-02-21 RX ORDER — ONDANSETRON HCL/PF 4 MG/2 ML
4 VIAL (ML) INJECTION ONCE
Refills: 0 | Status: DISCONTINUED | OUTPATIENT
Start: 2025-02-21 | End: 2025-02-21

## 2025-02-21 RX ORDER — ACETAMINOPHEN 500 MG/5ML
650 LIQUID (ML) ORAL EVERY 6 HOURS
Refills: 0 | Status: DISCONTINUED | OUTPATIENT
Start: 2025-02-21 | End: 2025-02-24

## 2025-02-21 RX ADMIN — Medication 30 MILLIGRAM(S): at 06:31

## 2025-02-21 RX ADMIN — Medication 30 MILLIGRAM(S): at 18:31

## 2025-02-21 RX ADMIN — OXYCODONE HYDROCHLORIDE 20 MILLIGRAM(S): 30 TABLET ORAL at 03:25

## 2025-02-21 RX ADMIN — COLCHICINE 0.6 MILLIGRAM(S): 0.6 TABLET, FILM COATED ORAL at 12:00

## 2025-02-21 RX ADMIN — Medication 30 MILLIGRAM(S): at 19:00

## 2025-02-21 RX ADMIN — Medication 30 MILLIGRAM(S): at 07:30

## 2025-02-21 RX ADMIN — METOPROLOL SUCCINATE 100 MILLIGRAM(S): 50 TABLET, EXTENDED RELEASE ORAL at 06:30

## 2025-02-21 RX ADMIN — OXYCODONE HYDROCHLORIDE 20 MILLIGRAM(S): 30 TABLET ORAL at 11:50

## 2025-02-21 RX ADMIN — OXYCODONE HYDROCHLORIDE 20 MILLIGRAM(S): 30 TABLET ORAL at 21:49

## 2025-02-21 RX ADMIN — OXYCODONE HYDROCHLORIDE 20 MILLIGRAM(S): 30 TABLET ORAL at 02:25

## 2025-02-21 RX ADMIN — Medication 40 MILLIGRAM(S): at 06:30

## 2025-02-21 RX ADMIN — OXYCODONE HYDROCHLORIDE 20 MILLIGRAM(S): 30 TABLET ORAL at 22:49

## 2025-02-21 RX ADMIN — CYANOCOBALAMIN 1000 MICROGRAM(S): 1000 INJECTION INTRAMUSCULAR; SUBCUTANEOUS at 12:01

## 2025-02-21 RX ADMIN — NICOTINE POLACRILEX 1 PATCH: 4 GUM, CHEWING ORAL at 19:30

## 2025-02-21 RX ADMIN — NICOTINE POLACRILEX 1 PATCH: 4 GUM, CHEWING ORAL at 17:28

## 2025-02-21 RX ADMIN — OXYCODONE HYDROCHLORIDE 20 MILLIGRAM(S): 30 TABLET ORAL at 10:50

## 2025-02-21 RX ADMIN — Medication 112 MICROGRAM(S): at 07:10

## 2025-02-21 NOTE — ASU PREOP CHECKLIST - DENTURES
dental implant cemented in, cannot be removed/no lower dental implant cemented in, cannot be removed/no

## 2025-02-21 NOTE — CONSULT NOTE ADULT - ASSESSMENT
64F w/ scleroderma, Afib on eliquis (last dose 2/18 AM), asthma previously requiring intubation, pericardial effusion s/p pericardial drain, admitted w/ pleuritic chest pain. Interventional pulmonary consulted to evaluate patient for hilar mass biopsy. Course c/b hemoptysis. Patient transferred to [facility] for Flexible bronchoscopy for assesment of L lung suprahilar opacity.     #L suprahilar opacity s/p Flex bronch with EBUS of LN and lesion   Recommendations:   - Can restart Eliquis tomorrow morning if no significant hemoptysis   - If mild hemoptysis can start TXA nebs 500mg TID  - Please quantify hemoptysis --> have patient expectorate every time into a graded cup to quantify hemoptysis  - please notify pulmonary or MICU for increase in amount of blood or increase in respiratory symptoms. There is not any established definition of massive hemoptysis; any level of hemoptysis can result in airway compromise, pls monitor for changes in quality or amount of hemoptysis   - Follow up pathology, micro and cytology from procedure

## 2025-02-21 NOTE — ASU PREOP CHECKLIST - 3.
Unable to fully assess skin on back, patient refusing to turn due to pain with repositioning. All other areas intact, dryness noted.

## 2025-02-21 NOTE — BRIEF OPERATIVE NOTE - OPERATION/FINDINGS
Flexible bronchoscope inserted through ETT. Inspection of airway revealed DEISI endobronchial lesion, forceps biopsy performed with friable mucosa. EBUS performed with TBNA. EBUS of 7LN, 4L, 4R performed. DEISI BAL performed

## 2025-02-21 NOTE — CONSULT NOTE ADULT - ATTENDING COMMENTS
Patient seen and examined with team at bedside during rounds after lab data, medical records and radiology reports reviewed. I have read and agreeable in general with the documented note, assessment, and management plan which reflects my opinions from bedside rounds. Agree with note above and will highlight the followin 4 female with scleroderma complicated by limited neck mobility A-fib on Eliquis and history of recurrent intubations secondary to?  Asthma recent pericardial effusion requiring drain last year now with pleuritic chest pain.  IP consulted for evaluation of CT regularities.  And hemoptysis.      CT reviewed demonstrating left suprahilar opacity as well as left upper lobe consolidative mass.  Review of airway demonstrates patent upper airway and trachea  as well as major primary and secondary airways.  Esophagus is patulous and she has  history of poor GI motility.  I discussed the increased risk of sedation and intubation with the anesthesiologist and  her friend Sam who is her primary   Care assistant.  Her hemoptysis is decreased.  Will plan for bronchoscopic evaluation and biopsy of airways today plan for bronchoscopic intubation    I have personally provided 52 minutes of non-critical care time concurrently with the resident/fellow/NP/PA. This time excludes time spent on separate procedures and time spent teaching. I have reviewed the resident/fellow/NP/PA’s documentation and I agree with the assessment and plan of care. I have personally reviewed laboratory data, radiology results, discussion with primary team\patient, discussion with consulting services, and monitoring for potential decompensation. Interventional Pulmonology services provided to the patient were separate from general pulmonary service due to complexity of issues and interventions required.  Complex patient requiring advanced services.    Sam Webster MD  Interventional Pulmonology & Critical Care Medicine

## 2025-02-21 NOTE — PROVIDER CONTACT NOTE (OTHER) - ACTION/TREATMENT ORDERED:
no new orders given at this time
ACP made aware, plan of care ongoing.
no new orders given at this time

## 2025-02-22 LAB
ANION GAP SERPL CALC-SCNC: 11 MMOL/L — SIGNIFICANT CHANGE UP (ref 7–14)
BUN SERPL-MCNC: 12 MG/DL — SIGNIFICANT CHANGE UP (ref 7–23)
CALCIUM SERPL-MCNC: 8.8 MG/DL — SIGNIFICANT CHANGE UP (ref 8.4–10.5)
CHLORIDE SERPL-SCNC: 95 MMOL/L — LOW (ref 98–107)
CO2 SERPL-SCNC: 25 MMOL/L — SIGNIFICANT CHANGE UP (ref 22–31)
CREAT SERPL-MCNC: 0.44 MG/DL — LOW (ref 0.5–1.3)
EGFR: 107 ML/MIN/1.73M2 — SIGNIFICANT CHANGE UP
GLUCOSE SERPL-MCNC: 106 MG/DL — HIGH (ref 70–99)
GRAM STN FLD: SIGNIFICANT CHANGE UP
HCT VFR BLD CALC: 34.1 % — LOW (ref 34.5–45)
HGB BLD-MCNC: 11.4 G/DL — LOW (ref 11.5–15.5)
MAGNESIUM SERPL-MCNC: 1.7 MG/DL — SIGNIFICANT CHANGE UP (ref 1.6–2.6)
MCHC RBC-ENTMCNC: 29.5 PG — SIGNIFICANT CHANGE UP (ref 27–34)
MCHC RBC-ENTMCNC: 33.4 G/DL — SIGNIFICANT CHANGE UP (ref 32–36)
MCV RBC AUTO: 88.3 FL — SIGNIFICANT CHANGE UP (ref 80–100)
NIGHT BLUE STAIN TISS: SIGNIFICANT CHANGE UP
NRBC # BLD AUTO: 0 K/UL — SIGNIFICANT CHANGE UP (ref 0–0)
NRBC # FLD: 0 K/UL — SIGNIFICANT CHANGE UP (ref 0–0)
NRBC BLD AUTO-RTO: 0 /100 WBCS — SIGNIFICANT CHANGE UP (ref 0–0)
PHOSPHATE SERPL-MCNC: 3.5 MG/DL — SIGNIFICANT CHANGE UP (ref 2.5–4.5)
PLATELET # BLD AUTO: 281 K/UL — SIGNIFICANT CHANGE UP (ref 150–400)
POTASSIUM SERPL-MCNC: 4.3 MMOL/L — SIGNIFICANT CHANGE UP (ref 3.5–5.3)
POTASSIUM SERPL-SCNC: 4.3 MMOL/L — SIGNIFICANT CHANGE UP (ref 3.5–5.3)
RBC # BLD: 3.86 M/UL — SIGNIFICANT CHANGE UP (ref 3.8–5.2)
RBC # FLD: 11.8 % — SIGNIFICANT CHANGE UP (ref 10.3–14.5)
SODIUM SERPL-SCNC: 131 MMOL/L — LOW (ref 135–145)
SPECIMEN SOURCE: SIGNIFICANT CHANGE UP
SPECIMEN SOURCE: SIGNIFICANT CHANGE UP
WBC # BLD: 5.14 K/UL — SIGNIFICANT CHANGE UP (ref 3.8–10.5)
WBC # FLD AUTO: 5.14 K/UL — SIGNIFICANT CHANGE UP (ref 3.8–10.5)

## 2025-02-22 RX ORDER — LIDOCAINE HYDROCHLORIDE 20 MG/ML
1 JELLY TOPICAL ONCE
Refills: 0 | Status: COMPLETED | OUTPATIENT
Start: 2025-02-22 | End: 2025-02-22

## 2025-02-22 RX ORDER — APIXABAN 2.5 MG/1
5 TABLET, FILM COATED ORAL EVERY 12 HOURS
Refills: 0 | Status: DISCONTINUED | OUTPATIENT
Start: 2025-02-22 | End: 2025-02-24

## 2025-02-22 RX ADMIN — NICOTINE POLACRILEX 1 PATCH: 4 GUM, CHEWING ORAL at 17:06

## 2025-02-22 RX ADMIN — NICOTINE POLACRILEX 1 PATCH: 4 GUM, CHEWING ORAL at 07:30

## 2025-02-22 RX ADMIN — NICOTINE POLACRILEX 1 PATCH: 4 GUM, CHEWING ORAL at 19:02

## 2025-02-22 RX ADMIN — APIXABAN 5 MILLIGRAM(S): 2.5 TABLET, FILM COATED ORAL at 17:09

## 2025-02-22 RX ADMIN — OXYCODONE HYDROCHLORIDE 20 MILLIGRAM(S): 30 TABLET ORAL at 22:04

## 2025-02-22 RX ADMIN — OXYCODONE HYDROCHLORIDE 20 MILLIGRAM(S): 30 TABLET ORAL at 14:18

## 2025-02-22 RX ADMIN — OXYCODONE HYDROCHLORIDE 20 MILLIGRAM(S): 30 TABLET ORAL at 06:14

## 2025-02-22 RX ADMIN — LIDOCAINE HYDROCHLORIDE 1 PATCH: 20 JELLY TOPICAL at 19:02

## 2025-02-22 RX ADMIN — OXYCODONE HYDROCHLORIDE 20 MILLIGRAM(S): 30 TABLET ORAL at 14:10

## 2025-02-22 RX ADMIN — Medication 30 MILLIGRAM(S): at 19:02

## 2025-02-22 RX ADMIN — OXYCODONE HYDROCHLORIDE 20 MILLIGRAM(S): 30 TABLET ORAL at 07:30

## 2025-02-22 RX ADMIN — Medication 30 MILLIGRAM(S): at 07:30

## 2025-02-22 RX ADMIN — BACLOFEN 5 MILLIGRAM(S): 10 INJECTION INTRATHECAL at 01:26

## 2025-02-22 RX ADMIN — Medication 30 MILLIGRAM(S): at 06:14

## 2025-02-22 RX ADMIN — COLCHICINE 0.6 MILLIGRAM(S): 0.6 TABLET, FILM COATED ORAL at 11:23

## 2025-02-22 RX ADMIN — Medication 40 MILLIGRAM(S): at 06:15

## 2025-02-22 RX ADMIN — METOPROLOL SUCCINATE 100 MILLIGRAM(S): 50 TABLET, EXTENDED RELEASE ORAL at 06:14

## 2025-02-22 RX ADMIN — Medication 30 MILLIGRAM(S): at 18:46

## 2025-02-22 RX ADMIN — NALOXEGOL OXALATE 25 MILLIGRAM(S): 12.5 TABLET, FILM COATED ORAL at 11:23

## 2025-02-22 RX ADMIN — Medication 112 MICROGRAM(S): at 04:48

## 2025-02-22 RX ADMIN — LIDOCAINE HYDROCHLORIDE 1 PATCH: 20 JELLY TOPICAL at 07:08

## 2025-02-22 RX ADMIN — CYANOCOBALAMIN 1000 MICROGRAM(S): 1000 INJECTION INTRAMUSCULAR; SUBCUTANEOUS at 11:23

## 2025-02-23 LAB
ANION GAP SERPL CALC-SCNC: 12 MMOL/L — SIGNIFICANT CHANGE UP (ref 7–14)
BUN SERPL-MCNC: 12 MG/DL — SIGNIFICANT CHANGE UP (ref 7–23)
CALCIUM SERPL-MCNC: 8.3 MG/DL — LOW (ref 8.4–10.5)
CHLORIDE SERPL-SCNC: 94 MMOL/L — LOW (ref 98–107)
CO2 SERPL-SCNC: 27 MMOL/L — SIGNIFICANT CHANGE UP (ref 22–31)
CREAT SERPL-MCNC: 0.59 MG/DL — SIGNIFICANT CHANGE UP (ref 0.5–1.3)
CULTURE RESULTS: SIGNIFICANT CHANGE UP
EGFR: 99 ML/MIN/1.73M2 — SIGNIFICANT CHANGE UP
GLUCOSE SERPL-MCNC: 92 MG/DL — SIGNIFICANT CHANGE UP (ref 70–99)
HCT VFR BLD CALC: 30.5 % — LOW (ref 34.5–45)
HGB BLD-MCNC: 10.3 G/DL — LOW (ref 11.5–15.5)
MAGNESIUM SERPL-MCNC: 1.6 MG/DL — SIGNIFICANT CHANGE UP (ref 1.6–2.6)
MCHC RBC-ENTMCNC: 29.9 PG — SIGNIFICANT CHANGE UP (ref 27–34)
MCHC RBC-ENTMCNC: 33.8 G/DL — SIGNIFICANT CHANGE UP (ref 32–36)
MCV RBC AUTO: 88.4 FL — SIGNIFICANT CHANGE UP (ref 80–100)
NRBC # BLD AUTO: 0 K/UL — SIGNIFICANT CHANGE UP (ref 0–0)
NRBC # FLD: 0 K/UL — SIGNIFICANT CHANGE UP (ref 0–0)
NRBC BLD AUTO-RTO: 0 /100 WBCS — SIGNIFICANT CHANGE UP (ref 0–0)
PHOSPHATE SERPL-MCNC: 3.4 MG/DL — SIGNIFICANT CHANGE UP (ref 2.5–4.5)
PLATELET # BLD AUTO: 235 K/UL — SIGNIFICANT CHANGE UP (ref 150–400)
POTASSIUM SERPL-MCNC: 4 MMOL/L — SIGNIFICANT CHANGE UP (ref 3.5–5.3)
POTASSIUM SERPL-SCNC: 4 MMOL/L — SIGNIFICANT CHANGE UP (ref 3.5–5.3)
RBC # BLD: 3.45 M/UL — LOW (ref 3.8–5.2)
RBC # FLD: 11.9 % — SIGNIFICANT CHANGE UP (ref 10.3–14.5)
SODIUM SERPL-SCNC: 133 MMOL/L — LOW (ref 135–145)
SPECIMEN SOURCE: SIGNIFICANT CHANGE UP
WBC # BLD: 6.02 K/UL — SIGNIFICANT CHANGE UP (ref 3.8–10.5)
WBC # FLD AUTO: 6.02 K/UL — SIGNIFICANT CHANGE UP (ref 3.8–10.5)

## 2025-02-23 RX ORDER — LIDOCAINE HYDROCHLORIDE 20 MG/ML
1 JELLY TOPICAL ONCE
Refills: 0 | Status: COMPLETED | OUTPATIENT
Start: 2025-02-23 | End: 2025-02-23

## 2025-02-23 RX ADMIN — Medication 1 DOSE(S): at 20:04

## 2025-02-23 RX ADMIN — Medication 30 MILLIGRAM(S): at 05:40

## 2025-02-23 RX ADMIN — CYANOCOBALAMIN 1000 MICROGRAM(S): 1000 INJECTION INTRAMUSCULAR; SUBCUTANEOUS at 11:53

## 2025-02-23 RX ADMIN — OXYCODONE HYDROCHLORIDE 20 MILLIGRAM(S): 30 TABLET ORAL at 09:11

## 2025-02-23 RX ADMIN — OXYCODONE HYDROCHLORIDE 20 MILLIGRAM(S): 30 TABLET ORAL at 20:03

## 2025-02-23 RX ADMIN — APIXABAN 5 MILLIGRAM(S): 2.5 TABLET, FILM COATED ORAL at 17:30

## 2025-02-23 RX ADMIN — COLCHICINE 0.6 MILLIGRAM(S): 0.6 TABLET, FILM COATED ORAL at 11:54

## 2025-02-23 RX ADMIN — Medication 112 MICROGRAM(S): at 05:39

## 2025-02-23 RX ADMIN — OXYCODONE HYDROCHLORIDE 20 MILLIGRAM(S): 30 TABLET ORAL at 08:11

## 2025-02-23 RX ADMIN — APIXABAN 5 MILLIGRAM(S): 2.5 TABLET, FILM COATED ORAL at 05:39

## 2025-02-23 RX ADMIN — NICOTINE POLACRILEX 1 PATCH: 4 GUM, CHEWING ORAL at 17:31

## 2025-02-23 RX ADMIN — Medication 40 MILLIGRAM(S): at 06:22

## 2025-02-23 RX ADMIN — Medication 1 DOSE(S): at 08:03

## 2025-02-23 RX ADMIN — LIDOCAINE HYDROCHLORIDE 1 PATCH: 20 JELLY TOPICAL at 12:03

## 2025-02-23 RX ADMIN — Medication 30 MILLIGRAM(S): at 17:31

## 2025-02-23 RX ADMIN — NALOXEGOL OXALATE 25 MILLIGRAM(S): 12.5 TABLET, FILM COATED ORAL at 11:54

## 2025-02-24 ENCOUNTER — TRANSCRIPTION ENCOUNTER (OUTPATIENT)
Age: 65
End: 2025-02-24

## 2025-02-24 VITALS
HEART RATE: 70 BPM | DIASTOLIC BLOOD PRESSURE: 73 MMHG | OXYGEN SATURATION: 97 % | SYSTOLIC BLOOD PRESSURE: 126 MMHG | TEMPERATURE: 98 F | RESPIRATION RATE: 16 BRPM

## 2025-02-24 LAB
ANION GAP SERPL CALC-SCNC: 11 MMOL/L — SIGNIFICANT CHANGE UP (ref 7–14)
BUN SERPL-MCNC: 11 MG/DL — SIGNIFICANT CHANGE UP (ref 7–23)
CALCIUM SERPL-MCNC: 8.7 MG/DL — SIGNIFICANT CHANGE UP (ref 8.4–10.5)
CHLORIDE SERPL-SCNC: 93 MMOL/L — LOW (ref 98–107)
CO2 SERPL-SCNC: 25 MMOL/L — SIGNIFICANT CHANGE UP (ref 22–31)
CREAT SERPL-MCNC: 0.45 MG/DL — LOW (ref 0.5–1.3)
EGFR: 106 ML/MIN/1.73M2 — SIGNIFICANT CHANGE UP
GLUCOSE SERPL-MCNC: 86 MG/DL — SIGNIFICANT CHANGE UP (ref 70–99)
HCT VFR BLD CALC: 34.2 % — LOW (ref 34.5–45)
HGB BLD-MCNC: 11.4 G/DL — LOW (ref 11.5–15.5)
MAGNESIUM SERPL-MCNC: 1.7 MG/DL — SIGNIFICANT CHANGE UP (ref 1.6–2.6)
MCHC RBC-ENTMCNC: 29.5 PG — SIGNIFICANT CHANGE UP (ref 27–34)
MCHC RBC-ENTMCNC: 33.3 G/DL — SIGNIFICANT CHANGE UP (ref 32–36)
MCV RBC AUTO: 88.6 FL — SIGNIFICANT CHANGE UP (ref 80–100)
NRBC # BLD AUTO: 0 K/UL — SIGNIFICANT CHANGE UP (ref 0–0)
NRBC # FLD: 0 K/UL — SIGNIFICANT CHANGE UP (ref 0–0)
NRBC BLD AUTO-RTO: 0 /100 WBCS — SIGNIFICANT CHANGE UP (ref 0–0)
PATHOLOGIST REVIEW: SIGNIFICANT CHANGE UP
PHOSPHATE SERPL-MCNC: 3.3 MG/DL — SIGNIFICANT CHANGE UP (ref 2.5–4.5)
PLATELET # BLD AUTO: 259 K/UL — SIGNIFICANT CHANGE UP (ref 150–400)
POTASSIUM SERPL-MCNC: 3.9 MMOL/L — SIGNIFICANT CHANGE UP (ref 3.5–5.3)
POTASSIUM SERPL-SCNC: 3.9 MMOL/L — SIGNIFICANT CHANGE UP (ref 3.5–5.3)
RBC # BLD: 3.86 M/UL — SIGNIFICANT CHANGE UP (ref 3.8–5.2)
RBC # FLD: 12.1 % — SIGNIFICANT CHANGE UP (ref 10.3–14.5)
SODIUM SERPL-SCNC: 129 MMOL/L — LOW (ref 135–145)
WBC # BLD: 5.6 K/UL — SIGNIFICANT CHANGE UP (ref 3.8–10.5)
WBC # FLD AUTO: 5.6 K/UL — SIGNIFICANT CHANGE UP (ref 3.8–10.5)

## 2025-02-24 PROCEDURE — 93010 ELECTROCARDIOGRAM REPORT: CPT

## 2025-02-24 PROCEDURE — 99233 SBSQ HOSP IP/OBS HIGH 50: CPT

## 2025-02-24 RX ORDER — APIXABAN 2.5 MG/1
1 TABLET, FILM COATED ORAL
Qty: 0 | Refills: 0 | DISCHARGE
Start: 2025-02-24

## 2025-02-24 RX ORDER — LIDOCAINE HYDROCHLORIDE 20 MG/ML
1 JELLY TOPICAL
Qty: 14 | Refills: 0
Start: 2025-02-24 | End: 2025-03-09

## 2025-02-24 RX ORDER — ACETAMINOPHEN 500 MG/5ML
2 LIQUID (ML) ORAL
Qty: 0 | Refills: 0 | DISCHARGE
Start: 2025-02-24

## 2025-02-24 RX ORDER — LIDOCAINE HYDROCHLORIDE 20 MG/ML
1 JELLY TOPICAL DAILY
Refills: 0 | Status: DISCONTINUED | OUTPATIENT
Start: 2025-02-24 | End: 2025-02-24

## 2025-02-24 RX ADMIN — LIDOCAINE HYDROCHLORIDE 1 PATCH: 20 JELLY TOPICAL at 15:05

## 2025-02-24 RX ADMIN — CYANOCOBALAMIN 1000 MICROGRAM(S): 1000 INJECTION INTRAMUSCULAR; SUBCUTANEOUS at 11:53

## 2025-02-24 RX ADMIN — COLCHICINE 0.6 MILLIGRAM(S): 0.6 TABLET, FILM COATED ORAL at 11:52

## 2025-02-24 RX ADMIN — Medication 30 MILLIGRAM(S): at 15:04

## 2025-02-24 RX ADMIN — OXYCODONE HYDROCHLORIDE 20 MILLIGRAM(S): 30 TABLET ORAL at 10:16

## 2025-02-24 RX ADMIN — NALOXEGOL OXALATE 25 MILLIGRAM(S): 12.5 TABLET, FILM COATED ORAL at 11:53

## 2025-02-24 RX ADMIN — BACLOFEN 5 MILLIGRAM(S): 10 INJECTION INTRATHECAL at 00:53

## 2025-02-24 RX ADMIN — APIXABAN 5 MILLIGRAM(S): 2.5 TABLET, FILM COATED ORAL at 05:04

## 2025-02-24 RX ADMIN — Medication 40 MILLIGRAM(S): at 06:05

## 2025-02-24 RX ADMIN — OXYCODONE HYDROCHLORIDE 20 MILLIGRAM(S): 30 TABLET ORAL at 10:45

## 2025-02-24 RX ADMIN — Medication 112 MICROGRAM(S): at 05:03

## 2025-02-24 NOTE — DISCHARGE NOTE NURSING/CASE MANAGEMENT/SOCIAL WORK - PATIENT PORTAL LINK FT
You can access the FollowMyHealth Patient Portal offered by [facility] by registering at the following website: [email or web address]. By joining [employer]’s FollowMyHealth portal, you will also be able to view your health information using other applications (apps) compatible with our system.

## 2025-02-24 NOTE — DISCHARGE NOTE PROVIDER - NSDCCPCAREPLAN_GEN_ALL_CORE_FT
PRINCIPAL DISCHARGE DIAGNOSIS  Diagnosis: Mediastinal mass  Assessment and Plan of Treatment: Pleuritic chest pain likely in setting of mediastinal mass. You had bronchoscopy and biopsy with interventional pulmonary team. Follow up next week for biopsy results - an appointment will be scheduled for you.      SECONDARY DISCHARGE DIAGNOSES  Diagnosis: Pericardial effusion  Assessment and Plan of Treatment: No intervention required in the hopsital but you will need to follow up with cardiology and CT surgery for close monitoring- follow up with Dr. Núñez in 1 month.    Diagnosis: Chronic atrial fibrillation  Assessment and Plan of Treatment: COntinue Eliquis but HOLD metoprolol given slow heart rate in 50s - follow up with cardiology as outpatient in 1 week to determine if Metoprolol can be resumed at adjusted dose

## 2025-02-24 NOTE — PROVIDER CONTACT NOTE (OTHER) - BACKGROUND
Patient admitted for malignant neoplasm of mediastinum. PMH scleroderma, hypothyroidism, asthma.
63y/o female admitted 4 days ago admitted for malignant neoplasm of mediastinum
Patient admitted for malignant neoplasm of mediastinum. PMH scleroderma, hypothyroidism, asthma.
direct admit from [facility]

## 2025-02-24 NOTE — PROVIDER CONTACT NOTE (OTHER) - SITUATION
/51
Pt. c/o chest pain
patient's heart rhythm and rate sinus karen 50s
Pt. reports small amount of blood per rectum during last bowel movement. States that it happens frequently and is related to her hemorrhoids.

## 2025-02-24 NOTE — DISCHARGE NOTE PROVIDER - NSDCFUADDAPPT_GEN_ALL_CORE_FT
Follow up with your primary care physician for further monitoring in 1-2 weeks. Please call to arrange appointment.     Follow up with interventional pulmonary next week to review biopsy results - an appointment will be scheduled for you and the office will be calling you     Follow up with cardiology in 1 week    Follow up with CT surgery Dr. Núñez in 1 month - please call to make an appointment

## 2025-02-24 NOTE — DISCHARGE NOTE NURSING/CASE MANAGEMENT/SOCIAL WORK - NSDCPEPTCAREGIVEDUMATLIST _GEN_ALL_CORE
Influenza Vaccination/Apixaban/Eliquis I have personally performed a face to face diagnostic evaluation on this patient. I have reviewed the ACP note and agree with the history, exam and plan of care, except as noted.

## 2025-02-24 NOTE — PROVIDER CONTACT NOTE (OTHER) - ASSESSMENT
Pt. reports small amount of blood per rectum during last bowel movement. States that it happens frequently and is related to her hemorrhoids.
patient asymptomatic
T 97.5. P 57. /51. R 16. O2 96%. patient asymptomatic.
On assessment, pt. is c/o 8 out of 10 pain- originally described the pain as chest pain, then stated that it feels like abdominal cramps. EKG taken and placed in chart.

## 2025-02-24 NOTE — DISCHARGE NOTE PROVIDER - NSDCMRMEDTOKEN_GEN_ALL_CORE_FT
acetaminophen 325 mg oral tablet: 2 tab(s) orally every 6 hours As needed Temp greater or equal to 38C (100.4F), Mild Pain (1 - 3)  albuterol 90 mcg/inh inhalation aerosol: 2 puff(s) inhaled every 6 hours As needed Shortness of Breath and/or Wheezing  apixaban 5 mg oral tablet: 1 tab(s) orally every 12 hours  baclofen 5 mg oral tablet: 1 tab(s) orally every 8 hours As needed Musculoskeletal Pain  colchicine 0.6 mg oral tablet: 1 tab(s) orally once a day  cyanocobalamin: 1,000 milligram(s) intramuscular once a day for 4 more days  fluticasone-salmeterol: 1 inhaler(s) inhaled 2 times a day  levothyroxine 112 mcg (0.112 mg) oral tablet: 1 tab(s) orally once a day  lidocaine 4% topical film: Apply topically to affected area once a day  morphine 30 mg/8 to 12 hr oral tablet, extended release: 1 tab(s) orally every 12 hours  naloxegol 25 mg oral tablet: 1 tab(s) orally once a day  nicotine 21 mg/24 hr transdermal film, extended release: 1 patch transdermal once a day  oxyCODONE 20 mg oral tablet: 1 tab(s) orally every 8 hours As needed for severe pain  pantoprazole 40 mg oral delayed release tablet: 1 tab(s) orally every 12 hours  traMADol 50 mg oral tablet: 0.5 tab(s) orally 3 times a day

## 2025-02-24 NOTE — DISCHARGE NOTE NURSING/CASE MANAGEMENT/SOCIAL WORK - FINANCIAL ASSISTANCE
[facility] provides services at a reduced cost to those who are determined to be eligible through [facility]’s financial assistance program. Information regarding [facility]’s financial assistance program can be found by going to [email or web address] or by calling [phone].

## 2025-02-24 NOTE — DISCHARGE NOTE PROVIDER - NSDCQMPCI_CARD_ALL_CORE
Physical Therapy                 Therapy Communication Note    Patient Name: Kalie Ramos  MRN: [id number]  Today's Date: 12/15/2023     Discipline: Physical Therapy    Missed Visit Reason: Missed Visit Reason: Patient placed on medical hold (pt admitted with acute right femoral neck fx; For probable surgery 12/15/23; Wait for post-op orders and proceed as appropriate.)    Missed Time: Cancel    Comment:   No

## 2025-02-24 NOTE — DISCHARGE NOTE PROVIDER - CARE PROVIDER_API CALL
Sam Webster  Critical Care Medicine  [address], [address]  [place] [address]  Phone: [phone]  Fax: [phone]  Follow Up Time:     Brandon Verduzco  Cardiovascular Disease  [address] [place] [address], [address]  [place] [address]  Phone: [phone]  Fax: [phone]  Follow Up Time:     Yuan Núñez  Thoracic Surgery  [address] [place] [address], [address] [place] [address]  Phone: [phone]  Fax: [phone]  Follow Up Time:

## 2025-02-24 NOTE — DISCHARGE NOTE PROVIDER - PROVIDER TOKENS
PROVIDER:[TOKEN:[[id number]]],PROVIDER:[TOKEN:[[id number]]],PROVIDER:[TOKEN:[[id number]]]

## 2025-02-24 NOTE — PROGRESS NOTE ADULT - ASSESSMENT
64-year-old female past medical history of A-fib, scleroderma, asthma with prior intubations, patient admission for pericardial effusion status post pericardiocentesis with drain on colchicine presents ED complaining of sudden onset left flank pain that woke her from sleep this morning.  Had similar symptoms 1 week ago on right side, now with left flank worse with inspiration with mild shortness of breath.  Took inhaler with no relief prompting visit to ED.  Denies fever chills nausea vomiting diarrhea abdominal pain dysuria hematuria.  Patient on 3 L nasal cannula baseline at home.  Uncomfortable with laying flat.     L suprahilar mediastinal mass, mediastinal adenopathy; 2 x 2cm DEISI opacity   - Describes hemoptysis as sputum streaked with dark red; no bright red blood, or clots  - hold eliquis  - pls initiate transfer to [facility] for bronchoscopy, EBUS, hilar mass biopsy with IP  - procedure will be scheduled and booked once patient arrives at [facility]   - patient is agreeable to transfer, once completed, can return to NS  - pls quantify hemoptysis --> have patient expectorate every time into a graded cup to quantify hemoptysis.   - please notify pulmonary or MICU for increase in amount of blood or increase in respiratory symptoms. There is not any established definition of massive hemoptysis; any level of hemoptysis can result in airway compromise, pls monitor for changes in quality or amount of hemoptysis    - for ebus today     Chest Pain.   - Had admission 1 month ago for pericardial effusion s/p drain on home maintenance with colchicine  - ECG NSR  - Trop and BNP wnl  - CXR wnl  - Prior TTE showed preserved EF, no WMA, small pericardial effusion; Repeat pending  - ESR, CRP elevated, h/o scleroderma  - D-dimer elevated.     Pericardial Effusion  - s/p drain with 650ml removed last admission in December 2024  - OP Cards did TTE which shows small increase from prior  - Repeat TTE - effusion is bigger  - CT chest done  - ct surg consult following    Atrial Fibrillation  - c/w Toprol 100mg PO daily  - hold eliquis Eliquis 5mg PO BID for poss surgery  - Will monitor HR closely. Currently karen in 50s. May need dose adjustment of Toprol.  - Monitor on tele.    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    
64-year-old female past medical history of A-fib, scleroderma, asthma with prior intubations, patient admission for pericardial effusion status post pericardiocentesis with drain on colchicine presents ED complaining of sudden onset left flank pain that woke her from sleep this morning.  Had similar symptoms 1 week ago on right side, now with left flank worse with inspiration with mild shortness of breath.  Took inhaler with no relief prompting visit to ED.  Denies fever chills nausea vomiting diarrhea abdominal pain dysuria hematuria.  Patient on 3 L nasal cannula baseline at home.  Uncomfortable with laying flat.     L suprahilar mediastinal mass, mediastinal adenopathy; 2 x 2cm DEISI opacity   - Describes hemoptysis as sputum streaked with dark red; no bright red blood, or clots  - hold eliquis  - pls initiate transfer to [facility] for bronchoscopy, EBUS, hilar mass biopsy with IP  - procedure will be scheduled and booked once patient arrives at [facility]   - patient is agreeable to transfer, once completed, can return to NS  - pls quantify hemoptysis --> have patient expectorate every time into a graded cup to quantify hemoptysis.   - please notify pulmonary or MICU for increase in amount of blood or increase in respiratory symptoms. There is not any established definition of massive hemoptysis; any level of hemoptysis can result in airway compromise, pls monitor for changes in quality or amount of hemoptysis    - for ebus today     Chest Pain.   - Had admission 1 month ago for pericardial effusion s/p drain on home maintenance with colchicine  - ECG NSR  - Trop and BNP wnl  - CXR wnl  - Prior TTE showed preserved EF, no WMA, small pericardial effusion; Repeat pending  - ESR, CRP elevated, h/o scleroderma  - D-dimer elevated.     Pericardial Effusion- s/p drain with 650ml removed last admission in December 2024  - OP Cards did TTE which shows small increase from prior  - Repeat TTE - effusion is bigger  - CT chest done  - ct surg consult following    Atrial Fibrillation  - c/w Toprol 100mg PO daily  - hold eliquis Eliquis 5mg PO BID for poss surgery  - Will monitor HR closely. Currently karen in 50s. May need dose adjustment of Toprol.  - Monitor on tele.    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  
64-year-old female past medical history of A-fib, scleroderma, asthma with prior intubations, patient admission for pericardial effusion status post pericardiocentesis with drain on colchicine presents ED complaining of sudden onset left flank pain that woke her from sleep this morning.  Had similar symptoms 1 week ago on right side, now with left flank worse with inspiration with mild shortness of breath.  Took inhaler with no relief prompting visit to ED.  Denies fever chills nausea vomiting diarrhea abdominal pain dysuria hematuria.  Patient on 3 L nasal cannula baseline at home.  Uncomfortable with laying flat.     L suprahilar mediastinal mass, mediastinal adenopathy; 2 x 2cm DEISI opacity   - Describes hemoptysis as sputum streaked with dark red; no bright red blood, or clots  - hold eliquis  - pls initiate transfer to [facility] for bronchoscopy, EBUS, hilar mass biopsy with IP  - procedure will be scheduled and booked once patient arrives at [facility]   - patient is agreeable to transfer, once completed, can return to NS  - pls quantify hemoptysis --> have patient expectorate every time into a graded cup to quantify hemoptysis.   - please notify pulmonary or MICU for increase in amount of blood or increase in respiratory symptoms. There is not any established definition of massive hemoptysis; any level of hemoptysis can result in airway compromise, pls monitor for changes in quality or amount of hemoptysis    - for ebus today     Chest Pain.   - Had admission 1 month ago for pericardial effusion s/p drain on home maintenance with colchicine  - ECG NSR- CXR wnl  - Prior TTE showed preserved EF, no WMA, small pericardial effusion; Repeat pending  - ESR, CRP elevated, h/o scleroderma  - D-dimer elevated.     Pericardial Effusion- s/p drain with 650ml removed last admission in December 2024  - OP Cards did TTE which shows small increase from prior  - Repeat TTE - effusion is bigger  - CT chest done  - ct surg consult following    Atrial Fibrillation  - c/w Toprol 100mg PO daily  - hold eliquis Eliquis 5mg PO BID for poss surgery  - Will monitor HR closely. Currently karen in 50s. May need dose adjustment of Toprol.  - Monitor on tele.    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  
64-year-old female past medical history of A-fib, scleroderma, asthma with prior intubations, patient admission for pericardial effusion status post pericardiocentesis with drain on colchicine presents ED complaining of sudden onset left flank pain that woke her from sleep this morning.  Had similar symptoms 1 week ago on right side, now with left flank worse with inspiration with mild shortness of breath.  Took inhaler with no relief prompting visit to ED.  Denies fever chills nausea vomiting diarrhea abdominal pain dysuria hematuria.  Patient on 3 L nasal cannula baseline at home.  Uncomfortable with laying flat.     L suprahilar mediastinal mass, mediastinal adenopathy; 2 x 2cm DEISI opacity   - Describes hemoptysis as sputum streaked with dark red; no bright red blood, or clots  - hold eliquis  - pls initiate transfer to [facility] for bronchoscopy, EBUS, hilar mass biopsy with IP  - procedure will be scheduled and booked once patient arrives at [facility]   - patient is agreeable to transfer, once completed, can return to NS  - pls quantify hemoptysis --> have patient expectorate every time into a graded cup to quantify hemoptysis.   - please notify pulmonary or MICU for increase in amount of blood or increase in respiratory symptoms. There is not any established definition of massive hemoptysis; any level of hemoptysis can result in airway compromise, pls monitor for changes in quality or amount of hemoptysis    - for ebus today  - 2/22 s/p EBUS     Chest Pain.   - Had admission 1 month ago for pericardial effusion s/p drain on home maintenance with colchicine  - ECG NSR  - Trop and BNP wnl  - CXR wnl  - Prior TTE showed preserved EF, no WMA, small pericardial effusion; Repeat pending  - ESR, CRP elevated, h/o scleroderma  - D-dimer elevated.    Pericardial Effusion  - s/p drain with 650ml removed last admission in December 2024  - OP Cards did TTE which shows small increase from prior  - Repeat TTE - effusion is bigger  - CT chest done  - ct surg consult following    Atrial Fibrillation  - c/w Toprol 100mg PO daily  - hold eliquis Eliquis 5mg PO BID for poss surgery  - Will monitor HR closely. Currently karen in 50s. May need dose adjustment of Toprol.  - Monitor on tele.    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 CT of chest shows uneremarkable esophagus   -obtain esophagram (if possible, otherwise can defer to outpatient)  -scleroderma esophagus is exacerbated by GERD given lack of LES tone, therefore high dose PPI is beneficial  - per GI refusing workup at this time      : Asthma  - Reports lifelong hx  - c/w advair and ventolin    chronic pain  - c/w home pain meds  - pt has been on same meds for many years    cab ne discharged when cleared by pulmonary

## 2025-02-24 NOTE — CONSULT NOTE ADULT - ASSESSMENT
64F w/ scleroderma, Afib on eliquis (last dose 2/18 AM), asthma previously requiring intubation, pericardial effusion s/p pericardial drain, admitted w/ pleuritic chest pain, found to have hilar mass and mediastinal LAD, s/p bronchoscoic evaluation s/p EBUS and TBBx of lymph nodes as well as endobronchial biopsy of lesion at the distal LMSB. 64F w/ scleroderma, Afib on eliquis (last dose 2/18 AM), asthma previously requiring intubation, pericardial effusion s/p pericardial drain, admitted w/ pleuritic chest pain, found to have hilar mass and mediastinal LAD, s/p bronchoscoic evaluation s/p EBUS and TBBx of lymph nodes as well as endobronchial biopsy of lesion at the distal LMSB.    #Mediastinal mass  #Endobronchial lesion  #Hemoptysis  - patient with hemoptysis prior to EBUS and bronchoscopic intervention  - reports improved hemoptysis with more brown tinged and light red mucous  - has been on AC for > 48 hours without bernadine hemoptysis   - educated on strict return to hospital if she has bernadine hemoptysis, coughs up blood clots or worsening shortness of breath    Will plan for follow up interventional pulmonary  next week to review biopsy results.

## 2025-02-24 NOTE — PROGRESS NOTE ADULT - SUBJECTIVE AND OBJECTIVE BOX
DATE OF SERVICE: 02-22-25 @ 12:25    Patient is a 64y old  Female who presents with a chief complaint of chest pain and sob (21 Feb 2025 14:25)      SUBJECTIVE / OVERNIGHT EVENTS:  No chest pain. No shortness of breath. No complaints. No events overnight.     MEDICATIONS  (STANDING):  apixaban 5 milliGRAM(s) Oral every 12 hours  colchicine 0.6 milliGRAM(s) Oral daily  cyanocobalamin Injectable 1000 MICROGram(s) IntraMuscular daily  fluticasone propionate/ salmeterol 250-50 MICROgram(s) Diskus 1 Dose(s) Inhalation two times a day  levothyroxine 112 MICROGram(s) Oral daily  metoprolol succinate  milliGRAM(s) Oral daily  morphine ER Tablet 30 milliGRAM(s) Oral every 12 hours  naloxegol 25 milliGRAM(s) Oral daily  nicotine - 21 mG/24Hr(s) Patch 1 Patch Transdermal daily  pantoprazole    Tablet 40 milliGRAM(s) Oral before breakfast    MEDICATIONS  (PRN):  acetaminophen     Tablet .. 650 milliGRAM(s) Oral every 6 hours PRN Temp greater or equal to 38C (100.4F), Mild Pain (1 - 3)  albuterol    90 MICROgram(s) HFA Inhaler 2 Puff(s) Inhalation every 6 hours PRN Shortness of Breath and/or Wheezing  baclofen 5 milliGRAM(s) Oral every 8 hours PRN Musculoskeletal Pain  oxyCODONE    IR 20 milliGRAM(s) Oral every 8 hours PRN for severe pain      Vital Signs Last 24 Hrs  T(C): 36.6 (22 Feb 2025 06:00), Max: 36.9 (21 Feb 2025 21:28)  T(F): 97.9 (22 Feb 2025 06:00), Max: 98.4 (21 Feb 2025 21:28)  HR: 60 (22 Feb 2025 06:00) (58 - 76)  BP: 120/68 (22 Feb 2025 06:00) (92/52 - 120/68)  BP(mean): --  RR: 18 (22 Feb 2025 06:00) (16 - 18)  SpO2: 93% (22 Feb 2025 06:00) (93% - 99%)    Parameters below as of 22 Feb 2025 06:00  Patient On (Oxygen Delivery Method): room air      CAPILLARY BLOOD GLUCOSE        I&O's Summary    21 Feb 2025 07:01  -  22 Feb 2025 07:00  --------------------------------------------------------  IN: 0 mL / OUT: 400 mL / NET: -400 mL        PHYSICAL EXAM:  GENERAL: NAD, well-developed  HEAD:  Atraumatic, Normocephalic  EYES: EOMI, PERRLA, conjunctiva and sclera clear  NECK: Supple, No JVD  CHEST/LUNG: Clear to auscultation bilaterally; No wheeze  HEART: Regular rate and rhythm; No murmurs, rubs, or gallops  ABDOMEN: Soft, Nontender, Nondistended; Bowel sounds present  EXTREMITIES:  2+ Peripheral Pulses, No clubbing, cyanosis, or edema  PSYCH: AAOx3  NEUROLOGY: non-focal  SKIN: No rashes or lesions    LABS:                        11.4   5.14  )-----------( 281      ( 22 Feb 2025 05:41 )             34.1     02-22    131[L]  |  95[L]  |  12  ----------------------------<  106[H]  4.3   |  25  |  0.44[L]    Ca    8.8      22 Feb 2025 05:41  Phos  3.5     02-22  Mg     1.70     02-22    TPro  6.6  /  Alb  3.6  /  TBili  0.3  /  DBili  x   /  AST  22  /  ALT  11  /  AlkPhos  97  02-21    PT/INR - ( 21 Feb 2025 06:30 )   PT: 11.6 sec;   INR: 1.00 ratio         PTT - ( 21 Feb 2025 06:30 )  PTT:28.7 sec      Urinalysis Basic - ( 22 Feb 2025 05:41 )    Color: x / Appearance: x / SG: x / pH: x  Gluc: 106 mg/dL / Ketone: x  / Bili: x / Urobili: x   Blood: x / Protein: x / Nitrite: x   Leuk Esterase: x / RBC: x / WBC x   Sq Epi: x / Non Sq Epi: x / Bacteria: x        RADIOLOGY & ADDITIONAL TESTS:    Imaging Personally Reviewed:    Consultant(s) Notes Reviewed:      Care Discussed with Consultants/Other Providers:  
DATE OF SERVICE: 02-21-25 @ 11:38    Patient is a 64y old  Female who presents with a chief complaint of chest pain and sob (20 Feb 2025 15:43)      SUBJECTIVE / OVERNIGHT EVENTS:  No chest pain. No shortness of breath. No complaints. No events overnight.     MEDICATIONS  (STANDING):  colchicine 0.6 milliGRAM(s) Oral daily  cyanocobalamin Injectable 1000 MICROGram(s) IntraMuscular daily  fluticasone propionate/ salmeterol 250-50 MICROgram(s) Diskus 1 Dose(s) Inhalation two times a day  levothyroxine 112 MICROGram(s) Oral daily  metoprolol succinate  milliGRAM(s) Oral daily  morphine ER Tablet 30 milliGRAM(s) Oral every 12 hours  naloxegol 25 milliGRAM(s) Oral daily  nicotine - 21 mG/24Hr(s) Patch 1 Patch Transdermal daily  pantoprazole    Tablet 40 milliGRAM(s) Oral before breakfast    MEDICATIONS  (PRN):  acetaminophen     Tablet .. 650 milliGRAM(s) Oral every 6 hours PRN Temp greater or equal to 38C (100.4F), Mild Pain (1 - 3)  albuterol    90 MICROgram(s) HFA Inhaler 2 Puff(s) Inhalation every 6 hours PRN Shortness of Breath and/or Wheezing  baclofen 5 milliGRAM(s) Oral every 8 hours PRN Musculoskeletal Pain  oxyCODONE    IR 20 milliGRAM(s) Oral every 8 hours PRN for severe pain      Vital Signs Last 24 Hrs  T(C): 36.5 (21 Feb 2025 06:30), Max: 37.1 (20 Feb 2025 15:00)  T(F): 97.7 (21 Feb 2025 06:30), Max: 98.8 (20 Feb 2025 15:00)  HR: 60 (21 Feb 2025 06:30) (57 - 70)  BP: 122/67 (21 Feb 2025 06:30) (108/51 - 143/97)  BP(mean): --  RR: 16 (21 Feb 2025 06:30) (16 - 20)  SpO2: 96% (21 Feb 2025 06:30) (94% - 96%)    Parameters below as of 21 Feb 2025 06:30  Patient On (Oxygen Delivery Method): nasal cannula  O2 Flow (L/min): 3    CAPILLARY BLOOD GLUCOSE        I&O's Summary      PHYSICAL EXAM:  GENERAL: NAD, well-developed  HEAD:  Atraumatic, Normocephalic  EYES: EOMI, PERRLA, conjunctiva and sclera clear  NECK: Supple, No JVD  CHEST/LUNG: Clear to auscultation bilaterally; No wheeze  HEART: Regular rate and rhythm; No murmurs, rubs, or gallops  ABDOMEN: Soft, Nontender, Nondistended; Bowel sounds present  EXTREMITIES:  2+ Peripheral Pulses, No clubbing, cyanosis, or edema  PSYCH: AAOx3  NEUROLOGY: non-focal  SKIN: No rashes or lesions    LABS:                        12.0   5.20  )-----------( 277      ( 21 Feb 2025 06:30 )             36.5     02-21    133[L]  |  95[L]  |  10  ----------------------------<  92  4.3   |  26  |  0.47[L]    Ca    9.1      21 Feb 2025 06:30  Phos  4.1     02-21  Mg     1.90     02-21    TPro  6.6  /  Alb  3.6  /  TBili  0.3  /  DBili  x   /  AST  22  /  ALT  11  /  AlkPhos  97  02-21    PT/INR - ( 21 Feb 2025 06:30 )   PT: 11.6 sec;   INR: 1.00 ratio         PTT - ( 21 Feb 2025 06:30 )  PTT:28.7 sec      Urinalysis Basic - ( 21 Feb 2025 06:30 )    Color: x / Appearance: x / SG: x / pH: x  Gluc: 92 mg/dL / Ketone: x  / Bili: x / Urobili: x   Blood: x / Protein: x / Nitrite: x   Leuk Esterase: x / RBC: x / WBC x   Sq Epi: x / Non Sq Epi: x / Bacteria: x        RADIOLOGY & ADDITIONAL TESTS:    Imaging Personally Reviewed:    Consultant(s) Notes Reviewed:      Care Discussed with Consultants/Other Providers:  
Patient is a 64y old  Female who presents with a chief complaint of chest pain and sob (22 Feb 2025 12:25)    Date of servie : 02-23-25 @ 13:42  INTERVAL HPI/OVERNIGHT EVENTS:  T(C): 36.7 (02-23-25 @ 11:45), Max: 36.8 (02-22-25 @ 21:48)  HR: 55 (02-23-25 @ 11:45) (55 - 69)  BP: 100/78 (02-23-25 @ 11:45) (100/78 - 122/70)  RR: 16 (02-23-25 @ 11:45) (16 - 18)  SpO2: 96% (02-23-25 @ 11:45) (94% - 96%)  Wt(kg): --  I&O's Summary    22 Feb 2025 07:01  -  23 Feb 2025 07:00  --------------------------------------------------------  IN: 0 mL / OUT: 950 mL / NET: -950 mL        LABS:                        10.3   6.02  )-----------( 235      ( 23 Feb 2025 04:24 )             30.5     02-23    133[L]  |  94[L]  |  12  ----------------------------<  92  4.0   |  27  |  0.59    Ca    8.3[L]      23 Feb 2025 04:24  Phos  3.4     02-23  Mg     1.60     02-23        Urinalysis Basic - ( 23 Feb 2025 04:24 )    Color: x / Appearance: x / SG: x / pH: x  Gluc: 92 mg/dL / Ketone: x  / Bili: x / Urobili: x   Blood: x / Protein: x / Nitrite: x   Leuk Esterase: x / RBC: x / WBC x   Sq Epi: x / Non Sq Epi: x / Bacteria: x      CAPILLARY BLOOD GLUCOSE            Urinalysis Basic - ( 23 Feb 2025 04:24 )    Color: x / Appearance: x / SG: x / pH: x  Gluc: 92 mg/dL / Ketone: x  / Bili: x / Urobili: x   Blood: x / Protein: x / Nitrite: x   Leuk Esterase: x / RBC: x / WBC x   Sq Epi: x / Non Sq Epi: x / Bacteria: x        MEDICATIONS  (STANDING):  apixaban 5 milliGRAM(s) Oral every 12 hours  colchicine 0.6 milliGRAM(s) Oral daily  cyanocobalamin Injectable 1000 MICROGram(s) IntraMuscular daily  fluticasone propionate/ salmeterol 250-50 MICROgram(s) Diskus 1 Dose(s) Inhalation two times a day  levothyroxine 112 MICROGram(s) Oral daily  metoprolol succinate  milliGRAM(s) Oral daily  morphine ER Tablet 30 milliGRAM(s) Oral every 12 hours  naloxegol 25 milliGRAM(s) Oral daily  nicotine - 21 mG/24Hr(s) Patch 1 Patch Transdermal daily  pantoprazole    Tablet 40 milliGRAM(s) Oral before breakfast    MEDICATIONS  (PRN):  acetaminophen     Tablet .. 650 milliGRAM(s) Oral every 6 hours PRN Temp greater or equal to 38C (100.4F), Mild Pain (1 - 3)  albuterol    90 MICROgram(s) HFA Inhaler 2 Puff(s) Inhalation every 6 hours PRN Shortness of Breath and/or Wheezing  baclofen 5 milliGRAM(s) Oral every 8 hours PRN Musculoskeletal Pain  oxyCODONE    IR 20 milliGRAM(s) Oral every 8 hours PRN for severe pain          PHYSICAL EXAM:  GENERAL: NAD, well-groomed, well-developed  HEAD:  Atraumatic, Normocephalic  CHEST/LUNG: Clear to percussion bilaterally; No rales, rhonchi, wheezing, or rubs  HEART: Regular rate and rhythm; No murmurs, rubs, or gallops  ABDOMEN: Soft, Nontender, Nondistended; Bowel sounds present  EXTREMITIES:  2+ Peripheral Pulses, No clubbing, cyanosis, or edema  LYMPH: No lymphadenopathy noted  SKIN: No rashes or lesions    Care Discussed with Consultants/Other Providers [ ] YES  [ ] NO
Patient is a 64y old  Female who presents with a chief complaint of chest pain and sob (24 Feb 2025 14:17)    Date of servie : 02-24-25 @ 14:57  INTERVAL HPI/OVERNIGHT EVENTS:  T(C): 36.6 (02-24-25 @ 11:30), Max: 36.8 (02-23-25 @ 21:00)  HR: 61 (02-24-25 @ 11:30) (57 - 66)  BP: 109/67 (02-24-25 @ 11:30) (106/70 - 118/75)  RR: 18 (02-24-25 @ 11:30) (17 - 18)  SpO2: 97% (02-24-25 @ 11:30) (95% - 97%)  Wt(kg): --  I&O's Summary    23 Feb 2025 07:01  -  24 Feb 2025 07:00  --------------------------------------------------------  IN: 620 mL / OUT: 875 mL / NET: -255 mL        LABS:                        11.4   5.60  )-----------( 259      ( 24 Feb 2025 04:35 )             34.2     02-24    129[L]  |  93[L]  |  11  ----------------------------<  86  3.9   |  25  |  0.45[L]    Ca    8.7      24 Feb 2025 04:35  Phos  3.3     02-24  Mg     1.70     02-24        Urinalysis Basic - ( 24 Feb 2025 04:35 )    Color: x / Appearance: x / SG: x / pH: x  Gluc: 86 mg/dL / Ketone: x  / Bili: x / Urobili: x   Blood: x / Protein: x / Nitrite: x   Leuk Esterase: x / RBC: x / WBC x   Sq Epi: x / Non Sq Epi: x / Bacteria: x      CAPILLARY BLOOD GLUCOSE            Urinalysis Basic - ( 24 Feb 2025 04:35 )    Color: x / Appearance: x / SG: x / pH: x  Gluc: 86 mg/dL / Ketone: x  / Bili: x / Urobili: x   Blood: x / Protein: x / Nitrite: x   Leuk Esterase: x / RBC: x / WBC x   Sq Epi: x / Non Sq Epi: x / Bacteria: x        MEDICATIONS  (STANDING):  apixaban 5 milliGRAM(s) Oral every 12 hours  colchicine 0.6 milliGRAM(s) Oral daily  cyanocobalamin Injectable 1000 MICROGram(s) IntraMuscular daily  fluticasone propionate/ salmeterol 250-50 MICROgram(s) Diskus 1 Dose(s) Inhalation two times a day  levothyroxine 112 MICROGram(s) Oral daily  metoprolol succinate  milliGRAM(s) Oral daily  morphine ER Tablet 30 milliGRAM(s) Oral every 12 hours  morphine ER Tablet 30 milliGRAM(s) Oral once  naloxegol 25 milliGRAM(s) Oral daily  nicotine - 21 mG/24Hr(s) Patch 1 Patch Transdermal daily  pantoprazole    Tablet 40 milliGRAM(s) Oral before breakfast    MEDICATIONS  (PRN):  acetaminophen     Tablet .. 650 milliGRAM(s) Oral every 6 hours PRN Temp greater or equal to 38C (100.4F), Mild Pain (1 - 3)  albuterol    90 MICROgram(s) HFA Inhaler 2 Puff(s) Inhalation every 6 hours PRN Shortness of Breath and/or Wheezing  baclofen 5 milliGRAM(s) Oral every 8 hours PRN Musculoskeletal Pain  lidocaine   4% Patch 1 Patch Transdermal daily PRN back pain  oxyCODONE    IR 20 milliGRAM(s) Oral every 8 hours PRN for severe pain          PHYSICAL EXAM:  GENERAL: NAD, well-groomed, well-developed  HEAD:  Atraumatic, Normocephalic  CHEST/LUNG: Clear to percussion bilaterally; No rales, rhonchi, wheezing, or rubs  HEART: Regular rate and rhythm; No murmurs, rubs, or gallops  ABDOMEN: Soft, Nontender, Nondistended; Bowel sounds present  EXTREMITIES:  2+ Peripheral Pulses, No clubbing, cyanosis, or edema  LYMPH: No lymphadenopathy noted  SKIN: No rashes or lesions    Care Discussed with Consultants/Other Providers [ ] YES  [ ] NO

## 2025-02-24 NOTE — PROVIDER CONTACT NOTE (OTHER) - REASON
/51
Pt. c/o chest pain
sinus bradycardia
Pt. described small amount of blood in most recent bowel movent

## 2025-02-24 NOTE — DISCHARGE NOTE PROVIDER - NSFOLLOWUPCLINICS_GEN_ALL_ED_FT
[facility]  Pulmonology  [address] [place] [address], [place] [address]  Phone: [phone]  Fax:

## 2025-02-24 NOTE — DISCHARGE NOTE PROVIDER - HOSPITAL COURSE
64F w/ scleroderma, Afib on eliquis (last dose 2/18 AM), asthma previously requiring intubation, pericardial effusion s/p pericardial drain, admitted w/ pleuritic chest pain, found to have hilar mass and mediastinal LAD, s/p bronchoscoic evaluation s/p EBUS and TBBx of lymph nodes as well as endobronchial biopsy of lesion at the distal LMSB.    #Mediastinal mass  #Endobronchial lesion  #Hemoptysis  - patient with hemoptysis prior to EBUS and bronchoscopic intervention  - reports improved hemoptysis with more brown tinged and light red mucous  - has been on AC for > 48 hours without bernadine hemoptysis   - educated on strict return to hospital if she has bernadine hemoptysis, coughs up blood clots or worsening shortness of breath    Will plan for follow up interventional pulmonary next week to review biopsy results - appt to be arranged by pulmonary  Discussed case with Dr. Huggins and with pulm, pt is cleared for discharge home today 64F w/ scleroderma, Afib on eliquis (last dose 2/18 AM), asthma previously requiring intubation, pericardial effusion s/p pericardial drain, admitted w/ pleuritic chest pain, found to have hilar mass and mediastinal LAD, s/p bronchoscoic evaluation s/p EBUS and TBBx of lymph nodes as well as endobronchial biopsy of lesion at the distal LMSB.    #Mediastinal mass  #Endobronchial lesion  #Hemoptysis  - patient with hemoptysis prior to EBUS and bronchoscopic intervention  - reports improved hemoptysis with more brown tinged and light red mucous  - has been on AC for > 48 hours without bernadine hemoptysis   - educated on strict return to hospital if she has bernadine hemoptysis, coughs up blood clots or worsening shortness of breath    Will plan for follow up interventional pulmonary next week to review biopsy results - appt to be arranged by pulmonary  Discussed case with Dr. Huggins and with pulm, pt is cleared for discharge home today    Pt's HE has been sinus karen to 50s - pt is asymptomatic - denies dizziness, lightheadedness. Pt has not received Metoprolol last 2 days given hold parameters.   Discussed with Dr. Huggins - will hold metoprolol on discharge and recommend outpatient cardiology follow up

## 2025-02-24 NOTE — DISCHARGE NOTE PROVIDER - CARE PROVIDERS DIRECT ADDRESSES
[email or web address]

## 2025-02-25 LAB — TM INTERPRETATION: SIGNIFICANT CHANGE UP

## 2025-03-04 LAB — NON-GYNECOLOGICAL CYTOLOGY STUDY: SIGNIFICANT CHANGE UP

## 2025-03-06 ENCOUNTER — APPOINTMENT (OUTPATIENT)
Dept: PULMONOLOGY | Facility: CLINIC | Age: 65
End: 2025-03-06
Payer: MEDICAID

## 2025-03-06 ENCOUNTER — NON-APPOINTMENT (OUTPATIENT)
Age: 65
End: 2025-03-06

## 2025-03-06 PROCEDURE — 99214 OFFICE O/P EST MOD 30 MIN: CPT

## 2025-03-06 PROCEDURE — 99204 OFFICE O/P NEW MOD 45 MIN: CPT | Mod: 95

## 2025-03-07 PROBLEM — C80.1 SMALL CELL CARCINOMA: Status: ACTIVE | Noted: 2025-03-07

## 2025-03-10 ENCOUNTER — OUTPATIENT (OUTPATIENT)
Dept: OUTPATIENT SERVICES | Facility: HOSPITAL | Age: 65
LOS: 1 days | Discharge: HOME | End: 2025-03-10

## 2025-03-10 DIAGNOSIS — Z98.890 OTHER SPECIFIED POSTPROCEDURAL STATES: Chronic | ICD-10-CM

## 2025-03-10 DIAGNOSIS — K56.60 UNSPECIFIED INTESTINAL OBSTRUCTION: Chronic | ICD-10-CM

## 2025-03-10 DIAGNOSIS — D25.9 LEIOMYOMA OF UTERUS, UNSPECIFIED: Chronic | ICD-10-CM

## 2025-03-10 DIAGNOSIS — C34.90 MALIGNANT NEOPLASM OF UNSPECIFIED PART OF UNSPECIFIED BRONCHUS OR LUNG: ICD-10-CM

## 2025-03-10 DIAGNOSIS — Z98.89 OTHER SPECIFIED POSTPROCEDURAL STATES: Chronic | ICD-10-CM

## 2025-03-11 ENCOUNTER — APPOINTMENT (OUTPATIENT)
Dept: HEMATOLOGY ONCOLOGY | Facility: CLINIC | Age: 65
End: 2025-03-11
Payer: MEDICAID

## 2025-03-11 ENCOUNTER — NON-APPOINTMENT (OUTPATIENT)
Age: 65
End: 2025-03-11

## 2025-03-11 ENCOUNTER — INPATIENT (INPATIENT)
Facility: HOSPITAL | Age: 65
LOS: 19 days | Discharge: NURSING, REHAB OR LONG-TERM CARE | End: 2025-03-31
Attending: STUDENT IN AN ORGANIZED HEALTH CARE EDUCATION/TRAINING PROGRAM | Admitting: STUDENT IN AN ORGANIZED HEALTH CARE EDUCATION/TRAINING PROGRAM
Payer: MEDICARE

## 2025-03-11 VITALS
HEART RATE: 98 BPM | WEIGHT: 139.99 LBS | RESPIRATION RATE: 16 BRPM | DIASTOLIC BLOOD PRESSURE: 81 MMHG | SYSTOLIC BLOOD PRESSURE: 140 MMHG | OXYGEN SATURATION: 95 % | HEIGHT: 69 IN | TEMPERATURE: 98 F

## 2025-03-11 VITALS
DIASTOLIC BLOOD PRESSURE: 75 MMHG | SYSTOLIC BLOOD PRESSURE: 118 MMHG | HEIGHT: 69 IN | TEMPERATURE: 97 F | HEART RATE: 83 BPM | OXYGEN SATURATION: 94 % | WEIGHT: 154 LBS | BODY MASS INDEX: 22.81 KG/M2 | RESPIRATION RATE: 16 BRPM

## 2025-03-11 DIAGNOSIS — I48.91 UNSPECIFIED ATRIAL FIBRILLATION: ICD-10-CM

## 2025-03-11 DIAGNOSIS — K56.60 UNSPECIFIED INTESTINAL OBSTRUCTION: Chronic | ICD-10-CM

## 2025-03-11 DIAGNOSIS — C34.90 MALIGNANT NEOPLASM OF UNSPECIFIED PART OF UNSPECIFIED BRONCHUS OR LUNG: ICD-10-CM

## 2025-03-11 DIAGNOSIS — R07.9 CHEST PAIN, UNSPECIFIED: ICD-10-CM

## 2025-03-11 DIAGNOSIS — R13.10 DYSPHAGIA, UNSPECIFIED: ICD-10-CM

## 2025-03-11 DIAGNOSIS — M34.9 SYSTEMIC SCLEROSIS, UNSPECIFIED: ICD-10-CM

## 2025-03-11 DIAGNOSIS — F17.200 NICOTINE DEPENDENCE, UNSPECIFIED, UNCOMPLICATED: ICD-10-CM

## 2025-03-11 DIAGNOSIS — J45.909 UNSPECIFIED ASTHMA, UNCOMPLICATED: ICD-10-CM

## 2025-03-11 DIAGNOSIS — Z98.890 OTHER SPECIFIED POSTPROCEDURAL STATES: Chronic | ICD-10-CM

## 2025-03-11 DIAGNOSIS — E03.9 HYPOTHYROIDISM, UNSPECIFIED: ICD-10-CM

## 2025-03-11 DIAGNOSIS — Z98.89 OTHER SPECIFIED POSTPROCEDURAL STATES: Chronic | ICD-10-CM

## 2025-03-11 DIAGNOSIS — C80.1 MALIGNANT (PRIMARY) NEOPLASM, UNSPECIFIED: ICD-10-CM

## 2025-03-11 DIAGNOSIS — Z29.9 ENCOUNTER FOR PROPHYLACTIC MEASURES, UNSPECIFIED: ICD-10-CM

## 2025-03-11 DIAGNOSIS — Z87.39 PERSONAL HISTORY OF OTHER DISEASES OF THE MUSCULOSKELETAL SYSTEM AND CONNECTIVE TISSUE: ICD-10-CM

## 2025-03-11 DIAGNOSIS — G89.29 OTHER CHRONIC PAIN: ICD-10-CM

## 2025-03-11 DIAGNOSIS — I31.39 OTHER PERICARDIAL EFFUSION (NONINFLAMMATORY): ICD-10-CM

## 2025-03-11 DIAGNOSIS — D25.9 LEIOMYOMA OF UTERUS, UNSPECIFIED: Chronic | ICD-10-CM

## 2025-03-11 LAB
ALBUMIN SERPL ELPH-MCNC: 4 G/DL — SIGNIFICANT CHANGE UP (ref 3.3–5)
ALP SERPL-CCNC: 100 U/L — SIGNIFICANT CHANGE UP (ref 40–120)
ALT FLD-CCNC: 8 U/L — SIGNIFICANT CHANGE UP (ref 4–33)
ANION GAP SERPL CALC-SCNC: 11 MMOL/L — SIGNIFICANT CHANGE UP (ref 7–14)
APTT BLD: 37.5 SEC — HIGH (ref 24.5–35.6)
AST SERPL-CCNC: 18 U/L — SIGNIFICANT CHANGE UP (ref 4–32)
BASOPHILS # BLD AUTO: 0.04 K/UL — SIGNIFICANT CHANGE UP (ref 0–0.2)
BASOPHILS NFR BLD AUTO: 0.9 % — SIGNIFICANT CHANGE UP (ref 0–2)
BILIRUB SERPL-MCNC: 0.3 MG/DL — SIGNIFICANT CHANGE UP (ref 0.2–1.2)
BLOOD GAS VENOUS COMPREHENSIVE RESULT: SIGNIFICANT CHANGE UP
BUN SERPL-MCNC: 9 MG/DL — SIGNIFICANT CHANGE UP (ref 7–23)
CALCIUM SERPL-MCNC: 9.4 MG/DL — SIGNIFICANT CHANGE UP (ref 8.4–10.5)
CHLORIDE SERPL-SCNC: 92 MMOL/L — LOW (ref 98–107)
CO2 SERPL-SCNC: 28 MMOL/L — SIGNIFICANT CHANGE UP (ref 22–31)
CREAT SERPL-MCNC: 0.47 MG/DL — LOW (ref 0.5–1.3)
EGFR: 105 ML/MIN/1.73M2 — SIGNIFICANT CHANGE UP
EGFR: 105 ML/MIN/1.73M2 — SIGNIFICANT CHANGE UP
EOSINOPHIL # BLD AUTO: 0.13 K/UL — SIGNIFICANT CHANGE UP (ref 0–0.5)
EOSINOPHIL NFR BLD AUTO: 3 % — SIGNIFICANT CHANGE UP (ref 0–6)
GLUCOSE SERPL-MCNC: 116 MG/DL — HIGH (ref 70–99)
HCT VFR BLD CALC: 37.4 % — SIGNIFICANT CHANGE UP (ref 34.5–45)
HGB BLD-MCNC: 12.8 G/DL — SIGNIFICANT CHANGE UP (ref 11.5–15.5)
IANC: 2.22 K/UL — SIGNIFICANT CHANGE UP (ref 1.8–7.4)
IMM GRANULOCYTES NFR BLD AUTO: 0 % — SIGNIFICANT CHANGE UP (ref 0–0.9)
INR BLD: 1.3 RATIO — HIGH (ref 0.85–1.16)
LYMPHOCYTES # BLD AUTO: 1.48 K/UL — SIGNIFICANT CHANGE UP (ref 1–3.3)
LYMPHOCYTES # BLD AUTO: 34.4 % — SIGNIFICANT CHANGE UP (ref 13–44)
MAGNESIUM SERPL-MCNC: 1.9 MG/DL — SIGNIFICANT CHANGE UP (ref 1.6–2.6)
MCHC RBC-ENTMCNC: 29.9 PG — SIGNIFICANT CHANGE UP (ref 27–34)
MCHC RBC-ENTMCNC: 34.2 G/DL — SIGNIFICANT CHANGE UP (ref 32–36)
MCV RBC AUTO: 87.4 FL — SIGNIFICANT CHANGE UP (ref 80–100)
MONOCYTES # BLD AUTO: 0.43 K/UL — SIGNIFICANT CHANGE UP (ref 0–0.9)
MONOCYTES NFR BLD AUTO: 10 % — SIGNIFICANT CHANGE UP (ref 2–14)
NEUTROPHILS # BLD AUTO: 2.22 K/UL — SIGNIFICANT CHANGE UP (ref 1.8–7.4)
NEUTROPHILS NFR BLD AUTO: 51.7 % — SIGNIFICANT CHANGE UP (ref 43–77)
NRBC # BLD AUTO: 0 K/UL — SIGNIFICANT CHANGE UP (ref 0–0)
NRBC # FLD: 0 K/UL — SIGNIFICANT CHANGE UP (ref 0–0)
NRBC BLD AUTO-RTO: 0 /100 WBCS — SIGNIFICANT CHANGE UP (ref 0–0)
NT-PROBNP SERPL-SCNC: 658 PG/ML — HIGH
PLATELET # BLD AUTO: 379 K/UL — SIGNIFICANT CHANGE UP (ref 150–400)
POTASSIUM SERPL-MCNC: 4.2 MMOL/L — SIGNIFICANT CHANGE UP (ref 3.5–5.3)
POTASSIUM SERPL-SCNC: 4.2 MMOL/L — SIGNIFICANT CHANGE UP (ref 3.5–5.3)
PROT SERPL-MCNC: 7.2 G/DL — SIGNIFICANT CHANGE UP (ref 6–8.3)
PROTHROM AB SERPL-ACNC: 15.4 SEC — HIGH (ref 9.9–13.4)
RBC # BLD: 4.28 M/UL — SIGNIFICANT CHANGE UP (ref 3.8–5.2)
RBC # FLD: 12.2 % — SIGNIFICANT CHANGE UP (ref 10.3–14.5)
SODIUM SERPL-SCNC: 131 MMOL/L — LOW (ref 135–145)
TROPONIN T, HIGH SENSITIVITY RESULT: 10 NG/L — SIGNIFICANT CHANGE UP
WBC # BLD: 4.3 K/UL — SIGNIFICANT CHANGE UP (ref 3.8–10.5)
WBC # FLD AUTO: 4.3 K/UL — SIGNIFICANT CHANGE UP (ref 3.8–10.5)

## 2025-03-11 PROCEDURE — 99223 1ST HOSP IP/OBS HIGH 75: CPT

## 2025-03-11 PROCEDURE — G0316 PROLONG INPT EVAL ADD15 M: CPT

## 2025-03-11 PROCEDURE — 99205 OFFICE O/P NEW HI 60 MIN: CPT

## 2025-03-11 PROCEDURE — 99285 EMERGENCY DEPT VISIT HI MDM: CPT | Mod: GC

## 2025-03-11 PROCEDURE — 99222 1ST HOSP IP/OBS MODERATE 55: CPT | Mod: GC

## 2025-03-11 PROCEDURE — 99245 OFF/OP CONSLTJ NEW/EST HI 55: CPT

## 2025-03-11 RX ORDER — LEVOTHYROXINE SODIUM 300 MCG
112 TABLET ORAL DAILY
Refills: 0 | Status: DISCONTINUED | OUTPATIENT
Start: 2025-03-11 | End: 2025-03-31

## 2025-03-11 RX ORDER — COLCHICINE 0.6 MG/1
0.6 TABLET, FILM COATED ORAL DAILY
Refills: 0 | Status: DISCONTINUED | OUTPATIENT
Start: 2025-03-11 | End: 2025-03-31

## 2025-03-11 RX ORDER — NALOXEGOL OXALATE 12.5 MG/1
25 TABLET, FILM COATED ORAL DAILY
Refills: 0 | Status: DISCONTINUED | OUTPATIENT
Start: 2025-03-11 | End: 2025-03-31

## 2025-03-11 RX ORDER — ACETAMINOPHEN 500 MG/5ML
650 LIQUID (ML) ORAL EVERY 6 HOURS
Refills: 0 | Status: DISCONTINUED | OUTPATIENT
Start: 2025-03-11 | End: 2025-03-12

## 2025-03-11 RX ORDER — ONDANSETRON HCL/PF 4 MG/2 ML
4 VIAL (ML) INJECTION EVERY 8 HOURS
Refills: 0 | Status: DISCONTINUED | OUTPATIENT
Start: 2025-03-11 | End: 2025-03-12

## 2025-03-11 RX ORDER — OXYCODONE HYDROCHLORIDE 30 MG/1
20 TABLET ORAL EVERY 8 HOURS
Refills: 0 | Status: DISCONTINUED | OUTPATIENT
Start: 2025-03-11 | End: 2025-03-12

## 2025-03-11 RX ORDER — APIXABAN 2.5 MG/1
5 TABLET, FILM COATED ORAL EVERY 12 HOURS
Refills: 0 | Status: DISCONTINUED | OUTPATIENT
Start: 2025-03-11 | End: 2025-03-20

## 2025-03-11 RX ORDER — LIDOCAINE HYDROCHLORIDE 20 MG/ML
1 JELLY TOPICAL DAILY
Refills: 0 | Status: DISCONTINUED | OUTPATIENT
Start: 2025-03-11 | End: 2025-03-21

## 2025-03-11 RX ORDER — TRAMADOL HYDROCHLORIDE 50 MG/1
25 TABLET, FILM COATED ORAL THREE TIMES A DAY
Refills: 0 | Status: DISCONTINUED | OUTPATIENT
Start: 2025-03-11 | End: 2025-03-12

## 2025-03-11 RX ORDER — MAGNESIUM, ALUMINUM HYDROXIDE 200-200 MG
30 TABLET,CHEWABLE ORAL EVERY 4 HOURS
Refills: 0 | Status: DISCONTINUED | OUTPATIENT
Start: 2025-03-11 | End: 2025-03-31

## 2025-03-11 RX ORDER — MELATONIN 5 MG
3 TABLET ORAL AT BEDTIME
Refills: 0 | Status: DISCONTINUED | OUTPATIENT
Start: 2025-03-11 | End: 2025-03-31

## 2025-03-11 RX ORDER — NICOTINE POLACRILEX 4 MG/1
1 GUM, CHEWING ORAL DAILY
Refills: 0 | Status: DISCONTINUED | OUTPATIENT
Start: 2025-03-11 | End: 2025-03-12

## 2025-03-11 RX ORDER — BACLOFEN 10 MG/20ML
5 INJECTION INTRATHECAL EVERY 8 HOURS
Refills: 0 | Status: DISCONTINUED | OUTPATIENT
Start: 2025-03-11 | End: 2025-03-19

## 2025-03-11 RX ORDER — ALBUTEROL SULFATE 2.5 MG/3ML
2 VIAL, NEBULIZER (ML) INHALATION EVERY 6 HOURS
Refills: 0 | Status: DISCONTINUED | OUTPATIENT
Start: 2025-03-11 | End: 2025-03-12

## 2025-03-11 RX ADMIN — Medication 40 MILLIGRAM(S): at 22:33

## 2025-03-11 RX ADMIN — TRAMADOL HYDROCHLORIDE 25 MILLIGRAM(S): 50 TABLET, FILM COATED ORAL at 22:29

## 2025-03-11 NOTE — CONSULT NOTE ADULT - SUBJECTIVE AND OBJECTIVE BOX
Ms. Wen is a 65 yo woman with a new diagnosis of small cell lung cancer who presented to the ER for SOB and persistent back pain.   She was admitted for SOB in December and was found to have a pericardial effusion which drained 650 mL. Cytopathology was negative for malignant cells. She was also recently discharged on 2/24/25 for similar symptoms.   A CT Chest on 2/16/2025 showed a 2.4 cm left upper lobe mass as well as a 4.2 cm suprahilar/mediastinal mass and a 1.8 cm subcarinal lymph node. The pericardial effusion was again noted, although smaller than in December.    She underwent a bronchoscopy and EBUS on 2/21/2025 which confirmed small cell lung cancer in the left upper lobe mass, level 7 LN and 4L lymph node.     She currently still has some SOB but is comfortable on 3 L NC. Her back pain has improved somewhat with her pain medication.     PAST MEDICAL & SURGICAL HISTORY:  Scleroderma  Asthma  High cholesterol  Unable to take STATINS for high cholesterol due to genetic disposition  Shingles  Hypothyroidism  GERD (gastroesophageal reflux disease)  Smoker  Multiple drug allergies  S/P small bowel resection  History of myomectomy  History of ovarian cystectomy  Fibroid  Bowel obstruction  multiple surgeries for bowel obstruction  H/O ovarian cystectomy  S/P pericardiocentesis    Allergies  penicillin (Unknown)  penicillin (Anaphylaxis; Hives; Other)  iodine (Anaphylaxis)  statins (Unknown)  Xolair (Unknown)  ABIDA dye (Short breath; Hives)  IV Contrast (Unknown)  Originally Entered as [Unknown] reaction to [BEE STINGS] (Unknown)    ROS: Negative except for above    OBJECTIVE  ICU Vital Signs Last 24 Hrs  T(C): 36.8 (11 Mar 2025 16:45), Max: 36.8 (11 Mar 2025 16:45)  T(F): 98.3 (11 Mar 2025 16:45), Max: 98.3 (11 Mar 2025 16:45)  HR: 98 (11 Mar 2025 16:45) (98 - 98)  BP: 140/81 (11 Mar 2025 16:45) (140/81 - 140/81)  RR: 16 (11 Mar 2025 16:45) (16 - 16)  SpO2: 95% (11 Mar 2025 16:45) (95% - 95%), 3L NC    KPS 70    CONSTITUTIONAL: examined lying in bed, awake but fatigued  EYES: PERRLA and symmetric, No conjunctival or scleral injection, non-icteric  RESP: No respiratory distress, no use of accessory muscles  CV: normal pusles, no JVD; no peripheral edema  GI: Soft, nontender, nondistended  MSK: Normal range of motion in all extremities, mild weakness in bilateral shoulders, spine TTP around mid back but difficult to pinpoint  SKIN: No rashes or ulcers noted; no subcutaneous nodules or induration palpable  NEURO: no focal deficits   PSYCH: A+O x 3

## 2025-03-11 NOTE — ED ADULT NURSE NOTE - NSFALLRISKINTERV_ED_ALL_ED

## 2025-03-11 NOTE — H&P ADULT - PROBLEM SELECTOR PLAN 4
Has smoked 1 PPD since the age of 17  As per patient she quit in her 30s and 40s for at least 11 months and in  she was able to quit for 7 months before restarting since her son .     Discussed cessation in length Has smoked 1 PPD since the age of 17  As per patient she quit in her 30s and 40s for at least 11 months and in  she was able to quit for 7 months before restarting since her son .     Discussed cessation in length  Nicotine patch

## 2025-03-11 NOTE — H&P ADULT - PROBLEM SELECTOR PLAN 10
DVT prophylaxis: Eliquis  GI prophylaxis: Pantoprazole  Diet: Regular  Ethics: Full code  Disposition: Pending clinical course

## 2025-03-11 NOTE — H&P ADULT - PROBLEM SELECTOR PLAN 9
DVT prophylaxis: Eliquis  GI prophylaxis: Pantoprazole  Diet: Regular  Ethics: Full code  Disposition: Pending clinical course Has history of increasing difficulty with swallowing food  Has prior history of multiple abdominal surgeries and with possible adhesions   Evaluated by SLP in 12/2024 who recommended repeat MBSS which the patient refused    Aspiration precautions Has history of increasing difficulty with swallowing food  Has prior history of multiple abdominal surgeries and with possible adhesions   Evaluated by SLP in 12/2024 who recommended repeat MBSS which the patient refused  Evaluated by GI in 02/2025 who recommended to start high dose PPI as 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

## 2025-03-11 NOTE — ED PROVIDER NOTE - PROGRESS NOTE DETAILS
Khurram Kay PGY3:  Spoke with [facility]. Discussed plan with Onc fellow who evaluated Pt at bedside. Khurram Kay PGY3:  bedside pocus does not show any b lines bilat, Pt has a pericardial effusion which is known. There is no sign of tamponade physiology.

## 2025-03-11 NOTE — CONSULT NOTE ADULT - SUBJECTIVE AND OBJECTIVE BOX
64F presents for SOB, now pathologic confirmed new small cell lung cancer. Pt reports continued SOB, CP, back pain (taking pain meds).    Pt initially presented to [facility] for SOB and chest pain - transferred to [facility] (discharged 2/24/25) for left suprahilar mediastinal mass in the prevascular space extending into the aortopulmonary window and mediastinal adenopathy, new peripheral ill-defined 2.4 x 2.2 cm opacity in the left upper lobe. Pt underwent TBNA + EBUS (results below), pathology subsequently showing small cell lung cancer. Pt also found to have mediastinal LA, and a moderate-large pericardial effusion (not drained; 650cc drained in 12/2024 prior).    Oncology following for new small cell lung cancer.        PMH: A-fib (Eliquis), scleroderma, pericardial effusion s/p pericardiocentesis (12/2024) and asthma  Surgical history: Bowel obstruction s/p surgery (1986), fibroid tumors removed many times, ovarian cyst removal, cholecystectomy (approx 2018)    Medications: synthroid 112 mcg, eliquis 5 mg BID, colchicine 0.6 mg, baclofen 10 mg TID, morphine, oxycodone, prednisone, ventolin, albuterol  Pain management for Scleroderma: oxycontin 30 mg TID for 17 years, morphine 30 mg BID    Smoking Status: smoking since age 17, 1PPD until 12/19/2024  Social: Lives with partner, used to work in [employer]        Allergies    penicillin (Unknown)  penicillin (Anaphylaxis; Hives; Other)  iodine (Anaphylaxis)  statins (Unknown)  Xolair (Unknown)  ABIDA dye (Short breath; Hives)  IV Contrast (Unknown)  Originally Entered as [Unknown] reaction to [BEE STINGS] (Unknown)    Intolerances        REVIEW OF SYSTEMS:  CONSTITUTIONAL: No weakness, fevers, chills, sick contacts, or unintended weight loss  EYES: No visual changes or vertigo  ENT: No throat pain, rhinorrhea, or hearing loss   NECK: No pain or stiffness  RESPIRATORY: No cough, wheezing, hemoptysis; No shortness of breath  CARDIOVASCULAR: No chest pain or palpitations  GASTROINTESTINAL: No abdominal or epigastric pain. No nausea, vomiting, or hematemesis; No diarrhea or constipation. No melena or hematochezia.  GENITOURINARY: No dysuria, frequency or hematuria  NEUROLOGICAL: No numbness or weakness  SKIN: No itching, rashes, or bruises  Psych: Good mood, no substance use      OBJECTIVE:  ICU Vital Signs Last 24 Hrs  T(C): 36.8 (11 Mar 2025 16:45), Max: 36.8 (11 Mar 2025 16:45)  T(F): 98.3 (11 Mar 2025 16:45), Max: 98.3 (11 Mar 2025 16:45)  HR: 98 (11 Mar 2025 16:45) (98 - 98)  BP: 140/81 (11 Mar 2025 16:45) (140/81 - 140/81)  BP(mean): --  ABP: --  ABP(mean): --  RR: 16 (11 Mar 2025 16:45) (16 - 16)  SpO2: 95% (11 Mar 2025 16:45) (95% - 95%)    O2 Parameters below as of 11 Mar 2025 16:45  Patient On (Oxygen Delivery Method): nasal cannula  O2 Flow (L/min): 3          GENERAL: NAD, lying in bed comfortably  HEAD:  Atraumatic, Normocephalic  EYES: EOMI, conjunctiva and sclera clear  ENT: Moist mucous membranes  NECK: Supple  CHEST/LUNG: Unlabored respirations; no wheezing  HEART: Regular rate and rhythm  ABDOMEN: Soft, nontender, nondistended  EXTREMITIES:  No cyanosis or edema  NERVOUS SYSTEM:  A&Ox3, no focal deficits   SKIN: No rashes or lesions  Psych: Normal speech, normal behavior, normal affect        Cytopathology (12/20/24) pericardial fluid: negative for malignant cells  ?  Pathology Report 2/21/2025 Endobronchial lung lesion in left upper lobe; mediastinal LAD.  ?  Specimen(s) Submitted  1. LUNG, LEFT UPPER LOBE, ENDOBRONCHIAL FORCEPS BIOPSY AND TRANSBRONCHIAL FNA  2. LYMPH NODE, LEVEL 7, EBUS-GUIDED FNA  3. LYMPH NODE, 4L, EBUS-GUIDED FNA  4. LUNG, LEFT UPPER LOBE, BRONCHOALVEOLAR LAVAGE  ?  Final Diagnosis  1. LUNG, LEFT UPPER LOBE, ENDOBRONCHIAL FORCEPS BIOPSY AND TRANSBRONCHIAL  POSITIVE FOR MALIGNANT CELLS.  Small cell carcinoma.  ?  Touch Imprint slide, cell block and core biopsy show a cellular specimen composed of groups and numerous single-lying hyperchromatic cells with  irregular nuclear membranes, nuclear molding and scanty cytoplasm. Crush artifact and mitotic figures present. Immunocytochemical studies : The neoplastic cells are positive for Cam 5.2.hrmogranin, synaptophysin, INSM-1, TTF-1 while negative for CD45 and P40. Ki-67 reveal a proliferation index of 90%.  ?  2. LYMPH NODE, LEVEL 7, EBUS-GUIDED FNA SUSPICIOUS FOR MALIGNANCY.  Suspicious for small cell carcinoma.  Cytology smears and cell block display a hypocellular specimen composed of rare groups and few single-lying hyperchromatic cells with irregular  nuclear membranes, nuclear molding and scanty cytoplasm with crush artifact, in a background of rare lymphocytes.  ?  3. LYMPH NODE, 4L, EBUS-GUIDED FNA POSITIVE FOR MALIGNANT CELLS.  Small cell carcinoma.  ?  Cytology smears and cell block show a cellular specimen composed of groups and single-lying hyperchromatic cells with irregular nuclear  membranes, nuclear molding and scanty cytoplasm, in a background of rare lymphocytes. Immunocytochemical studies: The neoplastic cells are positive for Cam 5.2, Chromogranin, synaptophysin, INSM-1, TTF-1 while negative for CD45  and P40.Ki-67 reveals a proliferative index of 90%.  ?  In summary the cytomorphology and immunophenotype are consistent with small cell carcinoma.  ?  4. LUNG, LEFT UPPER LOBE, BRONCHOALVEOLAR LAVAGE  ATYPICAL FINDINGS  Cytology slide and cell block show hyperchromatic groups of poorly preserved cells among reactive ciliated bronchial cells and alveolar macrophages.             64F presents for SOB, now pathologic confirmed new small cell lung cancer. Pt reports continued SOB, CP, back pain (taking pain meds).    Pt initially presented to [facility] for SOB and chest pain - transferred to [facility] (discharged 2/24/25) for left suprahilar mediastinal mass in the prevascular space extending into the aortopulmonary window and mediastinal adenopathy, new peripheral ill-defined 2.4 x 2.2 cm opacity in the left upper lobe. Pt underwent TBNA + EBUS (results below), pathology subsequently showing small cell lung cancer. Pt also found to have mediastinal LA, and a moderate-large pericardial effusion (not drained; 650cc drained in 12/2024 prior).    Oncology following for new small cell lung cancer.        PMH: A-fib (Eliquis), scleroderma, pericardial effusion s/p pericardiocentesis (12/2024) and asthma  Surgical history: Bowel obstruction s/p surgery (1986), fibroid tumors removed many times, ovarian cyst removal, cholecystectomy (approx 2018)    Medications: synthroid 112 mcg, eliquis 5 mg BID, colchicine 0.6 mg, baclofen 10 mg TID, morphine, oxycodone, prednisone, ventolin, albuterol  Pain management for Scleroderma: oxycontin 30 mg TID for 17 years, morphine 30 mg BID    Smoking Status: smoking since age 17, 1PPD until 12/19/2024  Social: Lives with partner, used to work in [employer]        Allergies    penicillin (Unknown)  penicillin (Anaphylaxis; Hives; Other)  iodine (Anaphylaxis)  statins (Unknown)  Xolair (Unknown)  ABIDA dye (Short breath; Hives)  IV Contrast (Unknown)  Originally Entered as [Unknown] reaction to [BEE STINGS] (Unknown)    Intolerances        REVIEW OF SYSTEMS:  CONSTITUTIONAL: No weakness, fevers, chills, sick contacts, or unintended weight loss  EYES: No visual changes or vertigo  ENT: No throat pain, rhinorrhea, or hearing loss   NECK: No pain or stiffness  RESPIRATORY: +SOB; no cough  CARDIOVASCULAR: No chest pain or palpitations  GASTROINTESTINAL: No abdominal or epigastric pain. No nausea, vomiting, or hematemesis; No diarrhea or constipation. No melena or hematochezia.  GENITOURINARY: No dysuria, frequency or hematuria  NEUROLOGICAL: No numbness or weakness  SKIN: No itching, rashes, or bruises  Psych: Good mood, no substance use      OBJECTIVE:  ICU Vital Signs Last 24 Hrs  T(C): 36.8 (11 Mar 2025 16:45), Max: 36.8 (11 Mar 2025 16:45)  T(F): 98.3 (11 Mar 2025 16:45), Max: 98.3 (11 Mar 2025 16:45)  HR: 98 (11 Mar 2025 16:45) (98 - 98)  BP: 140/81 (11 Mar 2025 16:45) (140/81 - 140/81)  BP(mean): --  ABP: --  ABP(mean): --  RR: 16 (11 Mar 2025 16:45) (16 - 16)  SpO2: 95% (11 Mar 2025 16:45) (95% - 95%)    O2 Parameters below as of 11 Mar 2025 16:45  Patient On (Oxygen Delivery Method): nasal cannula  O2 Flow (L/min): 3          GENERAL: NAD, lying in bed comfortably on RA  HEAD:  Atraumatic, Normocephalic  EYES: EOMI, conjunctiva and sclera clear  ENT: Moist mucous membranes  NECK: Supple  CHEST/LUNG: Unlabored respirations; fine expiratory wheezes with fine crackles on L  HEART: +tachycardia  ABDOMEN: Soft, nontender, nondistended  EXTREMITIES:  No cyanosis or edema  NERVOUS SYSTEM:  A&Ox3, proximal muscles (shoulder) strength reduced, R hand fine motor reduced (when writing)  SKIN: No rashes or lesions  Psych: Normal speech, normal behavior, normal affect        Cytopathology (12/20/24) pericardial fluid: negative for malignant cells  ?  Pathology Report 2/21/2025 Endobronchial lung lesion in left upper lobe; mediastinal LAD.  ?  Specimen(s) Submitted  1. LUNG, LEFT UPPER LOBE, ENDOBRONCHIAL FORCEPS BIOPSY AND TRANSBRONCHIAL FNA  2. LYMPH NODE, LEVEL 7, EBUS-GUIDED FNA  3. LYMPH NODE, 4L, EBUS-GUIDED FNA  4. LUNG, LEFT UPPER LOBE, BRONCHOALVEOLAR LAVAGE  ?  Final Diagnosis  1. LUNG, LEFT UPPER LOBE, ENDOBRONCHIAL FORCEPS BIOPSY AND TRANSBRONCHIAL  POSITIVE FOR MALIGNANT CELLS.  Small cell carcinoma.  ?  Touch Imprint slide, cell block and core biopsy show a cellular specimen composed of groups and numerous single-lying hyperchromatic cells with  irregular nuclear membranes, nuclear molding and scanty cytoplasm. Crush artifact and mitotic figures present. Immunocytochemical studies : The neoplastic cells are positive for Cam 5.2.hrmogranin, synaptophysin, INSM-1, TTF-1 while negative for CD45 and P40. Ki-67 reveal a proliferation index of 90%.  ?  2. LYMPH NODE, LEVEL 7, EBUS-GUIDED FNA SUSPICIOUS FOR MALIGNANCY.  Suspicious for small cell carcinoma.  Cytology smears and cell block display a hypocellular specimen composed of rare groups and few single-lying hyperchromatic cells with irregular  nuclear membranes, nuclear molding and scanty cytoplasm with crush artifact, in a background of rare lymphocytes.  ?  3. LYMPH NODE, 4L, EBUS-GUIDED FNA POSITIVE FOR MALIGNANT CELLS.  Small cell carcinoma.  ?  Cytology smears and cell block show a cellular specimen composed of groups and single-lying hyperchromatic cells with irregular nuclear  membranes, nuclear molding and scanty cytoplasm, in a background of rare lymphocytes. Immunocytochemical studies: The neoplastic cells are positive for Cam 5.2, Chromogranin, synaptophysin, INSM-1, TTF-1 while negative for CD45  and P40.Ki-67 reveals a proliferative index of 90%.  ?  In summary the cytomorphology and immunophenotype are consistent with small cell carcinoma.  ?  4. LUNG, LEFT UPPER LOBE, BRONCHOALVEOLAR LAVAGE  ATYPICAL FINDINGS  Cytology slide and cell block show hyperchromatic groups of poorly preserved cells among reactive ciliated bronchial cells and alveolar macrophages.

## 2025-03-11 NOTE — ED ADULT NURSE NOTE - HOW OFTEN DO YOU HAVE A DRINK CONTAINING ALCOHOL?
Wrists b/l R.L ulcers with necrotic eschars with drainage with minimal erythema. Hands contrated with edema right
Never

## 2025-03-11 NOTE — H&P ADULT - NSHPREVIEWOFSYSTEMS_GEN_ALL_CORE
CONSTITUTIONAL: No weakness, fevers or chills  EYES/ENT: No visual changes; No dysphagia; No sore throat; No rhinorrhea; No sinus pain/pressure  NECK: No pain or stiffness  RESPIRATORY: No cough, wheezing, hemoptysis; SOB+  CARDIOVASCULAR:  No lower extremity edema. No chest pain   GASTROINTESTINAL: No abdominal or epigastric pain. No nausea, vomiting, or hematemesis; No diarrhea or constipation. No melena or hematochezia.  GENITOURINARY: No dysuria, frequency or hematuria  NEUROLOGICAL: No numbness, paresthesias, or weakness; No HA; No LH/dizziness     SKIN: No itching, burning, rashes, or lesions

## 2025-03-11 NOTE — H&P ADULT - NSHPPHYSICALEXAM_GEN_ALL_CORE
General: Patient sitting upright in the bed comfortably. Is in no acute distress  Eyes: PERRL, EOMI  Resp: Diminished breath sounds in left basilar region. Saturating 95% on 3 L   CVS: Tachycardic +. S1 & S2+. No murmurs, rubs or gallops  GI: Soft, NDNT. Bowel sounds x 4 quadrants +  Extremities: No edema, cyanosis or clubbing  Neuro: Alert and oriented x 3. Motor and sensory intact.

## 2025-03-11 NOTE — H&P ADULT - PROBLEM SELECTOR PLAN 8
DVT prophylaxis: Eliquis  GI prophylaxis: Pantoprazole  Diet: Regular  Ethics: Full code  Disposition: Pending clinical course Has history of increasing difficulty with swallowing food  Has prior history of multiple abdominal surgeries and with possible adhesions   Evaluated by SLP in 12/2024 who recommended repeat MBSS which the patient refused Synthroid 112 mcg daily

## 2025-03-11 NOTE — H&P ADULT - PROBLEM SELECTOR PLAN 6
Hx of pericardial effusion with tamponade in 12/2024   Evaluated by Cardiology   S/p pericardiocentesis with removal of 650 cc of sanguinous fluid in 12/2024 with drain  Started on NSAIDs and colchicine     Colchicine

## 2025-03-11 NOTE — H&P ADULT - PROBLEM SELECTOR PLAN 2
Dx 18 years ago with skin involvement, dysphagia, gastric dysmotility and Raynaud's  Follows with Dr. Metzger   Evaluated by Rheumatology in 12/2024 who recommended further workup     Rheumatology consult in the AM Dx 18 years ago with skin involvement, dysphagia, gastric dysmotility and Raynaud's  Follows with Dr. Metzger   Evaluated by Rheumatology in 12/2024 who recommended further workup     Rheumatology consult in the AM ( If extensive stage, would warrant addition of immunotherapy which may increase risk of immune related AEs in this patient)

## 2025-03-11 NOTE — ED PROVIDER NOTE - CLINICAL SUMMARY MEDICAL DECISION MAKING FREE TEXT BOX
65 yo female with hx of asthma, scleroderma, hypothyroidism, pericardial effusion s/p pericardiocentesis (12/2024) with drain on colchicine presented to the ED due to SOB. 65 yo female with hx of asthma, scleroderma, hypothyroidism, pericardial effusion s/p pericardiocentesis (12/2024) comes into the ED for 63 yo female with hx of asthma, scleroderma, hypothyroidism, a fib, pericardial effusion s/p pericardiocentesis (12/2024), new diagnosis of small cell lung cancer comes into the ED for SOB, worsening chest and left sided back pain. She reports she has been having some increased shortness of breath and cough. She is on O2 at home at baseline. On exam, she is able to speak in full sentences, POCUS shows a pericardial effusion, there were no b lines in the lung fields or pleural effusions bilat. Pt was seen by Onc fellow at bedside. Pt reports they were planning to do a PET scan. Onc reports plan to do an MRI of brain, chest abd/pelvis and plan for admission for further work up and management. 65 yo female with hx of asthma, scleroderma, hypothyroidism, a fib, pericardial effusion s/p pericardiocentesis (12/2024), new diagnosis of small cell lung cancer comes into the ED for SOB, worsening chest and left sided back pain. She reports she has been having some increased shortness of breath and cough. She is on O2 at home at baseline. On exam, she is able to speak in full sentences, POCUS shows a pericardial effusion, there were no b lines in the lung fields or pleural effusions bilat. Pt was seen by Onc fellow at bedside. Pt reports they were planning to do a PET scan. Onc reports plan to do an MRI of brain, chest abd/pelvis and plan for admission for further work up and management. In ED, begin eval for ACS, HF. back pain and chest pain noted, however back pain is chronic, BP is acceptable, pulses equal, no neuro symptoms to suggest dissection. PE. follow up imaging by medicine team.

## 2025-03-11 NOTE — ED ADULT TRIAGE NOTE - CHIEF COMPLAINT QUOTE
Pt history of small cell lung ca, scheduled to start chemo soon, referred to ED by oncologist for shortness of breath, chest pain radiating to back accompanied with dyspnea, pt is tachypneic, placed on 3L NC, spo2 >95%. Oncologist requesting to page Oncology Fellow.

## 2025-03-11 NOTE — ED ADULT NURSE NOTE - OBJECTIVE STATEMENT
Pili RN: Received pt lying in room 1. alert and oriented x4, ambulates with a cane. comes I c/o pain in her chest an SOB,pt has small cell carcinoma of her lung. oncologist send her in to start chemo tomorrow. Labs sent. 20 G SL placed in the right forearm. Pt is resting. On oxygen 3L via NC. Awaits lab results. Offers no other complaints.

## 2025-03-11 NOTE — ED PROVIDER NOTE - PHYSICAL EXAMINATION
GENERAL: Not in acute distress, non-toxic appearing  HEAD: normocephalic, atraumatic  HEENT: EOMI, normal conjunctiva, oral mucosa moist, neck supple  CARDIAC: regular rate and rhythm, normal S1 and S2,  no appreciable murmurs  PULM: + increased work of breathing, speaking in full sentences, clear to auscultation bilat  GI: abdomen nondistended, soft, nontender, no guarding or rebound tenderness  NEURO: alert and oriented x 3, normal speech, moving all 4 extremities   MSK: No visible deformities, no calf tenderness/redness/swelling  SKIN: No visible rashes, dry, well-perfused  PSYCH: appropriate mood and affect

## 2025-03-11 NOTE — H&P ADULT - HISTORY OF PRESENT ILLNESS
65 y/o F with PMHx of AF, scleroderma, asthma with prior intubations, pericardial effusion s/p pericardiocentesis (12/2024) with drain on colchicine presented to the ED due to SOB. She initially presented to [facility] for SOB and chest pain and was transferred to [facility] (was discharged on 02/24/2025) presented to the ED for further workup.  63 y/o F with PMHx of AF, scleroderma, asthma with prior intubations, pericardial effusion s/p pericardiocentesis (12/2024) with drain on colchicine presented to the ED due to SOB, chest pain, and back pain.  63 y/o F with PMHx of AF, scleroderma, asthma with prior intubations, hypothyroidism, pericardial effusion s/p pericardiocentesis (12/2024) and drain on colchicine presented to the ED due to SOB, chest pain, and back pain.  65 y/o F with PMHx of AF, scleroderma, asthma with prior intubations, hypothyroidism, pericardial effusion s/p pericardiocentesis (12/2024) and drain on colchicine presented to the ED due to SOB, chest pain, and back pain.   Was admitted in 12/2024 due to chest pain and SOB and found to have a pericardial effusion with evidence of tamponade physiology. She underwent urgent pericardiocentesis with placement of drain.  63 y/o F with PMHx of AF, scleroderma, asthma with prior intubations, hypothyroidism, pericardial effusion s/p pericardiocentesis (12/2024) and drain on colchicine presented to the ED due to SOB, chest pain, and back pain. Oncology evaluated her and recommended MRI brain and MRI chest/abdomen/pelvis with contrast to rule out metastasis.   Was previously admitted in 12/2024 due to chest pain and SOB and found to have a pericardial effusion with evidence of tamponade physiology. She underwent urgent pericardiocentesis with placement of drain which was removed on 12/26. She was started on colchicine and NSAIDs. Hospital course was complicated by AF with RVR which was new onset and was suspected to be due to drain placement. After drain was removed patient continued to have pAF and was started on Eliquis. Presented to the ED on 02/13/25 for left sided chest pain radiating to the back. Repeat TTE demonstrated interval increase in pericardial effusion (moderate-large adjacent to RA with no evidence of tamponade) and CTS was consulted for possible pericardial window and recommended no acute intervention. 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 Pulmonary consulted and recommended transfer to [facility] for bronchoscopy, EBUS and hilar mass biopsy. 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

## 2025-03-11 NOTE — H&P ADULT - NS ATTEST RISK PROBLEM GEN_ALL_CORE FT
Small cell lung cancer  Scleroderma  Asthma  Current smoker  Chronic pain   Pericardial effusion   Dysphagia

## 2025-03-11 NOTE — ED PROVIDER NOTE - ATTENDING CONTRIBUTION TO CARE
Nimco Barajas (Rodriguez), DO   I have personally performed a face to face medical and diagnostic evaluation of the patient. I have discussed with and reviewed the resident's note and agree with the History, ROS, Physical Exam and MDM and made any necessary edits to reflect my clinical interpretation, impression and plan. I actively participated in the comanagement of this patient with the resident. I have personally reviewed all orders, study/imaging results, medication orders. I discussed indications for consultant evaluation and consultant recommendations with the resident when applicable, and have discussed the disposition plan with the resident.

## 2025-03-11 NOTE — CONSULT NOTE ADULT - ASSESSMENT
please obtain    - MR Brain w/IV Contrast >> expedite  - Standing weight/height obtain  - TLS labs with CBC, CMP, Coags, T&S: LDH, uric acid, K, Ca, Phos      I am aware of patient, will see shortly.      Elver Grullon, PGY4  Hematology & Oncology Fellow  MS Teams - Call or Text      incomplete note please obtain    - MR Brain + Chest/Abd/Pelvis w/IV Contrast (anaphylactic rxn to iodinated contrast prior) >> expedite  - Standing weight/height obtain  - TLS labs with CBC, CMP, Coags, T&S: LDH, uric acid, K, Ca, Phos      I am aware of patient, will see shortly.      Elver Grullon, PGY4  Hematology & Oncology Fellow  MS Teams - Call or Text      incomplete note Assessment:         please obtain  - MR Brain + Chest/Abd/Pelvis w/IV Contrast (anaphylactic rxn to iodinated contrast prior) >> expedite  - Standing weight/height obtain  - TLS labs with CBC, CMP, Coags, T&S: LDH, uric acid, K, Ca, Phos      I am aware of patient, will see shortly.      Elver Grullon, PGY4  Hematology & Oncology Fellow  MS Teams - Call or Text      incomplete note Assessment: 64F presents for SOB, now pathologic confirmed new small cell lung cancer. Pt reports continued SOB, CP, back pain (taking pain meds).      #Small cell lung cancer, new  - Smoking status: smoking since age 17, 1PPD until 2024  - ECO  - Stage: - pending MR -      Recommendations:  - MR Brain + Chest/Abd/Pelvis w/IV Contrast (anaphylactic rxn to iodinated contrast prior) with premedication for anxiety >> expedite  - Standing weight/height obtain  - TLS labs daily: LDH, uric acid, K, Ca, Phos  - IVF, 125cc/hr  - Radiation Oncology consult  - Palliative care for pain optimization  - Rheumatology consult (scleroderma, seronegative - recent evaluation at [facility]; If extensive stage, would warrant addition of immunotherapy which may increase risk of immune related AEs in this patient)  - [facility], Sam Love to complete HCP forms      Carboplatin + Etoposide planned, consented today by myself. MR needed to complete staging. PICC line is not needed for carboplatin/etoposide, can be given via PIV.  Oncology to follow with you.      Please message me on MS teams with any additional questions.    Elver Grullon, PGY4  Hematology & Oncology Fellow  MS Teams - Call or Text Assessment: 64F presents for SOB, now pathologic confirmed new small cell lung cancer. Pt reports continued SOB, CP, back pain (taking pain meds).      #Small cell lung cancer, new  - Smoking status: smoking since age 17, 1PPD until 2024  - ECO  - Stage: - pending MR -      Recommendations:  - MR Brain + Chest/Abd/Pelvis w/IV Contrast (anaphylactic rxn to iodinated contrast prior) with premedication for anxiety >> expedite  - Standing weight/height obtain  - TLS labs daily: LDH, uric acid, K, Ca, Phos  - IVF, maintenance  - Radiation Oncology consult  - Palliative care for pain optimization  - Rheumatology consult (scleroderma, seronegative - recent evaluation at [facility]; If extensive stage, would warrant addition of immunotherapy which may increase risk of immune related AEs in this patient)  - [facility], Sam Love to complete HCP forms      Carboplatin + Etoposide planned, consented today by myself. MR needed to complete staging. PICC line is not needed for carboplatin/etoposide, can be given via PIV.  Oncology to follow with you.      Please message me on MS teams with any additional questions.    Elver Grullon, PGY4  Hematology & Oncology Fellow  MS Teams - Call or Text Assessment: 64F presents for SOB, now pathologic confirmed new small cell lung cancer. Pt reports continued SOB, CP, back pain (taking pain meds).      #Small cell lung cancer, new  - Smoking status: smoking since age 17, 1PPD until 2024  - ECO  - Stage: - pending MR -      Recommendations:  - MR Brain + Chest/Abd/Pelvis w/IV Contrast (anaphylactic rxn to iodinated contrast prior) with premedication for anxiety >> expedite  - Standing weight/height obtain  - TLS labs daily: LDH, uric acid, K, Ca, Phos  - IVF, maintenance  - Radiation Oncology consult (I emailed)  - Palliative care for pain optimization  - Rheumatology consult (scleroderma, seronegative - recent evaluation at [facility]; If extensive stage, would warrant addition of immunotherapy which may increase risk of immune related AEs in this patient)  - [facility], Sam Love to complete HCP forms      Carboplatin + Etoposide planned, consented today by myself. MR needed to complete staging. PICC line is not needed for carboplatin/etoposide, can be given via PIV.  Oncology to follow with you.      Please message me on MS teams with any additional questions.    Elver Grullon, PGY4  Hematology & Oncology Fellow  MS Teams - Call or Text Assessment: 64F presents for SOB, now pathologic confirmed new small cell lung cancer. Pt reports continued SOB, CP, back pain (taking pain meds).      #Small cell lung cancer, new  - Smoking status: smoking since age 17, 1PPD until 2024  - ECO  - Stage: - pending MR -      Recommendations:  - MR Brain + Chest/Abd/Pelvis w/IV Contrast (anaphylactic rxn to iodinated contrast prior) with premedication for anxiety >> expedite  - Standing weight/height obtain  - TLS labs daily: LDH, uric acid, K, Ca, Phos  - IVF, maintenance  - Pain control: please continue the regimen she was on most recently during her admission  - Radiation Oncology consult (I emailed)  - Palliative care for pain optimization  - Rheumatology consult (scleroderma, seronegative - recent evaluation at [facility]; If extensive stage, would warrant addition of immunotherapy which may increase risk of immune related AEs in this patient)  - [facility], Sam Love to complete HCP forms      Carboplatin + Etoposide planned, consented today by myself. MR needed to complete staging. PICC line is not needed for carboplatin/etoposide, can be given via PIV.  Oncology to follow with you.      Please message me on MS teams with any additional questions.    Elver Grullon, PGY4  Hematology & Oncology Fellow  MS Teams - Call or Text

## 2025-03-11 NOTE — H&P ADULT - TIME BILLING
I had a face to face encounter with this patient. I spent 90 total minutes on the bedside interview and examination, coordination of care, counseling, chart review, order placement and documentation for this patient.

## 2025-03-11 NOTE — H&P ADULT - PROBLEM SELECTOR PLAN 3
Not in acute exacerbation     Albuterol PRN   Advair Not in acute exacerbation   Hx of prior intubations     Albuterol PRN   Advair

## 2025-03-11 NOTE — H&P ADULT - PROBLEM SELECTOR PLAN 5
Evaluated by pain management in 12/2024 who provided recommendations   Likely due to scleroderma and neuropathic pain   Previously failed gabapentin and antidepressants for neuropathic pain    Morphine 30 mg BID  Oxycodone 20 mg q 6 PRN  Baclofen 5 mg q 8 PRN

## 2025-03-11 NOTE — H&P ADULT - PROBLEM SELECTOR PLAN 1
MR Brain + Chest/Abd/Pelvis w/IV Contrast (anaphylactic rxn to iodinated contrast prior) with premedication for anxiety >> expedite  - Standing weight/height obtain  - TLS labs daily: LDH, uric acid, K, Ca, Phos  - IVF, maintenance  - Pain control: please continue the regimen she was on most recently during her admission  - Radiation Oncology consult (I emailed)  - Palliative care for pain optimization  - Rheumatology consult (scleroderma, seronegative - recent evaluation at [facility]; If extensive stage, would warrant addition of immunotherapy which may increase risk of immune related AEs in this patient)  - [facility], Sam Love to complete HCP forms CT chest (12/2024) demonstrated pulmonary nodules measuring up to 4 mm  CT chest (022025) demonstrated left suprahilar mediastinal mass and new peripheral ill-defined 2.4 x 2.2 cm opacity in the left upper lobe  Underwent EBUS on 02/21/25 by Pulmonary   Cytopathology report (02/21/25) demonstrated small cell carcinoma     MRI brain + Chest/Abd/Pelvis w/IV Contrast (anaphylactic rxn to iodinated contrast prior) with premedication for anxiety - pending as patient is refusing contrast study even with pre-medication   TLS labs daily: LDH, Uric acid, K, Ca, Phos  Maintenance IVF    Pain regimen as per recent admission   Palliative care for pain optimization consulted   Oncology recommendations appreciated   Radonc recommendations appreciated Presented with SOB and chest pain   CT chest (12/2024) demonstrated pulmonary nodules measuring up to 4 mm  CT chest (022025) demonstrated left suprahilar mediastinal mass and new peripheral ill-defined 2.4 x 2.2 cm opacity in the left upper lobe  Underwent EBUS on 02/21/25 by Pulmonary   Cytopathology report (02/21/25) demonstrated small cell carcinoma     MRI brain + Chest/Abd/Pelvis w/IV Contrast (anaphylactic rxn to iodinated contrast prior) with premedication for anxiety - pending as patient is refusing contrast study even with pre-medication   TLS labs daily: LDH, Uric acid, K, Ca, Phos  Maintenance IVF    Pain regimen as per recent admission   Palliative care for pain optimization consulted   Oncology recommendations appreciated   Radonc recommendations appreciated

## 2025-03-11 NOTE — H&P ADULT - TIME-BASED BILLING (NON-CRITICAL CARE)
Quality 226: Preventive Care And Screening: Tobacco Use: Screening And Cessation Intervention: Patient screened for tobacco use and is an ex/non-smoker Detail Level: Detailed Time-based billing (NON-critical care)

## 2025-03-11 NOTE — CONSULT NOTE ADULT - ASSESSMENT
Ms. Wen is a 65 yo woman with a new diagnosis of small cell lung cancer, at least T1cN2 if not higher, who presented to the ER for SOB. She underwent a bronchoscopy and EBUS on 2/21/2025 which confirmed small cell lung cancer in the left upper lobe mass, level 7 LN and 4L lymph nodes. Her workup was notable for a 4.2 cm suprahilar/mediastinal mass and a pericardial effusion seen on CT on 2/16/2025. Previous cytopathology from the pericardial effusion in December 2024 was negative for malignancy.   The rest of her staging workup is still pending, including an MRI brain and CT C/A/P. The extent and timing of the radiation therapy will depend on the results of the imaging. If it is confirmed that she has limited stage disease, she would be a candidate for definitive concurrent chemoradiation. Medical Oncology plans to start chemotherapy as soon as possible noted. The radiation would ideally start by cycle 2 of the chemotherapy and would likely be 60 Gy in 30 fx.  We will continue to follow for the results of her imaging and final recommendations will be made then. Please let us know of any discharge plans so that we may coordinate radiation appropriately.     Case discussed with Dr. Leon, radiation medicine attending on call. Final recommendations to follow in her attestation.

## 2025-03-12 ENCOUNTER — RESULT REVIEW (OUTPATIENT)
Age: 65
End: 2025-03-12

## 2025-03-12 DIAGNOSIS — J96.01 ACUTE RESPIRATORY FAILURE WITH HYPOXIA: ICD-10-CM

## 2025-03-12 DIAGNOSIS — R11.2 NAUSEA WITH VOMITING, UNSPECIFIED: ICD-10-CM

## 2025-03-12 DIAGNOSIS — Z71.89 OTHER SPECIFIED COUNSELING: ICD-10-CM

## 2025-03-12 DIAGNOSIS — Z51.5 ENCOUNTER FOR PALLIATIVE CARE: ICD-10-CM

## 2025-03-12 DIAGNOSIS — G89.3 NEOPLASM RELATED PAIN (ACUTE) (CHRONIC): ICD-10-CM

## 2025-03-12 LAB
ADD ON TEST-SPECIMEN IN LAB: SIGNIFICANT CHANGE UP
ALBUMIN SERPL ELPH-MCNC: 3.5 G/DL — SIGNIFICANT CHANGE UP (ref 3.3–5)
ALP SERPL-CCNC: 85 U/L — SIGNIFICANT CHANGE UP (ref 40–120)
ALT FLD-CCNC: 7 U/L — SIGNIFICANT CHANGE UP (ref 4–33)
ANION GAP SERPL CALC-SCNC: 9 MMOL/L — SIGNIFICANT CHANGE UP (ref 7–14)
AST SERPL-CCNC: 17 U/L — SIGNIFICANT CHANGE UP (ref 4–32)
BILIRUB SERPL-MCNC: 0.3 MG/DL — SIGNIFICANT CHANGE UP (ref 0.2–1.2)
BUN SERPL-MCNC: 10 MG/DL — SIGNIFICANT CHANGE UP (ref 7–23)
CALCIUM SERPL-MCNC: 8.9 MG/DL — SIGNIFICANT CHANGE UP (ref 8.4–10.5)
CHLORIDE SERPL-SCNC: 96 MMOL/L — LOW (ref 98–107)
CHLORIDE UR-SCNC: 79 MMOL/L — SIGNIFICANT CHANGE UP
CO2 SERPL-SCNC: 25 MMOL/L — SIGNIFICANT CHANGE UP (ref 22–31)
CREAT SERPL-MCNC: 0.37 MG/DL — LOW (ref 0.5–1.3)
EGFR: 111 ML/MIN/1.73M2 — SIGNIFICANT CHANGE UP
EGFR: 111 ML/MIN/1.73M2 — SIGNIFICANT CHANGE UP
GLUCOSE SERPL-MCNC: 97 MG/DL — SIGNIFICANT CHANGE UP (ref 70–99)
HCT VFR BLD CALC: 32.5 % — LOW (ref 34.5–45)
HGB BLD-MCNC: 11 G/DL — LOW (ref 11.5–15.5)
LDH SERPL L TO P-CCNC: 151 U/L — SIGNIFICANT CHANGE UP (ref 135–225)
MAGNESIUM SERPL-MCNC: 1.8 MG/DL — SIGNIFICANT CHANGE UP (ref 1.6–2.6)
MCHC RBC-ENTMCNC: 29.9 PG — SIGNIFICANT CHANGE UP (ref 27–34)
MCHC RBC-ENTMCNC: 33.8 G/DL — SIGNIFICANT CHANGE UP (ref 32–36)
MCV RBC AUTO: 88.3 FL — SIGNIFICANT CHANGE UP (ref 80–100)
NRBC # BLD AUTO: 0 K/UL — SIGNIFICANT CHANGE UP (ref 0–0)
NRBC # FLD: 0 K/UL — SIGNIFICANT CHANGE UP (ref 0–0)
NRBC BLD AUTO-RTO: 0 /100 WBCS — SIGNIFICANT CHANGE UP (ref 0–0)
OSMOLALITY SERPL: 279 MOSM/KG — SIGNIFICANT CHANGE UP (ref 275–295)
OSMOLALITY UR: 632 MOSM/KG — SIGNIFICANT CHANGE UP (ref 50–1200)
PHOSPHATE SERPL-MCNC: 3.9 MG/DL — SIGNIFICANT CHANGE UP (ref 2.5–4.5)
PLATELET # BLD AUTO: 319 K/UL — SIGNIFICANT CHANGE UP (ref 150–400)
POTASSIUM SERPL-MCNC: 3.5 MMOL/L — SIGNIFICANT CHANGE UP (ref 3.5–5.3)
POTASSIUM SERPL-SCNC: 3.5 MMOL/L — SIGNIFICANT CHANGE UP (ref 3.5–5.3)
POTASSIUM UR-SCNC: 40.2 MMOL/L — SIGNIFICANT CHANGE UP
PROT SERPL-MCNC: 6.2 G/DL — SIGNIFICANT CHANGE UP (ref 6–8.3)
RBC # BLD: 3.68 M/UL — LOW (ref 3.8–5.2)
RBC # FLD: 12.3 % — SIGNIFICANT CHANGE UP (ref 10.3–14.5)
SODIUM SERPL-SCNC: 130 MMOL/L — LOW (ref 135–145)
SODIUM UR-SCNC: 98 MMOL/L — SIGNIFICANT CHANGE UP
T4 FREE SERPL-MCNC: 1.6 NG/DL — SIGNIFICANT CHANGE UP (ref 0.9–1.7)
TSH SERPL-MCNC: 1.01 UIU/ML — SIGNIFICANT CHANGE UP (ref 0.27–4.2)
URATE SERPL-MCNC: 2.4 MG/DL — LOW (ref 2.5–7)
WBC # BLD: 4.13 K/UL — SIGNIFICANT CHANGE UP (ref 3.8–10.5)
WBC # FLD AUTO: 4.13 K/UL — SIGNIFICANT CHANGE UP (ref 3.8–10.5)

## 2025-03-12 PROCEDURE — 99223 1ST HOSP IP/OBS HIGH 75: CPT

## 2025-03-12 PROCEDURE — 99233 SBSQ HOSP IP/OBS HIGH 50: CPT

## 2025-03-12 PROCEDURE — 99497 ADVNCD CARE PLAN 30 MIN: CPT | Mod: 25

## 2025-03-12 PROCEDURE — 74018 RADEX ABDOMEN 1 VIEW: CPT | Mod: 26

## 2025-03-12 PROCEDURE — 93308 TTE F-UP OR LMTD: CPT | Mod: 26,GC

## 2025-03-12 PROCEDURE — 71045 X-RAY EXAM CHEST 1 VIEW: CPT | Mod: 26

## 2025-03-12 RX ORDER — ACETAMINOPHEN 500 MG/5ML
650 LIQUID (ML) ORAL ONCE
Refills: 0 | Status: COMPLETED | OUTPATIENT
Start: 2025-03-12 | End: 2025-03-12

## 2025-03-12 RX ORDER — HYDROMORPHONE/SOD CHLOR,ISO/PF 2 MG/10 ML
1 SYRINGE (ML) INJECTION EVERY 4 HOURS
Refills: 0 | Status: DISCONTINUED | OUTPATIENT
Start: 2025-03-12 | End: 2025-03-13

## 2025-03-12 RX ORDER — ACETAMINOPHEN 500 MG/5ML
1000 LIQUID (ML) ORAL ONCE
Refills: 0 | Status: COMPLETED | OUTPATIENT
Start: 2025-03-12 | End: 2025-03-12

## 2025-03-12 RX ORDER — ONDANSETRON HCL/PF 4 MG/2 ML
4 VIAL (ML) INJECTION EVERY 6 HOURS
Refills: 0 | Status: DISCONTINUED | OUTPATIENT
Start: 2025-03-12 | End: 2025-03-31

## 2025-03-12 RX ORDER — DIPHENHYDRAMINE HCL 12.5MG/5ML
25 ELIXIR ORAL ONCE
Refills: 0 | Status: COMPLETED | OUTPATIENT
Start: 2025-03-12 | End: 2025-03-12

## 2025-03-12 RX ORDER — INFLUENZA A VIRUS A/IDAHO/07/2018 (H1N1) ANTIGEN (MDCK CELL DERIVED, PROPIOLACTONE INACTIVATED, INFLUENZA A VIRUS A/INDIANA/08/2018 (H3N2) ANTIGEN (MDCK CELL DERIVED, PROPIOLACTONE INACTIVATED), INFLUENZA B VIRUS B/SINGAPORE/INFTT-16-0610/2016 ANTIGEN (MDCK CELL DERIVED, PROPIOLACTONE INACTIVATED), INFLUENZA B VIRUS B/IOWA/06/2017 ANTIGEN (MDCK CELL DERIVED, PROPIOLACTONE INACTIVATED) 15; 15; 15; 15 UG/.5ML; UG/.5ML; UG/.5ML; UG/.5ML
0.5 INJECTION, SUSPENSION INTRAMUSCULAR ONCE
Refills: 0 | Status: DISCONTINUED | OUTPATIENT
Start: 2025-03-12 | End: 2025-03-31

## 2025-03-12 RX ORDER — NICOTINE POLACRILEX 4 MG/1
1 GUM, CHEWING ORAL DAILY
Refills: 0 | Status: COMPLETED | OUTPATIENT
Start: 2025-03-12 | End: 2025-03-25

## 2025-03-12 RX ORDER — IPRATROPIUM BROMIDE AND ALBUTEROL SULFATE .5; 2.5 MG/3ML; MG/3ML
3 SOLUTION RESPIRATORY (INHALATION) EVERY 6 HOURS
Refills: 0 | Status: DISCONTINUED | OUTPATIENT
Start: 2025-03-12 | End: 2025-03-26

## 2025-03-12 RX ORDER — OXYCODONE HYDROCHLORIDE 30 MG/1
20 TABLET ORAL EVERY 4 HOURS
Refills: 0 | Status: DISCONTINUED | OUTPATIENT
Start: 2025-03-12 | End: 2025-03-12

## 2025-03-12 RX ORDER — OXYCODONE HYDROCHLORIDE 30 MG/1
5 TABLET ORAL ONCE
Refills: 0 | Status: DISCONTINUED | OUTPATIENT
Start: 2025-03-12 | End: 2025-03-12

## 2025-03-12 RX ORDER — SENNA 187 MG
2 TABLET ORAL AT BEDTIME
Refills: 0 | Status: DISCONTINUED | OUTPATIENT
Start: 2025-03-12 | End: 2025-03-31

## 2025-03-12 RX ORDER — LORAZEPAM 4 MG/ML
1 VIAL (ML) INJECTION ONCE
Refills: 0 | Status: DISCONTINUED | OUTPATIENT
Start: 2025-03-12 | End: 2025-03-12

## 2025-03-12 RX ORDER — LORAZEPAM 4 MG/ML
0.25 VIAL (ML) INJECTION EVERY 8 HOURS
Refills: 0 | Status: DISCONTINUED | OUTPATIENT
Start: 2025-03-12 | End: 2025-03-13

## 2025-03-12 RX ORDER — POLYETHYLENE GLYCOL 3350 17 G/17G
17 POWDER, FOR SOLUTION ORAL DAILY
Refills: 0 | Status: DISCONTINUED | OUTPATIENT
Start: 2025-03-12 | End: 2025-03-26

## 2025-03-12 RX ORDER — OXYCODONE HYDROCHLORIDE 30 MG/1
2.5 TABLET ORAL ONCE
Refills: 0 | Status: DISCONTINUED | OUTPATIENT
Start: 2025-03-12 | End: 2025-03-12

## 2025-03-12 RX ORDER — METHYLNALTREXONE BROMIDE 12 MG/.6ML
12 INJECTION, SOLUTION SUBCUTANEOUS ONCE
Refills: 0 | Status: DISCONTINUED | OUTPATIENT
Start: 2025-03-12 | End: 2025-03-31

## 2025-03-12 RX ADMIN — Medication 1 APPLICATION(S): at 14:28

## 2025-03-12 RX ADMIN — Medication 650 MILLIGRAM(S): at 18:00

## 2025-03-12 RX ADMIN — NALOXEGOL OXALATE 25 MILLIGRAM(S): 12.5 TABLET, FILM COATED ORAL at 13:31

## 2025-03-12 RX ADMIN — Medication 112 MICROGRAM(S): at 05:40

## 2025-03-12 RX ADMIN — Medication 1000 MILLIGRAM(S): at 21:50

## 2025-03-12 RX ADMIN — Medication 1 MILLIGRAM(S): at 14:30

## 2025-03-12 RX ADMIN — APIXABAN 5 MILLIGRAM(S): 2.5 TABLET, FILM COATED ORAL at 08:59

## 2025-03-12 RX ADMIN — TRAMADOL HYDROCHLORIDE 25 MILLIGRAM(S): 50 TABLET, FILM COATED ORAL at 05:39

## 2025-03-12 RX ADMIN — OXYCODONE HYDROCHLORIDE 20 MILLIGRAM(S): 30 TABLET ORAL at 01:28

## 2025-03-12 RX ADMIN — Medication 40 MILLIEQUIVALENT(S): at 08:59

## 2025-03-12 RX ADMIN — Medication 75 MILLILITER(S): at 05:41

## 2025-03-12 RX ADMIN — Medication 25 MILLIGRAM(S): at 10:56

## 2025-03-12 RX ADMIN — TRAMADOL HYDROCHLORIDE 25 MILLIGRAM(S): 50 TABLET, FILM COATED ORAL at 06:39

## 2025-03-12 RX ADMIN — Medication 40 MILLIGRAM(S): at 05:40

## 2025-03-12 RX ADMIN — NICOTINE POLACRILEX 1 PATCH: 4 GUM, CHEWING ORAL at 20:17

## 2025-03-12 RX ADMIN — Medication 4 MILLIGRAM(S): at 11:49

## 2025-03-12 RX ADMIN — IPRATROPIUM BROMIDE AND ALBUTEROL SULFATE 3 MILLILITER(S): .5; 2.5 SOLUTION RESPIRATORY (INHALATION) at 15:32

## 2025-03-12 RX ADMIN — Medication 1 MILLIGRAM(S): at 13:30

## 2025-03-12 RX ADMIN — OXYCODONE HYDROCHLORIDE 20 MILLIGRAM(S): 30 TABLET ORAL at 02:28

## 2025-03-12 RX ADMIN — COLCHICINE 0.6 MILLIGRAM(S): 0.6 TABLET, FILM COATED ORAL at 13:31

## 2025-03-12 RX ADMIN — OXYCODONE HYDROCHLORIDE 20 MILLIGRAM(S): 30 TABLET ORAL at 09:25

## 2025-03-12 RX ADMIN — Medication 30 MILLIGRAM(S): at 05:40

## 2025-03-12 RX ADMIN — Medication 30 MILLIGRAM(S): at 20:15

## 2025-03-12 RX ADMIN — NICOTINE POLACRILEX 1 PATCH: 4 GUM, CHEWING ORAL at 14:26

## 2025-03-12 RX ADMIN — Medication 1 GRAM(S): at 17:44

## 2025-03-12 RX ADMIN — Medication 400 MILLIGRAM(S): at 21:35

## 2025-03-12 RX ADMIN — Medication 1 MILLIGRAM(S): at 20:58

## 2025-03-12 RX ADMIN — NICOTINE POLACRILEX 1 PATCH: 4 GUM, CHEWING ORAL at 11:52

## 2025-03-12 RX ADMIN — Medication 40 MILLIGRAM(S): at 17:44

## 2025-03-12 RX ADMIN — NICOTINE POLACRILEX 1 PATCH: 4 GUM, CHEWING ORAL at 14:25

## 2025-03-12 RX ADMIN — APIXABAN 5 MILLIGRAM(S): 2.5 TABLET, FILM COATED ORAL at 17:44

## 2025-03-12 RX ADMIN — OXYCODONE HYDROCHLORIDE 5 MILLIGRAM(S): 30 TABLET ORAL at 23:46

## 2025-03-12 RX ADMIN — Medication 650 MILLIGRAM(S): at 17:00

## 2025-03-12 RX ADMIN — Medication 30 MILLIGRAM(S): at 17:43

## 2025-03-12 RX ADMIN — Medication 30 MILLIGRAM(S): at 06:40

## 2025-03-12 RX ADMIN — Medication 0.25 MILLIGRAM(S): at 20:08

## 2025-03-12 RX ADMIN — OXYCODONE HYDROCHLORIDE 20 MILLIGRAM(S): 30 TABLET ORAL at 10:25

## 2025-03-12 NOTE — PATIENT PROFILE ADULT - FALL HARM RISK - HARM RISK INTERVENTIONS

## 2025-03-12 NOTE — CONSULT NOTE ADULT - SUBJECTIVE AND OBJECTIVE BOX
HPI:  63 y/o F with PMHx of AF, scleroderma, asthma with prior intubations, hypothyroidism, pericardial effusion s/p pericardiocentesis (12/2024) and drain on colchicine presented to the ED due to SOB, chest pain, and back pain. Oncology evaluated her and recommended MRI brain and MRI chest/abdomen/pelvis with contrast to rule out metastasis.   Was previously admitted in 12/2024 due to chest pain and SOB and found to have a pericardial effusion with evidence of tamponade physiology. She underwent urgent pericardiocentesis with placement of drain which was removed on 12/26. She was started on colchicine and NSAIDs. Hospital course was complicated by AF with RVR which was new onset and was suspected to be due to drain placement. After drain was removed patient continued to have pAF and was started on Eliquis. Presented to the ED on 02/13/25 for left sided chest pain radiating to the back. Repeat TTE demonstrated interval increase in pericardial effusion (moderate-large adjacent to RA with no evidence of tamponade) and CTS was consulted for possible pericardial window and recommended no acute intervention. 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 Pulmonary consulted and recommended transfer to [facility] for bronchoscopy, EBUS and hilar mass biopsy. 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  (11 Mar 2025 20:25)    PERTINENT PM/SXH:   Scleroderma    Asthma    High cholesterol    Scleroderma    Shingles    Hypothyroidism    GERD (gastroesophageal reflux disease)    Smoker    Multiple drug allergies      S/P small bowel resection    History of myomectomy    History of ovarian cystectomy    Fibroid    Bowel obstruction    H/O ovarian cystectomy    S/P pericardiocentesis      FAMILY HISTORY:    ------------------------------------------------------------------------------------------------------------  ITEMS NOT CHECKED ARE NOT PRESENT    SOCIAL HISTORY:   Living Situation: [ ]Home  [ ]Long term care  [ ]Rehab [ ]Other  Support:     Substance hx:  [ ]   Tobacco hx:  [ ]   Alcohol hx: [ ]   Family Hx substance abuse [ ]yes [ ]no    Jew/Spirituality:  PCSSQ[Palliative Care Spiritual Screening Question]   Severity (0-10): 0  Score of 4 or > indicate consideration of Chaplaincy referral.  Chaplaincy Referral: [ ] yes [X ] refused [ ] following    Anticipatory Grief present?:  [ ] yes [X ] no  [ ] Deferred                      Other Referrals:    [ ]Hospice   [ ]Caregiver [place] Support  [ ]Child Life    [ ]Patient & Family Centered Care Referral  [ ]Holistic Therapy     ------------------------------------------------------------------------------------------------------------    PRESENT SYMPTOMS:     [ ] Unable to self-report      [ ] PAINADS     [ ] RDOS    [ ] No     [ ] Yes     Source if other than patient:  [ ]Family   [ ]Team     PAIN:   If blank, patient unable to specify     [ ]yes [ ]no    1. Location-   2. Radiation-    3. Quality-   4. Timing-   5. Minimal acceptable level/pain goal-   6. Aggravating factors-   7. QOL impact-     SYMPTOMS:   Dyspnea:                           [ ]Mild [ ]Moderate [ ]Severe  Anxiety:                             [ ]Mild [ ]Moderate [ ]Severe  Fatigue:                             [ ]Mild [ ]Moderate [ ]Severe  Nausea/Vomiting:              [ ]Mild [ ]Moderate [ ]Severe  Loss of appetite:                [ ]Mild [ ]Moderate [ ]Severe  Constipation:                     [ ]Mild [ ]Moderate [ ]Severe    Other Symptoms:  [X ]All other review of systems negative     ------------------------------------------------------------------------------------------------------------      I-Stop Reference No:     ------------------------------------------------------------------------------------------------------------    FUNCTIONAL STATUS:     Baseline ADL (prior to admission):  [ ] Independent  [ ] Moderate Assistance [ ] Dependent    Palliative Performance Score (current):     [ ]PPSV2 < or = to 30%   [ ]artificial nutrition      NUTRITIONAL STATUS:     Protein Calorie Malnutrition Present:   [ ]mild   [ ]moderate or severe    Weight:   [ ]underweight (BMI 18.5 or less)   [ ]morbid obesity (BMI 30 or higher)   [ ]anasarca  [ ]significant weight loss     Height (cm): 175.3 (03-11-25 @ 16:45), 175.26 (03-11-25 @ 11:01), 175.3 (02-21-25 @ 12:46)  Weight (kg): 63.5 (03-11-25 @ 16:45), 69.85 (03-11-25 @ 11:01), 65.9 (02-21-25 @ 12:46)  BMI (kg/m2): 20.7 (03-11-25 @ 16:45), 22.7 (03-11-25 @ 11:01), 21.4 (02-21-25 @ 12:46)    ------------------------------------------------------------------------------------------------------------    PRIOR ADVANCE DIRECTIVES:    [ ] DNR/MOLST    [ ] Living Will    [ ] Health Care Proxy(s)    DECISION MAKER(s):  [ ] Patient    [ ] Surrogate(s)  [ ] HCP   [ ] Guardian             ------------------------------------------------------------------------------------------------------------  PHYSICAL EXAM:  Vital Signs Last 24 Hrs  T(C): 36.7 (12 Mar 2025 08:30), Max: 36.8 (11 Mar 2025 16:45)  T(F): 98.1 (12 Mar 2025 08:30), Max: 98.3 (11 Mar 2025 16:45)  HR: 77 (12 Mar 2025 08:30) (76 - 98)  BP: 118/65 (12 Mar 2025 08:30) (117/66 - 140/81)  BP(mean): --  RR: 18 (12 Mar 2025 08:30) (16 - 20)  SpO2: 95% (12 Mar 2025 08:30) (95% - 100%)    Parameters below as of 12 Mar 2025 08:30  Patient On (Oxygen Delivery Method): room air     I&O's Summary    11 Mar 2025 07:01  -  12 Mar 2025 07:00  --------------------------------------------------------  IN: 300 mL / OUT: 250 mL / NET: 50 mL    12 Mar 2025 07:01  -  12 Mar 2025 12:32  --------------------------------------------------------  IN: 75 mL / OUT: 0 mL / NET: 75 mL        GENERAL:  [ ]Cachexia  [ ] Frail  [ ]Awake  [ ]Oriented   [ ]Lethargic  [ ]Unarousable  [ ]Verbal  [ ]Non-Verbal    BEHAVIORAL:   [ ] Anxiety  [ ] Delirium [ ] Agitation [ ] Other    HEENT:   [ ]Normal   [ ]Dry mouth   [ ]ET Tube/Trach  [ ]Oral lesions    PULMONARY:   [ ]Clear              [ ]Tachypnea  [ ]Audible excessive secretions   [ ]Rhonchi        [ ]Right [ ]Left [ ]Bilateral  [ ]Crackles        [ ]Right [ ]Left [ ]Bilateral  [ ]Wheezing     [ ]Right [ ]Left [ ]Bilateral  [ ]Diminished breath sounds [ ]right [ ]left [ ]bilateral    CARDIOVASCULAR:    [ ]Regular [ ]Irregular [ ]Tachy  [ ]Ridge [ ]Murmur [ ]Other    GASTROINTESTINAL:  [ ]Soft  [ ]Distended   [ ]+BS  [ ]Non tender [ ]Tender  [ ]Other [ ]PEG [ ]OGT/ NGT      GENITOURINARY:  [ ]Normal [ ] Incontinent   [ ]Oliguria/Anuria   [ ]Camargo    MUSCULOSKELETAL:   [ ]Normal   [ ]Weakness  [ ]Bed/Wheelchair bound [ ]Edema    NEUROLOGIC:   [ ]No focal deficits  [ ]Cognitive impairment  [ ]Dysphagia [ ]Dysarthria [ ]Paresis [ ]Other     SKIN:   [ ]Normal  [ ]Rash  [ ]Other  [ ]Pressure ulcer(s)       Present on admission [ ]y [ ]n    ------------------------------------------------------------------------------------------------------------    LABS:                        11.0   4.13  )-----------( 319      ( 12 Mar 2025 06:20 )             32.5   03-12    130[L]  |  96[L]  |  10  ----------------------------<  97  3.5   |  25  |  0.37[L]    Ca    8.9      12 Mar 2025 06:20  Phos  3.9     03-12  Mg     1.80     03-12    TPro  6.2  /  Alb  3.5  /  TBili  0.3  /  DBili  x   /  AST  17  /  ALT  7   /  AlkPhos  85  03-12  PT/INR - ( 11 Mar 2025 20:40 )   PT: 15.4 sec;   INR: 1.30 ratio         PTT - ( 11 Mar 2025 20:40 )  PTT:37.5 sec    Urinalysis Basic - ( 12 Mar 2025 06:20 )    Color: x / Appearance: x / SG: x / pH: x  Gluc: 97 mg/dL / Ketone: x  / Bili: x / Urobili: x   Blood: x / Protein: x / Nitrite: x   Leuk Esterase: x / RBC: x / WBC x   Sq Epi: x / Non Sq Epi: x / Bacteria: x        ------------------------------------------------------------------------------------------------------------    CRITICAL CARE:  [ ]Shock Present  [ ]Septic [ ]Cardiogenic [ ]Neurologic [ ]Hypovolemic [ ] Undifferentiated/Mixed   [ ]Vasopressors [ ]Inotropes     [ ]Respiratory failure present: [ ]Acute  [ ]Chronic [ ]Hypoxic  [ ]Hypercarbic   [ ]Mechanical ventilation   [ ]Trach collar   [ ]Non-invasive ventilatory support   [ ]High flow    [ ]Non-rebreather/Venti     [ ]Other organ failure     ------------------------------------------------------------------------------------------------------------    RADIOLOGY & ADDITIONAL STUDIES:      ------------------------------------------------------------------------------------------------------------    ALLERGIES:  Allergies    penicillin (Unknown)  penicillin (Anaphylaxis; Hives; Other)  iodine (Anaphylaxis)  statins (Unknown)  Xolair (Unknown)  ABIDA dye (Short breath; Hives)  IV Contrast (Unknown)  Originally Entered as [Unknown] reaction to [BEE STINGS] (Unknown)    Intolerances        MEDICATIONS  (STANDING):  albuterol/ipratropium for Nebulization 3 milliLiter(s) Nebulizer every 6 hours  apixaban 5 milliGRAM(s) Oral every 12 hours  chlorhexidine 2% Cloths 1 Application(s) Topical <User Schedule>  colchicine 0.6 milliGRAM(s) Oral daily  fluticasone propionate/ salmeterol 100-50 MICROgram(s) Diskus 1 Dose(s) Inhalation two times a day  influenza  Vaccine (HIGH DOSE) 0.5 milliLiter(s) IntraMuscular once  levothyroxine 112 MICROGram(s) Oral daily  lidocaine   4% Patch 1 Patch Transdermal daily  morphine ER Tablet 30 milliGRAM(s) Oral every 12 hours  naloxegol 25 milliGRAM(s) Oral daily  nicotine - 21 mG/24Hr(s) Patch 1 Patch Transdermal daily  nicotine - 21 mG/24Hr(s) Patch 1 Patch Transdermal daily  pantoprazole    Tablet 40 milliGRAM(s) Oral every 12 hours  polyethylene glycol 3350 17 Gram(s) Oral daily  senna 2 Tablet(s) Oral at bedtime  sodium chloride 1 Gram(s) Oral two times a day    MEDICATIONS  (PRN):  aluminum hydroxide/magnesium hydroxide/simethicone Suspension 30 milliLiter(s) Oral every 4 hours PRN Dyspepsia  baclofen 5 milliGRAM(s) Oral every 8 hours PRN Musculoskeletal Pain  melatonin 3 milliGRAM(s) Oral at bedtime PRN Insomnia  ondansetron Injectable 4 milliGRAM(s) IV Push every 8 hours PRN Nausea and/or Vomiting  oxyCODONE    IR 20 milliGRAM(s) Oral every 4 hours PRN for severe pain           HPI:  63 y/o F with PMHx of AF, scleroderma, asthma with prior intubations, hypothyroidism, pericardial effusion s/p pericardiocentesis (12/2024) and drain on colchicine presented to the ED due to SOB, chest pain, and back pain. Oncology evaluated her and recommended MRI brain and MRI chest/abdomen/pelvis with contrast to rule out metastasis. Was previously admitted in 12/2024 due to chest pain and SOB and found to have a pericardial effusion with evidence of tamponade physiology. She underwent urgent pericardiocentesis with placement of drain which was removed on 12/26. She was started on colchicine and NSAIDs. Hospital course was complicated by AF with RVR which was new onset and was suspected to be due to drain placement. After drain was removed patient continued to have pAF and was started on Eliquis. Presented to the ED on 02/13/25 for left sided chest pain radiating to the back. Repeat TTE demonstrated interval increase in pericardial effusion (moderate-large adjacent to RA with no evidence of tamponade) and CTS was consulted for possible pericardial window and recommended no acute intervention. 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 Pulmonary consulted and recommended transfer to [facility] for bronchoscopy, EBUS and hilar mass biopsy. Patient transferred to [facility] on 02/20/25 and underwent EBUS on 02/21/25. 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  (11 Mar 2025 20:25)    PERTINENT PM/SXH:   Scleroderma    Asthma    High cholesterol    Scleroderma    Shingles    Hypothyroidism    GERD (gastroesophageal reflux disease)    Smoker    Multiple drug allergies      S/P small bowel resection    History of myomectomy    History of ovarian cystectomy    Fibroid    Bowel obstruction    H/O ovarian cystectomy    S/P pericardiocentesis      FAMILY HISTORY:    ------------------------------------------------------------------------------------------------------------  ITEMS NOT CHECKED ARE NOT PRESENT    SOCIAL HISTORY:   Living Situation: [ ]Home  [ ]Long term care  [ ]Rehab [ ]Other  Support:     Substance hx:  [ ]   Tobacco hx:  [ ]   Alcohol hx: [ ]   Family Hx substance abuse [ ]yes [ ]no    Anglican/Spirituality:  PCSSQ[Palliative Care Spiritual Screening Question]   Severity (0-10): 0  Score of 4 or > indicate consideration of Chaplaincy referral.  Chaplaincy Referral: [ ] yes [X ] refused [ ] following    Anticipatory Grief present?:  [ ] yes [X ] no  [ ] Deferred                      Other Referrals:    [ ]Hospice   [ ]Caregiver [place] Support  [ ]Child Life    [ ]Patient & Family Centered Care Referral  [ ]Holistic Therapy     ------------------------------------------------------------------------------------------------------------    PRESENT SYMPTOMS:     [ ] Unable to self-report      [ ] PAINADS     [ ] RDOS    [ ] No     [ ] Yes     Source if other than patient:  [ ]Family   [ ]Team     PAIN:   If blank, patient unable to specify     [ ]yes [ ]no    1. Location-   2. Radiation-    3. Quality-   4. Timing-   5. Minimal acceptable level/pain goal-   6. Aggravating factors-   7. QOL impact-     SYMPTOMS:   Dyspnea:                           [ ]Mild [ ]Moderate [ ]Severe  Anxiety:                             [ ]Mild [ ]Moderate [ ]Severe  Fatigue:                             [ ]Mild [ ]Moderate [ ]Severe  Nausea/Vomiting:              [ ]Mild [ ]Moderate [ ]Severe  Loss of appetite:                [ ]Mild [ ]Moderate [ ]Severe  Constipation:                     [ ]Mild [ ]Moderate [ ]Severe    Other Symptoms:  [X ]All other review of systems negative     ------------------------------------------------------------------------------------------------------------      I-Stop Reference No:     ------------------------------------------------------------------------------------------------------------    FUNCTIONAL STATUS:     Baseline ADL (prior to admission):  [ ] Independent  [ ] Moderate Assistance [ ] Dependent    Palliative Performance Score (current):     [ ]PPSV2 < or = to 30%   [ ]artificial nutrition      NUTRITIONAL STATUS:     Protein Calorie Malnutrition Present:   [ ]mild   [ ]moderate or severe    Weight:   [ ]underweight (BMI 18.5 or less)   [ ]morbid obesity (BMI 30 or higher)   [ ]anasarca  [ ]significant weight loss     Height (cm): 175.3 (03-11-25 @ 16:45), 175.26 (03-11-25 @ 11:01), 175.3 (02-21-25 @ 12:46)  Weight (kg): 63.5 (03-11-25 @ 16:45), 69.85 (03-11-25 @ 11:01), 65.9 (02-21-25 @ 12:46)  BMI (kg/m2): 20.7 (03-11-25 @ 16:45), 22.7 (03-11-25 @ 11:01), 21.4 (02-21-25 @ 12:46)    ------------------------------------------------------------------------------------------------------------    PRIOR ADVANCE DIRECTIVES:    [ ] DNR/MOLST    [ ] Living Will    [ ] Health Care Proxy(s)    DECISION MAKER(s):  [ ] Patient    [ ] Surrogate(s)  [ ] HCP   [ ] Guardian             ------------------------------------------------------------------------------------------------------------  PHYSICAL EXAM:  Vital Signs Last 24 Hrs  T(C): 36.7 (12 Mar 2025 08:30), Max: 36.8 (11 Mar 2025 16:45)  T(F): 98.1 (12 Mar 2025 08:30), Max: 98.3 (11 Mar 2025 16:45)  HR: 77 (12 Mar 2025 08:30) (76 - 98)  BP: 118/65 (12 Mar 2025 08:30) (117/66 - 140/81)  BP(mean): --  RR: 18 (12 Mar 2025 08:30) (16 - 20)  SpO2: 95% (12 Mar 2025 08:30) (95% - 100%)    Parameters below as of 12 Mar 2025 08:30  Patient On (Oxygen Delivery Method): room air     I&O's Summary    11 Mar 2025 07:01  -  12 Mar 2025 07:00  --------------------------------------------------------  IN: 300 mL / OUT: 250 mL / NET: 50 mL    12 Mar 2025 07:01  -  12 Mar 2025 12:32  --------------------------------------------------------  IN: 75 mL / OUT: 0 mL / NET: 75 mL        GENERAL:  [ ]Cachexia  [ ] Frail  [ ]Awake  [ ]Oriented   [ ]Lethargic  [ ]Unarousable  [ ]Verbal  [ ]Non-Verbal    BEHAVIORAL:   [ ] Anxiety  [ ] Delirium [ ] Agitation [ ] Other    HEENT:   [ ]Normal   [ ]Dry mouth   [ ]ET Tube/Trach  [ ]Oral lesions    PULMONARY:   [ ]Clear              [ ]Tachypnea  [ ]Audible excessive secretions   [ ]Rhonchi        [ ]Right [ ]Left [ ]Bilateral  [ ]Crackles        [ ]Right [ ]Left [ ]Bilateral  [ ]Wheezing     [ ]Right [ ]Left [ ]Bilateral  [ ]Diminished breath sounds [ ]right [ ]left [ ]bilateral    CARDIOVASCULAR:    [ ]Regular [ ]Irregular [ ]Tachy  [ ]Ridge [ ]Murmur [ ]Other    GASTROINTESTINAL:  [ ]Soft  [ ]Distended   [ ]+BS  [ ]Non tender [ ]Tender  [ ]Other [ ]PEG [ ]OGT/ NGT      GENITOURINARY:  [ ]Normal [ ] Incontinent   [ ]Oliguria/Anuria   [ ]Camargo    MUSCULOSKELETAL:   [ ]Normal   [ ]Weakness  [ ]Bed/Wheelchair bound [ ]Edema    NEUROLOGIC:   [ ]No focal deficits  [ ]Cognitive impairment  [ ]Dysphagia [ ]Dysarthria [ ]Paresis [ ]Other     SKIN:   [ ]Normal  [ ]Rash  [ ]Other  [ ]Pressure ulcer(s)       Present on admission [ ]y [ ]n    ------------------------------------------------------------------------------------------------------------    LABS:                        11.0   4.13  )-----------( 319      ( 12 Mar 2025 06:20 )             32.5   03-12    130[L]  |  96[L]  |  10  ----------------------------<  97  3.5   |  25  |  0.37[L]    Ca    8.9      12 Mar 2025 06:20  Phos  3.9     03-12  Mg     1.80     03-12    TPro  6.2  /  Alb  3.5  /  TBili  0.3  /  DBili  x   /  AST  17  /  ALT  7   /  AlkPhos  85  03-12  PT/INR - ( 11 Mar 2025 20:40 )   PT: 15.4 sec;   INR: 1.30 ratio         PTT - ( 11 Mar 2025 20:40 )  PTT:37.5 sec    Urinalysis Basic - ( 12 Mar 2025 06:20 )    Color: x / Appearance: x / SG: x / pH: x  Gluc: 97 mg/dL / Ketone: x  / Bili: x / Urobili: x   Blood: x / Protein: x / Nitrite: x   Leuk Esterase: x / RBC: x / WBC x   Sq Epi: x / Non Sq Epi: x / Bacteria: x        ------------------------------------------------------------------------------------------------------------    CRITICAL CARE:  [ ]Shock Present  [ ]Septic [ ]Cardiogenic [ ]Neurologic [ ]Hypovolemic [ ] Undifferentiated/Mixed   [ ]Vasopressors [ ]Inotropes     [ ]Respiratory failure present: [ ]Acute  [ ]Chronic [ ]Hypoxic  [ ]Hypercarbic   [ ]Mechanical ventilation   [ ]Trach collar   [ ]Non-invasive ventilatory support   [ ]High flow    [ ]Non-rebreather/Venti     [ ]Other organ failure     ------------------------------------------------------------------------------------------------------------    RADIOLOGY & ADDITIONAL STUDIES:      ------------------------------------------------------------------------------------------------------------    ALLERGIES:  Allergies    penicillin (Unknown)  penicillin (Anaphylaxis; Hives; Other)  iodine (Anaphylaxis)  statins (Unknown)  Xolair (Unknown)  ABIDA dye (Short breath; Hives)  IV Contrast (Unknown)  Originally Entered as [Unknown] reaction to [BEE STINGS] (Unknown)    Intolerances        MEDICATIONS  (STANDING):  albuterol/ipratropium for Nebulization 3 milliLiter(s) Nebulizer every 6 hours  apixaban 5 milliGRAM(s) Oral every 12 hours  chlorhexidine 2% Cloths 1 Application(s) Topical <User Schedule>  colchicine 0.6 milliGRAM(s) Oral daily  fluticasone propionate/ salmeterol 100-50 MICROgram(s) Diskus 1 Dose(s) Inhalation two times a day  influenza  Vaccine (HIGH DOSE) 0.5 milliLiter(s) IntraMuscular once  levothyroxine 112 MICROGram(s) Oral daily  lidocaine   4% Patch 1 Patch Transdermal daily  morphine ER Tablet 30 milliGRAM(s) Oral every 12 hours  naloxegol 25 milliGRAM(s) Oral daily  nicotine - 21 mG/24Hr(s) Patch 1 Patch Transdermal daily  nicotine - 21 mG/24Hr(s) Patch 1 Patch Transdermal daily  pantoprazole    Tablet 40 milliGRAM(s) Oral every 12 hours  polyethylene glycol 3350 17 Gram(s) Oral daily  senna 2 Tablet(s) Oral at bedtime  sodium chloride 1 Gram(s) Oral two times a day    MEDICATIONS  (PRN):  aluminum hydroxide/magnesium hydroxide/simethicone Suspension 30 milliLiter(s) Oral every 4 hours PRN Dyspepsia  baclofen 5 milliGRAM(s) Oral every 8 hours PRN Musculoskeletal Pain  melatonin 3 milliGRAM(s) Oral at bedtime PRN Insomnia  ondansetron Injectable 4 milliGRAM(s) IV Push every 8 hours PRN Nausea and/or Vomiting  oxyCODONE    IR 20 milliGRAM(s) Oral every 4 hours PRN for severe pain           HPI:  63 y/o F with PMHx of AF, scleroderma, asthma with prior intubations, hypothyroidism, pericardial effusion s/p pericardiocentesis (12/2024) and drain on colchicine presented to the ED due to SOB, chest pain, and back pain. Oncology evaluated her and recommended MRI brain and MRI chest/abdomen/pelvis with contrast to rule out metastasis. Was previously admitted in 12/2024 due to chest pain and SOB and found to have a pericardial effusion with evidence of tamponade physiology. She underwent urgent pericardiocentesis with placement of drain which was removed on 12/26. She was started on colchicine and NSAIDs. Hospital course was complicated by AF with RVR which was new onset and was suspected to be due to drain placement. After drain was removed patient continued to have pAF and was started on Eliquis. Presented to the ED on 02/13/25 for left sided chest pain radiating to the back. Repeat TTE demonstrated interval increase in pericardial effusion (moderate-large adjacent to RA with no evidence of tamponade) and CTS was consulted for possible pericardial window and recommended no acute intervention. 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 Pulmonary consulted and recommended transfer to [facility] for bronchoscopy, EBUS and hilar mass biopsy. Patient transferred to [facility] on 02/20/25 and underwent EBUS on 02/21/25. 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     Patient seen this AM. Patient found wretching, with minimal output. Patient improved on own, and subsequently received IV zofran. Patient then soon after found to be hypoxic on O2 sat going down to 70s. Patient states she has been chronically SOB      PERTINENT PM/SXH:   Scleroderma    Asthma    High cholesterol    Scleroderma    Shingles    Hypothyroidism    GERD (gastroesophageal reflux disease)    Smoker    Multiple drug allergies      S/P small bowel resection    History of myomectomy    History of ovarian cystectomy    Fibroid    Bowel obstruction    H/O ovarian cystectomy    S/P pericardiocentesis      FAMILY HISTORY:    ------------------------------------------------------------------------------------------------------------  ITEMS NOT CHECKED ARE NOT PRESENT    SOCIAL HISTORY:   Living Situation: [ ]Home  [ ]Long term care  [ ]Rehab [ ]Other  Support:     Substance hx:  [ ]   Tobacco hx:  [ ]   Alcohol hx: [ ]   Family Hx substance abuse [ ]yes [ ]no    Jainism/Spirituality:  PCSSQ[Palliative Care Spiritual Screening Question]   Severity (0-10): 0  Score of 4 or > indicate consideration of Chaplaincy referral.  Chaplaincy Referral: [ ] yes [X ] refused [ ] following    Anticipatory Grief present?:  [ ] yes [X ] no  [ ] Deferred                      Other Referrals:    [ ]Hospice   [ ]Caregiver [place] Support  [ ]Child Life    [ ]Patient & Family Centered Care Referral  [ ]Holistic Therapy     ------------------------------------------------------------------------------------------------------------    PRESENT SYMPTOMS:     [ ] Unable to self-report      [ ] PAINADS     [ ] RDOS    [ ] No     [ ] Yes     Source if other than patient:  [ ]Family   [ ]Team     PAIN:   If blank, patient unable to specify     [ ]yes [ ]no    1. Location-   2. Radiation-    3. Quality-   4. Timing-   5. Minimal acceptable level/pain goal-   6. Aggravating factors-   7. QOL impact-     SYMPTOMS:   Dyspnea:                           [ ]Mild [ ]Moderate [ ]Severe  Anxiety:                             [ ]Mild [ ]Moderate [ ]Severe  Fatigue:                             [ ]Mild [ ]Moderate [ ]Severe  Nausea/Vomiting:              [ ]Mild [ ]Moderate [ ]Severe  Loss of appetite:                [ ]Mild [ ]Moderate [ ]Severe  Constipation:                     [ ]Mild [ ]Moderate [ ]Severe    Other Symptoms:  [X ]All other review of systems negative     ------------------------------------------------------------------------------------------------------------      I-Stop Reference No:     ------------------------------------------------------------------------------------------------------------    FUNCTIONAL STATUS:     Baseline ADL (prior to admission):  [ ] Independent  [ ] Moderate Assistance [ ] Dependent    Palliative Performance Score (current):     [ ]PPSV2 < or = to 30%   [ ]artificial nutrition      NUTRITIONAL STATUS:     Protein Calorie Malnutrition Present:   [ ]mild   [ ]moderate or severe    Weight:   [ ]underweight (BMI 18.5 or less)   [ ]morbid obesity (BMI 30 or higher)   [ ]anasarca  [ ]significant weight loss     Height (cm): 175.3 (03-11-25 @ 16:45), 175.26 (03-11-25 @ 11:01), 175.3 (02-21-25 @ 12:46)  Weight (kg): 63.5 (03-11-25 @ 16:45), 69.85 (03-11-25 @ 11:01), 65.9 (02-21-25 @ 12:46)  BMI (kg/m2): 20.7 (03-11-25 @ 16:45), 22.7 (03-11-25 @ 11:01), 21.4 (02-21-25 @ 12:46)    ------------------------------------------------------------------------------------------------------------    PRIOR ADVANCE DIRECTIVES:    [ ] DNR/MOLST    [ ] Living Will    [ ] Health Care Proxy(s)    DECISION MAKER(s):  [ ] Patient    [ ] Surrogate(s)  [ ] HCP   [ ] Guardian             ------------------------------------------------------------------------------------------------------------  PHYSICAL EXAM:  Vital Signs Last 24 Hrs  T(C): 36.7 (12 Mar 2025 08:30), Max: 36.8 (11 Mar 2025 16:45)  T(F): 98.1 (12 Mar 2025 08:30), Max: 98.3 (11 Mar 2025 16:45)  HR: 77 (12 Mar 2025 08:30) (76 - 98)  BP: 118/65 (12 Mar 2025 08:30) (117/66 - 140/81)  BP(mean): --  RR: 18 (12 Mar 2025 08:30) (16 - 20)  SpO2: 95% (12 Mar 2025 08:30) (95% - 100%)    Parameters below as of 12 Mar 2025 08:30  Patient On (Oxygen Delivery Method): room air     I&O's Summary    11 Mar 2025 07:01  -  12 Mar 2025 07:00  --------------------------------------------------------  IN: 300 mL / OUT: 250 mL / NET: 50 mL    12 Mar 2025 07:01  -  12 Mar 2025 12:32  --------------------------------------------------------  IN: 75 mL / OUT: 0 mL / NET: 75 mL        GENERAL:  [ ]Cachexia  [ ] Frail  [ ]Awake  [ ]Oriented   [ ]Lethargic  [ ]Unarousable  [ ]Verbal  [ ]Non-Verbal    BEHAVIORAL:   [ ] Anxiety  [ ] Delirium [ ] Agitation [ ] Other    HEENT:   [ ]Normal   [ ]Dry mouth   [ ]ET Tube/Trach  [ ]Oral lesions    PULMONARY:   [ ]Clear              [ ]Tachypnea  [ ]Audible excessive secretions   [ ]Rhonchi        [ ]Right [ ]Left [ ]Bilateral  [ ]Crackles        [ ]Right [ ]Left [ ]Bilateral  [ ]Wheezing     [ ]Right [ ]Left [ ]Bilateral  [ ]Diminished breath sounds [ ]right [ ]left [ ]bilateral    CARDIOVASCULAR:    [ ]Regular [ ]Irregular [ ]Tachy  [ ]Ridge [ ]Murmur [ ]Other    GASTROINTESTINAL:  [ ]Soft  [ ]Distended   [ ]+BS  [ ]Non tender [ ]Tender  [ ]Other [ ]PEG [ ]OGT/ NGT      GENITOURINARY:  [ ]Normal [ ] Incontinent   [ ]Oliguria/Anuria   [ ]Camargo    MUSCULOSKELETAL:   [ ]Normal   [ ]Weakness  [ ]Bed/Wheelchair bound [ ]Edema    NEUROLOGIC:   [ ]No focal deficits  [ ]Cognitive impairment  [ ]Dysphagia [ ]Dysarthria [ ]Paresis [ ]Other     SKIN:   [ ]Normal  [ ]Rash  [ ]Other  [ ]Pressure ulcer(s)       Present on admission [ ]y [ ]n    ------------------------------------------------------------------------------------------------------------    LABS:                        11.0   4.13  )-----------( 319      ( 12 Mar 2025 06:20 )             32.5   03-12    130[L]  |  96[L]  |  10  ----------------------------<  97  3.5   |  25  |  0.37[L]    Ca    8.9      12 Mar 2025 06:20  Phos  3.9     03-12  Mg     1.80     03-12    TPro  6.2  /  Alb  3.5  /  TBili  0.3  /  DBili  x   /  AST  17  /  ALT  7   /  AlkPhos  85  03-12  PT/INR - ( 11 Mar 2025 20:40 )   PT: 15.4 sec;   INR: 1.30 ratio         PTT - ( 11 Mar 2025 20:40 )  PTT:37.5 sec    Urinalysis Basic - ( 12 Mar 2025 06:20 )    Color: x / Appearance: x / SG: x / pH: x  Gluc: 97 mg/dL / Ketone: x  / Bili: x / Urobili: x   Blood: x / Protein: x / Nitrite: x   Leuk Esterase: x / RBC: x / WBC x   Sq Epi: x / Non Sq Epi: x / Bacteria: x        ------------------------------------------------------------------------------------------------------------    CRITICAL CARE:  [ ]Shock Present  [ ]Septic [ ]Cardiogenic [ ]Neurologic [ ]Hypovolemic [ ] Undifferentiated/Mixed   [ ]Vasopressors [ ]Inotropes     [ ]Respiratory failure present: [ ]Acute  [ ]Chronic [ ]Hypoxic  [ ]Hypercarbic   [ ]Mechanical ventilation   [ ]Trach collar   [ ]Non-invasive ventilatory support   [ ]High flow    [ ]Non-rebreather/Venti     [ ]Other organ failure     ------------------------------------------------------------------------------------------------------------    RADIOLOGY & ADDITIONAL STUDIES:      ------------------------------------------------------------------------------------------------------------    ALLERGIES:  Allergies    penicillin (Unknown)  penicillin (Anaphylaxis; Hives; Other)  iodine (Anaphylaxis)  statins (Unknown)  Xolair (Unknown)  ABIDA dye (Short breath; Hives)  IV Contrast (Unknown)  Originally Entered as [Unknown] reaction to [BEE STINGS] (Unknown)    Intolerances        MEDICATIONS  (STANDING):  albuterol/ipratropium for Nebulization 3 milliLiter(s) Nebulizer every 6 hours  apixaban 5 milliGRAM(s) Oral every 12 hours  chlorhexidine 2% Cloths 1 Application(s) Topical <User Schedule>  colchicine 0.6 milliGRAM(s) Oral daily  fluticasone propionate/ salmeterol 100-50 MICROgram(s) Diskus 1 Dose(s) Inhalation two times a day  influenza  Vaccine (HIGH DOSE) 0.5 milliLiter(s) IntraMuscular once  levothyroxine 112 MICROGram(s) Oral daily  lidocaine   4% Patch 1 Patch Transdermal daily  morphine ER Tablet 30 milliGRAM(s) Oral every 12 hours  naloxegol 25 milliGRAM(s) Oral daily  nicotine - 21 mG/24Hr(s) Patch 1 Patch Transdermal daily  nicotine - 21 mG/24Hr(s) Patch 1 Patch Transdermal daily  pantoprazole    Tablet 40 milliGRAM(s) Oral every 12 hours  polyethylene glycol 3350 17 Gram(s) Oral daily  senna 2 Tablet(s) Oral at bedtime  sodium chloride 1 Gram(s) Oral two times a day    MEDICATIONS  (PRN):  aluminum hydroxide/magnesium hydroxide/simethicone Suspension 30 milliLiter(s) Oral every 4 hours PRN Dyspepsia  baclofen 5 milliGRAM(s) Oral every 8 hours PRN Musculoskeletal Pain  melatonin 3 milliGRAM(s) Oral at bedtime PRN Insomnia  ondansetron Injectable 4 milliGRAM(s) IV Push every 8 hours PRN Nausea and/or Vomiting  oxyCODONE    IR 20 milliGRAM(s) Oral every 4 hours PRN for severe pain           HPI:  63 y/o F with PMHx of AF, scleroderma, asthma with prior intubations, hypothyroidism, pericardial effusion s/p pericardiocentesis (12/2024) and drain on colchicine presented to the ED due to SOB, chest pain, and back pain. Oncology evaluated her and recommended MRI brain and MRI chest/abdomen/pelvis with contrast to rule out metastasis. Was previously admitted in 12/2024 due to chest pain and SOB and found to have a pericardial effusion with evidence of tamponade physiology. She underwent urgent pericardiocentesis with placement of drain which was removed on 12/26. She was started on colchicine and NSAIDs. Hospital course was complicated by AF with RVR which was new onset and was suspected to be due to drain placement. After drain was removed patient continued to have pAF and was started on Eliquis. Presented to the ED on 02/13/25 for left sided chest pain radiating to the back. Repeat TTE demonstrated interval increase in pericardial effusion (moderate-large adjacent to RA with no evidence of tamponade) and CTS was consulted for possible pericardial window and recommended no acute intervention. 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 Pulmonary consulted and recommended transfer to [facility] for bronchoscopy, EBUS and hilar mass biopsy. Patient transferred to [facility] on 02/20/25 and underwent EBUS on 02/21/25. 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     Patient seen this AM. Patient found wretching, with minimal output. Patient improved on own, and subsequently received IV zofran. Patient then soon after found to be hypoxic on O2 sat going down to 70s. Patient states she has been chronically SOB      PERTINENT PM/SXH:   Scleroderma    Asthma    High cholesterol    Scleroderma    Shingles    Hypothyroidism    GERD (gastroesophageal reflux disease)    Smoker    Multiple drug allergies      S/P small bowel resection    History of myomectomy    History of ovarian cystectomy    Fibroid    Bowel obstruction    H/O ovarian cystectomy    S/P pericardiocentesis      FAMILY HISTORY:    ------------------------------------------------------------------------------------------------------------  ITEMS NOT CHECKED ARE NOT PRESENT    SOCIAL HISTORY:   Living Situation: [ ]Home  [ ]Long term care  [ ]Rehab [ ]Other  Patient worked as a [employer]. Enjoys to read, do art     Substance hx:  [ ]   Tobacco hx:  [ ]   Alcohol hx: [ ]   Family Hx substance abuse [ ]yes [ ]no    Moravian/Spirituality:  PCSSQ[Palliative Care Spiritual Screening Question]   Severity (0-10): 0  Score of 4 or > indicate consideration of Chaplaincy referral.  Chaplaincy Referral: [ ] yes [X ] refused [ ] following    Anticipatory Grief present?:  [ ] yes [X ] no  [ ] Deferred                      Other Referrals:    [ ]Hospice   [ ]Caregiver [place] Support  [ ]Child Life    [ ]Patient & Family Centered Care Referral  [ ]Holistic Therapy     ------------------------------------------------------------------------------------------------------------    PRESENT SYMPTOMS:     [ ] Unable to self-report      [ ] PAINADS     [ ] RDOS    [ ] No     [ ] Yes     Source if other than patient:  [ ]Family   [ ]Team     PAIN:   If blank, patient unable to specify     [ ]yes [ ]no    1. Location-   2. Radiation-    3. Quality-   4. Timing-   5. Minimal acceptable level/pain goal-   6. Aggravating factors-   7. QOL impact-     SYMPTOMS:   Dyspnea:                           [ ]Mild [ ]Moderate [ ]Severe  Anxiety:                             [ ]Mild [ ]Moderate [ ]Severe  Fatigue:                             [ ]Mild [ ]Moderate [ ]Severe  Nausea/Vomiting:              [ ]Mild [ ]Moderate [ ]Severe  Loss of appetite:                [ ]Mild [ ]Moderate [ ]Severe  Constipation:                     [ ]Mild [ ]Moderate [ ]Severe    Other Symptoms:  [X ]All other review of systems negative     ------------------------------------------------------------------------------------------------------------      I-Stop Reference No: [id number]    Prescription Information      PDI Filter:    PDI	Current Rx	Drug Type	Rx Written	Rx Dispensed	Drug	Quantity	Days Supply	Prescriber Name	Prescriber KHOA #	Payment Method	Dispenser  A	Y	O	02/27/2025	03/08/2025	morphine sulf er 30 mg tablet	60	30	Guru Crowe MD	[id number]	Medicare	[id number]  A	Y	O	02/27/2025	03/08/2025	oxycodone hcl (ir) 20 mg tab	90	30	Guru Crowe MD	[id number]	Medicare	[id number]  A	N	O	01/30/2025	02/06/2025	morphine sulf er 30 mg tablet	60	30	Guru Crowe MD	[id number]	Medicare	[id number]  A	N	O	01/30/2025	02/06/2025	oxycodone hcl (ir) 20 mg tab	90	30	Guru Crowe MD	[id number]	Medicare	[id number]  A	N	O	01/07/2025	01/08/2025	morphine sulf er 30 mg tablet	60	30	Guru Crowe MD	[id number]	Medicare	[id number]  A	N	O	01/07/2025	01/08/2025	oxycodone hcl (ir) 20 mg tab	90	30	Guru Crowe MD	[id number]	Medicare	[id number]  A	N	O	11/27/2024	12/06/2024	morphine sulf er 30 mg tablet	60	30	Guru Crowe MD	[id number]	Medicare	[id number]  A	N	O	11/27/2024	12/06/2024	oxycodone hcl (ir) 20 mg tab	90	30	Guru Crowe MD	[id number]	Medicare	[id number]  A	N	O	10/31/2024	11/08/2024	morphine sulf er 30 mg tablet	60	30	Guru Crowe MD	[id number]	Medicare	[id number]  A	N	O	10/31/2024	11/08/2024	oxycodone hcl (ir) 20 mg tab	90	30	Guru Crowe MD	[id number]	Medicare	[id number]  A	N	O	10/03/2024	10/09/2024	oxycodone hcl (ir) 20 mg tab	90	30	Guru Crowe MD	[id number]	Medicare	[id number]    ------------------------------------------------------------------------------------------------------------    FUNCTIONAL STATUS:     Baseline ADL (prior to admission):  [X ] Independent  [ ] Moderate Assistance [ ] Dependent    Palliative Performance Score (current): 40%     [ ]PPSV2 < or = to 30%   [ ]artificial nutrition      NUTRITIONAL STATUS:     Protein Calorie Malnutrition Present:   [ ]mild   [ ]moderate or severe    Weight:   [ ]underweight (BMI 18.5 or less)   [ ]morbid obesity (BMI 30 or higher)   [ ]anasarca  [ ]significant weight loss     Height (cm): 175.3 (03-11-25 @ 16:45), 175.26 (03-11-25 @ 11:01), 175.3 (02-21-25 @ 12:46)  Weight (kg): 63.5 (03-11-25 @ 16:45), 69.85 (03-11-25 @ 11:01), 65.9 (02-21-25 @ 12:46)  BMI (kg/m2): 20.7 (03-11-25 @ 16:45), 22.7 (03-11-25 @ 11:01), 21.4 (02-21-25 @ 12:46)    ------------------------------------------------------------------------------------------------------------    PRIOR ADVANCE DIRECTIVES:    [ ] DNR/MOLST    [ ] Living Will    [ ] Health Care Proxy(s)    DECISION MAKER(s):  [X ] Patient    [ ] Surrogate(s)  [ ] HCP   [ ] Guardian             ------------------------------------------------------------------------------------------------------------  PHYSICAL EXAM:  Vital Signs Last 24 Hrs  T(C): 36.7 (12 Mar 2025 08:30), Max: 36.8 (11 Mar 2025 16:45)  T(F): 98.1 (12 Mar 2025 08:30), Max: 98.3 (11 Mar 2025 16:45)  HR: 77 (12 Mar 2025 08:30) (76 - 98)  BP: 118/65 (12 Mar 2025 08:30) (117/66 - 140/81)  BP(mean): --  RR: 18 (12 Mar 2025 08:30) (16 - 20)  SpO2: 95% (12 Mar 2025 08:30) (95% - 100%)    Parameters below as of 12 Mar 2025 08:30  Patient On (Oxygen Delivery Method): room air     I&O's Summary    11 Mar 2025 07:01  -  12 Mar 2025 07:00  --------------------------------------------------------  IN: 300 mL / OUT: 250 mL / NET: 50 mL    12 Mar 2025 07:01  -  12 Mar 2025 12:32  --------------------------------------------------------  IN: 75 mL / OUT: 0 mL / NET: 75 mL      GENERAL:  [ ]Cachexia  [ ] Frail  [X ]Awake  [X ]Oriented   [ ]Lethargic  [ ]Unarousable  [ ]Verbal  [ ]Non-Verbal    BEHAVIORAL:   [ ] Anxiety  [ ] Delirium [ ] Agitation [ ] Other    HEENT:   [X ]Normal   [ ]Dry mouth   [ ]ET Tube/Trach  [ ]Oral lesions    PULMONARY:   [X ]Clear              [ ]Tachypnea  [ ]Audible excessive secretions   [ ]Rhonchi        [ ]Right [ ]Left [ ]Bilateral  [ ]Crackles        [ ]Right [ ]Left [ ]Bilateral  [ ]Wheezing     [ ]Right [ ]Left [ ]Bilateral  [ ]Diminished breath sounds [ ]right [ ]left [ ]bilateral    CARDIOVASCULAR:    [X ]Regular [ ]Irregular [ ]Tachy  [ ]Ridge [ ]Murmur [ ]Other    GASTROINTESTINAL:  [X ]Soft  [ ]Distended   [X ]+BS  [X ]Non tender [ ]Tender  [ ]Other [ ]PEG [ ]OGT/ NGT      GENITOURINARY:  [X ]Normal [ ] Incontinent   [ ]Oliguria/Anuria   [ ]Camargo    MUSCULOSKELETAL:   [X ]Normal   [ ]Weakness  [ ]Bed/Wheelchair bound [ ]Edema    NEUROLOGIC:   [X ]No focal deficits  [ ]Cognitive impairment  [ ]Dysphagia [ ]Dysarthria [ ]Paresis [ ]Other     SKIN:   [X ]Normal  [ ]Rash  [ ]Other  [ ]Pressure ulcer(s)       Present on admission [ ]y [ ]n    ------------------------------------------------------------------------------------------------------------    LABS:                        11.0   4.13  )-----------( 319      ( 12 Mar 2025 06:20 )             32.5   03-12    130[L]  |  96[L]  |  10  ----------------------------<  97  3.5   |  25  |  0.37[L]    Ca    8.9      12 Mar 2025 06:20  Phos  3.9     03-12  Mg     1.80     03-12    TPro  6.2  /  Alb  3.5  /  TBili  0.3  /  DBili  x   /  AST  17  /  ALT  7   /  AlkPhos  85  03-12  PT/INR - ( 11 Mar 2025 20:40 )   PT: 15.4 sec;   INR: 1.30 ratio       PTT - ( 11 Mar 2025 20:40 )  PTT:37.5 sec    Urinalysis Basic - ( 12 Mar 2025 06:20 )    Color: x / Appearance: x / SG: x / pH: x  Gluc: 97 mg/dL / Ketone: x  / Bili: x / Urobili: x   Blood: x / Protein: x / Nitrite: x   Leuk Esterase: x / RBC: x / WBC x   Sq Epi: x / Non Sq Epi: x / Bacteria: x    ------------------------------------------------------------------------------------------------------------    CRITICAL CARE:  [ ]Shock Present  [ ]Septic [ ]Cardiogenic [ ]Neurologic [ ]Hypovolemic [ ] Undifferentiated/Mixed   [ ]Vasopressors [ ]Inotropes     [ ]Respiratory failure present: [ ]Acute  [ ]Chronic [ ]Hypoxic  [ ]Hypercarbic   [ ]Mechanical ventilation   [ ]Trach collar   [ ]Non-invasive ventilatory support   [ ]High flow    [ ]Non-rebreather/Venti     [ ]Other organ failure     ------------------------------------------------------------------------------------------------------------    RADIOLOGY & ADDITIONAL STUDIES:    < from: Xray Chest 1 View-PORTABLE IMMEDIATE (Xray Chest 1 View-PORTABLE IMMEDIATE .) (03.12.25 @ 13:48) >    IMPRESSION:  Bilateral upper lobe opacities with 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  No acute pulmonary or cardiovascular disease.    < end of copied text >    ------------------------------------------------------------------------------------------------------------    ALLERGIES:  Allergies    penicillin (Unknown)  penicillin (Anaphylaxis; Hives; Other)  iodine (Anaphylaxis)  statins (Unknown)  Xolair (Unknown)  ABIDA dye (Short breath; Hives)  IV Contrast (Unknown)  Originally Entered as [Unknown] reaction to [BEE STINGS] (Unknown)    Intolerances    MEDICATIONS  (STANDING):  albuterol/ipratropium for Nebulization 3 milliLiter(s) Nebulizer every 6 hours  apixaban 5 milliGRAM(s) Oral every 12 hours  chlorhexidine 2% Cloths 1 Application(s) Topical <User Schedule>  colchicine 0.6 milliGRAM(s) Oral daily  fluticasone propionate/ salmeterol 100-50 MICROgram(s) Diskus 1 Dose(s) Inhalation two times a day  influenza  Vaccine (HIGH DOSE) 0.5 milliLiter(s) IntraMuscular once  levothyroxine 112 MICROGram(s) Oral daily  lidocaine   4% Patch 1 Patch Transdermal daily  morphine ER Tablet 30 milliGRAM(s) Oral every 12 hours  naloxegol 25 milliGRAM(s) Oral daily  nicotine - 21 mG/24Hr(s) Patch 1 Patch Transdermal daily  nicotine - 21 mG/24Hr(s) Patch 1 Patch Transdermal daily  pantoprazole    Tablet 40 milliGRAM(s) Oral every 12 hours  polyethylene glycol 3350 17 Gram(s) Oral daily  senna 2 Tablet(s) Oral at bedtime  sodium chloride 1 Gram(s) Oral two times a day    MEDICATIONS  (PRN):  aluminum hydroxide/magnesium hydroxide/simethicone Suspension 30 milliLiter(s) Oral every 4 hours PRN Dyspepsia  baclofen 5 milliGRAM(s) Oral every 8 hours PRN Musculoskeletal Pain  melatonin 3 milliGRAM(s) Oral at bedtime PRN Insomnia  ondansetron Injectable 4 milliGRAM(s) IV Push every 8 hours PRN Nausea and/or Vomiting  oxyCODONE    IR 20 milliGRAM(s) Oral every 4 hours PRN for severe pain           HPI:  65 y/o F with PMHx of AF, scleroderma, asthma with prior intubations, hypothyroidism, pericardial effusion s/p pericardiocentesis (12/2024) and drain on colchicine presented to the ED due to SOB, chest pain, and back pain. Oncology evaluated her and recommended MRI brain and MRI chest/abdomen/pelvis with contrast to rule out metastasis. Was previously admitted in 12/2024 due to chest pain and SOB and found to have a pericardial effusion with evidence of tamponade physiology. She underwent urgent pericardiocentesis with placement of drain which was removed on 12/26. She was started on colchicine and NSAIDs. Hospital course was complicated by AF with RVR which was new onset and was suspected to be due to drain placement. After drain was removed patient continued to have pAF and was started on Eliquis. Presented to the ED on 02/13/25 for left sided chest pain radiating to the back. Repeat TTE demonstrated interval increase in pericardial effusion (moderate-large adjacent to RA with no evidence of tamponade) and CTS was consulted for possible pericardial window and recommended no acute intervention. 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 Pulmonary consulted and recommended transfer to [facility] for bronchoscopy, EBUS and hilar mass biopsy. Patient transferred to [facility] on 02/20/25 and underwent EBUS on 02/21/25. 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     Patient seen this AM. Patient found wretching, with minimal output. Patient improved on own, and subsequently received IV zofran. Patient then soon after found to be hypoxic on O2 sat going down to 70s. Patient states she has been chronically SOB but placed on NRB with improvement of O2 sat and symptoms. Discussed patient's pain. She has hx of chronic opioid use for pain related to scleroderma which is in her feet and legs, can be burning and aching. 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 She states oxycodone 20 mg PRN not helping for chest pain, agreed to try IV dilaudid.       PERTINENT PM/SXH:   Scleroderma    Asthma    High cholesterol    Scleroderma    Shingles    Hypothyroidism    GERD (gastroesophageal reflux disease)    Smoker    Multiple drug allergies      S/P small bowel resection    History of myomectomy    History of ovarian cystectomy    Fibroid    Bowel obstruction    H/O ovarian cystectomy    S/P pericardiocentesis      FAMILY HISTORY:    ------------------------------------------------------------------------------------------------------------  ITEMS NOT CHECKED ARE NOT PRESENT    SOCIAL HISTORY:   Living Situation: [ ]Home  [ ]Long term care  [ ]Rehab [ ]Other  Patient worked as a [employer]. Enjoys to read, do art     Substance hx:  [ ]   Tobacco hx:  [ ]   Alcohol hx: [ ]   Family Hx substance abuse [ ]yes [ ]no    Buddhist/Spirituality:  PCSSQ[Palliative Care Spiritual Screening Question]   Severity (0-10): 0  Score of 4 or > indicate consideration of Chaplaincy referral.  Chaplaincy Referral: [ ] yes [X ] refused [ ] following    Anticipatory Grief present?:  [ ] yes [X ] no  [ ] Deferred                      Other Referrals:    [ ]Hospice   [ ]Caregiver [place] Support  [ ]Child Life    [ ]Patient & Family Centered Care Referral  [ ]Holistic Therapy     ------------------------------------------------------------------------------------------------------------    PRESENT SYMPTOMS:     [ ] Unable to self-report      [ ] PAINADS     [ ] RDOS    [ ] No     [X ] Yes     Source if other than patient:  [ ]Family   [ ]Team     PAIN:   If blank, patient unable to specify     [X ]yes [ ]no    1. Location- Left chest   2. Radiation-  Left arm  3. Quality- Sharp   4. Timing- Constant   5. Minimal acceptable level/pain goal- 4/10   6. Aggravating factors-   7. QOL impact- Severe     SYMPTOMS:   Dyspnea:                           [ ]Mild [X ]Moderate [ ]Severe  Anxiety:                             [ ]Mild [ ]Moderate [ ]Severe  Fatigue:                             [ ]Mild [ ]Moderate [ ]Severe  Nausea/Vomiting:              [ ]Mild [ ]Moderate [ ]Severe  Loss of appetite:                [ ]Mild [ ]Moderate [ ]Severe  Constipation:                     [ ]Mild [ ]Moderate [ ]Severe    Other Symptoms:  [X ]All other review of systems negative     ------------------------------------------------------------------------------------------------------------      I-Stop Reference No: [id number]    Prescription Information      PDI Filter:    PDI	Current Rx	Drug Type	Rx Written	Rx Dispensed	Drug	Quantity	Days Supply	Prescriber Name	Prescriber KHOA #	Payment Method	Dispenser  A	Y	O	02/27/2025	03/08/2025	morphine sulf er 30 mg tablet	60	30	SebastienGuru MD	[id number]	Medicare	[id number]  A	Y	O	02/27/2025	03/08/2025	oxycodone hcl (ir) 20 mg tab	90	30	SebasitenGuru MD	[id number]	Medicare	[id number]  A	N	O	01/30/2025	02/06/2025	morphine sulf er 30 mg tablet	60	30	SebastienGuru MD	[id number]	Medicare	[id number]  A	N	O	01/30/2025	02/06/2025	oxycodone hcl (ir) 20 mg tab	90	30	Guru Crowe MD	[id number]	Medicare	[id number]  A	N	O	01/07/2025	01/08/2025	morphine sulf er 30 mg tablet	60	30	SebastienGuru MD	[id number]	Medicare	[id number]  A	N	O	01/07/2025	01/08/2025	oxycodone hcl (ir) 20 mg tab	90	30	Sebastien Guru ROSS	[id number]	Medicare	[id number]  A	N	O	11/27/2024	12/06/2024	morphine sulf er 30 mg tablet	60	30	Sebastien Guru ROSS	[id number]	Medicare	[id number]  A	N	O	11/27/2024	12/06/2024	oxycodone hcl (ir) 20 mg tab	90	30	Sebastien Guru ROSS	[id number]	Medicare	[id number]  A	N	O	10/31/2024	11/08/2024	morphine sulf er 30 mg tablet	60	30	Sebastien Guru ROSS	[id number]	Medicare	[id number]  A	N	O	10/31/2024	11/08/2024	oxycodone hcl (ir) 20 mg tab	90	30	Sebastien Guru ROSS	[id number]	Medicare	[id number]  A	N	O	10/03/2024	10/09/2024	oxycodone hcl (ir) 20 mg tab	90	30	Sebastien Guru ROSS	[id number]	Medicare	[id number]    ------------------------------------------------------------------------------------------------------------    FUNCTIONAL STATUS:     Baseline ADL (prior to admission):  [X ] Independent  [ ] Moderate Assistance [ ] Dependent    Palliative Performance Score (current): 40%     [ ]PPSV2 < or = to 30%   [ ]artificial nutrition      NUTRITIONAL STATUS:     Protein Calorie Malnutrition Present:   [ ]mild   [ ]moderate or severe    Weight:   [ ]underweight (BMI 18.5 or less)   [ ]morbid obesity (BMI 30 or higher)   [ ]anasarca  [ ]significant weight loss     Height (cm): 175.3 (03-11-25 @ 16:45), 175.26 (03-11-25 @ 11:01), 175.3 (02-21-25 @ 12:46)  Weight (kg): 63.5 (03-11-25 @ 16:45), 69.85 (03-11-25 @ 11:01), 65.9 (02-21-25 @ 12:46)  BMI (kg/m2): 20.7 (03-11-25 @ 16:45), 22.7 (03-11-25 @ 11:01), 21.4 (02-21-25 @ 12:46)    ------------------------------------------------------------------------------------------------------------    PRIOR ADVANCE DIRECTIVES:    [ ] DNR/MOLST    [ ] Living Will    [ ] Health Care Proxy(s)    DECISION MAKER(s):  [X ] Patient    [ ] Surrogate(s)  [ ] HCP   [ ] Guardian             ------------------------------------------------------------------------------------------------------------  PHYSICAL EXAM:  Vital Signs Last 24 Hrs  T(C): 36.7 (12 Mar 2025 08:30), Max: 36.8 (11 Mar 2025 16:45)  T(F): 98.1 (12 Mar 2025 08:30), Max: 98.3 (11 Mar 2025 16:45)  HR: 77 (12 Mar 2025 08:30) (76 - 98)  BP: 118/65 (12 Mar 2025 08:30) (117/66 - 140/81)  BP(mean): --  RR: 18 (12 Mar 2025 08:30) (16 - 20)  SpO2: 95% (12 Mar 2025 08:30) (95% - 100%)    Parameters below as of 12 Mar 2025 08:30  Patient On (Oxygen Delivery Method): room air     I&O's Summary    11 Mar 2025 07:01  -  12 Mar 2025 07:00  --------------------------------------------------------  IN: 300 mL / OUT: 250 mL / NET: 50 mL    12 Mar 2025 07:01  -  12 Mar 2025 12:32  --------------------------------------------------------  IN: 75 mL / OUT: 0 mL / NET: 75 mL      GENERAL:  [ ]Cachexia  [ ] Frail  [X ]Awake  [X ]Oriented   [ ]Lethargic  [ ]Unarousable  [ ]Verbal  [ ]Non-Verbal    BEHAVIORAL:   [ ] Anxiety  [ ] Delirium [ ] Agitation [ ] Other    HEENT:   [X ]Normal   [ ]Dry mouth   [ ]ET Tube/Trach  [ ]Oral lesions    PULMONARY:   [X ]Clear              [ ]Tachypnea  [ ]Audible excessive secretions   [ ]Rhonchi        [ ]Right [ ]Left [ ]Bilateral  [ ]Crackles        [ ]Right [ ]Left [ ]Bilateral  [ ]Wheezing     [ ]Right [ ]Left [ ]Bilateral  [ ]Diminished breath sounds [ ]right [ ]left [ ]bilateral    CARDIOVASCULAR:    [X ]Regular [ ]Irregular [ ]Tachy  [ ]Ridge [ ]Murmur [ ]Other    GASTROINTESTINAL:  [X ]Soft  [ ]Distended   [X ]+BS  [X ]Non tender [ ]Tender  [ ]Other [ ]PEG [ ]OGT/ NGT      GENITOURINARY:  [X ]Normal [ ] Incontinent   [ ]Oliguria/Anuria   [ ]Camargo    MUSCULOSKELETAL:   [X ]Normal   [ ]Weakness  [ ]Bed/Wheelchair bound [ ]Edema    NEUROLOGIC:   [X ]No focal deficits  [ ]Cognitive impairment  [ ]Dysphagia [ ]Dysarthria [ ]Paresis [ ]Other     SKIN:   [X ]Normal  [ ]Rash  [ ]Other  [ ]Pressure ulcer(s)       Present on admission [ ]y [ ]n    ------------------------------------------------------------------------------------------------------------    LABS:                        11.0   4.13  )-----------( 319      ( 12 Mar 2025 06:20 )             32.5   03-12    130[L]  |  96[L]  |  10  ----------------------------<  97  3.5   |  25  |  0.37[L]    Ca    8.9      12 Mar 2025 06:20  Phos  3.9     03-12  Mg     1.80     03-12    TPro  6.2  /  Alb  3.5  /  TBili  0.3  /  DBili  x   /  AST  17  /  ALT  7   /  AlkPhos  85  03-12  PT/INR - ( 11 Mar 2025 20:40 )   PT: 15.4 sec;   INR: 1.30 ratio       PTT - ( 11 Mar 2025 20:40 )  PTT:37.5 sec    Urinalysis Basic - ( 12 Mar 2025 06:20 )    Color: x / Appearance: x / SG: x / pH: x  Gluc: 97 mg/dL / Ketone: x  / Bili: x / Urobili: x   Blood: x / Protein: x / Nitrite: x   Leuk Esterase: x / RBC: x / WBC x   Sq Epi: x / Non Sq Epi: x / Bacteria: x    ------------------------------------------------------------------------------------------------------------    CRITICAL CARE:  [ ]Shock Present  [ ]Septic [ ]Cardiogenic [ ]Neurologic [ ]Hypovolemic [ ] Undifferentiated/Mixed   [ ]Vasopressors [ ]Inotropes     [ ]Respiratory failure present: [ ]Acute  [ ]Chronic [ ]Hypoxic  [ ]Hypercarbic   [ ]Mechanical ventilation   [ ]Trach collar   [ ]Non-invasive ventilatory support   [ ]High flow    [ ]Non-rebreather/Venti     [ ]Other organ failure     ------------------------------------------------------------------------------------------------------------    RADIOLOGY & ADDITIONAL STUDIES:    < from: Xray Chest 1 View-PORTABLE IMMEDIATE (Xray Chest 1 View-PORTABLE IMMEDIATE .) (03.12.25 @ 13:48) >    IMPRESSION:  Bilateral upper lobe opacities with 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  No acute pulmonary or cardiovascular disease.    < end of copied text >    ------------------------------------------------------------------------------------------------------------    ALLERGIES:  Allergies    penicillin (Unknown)  penicillin (Anaphylaxis; Hives; Other)  iodine (Anaphylaxis)  statins (Unknown)  Xolair (Unknown)  ABIDA dye (Short breath; Hives)  IV Contrast (Unknown)  Originally Entered as [Unknown] reaction to [BEE STINGS] (Unknown)    Intolerances    MEDICATIONS  (STANDING):  albuterol/ipratropium for Nebulization 3 milliLiter(s) Nebulizer every 6 hours  apixaban 5 milliGRAM(s) Oral every 12 hours  chlorhexidine 2% Cloths 1 Application(s) Topical <User Schedule>  colchicine 0.6 milliGRAM(s) Oral daily  fluticasone propionate/ salmeterol 100-50 MICROgram(s) Diskus 1 Dose(s) Inhalation two times a day  influenza  Vaccine (HIGH DOSE) 0.5 milliLiter(s) IntraMuscular once  levothyroxine 112 MICROGram(s) Oral daily  lidocaine   4% Patch 1 Patch Transdermal daily  morphine ER Tablet 30 milliGRAM(s) Oral every 12 hours  naloxegol 25 milliGRAM(s) Oral daily  nicotine - 21 mG/24Hr(s) Patch 1 Patch Transdermal daily  nicotine - 21 mG/24Hr(s) Patch 1 Patch Transdermal daily  pantoprazole    Tablet 40 milliGRAM(s) Oral every 12 hours  polyethylene glycol 3350 17 Gram(s) Oral daily  senna 2 Tablet(s) Oral at bedtime  sodium chloride 1 Gram(s) Oral two times a day    MEDICATIONS  (PRN):  aluminum hydroxide/magnesium hydroxide/simethicone Suspension 30 milliLiter(s) Oral every 4 hours PRN Dyspepsia  baclofen 5 milliGRAM(s) Oral every 8 hours PRN Musculoskeletal Pain  melatonin 3 milliGRAM(s) Oral at bedtime PRN Insomnia  ondansetron Injectable 4 milliGRAM(s) IV Push every 8 hours PRN Nausea and/or Vomiting  oxyCODONE    IR 20 milliGRAM(s) Oral every 4 hours PRN for severe pain           HPI:  65 y/o F with PMHx of AF, scleroderma, asthma with prior intubations, hypothyroidism, pericardial effusion s/p pericardiocentesis (12/2024) and drain on colchicine presented to the ED due to SOB, chest pain, and back pain. Oncology evaluated her and recommended MRI brain and MRI chest/abdomen/pelvis with contrast to rule out metastasis. Was previously admitted in 12/2024 due to chest pain and SOB and found to have a pericardial effusion with evidence of tamponade physiology. She underwent urgent pericardiocentesis with placement of drain which was removed on 12/26. She was started on colchicine and NSAIDs. Hospital course was complicated by AF with RVR which was new onset and was suspected to be due to drain placement. After drain was removed patient continued to have pAF and was started on Eliquis. Presented to the ED on 02/13/25 for left sided chest pain radiating to the back. Repeat TTE demonstrated interval increase in pericardial effusion (moderate-large adjacent to RA with no evidence of tamponade) and CTS was consulted for possible pericardial window and recommended no acute intervention. 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 Pulmonary consulted and recommended transfer to [facility] for bronchoscopy, EBUS and hilar mass biopsy. Patient transferred to [facility] on 02/20/25 and underwent EBUS on 02/21/25. 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     Patient seen this AM. Patient found wretching, with minimal output. Patient improved on own, and subsequently received IV zofran. Patient then soon after found to be hypoxic on O2 sat going down to 70s. Patient states she has been chronically SOB but placed on NRB with improvement of O2 sat and symptoms. Discussed patient's pain. She has hx of chronic opioid use for pain related to scleroderma which is in her feet and legs, can be burning and aching. 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 She states oxycodone 20 mg PRN not helping for chest pain, agreed to try IV dilaudid.       PERTINENT PM/SXH:   Scleroderma    Asthma    High cholesterol    Scleroderma    Shingles    Hypothyroidism    GERD (gastroesophageal reflux disease)    Smoker    Multiple drug allergies      S/P small bowel resection    History of myomectomy    History of ovarian cystectomy    Fibroid    Bowel obstruction    H/O ovarian cystectomy    S/P pericardiocentesis      FAMILY HISTORY:    ------------------------------------------------------------------------------------------------------------  ITEMS NOT CHECKED ARE NOT PRESENT    SOCIAL HISTORY:   Living Situation: [X ]Home  [ ]Long term care  [ ]Rehab [ ]Other  Patient worked as a [employer]. Enjoys to read, do art     Substance hx:  [ ]   Tobacco hx:  [ ]   Alcohol hx: [ ]   Family Hx substance abuse [ ]yes [ ]no    Confucianism/Spirituality:  PCSSQ[Palliative Care Spiritual Screening Question]   Severity (0-10): 0  Score of 4 or > indicate consideration of Chaplaincy referral.  Chaplaincy Referral: [ ] yes [X ] refused [ ] following    Anticipatory Grief present?:  [ ] yes [X ] no  [ ] Deferred                      Other Referrals:    [ ]Hospice   [ ]Caregiver [place] Support  [ ]Child Life    [ ]Patient & Family Centered Care Referral  [ ]Holistic Therapy     ------------------------------------------------------------------------------------------------------------    PRESENT SYMPTOMS:     [ ] Unable to self-report      [ ] PAINADS     [ ] RDOS    [ ] No     [X ] Yes     Source if other than patient:  [ ]Family   [ ]Team     PAIN:   If blank, patient unable to specify     [X ]yes [ ]no    1. Location- Left chest   2. Radiation-  Left arm  3. Quality- Sharp   4. Timing- Constant   5. Minimal acceptable level/pain goal- 4/10   6. Aggravating factors-   7. QOL impact- Severe     SYMPTOMS:   Dyspnea:                           [ ]Mild [X ]Moderate [ ]Severe  Anxiety:                             [ ]Mild [ ]Moderate [ ]Severe  Fatigue:                             [ ]Mild [ ]Moderate [ ]Severe  Nausea/Vomiting:              [ ]Mild [ ]Moderate [ ]Severe  Loss of appetite:                [ ]Mild [ ]Moderate [ ]Severe  Constipation:                     [ ]Mild [ ]Moderate [ ]Severe    Other Symptoms:  [X ]All other review of systems negative     ------------------------------------------------------------------------------------------------------------      I-Stop Reference No: [id number]    Prescription Information      PDI Filter:    PDI	Current Rx	Drug Type	Rx Written	Rx Dispensed	Drug	Quantity	Days Supply	Prescriber Name	Prescriber KHOA #	Payment Method	Dispenser  A	Y	O	02/27/2025	03/08/2025	morphine sulf er 30 mg tablet	60	30	Guru Crowe MD	[id number]	Medicare	[id number]  A	Y	O	02/27/2025	03/08/2025	oxycodone hcl (ir) 20 mg tab	90	30	Guru Crowe MD	[id number]	Medicare	[facility]  A	N	O	01/30/2025	02/06/2025	morphine sulf er 30 mg tablet	60	30	SebastienGuru MD	[id number]	Medicare	[facility]  A	N	O	01/30/2025	02/06/2025	oxycodone hcl (ir) 20 mg tab	90	30	Guru Crowe MD	[id number]	Medicare	[facility]  A	N	O	01/07/2025	01/08/2025	morphine sulf er 30 mg tablet	60	30	Guru Crowe MD	[id number]	Medicare	[facility]  A	N	O	01/07/2025	01/08/2025	oxycodone hcl (ir) 20 mg tab	90	30	Sebastien Guru ROSS	[id number]	Medicare	[facility]  A	N	O	11/27/2024	12/06/2024	morphine sulf er 30 mg tablet	60	30	Sebastien Guru ROSS	[id number]	Medicare	[facility]  A	N	O	11/27/2024	12/06/2024	oxycodone hcl (ir) 20 mg tab	90	30	Sebastien uGru ROSS	[id number]	Medicare	[facility]  A	N	O	10/31/2024	11/08/2024	morphine sulf er 30 mg tablet	60	30	Sebastien Guru ROSS	[id number]	Medicare	[facility]  A	N	O	10/31/2024	11/08/2024	oxycodone hcl (ir) 20 mg tab	90	30	Sebastien Guru ROSS	[id number]	Medicare	[facility]  A	N	O	10/03/2024	10/09/2024	oxycodone hcl (ir) 20 mg tab	90	30	Sebastien Guru ROSS	[id number]	Medicare	[facility]    ------------------------------------------------------------------------------------------------------------    FUNCTIONAL STATUS:     Baseline ADL (prior to admission):  [X ] Independent  [ ] Moderate Assistance [ ] Dependent    Palliative Performance Score (current): 40%     [ ]PPSV2 < or = to 30%   [ ]artificial nutrition      NUTRITIONAL STATUS:     Protein Calorie Malnutrition Present:   [ ]mild   [ ]moderate or severe    Weight:   [ ]underweight (BMI 18.5 or less)   [ ]morbid obesity (BMI 30 or higher)   [ ]anasarca  [ ]significant weight loss     Height (cm): 175.3 (03-11-25 @ 16:45), 175.26 (03-11-25 @ 11:01), 175.3 (02-21-25 @ 12:46)  Weight (kg): 63.5 (03-11-25 @ 16:45), 69.85 (03-11-25 @ 11:01), 65.9 (02-21-25 @ 12:46)  BMI (kg/m2): 20.7 (03-11-25 @ 16:45), 22.7 (03-11-25 @ 11:01), 21.4 (02-21-25 @ 12:46)    ------------------------------------------------------------------------------------------------------------    PRIOR ADVANCE DIRECTIVES:    [ ] DNR/MOLST    [ ] Living Will    [ ] Health Care Proxy(s)    DECISION MAKER(s):  [X ] Patient    [ ] Surrogate(s)  [ ] HCP   [ ] Guardian             ------------------------------------------------------------------------------------------------------------  PHYSICAL EXAM:  Vital Signs Last 24 Hrs  T(C): 36.7 (12 Mar 2025 08:30), Max: 36.8 (11 Mar 2025 16:45)  T(F): 98.1 (12 Mar 2025 08:30), Max: 98.3 (11 Mar 2025 16:45)  HR: 77 (12 Mar 2025 08:30) (76 - 98)  BP: 118/65 (12 Mar 2025 08:30) (117/66 - 140/81)  BP(mean): --  RR: 18 (12 Mar 2025 08:30) (16 - 20)  SpO2: 95% (12 Mar 2025 08:30) (95% - 100%)    Parameters below as of 12 Mar 2025 08:30  Patient On (Oxygen Delivery Method): room air     I&O's Summary    11 Mar 2025 07:01  -  12 Mar 2025 07:00  --------------------------------------------------------  IN: 300 mL / OUT: 250 mL / NET: 50 mL    12 Mar 2025 07:01  -  12 Mar 2025 12:32  --------------------------------------------------------  IN: 75 mL / OUT: 0 mL / NET: 75 mL      GENERAL:  [ ]Cachexia  [ ] Frail  [X ]Awake  [X ]Oriented   [ ]Lethargic  [ ]Unarousable  [ ]Verbal  [ ]Non-Verbal    BEHAVIORAL:   [ ] Anxiety  [ ] Delirium [ ] Agitation [ ] Other    HEENT:   [X ]Normal   [ ]Dry mouth   [ ]ET Tube/Trach  [ ]Oral lesions    PULMONARY:   [X ]Clear              [ ]Tachypnea  [ ]Audible excessive secretions   [ ]Rhonchi        [ ]Right [ ]Left [ ]Bilateral  [ ]Crackles        [ ]Right [ ]Left [ ]Bilateral  [ ]Wheezing     [ ]Right [ ]Left [ ]Bilateral  [ ]Diminished breath sounds [ ]right [ ]left [ ]bilateral    CARDIOVASCULAR:    [X ]Regular [ ]Irregular [ ]Tachy  [ ]Ridge [ ]Murmur [ ]Other    GASTROINTESTINAL:  [X ]Soft  [ ]Distended   [X ]+BS  [X ]Non tender [ ]Tender  [ ]Other [ ]PEG [ ]OGT/ NGT      GENITOURINARY:  [X ]Normal [ ] Incontinent   [ ]Oliguria/Anuria   [ ]Camargo    MUSCULOSKELETAL:   [X ]Normal   [ ]Weakness  [ ]Bed/Wheelchair bound [ ]Edema    NEUROLOGIC:   [X ]No focal deficits  [ ]Cognitive impairment  [ ]Dysphagia [ ]Dysarthria [ ]Paresis [ ]Other     SKIN:   [X ]Normal  [ ]Rash  [ ]Other  [ ]Pressure ulcer(s)       Present on admission [ ]y [ ]n    ------------------------------------------------------------------------------------------------------------    LABS:                        11.0   4.13  )-----------( 319      ( 12 Mar 2025 06:20 )             32.5   03-12    130[L]  |  96[L]  |  10  ----------------------------<  97  3.5   |  25  |  0.37[L]    Ca    8.9      12 Mar 2025 06:20  Phos  3.9     03-12  Mg     1.80     03-12    TPro  6.2  /  Alb  3.5  /  TBili  0.3  /  DBili  x   /  AST  17  /  ALT  7   /  AlkPhos  85  03-12  PT/INR - ( 11 Mar 2025 20:40 )   PT: 15.4 sec;   INR: 1.30 ratio       PTT - ( 11 Mar 2025 20:40 )  PTT:37.5 sec    Urinalysis Basic - ( 12 Mar 2025 06:20 )    Color: x / Appearance: x / SG: x / pH: x  Gluc: 97 mg/dL / Ketone: x  / Bili: x / Urobili: x   Blood: x / Protein: x / Nitrite: x   Leuk Esterase: x / RBC: x / WBC x   Sq Epi: x / Non Sq Epi: x / Bacteria: x    ------------------------------------------------------------------------------------------------------------    CRITICAL CARE:  [ ]Shock Present  [ ]Septic [ ]Cardiogenic [ ]Neurologic [ ]Hypovolemic [ ] Undifferentiated/Mixed   [ ]Vasopressors [ ]Inotropes     [ ]Respiratory failure present: [ ]Acute  [ ]Chronic [ ]Hypoxic  [ ]Hypercarbic   [ ]Mechanical ventilation   [ ]Trach collar   [ ]Non-invasive ventilatory support   [ ]High flow    [ ]Non-rebreather/Venti     [ ]Other organ failure     ------------------------------------------------------------------------------------------------------------    RADIOLOGY & ADDITIONAL STUDIES:    < from: Xray Chest 1 View-PORTABLE IMMEDIATE (Xray Chest 1 View-PORTABLE IMMEDIATE .) (03.12.25 @ 13:48) >    IMPRESSION:  Bilateral upper lobe opacities with 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  No acute pulmonary or cardiovascular disease.    < end of copied text >    ------------------------------------------------------------------------------------------------------------    ALLERGIES:  Allergies    penicillin (Unknown)  penicillin (Anaphylaxis; Hives; Other)  iodine (Anaphylaxis)  statins (Unknown)  Xolair (Unknown)  ABIDA dye (Short breath; Hives)  IV Contrast (Unknown)  Originally Entered as [Unknown] reaction to [BEE STINGS] (Unknown)    Intolerances    MEDICATIONS  (STANDING):  albuterol/ipratropium for Nebulization 3 milliLiter(s) Nebulizer every 6 hours  apixaban 5 milliGRAM(s) Oral every 12 hours  chlorhexidine 2% Cloths 1 Application(s) Topical <User Schedule>  colchicine 0.6 milliGRAM(s) Oral daily  fluticasone propionate/ salmeterol 100-50 MICROgram(s) Diskus 1 Dose(s) Inhalation two times a day  influenza  Vaccine (HIGH DOSE) 0.5 milliLiter(s) IntraMuscular once  levothyroxine 112 MICROGram(s) Oral daily  lidocaine   4% Patch 1 Patch Transdermal daily  morphine ER Tablet 30 milliGRAM(s) Oral every 12 hours  naloxegol 25 milliGRAM(s) Oral daily  nicotine - 21 mG/24Hr(s) Patch 1 Patch Transdermal daily  nicotine - 21 mG/24Hr(s) Patch 1 Patch Transdermal daily  pantoprazole    Tablet 40 milliGRAM(s) Oral every 12 hours  polyethylene glycol 3350 17 Gram(s) Oral daily  senna 2 Tablet(s) Oral at bedtime  sodium chloride 1 Gram(s) Oral two times a day    MEDICATIONS  (PRN):  aluminum hydroxide/magnesium hydroxide/simethicone Suspension 30 milliLiter(s) Oral every 4 hours PRN Dyspepsia  baclofen 5 milliGRAM(s) Oral every 8 hours PRN Musculoskeletal Pain  melatonin 3 milliGRAM(s) Oral at bedtime PRN Insomnia  ondansetron Injectable 4 milliGRAM(s) IV Push every 8 hours PRN Nausea and/or Vomiting  oxyCODONE    IR 20 milliGRAM(s) Oral every 4 hours PRN for severe pain

## 2025-03-12 NOTE — CONSULT NOTE ADULT - PROBLEM SELECTOR RECOMMENDATION 6
For pain management     In the event of worsening symptoms, please contact the Palliative Medicine team via pager (if the patient is at [facility] #[phone] or if the patient is at [facility] #[phone]) The Geriatric and Palliative Medicine service has coverage 24 hours a day/ 7 days a week to provide medical recommendations regarding symptom management needs via telephone - 3/12- See [facility]. Patient appointed and completed HCP, named long time friend Sam Oswald

## 2025-03-12 NOTE — CHART NOTE - NSCHARTNOTEFT_GEN_A_CORE
At 1220p RN endorsed that pt was hypoxic to 76% on 6L NC.  Stat ABG, CXR, and neb ordered for further eval. At 1220p RN endorsed that pt was hypoxic to 76% on 6L NC.  PA to perform urgent bedside assessment.  Stat ABG, CXR, and neb ordered for further eval.  Maintaining low threshold for V/Q scan.

## 2025-03-12 NOTE — CONSULT NOTE ADULT - PROBLEM SELECTOR RECOMMENDATION 9
Assessment-   - Source:   - Home regimen if any: MS Contin 30 mg BID and oxycodone 20 mg q4h PRN (long term dose)   - PRN use in past 24 hours from 8 AM to 8 AM:   - Pain currently   [ ] improved or controlled   [X ] uncontrolled     Plan and Recommendations:   - non-opioid analgesics:   - adjuvants:   - opioids:   > Discontinue oxycodone 20 mg q4h PRN for severe pain   > Start IV dilaudid 1 mg q4h PRN for severe pain. If no improvement, can increase to 1.5 mg   > c/w MS Contin 30 mg BID   - Bowel regimen to prevent opioid induced constipation, goal BM every 2 to 3 days, hold for loose stool Assessment-   - Source:   - Home regimen if any: MS Contin 30 mg BID and oxycodone 20 mg q4h PRN (long term dose)   - Pain currently   [ ] improved or controlled   [X ] uncontrolled     Plan and Recommendations:   - opioids:   > Discontinue oxycodone 20 mg q4h PRN for severe pain   > Start IV dilaudid 1 mg q4h PRN for severe pain. If no improvement, can increase to 1.5 mg q4h PRN   > c/w MS Contin 30 mg BID   - Bowel regimen to prevent opioid induced constipation, goal BM every 2 to 3 days, hold for loose stool Assessment-   - Source: Chest pain from mediastinal mass   - Home regimen if any: MS Contin 30 mg BID and oxycodone 20 mg q4h PRN (long term dose) given by outpatient pain specialist originally for scleroderma pain  - Pain currently   [ ] improved or controlled   [X ] uncontrolled     Plan and Recommendations:   - opioids:   > Discontinue oxycodone 20 mg q4h PRN for severe pain   > Start IV dilaudid 1 mg q4h PRN for severe pain. If no improvement, can increase to 1.5 mg q4h PRN   > c/w MS Contin 30 mg BID   - Bowel regimen to prevent opioid induced constipation, goal BM every 2 to 3 days, hold for loose stool

## 2025-03-12 NOTE — CONSULT NOTE ADULT - PROBLEM SELECTOR RECOMMENDATION 4
- f/u rheum - recently diagnosed  - Pending staging scans  - CT chest on 2/15- MEDIASTINUM AND JEREMIAS: Anterior subcarinal lymph node measures 1.8 cm in short axis, stable. There is a soft tissue density, measuring up to 4.2 cm in the prevascular space extending into the subcutaneous left main pulmonary artery region, this is more pronounced on today's study.   - Per onc, plan is to start inpatient treatment - recently diagnosed  - Pending MRI A/P and brain   - CT chest on 2/15- MEDIASTINUM AND JEREMIAS: Anterior subcarinal lymph node measures 1.8 cm in short axis, stable. There is a soft tissue density, measuring up to 4.2 cm in the prevascular space extending into the subcutaneous left main pulmonary artery region, this is more pronounced on today's study.   - Per onc, plan is to start inpatient chemo treatment and RT   - Follows with Dr. Hall at [facility]

## 2025-03-12 NOTE — CONSULT NOTE ADULT - PROBLEM SELECTOR RECOMMENDATION 7
For pain management     In the event of worsening symptoms, please contact the Palliative Medicine team via pager (if the patient is at [facility] #[phone] or if the patient is at [facility] #[phone]) The Geriatric and Palliative Medicine service has coverage 24 hours a day/ 7 days a week to provide medical recommendations regarding symptom management needs via telephone

## 2025-03-12 NOTE — CONSULT NOTE ADULT - ASSESSMENT
65 y/o F with PMHx of AF, scleroderma, asthma with prior intubations, hypothyroidism, pericardial effusion s/p pericardiocentesis (12/2024) and drain on colchicine presented to the ED due to SOB, chest pain, and back pain. Oncology evaluated her and recommended MRI brain and MRI chest/abdomen/pelvis with contrast to rule out metastasis. Was previously admitted in 12/2024 due to chest pain and SOB and found to have a pericardial effusion with evidence of tamponade physiology. She underwent urgent pericardiocentesis with placement of drain which was removed on 12/26. She was started on colchicine and NSAIDs. Hospital course was complicated by AF with RVR which was new onset and was suspected to be due to drain placement. After drain was removed patient continued to have pAF and was started on Eliquis. Presented to the ED on 02/13/25 for left sided chest pain radiating to the back. Repeat TTE demonstrated interval increase in pericardial effusion (moderate-large adjacent to RA with no evidence of tamponade) and CTS was consulted for possible pericardial window and recommended no acute intervention. 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 Pulmonary consulted and recommended transfer to [facility] for bronchoscopy, EBUS and hilar mass biopsy. Patient transferred to [facility] on 02/20/25 and underwent EBUS on 02/21/25. 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

## 2025-03-12 NOTE — PROGRESS NOTE ADULT - SUBJECTIVE AND OBJECTIVE BOX
SOLID TUMOR ONCOLOGY HOSPITALIST PROGRESS NOTE    S: No acute events overnight.  Pt complained of acute-on-chronic L sided chest pain that radiates down her arm and is occasionally a/w palpitations. She denied feeling short of breath.  She confirmed that she takes Morphine ER twice daily as well as Oxy IR for breakthrough pain.  She also indicated that she has an allergy to iodinated dye and has had shortness of breath/anaphylaxis after receiving it.  She admitted that she originally refused MRI a/p and brain because she wan't sure if she was going to receive iodine or not, but was amenable to undergoing the test upon learning that gadolinium is the contrast agent used.  She also reported that she is claustrophobic and takes Xanax prn at home.  She also mentioned that she hasn't had a bm in ~13 days, which is normal for her; she indicated that she's received Relistor in the past which worked well for her, and also indicated that she has a h/o bowel obstructions (from before she was dx with Scleroderma).    CURRENT MEDICATIONS  MEDICATIONS  (STANDING):  albuterol/ipratropium for Nebulization 3 milliLiter(s) Nebulizer every 6 hours  apixaban 5 milliGRAM(s) Oral every 12 hours  chlorhexidine 2% Cloths 1 Application(s) Topical <User Schedule>  colchicine 0.6 milliGRAM(s) Oral daily  fluticasone propionate/ salmeterol 100-50 MICROgram(s) Diskus 1 Dose(s) Inhalation two times a day  influenza  Vaccine (HIGH DOSE) 0.5 milliLiter(s) IntraMuscular once  levothyroxine 112 MICROGram(s) Oral daily  lidocaine   4% Patch 1 Patch Transdermal daily  morphine ER Tablet 30 milliGRAM(s) Oral every 12 hours  naloxegol 25 milliGRAM(s) Oral daily  nicotine - 21 mG/24Hr(s) Patch 1 Patch Transdermal daily  nicotine - 21 mG/24Hr(s) Patch 1 Patch Transdermal daily  pantoprazole    Tablet 40 milliGRAM(s) Oral every 12 hours  MEDICATIONS  (PRN):  aluminum hydroxide/magnesium hydroxide/simethicone Suspension 30 milliLiter(s) Oral every 4 hours PRN Dyspepsia  baclofen 5 milliGRAM(s) Oral every 8 hours PRN Musculoskeletal Pain  melatonin 3 milliGRAM(s) Oral at bedtime PRN Insomnia  ondansetron Injectable 4 milliGRAM(s) IV Push every 8 hours PRN Nausea and/or Vomiting  oxyCODONE    IR 20 milliGRAM(s) Oral every 4 hours PRN for severe pain      PHYSICAL EXAM  T(C): 36.7 (03-12-25 @ 08:30), Max: 36.8 (03-11-25 @ 16:45)  HR: 77 (03-12-25 @ 08:30) (76 - 98)  BP: 118/65 (03-12-25 @ 08:30) (117/66 - 140/81)  RR: 18 (03-12-25 @ 08:30) (16 - 20)  SpO2: 95% (03-12-25 @ 08:30) (95% - 100%)    03-11-25 @ 07:01  -  03-12-25 @ 07:00  --------------------------------------------------------  IN: 300 mL / OUT: 250 mL / NET: 50 mL    03-12-25 @ 07:01  -  03-12-25 @ 11:43  --------------------------------------------------------  IN: 75 mL / OUT: 0 mL / NET: 75 mL  Non-toxic-appearing woman, sitting up in bed, mildly distressed, nad  Answering all questions appropriately with some conversational dyspnea, following commands  Anicteric sclera, no oral lesions/thrush  RRR, no m/r/g, heart sounds faint  Breaths somewhat labored, but not tachypnea; trace RLB crackles, no w/r  Abd soft/nt/nd, hypoactive bowel sounds  No peripheral edema  CN 2-12 grossly intact; no gross focal neuro deficits; pt moves all extremities spontaneously      LABS                        11.0   4.13  )-----------( 319      ( 12 Mar 2025 06:20 )             32.5     03-12    130[L]  |  96[L]  |  10  ----------------------------<  97  3.5   |  25  |  0.37[L]    Ca    8.9      12 Mar 2025 06:20  Phos  3.9     03-12  Mg     1.80     03-12    TPro  6.2  /  Alb  3.5  /  TBili  0.3  /  DBili  x   /  AST  17  /  ALT  7   /  AlkPhos  85  03-12    PT/INR - ( 11 Mar 2025 20:40 )   PT: 15.4 sec;   INR: 1.30 ratio    PTT - ( 11 Mar 2025 20:40 )  PTT:37.5 sec      ADDITIONAL LABS  Dylan 98  Uosm 632  uric acid 2.4    PATHOLOGY  2/21 LN biopsies  1. LUNG, LEFT UPPER LOBE, ENDOBRONCHIAL FORCEPS BIOPSY AND TRANSBRONCHIAL   FNA   POSITIVE FOR MALIGNANT CELLS.   Small cell carcinoma.   Touch Imprint slide, cell block and core biopsy show a cellular specimen   composed of groups and numerous single-lying hyperchromatic cells with   irregular nuclear membranes, nuclear molding and scanty cytoplasm. Crush   artifact and mitotic figures present.   Immunocytochemical studies : The neoplastic cells are positive for Cam   5.2.hrmogranin, synaptophysin, INSM-1, TTF-1 while negative for CD45 and   P40. Ki-67 reveal a proliferation index of 90%.   Case discussed at the daily cytopathology  conference on   03/04/2025.   Case reported to Tumor Registry.   Dr. Webster was informed of the diagnosis via encrypted email on 03/04/2025.   2. LYMPH NODE, LEVEL 7, EBUS-GUIDED FNA   SUSPICIOUS FOR MALIGNANCY.   Suspicious for small cell carcinoma.   Cytology smears and cell block display a hypocellular specimen composed   of rare groups and few single-lying hyperchromatic cells with irregular   nuclear membranes, nuclear molding and scanty cytoplasm with crush   artifact, in a background of rare lymphocytes.   Case discussed at the daily cytopathology  conference on   03/04/2025.   3. LYMPH NODE, 4L, EBUS-GUIDED FNA   POSITIVE FOR MALIGNANT CELLS.   S mall cell carcinoma.   Cytology smears and cell block show a cellular specimen composed of   groups and single-lying hyperchromatic cells with irregular nuclear   membranes, nuclear molding and scanty cytoplasm, in a background of rare   lymphocytes.   Immunocytochemical studies: The neoplastic cells are positive for Cam   5.2, Chromogranin, synaptophysin, INSM-1, TTF-1 while negative for CD45   and P40.Ki-67 reveals a proliferative index of 90%.   In summary the cytomorphology and immunophenotype are consistent with   small cell carcinoma.   4. LUNG, LEFT UPPER LOBE, BRONCHOALVEOLAR LAVAGE   ATYPICAL FINDINGS   Cytology slide and cell block show hyperchromatic groups of poorly   preserved cells among reactive ciliated bronchial cells and alveolar   macrophages.   12/20/24 pericardial fluid cytology negative for malignant cells      MICROBIOLOGY  Abx: none on this admission    Cx Data  12/23 Coxsackie B titers positive 1:100-1:1000  12/23 Coxsackie A titers negative  12/23 EBV IgG positive  12/23 viral hep panel non-reactive  12/22 Quant Gold negative      PERTINENT RADIOLOGY  MRI Brain w/without contrast: pending  MRI a/p w/without contrast: pending  2/21 CXR: No complications post bronchoscopy.  Increased left upper lobe opacity (pneumonia?)  2/15 NC CT chest: Small circumferential pericardial effusion, mildly increased on today's   study when compared to the prior. Left suprahilar mediastinal mass in the prevascular space, extending into the aortopulmonary window, mediastinal adenopathy, appears more   pronounced on today's study though resolution is limited without   intravenous contrast. There is a new peripheral ill-defined 2.4 x 2.2 cm opacity in the left   upper lobe, differential includes infection/inflammation/neoplasm.  Dilated ascending aorta measuring up to 4 cm, similar to the prior study.  Consider contrast-enhanced CT of the chest if patient is able to tolerate   intravenous contrast.  2/15 B/L LE dopplers: negative for DVT.

## 2025-03-12 NOTE — CONSULT NOTE ADULT - CONVERSATION DETAILS
Discussed with patient HCP form. Explained HCP was a document in which patient can appoint someone they trust to make medical decisions on patient's behalf in the event patient is incapacitated and unable to communicate treatment preferences. Explained HCP should be someone who patient discusses their medical issues and treatment preferences with so that proxy is aware and able to make medical decisions based on patient's wishes in the future.   Patient has no partner. Her son  recently. Her siblings are either  or moved back home to [place], has minimal contact with them. She appoints her long time friend Sam Margret as her HCP. She reports that he visits daily and aware of her medical issues.  HCP signed by patient.

## 2025-03-12 NOTE — CONSULT NOTE ADULT - PROBLEM SELECTOR RECOMMENDATION 5
- 3/12- See [place] [facility]. Patient appointed and completed HCP, named long time friend Sam Oswald - f/u rheum

## 2025-03-12 NOTE — CHART NOTE - NSCHARTNOTEFT_GEN_A_CORE
[facility] MEDICINE NIGHT COVERAGE - Medicine Subsequent Hospital Care Note    CC: Anxiety  HPI/Subjective: Patient is a 65 y/o F w/ 47ppy smoking hx (quit 12/2024), PMHx A. Fib (Eliquis), Hypothyroidism, Scleroderma, Pericardial effusion s/p pericardiocentesis (12/2024, on Colchicine), Asthma, and Newly dx SCLC, p/w worsening SOB and expedited onc workup w/ plan for inpatient chemo.  C/b chest discomfort w/ distant heart sounds, repeat Echo showed global pericardial effusion w/o e/o tamponade. C/c/b hyponatremia c/w SIADH, started salt tabs and fluid restrictions. Staging scans MR H/abd/pelv pending.  Pall care consulted for pain management, cont MS Contin, switched Oxy to Dilaudid PRN.    Overnight patient noted to have woken up with a panic and crying. Patient anxious, rocking back and forth in bed. Patient reports that she is scared of her diagnosis and is tired of treatment. She reports chest pain from the anxiety. Patient denies fever, chills, diaphoresis, malaise, night sweats, generalized weakness, headache, changes in vision, dizziness, cough, sputum production, wheezing, hemoptysis,  PND, dysphagia, new abdominal pain, nausea, vomiting, diarrhea, constipation, melena, hematochezia, dysuria, increased urinary frequency/urgency, hematuria, nocturia, numbness/weakness/tingling, any other complaints.    Vital Signs Last 24 Hrs  T(C): 36.3 (03-13-25 @ 05:38), Max: 36.8 (03-12-25 @ 23:30)  T(F): 97.4 (03-13-25 @ 05:38), Max: 98.2 (03-12-25 @ 23:30)  HR: 63 (03-13-25 @ 05:38) (63 - 80)  BP: 154/85 (03-13-25 @ 05:38) (118/65 - 154/85)  RR: 18 (03-13-25 @ 05:38) (18 - 18)  SpO2: 100% (03-13-25 @ 05:38) (94% - 100%) on (O2)    PHYSICAL EXAM:  General: Anxious. Rocking back and forth in bed   Respiratory: Hyperventilating.   Cardiovascular: Tender on palpation. regular rate and rhythm, S1 and S2, no murmurs, rubs or gallops, no edema, 2+ peripheral pulses  Gastrointestinal: soft, nontender, nondistended, +bowel sounds, no hernia  Skin: warm, dry, no rash  Neurologic: sensation grossly intact, CN grossly intact, non-focal exam  Musculoskeletal: no clubbing, no cyanosis, no joint swelling  Psychiatric: A&Ox3    LABS:                        11.0   4.13  )-----------( 319      ( 12 Mar 2025 06:20 )             32.5     03-12    130[L]  |  96[L]  |  10  ----------------------------<  97  3.5   |  25  |  0.37[L]    Ca    8.9      12 Mar 2025 06:20  Phos  3.9     03-12  Mg     1.80     03-12    TPro  6.2  /  Alb  3.5  /  TBili  0.3  /  DBili  x   /  AST  17  /  ALT  7   /  AlkPhos  85  03-12    PT/INR: PT: 15.4 sec | INR: 1.30 ratio (03-11-25 @ 20:40)  PTT: 37.5 sec (03-11-25 @ 20:40)  PT/INR: PT: 11.6 sec | INR: 1.00 ratio (02-21-25 @ 06:30)  PTT: 28.7 sec (02-21-25 @ 06:30)  PT/INR: PT: 13.0 sec | INR: 1.13 ratio (02-13-25 @ 11:15)  PTT: 29.7 sec (02-13-25 @ 11:15)    CARDIAC ENZYMES            Serum Pro-Brain Natriuretic Peptide:   D-Dimer Assay: 499 ng/mL DDU (02-13-25 @ 15:08)      Urinanalysis Basic (03-13-25 @ 06:09):  Color: -- / Appearance: -- / SG: -- / pH: --  Gluc: 97 / Ketone: -- / Bili: -- / Urobili: --  Blood: -- / Protein: -- / Nitrite: --  Leuk Esterase: -- / RBC: -- / WBC: --  Sq Epi: -- / Non Sq Epi: -- / Bacteria: --      Blood Gas Venous (03-13-25 @ 06:09):  pH: 7.32 | HCO3: 30 | pCO2: 59 | pO2: 26 | Lactate: 1.1  pH: 7.35 | HCO3: 32 | pCO2: 58 | pO2: 37 | Lactate: 0.8      Blood Gas Arterial (03-13-25 @ 06:09):      CAPILLARY BLOOD GLUCOSE:  POCT Blood Glucose: 110 mg/dL (02-14-25 @ 20:38)      COVID PCR:      RADIOLOGY:    MEDICATIONS  (STANDING):  albuterol/ipratropium for Nebulization 3 milliLiter(s) Nebulizer every 6 hours  apixaban 5 milliGRAM(s) Oral every 12 hours  chlorhexidine 2% Cloths 1 Application(s) Topical <User Schedule>  colchicine 0.6 milliGRAM(s) Oral daily  fluticasone propionate/ salmeterol 100-50 MICROgram(s) Diskus 1 Dose(s) Inhalation two times a day  influenza  Vaccine (HIGH DOSE) 0.5 milliLiter(s) IntraMuscular once  levothyroxine 112 MICROGram(s) Oral daily  lidocaine   4% Patch 1 Patch Transdermal daily  methylnaltrexone Injectable 12 milliGRAM(s) SubCutaneous once  morphine ER Tablet 30 milliGRAM(s) Oral every 12 hours  naloxegol 25 milliGRAM(s) Oral daily  nicotine - 21 mG/24Hr(s) Patch 1 Patch Transdermal daily  pantoprazole    Tablet 40 milliGRAM(s) Oral every 12 hours  polyethylene glycol 3350 17 Gram(s) Oral daily  senna 2 Tablet(s) Oral at bedtime  sodium chloride 1 Gram(s) Oral two times a day    MEDICATIONS  (PRN):  aluminum hydroxide/magnesium hydroxide/simethicone Suspension 30 milliLiter(s) Oral every 4 hours PRN Dyspepsia  baclofen 5 milliGRAM(s) Oral every 8 hours PRN Musculoskeletal Pain  HYDROmorphone  Injectable 1 milliGRAM(s) IV Push every 4 hours PRN Severe Pain (7 - 10)  LORazepam     Tablet 0.25 milliGRAM(s) Oral every 8 hours PRN Anxiety  melatonin 3 milliGRAM(s) Oral at bedtime PRN Insomnia  ondansetron Injectable 4 milliGRAM(s) IV Push every 6 hours PRN Nausea and/or Vomiting    I&O's Summary    11 Mar 2025 07:01  -  12 Mar 2025 07:00  --------------------------------------------------------  IN: 300 mL / OUT: 250 mL / NET: 50 mL    12 Mar 2025 07:01  -  13 Mar 2025 06:09  --------------------------------------------------------  IN: 575 mL / OUT: 600 mL / NET: -25 mL      I reviewed the above labs, radiology, medications, tests, telemetry, and EKG interpretation.    ASSESSMENT/PLAN: 65 y/o F former smoker, A. Fib, Scleroderma, Pericardial effusion on Colchicine, Asthma, and Newly dx SCLC p/w dyspnea. Pending expedited onc workup and inpatient chemo.  Overnight patient crying and having an anxiety attack.    Plan  -1mg Ativan IVP given x1  -1G Tylenol IVP given x1  - Vitals q4hrs  - Consider behavioral health consult in AM    - Clinical findings, labs, tests, telemetry, and ekg reviewed. Will monitor patient closely.  Emotional support given to patient.    Evelyn Aguilar PA-C  Department of Medicine [phone]

## 2025-03-12 NOTE — CHART NOTE - NSCHARTNOTEFT_GEN_A_CORE
Spoke with MRI technician who agreed with plan of premedication for MRI contrast study and to inform them once pre-medication is started.   Discussed with patient in length the importance of MRI brain and MRI chest, abdomen and pelvis with contrast and the process premedication. She stated understanding but repeatedly refused. Importance of the imaging was again re-iterated to the patient and she stated understanding and refused.   Will inform Oncology in the AM. Spoke with MRI technician who agreed with plan for MRI contrast study and to inform them once pre-medication is started.   Discussed with patient in length the importance of MRI brain and MRI chest, abdomen and pelvis with contrast and the process of premedication. She stated understanding but repeatedly refused. Importance of the imaging was again re-iterated to the patient and she stated understanding and again refused.   Will inform Oncology in the AM.

## 2025-03-12 NOTE — PATIENT PROFILE ADULT - PATIENT’S MOTHER’S MAIDEN LAST NAME (INFO USED BY THE IMMUNIZATION REGISTRY):
Pain over suprapubic area, negative CVA tenderness.   - continue with oral antibiotics  - f/u 3-5 days or sooner with worsening pain   joint limitation left... na

## 2025-03-12 NOTE — PROGRESS NOTE ADULT - ASSESSMENT
63 yo woman, former smoker (47py; quit 12/2024) with h/o pAfib (on Eliquis), Hypothyroidism, Scleroderma, h/o pericardial effusion s/p pericardiocentesis (dx in 12/2024; ? viral vs inflammatory, cytology negative for malignant cells, on Colchicine), Asthma/suspected COPD, and recently-dx SCLCa > dx in 2/2025, staging javier not yet completed and pt is tx naive and referred to [facility] from Med Onc clinic for expedited onc workup and urgent initiation of inpatient chemotherapy.    ACTIVE PROBLEMS  Recently dx SCLCa  Hyponatremia 2/2 SIADH  h/o Pericardial effusion (viral with positive Coxsackie B titers in 12/2024)  Cancer related pain  Scleroderma  h/o Asthma/COPD (former smoker)  Hypothyroidism  pAfib  Hypercoag state  Immunocompromised 2/2 cancer, autoimmune disease    Recently dx SCLCa  Staging w/u in progress- MRI Brain and a/p w/without contrast ordered for further eval (CT deferred as pt has h/o anaphylaxis to iodinated contrast)  Options for cancer tx TBD- pt likely to begin Carbo/Etop inpatient (without immunotherapy given pt's h/o AID)  Rad Onc also made aware of pt- plan is for projected 3-week course of RT to the lung mass  Long-term prognosis: gaurded; pt is full code    Hyponatremia 2/2 SIADH  SIADH confirmed by 3/12 urine lytes  Pt is relatively asymptomatic  Starting 1L fluid restriction  Starting Salt tabs 1gm BID    h/o Pericardial effusion in 12/2024 (? autoimmune vs viral with positive Coxsackie B titers in 12/2024)  12/23 pericardial fluid cytology negative for malignant cells  No clinical evidence at this time to suggest recurrent effusion, tamponade  Repeat TTE pending  Continuing Colchicine 0.6mg daily    Cancer related pain  Continuing Morphine ER 30mg q12  Continuing Oxy IR 20mg q8/prn  Continuing Baclofen 5mg q8/prn  Starting bowel regimen (Miralax, Senna) for OIC prevention    h/o Asthma/COPD (former smoker)  Continuing Advair 100-50mcg MDI BID  Starint duonebs q6/prn   Continuing Nicotine patch 21mg daily (6 weeks)    Hypothyroidism: repeat TFTs ordered; continuing LT4 112mcg daily    pAfib  Hypercoag state  Continuing Eliquis 5mg q12 63 yo woman, former smoker (47py; quit 12/2024) with h/o pAfib (on Eliquis), Hypothyroidism, Scleroderma, h/o pericardial effusion s/p pericardiocentesis (dx in 12/2024; ? viral vs inflammatory, cytology negative for malignant cells, on Colchicine), Asthma/suspected COPD, and recently-dx SCLCa > dx in 2/2025, staging javier not yet completed and pt is tx naive and referred to [facility] from Med Onc clinic for expedited onc workup and urgent initiation of inpatient chemotherapy.    ACTIVE PROBLEMS  Recently dx SCLCa  Hyponatremia 2/2 SIADH  h/o Pericardial effusion (viral with positive Coxsackie B titers in 12/2024)  Cancer related pain  Constipation  h/o Asthma/COPD (former smoker)  Hypothyroidism  pAfib  Hypercoag state  Immunocompromised 2/2 cancer, autoimmune disease    Recently dx SCLCa  Staging w/u in progress- MRI Brain and a/p w/without contrast ordered for further eval (CT deferred as pt has h/o anaphylaxis to iodinated contrast)  Options for cancer tx TBD- pt likely to begin Carbo/Etop inpatient (without immunotherapy given pt's h/o AID)  Rad Onc also made aware of pt- plan is for projected 3-week course of RT to the lung mass  Long-term prognosis: gaurded; pt is full code    Hyponatremia 2/2 SIADH  SIADH confirmed by 3/12 urine lytes  Pt is relatively asymptomatic  Starting 1L fluid restriction  Starting Salt tabs 1gm BID    h/o Pericardial effusion in 12/2024 (? autoimmune vs viral with positive Coxsackie B titers in 12/2024)  12/23 pericardial fluid cytology negative for malignant cells  No clinical evidence at this time to suggest recurrent effusion, tamponade  Repeat TTE pending  Continuing Colchicine 0.6mg daily    Cancer related pain  Continuing Morphine ER 30mg q12  Continuing Oxy IR 20mg q8/prn  Continuing Baclofen 5mg q8/prn    Constipation  Possibly due to opioids +/- AID  AXR ordered, r/o ileus/obstruction  Starting bowel regimen (Miralax, Senna) for OIC prevention    h/o Asthma/COPD (former smoker)  Continuing Advair 100-50mcg MDI BID  Starint duonebs q6/prn   Continuing Nicotine patch 21mg daily (6 weeks)    Hypothyroidism: repeat TFTs ordered; continuing LT4 112mcg daily    pAfib  Hypercoag state  Continuing Eliquis 5mg q12

## 2025-03-12 NOTE — CONSULT NOTE ADULT - PROBLEM SELECTOR RECOMMENDATION 3
- recently diagnosed  - Pending staging scans  - Per onc, plan is to start inpatient treatment - on NC, NRB   - CXR Bilateral upper lobe opacities with 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 No acute pulmonary or cardiovascular disease.

## 2025-03-13 LAB
ALBUMIN SERPL ELPH-MCNC: 3.6 G/DL — SIGNIFICANT CHANGE UP (ref 3.3–5)
ALP SERPL-CCNC: 88 U/L — SIGNIFICANT CHANGE UP (ref 40–120)
ALT FLD-CCNC: 7 U/L — SIGNIFICANT CHANGE UP (ref 4–33)
ANION GAP SERPL CALC-SCNC: 12 MMOL/L — SIGNIFICANT CHANGE UP (ref 7–14)
AST SERPL-CCNC: 17 U/L — SIGNIFICANT CHANGE UP (ref 4–32)
BASOPHILS # BLD AUTO: 0.02 K/UL — SIGNIFICANT CHANGE UP (ref 0–0.2)
BASOPHILS NFR BLD AUTO: 0.5 % — SIGNIFICANT CHANGE UP (ref 0–2)
BILIRUB SERPL-MCNC: 0.4 MG/DL — SIGNIFICANT CHANGE UP (ref 0.2–1.2)
BUN SERPL-MCNC: 8 MG/DL — SIGNIFICANT CHANGE UP (ref 7–23)
CALCIUM SERPL-MCNC: 8.9 MG/DL — SIGNIFICANT CHANGE UP (ref 8.4–10.5)
CHLORIDE SERPL-SCNC: 96 MMOL/L — LOW (ref 98–107)
CO2 SERPL-SCNC: 23 MMOL/L — SIGNIFICANT CHANGE UP (ref 22–31)
CREAT SERPL-MCNC: 0.4 MG/DL — LOW (ref 0.5–1.3)
EGFR: 109 ML/MIN/1.73M2 — SIGNIFICANT CHANGE UP
EGFR: 109 ML/MIN/1.73M2 — SIGNIFICANT CHANGE UP
EOSINOPHIL # BLD AUTO: 0.11 K/UL — SIGNIFICANT CHANGE UP (ref 0–0.5)
EOSINOPHIL NFR BLD AUTO: 2.9 % — SIGNIFICANT CHANGE UP (ref 0–6)
GLUCOSE SERPL-MCNC: 92 MG/DL — SIGNIFICANT CHANGE UP (ref 70–99)
HCT VFR BLD CALC: 33.2 % — LOW (ref 34.5–45)
HGB BLD-MCNC: 11.3 G/DL — LOW (ref 11.5–15.5)
IANC: 1.79 K/UL — LOW (ref 1.8–7.4)
IMM GRANULOCYTES NFR BLD AUTO: 0.3 % — SIGNIFICANT CHANGE UP (ref 0–0.9)
LDH SERPL L TO P-CCNC: 166 U/L — SIGNIFICANT CHANGE UP (ref 135–225)
LYMPHOCYTES # BLD AUTO: 1.52 K/UL — SIGNIFICANT CHANGE UP (ref 1–3.3)
LYMPHOCYTES # BLD AUTO: 40 % — SIGNIFICANT CHANGE UP (ref 13–44)
MAGNESIUM SERPL-MCNC: 1.8 MG/DL — SIGNIFICANT CHANGE UP (ref 1.6–2.6)
MCHC RBC-ENTMCNC: 29.7 PG — SIGNIFICANT CHANGE UP (ref 27–34)
MCHC RBC-ENTMCNC: 34 G/DL — SIGNIFICANT CHANGE UP (ref 32–36)
MCV RBC AUTO: 87.1 FL — SIGNIFICANT CHANGE UP (ref 80–100)
MONOCYTES # BLD AUTO: 0.35 K/UL — SIGNIFICANT CHANGE UP (ref 0–0.9)
MONOCYTES NFR BLD AUTO: 9.2 % — SIGNIFICANT CHANGE UP (ref 2–14)
NEUTROPHILS # BLD AUTO: 1.79 K/UL — LOW (ref 1.8–7.4)
NEUTROPHILS NFR BLD AUTO: 47.1 % — SIGNIFICANT CHANGE UP (ref 43–77)
NRBC # BLD AUTO: 0 K/UL — SIGNIFICANT CHANGE UP (ref 0–0)
NRBC # FLD: 0 K/UL — SIGNIFICANT CHANGE UP (ref 0–0)
NRBC BLD AUTO-RTO: 0 /100 WBCS — SIGNIFICANT CHANGE UP (ref 0–0)
PHOSPHATE SERPL-MCNC: 3.5 MG/DL — SIGNIFICANT CHANGE UP (ref 2.5–4.5)
PLATELET # BLD AUTO: 310 K/UL — SIGNIFICANT CHANGE UP (ref 150–400)
POTASSIUM SERPL-MCNC: 3.7 MMOL/L — SIGNIFICANT CHANGE UP (ref 3.5–5.3)
POTASSIUM SERPL-SCNC: 3.7 MMOL/L — SIGNIFICANT CHANGE UP (ref 3.5–5.3)
PROT SERPL-MCNC: 6.4 G/DL — SIGNIFICANT CHANGE UP (ref 6–8.3)
RBC # BLD: 3.81 M/UL — SIGNIFICANT CHANGE UP (ref 3.8–5.2)
RBC # FLD: 12.2 % — SIGNIFICANT CHANGE UP (ref 10.3–14.5)
SODIUM SERPL-SCNC: 131 MMOL/L — LOW (ref 135–145)
URATE SERPL-MCNC: 2.3 MG/DL — LOW (ref 2.5–7)
WBC # BLD: 3.8 K/UL — SIGNIFICANT CHANGE UP (ref 3.8–10.5)
WBC # FLD AUTO: 3.8 K/UL — SIGNIFICANT CHANGE UP (ref 3.8–10.5)

## 2025-03-13 PROCEDURE — 99233 SBSQ HOSP IP/OBS HIGH 50: CPT

## 2025-03-13 PROCEDURE — 99223 1ST HOSP IP/OBS HIGH 75: CPT | Mod: GC

## 2025-03-13 RX ORDER — LORAZEPAM 4 MG/ML
1 VIAL (ML) INJECTION ONCE
Refills: 0 | Status: DISCONTINUED | OUTPATIENT
Start: 2025-03-13 | End: 2025-03-13

## 2025-03-13 RX ORDER — DIPHENHYDRAMINE HCL 12.5MG/5ML
25 ELIXIR ORAL ONCE
Refills: 0 | Status: COMPLETED | OUTPATIENT
Start: 2025-03-13 | End: 2025-03-13

## 2025-03-13 RX ORDER — LIDOCAINE HYDROCHLORIDE 20 MG/ML
1 JELLY TOPICAL EVERY 24 HOURS
Refills: 0 | Status: DISCONTINUED | OUTPATIENT
Start: 2025-03-13 | End: 2025-03-21

## 2025-03-13 RX ORDER — OXYCODONE HYDROCHLORIDE 30 MG/1
20 TABLET ORAL EVERY 4 HOURS
Refills: 0 | Status: DISCONTINUED | OUTPATIENT
Start: 2025-03-13 | End: 2025-03-17

## 2025-03-13 RX ORDER — LORAZEPAM 4 MG/ML
0.5 VIAL (ML) INJECTION EVERY 8 HOURS
Refills: 0 | Status: DISCONTINUED | OUTPATIENT
Start: 2025-03-13 | End: 2025-03-14

## 2025-03-13 RX ORDER — HYDROMORPHONE/SOD CHLOR,ISO/PF 2 MG/10 ML
1 SYRINGE (ML) INJECTION EVERY 4 HOURS
Refills: 0 | Status: DISCONTINUED | OUTPATIENT
Start: 2025-03-13 | End: 2025-03-14

## 2025-03-13 RX ORDER — OXYCODONE HYDROCHLORIDE 30 MG/1
30 TABLET ORAL EVERY 4 HOURS
Refills: 0 | Status: DISCONTINUED | OUTPATIENT
Start: 2025-03-13 | End: 2025-03-17

## 2025-03-13 RX ADMIN — Medication 1 MILLIGRAM(S): at 07:08

## 2025-03-13 RX ADMIN — Medication 1 MILLIGRAM(S): at 11:00

## 2025-03-13 RX ADMIN — APIXABAN 5 MILLIGRAM(S): 2.5 TABLET, FILM COATED ORAL at 09:54

## 2025-03-13 RX ADMIN — OXYCODONE HYDROCHLORIDE 30 MILLIGRAM(S): 30 TABLET ORAL at 17:51

## 2025-03-13 RX ADMIN — APIXABAN 5 MILLIGRAM(S): 2.5 TABLET, FILM COATED ORAL at 18:35

## 2025-03-13 RX ADMIN — Medication 1 MILLIGRAM(S): at 06:53

## 2025-03-13 RX ADMIN — Medication 1 MILLIGRAM(S): at 02:44

## 2025-03-13 RX ADMIN — Medication 1 MILLIGRAM(S): at 02:59

## 2025-03-13 RX ADMIN — OXYCODONE HYDROCHLORIDE 30 MILLIGRAM(S): 30 TABLET ORAL at 16:51

## 2025-03-13 RX ADMIN — Medication 1 MILLIGRAM(S): at 14:54

## 2025-03-13 RX ADMIN — Medication 30 MILLIGRAM(S): at 18:35

## 2025-03-13 RX ADMIN — Medication 1 GRAM(S): at 18:36

## 2025-03-13 RX ADMIN — Medication 0.25 MILLIGRAM(S): at 05:33

## 2025-03-13 RX ADMIN — Medication 30 MILLIGRAM(S): at 06:33

## 2025-03-13 RX ADMIN — OXYCODONE HYDROCHLORIDE 30 MILLIGRAM(S): 30 TABLET ORAL at 22:41

## 2025-03-13 RX ADMIN — OXYCODONE HYDROCHLORIDE 30 MILLIGRAM(S): 30 TABLET ORAL at 21:41

## 2025-03-13 RX ADMIN — Medication 112 MICROGRAM(S): at 05:34

## 2025-03-13 RX ADMIN — OXYCODONE HYDROCHLORIDE 5 MILLIGRAM(S): 30 TABLET ORAL at 00:46

## 2025-03-13 RX ADMIN — Medication 1 APPLICATION(S): at 05:56

## 2025-03-13 RX ADMIN — NICOTINE POLACRILEX 1 PATCH: 4 GUM, CHEWING ORAL at 14:10

## 2025-03-13 RX ADMIN — Medication 1 MILLIGRAM(S): at 12:00

## 2025-03-13 RX ADMIN — Medication 30 MILLIGRAM(S): at 19:35

## 2025-03-13 RX ADMIN — Medication 25 MILLIGRAM(S): at 22:42

## 2025-03-13 RX ADMIN — Medication 40 MILLIGRAM(S): at 18:36

## 2025-03-13 RX ADMIN — NICOTINE POLACRILEX 1 PATCH: 4 GUM, CHEWING ORAL at 19:10

## 2025-03-13 RX ADMIN — Medication 30 MILLIGRAM(S): at 05:33

## 2025-03-13 RX ADMIN — NALOXEGOL OXALATE 25 MILLIGRAM(S): 12.5 TABLET, FILM COATED ORAL at 14:44

## 2025-03-13 RX ADMIN — NICOTINE POLACRILEX 1 PATCH: 4 GUM, CHEWING ORAL at 06:17

## 2025-03-13 RX ADMIN — COLCHICINE 0.6 MILLIGRAM(S): 0.6 TABLET, FILM COATED ORAL at 14:44

## 2025-03-13 RX ADMIN — NICOTINE POLACRILEX 1 PATCH: 4 GUM, CHEWING ORAL at 18:36

## 2025-03-13 NOTE — PROGRESS NOTE ADULT - PROBLEM SELECTOR PLAN 8
Thank you for allowing us to participate in your patient's care. We will continue to follow with you. Please page [phone] for any q's or c's. The [facility] and Palliative Medicine service has coverage 24 hours a day/ 7 days a week to provide medical recommendations regarding symptom management needs via telephone.    Natalie Trcay MD  Palliative Medicine

## 2025-03-13 NOTE — PROGRESS NOTE ADULT - PROBLEM SELECTOR PLAN 2
Patient reports she was able to tolerate food today  PLAN  c/w zofran PRN  - Recommended brain imaging to r/o brain mets as patient also has headaches: patient was unable to tolerate mri to significant anxiety/claustrophobia; unable to perform mri with sedation as patient high risk given cancer

## 2025-03-13 NOTE — CONSULT NOTE ADULT - ATTENDING COMMENTS
patient was seen and examined with resident physician, agree with note above  in brief, Ms. Wen is a 63 yo female with small cell lung cancer, biopsy proven with involvement of mediastinal lymph nodes. Extent of disease work up is pending. She is admitted for further work up and management of respiratory compromise from cancer. For limited stage disease, definitive treatment with chemoradiation may be offered, usually as outpatient since that treatment can be daily for several weeks. For metastatic disease, palliative RT to the chest can be considered, usually after a course of chemotherapy, and can be done in-pt over 1-2 weeks. Palliative radiation therapy may be offered to symptomatic metastatic sites. Will follow closely with in-patient team.
as above      Dr. Mony Estrada  Rheumatology Attending  [facility]  Rheumatology at [place]     Contact:   call the office:: [phone] or reach [place] [employer], weekdays before 5 pm   after 5 pm or on weekends, contact [phone]
newly diagnosed small cell lung cancer   complete staging work up including MR brain  will keep the patient for chemotherapy as inpatient  need hydration to prevent renal failure and TLS

## 2025-03-13 NOTE — CONSULT NOTE ADULT - SUBJECTIVE AND OBJECTIVE BOX
AYLEEN BOSWELL  [id number]    HISTORY OF PRESENT ILLNESS:  "65 y/o F with PMHx of AF, scleroderma, asthma with prior intubations, hypothyroidism, pericardial effusion s/p pericardiocentesis (12/2024) and drain on colchicine presented to the ED due to SOB, chest pain, and back pain. Oncology evaluated her and recommended MRI brain and MRI chest/abdomen/pelvis with contrast to rule out metastasis.   Was previously admitted in 12/2024 due to chest pain and SOB and found to have a pericardial effusion with evidence of tamponade physiology. She underwent urgent pericardiocentesis with placement of drain which was removed on 12/26. She was started on colchicine and NSAIDs. Hospital course was complicated by AF with RVR which was new onset and was suspected to be due to drain placement. After drain was removed patient continued to have pAF and was started on Eliquis. Presented to the ED on 02/13/25 for left sided chest pain radiating to the back. Repeat TTE demonstrated interval increase in pericardial effusion (moderate-large adjacent to RA with no evidence of tamponade) and CTS was consulted for possible pericardial window and recommended no acute intervention. 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 Pulmonary consulted and recommended transfer to [facility] for bronchoscopy, EBUS and hilar mass biopsy. 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 "    Rheum ROS    Denies fevers/chills/weight loss/night sweats/alopecia/sinus disease/asthma history/oral ulcers/dysphagia/vision changes/Raynauds/VTE/miscarraiges/sicca symptoms/urinary changes/edema/SOB/joint pain/swelling/jaw claudication/rash/photosensitivity/morning stiffness    Endorses fevers/chills/weight loss/night sweats/alopecia/sinus disease/asthma history/oral ulcers/dysphagia/vision changes/Raynauds/VTE/miscarraiges/sicca symptoms/urinary changes/edema/SOB/joint pain/swelling/jaw claudication/rash/photosensitivity/morning stiffness      MEDICATIONS  (STANDING):  albuterol/ipratropium for Nebulization 3 milliLiter(s) Nebulizer every 6 hours  apixaban 5 milliGRAM(s) Oral every 12 hours  chlorhexidine 2% Cloths 1 Application(s) Topical <User Schedule>  colchicine 0.6 milliGRAM(s) Oral daily  fluticasone propionate/ salmeterol 100-50 MICROgram(s) Diskus 1 Dose(s) Inhalation two times a day  influenza  Vaccine (HIGH DOSE) 0.5 milliLiter(s) IntraMuscular once  levothyroxine 112 MICROGram(s) Oral daily  lidocaine   4% Patch 1 Patch Transdermal daily  LORazepam     Tablet 1 milliGRAM(s) Oral once  methylnaltrexone Injectable 12 milliGRAM(s) SubCutaneous once  morphine ER Tablet 30 milliGRAM(s) Oral every 12 hours  naloxegol 25 milliGRAM(s) Oral daily  nicotine - 21 mG/24Hr(s) Patch 1 Patch Transdermal daily  pantoprazole    Tablet 40 milliGRAM(s) Oral every 12 hours  polyethylene glycol 3350 17 Gram(s) Oral daily  senna 2 Tablet(s) Oral at bedtime  sodium chloride 1 Gram(s) Oral two times a day    MEDICATIONS  (PRN):  aluminum hydroxide/magnesium hydroxide/simethicone Suspension 30 milliLiter(s) Oral every 4 hours PRN Dyspepsia  baclofen 5 milliGRAM(s) Oral every 8 hours PRN Musculoskeletal Pain  HYDROmorphone  Injectable 1 milliGRAM(s) IV Push every 4 hours PRN Severe Pain (7 - 10)  LORazepam     Tablet 0.25 milliGRAM(s) Oral every 8 hours PRN Anxiety  melatonin 3 milliGRAM(s) Oral at bedtime PRN Insomnia  ondansetron Injectable 4 milliGRAM(s) IV Push every 6 hours PRN Nausea and/or Vomiting      Allergies    penicillin (Unknown)  penicillin (Anaphylaxis; Hives; Other)  iodine (Anaphylaxis)  statins (Unknown)  Xolair (Unknown)  ABIDA dye (Short breath; Hives)  IV Contrast (Unknown)  Originally Entered as [Unknown] reaction to [BEE STINGS] (Unknown)    Intolerances        PERTINENT MEDICATION HISTORY:    SOCIAL HISTORY:  OCCUPATION:  TRAVEL HISTORY:    FAMILY HISTORY:      Vital Signs Last 24 Hrs  T(C): 36.3 (13 Mar 2025 05:38), Max: 36.8 (12 Mar 2025 23:30)  T(F): 97.4 (13 Mar 2025 05:38), Max: 98.2 (12 Mar 2025 23:30)  HR: 63 (13 Mar 2025 05:38) (63 - 80)  BP: 154/85 (13 Mar 2025 05:38) (121/66 - 154/85)  BP(mean): --  RR: 18 (13 Mar 2025 05:38) (18 - 18)  SpO2: 100% (13 Mar 2025 05:38) (94% - 100%)    Parameters below as of 13 Mar 2025 05:38  Patient On (Oxygen Delivery Method): nasal cannula  O2 Flow (L/min): 6      ROS as noted above     Physical Exam:  General: No apparent distress  HEENT: EOMI, MMM  CVS: +S1/S2, RRR, no murmurs/rubs/gallops  Resp: CTA b/l. No crackles/wheezing  GI: Soft, NT/ND +BS  MSK: no swelling/warmth/erythema of the joints of the UE/LE  Neuro: AAOx3  Skin: no visible rashes    LABS:                        11.3   3.80  )-----------( 310      ( 13 Mar 2025 06:12 )             33.2     03-13    131[L]  |  96[L]  |  8   ----------------------------<  92  3.7   |  23  |  0.40[L]    Ca    8.9      13 Mar 2025 06:12  Phos  3.5     03-13  Mg     1.80     03-13    TPro  6.4  /  Alb  3.6  /  TBili  0.4  /  DBili  x   /  AST  17  /  ALT  7   /  AlkPhos  88  03-13    PT/INR - ( 11 Mar 2025 20:40 )   PT: 15.4 sec;   INR: 1.30 ratio         PTT - ( 11 Mar 2025 20:40 )  PTT:37.5 sec  Urinalysis Basic - ( 13 Mar 2025 06:12 )    Color: x / Appearance: x / SG: x / pH: x  Gluc: 92 mg/dL / Ketone: x  / Bili: x / Urobili: x   Blood: x / Protein: x / Nitrite: x   Leuk Esterase: x / RBC: x / WBC x   Sq Epi: x / Non Sq Epi: x / Bacteria: x            Rheumatology Work Up    Sedimentation Rate, Erythrocyte: 44 mm/hr *H* [0 - 20] (02-14-25 @ 07:17)  Sedimentation Rate, Erythrocyte: 50 mm/hr *H* [0 - 20] (02-13-25 @ 15:08)  C-Reactive Protein: 23 mg/L *H* [0 - 4] (02-13-25 @ 15:08)  Sedimentation Rate, Erythrocyte: 43 mm/hr *H* [0 - 20] (12-24-24 @ 06:43)            RADIOLOGY & ADDITIONAL STUDIES:    < from: CT Chest No Cont (02.15.25 @ 18:28) >  IMPRESSION:    Small circumferential pericardial effusion, mildly increased on today's   study when compared to the prior.    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    There is a new peripheral ill-defined 2.4 x 2.2 cm opacity in the left   upper lobe, differential includes infection/inflammation/neoplasm.    Dilated ascending aorta measuring up to 4 cm, similar to the prior study.    Consider contrast-enhanced CT of the chest if patient is able to tolerate   intravenous contrast.    < end of copied text >   AYLEEN BOSWELL  [id number]    HISTORY OF PRESENT ILLNESS:  "65 y/o F with PMHx of AF, scleroderma, asthma with prior intubations, hypothyroidism, pericardial effusion s/p pericardiocentesis (12/2024) and drain on colchicine presented to the ED due to SOB, chest pain, and back pain. Oncology evaluated her and recommended MRI brain and MRI chest/abdomen/pelvis with contrast to rule out metastasis.   Was previously admitted in 12/2024 due to chest pain and SOB and found to have a pericardial effusion with evidence of tamponade physiology. She underwent urgent pericardiocentesis with placement of drain which was removed on 12/26. She was started on colchicine and NSAIDs. Hospital course was complicated by AF with RVR which was new onset and was suspected to be due to drain placement. After drain was removed patient continued to have pAF and was started on Eliquis. Presented to the ED on 02/13/25 for left sided chest pain radiating to the back. Repeat TTE demonstrated interval increase in pericardial effusion (moderate-large adjacent to RA with no evidence of tamponade) and CTS was consulted for possible pericardial window and recommended no acute intervention. 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 Pulmonary consulted and recommended transfer to [facility] for bronchoscopy, EBUS and hilar mass biopsy. 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 "    Rheum hx:  Pt states that she was diagnosed in 2007. At that time noticed she had progressive skin changes in feet and hands (getting shiny and tight), leg pain, loss of hip sensation. +Raynauds, dysphagia since last several yrs to both solids and liquids. Has noticed foot sores prior but has not had them in the past year. Has been having intermittent chest pain since December, had an admission 12/2024 found to have pericardial effusion. Was coxsackie positive, treated with colchicine and pericardial drain. Also found to have L suprahilar mediastinal mass, had admission 2/2025 for EBUS and transbronchial FNA, which showed SCLC. Admitted now for inpatient chemo evaluation. Pt used to see Dr Briceno, but has new rheumatologist at [facility], does not remember name. States she was in clinical trials at [facility] previously when she was diagnosed, and believes she used to be on cytoxan, MMF and steroids but none recently. Worked as jeweler in the past for many yrs. No contrast exposure prior to initial skin changes. No previous diagnosis of cancer other than recent SCLC.    Rheum ROS  As above      MEDICATIONS  (STANDING):  albuterol/ipratropium for Nebulization 3 milliLiter(s) Nebulizer every 6 hours  apixaban 5 milliGRAM(s) Oral every 12 hours  chlorhexidine 2% Cloths 1 Application(s) Topical <User Schedule>  colchicine 0.6 milliGRAM(s) Oral daily  fluticasone propionate/ salmeterol 100-50 MICROgram(s) Diskus 1 Dose(s) Inhalation two times a day  influenza  Vaccine (HIGH DOSE) 0.5 milliLiter(s) IntraMuscular once  levothyroxine 112 MICROGram(s) Oral daily  lidocaine   4% Patch 1 Patch Transdermal daily  LORazepam     Tablet 1 milliGRAM(s) Oral once  methylnaltrexone Injectable 12 milliGRAM(s) SubCutaneous once  morphine ER Tablet 30 milliGRAM(s) Oral every 12 hours  naloxegol 25 milliGRAM(s) Oral daily  nicotine - 21 mG/24Hr(s) Patch 1 Patch Transdermal daily  pantoprazole    Tablet 40 milliGRAM(s) Oral every 12 hours  polyethylene glycol 3350 17 Gram(s) Oral daily  senna 2 Tablet(s) Oral at bedtime  sodium chloride 1 Gram(s) Oral two times a day    MEDICATIONS  (PRN):  aluminum hydroxide/magnesium hydroxide/simethicone Suspension 30 milliLiter(s) Oral every 4 hours PRN Dyspepsia  baclofen 5 milliGRAM(s) Oral every 8 hours PRN Musculoskeletal Pain  HYDROmorphone  Injectable 1 milliGRAM(s) IV Push every 4 hours PRN Severe Pain (7 - 10)  LORazepam     Tablet 0.25 milliGRAM(s) Oral every 8 hours PRN Anxiety  melatonin 3 milliGRAM(s) Oral at bedtime PRN Insomnia  ondansetron Injectable 4 milliGRAM(s) IV Push every 6 hours PRN Nausea and/or Vomiting      Allergies    penicillin (Unknown)  penicillin (Anaphylaxis; Hives; Other)  iodine (Anaphylaxis)  statins (Unknown)  Xolair (Unknown)  ABIDA dye (Short breath; Hives)  IV Contrast (Unknown)  Originally Entered as [Unknown] reaction to [BEE STINGS] (Unknown)    Intolerances        PERTINENT MEDICATION HISTORY: none    SOCIAL HISTORY:  did not obtain  OCCUPATION: worked as jeweler in the past  TRAVEL HISTORY: did not obtain    FAMILY HISTORY: No FH of autoimmune dx      Vital Signs Last 24 Hrs  T(C): 36.3 (13 Mar 2025 05:38), Max: 36.8 (12 Mar 2025 23:30)  T(F): 97.4 (13 Mar 2025 05:38), Max: 98.2 (12 Mar 2025 23:30)  HR: 63 (13 Mar 2025 05:38) (63 - 80)  BP: 154/85 (13 Mar 2025 05:38) (121/66 - 154/85)  BP(mean): --  RR: 18 (13 Mar 2025 05:38) (18 - 18)  SpO2: 100% (13 Mar 2025 05:38) (94% - 100%)    Parameters below as of 13 Mar 2025 05:38  Patient On (Oxygen Delivery Method): nasal cannula  O2 Flow (L/min): 6      ROS as noted above     Physical Exam:  General: No apparent distress  HEENT: EOMI, MMM, no oral ulcers  CVS: +S1/S2, RRR, no murmurs/rubs/gallops  Resp: CTA b/l  GI: Soft, NT/ND   MSK: skin tightening of fingers and lower extremity up to knees. Contracture of hands. Limited ROM of wrists, shoulders, neck, ankles, knees  Neuro: AAOx3  Skin: no visible rashes    LABS:                        11.3   3.80  )-----------( 310      ( 13 Mar 2025 06:12 )             33.2     03-13    131[L]  |  96[L]  |  8   ----------------------------<  92  3.7   |  23  |  0.40[L]    Ca    8.9      13 Mar 2025 06:12  Phos  3.5     03-13  Mg     1.80     03-13    TPro  6.4  /  Alb  3.6  /  TBili  0.4  /  DBili  x   /  AST  17  /  ALT  7   /  AlkPhos  88  03-13    PT/INR - ( 11 Mar 2025 20:40 )   PT: 15.4 sec;   INR: 1.30 ratio         PTT - ( 11 Mar 2025 20:40 )  PTT:37.5 sec  Urinalysis Basic - ( 13 Mar 2025 06:12 )    Color: x / Appearance: x / SG: x / pH: x  Gluc: 92 mg/dL / Ketone: x  / Bili: x / Urobili: x   Blood: x / Protein: x / Nitrite: x   Leuk Esterase: x / RBC: x / WBC x   Sq Epi: x / Non Sq Epi: x / Bacteria: x            Rheumatology Work Up    Sedimentation Rate, Erythrocyte: 44 mm/hr *H* [0 - 20] (02-14-25 @ 07:17)  Sedimentation Rate, Erythrocyte: 50 mm/hr *H* [0 - 20] (02-13-25 @ 15:08)  C-Reactive Protein: 23 mg/L *H* [0 - 4] (02-13-25 @ 15:08)  Sedimentation Rate, Erythrocyte: 43 mm/hr *H* [0 - 20] (12-24-24 @ 06:43)            RADIOLOGY & ADDITIONAL STUDIES:    < from: CT Chest No Cont (02.15.25 @ 18:28) >  IMPRESSION:    Small circumferential pericardial effusion, mildly increased on today's   study when compared to the prior.    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    There is a new peripheral ill-defined 2.4 x 2.2 cm opacity in the left   upper lobe, differential includes infection/inflammation/neoplasm.    Dilated ascending aorta measuring up to 4 cm, similar to the prior study.    Consider contrast-enhanced CT of the chest if patient is able to tolerate   intravenous contrast.    < end of copied text >   AYLEEN BOSWELL  [id number]    HISTORY OF PRESENT ILLNESS:  "65 y/o F with PMHx of AF, scleroderma, asthma with prior intubations, hypothyroidism, pericardial effusion s/p pericardiocentesis (12/2024) and drain on colchicine presented to the ED due to SOB, chest pain, and back pain. Oncology evaluated her and recommended MRI brain and MRI chest/abdomen/pelvis with contrast to rule out metastasis.   Was previously admitted in 12/2024 due to chest pain and SOB and found to have a pericardial effusion with evidence of tamponade physiology. She underwent urgent pericardiocentesis with placement of drain which was removed on 12/26. She was started on colchicine and NSAIDs. Hospital course was complicated by AF with RVR which was new onset and was suspected to be due to drain placement. After drain was removed patient continued to have pAF and was started on Eliquis. Presented to the ED on 02/13/25 for left sided chest pain radiating to the back. Repeat TTE demonstrated interval increase in pericardial effusion (moderate-large adjacent to RA with no evidence of tamponade) and CTS was consulted for possible pericardial window and recommended no acute intervention. 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 Pulmonary consulted and recommended transfer to [facility] for bronchoscopy, EBUS and hilar mass biopsy. 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 "    Rheum hx:  Pt states that she was diagnosed in 2007. At that time noticed she had progressive skin changes in feet and hands (getting shiny and tight), leg pain, loss of hip sensation. +Raynauds, dysphagia since last several yrs to both solids and liquids. Has noticed foot sores prior but has not had them in the past year. Has been having intermittent chest pain since December, had an admission 12/2024 found to have pericardial effusion. Was coxsackie positive, treated with colchicine and pericardial drain. Also found to have L suprahilar mediastinal mass, had admission 2/2025 for EBUS and transbronchial FNA, which showed SCLC. Admitted now for inpatient chemo evaluation. Pt used to see Dr Briceno, but has new rheumatologist at [facility], does not remember name. States she was in clinical trials at [facility] previously when she was diagnosed, and believes she used to be on cytoxan, MMF and steroids but none recently. Worked as jeweler in the past for many yrs. No contrast exposure prior to initial skin changes. No previous diagnosis of cancer other than recent SCLC.    Rheum ROS  As above      MEDICATIONS  (STANDING):  albuterol/ipratropium for Nebulization 3 milliLiter(s) Nebulizer every 6 hours  apixaban 5 milliGRAM(s) Oral every 12 hours  chlorhexidine 2% Cloths 1 Application(s) Topical <User Schedule>  colchicine 0.6 milliGRAM(s) Oral daily  fluticasone propionate/ salmeterol 100-50 MICROgram(s) Diskus 1 Dose(s) Inhalation two times a day  influenza  Vaccine (HIGH DOSE) 0.5 milliLiter(s) IntraMuscular once  levothyroxine 112 MICROGram(s) Oral daily  lidocaine   4% Patch 1 Patch Transdermal daily  LORazepam     Tablet 1 milliGRAM(s) Oral once  methylnaltrexone Injectable 12 milliGRAM(s) SubCutaneous once  morphine ER Tablet 30 milliGRAM(s) Oral every 12 hours  naloxegol 25 milliGRAM(s) Oral daily  nicotine - 21 mG/24Hr(s) Patch 1 Patch Transdermal daily  pantoprazole    Tablet 40 milliGRAM(s) Oral every 12 hours  polyethylene glycol 3350 17 Gram(s) Oral daily  senna 2 Tablet(s) Oral at bedtime  sodium chloride 1 Gram(s) Oral two times a day    MEDICATIONS  (PRN):  aluminum hydroxide/magnesium hydroxide/simethicone Suspension 30 milliLiter(s) Oral every 4 hours PRN Dyspepsia  baclofen 5 milliGRAM(s) Oral every 8 hours PRN Musculoskeletal Pain  HYDROmorphone  Injectable 1 milliGRAM(s) IV Push every 4 hours PRN Severe Pain (7 - 10)  LORazepam     Tablet 0.25 milliGRAM(s) Oral every 8 hours PRN Anxiety  melatonin 3 milliGRAM(s) Oral at bedtime PRN Insomnia  ondansetron Injectable 4 milliGRAM(s) IV Push every 6 hours PRN Nausea and/or Vomiting      Allergies    penicillin (Unknown)  penicillin (Anaphylaxis; Hives; Other)  iodine (Anaphylaxis)  statins (Unknown)  Xolair (Unknown)  ABIDA dye (Short breath; Hives)  IV Contrast (Unknown)  Originally Entered as [Unknown] reaction to [BEE STINGS] (Unknown)    Intolerances        PERTINENT MEDICATION HISTORY: none    SOCIAL HISTORY:  did not obtain  OCCUPATION: worked as jeweler in the past  TRAVEL HISTORY: did not obtain    FAMILY HISTORY: No FH of autoimmune dx      Vital Signs Last 24 Hrs  T(C): 36.3 (13 Mar 2025 05:38), Max: 36.8 (12 Mar 2025 23:30)  T(F): 97.4 (13 Mar 2025 05:38), Max: 98.2 (12 Mar 2025 23:30)  HR: 63 (13 Mar 2025 05:38) (63 - 80)  BP: 154/85 (13 Mar 2025 05:38) (121/66 - 154/85)  BP(mean): --  RR: 18 (13 Mar 2025 05:38) (18 - 18)  SpO2: 100% (13 Mar 2025 05:38) (94% - 100%)    Parameters below as of 13 Mar 2025 05:38  Patient On (Oxygen Delivery Method): nasal cannula  O2 Flow (L/min): 6      ROS as noted above     Physical Exam:  General: No apparent distress  HEENT: EOMI, MMM, no oral ulcers  CVS: +S1/S2, RRR, no murmurs/rubs/gallops  Resp: CTA b/l  GI: Soft, NT/ND   MSK: Contracture of hands. Limited ROM of wrists, shoulders, neck, ankles, knees with contractures of toes and knees as well. Skin appears shiny in areas of contracture but not necessarily sclerodactyly, telengiectasias, digital ulcers, or skin thickening  Neuro: AAOx3  Skin: no visible rashes    LABS:                        11.3   3.80  )-----------( 310      ( 13 Mar 2025 06:12 )             33.2     03-13    131[L]  |  96[L]  |  8   ----------------------------<  92  3.7   |  23  |  0.40[L]    Ca    8.9      13 Mar 2025 06:12  Phos  3.5     03-13  Mg     1.80     03-13    TPro  6.4  /  Alb  3.6  /  TBili  0.4  /  DBili  x   /  AST  17  /  ALT  7   /  AlkPhos  88  03-13    PT/INR - ( 11 Mar 2025 20:40 )   PT: 15.4 sec;   INR: 1.30 ratio         PTT - ( 11 Mar 2025 20:40 )  PTT:37.5 sec  Urinalysis Basic - ( 13 Mar 2025 06:12 )    Color: x / Appearance: x / SG: x / pH: x  Gluc: 92 mg/dL / Ketone: x  / Bili: x / Urobili: x   Blood: x / Protein: x / Nitrite: x   Leuk Esterase: x / RBC: x / WBC x   Sq Epi: x / Non Sq Epi: x / Bacteria: x            Rheumatology Work Up    Sedimentation Rate, Erythrocyte: 44 mm/hr *H* [0 - 20] (02-14-25 @ 07:17)  Sedimentation Rate, Erythrocyte: 50 mm/hr *H* [0 - 20] (02-13-25 @ 15:08)  C-Reactive Protein: 23 mg/L *H* [0 - 4] (02-13-25 @ 15:08)  Sedimentation Rate, Erythrocyte: 43 mm/hr *H* [0 - 20] (12-24-24 @ 06:43)            RADIOLOGY & ADDITIONAL STUDIES:    < from: CT Chest No Cont (02.15.25 @ 18:28) >  IMPRESSION:    Small circumferential pericardial effusion, mildly increased on today's   study when compared to the prior.    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    There is a new peripheral ill-defined 2.4 x 2.2 cm opacity in the left   upper lobe, differential includes infection/inflammation/neoplasm.    Dilated ascending aorta measuring up to 4 cm, similar to the prior study.    Consider contrast-enhanced CT of the chest if patient is able to tolerate   intravenous contrast.    < end of copied text >

## 2025-03-13 NOTE — PROGRESS NOTE ADULT - PROBLEM SELECTOR PLAN 7
- 3/12- See GOC. Patient appointed and completed HCP, named long time friend Sam Contreraskings.  - 3/13: patient had further conversation with primary team and requested a copy of molst to review; expressing concerns about her ability to tolerate cancer tx with underlying scleroderma and overall QOL

## 2025-03-13 NOTE — PROGRESS NOTE ADULT - SUBJECTIVE AND OBJECTIVE BOX
[facility] Geriatrics and Palliative Care  Natalie Tracy MD, Palliative Care Attending  Contact Info: Page [phone] (including Nights/Weekends), message on Microsoft Teams (Natalie Tracy MD), or leave  at Palliative Office [phone] (non-urgent)   Date of [id number] @ 18:01    SUBJECTIVE AND OBJECTIVE:  Patient seen this morning at bedside. They are accompanied by their friend Sam  patient seen post mri attempt - pt could not tolerate  Patient was very anxious and upset and shared that she had panic attack last night  She shared that she was being weaned down from oxycodone to IV: reviewed and educated patient on plan of care from yesterday including that she was swithced to IV meds d/t nausea/vomting   Discussed options for pain management and patient prefers to resume oxycodone and would want to trial higher dose with additional breakthrough IV meds  Was able to tolerate her food today without emesis    Indication for Geriatrics and Palliative Care Services/INTERVAL HPI:    OVERNIGHT EVENTS:    DNR on chart:  Allergies    penicillin (Unknown)  penicillin (Anaphylaxis; Hives; Other)  iodine (Anaphylaxis)  statins (Unknown)  Xolair (Unknown)  ABIDA dye (Short breath; Hives)  IV Contrast (Unknown)  Originally Entered as [Unknown] reaction to [BEE STINGS] (Unknown)    Intolerances    MEDICATIONS  (STANDING):  albuterol/ipratropium for Nebulization 3 milliLiter(s) Nebulizer every 6 hours  apixaban 5 milliGRAM(s) Oral every 12 hours  chlorhexidine 2% Cloths 1 Application(s) Topical <User Schedule>  colchicine 0.6 milliGRAM(s) Oral daily  fluticasone propionate/ salmeterol 100-50 MICROgram(s) Diskus 1 Dose(s) Inhalation two times a day  influenza  Vaccine (HIGH DOSE) 0.5 milliLiter(s) IntraMuscular once  levothyroxine 112 MICROGram(s) Oral daily  lidocaine   4% Patch 1 Patch Transdermal daily  lidocaine   4% Patch 1 Patch Transdermal every 24 hours  methylnaltrexone Injectable 12 milliGRAM(s) SubCutaneous once  morphine ER Tablet 30 milliGRAM(s) Oral every 12 hours  naloxegol 25 milliGRAM(s) Oral daily  nicotine - 21 mG/24Hr(s) Patch 1 Patch Transdermal daily  pantoprazole    Tablet 40 milliGRAM(s) Oral every 12 hours  polyethylene glycol 3350 17 Gram(s) Oral daily  senna 2 Tablet(s) Oral at bedtime  sodium chloride 1 Gram(s) Oral two times a day    MEDICATIONS  (PRN):  aluminum hydroxide/magnesium hydroxide/simethicone Suspension 30 milliLiter(s) Oral every 4 hours PRN Dyspepsia  baclofen 5 milliGRAM(s) Oral every 8 hours PRN Musculoskeletal Pain  HYDROmorphone  Injectable 1 milliGRAM(s) IV Push every 4 hours PRN severe breakthrough pain  LORazepam     Tablet 0.5 milliGRAM(s) Oral every 8 hours PRN Anxiety  melatonin 3 milliGRAM(s) Oral at bedtime PRN Insomnia  ondansetron Injectable 4 milliGRAM(s) IV Push every 6 hours PRN Nausea and/or Vomiting  oxyCODONE    IR 30 milliGRAM(s) Oral every 4 hours PRN Severe Pain (7 - 10)  oxyCODONE    IR 20 milliGRAM(s) Oral every 4 hours PRN Moderate Pain (4 - 6)      ITEMS UNCHECKED ARE NOT PRESENT    PRESENT SYMPTOMS: [ ]Unable to self-report - see [ ] CPOT [ ] PAINADS [ ] RDOS  Source if other than patient:  [ ]Family   [ ]Team     1. Location- Left chest   2. Radiation-  Left arm  3. Quality- Sharp   4. Timing- Constant   5. Minimal acceptable level/pain goal- 4/10   6. Aggravating factors-   7. QOL impact- Severe     CPOT:    https://www.sccm.org/getattachment/mni85j20-3x1b-0x0p-4i4b-3639b6093c4s/Critical-Care-Pain-Observation-Tool-(CPOT)    Dyspnea:                           [ ]Mild [ ]Moderate [ ]Severe  Anxiety:                             [ ]Mild [ ]Moderate [ ]Severe  Fatigue:                             [ ]Mild [ ]Moderate [ ]Severe  Nausea:                             [ x]Mild [ ]Moderate [ ]Severe  Loss of appetite:              [ ]Mild [ ]Moderate [ ]Severe  Constipation:                    [ ]Mild [ ]Moderate [ ]Severe  Other Symptoms:  [ ]All other review of systems negative     PCSSQ[Palliative Care Spiritual Screening Question]   Severity (0-10):  Score of 4 or > indicate consideration of Chaplaincy referral.  Chaplaincy Referral: [ ] yes [ ] refused [ ] following [ ] deferred    Caregiver [place]? : [ ] yes [ ] no [ ] Deferred [ ] Declined             Social work referral [ ] Patient & Family Centered Care Referral [ ]  Anticipatory Grief present?:  [ ] yes [ ] no  [ ] Deferred                  Social work referral [ ] Patient & Family Centered Care Referral [ ]      PHYSICAL EXAM:  Vital Signs Last 24 Hrs  T(C): 36.6 (13 Mar 2025 13:13), Max: 36.8 (12 Mar 2025 23:30)  T(F): 97.9 (13 Mar 2025 13:13), Max: 98.2 (12 Mar 2025 23:30)  HR: 75 (13 Mar 2025 13:13) (63 - 80)  BP: 128/67 (13 Mar 2025 13:13) (121/66 - 154/85)  BP(mean): --  RR: 18 (13 Mar 2025 13:13) (18 - 18)  SpO2: 97% (13 Mar 2025 13:13) (97% - 100%)    Parameters below as of 13 Mar 2025 13:13  Patient On (Oxygen Delivery Method): room air     I&O's Summary    12 Mar 2025 07:01  -  13 Mar 2025 07:00  --------------------------------------------------------  IN: 775 mL / OUT: 600 mL / NET: 175 mL         General:, alert,  HENT:  facial grimacing  Eyes: no icterus, conjunctiva clear  Chest: symmetric excursion, no accessory muscle use; L chest wall tenderness to palpation  Heart: peripheral pulse 2+ and regular  Lungs: no dyspnea with speech  Abdomen: soft, NT, ND  Ext: no cyanosis, clubbing, visible veins, ulcers, wounds  Neuro: alert, coherent speech, grossly non-focal  Psych: anxious, linear thought process  Skin: ]Normal  [ ]Rash  [ ]Other  [ ]Pressure ulcer(s) [ ]y [ ]n present on admission    CRITICAL CARE:  [ ]Shock Present  [ ]Septic [ ]Cardiogenic [ ]Neurologic [ ]Hypovolemic  [ ]Vasopressors [ ]Inotropes  [ x]Respiratory failure present [ ]Mechanical Ventilation [ ]Non-invasive ventilatory support [ ]High-Flow   [ ]Acute  [ ]Chronic [ ]Hypoxic  [ ]Hypercarbic [ ]Other  [ ]Other organ failure     LABS:                        11.3   3.80  )-----------( 310      ( 13 Mar 2025 06:12 )             33.2   03-13    131[L]  |  96[L]  |  8   ----------------------------<  92  3.7   |  23  |  0.40[L]    Ca    8.9      13 Mar 2025 06:12  Phos  3.5     03-13  Mg     1.80     03-13    TPro  6.4  /  Alb  3.6  /  TBili  0.4  /  DBili  x   /  AST  17  /  ALT  7   /  AlkPhos  88  03-13  PT/INR - ( 11 Mar 2025 20:40 )   PT: 15.4 sec;   INR: 1.30 ratio         PTT - ( 11 Mar 2025 20:40 )  PTT:37.5 sec    Urinalysis Basic - ( 13 Mar 2025 06:12 )    Color: x / Appearance: x / SG: x / pH: x  Gluc: 92 mg/dL / Ketone: x  / Bili: x / Urobili: x   Blood: x / Protein: x / Nitrite: x   Leuk Esterase: x / RBC: x / WBC x   Sq Epi: x / Non Sq Epi: x / Bacteria: x      RADIOLOGY & ADDITIONAL STUDIES:  < from: Xray Chest 1 View-PORTABLE IMMEDIATE (Xray Chest 1 View-PORTABLE IMMEDIATE .) (03.12.25 @ 13:48) >      IMPRESSION:  Bilateral upper lobe opacities with 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  No acute pulmonary or cardiovascular disease.    < end of copied text >    Protein Calorie Malnutrition Present: [ ]mild [ ]moderate [ ]severe [ ]underweight [ ]morbid obesity  https://www.andeal.org/vault/2440/web/files/ONC/Table_Clinical%20Characteristics%20to%20Document%20Malnutrition-White%20JV%20et%20al%202012.pdf    Height (cm): 175.3 (03-11-25 @ 16:45), 175.26 (03-11-25 @ 11:01), 175.3 (02-21-25 @ 12:46)  Weight (kg): 63.5 (03-11-25 @ 16:45), 69.85 (03-11-25 @ 11:01), 65.9 (02-21-25 @ 12:46)  BMI (kg/m2): 20.7 (03-11-25 @ 16:45), 22.7 (03-11-25 @ 11:01), 21.4 (02-21-25 @ 12:46)    [ ]PPSV2 < or = 30%  [ ]significant weight loss [ ]poor nutritional intake [ ]anasarca[ ]Artificial Nutrition    Other REFERRALS:  [ ]Hospice  [ ]Child Life  [ ]Social Work  [ ]Case management [ ]Holistic Therapy     Goals of Care Document:

## 2025-03-13 NOTE — PROGRESS NOTE ADULT - ASSESSMENT
65 yo woman, former smoker (47py; quit 12/2024) with h/o pAfib (on Eliquis), Hypothyroidism, Scleroderma, h/o pericardial effusion s/p pericardiocentesis (dx in 12/2024; ? viral vs inflammatory, cytology negative for malignant cells, on Colchicine), Asthma/suspected COPD, and recently-dx SCLCa > dx in 2/2025, staging javier not yet completed and pt is tx naive and referred to [facility] from Med Onc clinic for expedited onc workup and urgent initiation of inpatient chemotherapy.    ACTIVE PROBLEMS  Recently dx SCLCa  GOC discussion, counseling  Hyponatremia 2/2 SIADH  h/o Pericardial effusion (viral with positive Coxsackie B titers in 12/2024)  Cancer related pain, anxiety  Constipation  h/o Asthma/COPD (former smoker)  Hypothyroidism  pAfib  Hypercoag state  Immunocompromised 2/2 cancer, autoimmune disease    Recently dx SCLCa  GOC discussion, counseling  Staging w/u in progress- MRI Brain and a/p w/without contrast ordered for further eval (CT deferred as pt has h/o anaphylaxis to iodinated contrast)  Options for cancer tx TBD- pt likely to begin Carbo/Etop inpatient (without immunotherapy given pt's h/o AID)  Rad Onc also made aware of pt- plan is for projected 3-week course of RT to the lung mass  In GOC conversation on 3/13, pt expressed uncertainty about pursuing systemic cancer treatment, especially if her cancer is determined to be advanced- she expressed interest in reviewing the MOLST form and indicated that she would discuss with her HCP Yogi before making any final decisions about code status  Long-term prognosis: gaurded; pt is full code    Hyponatremia 2/2 SIADH  Na stable at 131 today; pt is relatively asymptomatic  SIADH confirmed by 3/12 urine lytes  Continuing 1L fluid restriction  Continuing Salt tabs 1gm BID    h/o Pericardial effusion in 12/2024 (? autoimmune vs viral with positive Coxsackie B titers in 12/2024)  12/23 pericardial fluid cytology negative for malignant cells  3/12 TTE with small to mod pericardial effusion, without tamponade physiology  Continuing Colchicine 0.6mg daily    Cancer related pain, anxiety  Continuing Morphine ER 30mg q12  Continuing Oxy IR 20mg q8/prn  Continuing Baclofen 5mg q8/prn  Increasing Ativan PO to 0.5mg q8/prn and 1mg PO prn (prior to MRI)    Constipation  Possibly due to opioids +/- AID  AXR ordered, r/o ileus/obstruction  Starting bowel regimen (Miralax, Senna) for OIC prevention    h/o Asthma/COPD (former smoker)  Continuing Advair 100-50mcg MDI BID  Starint duonebs q6/prn   Continuing Nicotine patch 21mg daily (6 weeks)    Hypothyroidism: repeat TFTs ordered; continuing LT4 112mcg daily    pAfib  Hypercoag state  Continuing Eliquis 5mg q12 65 yo woman, former smoker (47py; quit 12/2024) with h/o pAfib (on Eliquis), Hypothyroidism, Scleroderma, h/o pericardial effusion s/p pericardiocentesis (dx in 12/2024; ? viral vs inflammatory, cytology negative for malignant cells, on Colchicine), Asthma/suspected COPD, and recently-dx SCLCa > dx in 2/2025, staging javier not yet completed and pt is tx naive and referred to [facility] from Med Onc clinic for expedited onc workup and urgent initiation of inpatient chemotherapy.    ACTIVE PROBLEMS  Recently dx SCLCa  GOC discussion, counseling  Hyponatremia 2/2 SIADH  h/o Pericardial effusion (viral with positive Coxsackie B titers in 12/2024)  Cancer related pain, anxiety  Constipation  h/o Asthma/COPD (former smoker)  Hypothyroidism  pAfib  h/o Scleroderma  Hypercoag state  Immunocompromised 2/2 cancer, autoimmune disease    Recently dx SCLCa  GOC discussion, counseling  Staging w/u in progress- MRI Brain and a/p w/without contrast ordered for further eval (CT deferred as pt has h/o anaphylaxis to iodinated contrast)  Options for cancer tx TBD- pt likely to begin Carbo/Etop inpatient (without immunotherapy given pt's h/o AID)  Rad Onc also made aware of pt- plan is for projected 3-week course of RT to the lung mass  In GOC conversation on 3/13, pt expressed uncertainty about pursuing systemic cancer treatment, especially if her cancer is determined to be advanced- she expressed interest in reviewing the MOLST form and indicated that she would discuss with her HCP Yogi before making any final decisions about code status  Long-term prognosis: gaurded; pt is full code    Hyponatremia 2/2 SIADH  Na stable at 131 today; pt is relatively asymptomatic  SIADH confirmed by 3/12 urine lytes  Continuing 1L fluid restriction  Continuing Salt tabs 1gm BID    h/o Pericardial effusion in 12/2024 (? autoimmune vs viral with positive Coxsackie B titers in 12/2024)  12/23 pericardial fluid cytology negative for malignant cells  3/12 TTE with small to mod pericardial effusion, without tamponade physiology  Continuing Colchicine 0.6mg daily    Cancer related pain, anxiety  Continuing Morphine ER 30mg q12  Continuing Oxy IR 20mg q8/prn  Continuing Baclofen 5mg q8/prn  Increasing Ativan PO to 0.5mg q8/prn and 1mg PO prn (prior to MRI)    Constipation  Possibly due to opioids +/- AID  AXR ordered, r/o ileus/obstruction  Starting bowel regimen (Miralax, Senna) for OIC prevention    h/o Asthma/COPD (former smoker)  Continuing Advair 100-50mcg MDI BID  Starint duonebs q6/prn   Continuing Nicotine patch 21mg daily (6 weeks)    Hypothyroidism: repeat TFTs ordered; continuing LT4 112mcg daily    h/o Scleroderma: Rheum consulted for mgmt recs    pAfib  Hypercoag state  Continuing Eliquis 5mg q12

## 2025-03-13 NOTE — PROGRESS NOTE ADULT - SUBJECTIVE AND OBJECTIVE BOX
SOLID TUMOR ONCOLOGY HOSPITALIST PROGRESS NOTE    S: Overnight events documented by CHASE Aguilar have been reviewed.  Pt reported feeling better, less anxious today.  She expressed concern about progression of her Scleroderma symptoms.  She also indicated that she's not certain she wants to pursue any cancer-directed therapy, particularly if the cancer is advanced or if her scleroderma is getting worse.    CURRENT MEDICATIONS  MEDICATIONS  (STANDING):  albuterol/ipratropium for Nebulization 3 milliLiter(s) Nebulizer every 6 hours  apixaban 5 milliGRAM(s) Oral every 12 hours  chlorhexidine 2% Cloths 1 Application(s) Topical <User Schedule>  colchicine 0.6 milliGRAM(s) Oral daily  fluticasone propionate/ salmeterol 100-50 MICROgram(s) Diskus 1 Dose(s) Inhalation two times a day  influenza  Vaccine (HIGH DOSE) 0.5 milliLiter(s) IntraMuscular once  levothyroxine 112 MICROGram(s) Oral daily  lidocaine   4% Patch 1 Patch Transdermal daily  LORazepam     Tablet 1 milliGRAM(s) Oral once  methylnaltrexone Injectable 12 milliGRAM(s) SubCutaneous once  morphine ER Tablet 30 milliGRAM(s) Oral every 12 hours  naloxegol 25 milliGRAM(s) Oral daily  nicotine - 21 mG/24Hr(s) Patch 1 Patch Transdermal daily  pantoprazole    Tablet 40 milliGRAM(s) Oral every 12 hours  polyethylene glycol 3350 17 Gram(s) Oral daily  senna 2 Tablet(s) Oral at bedtime  sodium chloride 1 Gram(s) Oral two times a day  MEDICATIONS  (PRN):  aluminum hydroxide/magnesium hydroxide/simethicone Suspension 30 milliLiter(s) Oral every 4 hours PRN Dyspepsia  baclofen 5 milliGRAM(s) Oral every 8 hours PRN Musculoskeletal Pain  HYDROmorphone  Injectable 1 milliGRAM(s) IV Push every 4 hours PRN Severe Pain (7 - 10)  LORazepam     Tablet 0.25 milliGRAM(s) Oral every 8 hours PRN Anxiety  melatonin 3 milliGRAM(s) Oral at bedtime PRN Insomnia  ondansetron Injectable 4 milliGRAM(s) IV Push every 6 hours PRN Nausea and/or Vomiting      PHYSICAL EXAM  T(C): 36.3 (03-13-25 @ 05:38), Max: 36.8 (03-12-25 @ 23:30)  HR: 63 (03-13-25 @ 05:38) (63 - 80)  BP: 154/85 (03-13-25 @ 05:38) (121/66 - 154/85)  RR: 18 (03-13-25 @ 05:38) (18 - 18)  SpO2: 100% (03-13-25 @ 05:38) (94% - 100%)    03-12-25 @ 07:01  -  03-13-25 @ 07:00  --------------------------------------------------------  IN: 775 mL / OUT: 600 mL / NET: 175 mL  Non-toxic-appearing woman, sitting up in bed, mildly distressed, nad  Answering all questions appropriately with some conversational dyspnea, following commands  Anicteric sclera, no oral lesions/thrush  RRR, no m/r/g, heart sounds faint  Breaths somewhat labored, but not tachypnea; trace RLB crackles, no w/r  Abd soft/nt/nd, hypoactive bowel sounds  No peripheral edema  CN 2-12 grossly intact; no gross focal neuro deficits; pt moves all extremities spontaneously      LABS                        11.3   3.80  )-----------( 310      ( 13 Mar 2025 06:12 )             33.2     03-13    131[L]  |  96[L]  |  8   ----------------------------<  92  3.7   |  23  |  0.40[L]    Ca    8.9      13 Mar 2025 06:12  Phos  3.5     03-13  Mg     1.80     03-13    TPro  6.4  /  Alb  3.6  /  TBili  0.4  /  DBili  x   /  AST  17  /  ALT  7   /  AlkPhos  88  03-13    PT/INR - ( 11 Mar 2025 20:40 )   PT: 15.4 sec;   INR: 1.30 ratio    PTT - ( 11 Mar 2025 20:40 )  PTT:37.5 sec      ADDITIONAL LABS  Dylan 98  Uosm 632  uric acid 2.4    PATHOLOGY  2/21 LN biopsies  1. LUNG, LEFT UPPER LOBE, ENDOBRONCHIAL FORCEPS BIOPSY AND TRANSBRONCHIAL   FNA   POSITIVE FOR MALIGNANT CELLS.   Small cell carcinoma.   Touch Imprint slide, cell block and core biopsy show a cellular specimen   composed of groups and numerous single-lying hyperchromatic cells with   irregular nuclear membranes, nuclear molding and scanty cytoplasm. Crush   artifact and mitotic figures present.   Immunocytochemical studies : The neoplastic cells are positive for Cam   5.2.hrmogranin, synaptophysin, INSM-1, TTF-1 while negative for CD45 and   P40. Ki-67 reveal a proliferation index of 90%.   Case discussed at the daily cytopathology  conference on   03/04/2025.   Case reported to Tumor Registry.   Dr. Webster was informed of the diagnosis via encrypted email on 03/04/2025.   2. LYMPH NODE, LEVEL 7, EBUS-GUIDED FNA   SUSPICIOUS FOR MALIGNANCY.   Suspicious for small cell carcinoma.   Cytology smears and cell block display a hypocellular specimen composed   of rare groups and few single-lying hyperchromatic cells with irregular   nuclear membranes, nuclear molding and scanty cytoplasm with crush   artifact, in a background of rare lymphocytes.   Case discussed at the daily cytopathology  conference on   03/04/2025.   3. LYMPH NODE, 4L, EBUS-GUIDED FNA   POSITIVE FOR MALIGNANT CELLS.   S mall cell carcinoma.   Cytology smears and cell block show a cellular specimen composed of   groups and single-lying hyperchromatic cells with irregular nuclear   membranes, nuclear molding and scanty cytoplasm, in a background of rare   lymphocytes.   Immunocytochemical studies: The neoplastic cells are positive for Cam   5.2, Chromogranin, synaptophysin, INSM-1, TTF-1 while negative for CD45   and P40.Ki-67 reveals a proliferative index of 90%.   In summary the cytomorphology and immunophenotype are consistent with   small cell carcinoma.   4. LUNG, LEFT UPPER LOBE, BRONCHOALVEOLAR LAVAGE   ATYPICAL FINDINGS   Cytology slide and cell block show hyperchromatic groups of poorly   preserved cells among reactive ciliated bronchial cells and alveolar   macrophages.   12/20/24 pericardial fluid cytology negative for malignant cells      MICROBIOLOGY  Abx: none on this admission    Cx Data  12/23 Coxsackie B titers positive 1:100-1:1000  12/23 Coxsackie A titers negative  12/23 EBV IgG positive  12/23 viral hep panel non-reactive  12/22 Quant Gold negative      PERTINENT RADIOLOGY  MRI Brain w/without contrast: pending  MRI a/p w/without contrast: pending  3/12 TTE   1. Left ventricular systolic function is normal with an ejection fraction visually estimated at 65 to 70 %.   2. Small pericardial effusion noted adjacent to the posterior left ventricle, moderate pericardial effusion noted adjacentto the right atrium, moderate pericardial effusion noted adjacent to the anterior right ventricle and moderate pericardial effusion noted adjacent to the apex with no echocardiographic evidence of tamponade physiology.   3. The inferior vena cava is normal in size measuring 1.76 cm in diameter, (normal <2.1cm) with normal inspiratory collapse (normal >50%) consistent with normal right atrial pressure (~3, range 0-5mmHg).   4. No prior echocardiogram is available for comparison.  2/21 CXR: No complications post bronchoscopy.  Increased left upper lobe opacity (pneumonia?)  2/15 NC CT chest: Small circumferential pericardial effusion, mildly increased on today's   study when compared to the prior. Left suprahilar mediastinal mass in the prevascular space, extending into the aortopulmonary window, mediastinal adenopathy, appears more   pronounced on today's study though resolution is limited without   intravenous contrast. There is a new peripheral ill-defined 2.4 x 2.2 cm opacity in the left   upper lobe, differential includes infection/inflammation/neoplasm.  Dilated ascending aorta measuring up to 4 cm, similar to the prior study.  Consider contrast-enhanced CT of the chest if patient is able to tolerate   intravenous contrast.  2/15 B/L LE dopplers: negative for DVT.

## 2025-03-13 NOTE — PROGRESS NOTE ADULT - ASSESSMENT
63 y/o F with PMHx of AF, scleroderma, asthma with prior intubations, hypothyroidism, pericardial effusion s/p pericardiocentesis (12/2024) and drain on colchicine presented to the ED due to SOB, chest pain, and back pain. Oncology evaluated her and recommended MRI brain and MRI chest/abdomen/pelvis with contrast to rule out metastasis. Was previously admitted in 12/2024 due to chest pain and SOB and found to have a pericardial effusion with evidence of tamponade physiology. She underwent urgent pericardiocentesis with placement of drain which was removed on 12/26. She was started on colchicine and NSAIDs. Hospital course was complicated by AF with RVR which was new onset and was suspected to be due to drain placement. After drain was removed patient continued to have pAF and was started on Eliquis. Presented to the ED on 02/13/25 for left sided chest pain radiating to the back. Repeat TTE demonstrated interval increase in pericardial effusion (moderate-large adjacent to RA with no evidence of tamponade) and CTS was consulted for possible pericardial window and recommended no acute intervention. 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 Pulmonary consulted and recommended transfer to [facility] for bronchoscopy, EBUS and hilar mass biopsy. Patient transferred to [facility] on 02/20/25 and underwent EBUS on 02/21/25. 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 Palliative consulted for complex medical decision making and symptom management in the setting of serious illness.

## 2025-03-13 NOTE — PROGRESS NOTE ADULT - PROBLEM SELECTOR PLAN 3
Patient with hx of anxiety/panic d/o for which she shared she has been on xanax before but she prefers not to become dependent on meds  Currently significant anxiety related to dx and with attempted mri  She shared she had a full panic attack last night  PLAN  Currently on ativan 0.5mg q8h prn without significant improvement of anxiety  Close monitoring with cocurrent benzo/opioid use  Consider BH evaluation to help with symptoms management

## 2025-03-13 NOTE — CONSULT NOTE ADULT - ASSESSMENT
65 y/o F with PMHx of AF, scleroderma, asthma with prior intubations, hypothyroidism, pericardial effusion s/p pericardiocentesis (12/2024) and drain on colchicine, recent diagnosis of small cell lung cancer p/w SOB, chest pain, and back pain. Pending imaging for metastates and eval for chemotherapy. Rheumatology consulted to eval for hx of scleroderma    #scleroderma  -Pt states she was diagnosed in 2007, with skin involvement and Raynauds initially, then developed GI involvement with dysphagia  -Previously followed with Dr Briceno, now has new rheumatologist at [facility]. Previously was part of clinical trials at [facility] and recalls she used to be on MMF and cytoxan, and steroids, but no medications recently for scleroderma  -12/2024 serologies negative for scl70, centromere, RNA tere 3. Exam with skin tightening of fingers, LE up to knees, with significant contractures of hands, wrists, shoulders, neck, ankles and knees  -No ILD, but has recent dx of small cell lung cancer. Pt is pending further imaging to eval for metastases for staging and possible inpt chemo  -Will obtain outpt records, as pt has negative scleroderma serologies here. Could be seronegative scleroderma vs occupational exposure induced (pt worked as jeweler for many yrs), skin findings do not seem like scleromyxedema or eosinophilic fasciitis    Note incomplete  Shannon Rodriguez MD  Rheumatology Fellow 65 y/o F with PMHx of AF, scleroderma, asthma with prior intubations, hypothyroidism, pericardial effusion s/p pericardiocentesis (12/2024) and drain on colchicine, recent diagnosis of small cell lung cancer p/w SOB, chest pain, and back pain. Pending imaging for metastases and eval for chemotherapy. Rheumatology consulted to eval for hx of scleroderma    #scleroderma  -Pt states she was diagnosed in 2007, with skin involvement and Raynauds initially, then developed GI involvement with dysphagia  -Previously followed with Dr Briceno, now has new rheumatologist at [facility]. Previously was part of clinical trials at [facility] and recalls she used to be on MMF and cytoxan, and steroids, but no medications recently for scleroderma  -12/2024 serologies negative for scl70, centromere, RNA tere 3. Exam with contractures of fingers, toes and knees, with reduced ROM of hands, wrists, shoulders, neck, ankles and knees. No sclerodactyly, telengiectasias, digital ulcers, or skin thickening  -No ILD, but has recent dx of small cell lung cancer. Pt is pending further imaging to eval for metastases for staging and possible inpt chemo  -Will obtain outpt records, as pt has negative scleroderma serologies here. Could be contractures from psoriatic arthritis (?) vs seronegative scleroderma vs occupational exposure induced (pt worked as jeweler for many yrs), skin findings do not seem like scleromyxedema or eosinophilic fasciitis    Recommendations:  -Please obtain XRay of c-spine, L-spine, hands, and feet  -Please obtain ANGELA  -Pt asked if chemotherapy could worsen her autoimmune condition. Discussed that chemotherapy will likely not worsen her condition at this time    Case discussed with Dr Natalie Rodriguez MD  Rheumatology Fellow 63 y/o F with PMHx of AF, scleroderma, asthma with prior intubations, hypothyroidism, pericardial effusion s/p pericardiocentesis (12/2024) and drain on colchicine, recent diagnosis of small cell lung cancer p/w SOB, chest pain, and back pain. Pending imaging for metastases and eval for chemotherapy. Rheumatology consulted to eval for hx of scleroderma    #scleroderma  -Pt states she was diagnosed in 2007, with skin involvement and Raynauds initially, then developed GI involvement with dysphagia  -Previously followed with Dr Briceno, now has new rheumatologist at [facility]. Previously was part of clinical trials at [facility] and recalls she used to be on MMF and cytoxan, and steroids, but no medications recently for scleroderma  -12/2024 serologies negative for scl70, centromere, RNA tere 3. Exam with contractures of fingers, toes and knees, with reduced ROM of hands, wrists, shoulders, neck, ankles and knees. No sclerodactyly, telengiectasias, digital ulcers, or skin thickening  -No ILD, but has recent dx of small cell lung cancer. Pt is pending further imaging to eval for metastases for staging and possible inpt chemo  -Will obtain outpt records, as pt has negative scleroderma serologies here. Could be contractures in the setting of spine pain related to spondyloarthropathy vs seronegative scleroderma vs occupational exposure induced (pt worked as jeweler for many yrs), skin findings do not seem like scleromyxedema or eosinophilic fasciitis    Recommendations:  -Please obtain XRay of c-spine, L-spine, hands, and feet  -Please obtain ANGELA  -Pt asked if chemotherapy could worsen her autoimmune condition. No contraindication to chemoRx from rheumatology perspective.     Case discussed with Dr Natalie Rodriguez MD  Rheumatology Fellow

## 2025-03-13 NOTE — PROGRESS NOTE ADULT - PROBLEM SELECTOR PLAN 1
Chest pain from mediastinal mass   Patient with hx of diffuse pain related to her underlying scleroderma for which she shared at one point she was fentanyl 200 mcg patch and was weaned down  - Home regimen: MS Contin 30 mg BID and oxycodone 20 mg q4h PRN (long term dose) given by outpatient pain specialist originally for scleroderma pain  - Pain currently   [ ] improved or controlled   [X ] uncontrolled   -Patient today in significant anxiety related to dx and also feels that IV dilaudid is reduced dose and prefers to resume oxycodone but with higher dose as 20 mg is not effective in controlling pain and would like IV as back up option  Plan and Recommendations:   - opioids:   > Start oxycodone IV 20 mg q4h PRN for moderate pain and oxycodone IR 30 mg q4h prn for severe pain  > Start IV dilaudid 1 mg q4h PRN for severe breakthrough pain pain.   > c/w MS Contin 30 mg BID (given acuity/evolving clinical status, will hold off on increasing at this time)  - Bowel regimen

## 2025-03-13 NOTE — PROGRESS NOTE ADULT - PROBLEM SELECTOR PLAN 4
- recently diagnosed  - Unable to tolerate MRI A/P and brain d/t claustrophobia    - CT chest on 2/15- MEDIASTINUM AND JEREMIAS: Anterior subcarinal lymph node measures 1.8 cm in short axis, stable. There is a soft tissue density, measuring up to 4.2 cm in the prevascular space extending into the subcutaneous left main pulmonary artery region, this is more pronounced on today's study.   - Per onc, plan is to start inpatient chemo treatment and RT >> TBD as patient now expressing also second thoughts about whether she wants to pursue tx  - Follows with Dr. Hall at [facility].

## 2025-03-14 LAB
ALBUMIN SERPL ELPH-MCNC: 3.6 G/DL — SIGNIFICANT CHANGE UP (ref 3.3–5)
ALP SERPL-CCNC: 79 U/L — SIGNIFICANT CHANGE UP (ref 40–120)
ALT FLD-CCNC: 7 U/L — SIGNIFICANT CHANGE UP (ref 4–33)
ANION GAP SERPL CALC-SCNC: 11 MMOL/L — SIGNIFICANT CHANGE UP (ref 7–14)
AST SERPL-CCNC: 14 U/L — SIGNIFICANT CHANGE UP (ref 4–32)
BASOPHILS # BLD AUTO: 0.03 K/UL — SIGNIFICANT CHANGE UP (ref 0–0.2)
BASOPHILS NFR BLD AUTO: 0.7 % — SIGNIFICANT CHANGE UP (ref 0–2)
BILIRUB SERPL-MCNC: 0.3 MG/DL — SIGNIFICANT CHANGE UP (ref 0.2–1.2)
BUN SERPL-MCNC: 10 MG/DL — SIGNIFICANT CHANGE UP (ref 7–23)
CALCIUM SERPL-MCNC: 8.8 MG/DL — SIGNIFICANT CHANGE UP (ref 8.4–10.5)
CHLORIDE SERPL-SCNC: 98 MMOL/L — SIGNIFICANT CHANGE UP (ref 98–107)
CO2 SERPL-SCNC: 25 MMOL/L — SIGNIFICANT CHANGE UP (ref 22–31)
CREAT SERPL-MCNC: 0.42 MG/DL — LOW (ref 0.5–1.3)
EGFR: 108 ML/MIN/1.73M2 — SIGNIFICANT CHANGE UP
EGFR: 108 ML/MIN/1.73M2 — SIGNIFICANT CHANGE UP
EOSINOPHIL # BLD AUTO: 0.1 K/UL — SIGNIFICANT CHANGE UP (ref 0–0.5)
EOSINOPHIL NFR BLD AUTO: 2.3 % — SIGNIFICANT CHANGE UP (ref 0–6)
GLUCOSE SERPL-MCNC: 89 MG/DL — SIGNIFICANT CHANGE UP (ref 70–99)
HCT VFR BLD CALC: 32.2 % — LOW (ref 34.5–45)
HGB BLD-MCNC: 11 G/DL — LOW (ref 11.5–15.5)
IANC: 2 K/UL — SIGNIFICANT CHANGE UP (ref 1.8–7.4)
IMM GRANULOCYTES NFR BLD AUTO: 0 % — SIGNIFICANT CHANGE UP (ref 0–0.9)
LYMPHOCYTES # BLD AUTO: 1.88 K/UL — SIGNIFICANT CHANGE UP (ref 1–3.3)
LYMPHOCYTES # BLD AUTO: 42.8 % — SIGNIFICANT CHANGE UP (ref 13–44)
MAGNESIUM SERPL-MCNC: 1.7 MG/DL — SIGNIFICANT CHANGE UP (ref 1.6–2.6)
MCHC RBC-ENTMCNC: 29.9 PG — SIGNIFICANT CHANGE UP (ref 27–34)
MCHC RBC-ENTMCNC: 34.2 G/DL — SIGNIFICANT CHANGE UP (ref 32–36)
MCV RBC AUTO: 87.5 FL — SIGNIFICANT CHANGE UP (ref 80–100)
MONOCYTES # BLD AUTO: 0.38 K/UL — SIGNIFICANT CHANGE UP (ref 0–0.9)
MONOCYTES NFR BLD AUTO: 8.7 % — SIGNIFICANT CHANGE UP (ref 2–14)
NEUTROPHILS # BLD AUTO: 2 K/UL — SIGNIFICANT CHANGE UP (ref 1.8–7.4)
NEUTROPHILS NFR BLD AUTO: 45.5 % — SIGNIFICANT CHANGE UP (ref 43–77)
NRBC # BLD AUTO: 0 K/UL — SIGNIFICANT CHANGE UP (ref 0–0)
NRBC # FLD: 0 K/UL — SIGNIFICANT CHANGE UP (ref 0–0)
NRBC BLD AUTO-RTO: 0 /100 WBCS — SIGNIFICANT CHANGE UP (ref 0–0)
PHOSPHATE SERPL-MCNC: 3.1 MG/DL — SIGNIFICANT CHANGE UP (ref 2.5–4.5)
PLATELET # BLD AUTO: 267 K/UL — SIGNIFICANT CHANGE UP (ref 150–400)
POTASSIUM SERPL-MCNC: 3.3 MMOL/L — LOW (ref 3.5–5.3)
POTASSIUM SERPL-SCNC: 3.3 MMOL/L — LOW (ref 3.5–5.3)
PROT SERPL-MCNC: 6.4 G/DL — SIGNIFICANT CHANGE UP (ref 6–8.3)
RBC # BLD: 3.68 M/UL — LOW (ref 3.8–5.2)
RBC # FLD: 12.1 % — SIGNIFICANT CHANGE UP (ref 10.3–14.5)
SODIUM SERPL-SCNC: 134 MMOL/L — LOW (ref 135–145)
WBC # BLD: 4.39 K/UL — SIGNIFICANT CHANGE UP (ref 3.8–10.5)
WBC # FLD AUTO: 4.39 K/UL — SIGNIFICANT CHANGE UP (ref 3.8–10.5)

## 2025-03-14 PROCEDURE — 99233 SBSQ HOSP IP/OBS HIGH 50: CPT

## 2025-03-14 PROCEDURE — 99223 1ST HOSP IP/OBS HIGH 75: CPT

## 2025-03-14 RX ORDER — LORAZEPAM 4 MG/ML
1 VIAL (ML) INJECTION EVERY 8 HOURS
Refills: 0 | Status: DISCONTINUED | OUTPATIENT
Start: 2025-03-14 | End: 2025-03-20

## 2025-03-14 RX ORDER — CLONAZEPAM 0.5 MG/1
1 TABLET ORAL
Refills: 0 | Status: DISCONTINUED | OUTPATIENT
Start: 2025-03-14 | End: 2025-03-18

## 2025-03-14 RX ORDER — HYDROMORPHONE/SOD CHLOR,ISO/PF 2 MG/10 ML
2 SYRINGE (ML) INJECTION EVERY 4 HOURS
Refills: 0 | Status: DISCONTINUED | OUTPATIENT
Start: 2025-03-14 | End: 2025-03-17

## 2025-03-14 RX ORDER — MAGNESIUM SULFATE 500 MG/ML
2 SYRINGE (ML) INJECTION ONCE
Refills: 0 | Status: COMPLETED | OUTPATIENT
Start: 2025-03-14 | End: 2025-03-14

## 2025-03-14 RX ADMIN — APIXABAN 5 MILLIGRAM(S): 2.5 TABLET, FILM COATED ORAL at 10:13

## 2025-03-14 RX ADMIN — NICOTINE POLACRILEX 1 PATCH: 4 GUM, CHEWING ORAL at 11:00

## 2025-03-14 RX ADMIN — CLONAZEPAM 1 MILLIGRAM(S): 0.5 TABLET ORAL at 18:10

## 2025-03-14 RX ADMIN — NICOTINE POLACRILEX 1 PATCH: 4 GUM, CHEWING ORAL at 18:38

## 2025-03-14 RX ADMIN — NICOTINE POLACRILEX 1 PATCH: 4 GUM, CHEWING ORAL at 11:06

## 2025-03-14 RX ADMIN — Medication 0.5 MILLIGRAM(S): at 01:26

## 2025-03-14 RX ADMIN — APIXABAN 5 MILLIGRAM(S): 2.5 TABLET, FILM COATED ORAL at 18:10

## 2025-03-14 RX ADMIN — OXYCODONE HYDROCHLORIDE 30 MILLIGRAM(S): 30 TABLET ORAL at 23:19

## 2025-03-14 RX ADMIN — Medication 40 MILLIGRAM(S): at 05:56

## 2025-03-14 RX ADMIN — Medication 1 APPLICATION(S): at 06:50

## 2025-03-14 RX ADMIN — NICOTINE POLACRILEX 1 PATCH: 4 GUM, CHEWING ORAL at 05:51

## 2025-03-14 RX ADMIN — Medication 1 MILLIGRAM(S): at 04:28

## 2025-03-14 RX ADMIN — OXYCODONE HYDROCHLORIDE 30 MILLIGRAM(S): 30 TABLET ORAL at 11:35

## 2025-03-14 RX ADMIN — Medication 2 MILLIGRAM(S): at 13:31

## 2025-03-14 RX ADMIN — Medication 40 MILLIGRAM(S): at 18:10

## 2025-03-14 RX ADMIN — Medication 30 MILLIGRAM(S): at 05:56

## 2025-03-14 RX ADMIN — Medication 30 MILLIGRAM(S): at 18:10

## 2025-03-14 RX ADMIN — OXYCODONE HYDROCHLORIDE 30 MILLIGRAM(S): 30 TABLET ORAL at 11:04

## 2025-03-14 RX ADMIN — Medication 112 MICROGRAM(S): at 05:56

## 2025-03-14 RX ADMIN — COLCHICINE 0.6 MILLIGRAM(S): 0.6 TABLET, FILM COATED ORAL at 11:05

## 2025-03-14 RX ADMIN — Medication 1 GRAM(S): at 18:10

## 2025-03-14 RX ADMIN — Medication 30 MILLIGRAM(S): at 06:56

## 2025-03-14 RX ADMIN — OXYCODONE HYDROCHLORIDE 30 MILLIGRAM(S): 30 TABLET ORAL at 17:15

## 2025-03-14 RX ADMIN — OXYCODONE HYDROCHLORIDE 30 MILLIGRAM(S): 30 TABLET ORAL at 04:13

## 2025-03-14 RX ADMIN — OXYCODONE HYDROCHLORIDE 30 MILLIGRAM(S): 30 TABLET ORAL at 22:19

## 2025-03-14 RX ADMIN — Medication 25 GRAM(S): at 10:13

## 2025-03-14 RX ADMIN — Medication 1 MILLIGRAM(S): at 04:13

## 2025-03-14 RX ADMIN — Medication 30 MILLIGRAM(S): at 18:40

## 2025-03-14 RX ADMIN — Medication 2 MILLIGRAM(S): at 14:05

## 2025-03-14 RX ADMIN — OXYCODONE HYDROCHLORIDE 30 MILLIGRAM(S): 30 TABLET ORAL at 16:45

## 2025-03-14 RX ADMIN — OXYCODONE HYDROCHLORIDE 30 MILLIGRAM(S): 30 TABLET ORAL at 03:13

## 2025-03-14 RX ADMIN — Medication 40 MILLIEQUIVALENT(S): at 10:14

## 2025-03-14 NOTE — BH CONSULTATION LIAISON ASSESSMENT NOTE - HPI (INCLUDE ILLNESS QUALITY, SEVERITY, DURATION, TIMING, CONTEXT, MODIFYING FACTORS, ASSOCIATED SIGNS AND SYMPTOMS)
Pt is a 62 y/o female domiciled alone, one child (recently ), with PMHx of scleroderma, asthma, hypothyroidism, and HLD, recent admission to [facility] for tx of cardiac tamponade 2/2 unknown etiology in 2024, diagnosed with lung cancer on this admission, with a PPHx of SOURAV, two previous psych hospitalizations (in the  and ), with no current outpatient psych care (had some f/u in the -), one previous SA (in the ), no NSSIB, hx of potential misuse of benzos (?), admitted for shortness of breath and back pain and given new lung cancer diagnosis, psychiatry consulted for extreme anxiety.     On interview with pt she is attempting to pack her clothes and is visibly distressed, crying and gasping for air. Pt is focused on an interaction with a staff member where she felt she was talked down to and frustrations with inability to get IV medication for her MRI. Pt states she has had a long hx of anxiety and has managed with PRN xanax most of her life, remote hx of suicide attempt in her 20s and states "I promised God I would never do that again." Pt with apparent decompensation when her son passed away earlier in  leaving her with very limited social support, states she is largely supported by her lifelong friend. Pt endorses having multiple panic attacks per day while in the hospital, appears to be self medicating with xanax she has saved from previous prescriptions, no active scripts in [facility]. Pt endorses persistent low mood since son passed away, +anhedonic, but overall functioning with help of her home health aide. Pt notably more hopeless after receiving new SCLC diagnosis, states "I'm dying anyway, why can't I just go home?" Pt not amenable to taking SSRIs/SNRIs, states she had tried this in the / and felt "brain fog" while on it. Denies active SI/I/P, HI, AVH. Denies hx of manic sxs, or psychotic sxs.

## 2025-03-14 NOTE — PROGRESS NOTE ADULT - PROBLEM SELECTOR PLAN 1
Chest pain from mediastinal mass   Patient with hx of diffuse pain related to her underlying scleroderma for which she shared at one point she was fentanyl 200 mcg patch and was weaned down  - Home regimen: MS Contin 30 mg BID and oxycodone 20 mg q4h PRN (long term dose) given by outpatient pain specialist originally for scleroderma pain  - Pain currently   [ ] improved or controlled   [X ] uncontrolled     Plan and Recommendations:   - opioids:   > c/w oxycodone  20 mg q4h PRN for moderate pain and oxycodone IR 30 mg q4h prn for severe pain  > Started IV dilaudid 2 mg q4h PRN for severe breakthrough pain pain.        > If no improvement, can increase to 3 mg IV dilaudid   > c/w MS Contin 30 mg BID   - Bowel regimen  > Holistic therapy

## 2025-03-14 NOTE — BH CONSULTATION LIAISON ASSESSMENT NOTE - NSBHCHARTREVIEWVS_PSY_A_CORE FT
Vital Signs Last 24 Hrs  T(C): 36.6 (14 Mar 2025 12:06), Max: 36.9 (13 Mar 2025 20:34)  T(F): 97.8 (14 Mar 2025 12:06), Max: 98.4 (13 Mar 2025 20:34)  HR: 85 (14 Mar 2025 13:34) (63 - 94)  BP: 117/68 (14 Mar 2025 13:34) (117/68 - 134/79)  BP(mean): --  RR: 17 (14 Mar 2025 13:34) (17 - 20)  SpO2: 95% (14 Mar 2025 13:34) (82% - 99%)    Parameters below as of 14 Mar 2025 13:34  Patient On (Oxygen Delivery Method): nasal cannula  O2 Flow (L/min): 6

## 2025-03-14 NOTE — BH CONSULTATION LIAISON ASSESSMENT NOTE - NSBHCHARTREVIEWLAB_PSY_A_CORE FT
LABS:                        11.0   4.39  )-----------( 267      ( 14 Mar 2025 06:22 )             32.2     03-14    134[L]  |  98  |  10  ----------------------------<  89  3.3[L]   |  25  |  0.42[L]    Ca    8.8      14 Mar 2025 06:22  Phos  3.1     03-14  Mg     1.70     03-14    TPro  6.4  /  Alb  3.6  /  TBili  0.3  /  DBili  x   /  AST  14  /  ALT  7   /  AlkPhos  79  03-14

## 2025-03-14 NOTE — BH CONSULTATION LIAISON ASSESSMENT NOTE - RISK ASSESSMENT
Pt has chronic elevated risk of suicide, but while hospitalized, has low acute suicide risk. Acute factors include recent death of son, being hospitalized, acute cardiac condition, recent diagnosis of lung cancer. Chronic risk factors include hx of 1 SA, previous psych hx, her hx of chronic medical conditions, and lack of social support. Protective factors include currently being in the hospital and in treatment, no recent hx of SA or SI, and therapeutic relationship with aide, [employer] beliefs against suicide.

## 2025-03-14 NOTE — DIETITIAN INITIAL EVALUATION ADULT - OTHER INFO
Patient is currently ordered for a PO diet. Patient reports a poor appetite and PO intake at meals during course of admission. Patient deferred writer's offer of an oral nutrition supplement or documentation of food preferences. Patient denies any chewing or swallowing difficulty with current diet order. No report of GI distress (nausea, vomiting, diarrhea, constipation). Last BM 3/12/25 per RN flowsheet documentation. Not noted to be on a bowel regimen.     Nutrition education not appropriate at this time as patient became emotional during encounter, discussing her grief for her son.

## 2025-03-14 NOTE — DIETITIAN INITIAL EVALUATION ADULT - PERTINENT LABORATORY DATA
03-14    134[L]  |  98  |  10  ----------------------------<  89  3.3[L]   |  25  |  0.42[L]    Ca    8.8      14 Mar 2025 06:22  Phos  3.1     03-14  Mg     1.70     03-14    TPro  6.4  /  Alb  3.6  /  TBili  0.3  /  DBili  x   /  AST  14  /  ALT  7   /  AlkPhos  79  03-14

## 2025-03-14 NOTE — BH CONSULTATION LIAISON ASSESSMENT NOTE - CURRENT MEDICATION
MEDICATIONS  (STANDING):  albuterol/ipratropium for Nebulization 3 milliLiter(s) Nebulizer every 6 hours  apixaban 5 milliGRAM(s) Oral every 12 hours  chlorhexidine 2% Cloths 1 Application(s) Topical <User Schedule>  clonazePAM  Tablet 1 milliGRAM(s) Oral two times a day  colchicine 0.6 milliGRAM(s) Oral daily  fluticasone propionate/ salmeterol 100-50 MICROgram(s) Diskus 1 Dose(s) Inhalation two times a day  influenza  Vaccine (HIGH DOSE) 0.5 milliLiter(s) IntraMuscular once  levothyroxine 112 MICROGram(s) Oral daily  lidocaine   4% Patch 1 Patch Transdermal daily  lidocaine   4% Patch 1 Patch Transdermal every 24 hours  methylnaltrexone Injectable 12 milliGRAM(s) SubCutaneous once  morphine ER Tablet 30 milliGRAM(s) Oral every 12 hours  naloxegol 25 milliGRAM(s) Oral daily  nicotine - 21 mG/24Hr(s) Patch 1 Patch Transdermal daily  pantoprazole    Tablet 40 milliGRAM(s) Oral every 12 hours  polyethylene glycol 3350 17 Gram(s) Oral daily  senna 2 Tablet(s) Oral at bedtime  sodium chloride 1 Gram(s) Oral two times a day    MEDICATIONS  (PRN):  aluminum hydroxide/magnesium hydroxide/simethicone Suspension 30 milliLiter(s) Oral every 4 hours PRN Dyspepsia  baclofen 5 milliGRAM(s) Oral every 8 hours PRN Musculoskeletal Pain  HYDROmorphone  Injectable 2 milliGRAM(s) IV Push every 4 hours PRN severe breakthrough pain  LORazepam     Tablet 1 milliGRAM(s) Oral every 8 hours PRN Anxiety  melatonin 3 milliGRAM(s) Oral at bedtime PRN Insomnia  ondansetron Injectable 4 milliGRAM(s) IV Push every 6 hours PRN Nausea and/or Vomiting  oxyCODONE    IR 30 milliGRAM(s) Oral every 4 hours PRN Severe Pain (7 - 10)  oxyCODONE    IR 20 milliGRAM(s) Oral every 4 hours PRN Moderate Pain (4 - 6)

## 2025-03-14 NOTE — DIETITIAN INITIAL EVALUATION ADULT - REASON
Comprehensive NFPE not conducted at this time as patient grew emotional. However, visually observed severe orbital (subcutaneous fat) and moderate temporal (muscle) depletions

## 2025-03-14 NOTE — DIETITIAN INITIAL EVALUATION ADULT - ORAL INTAKE PTA/DIET HISTORY
Patient reports food allergy to shellfish. Nutrition supplementation at home includes a multivitamin. Patient reports a poor appetite "for a few months" secondary to grief for her son who passed away recently and her newly dx SCLC. Likely meeting <75% estimated energy needs.

## 2025-03-14 NOTE — CHART NOTE - NSCHARTNOTEFT_GEN_A_CORE
Searching on behalf of: [address] [place] [address]  The Drug Utilization Report below displays all of the controlled substance prescriptions, if any, that your patient has filled in the last twelve months. The information displayed on this report is compiled from pharmacy submissions to the Department, and accurately reflects the information as submitted by the pharmacies.    This report was requested by: Peter Morton | Reference #: [id number]    Practitioner Count: 1  Pharmacy Count: 1  Current Opioid Prescriptions: 2  Current Benzodiazepine Prescriptions: 0  Current Stimulant Prescriptions: 0      Patient Demographic Information (PDI)       PDI	First Name	Last Name	Birth Date	Gender	Street Address	[facility] Code  TASHA Wen	1960	Female	[address]	[facility]	[id number]    Prescription Information      PDI Filter:    PDI	Current Rx	Drug Type	Rx Written	Rx Dispensed	Drug	Quantity	Days Supply	Prescriber Name	Prescriber KHOA #	Payment Method	Dispenser  A	Y	O	02/27/2025	03/08/2025	morphine sulf er 30 mg tablet	60	30	Guru Crowe MD	[id number]	Medicare	[id number]  A	Y	O	02/27/2025	03/08/2025	oxycodone hcl (ir) 20 mg tab	90	30	Guru Crowe MD	[id number]	Medicare	[id number]  A	N	O	01/30/2025	02/06/2025	morphine sulf er 30 mg tablet	60	30	Guru Crowe MD	[id number]	Medicare	[id number]  A	N	O	01/30/2025	02/06/2025	oxycodone hcl (ir) 20 mg tab	90	30	Guru Crowe MD	[id number]	Medicare	[id number]  A	N	O	01/07/2025	01/08/2025	morphine sulf er 30 mg tablet	60	30	Guru Crowe MD	[id number]	Medicare	[id number]  A	N	O	01/07/2025	01/08/2025	oxycodone hcl (ir) 20 mg tab	90	30	Guru Crowe MD	[id number]	Medicare	[id number]  A	N	O	11/27/2024	12/06/2024	morphine sulf er 30 mg tablet	60	30	Guru Crowe MD	[id number]	Medicare	[id number]  A	N	O	11/27/2024	12/06/2024	oxycodone hcl (ir) 20 mg tab	90	30	Guru Crowe MD	[id number]	Medicare	[id number]  A	N	O	10/31/2024	11/08/2024	morphine sulf er 30 mg tablet	60	30	Guru Crowe MD	[id number]	Medicare	[id number]  A	N	O	10/31/2024	11/08/2024	oxycodone hcl (ir) 20 mg tab	90	30	Guru Crowe MD	[id number]	Medicare	[id number]  A	N	O	10/03/2024	10/09/2024	oxycodone hcl (ir) 20 mg tab	90	30	Guru Crowe MD	[id number]	Medicare	[id number]  A	N	O	10/03/2024	10/09/2024	morphine sulf er 30 mg tablet	60	30	Guru Crowe MD	[id number]	Medicare	[id number]  A	N	O	09/04/2024	09/10/2024	morphine sulf er 30 mg tablet	60	30	Guru Crowe MD	[id number]	Medicare	[id number]  A	N	O	09/04/2024	09/10/2024	oxycodone hcl (ir) 20 mg tab	90	30	Guru Crowe MD	[id number]	Medicare	[id number]  A	N	O	08/01/2024	08/12/2024	morphine sulf er 30 mg tablet	60	30	Guru Crowe MD	[id number]	Medicare	[id number]  A	N	O	08/01/2024	08/12/2024	oxycodone hcl (ir) 20 mg tab	90	30	Guru Crowe MD	[id number]	Medicare	[id number]  A	N	O	07/02/2024	07/14/2024	morphine sulf er 30 mg tablet	60	30	Guru Crowe MD	[id number]	Medicare	[id number]  A	N	O	07/02/2024	07/14/2024	oxycodone hcl (ir) 20 mg tab	90	30	Guru Crowe MD	[id number]	Medicare	[id number]  A	N	O	06/05/2024	06/15/2024	morphine sulf er 30 mg tablet	60	30	Guru Crowe MD	[id number]	Medicare	[id number]  A	N	O	06/05/2024	06/15/2024	oxycodone hcl (ir) 20 mg tab	120	30	Guru Crowe MD	[id number]	Medicare	[id number]  A	N	O	05/14/2024	05/20/2024	morphine sulf er 30 mg tablet	20	30	Guru Crowe MD	[id number]	Medicare	[id number]  A	N	O	05/14/2024	05/17/2024	morphine sulf er 30 mg tablet	40	20	Guru Crowe MD	[id number]	Medicare	[id number]  A	N	O	05/14/2024	05/17/2024	oxycodone hcl (ir) 20 mg tab	120	30	Guru Crowe MD	[id number]	Medicare	[id number]  A	N	O	04/18/2024	04/18/2024	morphine sulf er 30 mg tablet	60	30	Guru Crowe MD	[id number]	Medicare	[id number]  A	N	O	04/18/2024	04/18/2024	oxycodone hcl (ir) 20 mg tab	120	30	Guru Crowe MD	[id number]	Medicare	[id number]  A	N	O	03/14/2024	03/16/2024	oxycodone hcl (ir) 20 mg tab	120	30	Guru Crowe MD	[id number]	Medicare	[id number]  A	N	O	03/14/2024	03/16/2024	morphine sulf er 30 mg tablet	60	30	Guru Crowe MD	[id number]	Medicare	[id number]

## 2025-03-14 NOTE — BH CONSULTATION LIAISON ASSESSMENT NOTE - DESCRIPTION
Domiciled at home alone with home health aide, HCP is lifelong friend lisa. Pt with some trauma hx, was in foster care as a child. Previous ~40 pack yr smoking hx, denies drug/alcohol use currently.

## 2025-03-14 NOTE — PROGRESS NOTE ADULT - ASSESSMENT
65 y/o F with PMHx of AF, scleroderma, asthma with prior intubations, hypothyroidism, pericardial effusion s/p pericardiocentesis (12/2024) and drain on colchicine presented to the ED due to SOB, chest pain, and back pain. Oncology evaluated her and recommended MRI brain and MRI chest/abdomen/pelvis with contrast to rule out metastasis. Was previously admitted in 12/2024 due to chest pain and SOB and found to have a pericardial effusion with evidence of tamponade physiology. She underwent urgent pericardiocentesis with placement of drain which was removed on 12/26. She was started on colchicine and NSAIDs. Hospital course was complicated by AF with RVR which was new onset and was suspected to be due to drain placement. After drain was removed patient continued to have pAF and was started on Eliquis. Presented to the ED on 02/13/25 for left sided chest pain radiating to the back. Repeat TTE demonstrated interval increase in pericardial effusion (moderate-large adjacent to RA with no evidence of tamponade) and CTS was consulted for possible pericardial window and recommended no acute intervention. 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 Pulmonary consulted and recommended transfer to [facility] for bronchoscopy, EBUS and hilar mass biopsy. Patient transferred to [facility] on 02/20/25 and underwent EBUS on 02/21/25. 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 Palliative consulted for complex medical decision making and symptom management in the setting of serious illness.

## 2025-03-14 NOTE — DIETITIAN NUTRITION RISK NOTIFICATION - ADDITIONAL COMMENTS/DIETITIAN RECOMMENDATIONS
Please see Dietitian Initial Assessment for complete recommendations.    Marisol Grullon MS RDN CDN  On [employer] Teams (Preferred), Pager #[phone]

## 2025-03-14 NOTE — PROGRESS NOTE ADULT - PROBLEM SELECTOR PLAN 3
Patient with hx of anxiety/panic d/o for which she shared she has been on xanax before but she prefers not to become dependent on meds  Currently significant anxiety related to dx and with attempted mri  Having panic attacks   PLAN  Currently on ativan 0.5mg q8h prn without significant improvement of anxiety  Close monitoring with cocurrent benzo/opioid use  Consider  evaluation to help with symptoms management

## 2025-03-14 NOTE — BH CONSULTATION LIAISON ASSESSMENT NOTE - MSE UNSTRUCTURED FT
The patient appears stated age, fair hygiene, dressed appropriately. She was noticeably distressed but cooperative with interview. She maintained appropriate eye contact. +PMA. Steady gait. The patient's speech was fast due to emotional distress. The patient's mood is "bad." Affect is tearful, labile. The patient's thoughts are goal directed. She denies any delusions or hallucinations. She denies any suicidal or homicidal ideation, intent, or plan. Insight is fair. Judgement is somewhat poor. Impulse control has been impaired over this interval.

## 2025-03-14 NOTE — DIETITIAN INITIAL EVALUATION ADULT - REASON INDICATOR FOR ASSESSMENT
Nutrition Consult for MST score 2 or greater   Nutrition Risk Screen for Oncology    Per chart, patient is a 64y Female with PMH smoking, Afib, hypothyroidism, scleroderma, pericardial effusion s/p pericardiocentesis (12/2024), asthma, and newly dx SCLC who presented to [facility] with worsening SOB and expedited onc workup with plan for inpatient chemo.

## 2025-03-14 NOTE — PROGRESS NOTE ADULT - SUBJECTIVE AND OBJECTIVE BOX
SUBJECTIVE AND OBJECTIVE:  Patient seen this AM with friend/HCP Sam at bedside. Patient comfortable in no distress. She states she was taken down to MRI but did not receive high enough dose for anxiety.   Discussed pain management, oxycodone 30 mg helping but IV dilaudid 1 mg for breakthrough had no effect.     Indication for Geriatrics and Palliative Care Services/INTERVAL HPI:    OVERNIGHT EVENTS:    DNR on chart:  Allergies    penicillin (Unknown)  penicillin (Anaphylaxis; Hives; Other)  iodine (Anaphylaxis)  statins (Unknown)  Xolair (Unknown)  ABIDA dye (Short breath; Hives)  IV Contrast (Unknown)  Originally Entered as [Unknown] reaction to [BEE STINGS] (Unknown)    Intolerances    MEDICATIONS  (STANDING):  MEDICATIONS  (STANDING):  albuterol/ipratropium for Nebulization 3 milliLiter(s) Nebulizer every 6 hours  apixaban 5 milliGRAM(s) Oral every 12 hours  chlorhexidine 2% Cloths 1 Application(s) Topical <User Schedule>  colchicine 0.6 milliGRAM(s) Oral daily  fluticasone propionate/ salmeterol 100-50 MICROgram(s) Diskus 1 Dose(s) Inhalation two times a day  influenza  Vaccine (HIGH DOSE) 0.5 milliLiter(s) IntraMuscular once  levothyroxine 112 MICROGram(s) Oral daily  lidocaine   4% Patch 1 Patch Transdermal daily  lidocaine   4% Patch 1 Patch Transdermal every 24 hours  methylnaltrexone Injectable 12 milliGRAM(s) SubCutaneous once  morphine ER Tablet 30 milliGRAM(s) Oral every 12 hours  naloxegol 25 milliGRAM(s) Oral daily  nicotine - 21 mG/24Hr(s) Patch 1 Patch Transdermal daily  pantoprazole    Tablet 40 milliGRAM(s) Oral every 12 hours  polyethylene glycol 3350 17 Gram(s) Oral daily  senna 2 Tablet(s) Oral at bedtime  sodium chloride 1 Gram(s) Oral two times a day    MEDICATIONS  (PRN):  aluminum hydroxide/magnesium hydroxide/simethicone Suspension 30 milliLiter(s) Oral every 4 hours PRN Dyspepsia  baclofen 5 milliGRAM(s) Oral every 8 hours PRN Musculoskeletal Pain  HYDROmorphone  Injectable 2 milliGRAM(s) IV Push every 4 hours PRN severe breakthrough pain  LORazepam     Tablet 0.5 milliGRAM(s) Oral every 8 hours PRN Anxiety  melatonin 3 milliGRAM(s) Oral at bedtime PRN Insomnia  ondansetron Injectable 4 milliGRAM(s) IV Push every 6 hours PRN Nausea and/or Vomiting  oxyCODONE    IR 30 milliGRAM(s) Oral every 4 hours PRN Severe Pain (7 - 10)  oxyCODONE    IR 20 milliGRAM(s) Oral every 4 hours PRN Moderate Pain (4 - 6)        ITEMS UNCHECKED ARE NOT PRESENT    PRESENT SYMPTOMS: [ ]Unable to self-report - see [ ] CPOT [ ] PAINADS [ ] RDOS  Source if other than patient:  [ ]Family   [ ]Team     1. Location- Left chest   2. Radiation-  Left arm  3. Quality- Sharp   4. Timing- Constant   5. Minimal acceptable level/pain goal- 4/10   6. Aggravating factors-   7. QOL impact- Severe     CPOT:    https://www.[facility].org/getattachment/[email or web address]/Critical-Care-Pain-Observation-Tool-(CPOT)    Dyspnea:                           [ ]Mild [ ]Moderate [ ]Severe  Anxiety:                             [ ]Mild [ ]Moderate [ ]Severe  Fatigue:                             [ ]Mild [ ]Moderate [ ]Severe  Nausea:                             [ x]Mild [ ]Moderate [ ]Severe  Loss of appetite:              [ ]Mild [ ]Moderate [ ]Severe  Constipation:                    [ ]Mild [ ]Moderate [ ]Severe  Other Symptoms:  [ ]All other review of systems negative     PCSSQ[Palliative Care Spiritual Screening Question]   Severity (0-10):  Score of 4 or > indicate consideration of Chaplaincy referral.  Chaplaincy Referral: [ ] yes [ ] refused [ ] following [ ] deferred    Caregiver [place]? : [ ] yes [ ] no [ ] Deferred [ ] Declined             Social work referral [ ] Patient & Family Centered Care Referral [ ]  Anticipatory Grief present?:  [ ] yes [ ] no  [ ] Deferred                  Social work referral [ ] Patient & Family Centered Care Referral [ ]      PHYSICAL EXAM:  Vital Signs Last 24 Hrs  T(C): 36.6 (14 Mar 2025 12:06), Max: 36.9 (13 Mar 2025 20:34)  T(F): 97.8 (14 Mar 2025 12:06), Max: 98.4 (13 Mar 2025 20:34)  HR: 86 (14 Mar 2025 12:06) (63 - 94)  BP: 128/70 (14 Mar 2025 12:06) (124/78 - 134/79)  BP(mean): --  RR: 17 (14 Mar 2025 12:06) (17 - 20)  SpO2: 96% (14 Mar 2025 12:06) (82% - 99%)    Parameters below as of 14 Mar 2025 12:06  Patient On (Oxygen Delivery Method): nasal cannula    General:, alert,  HENT:  facial grimacing  Eyes: no icterus, conjunctiva clear  Chest: symmetric excursion, no accessory muscle use; L chest wall tenderness to palpation  Heart: peripheral pulse 2+ and regular  Lungs: no dyspnea with speech  Abdomen: soft, NT, ND  Ext: no cyanosis, clubbing, visible veins, ulcers, wounds  Neuro: alert, coherent speech, grossly non-focal  Psych: anxious, linear thought process  Skin: ]Normal  [ ]Rash  [ ]Other  [ ]Pressure ulcer(s) [ ]y [ ]n present on admission    CRITICAL CARE:  [ ]Shock Present  [ ]Septic [ ]Cardiogenic [ ]Neurologic [ ]Hypovolemic  [ ]Vasopressors [ ]Inotropes  [ ]Respiratory failure present [ ]Mechanical Ventilation [ ]Non-invasive ventilatory support [ ]High-Flow   [ ]Acute  [ ]Chronic [ ]Hypoxic  [ ]Hypercarbic [ ]Other  [ ]Other organ failure     LABS:                          11.0   4.39  )-----------( 267      ( 14 Mar 2025 06:22 )             32.2     03-14    134[L]  |  98  |  10  ----------------------------<  89  3.3[L]   |  25  |  0.42[L]    Ca    8.8      14 Mar 2025 06:22  Phos  3.1     03-14  Mg     1.70     03-14    TPro  6.4  /  Alb  3.6  /  TBili  0.3  /  DBili  x   /  AST  14  /  ALT  7   /  AlkPhos  79  03-14      RADIOLOGY & ADDITIONAL STUDIES:  < from: Xray Chest 1 View-PORTABLE IMMEDIATE (Xray Chest 1 View-PORTABLE IMMEDIATE .) (03.12.25 @ 13:48) >      IMPRESSION:  Bilateral upper lobe opacities with 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  No acute pulmonary or cardiovascular disease.    < end of copied text >    Protein Calorie Malnutrition Present: [ ]mild [ ]moderate [ ]severe [ ]underweight [ ]morbid obesity  https://www.andeal.org/vault/2440/web/files/ONC/Table_Clinical%20Characteristics%20to%20Document%20Malnutrition-White%20JV%20et%20al%202012.pdf    Height (cm): 175.3 (03-11-25 @ 16:45), 175.26 (03-11-25 @ 11:01), 175.3 (02-21-25 @ 12:46)  Weight (kg): 63.5 (03-11-25 @ 16:45), 69.85 (03-11-25 @ 11:01), 65.9 (02-21-25 @ 12:46)  BMI (kg/m2): 20.7 (03-11-25 @ 16:45), 22.7 (03-11-25 @ 11:01), 21.4 (02-21-25 @ 12:46)    [ ]PPSV2 < or = 30%  [ ]significant weight loss [ ]poor nutritional intake [ ]anasarca[ ]Artificial Nutrition    Other REFERRALS:  [ ]Hospice  [ ]Child Life  [ ]Social Work  [ ]Case management [X ]Holistic Therapy     Goals of Care Document:

## 2025-03-14 NOTE — PROGRESS NOTE ADULT - PROBLEM SELECTOR PLAN 2
c/w zofran PRN  - Recommended brain imaging to r/o brain mets as patient also has headaches: patient was unable to tolerate mri to significant anxiety/claustrophobia; unable to perform mri with sedation as patient high risk given cancer

## 2025-03-14 NOTE — PROGRESS NOTE ADULT - ASSESSMENT
65 yo woman, former smoker (47py; quit 12/2024) with h/o pAfib (on Eliquis), Hypothyroidism, Scleroderma, h/o pericardial effusion s/p pericardiocentesis (dx in 12/2024; ? viral vs inflammatory, cytology negative for malignant cells, on Colchicine), Asthma/suspected COPD, and recently-dx SCLCa > dx in 2/2025, staging javier not yet completed and pt is tx naive and referred to [facility] from Med Onc clinic for expedited onc workup and urgent initiation of inpatient chemotherapy.    ACTIVE PROBLEMS  Recently dx SCLCa  GOC discussion, counseling  Acute hypoxic resp failure  h/o Asthma/COPD (former smoker)  Hyponatremia 2/2 SIADH  h/o Pericardial effusion (viral with positive Coxsackie B titers in 12/2024)  Cancer related pain, anxiety  Constipation  Hypothyroidism  pAfib  h/o Scleroderma  Hypercoag state  Immunocompromised 2/2 cancer, autoimmune disease    Recently dx SCLCa  GOC discussion, counseling  Staging w/u in progress- MRI Brain and a/p w/without contrast ordered for further eval (CT deferred as pt has h/o anaphylaxis to iodinated contrast)  Options for cancer tx TBD- pt likely to begin Carbo/Etop inpatient (without immunotherapy given pt's h/o AID)  Rad Onc also made aware of pt- plan is for projected 3-week course of RT to the lung mass  In GOC conversation on 3/13, pt expressed uncertainty about pursuing systemic cancer treatment, especially if her cancer is determined to be advanced, noting that she wanted to know the cancer stage before making any decisions about this; she also expressed interest in reviewing the MOLST form and indicated that she would discuss with her HCP Yogi before making any final decisions about code status  Long-term prognosis: gaurded; pt is full code    Acute hypoxic resp failure  h/o Asthma/COPD (former smoker)  Pt desats to mid 80s% on RA and requires 4-6L NC supplemental O2 to maintain O2 sats > 92%  Likely due to burden of disease in lungs + pericardial effusion  Continuing supplemental O2 with weaning as tolerated and supportive resp care prn  Continuing Advair 100-50mcg MDI BID  Continuing duonebs q6/prn   Continuing Nicotine patch 21mg daily (6 weeks)    Hyponatremia 2/2 SIADH  Na improved, 131 > 134 today; pt is relatively asymptomatic  SIADH confirmed by 3/12 urine lytes  Continuing 1L fluid restriction  Continuing Salt tabs 1gm BID    h/o Pericardial effusion in 12/2024 (? autoimmune vs viral with positive Coxsackie B titers in 12/2024)  12/23 pericardial fluid cytology negative for malignant cells  3/12 TTE with small to mod pericardial effusion, without tamponade physiology  Continuing Colchicine 0.6mg daily    Cancer related pain, anxiety  Continuing Morphine ER 30mg q12  Continuing Oxy IR 20mg q8/prn  Continuing Baclofen 5mg q8/prn  Appreciate BHT consult:  Ativan PO to 0.5mg q8/prn and 1mg PO prn (prior to MRI)    Constipation  Improved, pt s/p large bm on 3/13  Possibly due to opioids +/- AID  3/12 AXR negative for ileus/obstruction  Continuing bowel regimen (Miralax, Senna) for OIC prevention    Hypothyroidism: repeat TFTs ordered; continuing LT4 112mcg daily    h/o Scleroderma: Rheum consulted for mgmt recs    pAfib  Hypercoag state  Continuing Eliquis 5mg q12

## 2025-03-14 NOTE — PROGRESS NOTE ADULT - PROBLEM SELECTOR PLAN 7
For pain management     In the event of worsening symptoms, please contact the Palliative Medicine team via pager (if the patient is at [facility] #[phone] or if the patient is at [facility] #[phone]) The Geriatric and Palliative Medicine service has coverage 24 hours a day/ 7 days a week to provide medical recommendations regarding symptom management needs via telephone

## 2025-03-14 NOTE — PROGRESS NOTE ADULT - SUBJECTIVE AND OBJECTIVE BOX
SOLID TUMOR ONCOLOGY HOSPITALIST PROGRESS NOTE    S: No acute events overnight.  Pt was unable to tolerate MRIs yesterday evening despite receipt of Po Ativan prior to the study.  She agreed to reattempt completing the MRIs under anesthesia.  She reiterated that, while she knows chemotherapy if being offered, she is still uncertain about wanting chemotherapy and would like more information about her cancer stage before making this decision.    CURRENT MEDICATIONS  MEDICATIONS  (STANDING):  albuterol/ipratropium for Nebulization 3 milliLiter(s) Nebulizer every 6 hours  apixaban 5 milliGRAM(s) Oral every 12 hours  chlorhexidine 2% Cloths 1 Application(s) Topical <User Schedule>  clonazePAM  Tablet 1 milliGRAM(s) Oral two times a day  colchicine 0.6 milliGRAM(s) Oral daily  fluticasone propionate/ salmeterol 100-50 MICROgram(s) Diskus 1 Dose(s) Inhalation two times a day  influenza  Vaccine (HIGH DOSE) 0.5 milliLiter(s) IntraMuscular once  levothyroxine 112 MICROGram(s) Oral daily  lidocaine   4% Patch 1 Patch Transdermal daily  lidocaine   4% Patch 1 Patch Transdermal every 24 hours  methylnaltrexone Injectable 12 milliGRAM(s) SubCutaneous once  morphine ER Tablet 30 milliGRAM(s) Oral every 12 hours  naloxegol 25 milliGRAM(s) Oral daily  nicotine - 21 mG/24Hr(s) Patch 1 Patch Transdermal daily  pantoprazole    Tablet 40 milliGRAM(s) Oral every 12 hours  polyethylene glycol 3350 17 Gram(s) Oral daily  senna 2 Tablet(s) Oral at bedtime  sodium chloride 1 Gram(s) Oral two times a day  MEDICATIONS  (PRN):  aluminum hydroxide/magnesium hydroxide/simethicone Suspension 30 milliLiter(s) Oral every 4 hours PRN Dyspepsia  baclofen 5 milliGRAM(s) Oral every 8 hours PRN Musculoskeletal Pain  HYDROmorphone  Injectable 2 milliGRAM(s) IV Push every 4 hours PRN severe breakthrough pain  LORazepam     Tablet 1 milliGRAM(s) Oral every 8 hours PRN Anxiety  melatonin 3 milliGRAM(s) Oral at bedtime PRN Insomnia  ondansetron Injectable 4 milliGRAM(s) IV Push every 6 hours PRN Nausea and/or Vomiting  oxyCODONE    IR 30 milliGRAM(s) Oral every 4 hours PRN Severe Pain (7 - 10)  oxyCODONE    IR 20 milliGRAM(s) Oral every 4 hours PRN Moderate Pain (4 - 6)      PHYSICAL EXAM  T(C): 36.6 (03-14-25 @ 21:28), Max: 36.8 (03-14-25 @ 05:39)  HR: 74 (03-14-25 @ 21:28) (63 - 94)  BP: 127/75 (03-14-25 @ 21:28) (117/68 - 134/79)  RR: 18 (03-14-25 @ 21:28) (17 - 18)  SpO2: 99% (03-14-25 @ 21:28) (82% - 99%)    03-13-25 @ 07:01  -  03-14-25 @ 07:00  --------------------------------------------------------  IN: 200 mL / OUT: 300 mL / NET: -100 mL    03-14-25 @ 07:01  -  03-14-25 @ 23:05  --------------------------------------------------------  IN: 300 mL / OUT: 0 mL / NET: 300 mL  Non-toxic-appearing woman, sitting up in bed, mildly distressed, nad  Answering all questions appropriately with some conversational dyspnea, following commands  Anicteric sclera, no oral lesions/thrush  RRR, no m/r/g, heart sounds faint  Breaths somewhat labored, but not tachypnea; trace RLB crackles, no w/r  Abd soft/nt/nd, hypoactive bowel sounds  No peripheral edema  CN 2-12 grossly intact; no gross focal neuro deficits; pt moves all extremities spontaneously      LABS                        11.0   4.39  )-----------( 267      ( 14 Mar 2025 06:22 )             32.2     03-14    134[L]  |  98  |  10  ----------------------------<  89  3.3[L]   |  25  |  0.42[L]    Ca    8.8      14 Mar 2025 06:22  Phos  3.1     03-14  Mg     1.70     03-14    TPro  6.4  /  Alb  3.6  /  TBili  0.3  /  DBili  x   /  AST  14  /  ALT  7   /  AlkPhos  79  03-14      ADDITIONAL LABS  Dylan 98  Uosm 632  uric acid 2.4    PATHOLOGY  2/21 LN biopsies  1. LUNG, LEFT UPPER LOBE, ENDOBRONCHIAL FORCEPS BIOPSY AND TRANSBRONCHIAL   FNA   POSITIVE FOR MALIGNANT CELLS.   Small cell carcinoma.   Touch Imprint slide, cell block and core biopsy show a cellular specimen   composed of groups and numerous single-lying hyperchromatic cells with   irregular nuclear membranes, nuclear molding and scanty cytoplasm. Crush   artifact and mitotic figures present.   Immunocytochemical studies : The neoplastic cells are positive for Cam   5.2.hrmogranin, synaptophysin, INSM-1, TTF-1 while negative for CD45 and   P40. Ki-67 reveal a proliferation index of 90%.   Case discussed at the daily cytopathology  conference on   03/04/2025.   Case reported to Tumor Registry.   Dr. Webster was informed of the diagnosis via encrypted email on 03/04/2025.   2. LYMPH NODE, LEVEL 7, EBUS-GUIDED FNA   SUSPICIOUS FOR MALIGNANCY.   Suspicious for small cell carcinoma.   Cytology smears and cell block display a hypocellular specimen composed   of rare groups and few single-lying hyperchromatic cells with irregular   nuclear membranes, nuclear molding and scanty cytoplasm with crush   artifact, in a background of rare lymphocytes.   Case discussed at the daily cytopathology  conference on   03/04/2025.   3. LYMPH NODE, 4L, EBUS-GUIDED FNA   POSITIVE FOR MALIGNANT CELLS.   S mall cell carcinoma.   Cytology smears and cell block show a cellular specimen composed of   groups and single-lying hyperchromatic cells with irregular nuclear   membranes, nuclear molding and scanty cytoplasm, in a background of rare   lymphocytes.   Immunocytochemical studies: The neoplastic cells are positive for Cam   5.2, Chromogranin, synaptophysin, INSM-1, TTF-1 while negative for CD45   and P40.Ki-67 reveals a proliferative index of 90%.   In summary the cytomorphology and immunophenotype are consistent with   small cell carcinoma.   4. LUNG, LEFT UPPER LOBE, BRONCHOALVEOLAR LAVAGE   ATYPICAL FINDINGS   Cytology slide and cell block show hyperchromatic groups of poorly   preserved cells among reactive ciliated bronchial cells and alveolar   macrophages.   12/20/24 pericardial fluid cytology negative for malignant cells      MICROBIOLOGY  Abx: none on this admission    Cx Data  12/23 Coxsackie B titers positive 1:100-1:1000  12/23 Coxsackie A titers negative  12/23 EBV IgG positive  12/23 viral hep panel non-reactive  12/22 Quant Gold negative      PERTINENT RADIOLOGY  MRI Brain w/without contrast: pending  MRI a/p w/without contrast: pending  3/12 CXR: Bilateral upper lobe opacities with 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  No acute pulmonary or cardiovascular disease.  3/12 AXR: There is a nonobstructive gas pattern.  Large stool burden.  No masses or pathologic calcifications.  Surgical clips in the right upper quadrant.  Visualized lungs are clear.  3/12 TTE   1. Left ventricular systolic function is normal with an ejection fraction visually estimated at 65 to 70 %.   2. Small pericardial effusion noted adjacent to the posterior left ventricle, moderate pericardial effusion noted adjacentto the right atrium, moderate pericardial effusion noted adjacent to the anterior right ventricle and moderate pericardial effusion noted adjacent to the apex with no echocardiographic evidence of tamponade physiology.   3. The inferior vena cava is normal in size measuring 1.76 cm in diameter, (normal <2.1cm) with normal inspiratory collapse (normal >50%) consistent with normal right atrial pressure (~3, range 0-5mmHg).   4. No prior echocardiogram is available for comparison.  2/21 CXR: No complications post bronchoscopy.  Increased left upper lobe opacity (pneumonia?)  2/15 NC CT chest: Small circumferential pericardial effusion, mildly increased on today's   study when compared to the prior. Left suprahilar mediastinal mass in the prevascular space, extending into the aortopulmonary window, mediastinal adenopathy, appears more   pronounced on today's study though resolution is limited without   intravenous contrast. There is a new peripheral ill-defined 2.4 x 2.2 cm opacity in the left   upper lobe, differential includes infection/inflammation/neoplasm.  Dilated ascending aorta measuring up to 4 cm, similar to the prior study.  Consider contrast-enhanced CT of the chest if patient is able to tolerate   intravenous contrast.  2/15 B/L LE dopplers: negative for DVT.

## 2025-03-14 NOTE — BH CONSULTATION LIAISON ASSESSMENT NOTE - NSBHATTESTCOMMENTATTENDFT_PSY_A_CORE
Chart reviewed. Seen with resident. Presents as AA O x 3 but highly/visibly anxious. No acute safety concerns/SI/HI/AVH/paranoia. Agree with above assessment/recs. Will continue to follow

## 2025-03-14 NOTE — PROGRESS NOTE ADULT - PROBLEM SELECTOR PLAN 4
- recently diagnosed  - Unable to tolerate MRI A/P and brain d/t claustrophobia    - CT chest on 2/15- MEDIASTINUM AND JEREMIAS: Anterior subcarinal lymph node measures 1.8 cm in short axis, stable. There is a soft tissue density, measuring up to 4.2 cm in the prevascular space extending into the subcutaneous left main pulmonary artery region, this is more pronounced on today's study.   - Per onc, plan is to start inpatient chemo treatment and RT >> TBD as patient now expressing also second thoughts about whether she wants to pursue tx  - Follows with Dr. Hall at [facility]

## 2025-03-14 NOTE — BH CONSULTATION LIAISON ASSESSMENT NOTE - SUMMARY
63 /yo F w/ longstanding PPHx of anxiety previously on xanax, remote hx of inpatient psych hospitalization for suicidality, PMHx scleroderma with chronic pain manifestations, recent admission for cardiac tamponade, admitted for workup of SOB/chest pain leading to SCLC diagnosis, psychiatry consulted for worsening of anxiety I/s/o multiple medical complications and her son's recent death. Pt likely with multiple comorbid psychiatric disorders including SOURAV, MDD, claustrophobia, most prominent disorder at this time is worsening of anxiety, with panic-like symptoms. Given guarded medical prognosis, recent social stressors, appropriate to treat severe anxiety with standing benzodiazepine regimen at this time. Pt not amenable to starting SSRI treatment, but if becomes amenable may benefit from this as well.     PLAN  - defer obs status to primary team   - rec klonopin 1 mg BID standing for treatment of anxiety  - ativan 1 mg PO/IV/IM q8h for breakthrough severe anxiety  - please monitor respiratory status closely given pt is also on baclofen and standing oxycodone, with limited baseline pulmonary reserve. HOLD benzodiazepines if concerned for oversedation.   - pt demonstrates good understanding of her medical conditions, and able to verbalize reason for hospitalization, as well as risks that may come if she leaves AMA. She is agreeable to stay in the hospital for now, however on my evaluation she has demonstrated capacity to leave AMA.   - if pt attempting to leave AMA again, would recommend primary team to reassess pt's capacity at that time

## 2025-03-15 LAB
ALBUMIN SERPL ELPH-MCNC: 3.5 G/DL — SIGNIFICANT CHANGE UP (ref 3.3–5)
ALP SERPL-CCNC: 80 U/L — SIGNIFICANT CHANGE UP (ref 40–120)
ALT FLD-CCNC: 8 U/L — SIGNIFICANT CHANGE UP (ref 4–33)
ANION GAP SERPL CALC-SCNC: 11 MMOL/L — SIGNIFICANT CHANGE UP (ref 7–14)
AST SERPL-CCNC: 17 U/L — SIGNIFICANT CHANGE UP (ref 4–32)
BASOPHILS # BLD AUTO: 0.04 K/UL — SIGNIFICANT CHANGE UP (ref 0–0.2)
BASOPHILS NFR BLD AUTO: 0.9 % — SIGNIFICANT CHANGE UP (ref 0–2)
BILIRUB SERPL-MCNC: 0.3 MG/DL — SIGNIFICANT CHANGE UP (ref 0.2–1.2)
BUN SERPL-MCNC: 13 MG/DL — SIGNIFICANT CHANGE UP (ref 7–23)
CALCIUM SERPL-MCNC: 8.5 MG/DL — SIGNIFICANT CHANGE UP (ref 8.4–10.5)
CHLORIDE SERPL-SCNC: 97 MMOL/L — LOW (ref 98–107)
CO2 SERPL-SCNC: 25 MMOL/L — SIGNIFICANT CHANGE UP (ref 22–31)
CREAT SERPL-MCNC: 0.48 MG/DL — LOW (ref 0.5–1.3)
EGFR: 104 ML/MIN/1.73M2 — SIGNIFICANT CHANGE UP
EGFR: 104 ML/MIN/1.73M2 — SIGNIFICANT CHANGE UP
EOSINOPHIL # BLD AUTO: 0.13 K/UL — SIGNIFICANT CHANGE UP (ref 0–0.5)
EOSINOPHIL NFR BLD AUTO: 2.9 % — SIGNIFICANT CHANGE UP (ref 0–6)
GLUCOSE SERPL-MCNC: 85 MG/DL — SIGNIFICANT CHANGE UP (ref 70–99)
HCT VFR BLD CALC: 33.1 % — LOW (ref 34.5–45)
HGB BLD-MCNC: 11.1 G/DL — LOW (ref 11.5–15.5)
IANC: 2.09 K/UL — SIGNIFICANT CHANGE UP (ref 1.8–7.4)
IMM GRANULOCYTES NFR BLD AUTO: 0.2 % — SIGNIFICANT CHANGE UP (ref 0–0.9)
LYMPHOCYTES # BLD AUTO: 1.76 K/UL — SIGNIFICANT CHANGE UP (ref 1–3.3)
LYMPHOCYTES # BLD AUTO: 39.7 % — SIGNIFICANT CHANGE UP (ref 13–44)
MAGNESIUM SERPL-MCNC: 1.9 MG/DL — SIGNIFICANT CHANGE UP (ref 1.6–2.6)
MCHC RBC-ENTMCNC: 29.8 PG — SIGNIFICANT CHANGE UP (ref 27–34)
MCHC RBC-ENTMCNC: 33.5 G/DL — SIGNIFICANT CHANGE UP (ref 32–36)
MCV RBC AUTO: 89 FL — SIGNIFICANT CHANGE UP (ref 80–100)
MONOCYTES # BLD AUTO: 0.4 K/UL — SIGNIFICANT CHANGE UP (ref 0–0.9)
MONOCYTES NFR BLD AUTO: 9 % — SIGNIFICANT CHANGE UP (ref 2–14)
NEUTROPHILS # BLD AUTO: 2.09 K/UL — SIGNIFICANT CHANGE UP (ref 1.8–7.4)
NEUTROPHILS NFR BLD AUTO: 47.3 % — SIGNIFICANT CHANGE UP (ref 43–77)
NRBC # BLD AUTO: 0 K/UL — SIGNIFICANT CHANGE UP (ref 0–0)
NRBC # FLD: 0 K/UL — SIGNIFICANT CHANGE UP (ref 0–0)
NRBC BLD AUTO-RTO: 0 /100 WBCS — SIGNIFICANT CHANGE UP (ref 0–0)
PHOSPHATE SERPL-MCNC: 3.7 MG/DL — SIGNIFICANT CHANGE UP (ref 2.5–4.5)
PLATELET # BLD AUTO: 262 K/UL — SIGNIFICANT CHANGE UP (ref 150–400)
POTASSIUM SERPL-MCNC: 3.7 MMOL/L — SIGNIFICANT CHANGE UP (ref 3.5–5.3)
POTASSIUM SERPL-SCNC: 3.7 MMOL/L — SIGNIFICANT CHANGE UP (ref 3.5–5.3)
PROT SERPL-MCNC: 6.2 G/DL — SIGNIFICANT CHANGE UP (ref 6–8.3)
RBC # BLD: 3.72 M/UL — LOW (ref 3.8–5.2)
RBC # FLD: 12.3 % — SIGNIFICANT CHANGE UP (ref 10.3–14.5)
SODIUM SERPL-SCNC: 133 MMOL/L — LOW (ref 135–145)
WBC # BLD: 4.43 K/UL — SIGNIFICANT CHANGE UP (ref 3.8–10.5)
WBC # FLD AUTO: 4.43 K/UL — SIGNIFICANT CHANGE UP (ref 3.8–10.5)

## 2025-03-15 PROCEDURE — 99232 SBSQ HOSP IP/OBS MODERATE 35: CPT

## 2025-03-15 RX ORDER — SODIUM CHLORIDE 0.65 %
1 AEROSOL, SPRAY (ML) NASAL THREE TIMES A DAY
Refills: 0 | Status: DISCONTINUED | OUTPATIENT
Start: 2025-03-15 | End: 2025-03-31

## 2025-03-15 RX ADMIN — Medication 30 MILLIGRAM(S): at 08:00

## 2025-03-15 RX ADMIN — Medication 1 GRAM(S): at 10:45

## 2025-03-15 RX ADMIN — Medication 30 MILLIGRAM(S): at 07:18

## 2025-03-15 RX ADMIN — OXYCODONE HYDROCHLORIDE 30 MILLIGRAM(S): 30 TABLET ORAL at 14:25

## 2025-03-15 RX ADMIN — Medication 2 MILLIGRAM(S): at 23:38

## 2025-03-15 RX ADMIN — NICOTINE POLACRILEX 1 PATCH: 4 GUM, CHEWING ORAL at 12:45

## 2025-03-15 RX ADMIN — APIXABAN 5 MILLIGRAM(S): 2.5 TABLET, FILM COATED ORAL at 10:44

## 2025-03-15 RX ADMIN — OXYCODONE HYDROCHLORIDE 30 MILLIGRAM(S): 30 TABLET ORAL at 22:18

## 2025-03-15 RX ADMIN — OXYCODONE HYDROCHLORIDE 30 MILLIGRAM(S): 30 TABLET ORAL at 17:56

## 2025-03-15 RX ADMIN — CLONAZEPAM 1 MILLIGRAM(S): 0.5 TABLET ORAL at 18:46

## 2025-03-15 RX ADMIN — NICOTINE POLACRILEX 1 PATCH: 4 GUM, CHEWING ORAL at 18:34

## 2025-03-15 RX ADMIN — OXYCODONE HYDROCHLORIDE 30 MILLIGRAM(S): 30 TABLET ORAL at 03:37

## 2025-03-15 RX ADMIN — OXYCODONE HYDROCHLORIDE 30 MILLIGRAM(S): 30 TABLET ORAL at 13:55

## 2025-03-15 RX ADMIN — APIXABAN 5 MILLIGRAM(S): 2.5 TABLET, FILM COATED ORAL at 17:53

## 2025-03-15 RX ADMIN — Medication 1 SPRAY(S): at 13:02

## 2025-03-15 RX ADMIN — Medication 30 MILLIGRAM(S): at 18:22

## 2025-03-15 RX ADMIN — Medication 1 SPRAY(S): at 21:11

## 2025-03-15 RX ADMIN — OXYCODONE HYDROCHLORIDE 30 MILLIGRAM(S): 30 TABLET ORAL at 23:18

## 2025-03-15 RX ADMIN — Medication 1 GRAM(S): at 17:53

## 2025-03-15 RX ADMIN — NICOTINE POLACRILEX 1 PATCH: 4 GUM, CHEWING ORAL at 06:05

## 2025-03-15 RX ADMIN — OXYCODONE HYDROCHLORIDE 30 MILLIGRAM(S): 30 TABLET ORAL at 02:37

## 2025-03-15 RX ADMIN — CLONAZEPAM 1 MILLIGRAM(S): 0.5 TABLET ORAL at 06:01

## 2025-03-15 RX ADMIN — Medication 40 MILLIGRAM(S): at 17:53

## 2025-03-15 RX ADMIN — NICOTINE POLACRILEX 1 PATCH: 4 GUM, CHEWING ORAL at 13:02

## 2025-03-15 RX ADMIN — Medication 1 APPLICATION(S): at 06:08

## 2025-03-15 RX ADMIN — Medication 40 MILLIGRAM(S): at 10:45

## 2025-03-15 RX ADMIN — Medication 30 MILLIGRAM(S): at 17:52

## 2025-03-15 RX ADMIN — OXYCODONE HYDROCHLORIDE 30 MILLIGRAM(S): 30 TABLET ORAL at 18:30

## 2025-03-15 RX ADMIN — COLCHICINE 0.6 MILLIGRAM(S): 0.6 TABLET, FILM COATED ORAL at 13:02

## 2025-03-15 RX ADMIN — Medication 112 MICROGRAM(S): at 06:02

## 2025-03-15 NOTE — CHART NOTE - NSCHARTNOTEFT_GEN_A_CORE
Brief f/u from Geriatrics and Palliative Medicine Team, chart reviewed, pt used PO oxycodone 30 mg prn x4 and IV dilaudid 2 mg x1 for breakthrough pain in the past 24 hours. primary team contacted who notes pt's pain to be well controlled on current regimen. No new recommendations at this time.    Delilah Suarez MD  [facility] Team Consults  Please call if we can be of assistance, [phone]

## 2025-03-15 NOTE — PROGRESS NOTE ADULT - SUBJECTIVE AND OBJECTIVE BOX
Dr. Emelia Constantino  Pager [phone]    PROGRESS NOTE:     Patient is a 64y old  Female who presents with a chief complaint of Chest pain     (14 Mar 2025 15:05)      SUBJECTIVE / OVERNIGHT EVENTS: pt denies chest pain or sob , c/o dry nose  ADDITIONAL REVIEW OF SYSTEMS: afebrile     MEDICATIONS  (STANDING):  albuterol/ipratropium for Nebulization 3 milliLiter(s) Nebulizer every 6 hours  apixaban 5 milliGRAM(s) Oral every 12 hours  chlorhexidine 2% Cloths 1 Application(s) Topical <User Schedule>  clonazePAM  Tablet 1 milliGRAM(s) Oral two times a day  colchicine 0.6 milliGRAM(s) Oral daily  fluticasone propionate/ salmeterol 100-50 MICROgram(s) Diskus 1 Dose(s) Inhalation two times a day  influenza  Vaccine (HIGH DOSE) 0.5 milliLiter(s) IntraMuscular once  levothyroxine 112 MICROGram(s) Oral daily  lidocaine   4% Patch 1 Patch Transdermal daily  lidocaine   4% Patch 1 Patch Transdermal every 24 hours  methylnaltrexone Injectable 12 milliGRAM(s) SubCutaneous once  morphine ER Tablet 30 milliGRAM(s) Oral every 12 hours  naloxegol 25 milliGRAM(s) Oral daily  nicotine - 21 mG/24Hr(s) Patch 1 Patch Transdermal daily  pantoprazole    Tablet 40 milliGRAM(s) Oral every 12 hours  polyethylene glycol 3350 17 Gram(s) Oral daily  senna 2 Tablet(s) Oral at bedtime  sodium chloride 1 Gram(s) Oral two times a day  sodium chloride 0.65% Nasal 1 Spray(s) Both Nostrils three times a day    MEDICATIONS  (PRN):  aluminum hydroxide/magnesium hydroxide/simethicone Suspension 30 milliLiter(s) Oral every 4 hours PRN Dyspepsia  baclofen 5 milliGRAM(s) Oral every 8 hours PRN Musculoskeletal Pain  HYDROmorphone  Injectable 2 milliGRAM(s) IV Push every 4 hours PRN severe breakthrough pain  LORazepam     Tablet 1 milliGRAM(s) Oral every 8 hours PRN Anxiety  melatonin 3 milliGRAM(s) Oral at bedtime PRN Insomnia  ondansetron Injectable 4 milliGRAM(s) IV Push every 6 hours PRN Nausea and/or Vomiting  oxyCODONE    IR 30 milliGRAM(s) Oral every 4 hours PRN Severe Pain (7 - 10)  oxyCODONE    IR 20 milliGRAM(s) Oral every 4 hours PRN Moderate Pain (4 - 6)      CAPILLARY BLOOD GLUCOSE        I&O's Summary    14 Mar 2025 07:01  -  15 Mar 2025 07:00  --------------------------------------------------------  IN: 450 mL / OUT: 500 mL / NET: -50 mL        PHYSICAL EXAM:  Vital Signs Last 24 Hrs  T(C): 36.3 (15 Mar 2025 12:24), Max: 36.7 (15 Mar 2025 05:25)  T(F): 97.4 (15 Mar 2025 12:24), Max: 98 (15 Mar 2025 05:25)  HR: 78 (15 Mar 2025 12:24) (69 - 81)  BP: 118/67 (15 Mar 2025 12:24) (114/67 - 133/63)  BP(mean): --  RR: 17 (15 Mar 2025 12:24) (17 - 18)  SpO2: 98% (15 Mar 2025 12:24) (97% - 100%)    Parameters below as of 15 Mar 2025 12:24  Patient On (Oxygen Delivery Method): nasal cannula      CONSTITUTIONAL: nad, on NC  RESPIRATORY: decreased air entry basilar crackle   CARDIOVASCULAR: Regular rate and rhythm, normal S1 and S2, no murmur/rub/gallop; No lower extremity edema; Peripheral pulses are 2+ bilaterally  ABDOMEN: Nontender to palpation, normoactive bowel sounds, no rebound/guarding; No hepatosplenomegaly  MUSCULOSKELETAL: no clubbing or cyanosis of digits; no joint swelling or tenderness to palpation  PSYCH: A+O to person, place, and time; affect appropriate    LABS:                        11.1   4.43  )-----------( 262      ( 15 Mar 2025 07:02 )             33.1     03-15    133[L]  |  97[L]  |  13  ----------------------------<  85  3.7   |  25  |  0.48[L]    Ca    8.5      15 Mar 2025 07:02  Phos  3.7     03-15  Mg     1.90     03-15    TPro  6.2  /  Alb  3.5  /  TBili  0.3  /  DBili  x   /  AST  17  /  ALT  8   /  AlkPhos  80  03-15          Urinalysis Basic - ( 15 Mar 2025 07:02 )    Color: x / Appearance: x / SG: x / pH: x  Gluc: 85 mg/dL / Ketone: x  / Bili: x / Urobili: x   Blood: x / Protein: x / Nitrite: x   Leuk Esterase: x / RBC: x / WBC x   Sq Epi: x / Non Sq Epi: x / Bacteria: x      RADIOLOGY & ADDITIONAL TESTS:  Results Reviewed:   Imaging Personally Reviewed:  < from: Xray Chest 1 View-PORTABLE IMMEDIATE (Xray Chest 1 View-PORTABLE IMMEDIATE .) (03.12.25 @ 13:48) >  IMPRESSION:  Bilateral upper lobe opacities with 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  No acute pulmonary or cardiovascular disease.      Electrocardiogram Personally Reviewed:    COORDINATION OF CARE:  Care Discussed with Consultants/Other Providers [Y/N]:  Prior or Outpatient Records Reviewed [Y/N]:

## 2025-03-15 NOTE — BH CONSULTATION LIAISON PROGRESS NOTE - NSBHASSESSMENTFT_PSY_ALL_CORE
63 /yo F w/ longstanding PPHx of anxiety previously on xanax, remote hx of inpatient psych hospitalization for suicidality, PMHx scleroderma with chronic pain manifestations, recent admission for cardiac tamponade, admitted for workup of SOB/chest pain leading to SCLC diagnosis, psychiatry consulted for worsening of anxiety I/s/o multiple medical complications and her son's recent death. Pt likely with multiple comorbid psychiatric disorders including SOURAV, MDD, claustrophobia, most prominent disorder at this time is worsening of anxiety, with panic-like symptoms. Given guarded medical prognosis, recent social stressors, appropriate to treat severe anxiety with standing benzodiazepine regimen at this time. Pt not amenable to starting SSRI treatment, but if becomes amenable may benefit from this as well.     3/15: Reports improved anxiety. No reported changes in breathing. Appears to be tolerating Klonopin. Will continue.    PLAN  - defer obs status to primary team   - c/w klonopin 1 mg BID standing for treatment of anxiety  - ativan 1 mg PO/IV/IM q8h for breakthrough severe anxiety  - please monitor respiratory status closely given pt is also on baclofen and standing oxycodone, with limited baseline pulmonary reserve. HOLD benzodiazepines if concerned for oversedation.   - pt demonstrates good understanding of her medical conditions, and able to verbalize reason for hospitalization, as well as risks that may come if she leaves AMA. She is agreeable to stay in the hospital for now, however on my evaluation she has demonstrated capacity to leave AMA.   - if pt attempting to leave AMA again, would recommend primary team to reassess pt's capacity at that time

## 2025-03-15 NOTE — BH CONSULTATION LIAISON PROGRESS NOTE - NSBHFUPINTERVALHXFT_PSY_A_CORE
Chart reviewed. VSS. No PRNs given for agitation. PRNs for pain given.    Patient seen at bedside. Reports feeling calmer with Klonopin. Reports not feeling "euphoria" which she experienced with Xanax. Denies increased difficulty breathing. States her breathing is similar to before. Fair sleep. Reports not having bad dreams about her son last night. Denies SI/HI/I/P.

## 2025-03-15 NOTE — PROGRESS NOTE ADULT - ASSESSMENT
65 yo woman, former smoker (47py; quit 12/2024) with h/o pAfib (on Eliquis), Hypothyroidism, Scleroderma, h/o pericardial effusion s/p pericardiocentesis (dx in 12/2024; ? viral vs inflammatory, cytology negative for malignant cells, on Colchicine), Asthma/suspected COPD, and recently-dx SCLCa > dx in 2/2025, staging javier not yet completed and pt is tx naive and referred to [facility] from Med Onc clinic for expedited onc workup and urgent initiation of inpatient chemotherapy.    ACTIVE PROBLEMS  Recently dx SCLCa  GOC discussion, counseling  Acute hypoxic resp failure  h/o Asthma/COPD (former smoker)  Hyponatremia 2/2 SIADH  h/o Pericardial effusion (viral with positive Coxsackie B titers in 12/2024)  Cancer related pain, anxiety  Constipation  Hypothyroidism  pAfib  h/o Scleroderma  Hypercoag state  Immunocompromised 2/2 cancer, autoimmune disease    Recently dx SCLCa  GOC discussion, counseling  Staging w/u in progress- MRI Brain and a/p w/without contrast ordered for further eval (CT deferred as pt has h/o anaphylaxis to iodinated contrast), plan for MRI under anesthesia next week  Options for cancer tx TBD- pt likely to begin Carbo/Etop inpatient (without immunotherapy given pt's h/o AID)  Rad Onc also made aware of pt- plan is for projected 3-week course of RT to the lung mass  In GOC conversation on 3/13, pt expressed uncertainty about pursuing systemic cancer treatment, especially if her cancer is determined to be advanced, noting that she wanted to know the cancer stage before making any decisions about this; she also expressed interest in reviewing the MOLST form and indicated that she would discuss with her HCP Yogi before making any final decisions about code status  Long-term prognosis: guarded; pt is full code    Acute hypoxic resp failure  h/o Asthma/COPD (former smoker)  Pt desats to mid 80s% on RA and requires 4-6L NC supplemental O2 to maintain O2 sats > 92%  Likely due to burden of disease in lungs + pericardial effusion  Continuing supplemental O2 with weaning as tolerated and supportive resp care prn  Continuing Advair 100-50mcg MDI BID  Continuing duonebs q6/prn   Continuing Nicotine patch 21mg daily (6 weeks)  Nasal spray ordered     Hyponatremia 2/2 SIADH  Na improved, 131 > 133 today; pt is relatively asymptomatic  SIADH confirmed by 3/12 urine lytes  Continuing 1L fluid restriction  Continuing Salt tabs 1gm BID    h/o Pericardial effusion in 12/2024 (? autoimmune vs viral with positive Coxsackie B titers in 12/2024)  12/23 pericardial fluid cytology negative for malignant cells  3/12 TTE with small to mod pericardial effusion, without tamponade physiology  Continuing Colchicine 0.6mg daily    Cancer related pain, anxiety  Continuing Morphine ER 30mg q12  Continuing Oxy IR 20mg q8/prn  Continuing Baclofen 5mg q8/prn  Appreciate BHT consult:  Ativan PO to 0.5mg q8/prn and 1mg PO prn (prior to MRI)    Constipation  Improved, pt s/p large bm on 3/13  Possibly due to opioids +/- AID  3/12 AXR negative for ileus/obstruction  Continuing bowel regimen (Miralax, Senna) for OIC prevention    Hypothyroidism: repeat TFTs ordered; continuing LT4 112mcg daily    h/o Scleroderma: Rheum consulted for mgmt recs    pAfib  Hypercoag state  Continuing Eliquis 5mg q12

## 2025-03-16 LAB
ALBUMIN SERPL ELPH-MCNC: 3.7 G/DL — SIGNIFICANT CHANGE UP (ref 3.3–5)
ALP SERPL-CCNC: 86 U/L — SIGNIFICANT CHANGE UP (ref 40–120)
ALT FLD-CCNC: 7 U/L — SIGNIFICANT CHANGE UP (ref 4–33)
ANION GAP SERPL CALC-SCNC: 9 MMOL/L — SIGNIFICANT CHANGE UP (ref 7–14)
AST SERPL-CCNC: 17 U/L — SIGNIFICANT CHANGE UP (ref 4–32)
BASOPHILS # BLD AUTO: 0.03 K/UL — SIGNIFICANT CHANGE UP (ref 0–0.2)
BASOPHILS NFR BLD AUTO: 0.7 % — SIGNIFICANT CHANGE UP (ref 0–2)
BILIRUB SERPL-MCNC: 0.3 MG/DL — SIGNIFICANT CHANGE UP (ref 0.2–1.2)
BUN SERPL-MCNC: 13 MG/DL — SIGNIFICANT CHANGE UP (ref 7–23)
CALCIUM SERPL-MCNC: 8.9 MG/DL — SIGNIFICANT CHANGE UP (ref 8.4–10.5)
CHLORIDE SERPL-SCNC: 100 MMOL/L — SIGNIFICANT CHANGE UP (ref 98–107)
CO2 SERPL-SCNC: 27 MMOL/L — SIGNIFICANT CHANGE UP (ref 22–31)
CREAT SERPL-MCNC: 0.47 MG/DL — LOW (ref 0.5–1.3)
EGFR: 105 ML/MIN/1.73M2 — SIGNIFICANT CHANGE UP
EGFR: 105 ML/MIN/1.73M2 — SIGNIFICANT CHANGE UP
EOSINOPHIL # BLD AUTO: 0.14 K/UL — SIGNIFICANT CHANGE UP (ref 0–0.5)
EOSINOPHIL NFR BLD AUTO: 3.3 % — SIGNIFICANT CHANGE UP (ref 0–6)
GLUCOSE SERPL-MCNC: 91 MG/DL — SIGNIFICANT CHANGE UP (ref 70–99)
HCT VFR BLD CALC: 33.8 % — LOW (ref 34.5–45)
HGB BLD-MCNC: 11.5 G/DL — SIGNIFICANT CHANGE UP (ref 11.5–15.5)
IANC: 2.31 K/UL — SIGNIFICANT CHANGE UP (ref 1.8–7.4)
IMM GRANULOCYTES NFR BLD AUTO: 0.2 % — SIGNIFICANT CHANGE UP (ref 0–0.9)
LYMPHOCYTES # BLD AUTO: 1.41 K/UL — SIGNIFICANT CHANGE UP (ref 1–3.3)
LYMPHOCYTES # BLD AUTO: 32.9 % — SIGNIFICANT CHANGE UP (ref 13–44)
MAGNESIUM SERPL-MCNC: 2 MG/DL — SIGNIFICANT CHANGE UP (ref 1.6–2.6)
MCHC RBC-ENTMCNC: 29.7 PG — SIGNIFICANT CHANGE UP (ref 27–34)
MCHC RBC-ENTMCNC: 34 G/DL — SIGNIFICANT CHANGE UP (ref 32–36)
MCV RBC AUTO: 87.3 FL — SIGNIFICANT CHANGE UP (ref 80–100)
MONOCYTES # BLD AUTO: 0.38 K/UL — SIGNIFICANT CHANGE UP (ref 0–0.9)
MONOCYTES NFR BLD AUTO: 8.9 % — SIGNIFICANT CHANGE UP (ref 2–14)
NEUTROPHILS # BLD AUTO: 2.31 K/UL — SIGNIFICANT CHANGE UP (ref 1.8–7.4)
NEUTROPHILS NFR BLD AUTO: 54 % — SIGNIFICANT CHANGE UP (ref 43–77)
NRBC # BLD AUTO: 0 K/UL — SIGNIFICANT CHANGE UP (ref 0–0)
NRBC # FLD: 0 K/UL — SIGNIFICANT CHANGE UP (ref 0–0)
NRBC BLD AUTO-RTO: 0 /100 WBCS — SIGNIFICANT CHANGE UP (ref 0–0)
PHOSPHATE SERPL-MCNC: 3.6 MG/DL — SIGNIFICANT CHANGE UP (ref 2.5–4.5)
PLATELET # BLD AUTO: 264 K/UL — SIGNIFICANT CHANGE UP (ref 150–400)
POTASSIUM SERPL-MCNC: 3.9 MMOL/L — SIGNIFICANT CHANGE UP (ref 3.5–5.3)
POTASSIUM SERPL-SCNC: 3.9 MMOL/L — SIGNIFICANT CHANGE UP (ref 3.5–5.3)
PROT SERPL-MCNC: 6.5 G/DL — SIGNIFICANT CHANGE UP (ref 6–8.3)
RBC # BLD: 3.87 M/UL — SIGNIFICANT CHANGE UP (ref 3.8–5.2)
RBC # FLD: 12.2 % — SIGNIFICANT CHANGE UP (ref 10.3–14.5)
SODIUM SERPL-SCNC: 136 MMOL/L — SIGNIFICANT CHANGE UP (ref 135–145)
WBC # BLD: 4.28 K/UL — SIGNIFICANT CHANGE UP (ref 3.8–10.5)
WBC # FLD AUTO: 4.28 K/UL — SIGNIFICANT CHANGE UP (ref 3.8–10.5)

## 2025-03-16 PROCEDURE — 99232 SBSQ HOSP IP/OBS MODERATE 35: CPT

## 2025-03-16 RX ADMIN — APIXABAN 5 MILLIGRAM(S): 2.5 TABLET, FILM COATED ORAL at 21:07

## 2025-03-16 RX ADMIN — Medication 1 LOZENGE: at 16:21

## 2025-03-16 RX ADMIN — CLONAZEPAM 1 MILLIGRAM(S): 0.5 TABLET ORAL at 10:24

## 2025-03-16 RX ADMIN — OXYCODONE HYDROCHLORIDE 30 MILLIGRAM(S): 30 TABLET ORAL at 10:34

## 2025-03-16 RX ADMIN — OXYCODONE HYDROCHLORIDE 30 MILLIGRAM(S): 30 TABLET ORAL at 15:30

## 2025-03-16 RX ADMIN — OXYCODONE HYDROCHLORIDE 30 MILLIGRAM(S): 30 TABLET ORAL at 14:44

## 2025-03-16 RX ADMIN — Medication 112 MICROGRAM(S): at 10:25

## 2025-03-16 RX ADMIN — NICOTINE POLACRILEX 1 PATCH: 4 GUM, CHEWING ORAL at 06:15

## 2025-03-16 RX ADMIN — CLONAZEPAM 1 MILLIGRAM(S): 0.5 TABLET ORAL at 21:07

## 2025-03-16 RX ADMIN — Medication 1 SPRAY(S): at 21:08

## 2025-03-16 RX ADMIN — Medication 1 APPLICATION(S): at 10:27

## 2025-03-16 RX ADMIN — NICOTINE POLACRILEX 1 PATCH: 4 GUM, CHEWING ORAL at 12:39

## 2025-03-16 RX ADMIN — COLCHICINE 0.6 MILLIGRAM(S): 0.6 TABLET, FILM COATED ORAL at 11:18

## 2025-03-16 RX ADMIN — Medication 40 MILLIGRAM(S): at 10:25

## 2025-03-16 RX ADMIN — NICOTINE POLACRILEX 1 PATCH: 4 GUM, CHEWING ORAL at 18:44

## 2025-03-16 RX ADMIN — OXYCODONE HYDROCHLORIDE 30 MILLIGRAM(S): 30 TABLET ORAL at 19:22

## 2025-03-16 RX ADMIN — Medication 2 MILLIGRAM(S): at 00:38

## 2025-03-16 RX ADMIN — APIXABAN 5 MILLIGRAM(S): 2.5 TABLET, FILM COATED ORAL at 11:17

## 2025-03-16 RX ADMIN — Medication 2 MILLIGRAM(S): at 17:00

## 2025-03-16 RX ADMIN — OXYCODONE HYDROCHLORIDE 30 MILLIGRAM(S): 30 TABLET ORAL at 11:10

## 2025-03-16 RX ADMIN — NICOTINE POLACRILEX 1 PATCH: 4 GUM, CHEWING ORAL at 12:30

## 2025-03-16 RX ADMIN — Medication 30 MILLIGRAM(S): at 23:29

## 2025-03-16 RX ADMIN — Medication 1 GRAM(S): at 11:17

## 2025-03-16 RX ADMIN — Medication 2 MILLIGRAM(S): at 23:24

## 2025-03-16 RX ADMIN — Medication 40 MILLIGRAM(S): at 21:07

## 2025-03-16 RX ADMIN — OXYCODONE HYDROCHLORIDE 30 MILLIGRAM(S): 30 TABLET ORAL at 19:52

## 2025-03-16 RX ADMIN — Medication 2 MILLIGRAM(S): at 16:28

## 2025-03-16 RX ADMIN — Medication 1 GRAM(S): at 21:08

## 2025-03-16 RX ADMIN — Medication 1 DOSE(S): at 10:27

## 2025-03-16 RX ADMIN — Medication 2 MILLIGRAM(S): at 22:24

## 2025-03-16 RX ADMIN — Medication 4 MILLIGRAM(S): at 19:22

## 2025-03-16 RX ADMIN — Medication 30 MILLIGRAM(S): at 10:24

## 2025-03-16 NOTE — PROGRESS NOTE ADULT - SUBJECTIVE AND OBJECTIVE BOX
Dr. Emelia Constantino  Pager [phone]    PROGRESS NOTE:     Patient is a 64y old  Female who presents with a chief complaint of Shortness of breath and chest pain (15 Mar 2025 15:15)      SUBJECTIVE / OVERNIGHT EVENTS: denies chest pain or sob   ADDITIONAL REVIEW OF SYSTEMS: afebrile     MEDICATIONS  (STANDING):  albuterol/ipratropium for Nebulization 3 milliLiter(s) Nebulizer every 6 hours  apixaban 5 milliGRAM(s) Oral every 12 hours  chlorhexidine 2% Cloths 1 Application(s) Topical <User Schedule>  clonazePAM  Tablet 1 milliGRAM(s) Oral two times a day  colchicine 0.6 milliGRAM(s) Oral daily  fluticasone propionate/ salmeterol 100-50 MICROgram(s) Diskus 1 Dose(s) Inhalation two times a day  influenza  Vaccine (HIGH DOSE) 0.5 milliLiter(s) IntraMuscular once  levothyroxine 112 MICROGram(s) Oral daily  lidocaine   4% Patch 1 Patch Transdermal daily  lidocaine   4% Patch 1 Patch Transdermal every 24 hours  methylnaltrexone Injectable 12 milliGRAM(s) SubCutaneous once  morphine ER Tablet 30 milliGRAM(s) Oral every 12 hours  naloxegol 25 milliGRAM(s) Oral daily  nicotine - 21 mG/24Hr(s) Patch 1 Patch Transdermal daily  pantoprazole    Tablet 40 milliGRAM(s) Oral every 12 hours  polyethylene glycol 3350 17 Gram(s) Oral daily  senna 2 Tablet(s) Oral at bedtime  sodium chloride 1 Gram(s) Oral two times a day  sodium chloride 0.65% Nasal 1 Spray(s) Both Nostrils three times a day    MEDICATIONS  (PRN):  aluminum hydroxide/magnesium hydroxide/simethicone Suspension 30 milliLiter(s) Oral every 4 hours PRN Dyspepsia  baclofen 5 milliGRAM(s) Oral every 8 hours PRN Musculoskeletal Pain  HYDROmorphone  Injectable 2 milliGRAM(s) IV Push every 4 hours PRN severe breakthrough pain  LORazepam     Tablet 1 milliGRAM(s) Oral every 8 hours PRN Anxiety  melatonin 3 milliGRAM(s) Oral at bedtime PRN Insomnia  ondansetron Injectable 4 milliGRAM(s) IV Push every 6 hours PRN Nausea and/or Vomiting  oxyCODONE    IR 30 milliGRAM(s) Oral every 4 hours PRN Severe Pain (7 - 10)  oxyCODONE    IR 20 milliGRAM(s) Oral every 4 hours PRN Moderate Pain (4 - 6)      CAPILLARY BLOOD GLUCOSE        I&O's Summary    15 Mar 2025 07:01  -  16 Mar 2025 07:00  --------------------------------------------------------  IN: 700 mL / OUT: 400 mL / NET: 300 mL        PHYSICAL EXAM:  Vital Signs Last 24 Hrs  T(C): 36.5 (16 Mar 2025 13:02), Max: 36.8 (16 Mar 2025 07:20)  T(F): 97.7 (16 Mar 2025 13:02), Max: 98.2 (16 Mar 2025 07:20)  HR: 83 (16 Mar 2025 13:02) (76 - 84)  BP: 113/71 (16 Mar 2025 13:02) (113/71 - 129/71)  BP(mean): --  RR: 17 (16 Mar 2025 13:02) (17 - 18)  SpO2: 92% (16 Mar 2025 13:02) (92% - 95%)    Parameters below as of 16 Mar 2025 13:02  Patient On (Oxygen Delivery Method): nasal cannula      CONSTITUTIONAL: nad, on NC  RESPIRATORY: decreased air entry basilar crackle   CARDIOVASCULAR: Regular rate and rhythm, normal S1 and S2, no murmur/rub/gallop; No lower extremity edema; Peripheral pulses are 2+ bilaterally  ABDOMEN: Nontender to palpation, normoactive bowel sounds, no rebound/guarding; No hepatosplenomegaly  MUSCULOSKELETAL: no clubbing or cyanosis of digits; no joint swelling or tenderness to palpation  PSYCH: A+O to person, place, and time; affect appropriate  LABS:                        11.1   4.43  )-----------( 262      ( 15 Mar 2025 07:02 )             33.1     03-15    133[L]  |  97[L]  |  13  ----------------------------<  85  3.7   |  25  |  0.48[L]    Ca    8.5      15 Mar 2025 07:02  Phos  3.7     03-15  Mg     1.90     03-15    TPro  6.2  /  Alb  3.5  /  TBili  0.3  /  DBili  x   /  AST  17  /  ALT  8   /  AlkPhos  80  03-15          Urinalysis Basic - ( 15 Mar 2025 07:02 )    Color: x / Appearance: x / SG: x / pH: x  Gluc: 85 mg/dL / Ketone: x  / Bili: x / Urobili: x   Blood: x / Protein: x / Nitrite: x   Leuk Esterase: x / RBC: x / WBC x   Sq Epi: x / Non Sq Epi: x / Bacteria: x          RADIOLOGY & ADDITIONAL TESTS:  Results Reviewed:   Imaging Personally Reviewed:  Electrocardiogram Personally Reviewed:    COORDINATION OF CARE:  Care Discussed with Consultants/Other Providers [Y/N]:  Prior or Outpatient Records Reviewed [Y/N]:

## 2025-03-16 NOTE — PROVIDER CONTACT NOTE (OTHER) - ACTION/TREATMENT ORDERED:
pt refusal of all other methods for an accurate read - will discontinue from  during the night but continuing to monitor

## 2025-03-16 NOTE — PROGRESS NOTE ADULT - ASSESSMENT
63 yo woman, former smoker (47py; quit 12/2024) with h/o pAfib (on Eliquis), Hypothyroidism, Scleroderma, h/o pericardial effusion s/p pericardiocentesis (dx in 12/2024; ? viral vs inflammatory, cytology negative for malignant cells, on Colchicine), Asthma/suspected COPD, and recently-dx SCLCa > dx in 2/2025, staging javier not yet completed and pt is tx naive and referred to [facility] from Med Onc clinic for expedited onc workup and urgent initiation of inpatient chemotherapy.    ACTIVE PROBLEMS  Recently dx SCLCa  GOC discussion, counseling  Acute hypoxic resp failure  h/o Asthma/COPD (former smoker)  Hyponatremia 2/2 SIADH  h/o Pericardial effusion (viral with positive Coxsackie B titers in 12/2024)  Cancer related pain, anxiety  Constipation  Hypothyroidism  pAfib  h/o Scleroderma  Hypercoag state  Immunocompromised 2/2 cancer, autoimmune disease    Recently dx SCLCa  GOC discussion, counseling  Staging w/u in progress- MRI Brain and a/p w/without contrast ordered for further eval (CT deferred as pt has h/o anaphylaxis to iodinated contrast), plan for MRI under anesthesia next week  Options for cancer tx TBD- pt likely to begin Carbo/Etop inpatient (without immunotherapy given pt's h/o AID)  Rad Onc also made aware of pt- plan is for projected 3-week course of RT to the lung mass  In GOC conversation on 3/13, pt expressed uncertainty about pursuing systemic cancer treatment, especially if her cancer is determined to be advanced, noting that she wanted to know the cancer stage before making any decisions about this; she also expressed interest in reviewing the MOLST form and indicated that she would discuss with her HCP Yogi before making any final decisions about code status  Long-term prognosis: guarded; pt is full code    Acute hypoxic resp failure  h/o Asthma/COPD (former smoker)  Pt desats to mid 80s% on RA and requires 4-6L NC supplemental O2 to maintain O2 sats > 92%  Likely due to burden of disease in lungs + pericardial effusion  Continuing supplemental O2 with weaning as tolerated and supportive resp care prn  Continuing Advair 100-50mcg MDI BID  Continuing duonebs q6/prn   Continuing Nicotine patch 21mg daily (6 weeks)  Nasal spray ordered     Hyponatremia 2/2 SIADH  Na improved, 131 > 133 today; pt is relatively asymptomatic  SIADH confirmed by 3/12 urine lytes  Continuing 1L fluid restriction  Continuing Salt tabs 1gm BID    h/o Pericardial effusion in 12/2024 (? autoimmune vs viral with positive Coxsackie B titers in 12/2024)  12/23 pericardial fluid cytology negative for malignant cells  3/12 TTE with small to mod pericardial effusion, without tamponade physiology  Continuing Colchicine 0.6mg daily    Cancer related pain, anxiety  Continuing Morphine ER 30mg q12  Continuing Oxy IR 20mg q8/prn  Continuing Baclofen 5mg q8/prn  Appreciate BHT consult:  Ativan PO to 0.5mg q8/prn and 1mg PO prn (prior to MRI)    Constipation  Improved, pt s/p large bm on 3/13  Possibly due to opioids +/- AID  3/12 AXR negative for ileus/obstruction  Continuing bowel regimen (Miralax, Senna) for OIC prevention    Hypothyroidism: repeat TFTs ordered; continuing LT4 112mcg daily    h/o Scleroderma: Rheum consulted for mgmt recs    pAfib  Hypercoag state  Continuing Eliquis 5mg q12

## 2025-03-17 LAB
ALBUMIN SERPL ELPH-MCNC: 3.6 G/DL — SIGNIFICANT CHANGE UP (ref 3.3–5)
ALP SERPL-CCNC: 81 U/L — SIGNIFICANT CHANGE UP (ref 40–120)
ALT FLD-CCNC: 7 U/L — SIGNIFICANT CHANGE UP (ref 4–33)
ANION GAP SERPL CALC-SCNC: 12 MMOL/L — SIGNIFICANT CHANGE UP (ref 7–14)
AST SERPL-CCNC: 16 U/L — SIGNIFICANT CHANGE UP (ref 4–32)
BASOPHILS # BLD AUTO: 0.04 K/UL — SIGNIFICANT CHANGE UP (ref 0–0.2)
BASOPHILS NFR BLD AUTO: 0.8 % — SIGNIFICANT CHANGE UP (ref 0–2)
BILIRUB SERPL-MCNC: 0.3 MG/DL — SIGNIFICANT CHANGE UP (ref 0.2–1.2)
BUN SERPL-MCNC: 13 MG/DL — SIGNIFICANT CHANGE UP (ref 7–23)
CALCIUM SERPL-MCNC: 8.8 MG/DL — SIGNIFICANT CHANGE UP (ref 8.4–10.5)
CHLORIDE SERPL-SCNC: 100 MMOL/L — SIGNIFICANT CHANGE UP (ref 98–107)
CO2 SERPL-SCNC: 26 MMOL/L — SIGNIFICANT CHANGE UP (ref 22–31)
CREAT SERPL-MCNC: 0.44 MG/DL — LOW (ref 0.5–1.3)
EGFR: 107 ML/MIN/1.73M2 — SIGNIFICANT CHANGE UP
EGFR: 107 ML/MIN/1.73M2 — SIGNIFICANT CHANGE UP
EOSINOPHIL # BLD AUTO: 0.17 K/UL — SIGNIFICANT CHANGE UP (ref 0–0.5)
EOSINOPHIL NFR BLD AUTO: 3.5 % — SIGNIFICANT CHANGE UP (ref 0–6)
GLUCOSE SERPL-MCNC: 90 MG/DL — SIGNIFICANT CHANGE UP (ref 70–99)
HCT VFR BLD CALC: 34.9 % — SIGNIFICANT CHANGE UP (ref 34.5–45)
HGB BLD-MCNC: 11.5 G/DL — SIGNIFICANT CHANGE UP (ref 11.5–15.5)
IANC: 2.42 K/UL — SIGNIFICANT CHANGE UP (ref 1.8–7.4)
IMM GRANULOCYTES NFR BLD AUTO: 0.2 % — SIGNIFICANT CHANGE UP (ref 0–0.9)
LYMPHOCYTES # BLD AUTO: 1.78 K/UL — SIGNIFICANT CHANGE UP (ref 1–3.3)
LYMPHOCYTES # BLD AUTO: 37 % — SIGNIFICANT CHANGE UP (ref 13–44)
MAGNESIUM SERPL-MCNC: 2 MG/DL — SIGNIFICANT CHANGE UP (ref 1.6–2.6)
MCHC RBC-ENTMCNC: 29.5 PG — SIGNIFICANT CHANGE UP (ref 27–34)
MCHC RBC-ENTMCNC: 33 G/DL — SIGNIFICANT CHANGE UP (ref 32–36)
MCV RBC AUTO: 89.5 FL — SIGNIFICANT CHANGE UP (ref 80–100)
MONOCYTES # BLD AUTO: 0.39 K/UL — SIGNIFICANT CHANGE UP (ref 0–0.9)
MONOCYTES NFR BLD AUTO: 8.1 % — SIGNIFICANT CHANGE UP (ref 2–14)
NEUTROPHILS # BLD AUTO: 2.42 K/UL — SIGNIFICANT CHANGE UP (ref 1.8–7.4)
NEUTROPHILS NFR BLD AUTO: 50.4 % — SIGNIFICANT CHANGE UP (ref 43–77)
NRBC # BLD AUTO: 0 K/UL — SIGNIFICANT CHANGE UP (ref 0–0)
NRBC # FLD: 0 K/UL — SIGNIFICANT CHANGE UP (ref 0–0)
NRBC BLD AUTO-RTO: 0 /100 WBCS — SIGNIFICANT CHANGE UP (ref 0–0)
PHOSPHATE SERPL-MCNC: 4.2 MG/DL — SIGNIFICANT CHANGE UP (ref 2.5–4.5)
PLATELET # BLD AUTO: 265 K/UL — SIGNIFICANT CHANGE UP (ref 150–400)
POTASSIUM SERPL-MCNC: 3.8 MMOL/L — SIGNIFICANT CHANGE UP (ref 3.5–5.3)
POTASSIUM SERPL-SCNC: 3.8 MMOL/L — SIGNIFICANT CHANGE UP (ref 3.5–5.3)
PROT SERPL-MCNC: 6.4 G/DL — SIGNIFICANT CHANGE UP (ref 6–8.3)
RBC # BLD: 3.9 M/UL — SIGNIFICANT CHANGE UP (ref 3.8–5.2)
RBC # FLD: 12.5 % — SIGNIFICANT CHANGE UP (ref 10.3–14.5)
SODIUM SERPL-SCNC: 138 MMOL/L — SIGNIFICANT CHANGE UP (ref 135–145)
WBC # BLD: 4.81 K/UL — SIGNIFICANT CHANGE UP (ref 3.8–10.5)
WBC # FLD AUTO: 4.81 K/UL — SIGNIFICANT CHANGE UP (ref 3.8–10.5)

## 2025-03-17 PROCEDURE — 99233 SBSQ HOSP IP/OBS HIGH 50: CPT

## 2025-03-17 PROCEDURE — 99232 SBSQ HOSP IP/OBS MODERATE 35: CPT

## 2025-03-17 RX ORDER — HYDROMORPHONE/SOD CHLOR,ISO/PF 2 MG/10 ML
3 SYRINGE (ML) INJECTION EVERY 4 HOURS
Refills: 0 | Status: DISCONTINUED | OUTPATIENT
Start: 2025-03-17 | End: 2025-03-20

## 2025-03-17 RX ORDER — OXYCODONE HYDROCHLORIDE 30 MG/1
30 TABLET ORAL EVERY 4 HOURS
Refills: 0 | Status: DISCONTINUED | OUTPATIENT
Start: 2025-03-17 | End: 2025-03-20

## 2025-03-17 RX ORDER — MIRTAZAPINE 30 MG/1
7.5 TABLET, FILM COATED ORAL AT BEDTIME
Refills: 0 | Status: DISCONTINUED | OUTPATIENT
Start: 2025-03-17 | End: 2025-03-20

## 2025-03-17 RX ORDER — OXYCODONE HYDROCHLORIDE 30 MG/1
20 TABLET ORAL EVERY 4 HOURS
Refills: 0 | Status: DISCONTINUED | OUTPATIENT
Start: 2025-03-17 | End: 2025-03-20

## 2025-03-17 RX ADMIN — OXYCODONE HYDROCHLORIDE 30 MILLIGRAM(S): 30 TABLET ORAL at 10:26

## 2025-03-17 RX ADMIN — MIRTAZAPINE 7.5 MILLIGRAM(S): 30 TABLET, FILM COATED ORAL at 21:56

## 2025-03-17 RX ADMIN — Medication 112 MICROGRAM(S): at 05:27

## 2025-03-17 RX ADMIN — OXYCODONE HYDROCHLORIDE 20 MILLIGRAM(S): 30 TABLET ORAL at 20:21

## 2025-03-17 RX ADMIN — OXYCODONE HYDROCHLORIDE 30 MILLIGRAM(S): 30 TABLET ORAL at 01:35

## 2025-03-17 RX ADMIN — NICOTINE POLACRILEX 1 PATCH: 4 GUM, CHEWING ORAL at 12:07

## 2025-03-17 RX ADMIN — OXYCODONE HYDROCHLORIDE 30 MILLIGRAM(S): 30 TABLET ORAL at 09:26

## 2025-03-17 RX ADMIN — Medication 1 APPLICATION(S): at 05:28

## 2025-03-17 RX ADMIN — NICOTINE POLACRILEX 1 PATCH: 4 GUM, CHEWING ORAL at 20:18

## 2025-03-17 RX ADMIN — Medication 1 DOSE(S): at 20:16

## 2025-03-17 RX ADMIN — Medication 30 MILLIGRAM(S): at 00:29

## 2025-03-17 RX ADMIN — Medication 1 GRAM(S): at 20:16

## 2025-03-17 RX ADMIN — Medication 1 SPRAY(S): at 05:27

## 2025-03-17 RX ADMIN — Medication 30 MILLIGRAM(S): at 12:16

## 2025-03-17 RX ADMIN — OXYCODONE HYDROCHLORIDE 20 MILLIGRAM(S): 30 TABLET ORAL at 21:21

## 2025-03-17 RX ADMIN — APIXABAN 5 MILLIGRAM(S): 2.5 TABLET, FILM COATED ORAL at 20:16

## 2025-03-17 RX ADMIN — NICOTINE POLACRILEX 1 PATCH: 4 GUM, CHEWING ORAL at 07:08

## 2025-03-17 RX ADMIN — Medication 3 MILLIGRAM(S): at 23:04

## 2025-03-17 RX ADMIN — CLONAZEPAM 1 MILLIGRAM(S): 0.5 TABLET ORAL at 05:27

## 2025-03-17 RX ADMIN — Medication 3 MILLIGRAM(S): at 23:39

## 2025-03-17 RX ADMIN — Medication 2 MILLIGRAM(S): at 05:44

## 2025-03-17 RX ADMIN — Medication 30 MILLIGRAM(S): at 21:56

## 2025-03-17 RX ADMIN — NALOXEGOL OXALATE 25 MILLIGRAM(S): 12.5 TABLET, FILM COATED ORAL at 12:05

## 2025-03-17 RX ADMIN — CLONAZEPAM 1 MILLIGRAM(S): 0.5 TABLET ORAL at 20:16

## 2025-03-17 RX ADMIN — OXYCODONE HYDROCHLORIDE 30 MILLIGRAM(S): 30 TABLET ORAL at 02:35

## 2025-03-17 RX ADMIN — OXYCODONE HYDROCHLORIDE 30 MILLIGRAM(S): 30 TABLET ORAL at 16:28

## 2025-03-17 RX ADMIN — NICOTINE POLACRILEX 1 PATCH: 4 GUM, CHEWING ORAL at 12:16

## 2025-03-17 RX ADMIN — Medication 1 DOSE(S): at 09:23

## 2025-03-17 RX ADMIN — Medication 1 SPRAY(S): at 21:58

## 2025-03-17 RX ADMIN — Medication 2 MILLIGRAM(S): at 04:44

## 2025-03-17 RX ADMIN — Medication 40 MILLIGRAM(S): at 20:16

## 2025-03-17 RX ADMIN — Medication 30 MILLIGRAM(S): at 12:03

## 2025-03-17 RX ADMIN — OXYCODONE HYDROCHLORIDE 30 MILLIGRAM(S): 30 TABLET ORAL at 15:28

## 2025-03-17 RX ADMIN — Medication 30 MILLIGRAM(S): at 00:00

## 2025-03-17 NOTE — PROGRESS NOTE ADULT - PROBLEM SELECTOR PLAN 3
Patient with hx of anxiety/panic d/o for which she shared she has been on xanax before but she prefers not to become dependent on meds  Currently significant anxiety related to dx and with attempted mri  Having panic attacks   PLAN  On klonopin and ativan   f/u BH

## 2025-03-17 NOTE — PROGRESS NOTE ADULT - ASSESSMENT
63 y/o F with PMHx of AF, scleroderma, asthma with prior intubations, hypothyroidism, pericardial effusion s/p pericardiocentesis (12/2024) and drain on colchicine presented to the ED due to SOB, chest pain, and back pain. Oncology evaluated her and recommended MRI brain and MRI chest/abdomen/pelvis with contrast to rule out metastasis. Was previously admitted in 12/2024 due to chest pain and SOB and found to have a pericardial effusion with evidence of tamponade physiology. She underwent urgent pericardiocentesis with placement of drain which was removed on 12/26. She was started on colchicine and NSAIDs. Hospital course was complicated by AF with RVR which was new onset and was suspected to be due to drain placement. After drain was removed patient continued to have pAF and was started on Eliquis. Presented to the ED on 02/13/25 for left sided chest pain radiating to the back. Repeat TTE demonstrated interval increase in pericardial effusion (moderate-large adjacent to RA with no evidence of tamponade) and CTS was consulted for possible pericardial window and recommended no acute intervention. 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 Pulmonary consulted and recommended transfer to [facility] for bronchoscopy, EBUS and hilar mass biopsy. Patient transferred to [facility] on 02/20/25 and underwent EBUS on 02/21/25. 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

## 2025-03-17 NOTE — PROGRESS NOTE ADULT - PROBLEM SELECTOR PLAN 6
- 3/12- See GOC. Patient appointed and completed HCP, named long time friend Sam Contreraskings.  - 3/13: patient had further conversation with primary team and requested a copy of molst to review; expressing concerns about her ability to tolerate cancer tx with underlying scleroderma and overall QOL.

## 2025-03-17 NOTE — PROGRESS NOTE ADULT - PROBLEM SELECTOR PLAN 1
Chest pain from mediastinal mass   Patient with hx of diffuse pain related to her underlying scleroderma for which she shared at one point she was fentanyl 200 mcg patch and was weaned down  - Home regimen: MS Contin 30 mg BID and oxycodone 20 mg q4h PRN (long term dose) given by outpatient pain specialist originally for scleroderma pain  - Pain currently   [ ] improved or controlled   [X ] uncontrolled   - PRN use in past 24 hours from 8 AM to 8 AM: Oxycodone 30 mg x 4 doses, IV dilaudid 3 mg x 3 doses     Plan and Recommendations:   - opioids:   > c/w oxycodone  20 mg q4h PRN for moderate pain and oxycodone IR 30 mg q4h prn for severe pain  > Increased IV dilaudid to 3 mg q4h PRN for severe breakthrough pain pain.   > Increased MS Contin 30 mg now TID   - Bowel regimen  > Holistic therapy. Chest pain from mediastinal mass   Patient with hx of diffuse pain related to her underlying scleroderma for which she shared at one point she was fentanyl 200 mcg patch and was weaned down  - Home regimen: MS Contin 30 mg BID and oxycodone 20 mg q4h PRN (long term dose) given by outpatient pain specialist originally for scleroderma pain  - Pain currently   [ ] improved or controlled   [X ] uncontrolled   - PRN use in past 24 hours from 8 AM to 8 AM: Oxycodone 30 mg x 4 doses, IV dilaudid 3 mg x 3 doses     Plan and Recommendations:   - opioids:   > c/w oxycodone  20 mg q4h PRN for moderate pain and oxycodone IR 30 mg q4h prn for severe pain  > Increased IV dilaudid to 3 mg q4h PRN for severe breakthrough pain pain.   > Increased MS Contin 30 mg now TID   - Bowel regimen  > Holistic therapy Chest pain from mediastinal mass   Patient with hx of diffuse pain related to her underlying scleroderma for which she shared at one point she was fentanyl 200 mcg patch and was weaned down  - Home regimen: MS Contin 30 mg BID and oxycodone 20 mg q4h PRN (long term dose) given by outpatient pain specialist originally for scleroderma pain  - Pain currently   [ ] improved or controlled   [X ] uncontrolled   - PRN use in past 24 hours from 8 AM to 8 AM: Oxycodone 30 mg x 4 doses, IV dilaudid 2 mg x 3 doses     Plan and Recommendations:   - opioids:   > c/w oxycodone  20 mg q4h PRN for moderate pain and oxycodone IR 30 mg q4h prn for severe pain  > Increased IV dilaudid to 3 mg q4h PRN for severe breakthrough pain pain.   > Increased MS Contin 30 mg now TID   - Bowel regimen  > Holistic therapy

## 2025-03-17 NOTE — PROGRESS NOTE ADULT - SUBJECTIVE AND OBJECTIVE BOX
SOLID TUMOR ONCOLOGY HOSPITALIST PROGRESS NOTE    S: No acute events overnight.  Pt had no new complaints today. She refused MRI with sedation, indicating that she would consider trying it again with addition of Klonipin.    CURRENT MEDICATIONS  MEDICATIONS  (STANDING):  albuterol/ipratropium for Nebulization 3 milliLiter(s) Nebulizer every 6 hours  apixaban 5 milliGRAM(s) Oral every 12 hours  chlorhexidine 2% Cloths 1 Application(s) Topical <User Schedule>  clonazePAM  Tablet 1 milliGRAM(s) Oral two times a day  colchicine 0.6 milliGRAM(s) Oral daily  fluticasone propionate/ salmeterol 100-50 MICROgram(s) Diskus 1 Dose(s) Inhalation two times a day  influenza  Vaccine (HIGH DOSE) 0.5 milliLiter(s) IntraMuscular once  levothyroxine 112 MICROGram(s) Oral daily  lidocaine   4% Patch 1 Patch Transdermal daily  lidocaine   4% Patch 1 Patch Transdermal every 24 hours  methylnaltrexone Injectable 12 milliGRAM(s) SubCutaneous once  mirtazapine 7.5 milliGRAM(s) Oral at bedtime  morphine ER Tablet 30 milliGRAM(s) Oral every 8 hours  naloxegol 25 milliGRAM(s) Oral daily  nicotine - 21 mG/24Hr(s) Patch 1 Patch Transdermal daily  pantoprazole    Tablet 40 milliGRAM(s) Oral every 12 hours  polyethylene glycol 3350 17 Gram(s) Oral daily  senna 2 Tablet(s) Oral at bedtime  sodium chloride 1 Gram(s) Oral two times a day  sodium chloride 0.65% Nasal 1 Spray(s) Both Nostrils three times a day  MEDICATIONS  (PRN):  aluminum hydroxide/magnesium hydroxide/simethicone Suspension 30 milliLiter(s) Oral every 4 hours PRN Dyspepsia  baclofen 5 milliGRAM(s) Oral every 8 hours PRN Musculoskeletal Pain  benzocaine/menthol Lozenge 1 Lozenge Oral three times a day PRN Sore Throat  HYDROmorphone  Injectable 3 milliGRAM(s) IV Push every 4 hours PRN severe breakthrough pain  LORazepam     Tablet 1 milliGRAM(s) Oral every 8 hours PRN Anxiety  melatonin 3 milliGRAM(s) Oral at bedtime PRN Insomnia  ondansetron Injectable 4 milliGRAM(s) IV Push every 6 hours PRN Nausea and/or Vomiting  oxyCODONE    IR 30 milliGRAM(s) Oral every 4 hours PRN Severe Pain (7 - 10)  oxyCODONE    IR 20 milliGRAM(s) Oral every 4 hours PRN Moderate Pain (4 - 6)      PHYSICAL EXAM  T(C): 36.7 (03-17-25 @ 05:16), Max: 36.7 (03-17-25 @ 05:16)  HR: 93 (03-17-25 @ 05:16) (79 - 94)  BP: 109/69 (03-17-25 @ 05:16) (108/62 - 132/76)  RR: 18 (03-17-25 @ 05:16) (18 - 18)  SpO2: 95% (03-17-25 @ 05:16) (95% - 97%)    03-16-25 @ 07:01  -  03-17-25 @ 07:00  --------------------------------------------------------  IN: 150 mL / OUT: 400 mL / NET: -250 mL  Non-toxic-appearing woman, sitting up in bed, mildly distressed, nad  Answering all questions appropriately with some conversational dyspnea, following commands  Anicteric sclera, no oral lesions/thrush  RRR, no m/r/g, heart sounds faint  Breaths somewhat labored, but not tachypnea; trace RLB crackles, no w/r  Abd soft/nt/nd, hypoactive bowel sounds  No peripheral edema  CN 2-12 grossly intact; no gross focal neuro deficits; pt moves all extremities spontaneously      LABS                        11.5   4.81  )-----------( 265      ( 17 Mar 2025 06:11 )             34.9     03-17    138  |  100  |  13  ----------------------------<  90  3.8   |  26  |  0.44[L]    Ca    8.8      17 Mar 2025 06:11  Phos  4.2     03-17  Mg     2.00     03-17    TPro  6.4  /  Alb  3.6  /  TBili  0.3  /  DBili  x   /  AST  16  /  ALT  7   /  AlkPhos  81  03-17      ADDITIONAL LABS  Dylan 98  Uosm 632  uric acid 2.4    PATHOLOGY  2/21 LN biopsies  1. LUNG, LEFT UPPER LOBE, ENDOBRONCHIAL FORCEPS BIOPSY AND TRANSBRONCHIAL   FNA   POSITIVE FOR MALIGNANT CELLS.   Small cell carcinoma.   Touch Imprint slide, cell block and core biopsy show a cellular specimen   composed of groups and numerous single-lying hyperchromatic cells with   irregular nuclear membranes, nuclear molding and scanty cytoplasm. Crush   artifact and mitotic figures present.   Immunocytochemical studies : The neoplastic cells are positive for Cam   5.2.hrmogranin, synaptophysin, INSM-1, TTF-1 while negative for CD45 and   P40. Ki-67 reveal a proliferation index of 90%.   Case discussed at the daily cytopathology  conference on   03/04/2025.   Case reported to Tumor Registry.   Dr. Webster was informed of the diagnosis via encrypted email on 03/04/2025.   2. LYMPH NODE, LEVEL 7, EBUS-GUIDED FNA   SUSPICIOUS FOR MALIGNANCY.   Suspicious for small cell carcinoma.   Cytology smears and cell block display a hypocellular specimen composed   of rare groups and few single-lying hyperchromatic cells with irregular   nuclear membranes, nuclear molding and scanty cytoplasm with crush   artifact, in a background of rare lymphocytes.   Case discussed at the daily cytopathology  conference on   03/04/2025.   3. LYMPH NODE, 4L, EBUS-GUIDED FNA   POSITIVE FOR MALIGNANT CELLS.   S mall cell carcinoma.   Cytology smears and cell block show a cellular specimen composed of   groups and single-lying hyperchromatic cells with irregular nuclear   membranes, nuclear molding and scanty cytoplasm, in a background of rare   lymphocytes.   Immunocytochemical studies: The neoplastic cells are positive for Cam   5.2, Chromogranin, synaptophysin, INSM-1, TTF-1 while negative for CD45   and P40.Ki-67 reveals a proliferative index of 90%.   In summary the cytomorphology and immunophenotype are consistent with   small cell carcinoma.   4. LUNG, LEFT UPPER LOBE, BRONCHOALVEOLAR LAVAGE   ATYPICAL FINDINGS   Cytology slide and cell block show hyperchromatic groups of poorly   preserved cells among reactive ciliated bronchial cells and alveolar   macrophages.   12/20/24 pericardial fluid cytology negative for malignant cells      MICROBIOLOGY  Abx: none on this admission    Cx Data  12/23 Coxsackie B titers positive 1:100-1:1000  12/23 Coxsackie A titers negative  12/23 EBV IgG positive  12/23 viral hep panel non-reactive  12/22 Quant Gold negative      PERTINENT RADIOLOGY  MRI Brain w/without contrast: pending  MRI a/p w/without contrast: pending  3/12 CXR: Bilateral upper lobe opacities with 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  No acute pulmonary or cardiovascular disease.  3/12 AXR: There is a nonobstructive gas pattern.  Large stool burden.  No masses or pathologic calcifications.  Surgical clips in the right upper quadrant.  Visualized lungs are clear.  3/12 TTE   1. Left ventricular systolic function is normal with an ejection fraction visually estimated at 65 to 70 %.   2. Small pericardial effusion noted adjacent to the posterior left ventricle, moderate pericardial effusion noted adjacentto the right atrium, moderate pericardial effusion noted adjacent to the anterior right ventricle and moderate pericardial effusion noted adjacent to the apex with no echocardiographic evidence of tamponade physiology.   3. The inferior vena cava is normal in size measuring 1.76 cm in diameter, (normal <2.1cm) with normal inspiratory collapse (normal >50%) consistent with normal right atrial pressure (~3, range 0-5mmHg).   4. No prior echocardiogram is available for comparison.  2/21 CXR: No complications post bronchoscopy.  Increased left upper lobe opacity (pneumonia?)  2/15 NC CT chest: Small circumferential pericardial effusion, mildly increased on today's   study when compared to the prior. Left suprahilar mediastinal mass in the prevascular space, extending into the aortopulmonary window, mediastinal adenopathy, appears more   pronounced on today's study though resolution is limited without   intravenous contrast. There is a new peripheral ill-defined 2.4 x 2.2 cm opacity in the left   upper lobe, differential includes infection/inflammation/neoplasm.  Dilated ascending aorta measuring up to 4 cm, similar to the prior study.  Consider contrast-enhanced CT of the chest if patient is able to tolerate   intravenous contrast.  2/15 B/L LE dopplers: negative for DVT.

## 2025-03-17 NOTE — BH CONSULTATION LIAISON PROGRESS NOTE - NSBHASSESSMENTFT_PSY_ALL_CORE
63 /yo F w/ longstanding PPHx of anxiety previously on xanax, remote hx of inpatient psych hospitalization for suicidality, PMHx scleroderma with chronic pain manifestations, recent admission for cardiac tamponade, admitted for workup of SOB/chest pain leading to SCLC diagnosis, psychiatry consulted for worsening of anxiety I/s/o multiple medical complications and her son's recent death. Pt likely with multiple comorbid psychiatric disorders including SOURAV, MDD, claustrophobia, most prominent disorder at this time is worsening of anxiety, with panic-like symptoms. Given guarded medical prognosis, recent social stressors, appropriate to treat severe anxiety with standing benzodiazepine regimen at this time. Pt not amenable to starting SSRI treatment, but if becomes amenable may benefit from this as well.     3/15: Reports improved anxiety. No reported changes in breathing. Appears to be tolerating Klonopin. Will continue.  3/17: Endorses decreased panic attacks and no use of PRN ativan over this interval. C/w klonopin, recommend ongoing GOC discussions with pt and her HCP    PLAN  - defer obs status to primary team   - c/w klonopin 1 mg BID standing for treatment of anxiety  - ativan 1 mg PO/IV/IM q8h for breakthrough severe anxiety  - please monitor respiratory status closely given pt is also on baclofen and standing oxycodone, with limited baseline pulmonary reserve. HOLD benzodiazepines if concerned for oversedation.   - pt demonstrates good understanding of her medical conditions, and able to verbalize reason for hospitalization, as well as risks that may come if she leaves AMA. She is agreeable to stay in the hospital for now, however on my evaluation she has demonstrated capacity to leave AMA.   - if pt attempting to leave AMA again, would recommend primary team to reassess pt's capacity at that time

## 2025-03-17 NOTE — PROGRESS NOTE ADULT - ASSESSMENT
63 yo woman, former smoker (47py; quit 12/2024) with h/o pAfib (on Eliquis), Hypothyroidism, ? h/o Sleroderma, h/o pericardial effusion s/p pericardiocentesis (dx in 12/2024; ? viral vs inflammatory, cytology negative for malignant cells, on Colchicine), Asthma/suspected COPD, and recently-dx SCLCa > dx in 2/2025, staging javier not yet completed and pt is tx naive and referred to [facility] from Med Onc clinic for expedited onc workup and urgent initiation of inpatient chemotherapy.    ACTIVE PROBLEMS  Recently dx SCLCa  GOC discussion, counseling  Acute hypoxic resp failure  h/o Asthma/COPD (former smoker)  Hyponatremia 2/2 SIADH  h/o Pericardial effusion (viral with positive Coxsackie B titers in 12/2024)  Cancer related pain, anxiety  Constipation  Hypothyroidism  pAfib  Hypercoag state  Immunocompromised 2/2 cancer, autoimmune disease    Recently dx SCLCa  GOC discussion, counseling  Staging w/u in progress- MRI Brain and a/p w/without contrast ordered for further eval (CT deferred as pt has h/o anaphylaxis to iodinated contrast)  In GOC conversation on 3/13, pt expressed uncertainty about pursuing systemic cancer treatment, especially if her cancer is determined to be advanced, noting that she wanted to know the cancer stage before making any decisions about this; she also expressed interest in reviewing the MOLST form and indicated that she would discuss with her HCP Yogi before making any final decisions about code status  Rad Onc also made aware of pt- she is being considered for a 3-week course of RT to the lung mass (pending pt's GOC)  Long-term prognosis: gaurded; pt is full code    Acute hypoxic resp failure  h/o Asthma/COPD (former smoker)  Pt desats to mid 80s% on RA and requires 4-6L NC supplemental O2 to maintain O2 sats > 92%  Likely due to burden of disease in lungs + pericardial effusion  Continuing supplemental O2 with weaning as tolerated and supportive resp care prn  Continuing Advair 100-50mcg MDI BID  Continuing duonebs q6/prn   Continuing Nicotine patch 21mg daily (6 weeks)    Hyponatremia 2/2 SIADH  Na normalized, 138 today; pt is asymptomatic  SIADH confirmed by 3/12 urine lytes  Continuing 1L fluid restriction  Continuing Salt tabs 1gm BID    h/o Pericardial effusion in 12/2024 (? autoimmune vs viral with positive Coxsackie B titers in 12/2024)  12/23 pericardial fluid cytology negative for malignant cells  3/12 TTE with small to mod pericardial effusion, without tamponade physiology  Continuing Colchicine 0.6mg daily    Cancer related pain, anxiety  Continuing Morphine ER 30mg q12  Continuing Oxy IR 20mg q8/prn  Continuing Baclofen 5mg q8/prn  Appreciate BHT consult:  Ativan PO to 0.5mg q8/prn and 1mg PO prn (prior to MRI)    Constipation  Improved, pt s/p large bm on 3/13  Possibly due to opioids +/- AID  3/12 AXR negative for ileus/obstruction  Continuing bowel regimen (Miralax, Senna) for OIC prevention    Hypothyroidism: 3/12 TFTs wnl; continuing LT4 112mcg daily    pAfib  Hypercoag state  Continuing Eliquis 5mg q12

## 2025-03-17 NOTE — PROGRESS NOTE ADULT - PROBLEM SELECTOR PLAN 7
For pain management     In the event of worsening symptoms, please contact the Palliative Medicine team via pager (if the patient is at [facility] #[phone] or if the patient is at [facility] #[phone]) The Geriatric and Palliative Medicine service has coverage 24 hours a day/ 7 days a week to provide medical recommendations regarding symptom management needs via telephone.

## 2025-03-17 NOTE — BH CONSULTATION LIAISON PROGRESS NOTE - NSBHCONSULTFOLLOWAFTERCARE_PSY_A_CORE FT
Would benefit from follow up with [facility] once outpatient: [phone]

## 2025-03-17 NOTE — BH CONSULTATION LIAISON PROGRESS NOTE - NSBHFUPINTERVALHXFT_PSY_A_CORE
Patient seen and evaluated, staff consulted, chart, labs, meds reviewed. No acute events over this interval. Pt visibly less anxious on interview and has not required ativan PRNs over the weekend. Pt endorses improved sleep and decreased panic attacks on standing regimen of klonopin. Focused on pending MRI and staging of her cancer, appealing to psychiatry team for recommendations on pursuing treatment. Denies SI/HI.

## 2025-03-17 NOTE — PROGRESS NOTE ADULT - PROBLEM SELECTOR PLAN 4
- recently diagnosed  - Unable to tolerate MRI A/P and brain d/t claustrophobia    - CT chest on 2/15- MEDIASTINUM AND JEREMIAS: Anterior subcarinal lymph node measures 1.8 cm in short axis, stable. There is a soft tissue density, measuring up to 4.2 cm in the prevascular space extending into the subcutaneous left main pulmonary artery region, this is more pronounced on today's study.   - Per onc, plan is to start inpatient chemo treatment and RT >> TBD as patient now expressing also second thoughts about whether she wants to pursue tx  - Follows with Dr. Hall at [facility] - recently diagnosed  - Unable to tolerate MRI A/P and brain d/t claustrophobia    - CT chest on 2/15- MEDIASTINUM AND JEREMIAS: Anterior subcarinal lymph node measures 1.8 cm in short axis, stable. There is a soft tissue density, measuring up to 4.2 cm in the prevascular space extending into the subcutaneous left main pulmonary artery region, this is more pronounced on today's study.   - Per onc, plan is to start inpatient chemo treatment and RT  - Follows with Dr. Hall at [facility]  - Pending MRI head and A/P

## 2025-03-17 NOTE — PROGRESS NOTE ADULT - PROBLEM SELECTOR PLAN 2
c/w zofran PRN  - Recommended brain imaging to r/o brain mets as patient also has headaches: patient was unable to tolerate mri to significant anxiety/claustrophobia; unable to perform mri with sedation as patient high risk given cancer.

## 2025-03-18 LAB
ALBUMIN SERPL ELPH-MCNC: 3.2 G/DL — LOW (ref 3.3–5)
ALP SERPL-CCNC: 74 U/L — SIGNIFICANT CHANGE UP (ref 40–120)
ALT FLD-CCNC: 6 U/L — SIGNIFICANT CHANGE UP (ref 4–33)
ANA PAT FLD IF-IMP: ABNORMAL
ANA TITR SER: ABNORMAL
ANION GAP SERPL CALC-SCNC: 9 MMOL/L — SIGNIFICANT CHANGE UP (ref 7–14)
AST SERPL-CCNC: 13 U/L — SIGNIFICANT CHANGE UP (ref 4–32)
BASOPHILS # BLD AUTO: 0.03 K/UL — SIGNIFICANT CHANGE UP (ref 0–0.2)
BASOPHILS NFR BLD AUTO: 0.7 % — SIGNIFICANT CHANGE UP (ref 0–2)
BILIRUB SERPL-MCNC: 0.2 MG/DL — SIGNIFICANT CHANGE UP (ref 0.2–1.2)
BUN SERPL-MCNC: 13 MG/DL — SIGNIFICANT CHANGE UP (ref 7–23)
CALCIUM SERPL-MCNC: 8.9 MG/DL — SIGNIFICANT CHANGE UP (ref 8.4–10.5)
CHLORIDE SERPL-SCNC: 100 MMOL/L — SIGNIFICANT CHANGE UP (ref 98–107)
CO2 SERPL-SCNC: 29 MMOL/L — SIGNIFICANT CHANGE UP (ref 22–31)
CREAT SERPL-MCNC: 0.45 MG/DL — LOW (ref 0.5–1.3)
EGFR: 106 ML/MIN/1.73M2 — SIGNIFICANT CHANGE UP
EGFR: 106 ML/MIN/1.73M2 — SIGNIFICANT CHANGE UP
EOSINOPHIL # BLD AUTO: 0.18 K/UL — SIGNIFICANT CHANGE UP (ref 0–0.5)
EOSINOPHIL NFR BLD AUTO: 4.3 % — SIGNIFICANT CHANGE UP (ref 0–6)
GLUCOSE SERPL-MCNC: 95 MG/DL — SIGNIFICANT CHANGE UP (ref 70–99)
HCT VFR BLD CALC: 33 % — LOW (ref 34.5–45)
HGB BLD-MCNC: 10.9 G/DL — LOW (ref 11.5–15.5)
IANC: 2.04 K/UL — SIGNIFICANT CHANGE UP (ref 1.8–7.4)
IMM GRANULOCYTES NFR BLD AUTO: 0.2 % — SIGNIFICANT CHANGE UP (ref 0–0.9)
LYMPHOCYTES # BLD AUTO: 1.55 K/UL — SIGNIFICANT CHANGE UP (ref 1–3.3)
LYMPHOCYTES # BLD AUTO: 36.8 % — SIGNIFICANT CHANGE UP (ref 13–44)
MAGNESIUM SERPL-MCNC: 1.9 MG/DL — SIGNIFICANT CHANGE UP (ref 1.6–2.6)
MCHC RBC-ENTMCNC: 30.5 PG — SIGNIFICANT CHANGE UP (ref 27–34)
MCHC RBC-ENTMCNC: 33 G/DL — SIGNIFICANT CHANGE UP (ref 32–36)
MCV RBC AUTO: 92.4 FL — SIGNIFICANT CHANGE UP (ref 80–100)
MONOCYTES # BLD AUTO: 0.4 K/UL — SIGNIFICANT CHANGE UP (ref 0–0.9)
MONOCYTES NFR BLD AUTO: 9.5 % — SIGNIFICANT CHANGE UP (ref 2–14)
NEUTROPHILS # BLD AUTO: 2.04 K/UL — SIGNIFICANT CHANGE UP (ref 1.8–7.4)
NEUTROPHILS NFR BLD AUTO: 48.5 % — SIGNIFICANT CHANGE UP (ref 43–77)
NRBC # BLD AUTO: 0 K/UL — SIGNIFICANT CHANGE UP (ref 0–0)
NRBC # FLD: 0 K/UL — SIGNIFICANT CHANGE UP (ref 0–0)
NRBC BLD AUTO-RTO: 0 /100 WBCS — SIGNIFICANT CHANGE UP (ref 0–0)
PHOSPHATE SERPL-MCNC: 4.1 MG/DL — SIGNIFICANT CHANGE UP (ref 2.5–4.5)
PLATELET # BLD AUTO: 204 K/UL — SIGNIFICANT CHANGE UP (ref 150–400)
POTASSIUM SERPL-MCNC: 3.5 MMOL/L — SIGNIFICANT CHANGE UP (ref 3.5–5.3)
POTASSIUM SERPL-SCNC: 3.5 MMOL/L — SIGNIFICANT CHANGE UP (ref 3.5–5.3)
PROT SERPL-MCNC: 5.8 G/DL — LOW (ref 6–8.3)
RBC # BLD: 3.57 M/UL — LOW (ref 3.8–5.2)
RBC # FLD: 12.7 % — SIGNIFICANT CHANGE UP (ref 10.3–14.5)
SODIUM SERPL-SCNC: 138 MMOL/L — SIGNIFICANT CHANGE UP (ref 135–145)
WBC # BLD: 4.21 K/UL — SIGNIFICANT CHANGE UP (ref 3.8–10.5)
WBC # FLD AUTO: 4.21 K/UL — SIGNIFICANT CHANGE UP (ref 3.8–10.5)

## 2025-03-18 PROCEDURE — 99233 SBSQ HOSP IP/OBS HIGH 50: CPT

## 2025-03-18 PROCEDURE — 99232 SBSQ HOSP IP/OBS MODERATE 35: CPT

## 2025-03-18 PROCEDURE — 70450 CT HEAD/BRAIN W/O DYE: CPT | Mod: 26

## 2025-03-18 PROCEDURE — 74176 CT ABD & PELVIS W/O CONTRAST: CPT | Mod: 26

## 2025-03-18 RX ORDER — CLONAZEPAM 0.5 MG/1
0.5 TABLET ORAL DAILY
Refills: 0 | Status: DISCONTINUED | OUTPATIENT
Start: 2025-03-19 | End: 2025-03-19

## 2025-03-18 RX ORDER — HALOPERIDOL 10 MG/1
0.5 TABLET ORAL ONCE
Refills: 0 | Status: COMPLETED | OUTPATIENT
Start: 2025-03-18 | End: 2025-03-18

## 2025-03-18 RX ORDER — LORAZEPAM 4 MG/ML
0.5 VIAL (ML) INJECTION ONCE
Refills: 0 | Status: DISCONTINUED | OUTPATIENT
Start: 2025-03-18 | End: 2025-03-18

## 2025-03-18 RX ADMIN — OXYCODONE HYDROCHLORIDE 30 MILLIGRAM(S): 30 TABLET ORAL at 19:54

## 2025-03-18 RX ADMIN — Medication 30 MILLIGRAM(S): at 15:16

## 2025-03-18 RX ADMIN — Medication 40 MILLIGRAM(S): at 05:28

## 2025-03-18 RX ADMIN — Medication 3 MILLIGRAM(S): at 19:33

## 2025-03-18 RX ADMIN — APIXABAN 5 MILLIGRAM(S): 2.5 TABLET, FILM COATED ORAL at 09:04

## 2025-03-18 RX ADMIN — CLONAZEPAM 1 MILLIGRAM(S): 0.5 TABLET ORAL at 07:27

## 2025-03-18 RX ADMIN — COLCHICINE 0.6 MILLIGRAM(S): 0.6 TABLET, FILM COATED ORAL at 11:49

## 2025-03-18 RX ADMIN — Medication 3 MILLIGRAM(S): at 12:00

## 2025-03-18 RX ADMIN — Medication 1 APPLICATION(S): at 07:29

## 2025-03-18 RX ADMIN — OXYCODONE HYDROCHLORIDE 30 MILLIGRAM(S): 30 TABLET ORAL at 10:17

## 2025-03-18 RX ADMIN — MIRTAZAPINE 7.5 MILLIGRAM(S): 30 TABLET, FILM COATED ORAL at 23:12

## 2025-03-18 RX ADMIN — NICOTINE POLACRILEX 1 PATCH: 4 GUM, CHEWING ORAL at 11:50

## 2025-03-18 RX ADMIN — Medication 1 SPRAY(S): at 23:13

## 2025-03-18 RX ADMIN — Medication 30 MILLIGRAM(S): at 23:10

## 2025-03-18 RX ADMIN — NALOXEGOL OXALATE 25 MILLIGRAM(S): 12.5 TABLET, FILM COATED ORAL at 11:49

## 2025-03-18 RX ADMIN — Medication 3 MILLIGRAM(S): at 14:11

## 2025-03-18 RX ADMIN — OXYCODONE HYDROCHLORIDE 30 MILLIGRAM(S): 30 TABLET ORAL at 11:17

## 2025-03-18 RX ADMIN — Medication 0.5 MILLIGRAM(S): at 14:53

## 2025-03-18 RX ADMIN — Medication 3 MILLIGRAM(S): at 22:08

## 2025-03-18 RX ADMIN — Medication 3 MILLIGRAM(S): at 11:00

## 2025-03-18 RX ADMIN — Medication 30 MILLIGRAM(S): at 14:16

## 2025-03-18 RX ADMIN — NICOTINE POLACRILEX 1 PATCH: 4 GUM, CHEWING ORAL at 18:38

## 2025-03-18 RX ADMIN — Medication 3 MILLIGRAM(S): at 15:11

## 2025-03-18 RX ADMIN — Medication 1 DOSE(S): at 23:14

## 2025-03-18 RX ADMIN — BACLOFEN 5 MILLIGRAM(S): 10 INJECTION INTRATHECAL at 23:11

## 2025-03-18 RX ADMIN — NICOTINE POLACRILEX 1 PATCH: 4 GUM, CHEWING ORAL at 12:01

## 2025-03-18 RX ADMIN — APIXABAN 5 MILLIGRAM(S): 2.5 TABLET, FILM COATED ORAL at 18:34

## 2025-03-18 RX ADMIN — Medication 40 MILLIGRAM(S): at 18:34

## 2025-03-18 RX ADMIN — Medication 3 MILLIGRAM(S): at 23:08

## 2025-03-18 RX ADMIN — OXYCODONE HYDROCHLORIDE 30 MILLIGRAM(S): 30 TABLET ORAL at 20:54

## 2025-03-18 RX ADMIN — Medication 1 GRAM(S): at 18:36

## 2025-03-18 RX ADMIN — HALOPERIDOL 0.5 MILLIGRAM(S): 10 TABLET ORAL at 15:17

## 2025-03-18 RX ADMIN — Medication 30 MILLIGRAM(S): at 07:28

## 2025-03-18 RX ADMIN — POLYETHYLENE GLYCOL 3350 17 GRAM(S): 17 POWDER, FOR SOLUTION ORAL at 11:50

## 2025-03-18 RX ADMIN — Medication 1 SPRAY(S): at 05:29

## 2025-03-18 RX ADMIN — NICOTINE POLACRILEX 1 PATCH: 4 GUM, CHEWING ORAL at 07:58

## 2025-03-18 RX ADMIN — Medication 1 GRAM(S): at 05:28

## 2025-03-18 RX ADMIN — Medication 1 DOSE(S): at 09:04

## 2025-03-18 RX ADMIN — Medication 3 MILLIGRAM(S): at 18:33

## 2025-03-18 RX ADMIN — Medication 112 MICROGRAM(S): at 05:28

## 2025-03-18 NOTE — BH CONSULTATION LIAISON PROGRESS NOTE - NSBHASSESSMENTFT_PSY_ALL_CORE
63 /yo F w/ longstanding PPHx of anxiety previously on xanax, remote hx of inpatient psych hospitalization for suicidality, PMHx scleroderma with chronic pain manifestations, recent admission for cardiac tamponade, admitted for workup of SOB/chest pain leading to SCLC diagnosis, psychiatry consulted for worsening of anxiety I/s/o multiple medical complications and her son's recent death. Pt likely with multiple comorbid psychiatric disorders including SOURAV, MDD, claustrophobia, most prominent disorder at this time is worsening of anxiety, with panic-like symptoms. Given guarded medical prognosis, recent social stressors, appropriate to treat severe anxiety with standing benzodiazepine regimen at this time. Pt not amenable to starting SSRI treatment, but if becomes amenable may benefit from this as well.     3/15: Reports improved anxiety. No reported changes in breathing. Appears to be tolerating Klonopin. Will continue.  3/17: Endorses decreased panic attacks and no use of PRN ativan over this interval. C/w klonopin, recommend ongoing GOC discussions with pt and her HCP  3/18: Unable to tolerate MRI over this interval, unclear reason. DEC klonopin to 0.5 mg qAM and 1 mg qHS due to pt complaint of sleepiness. Can give extra klonopin 0.5 mg wafer PRN prior to MRI, or ativan 1 mg IV x1 prior to MRI. If pt continues to refuse MRI, would reevaluate pt's capacity to refuse which may be impacted by her severe anxiety, and would involve her HCP.     PLAN  - defer obs status to primary team   - c/w klonopin 1 mg BID standing for treatment of anxiety  - ativan 1 mg PO/IV/IM q8h for breakthrough severe anxiety  - please monitor respiratory status closely given pt is also on baclofen and standing oxycodone, with limited baseline pulmonary reserve. HOLD benzodiazepines if concerned for oversedation.   - pt demonstrates good understanding of her medical conditions, and able to verbalize reason for hospitalization, as well as risks that may come if she leaves AMA. She is agreeable to stay in the hospital for now, however on my evaluation she has demonstrated capacity to leave AMA.   - if pt attempting to leave AMA again, would recommend primary team to reassess pt's capacity at that time 63 /yo F w/ longstanding PPHx of anxiety previously on xanax, remote hx of inpatient psych hospitalization for suicidality, PMHx scleroderma with chronic pain manifestations, recent admission for cardiac tamponade, admitted for workup of SOB/chest pain leading to SCLC diagnosis, psychiatry consulted for worsening of anxiety I/s/o multiple medical complications and her son's recent death. Pt likely with multiple comorbid psychiatric disorders including SOURAV, MDD, claustrophobia, most prominent disorder at this time is worsening of anxiety, with panic-like symptoms. Given guarded medical prognosis, recent social stressors, appropriate to treat severe anxiety with standing benzodiazepine regimen at this time. Pt not amenable to starting SSRI treatment, but if becomes amenable may benefit from this as well.     3/15: Reports improved anxiety. No reported changes in breathing. Appears to be tolerating Klonopin. Will continue.  3/17: Endorses decreased panic attacks and no use of PRN ativan over this interval. C/w klonopin, recommend ongoing GOC discussions with pt and her HCP  3/18: Unable to tolerate MRI over this interval, unclear reason. DEC klonopin to 0.5 mg qAM and 1 mg qHS due to pt complaint of sleepiness. Can give extra klonopin 0.5 mg wafer PRN prior to MRI, or ativan 1 mg IV x1 prior to MRI. If pt continues to refuse MRI, would reevaluate pt's capacity to refuse which may be impacted by her severe anxiety, and would involve her HCP.     PLAN  - defer obs status to primary team   - c/w klonopin 1 mg BID standing for treatment of anxiety  - ativan 1 mg PO/IV/IM q8h for breakthrough severe anxiety  - please monitor respiratory status closely given pt is also on baclofen and standing oxycodone, with limited baseline pulmonary reserve. HOLD benzodiazepines if concerned for oversedation.   - if pt attempting to leave AMA again, would recommend primary team to reassess pt's capacity at that time

## 2025-03-18 NOTE — PROGRESS NOTE ADULT - SUBJECTIVE AND OBJECTIVE BOX
SOLID TUMOR ONCOLOGY HOSPITALIST PROGRESS NOTE    S: No acute events overnight. She was unable to tolerate NC HCT and A/P this am.  Pt was distressed and crying in bed this morning.  She indicated that she has pain all over her body this morning and she doesn't understand why; when asked if she wanted pain medication or anti-anxiety medication to help her symptom she said she didn't know what was going to help her at this point.  She agreed to go again for her CT studies, but when transport came for her she had a panic attack and started screaming/crying and insisting that she couldn't do it. She was given 1-time doses of Ativan IV 0.5mg, Dilaudid IV, Klonipin PO, and Haldol IV 0.5mg, which caused her to sleep for ~30-45min, but when she woke up she immediately started crying again.    CURRENT MEDICATIONS  MEDICATIONS  (STANDING):  albuterol/ipratropium for Nebulization 3 milliLiter(s) Nebulizer every 6 hours  apixaban 5 milliGRAM(s) Oral every 12 hours  chlorhexidine 2% Cloths 1 Application(s) Topical <User Schedule>  colchicine 0.6 milliGRAM(s) Oral daily  fluticasone propionate/ salmeterol 100-50 MICROgram(s) Diskus 1 Dose(s) Inhalation two times a day  influenza  Vaccine (HIGH DOSE) 0.5 milliLiter(s) IntraMuscular once  levothyroxine 112 MICROGram(s) Oral daily  lidocaine   4% Patch 1 Patch Transdermal daily  lidocaine   4% Patch 1 Patch Transdermal every 24 hours  methylnaltrexone Injectable 12 milliGRAM(s) SubCutaneous once  mirtazapine 7.5 milliGRAM(s) Oral at bedtime  morphine ER Tablet 30 milliGRAM(s) Oral every 8 hours  naloxegol 25 milliGRAM(s) Oral daily  nicotine - 21 mG/24Hr(s) Patch 1 Patch Transdermal daily  pantoprazole    Tablet 40 milliGRAM(s) Oral every 12 hours  polyethylene glycol 3350 17 Gram(s) Oral daily  senna 2 Tablet(s) Oral at bedtime  sodium chloride 1 Gram(s) Oral two times a day  sodium chloride 0.65% Nasal 1 Spray(s) Both Nostrils three times a day  MEDICATIONS  (PRN):  aluminum hydroxide/magnesium hydroxide/simethicone Suspension 30 milliLiter(s) Oral every 4 hours PRN Dyspepsia  baclofen 5 milliGRAM(s) Oral every 8 hours PRN Musculoskeletal Pain  benzocaine/menthol Lozenge 1 Lozenge Oral three times a day PRN Sore Throat  HYDROmorphone  Injectable 3 milliGRAM(s) IV Push every 4 hours PRN severe breakthrough pain  LORazepam     Tablet 1 milliGRAM(s) Oral every 8 hours PRN Anxiety  melatonin 3 milliGRAM(s) Oral at bedtime PRN Insomnia  ondansetron Injectable 4 milliGRAM(s) IV Push every 6 hours PRN Nausea and/or Vomiting  oxyCODONE    IR 20 milliGRAM(s) Oral every 4 hours PRN Moderate Pain (4 - 6)  oxyCODONE    IR 30 milliGRAM(s) Oral every 4 hours PRN Severe Pain (7 - 10)      PHYSICAL EXAM  T(C): 36.6 (03-18-25 @ 13:21), Max: 37.1 (03-18-25 @ 06:02)  HR: 61 (03-18-25 @ 13:21) (61 - 80)  BP: 101/54 (03-18-25 @ 13:21) (99/64 - 112/59)  RR: 18 (03-18-25 @ 13:21) (18 - 18)  SpO2: 100% (03-18-25 @ 13:21) (97% - 100%)  Non-toxic-appearing woman, crying hysterically in bed  Answering all questions appropriately (conversational dyspnea resolved), following commands  Anicteric sclera, no oral lesions/thrush  RRR, no m/r/g, heart sounds faint  Breaths somewhat labored, but not tachypnea; trace RLB crackles, no w/r  Abd soft/nt/nd, hypoactive bowel sounds  No peripheral edema  CN 2-12 grossly intact; no gross focal neuro deficits; pt moves all extremities spontaneously      LABS                        10.9   4.21  )-----------( 204      ( 18 Mar 2025 07:06 )             33.0     03-18    138  |  100  |  13  ----------------------------<  95  3.5   |  29  |  0.45[L]    Ca    8.9      18 Mar 2025 07:06  Phos  4.1     03-18  Mg     1.90     03-18    TPro  5.8[L]  /  Alb  3.2[L]  /  TBili  0.2  /  DBili  x   /  AST  13  /  ALT  6   /  AlkPhos  74  03-18      ADDITIONAL LABS  Dylan 98  Uosm 632  uric acid 2.4    PATHOLOGY  2/21 LN biopsies  1. LUNG, LEFT UPPER LOBE, ENDOBRONCHIAL FORCEPS BIOPSY AND TRANSBRONCHIAL   FNA   POSITIVE FOR MALIGNANT CELLS.   Small cell carcinoma.   Touch Imprint slide, cell block and core biopsy show a cellular specimen   composed of groups and numerous single-lying hyperchromatic cells with   irregular nuclear membranes, nuclear molding and scanty cytoplasm. Crush   artifact and mitotic figures present.   Immunocytochemical studies : The neoplastic cells are positive for Cam   5.2.hrmogranin, synaptophysin, INSM-1, TTF-1 while negative for CD45 and   P40. Ki-67 reveal a proliferation index of 90%.   Case discussed at the daily cytopathology  conference on   03/04/2025.   Case reported to Tumor Registry.   Dr. Webster was informed of the diagnosis via encrypted email on 03/04/2025.   2. LYMPH NODE, LEVEL 7, EBUS-GUIDED FNA   SUSPICIOUS FOR MALIGNANCY.   Suspicious for small cell carcinoma.   Cytology smears and cell block display a hypocellular specimen composed   of rare groups and few single-lying hyperchromatic cells with irregular   nuclear membranes, nuclear molding and scanty cytoplasm with crush   artifact, in a background of rare lymphocytes.   Case discussed at the daily cytopathology  conference on   03/04/2025.   3. LYMPH NODE, 4L, EBUS-GUIDED FNA   POSITIVE FOR MALIGNANT CELLS.   S mall cell carcinoma.   Cytology smears and cell block show a cellular specimen composed of   groups and single-lying hyperchromatic cells with irregular nuclear   membranes, nuclear molding and scanty cytoplasm, in a background of rare   lymphocytes.   Immunocytochemical studies: The neoplastic cells are positive for Cam   5.2, Chromogranin, synaptophysin, INSM-1, TTF-1 while negative for CD45   and P40.Ki-67 reveals a proliferative index of 90%.   In summary the cytomorphology and immunophenotype are consistent with   small cell carcinoma.   4. LUNG, LEFT UPPER LOBE, BRONCHOALVEOLAR LAVAGE   ATYPICAL FINDINGS   Cytology slide and cell block show hyperchromatic groups of poorly   preserved cells among reactive ciliated bronchial cells and alveolar   macrophages.   12/20/24 pericardial fluid cytology negative for malignant cells      MICROBIOLOGY  Abx: none on this admission    Cx Data  12/23 Coxsackie B titers positive 1:100-1:1000  12/23 Coxsackie A titers negative  12/23 EBV IgG positive  12/23 viral hep panel non-reactive  12/22 Quant Gold negative      PERTINENT RADIOLOGY  MRI Brain w/without contrast: pending  MRI a/p w/without contrast: pending  3/12 CXR: Bilateral upper lobe opacities with 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  No acute pulmonary or cardiovascular disease.  3/12 AXR: There is a nonobstructive gas pattern.  Large stool burden.  No masses or pathologic calcifications.  Surgical clips in the right upper quadrant.  Visualized lungs are clear.  3/12 TTE   1. Left ventricular systolic function is normal with an ejection fraction visually estimated at 65 to 70 %.   2. Small pericardial effusion noted adjacent to the posterior left ventricle, moderate pericardial effusion noted adjacentto the right atrium, moderate pericardial effusion noted adjacent to the anterior right ventricle and moderate pericardial effusion noted adjacent to the apex with no echocardiographic evidence of tamponade physiology.   3. The inferior vena cava is normal in size measuring 1.76 cm in diameter, (normal <2.1cm) with normal inspiratory collapse (normal >50%) consistent with normal right atrial pressure (~3, range 0-5mmHg).   4. No prior echocardiogram is available for comparison.  2/21 CXR: No complications post bronchoscopy.  Increased left upper lobe opacity (pneumonia?)  2/15 NC CT chest: Small circumferential pericardial effusion, mildly increased on today's   study when compared to the prior. Left suprahilar mediastinal mass in the prevascular space, extending into the aortopulmonary window, mediastinal adenopathy, appears more   pronounced on today's study though resolution is limited without   intravenous contrast. There is a new peripheral ill-defined 2.4 x 2.2 cm opacity in the left   upper lobe, differential includes infection/inflammation/neoplasm.  Dilated ascending aorta measuring up to 4 cm, similar to the prior study.  Consider contrast-enhanced CT of the chest if patient is able to tolerate   intravenous contrast.  2/15 B/L LE dopplers: negative for DVT.

## 2025-03-18 NOTE — PROGRESS NOTE ADULT - PROBLEM SELECTOR PLAN 4
- recently diagnosed  - Unable to tolerate MRI A/P and brain d/t claustrophobia    - CT chest on 2/15- MEDIASTINUM AND JEREMIAS: Anterior subcarinal lymph node measures 1.8 cm in short axis, stable. There is a soft tissue density, measuring up to 4.2 cm in the prevascular space extending into the subcutaneous left main pulmonary artery region, this is more pronounced on today's study.   - Per onc, plan is to start inpatient chemo treatment and RT  - Follows with Dr. Hall at [facility]  - Unable to do MRI, now pending [facility] CT A/P

## 2025-03-18 NOTE — PROGRESS NOTE ADULT - ASSESSMENT
63 yo woman, former smoker (47py; quit 12/2024) with h/o pAfib (on Eliquis), Hypothyroidism, ? h/o Sleroderma, h/o pericardial effusion s/p pericardiocentesis (dx in 12/2024; ? viral vs inflammatory, cytology negative for malignant cells, on Colchicine), Asthma/suspected COPD, and recently-dx SCLCa > dx in 2/2025, staging javier not yet completed and pt is tx naive and referred to [facility] from Med Onc clinic for expedited onc workup and urgent initiation of inpatient chemotherapy.    ACTIVE PROBLEMS  Recently dx SCLCa  GOC discussion, counseling  Anxiety  Acute hypoxic resp failure  h/o Asthma/COPD (former smoker)  Hyponatremia 2/2 SIADH  h/o Pericardial effusion (viral with positive Coxsackie B titers in 12/2024)  Cancer related pain, anxiety  Constipation  Hypothyroidism  pAfib  Hypercoag state  Severe prot yahir malnutrtion  Immunocompromised 2/2 cancer, autoimmune disease    Recently dx SCLCa  GOC discussion, counseling  Staging w/u in progress   * Pt unable to tolerate MRI Brain, A/P (even with anesthesia- Versed), due to anxiety/claustrophobia   * NC CTH, A/P ordered (pt has allergy to iodinated contrast- anaphylaxis) in an attempt to stage- but pt also with difficulty tolerated due to anxiety  In GOC conversation on 3/13, pt expressed uncertainty about pursuing systemic cancer treatment, especially if her cancer is determined to be advanced, noting that she wanted to know the cancer stage before making any decisions about this; she also expressed interest in reviewing the MOLST form and indicated that she would discuss with her HCP Yogi before making any final decisions about code status  Rad Onc also made aware of pt- she is being considered for a 3-week course of RT to the lung mass (pending staging javier and pt's decision on GOC)  Long-term prognosis: gaurded; pt is full code    Anxiety  Not currently controlled, pt having daily panic attacks which is hindering her ability to go for staging scans  BHT following  Continuing Klonopin 1mg BID  Continuing Ativan PO/IV/IM 1mg q8/prn  1:1 Observation not required at this time    Acute hypoxic resp failure  h/o Asthma/COPD (former smoker)  Pt desats to mid 80s% on RA and requires 4-6L NC supplemental O2 to maintain O2 sats > 92%  Likely due to burden of disease in lungs + pericardial effusion  Continuing supplemental O2 with weaning as tolerated and supportive resp care prn  Continuing Advair 100-50mcg MDI BID  Continuing duonebs q6/prn   Continuing Nicotine patch 21mg daily (6 weeks)    Hyponatremia 2/2 SIADH  Na normalized, 138 today; pt is asymptomatic  SIADH confirmed by 3/12 urine lytes  Continuing 1L fluid restriction  Continuing Salt tabs 1gm BID    h/o Pericardial effusion in 12/2024 (? autoimmune vs viral with positive Coxsackie B titers in 12/2024)  12/23 pericardial fluid cytology negative for malignant cells  3/12 TTE with small to mod pericardial effusion, without tamponade physiology  Continuing Colchicine 0.6mg daily    Cancer related pain, anxiety  Continuing Morphine ER 30mg q12  Continuing Oxy IR 20mg q8/prn  Continuing Baclofen 5mg q8/prn  Appreciate BHT consult:  Ativan PO to 0.5mg q8/prn and 1mg PO prn (prior to MRI)    Constipation  Improved, pt s/p large bm on 3/13  Possibly due to opioids +/- AID  3/12 AXR negative for ileus/obstruction  Continuing bowel regimen (Miralax, Senna) for OIC prevention    Hypothyroidism: 3/12 TFTs wnl; continuing LT4 112mcg daily    pAfib  Hypercoag state  Continuing Eliquis 5mg q12    Severe prot yahir malnutrtion: appreciate DELFINA adorno; continuing regular diet

## 2025-03-18 NOTE — PROGRESS NOTE ADULT - SUBJECTIVE AND OBJECTIVE BOX
Indication of Geriatrics and Palliative Medicine Services:  [  ] Complex Medical Decision Making   [X  ] Symptom/Pain management     DNR on chart:    INTERVAL EVENTS: Patient seen this AM, in no distress. Pain controlled on current regimen.     -------------------------------------------------------------------------------------------------------    PRESENT SYMPTOMS:     [ ]Unable to self-report - see [ ] CPOT [ ] PAINADS [ ] RDOS  Source if other than patient:  [ ]Family   [ ]Team     PAIN   1. Location- Left chest   2. Radiation-  Left arm, back   3. Quality- Sharp   4. Timing- Constant   5. Minimal acceptable level/pain goal- 4/10   6. Aggravating factors-   7. QOL impact- Severe     Dyspnea:                           [ ]Mild [ ]Moderate [ ]Severe  Anxiety:                             [ ]Mild [ ]Moderate [ ]Severe  Fatigue:                             [ ]Mild [ ]Moderate [ ]Severe  Nausea:                             [ x]Mild [ ]Moderate [ ]Severe  Loss of appetite:                [ ]Mild [x ]Moderate [ ]Severe  Constipation:                     [ ]Mild [ ]Moderate [ ]Severe  Other Symptoms:  [ ]All other review of systems negative     -------------------------------------------------------------------------------------------------------    I STOP: [id number]    Prescription Information      PDI Filter:    PDI	Current Rx	Drug Type	Rx Written	Rx Dispensed	Drug	Quantity	Days Supply	Prescriber Name	Prescriber KHOA #	Payment Method	Dispenser  A	Y	O	02/27/2025	03/08/2025	morphine sulf er 30 mg tablet	60	30	Guru Crowe MD	[id number]	Medicare	[id number]  A	Y	O	02/27/2025	03/08/2025	oxycodone hcl (ir) 20 mg tab	90	30	Sebastien Guru ROSS	[id number]	Medicare	[id number]  A	N	O	01/30/2025	02/06/2025	morphine sulf er 30 mg tablet	60	30	Sebastien Guru ROSS	[id number]	Medicare	[id number]  A	N	O	01/30/2025	02/06/2025	oxycodone hcl (ir) 20 mg tab	90	30	Sebastien Guru ROSS	[id number]	Medicare	[id number]  A	N	O	01/07/2025	01/08/2025	morphine sulf er 30 mg tablet	60	30	Sebastien Guru ROSS	[id number]	Medicare	[id number]  A	N	O	01/07/2025	01/08/2025	oxycodone hcl (ir) 20 mg tab	90	30	Sebastien Guru ROSS	[id number]	Medicare	[id number]  A	N	O	11/27/2024	12/06/2024	morphine sulf er 30 mg tablet	60	30	Sebastien Guru ROSS	[id number]	Medicare	[id number]  A	N	O	11/27/2024	12/06/2024	oxycodone hcl (ir) 20 mg tab	90	30	Sebastien Guru ROSS	[id number]	Medicare	[id number]  A	N	O	10/31/2024	11/08/2024	morphine sulf er 30 mg tablet	60	30	Sebastien Guru ROSS	[id number]	Medicare	[id number]  A	N	O	10/31/2024	11/08/2024	oxycodone hcl (ir) 20 mg tab	90	30	SebastienGuru MD	[id number]	Medicare	[id number]  A	N	O	10/03/2024	10/09/2024	oxycodone hcl (ir) 20 mg tab	90	30	Sebastien Guru ROSS	[id number]	Medicare	[id number]    -------------------------------------------------------------------------------------------------------    ITEMS UNCHECKED ARE NOT PRESENT    PHYSICAL:  Vital Signs Last 24 Hrs  T(C): 37.1 (18 Mar 2025 06:02), Max: 37.1 (18 Mar 2025 06:02)  T(F): 98.8 (18 Mar 2025 06:02), Max: 98.8 (18 Mar 2025 06:02)  HR: 68 (18 Mar 2025 06:02) (68 - 80)  BP: 105/65 (18 Mar 2025 06:30) (99/64 - 112/59)  BP(mean): --  RR: 18 (18 Mar 2025 06:02) (18 - 18)  SpO2: 98% (18 Mar 2025 06:02) (97% - 98%)    Parameters below as of 18 Mar 2025 06:02  Patient On (Oxygen Delivery Method): nasal cannula      GENERAL:  [ ]Cachexia  [ ] Frail  [X ]Awake  [X ]Oriented   [ ]Lethargic  [ ]Unarousable  [ ]Verbal  [ ]Non-Verbal    BEHAVIORAL:   [ ] Anxiety  [ ] Delirium [ ] Agitation [ ] Other    HEENT:   [X ]Normal   [ ]Dry mouth   [ ]ET Tube/Trach  [ ]Oral lesions    PULMONARY:   [X ]Clear              [ ]Tachypnea  [ ]Audible excessive secretions   [ ]Rhonchi        [ ]Right [ ]Left [ ]Bilateral  [ ]Crackles        [ ]Right [ ]Left [ ]Bilateral  [ ]Wheezing     [ ]Right [ ]Left [ ]Bilateral  [ ]Diminished breath sounds [ ]right [ ]left [ ]bilateral    CARDIOVASCULAR:    [X ]Regular [ ]Irregular [ ]Tachy  [ ]Ridge [ ]Murmur [ ]Other    GASTROINTESTINAL:  [X ]Soft  [ ]Distended   [X ]+BS  [X ]Non tender [ ]Tender  [ ]Other [ ]PEG [ ]OGT/ NGT      GENITOURINARY:  [X ]Normal [ ] Incontinent   [ ]Oliguria/Anuria   [ ]Camargo    MUSCULOSKELETAL:   [X ]Normal   [ ]Weakness  [ ]Bed/Wheelchair bound [ ]Edema    NEUROLOGIC:   [X ]No focal deficits  [ ]Cognitive impairment  [ ]Dysphagia [ ]Dysarthria [ ]Paresis [ ]Other     SKIN:   [X ]Normal  [ ]Rash  [ ]Other  [ ]Pressure ulcer(s)       Present on admission [ ]y [ ]n    -------------------------------------------------------------------------------------------------------    LABS:                                   10.9   4.21  )-----------( 204      ( 18 Mar 2025 07:06 )             33.0     03-18    138  |  100  |  13  ----------------------------<  95  3.5   |  29  |  0.45[L]    Ca    8.9      18 Mar 2025 07:06  Phos  4.1     03-18  Mg     1.90     03-18    TPro  5.8[L]  /  Alb  3.2[L]  /  TBili  0.2  /  DBili  x   /  AST  13  /  ALT  6   /  AlkPhos  74  03-18      -------------------------------------------------------------------------------------------------------    CRITICAL CARE:  [ ]Shock Present  [ ]Septic [ ]Cardiogenic [ ]Neurologic [ ]Hypovolemic [ ]Undifferentiated    [ ]Vasopressors [ ]Inotropes    [ ]Respiratory failure present [ ]Acute  [ ]Chronic [ ]Hypoxic  [ ]Hypercarbic [ ]Mixed   [ ]Mechanical Ventilation  [ ]Trach collar   [ ]Non-invasive ventilatory support   [ ]High-Flow   [ ]Oxygen mask/venti     [ ]Other organ failure     -------------------------------------------------------------------------------------------------------    RADIOLOGY & ADDITIONAL STUDIES:     < from: Xray Chest 1 View-PORTABLE IMMEDIATE (Xray Chest 1 View-PORTABLE IMMEDIATE .) (03.12.25 @ 13:48) >    IMPRESSION:  Bilateral upper lobe opacities with 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  No acute pulmonary or cardiovascular disease.    < end of copied text >    -------------------------------------------------------------------------------------------------------  MEDICATIONS:     MEDICATIONS  (STANDING):  albuterol/ipratropium for Nebulization 3 milliLiter(s) Nebulizer every 6 hours  apixaban 5 milliGRAM(s) Oral every 12 hours  chlorhexidine 2% Cloths 1 Application(s) Topical <User Schedule>  clonazePAM  Tablet 1 milliGRAM(s) Oral two times a day  colchicine 0.6 milliGRAM(s) Oral daily  fluticasone propionate/ salmeterol 100-50 MICROgram(s) Diskus 1 Dose(s) Inhalation two times a day  influenza  Vaccine (HIGH DOSE) 0.5 milliLiter(s) IntraMuscular once  levothyroxine 112 MICROGram(s) Oral daily  lidocaine   4% Patch 1 Patch Transdermal daily  lidocaine   4% Patch 1 Patch Transdermal every 24 hours  methylnaltrexone Injectable 12 milliGRAM(s) SubCutaneous once  mirtazapine 7.5 milliGRAM(s) Oral at bedtime  morphine ER Tablet 30 milliGRAM(s) Oral every 8 hours  naloxegol 25 milliGRAM(s) Oral daily  nicotine - 21 mG/24Hr(s) Patch 1 Patch Transdermal daily  pantoprazole    Tablet 40 milliGRAM(s) Oral every 12 hours  polyethylene glycol 3350 17 Gram(s) Oral daily  senna 2 Tablet(s) Oral at bedtime  sodium chloride 1 Gram(s) Oral two times a day  sodium chloride 0.65% Nasal 1 Spray(s) Both Nostrils three times a day    MEDICATIONS  (PRN):  aluminum hydroxide/magnesium hydroxide/simethicone Suspension 30 milliLiter(s) Oral every 4 hours PRN Dyspepsia  baclofen 5 milliGRAM(s) Oral every 8 hours PRN Musculoskeletal Pain  benzocaine/menthol Lozenge 1 Lozenge Oral three times a day PRN Sore Throat  HYDROmorphone  Injectable 3 milliGRAM(s) IV Push every 4 hours PRN severe breakthrough pain  LORazepam     Tablet 1 milliGRAM(s) Oral every 8 hours PRN Anxiety  melatonin 3 milliGRAM(s) Oral at bedtime PRN Insomnia  ondansetron Injectable 4 milliGRAM(s) IV Push every 6 hours PRN Nausea and/or Vomiting  oxyCODONE    IR 20 milliGRAM(s) Oral every 4 hours PRN Moderate Pain (4 - 6)  oxyCODONE    IR 30 milliGRAM(s) Oral every 4 hours PRN Severe Pain (7 - 10)

## 2025-03-18 NOTE — PROGRESS NOTE ADULT - PROBLEM SELECTOR PLAN 1
Chest pain from mediastinal mass   Patient with hx of diffuse pain related to her underlying scleroderma for which she shared at one point she was fentanyl 200 mcg patch and was weaned down  - Home regimen: MS Contin 30 mg BID and oxycodone 20 mg q4h PRN (long term dose) given by outpatient pain specialist originally for scleroderma pain  - Pain currently   [X ] improved or controlled   [ ] uncontrolled   - PRN use in past 24 hours from 8 AM to 8 AM: Oxycodone 20 mg x 1 dose, oxycodone 30 mg x 1 dose, IV dilaudid 3 mg x 1 dose     Plan and Recommendations:   - opioids:   > c/w oxycodone  20 mg q4h PRN for moderate pain and oxycodone IR 30 mg q4h prn for severe pain  > c/w IV dilaudid to 3 mg q4h PRN for severe breakthrough pain pain.   > c/w MS Contin 30 mg now TID   - Bowel regimen  > Holistic therapy Chest pain from mediastinal mass   Patient with hx of diffuse pain related to her underlying scleroderma for which she shared at one point she was fentanyl 200 mcg patch and was weaned down  - Home regimen: MS Contin 30 mg BID and oxycodone 20 mg q4h PRN (long term dose) given by outpatient pain specialist originally for scleroderma pain  - Pain currently   [X ] improved or controlled   [ ] uncontrolled   - PRN use in past 24 hours from 8 AM to 8 AM: Oxycodone 20 mg x 1 dose, oxycodone 30 mg x 1 dose, IV dilaudid 3 mg x 1 dose     Plan and Recommendations:   - opioids:   > c/w oxycodone  20 mg q4h PRN for moderate pain and oxycodone IR 30 mg q4h prn for severe pain  > c/w IV dilaudid to 3 mg q4h PRN for severe breakthrough pain pain.   > c/w MS Contin 30 mg TID   - Bowel regimen  - Holistic therapy

## 2025-03-18 NOTE — PROGRESS NOTE ADULT - PROBLEM SELECTOR PLAN 7
For pain management     In the event of worsening symptoms, please contact the Palliative Medicine team via pager (if the patient is at [facility] #[phone] or if the patient is at [facility] #[phone]) The Geriatric and Palliative Medicine service has coverage 24 hours a day/ 7 days a week to provide medical recommendations regarding symptom management needs via telephone.

## 2025-03-18 NOTE — BH CONSULTATION LIAISON PROGRESS NOTE - [id number]_PSY_A_CORE
Patient seen and evaluated, staff consulted, chart, labs, meds reviewed. Over this interval pt did not tolerate going for MRI, unclear reason. Pt states anesthesiologist was not comfortable sedating her because "my lips were too blue." Initially seen rocking back and forth and crying hysterically, afterwards was able to engage in discussion regarding medication. Endorses feeling somewhat sleepy in the AM. Denies SI/HI. Discussed plan to offer IV benzodiazepine dose prior to MRI, pt amenable.

## 2025-03-18 NOTE — PROGRESS NOTE ADULT - ASSESSMENT
65 y/o F with PMHx of AF, scleroderma, asthma with prior intubations, hypothyroidism, pericardial effusion s/p pericardiocentesis (12/2024) and drain on colchicine presented to the ED due to SOB, chest pain, and back pain. Oncology evaluated her and recommended MRI brain and MRI chest/abdomen/pelvis with contrast to rule out metastasis. Was previously admitted in 12/2024 due to chest pain and SOB and found to have a pericardial effusion with evidence of tamponade physiology. She underwent urgent pericardiocentesis with placement of drain which was removed on 12/26. She was started on colchicine and NSAIDs. Hospital course was complicated by AF with RVR which was new onset and was suspected to be due to drain placement. After drain was removed patient continued to have pAF and was started on Eliquis. Presented to the ED on 02/13/25 for left sided chest pain radiating to the back. Repeat TTE demonstrated interval increase in pericardial effusion (moderate-large adjacent to RA with no evidence of tamponade) and CTS was consulted for possible pericardial window and recommended no acute intervention. 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 Pulmonary consulted and recommended transfer to [facility] for bronchoscopy, EBUS and hilar mass biopsy. Patient transferred to [facility] on 02/20/25 and underwent EBUS on 02/21/25. 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

## 2025-03-18 NOTE — BH CONSULTATION LIAISON PROGRESS NOTE - NSBHCONSULTFOLLOWAFTERCARE_PSY_A_CORE FT
Would benefit from follow up with [facility] once outpatient: [phone]

## 2025-03-19 LAB
ALBUMIN SERPL ELPH-MCNC: 3.7 G/DL — SIGNIFICANT CHANGE UP (ref 3.3–5)
ALP SERPL-CCNC: 84 U/L — SIGNIFICANT CHANGE UP (ref 40–120)
ALT FLD-CCNC: 7 U/L — SIGNIFICANT CHANGE UP (ref 4–33)
ANION GAP SERPL CALC-SCNC: 9 MMOL/L — SIGNIFICANT CHANGE UP (ref 7–14)
AST SERPL-CCNC: 14 U/L — SIGNIFICANT CHANGE UP (ref 4–32)
BASOPHILS # BLD AUTO: 0.04 K/UL — SIGNIFICANT CHANGE UP (ref 0–0.2)
BASOPHILS NFR BLD AUTO: 0.9 % — SIGNIFICANT CHANGE UP (ref 0–2)
BILIRUB SERPL-MCNC: 0.3 MG/DL — SIGNIFICANT CHANGE UP (ref 0.2–1.2)
BUN SERPL-MCNC: 10 MG/DL — SIGNIFICANT CHANGE UP (ref 7–23)
CALCIUM SERPL-MCNC: 9 MG/DL — SIGNIFICANT CHANGE UP (ref 8.4–10.5)
CHLORIDE SERPL-SCNC: 98 MMOL/L — SIGNIFICANT CHANGE UP (ref 98–107)
CO2 SERPL-SCNC: 33 MMOL/L — HIGH (ref 22–31)
CREAT SERPL-MCNC: 0.43 MG/DL — LOW (ref 0.5–1.3)
EGFR: 107 ML/MIN/1.73M2 — SIGNIFICANT CHANGE UP
EGFR: 107 ML/MIN/1.73M2 — SIGNIFICANT CHANGE UP
EOSINOPHIL # BLD AUTO: 0.14 K/UL — SIGNIFICANT CHANGE UP (ref 0–0.5)
EOSINOPHIL NFR BLD AUTO: 3.2 % — SIGNIFICANT CHANGE UP (ref 0–6)
GLUCOSE SERPL-MCNC: 119 MG/DL — HIGH (ref 70–99)
HCT VFR BLD CALC: 34.6 % — SIGNIFICANT CHANGE UP (ref 34.5–45)
HGB BLD-MCNC: 11.1 G/DL — LOW (ref 11.5–15.5)
IANC: 2.26 K/UL — SIGNIFICANT CHANGE UP (ref 1.8–7.4)
IMM GRANULOCYTES NFR BLD AUTO: 0 % — SIGNIFICANT CHANGE UP (ref 0–0.9)
LYMPHOCYTES # BLD AUTO: 1.57 K/UL — SIGNIFICANT CHANGE UP (ref 1–3.3)
LYMPHOCYTES # BLD AUTO: 35.8 % — SIGNIFICANT CHANGE UP (ref 13–44)
MAGNESIUM SERPL-MCNC: 1.8 MG/DL — SIGNIFICANT CHANGE UP (ref 1.6–2.6)
MCHC RBC-ENTMCNC: 29.8 PG — SIGNIFICANT CHANGE UP (ref 27–34)
MCHC RBC-ENTMCNC: 32.1 G/DL — SIGNIFICANT CHANGE UP (ref 32–36)
MCV RBC AUTO: 93 FL — SIGNIFICANT CHANGE UP (ref 80–100)
MONOCYTES # BLD AUTO: 0.38 K/UL — SIGNIFICANT CHANGE UP (ref 0–0.9)
MONOCYTES NFR BLD AUTO: 8.7 % — SIGNIFICANT CHANGE UP (ref 2–14)
NEUTROPHILS # BLD AUTO: 2.26 K/UL — SIGNIFICANT CHANGE UP (ref 1.8–7.4)
NEUTROPHILS NFR BLD AUTO: 51.4 % — SIGNIFICANT CHANGE UP (ref 43–77)
NRBC # BLD AUTO: 0 K/UL — SIGNIFICANT CHANGE UP (ref 0–0)
NRBC # FLD: 0 K/UL — SIGNIFICANT CHANGE UP (ref 0–0)
NRBC BLD AUTO-RTO: 0 /100 WBCS — SIGNIFICANT CHANGE UP (ref 0–0)
PHOSPHATE SERPL-MCNC: 3.5 MG/DL — SIGNIFICANT CHANGE UP (ref 2.5–4.5)
PLATELET # BLD AUTO: 218 K/UL — SIGNIFICANT CHANGE UP (ref 150–400)
POTASSIUM SERPL-MCNC: 3.2 MMOL/L — LOW (ref 3.5–5.3)
POTASSIUM SERPL-SCNC: 3.2 MMOL/L — LOW (ref 3.5–5.3)
PROT SERPL-MCNC: 6.5 G/DL — SIGNIFICANT CHANGE UP (ref 6–8.3)
RBC # BLD: 3.72 M/UL — LOW (ref 3.8–5.2)
RBC # FLD: 12.4 % — SIGNIFICANT CHANGE UP (ref 10.3–14.5)
SODIUM SERPL-SCNC: 140 MMOL/L — SIGNIFICANT CHANGE UP (ref 135–145)
WBC # BLD: 4.39 K/UL — SIGNIFICANT CHANGE UP (ref 3.8–10.5)
WBC # FLD AUTO: 4.39 K/UL — SIGNIFICANT CHANGE UP (ref 3.8–10.5)

## 2025-03-19 PROCEDURE — 99498 ADVNCD CARE PLAN ADDL 30 MIN: CPT | Mod: 25

## 2025-03-19 PROCEDURE — 99497 ADVNCD CARE PLAN 30 MIN: CPT | Mod: 25

## 2025-03-19 PROCEDURE — 99233 SBSQ HOSP IP/OBS HIGH 50: CPT

## 2025-03-19 RX ORDER — CLONAZEPAM 0.5 MG/1
0.5 TABLET ORAL DAILY
Refills: 0 | Status: DISCONTINUED | OUTPATIENT
Start: 2025-03-19 | End: 2025-03-20

## 2025-03-19 RX ORDER — BACLOFEN 10 MG/20ML
5 INJECTION INTRATHECAL EVERY 8 HOURS
Refills: 0 | Status: DISCONTINUED | OUTPATIENT
Start: 2025-03-19 | End: 2025-03-31

## 2025-03-19 RX ORDER — CLONAZEPAM 0.5 MG/1
1 TABLET ORAL AT BEDTIME
Refills: 0 | Status: DISCONTINUED | OUTPATIENT
Start: 2025-03-19 | End: 2025-03-25

## 2025-03-19 RX ORDER — LORAZEPAM 4 MG/ML
0.5 VIAL (ML) INJECTION ONCE
Refills: 0 | Status: DISCONTINUED | OUTPATIENT
Start: 2025-03-19 | End: 2025-03-19

## 2025-03-19 RX ORDER — ACETAMINOPHEN 500 MG/5ML
650 LIQUID (ML) ORAL ONCE
Refills: 0 | Status: COMPLETED | OUTPATIENT
Start: 2025-03-19 | End: 2025-03-19

## 2025-03-19 RX ORDER — HALOPERIDOL 10 MG/1
0.5 TABLET ORAL ONCE
Refills: 0 | Status: COMPLETED | OUTPATIENT
Start: 2025-03-19 | End: 2025-03-19

## 2025-03-19 RX ORDER — BACLOFEN 10 MG/20ML
5 INJECTION INTRATHECAL EVERY 8 HOURS
Refills: 0 | Status: DISCONTINUED | OUTPATIENT
Start: 2025-03-19 | End: 2025-03-19

## 2025-03-19 RX ORDER — GABAPENTIN 400 MG/1
100 CAPSULE ORAL THREE TIMES A DAY
Refills: 0 | Status: DISCONTINUED | OUTPATIENT
Start: 2025-03-19 | End: 2025-03-31

## 2025-03-19 RX ORDER — LIDOCAINE HYDROCHLORIDE 20 MG/ML
1 JELLY TOPICAL EVERY 24 HOURS
Refills: 0 | Status: DISCONTINUED | OUTPATIENT
Start: 2025-03-19 | End: 2025-03-21

## 2025-03-19 RX ADMIN — OXYCODONE HYDROCHLORIDE 30 MILLIGRAM(S): 30 TABLET ORAL at 01:03

## 2025-03-19 RX ADMIN — COLCHICINE 0.6 MILLIGRAM(S): 0.6 TABLET, FILM COATED ORAL at 12:43

## 2025-03-19 RX ADMIN — Medication 3 MILLIGRAM(S): at 12:40

## 2025-03-19 RX ADMIN — Medication 30 MILLIGRAM(S): at 00:10

## 2025-03-19 RX ADMIN — NICOTINE POLACRILEX 1 PATCH: 4 GUM, CHEWING ORAL at 12:40

## 2025-03-19 RX ADMIN — Medication 3 MILLIGRAM(S): at 19:16

## 2025-03-19 RX ADMIN — Medication 3 MILLIGRAM(S): at 06:20

## 2025-03-19 RX ADMIN — Medication 1 APPLICATION(S): at 05:54

## 2025-03-19 RX ADMIN — Medication 0.5 MILLIGRAM(S): at 14:15

## 2025-03-19 RX ADMIN — NICOTINE POLACRILEX 1 PATCH: 4 GUM, CHEWING ORAL at 19:39

## 2025-03-19 RX ADMIN — Medication 40 MILLIGRAM(S): at 05:52

## 2025-03-19 RX ADMIN — Medication 30 MILLIGRAM(S): at 06:51

## 2025-03-19 RX ADMIN — Medication 1 SPRAY(S): at 05:53

## 2025-03-19 RX ADMIN — OXYCODONE HYDROCHLORIDE 30 MILLIGRAM(S): 30 TABLET ORAL at 17:46

## 2025-03-19 RX ADMIN — APIXABAN 5 MILLIGRAM(S): 2.5 TABLET, FILM COATED ORAL at 17:18

## 2025-03-19 RX ADMIN — Medication 1 SPRAY(S): at 13:57

## 2025-03-19 RX ADMIN — Medication 112 MICROGRAM(S): at 05:51

## 2025-03-19 RX ADMIN — OXYCODONE HYDROCHLORIDE 30 MILLIGRAM(S): 30 TABLET ORAL at 17:16

## 2025-03-19 RX ADMIN — NALOXEGOL OXALATE 25 MILLIGRAM(S): 12.5 TABLET, FILM COATED ORAL at 12:43

## 2025-03-19 RX ADMIN — OXYCODONE HYDROCHLORIDE 30 MILLIGRAM(S): 30 TABLET ORAL at 23:28

## 2025-03-19 RX ADMIN — OXYCODONE HYDROCHLORIDE 30 MILLIGRAM(S): 30 TABLET ORAL at 00:03

## 2025-03-19 RX ADMIN — GABAPENTIN 100 MILLIGRAM(S): 400 CAPSULE ORAL at 22:20

## 2025-03-19 RX ADMIN — OXYCODONE HYDROCHLORIDE 30 MILLIGRAM(S): 30 TABLET ORAL at 10:22

## 2025-03-19 RX ADMIN — Medication 3 MILLIGRAM(S): at 19:46

## 2025-03-19 RX ADMIN — NICOTINE POLACRILEX 1 PATCH: 4 GUM, CHEWING ORAL at 06:56

## 2025-03-19 RX ADMIN — CLONAZEPAM 0.5 MILLIGRAM(S): 0.5 TABLET ORAL at 12:43

## 2025-03-19 RX ADMIN — Medication 30 MILLIGRAM(S): at 22:30

## 2025-03-19 RX ADMIN — HALOPERIDOL 0.5 MILLIGRAM(S): 10 TABLET ORAL at 18:15

## 2025-03-19 RX ADMIN — Medication 1 GRAM(S): at 17:18

## 2025-03-19 RX ADMIN — Medication 650 MILLIGRAM(S): at 08:29

## 2025-03-19 RX ADMIN — MIRTAZAPINE 7.5 MILLIGRAM(S): 30 TABLET, FILM COATED ORAL at 23:35

## 2025-03-19 RX ADMIN — Medication 650 MILLIGRAM(S): at 07:29

## 2025-03-19 RX ADMIN — Medication 30 MILLIGRAM(S): at 05:51

## 2025-03-19 RX ADMIN — APIXABAN 5 MILLIGRAM(S): 2.5 TABLET, FILM COATED ORAL at 05:52

## 2025-03-19 RX ADMIN — Medication 4 MILLIGRAM(S): at 07:08

## 2025-03-19 RX ADMIN — Medication 1 APPLICATION(S): at 12:50

## 2025-03-19 RX ADMIN — NICOTINE POLACRILEX 1 PATCH: 4 GUM, CHEWING ORAL at 12:45

## 2025-03-19 RX ADMIN — CLONAZEPAM 1 MILLIGRAM(S): 0.5 TABLET ORAL at 23:00

## 2025-03-19 RX ADMIN — OXYCODONE HYDROCHLORIDE 30 MILLIGRAM(S): 30 TABLET ORAL at 11:22

## 2025-03-19 RX ADMIN — Medication 3 MILLIGRAM(S): at 05:20

## 2025-03-19 RX ADMIN — Medication 40 MILLIGRAM(S): at 17:18

## 2025-03-19 RX ADMIN — Medication 1 GRAM(S): at 05:52

## 2025-03-19 RX ADMIN — HALOPERIDOL 0.5 MILLIGRAM(S): 10 TABLET ORAL at 13:57

## 2025-03-19 RX ADMIN — BACLOFEN 5 MILLIGRAM(S): 10 INJECTION INTRATHECAL at 17:13

## 2025-03-19 RX ADMIN — Medication 0.5 MILLIGRAM(S): at 18:14

## 2025-03-19 RX ADMIN — BACLOFEN 5 MILLIGRAM(S): 10 INJECTION INTRATHECAL at 23:28

## 2025-03-19 RX ADMIN — Medication 3 MILLIGRAM(S): at 13:40

## 2025-03-19 NOTE — CHART NOTE - NSCHARTNOTEFT_GEN_A_CORE
Palliative Care Biopsychosocial Assessment:     Discussed patient in morning Interdisciplinary rounds   Patient identified for need of assessment by Palliative Care . Maryann Wen is a 65 yo woman with newly diagnosed small cell cancer. She is treatment naive. She is aware of results of recent CT and was informed that there was no evidence of mets.  Dr. Tellez reviewed that pt's cancer is aggressive and not curable. Maryann Holliday requested information on her prognosis. Dr. Tellez shared that without treatment her prognosis was likely 6 months or less.  Pt shared her uncertainty about pursuing systemic treatment. She is worried about the potential side effects of DMT and fears that her quality of life may be negatively impacted. She understands that Dr. Tellez or her out-pt oncologist Dr. Hall can not guarantee that her quality of life would not be impacted by chemotherapy. She shared that her scleroderma is advanced and she believes that she may have a difficult time tolerating chemotherapy.  She worries that she will suffer and has fears of "suffocating."  Reassured her that whatever path she chose to pursue whether DMT or supportive care her symptom burden would always be prioritized.    Maryann shared she is leaning towards supportive care/hospice however values her HCP's input and requested that Dr. Tellez speak to him privately about her dx and prognosis including DMT options. She does not wish to make final decision until she can have discussion with Sam after Dr. Tellez speaks to him.  Educated Maryann on the philosophy of hospice care and explained the various settings and criteria in which hospice can be delivered (home vs. nursing home vs. inpatient hospice). Discussed the services provided under hospice services emphasizing the goal of elevating patient's quality of life and optimizing symptom management and avoiding unnecessary future hospitalizations. In addition to symptom management expertise, hospice provides supportive counseling services, nutritional support and chaplaincy services.     Patient Mental Status:   [ X  ]   alert     [ X  ]  oriented   [    ]  confused   [   ] lethargic  [   ]   non-responsive        Patient Coping Status:     [   ] coping well    [ X  ] coping with  some difficulty    [   ] difficulty coping   [   ] Unable to assess due to mental status                                                 Patient Emotional Status:   [ X  ] anxious   [   ] depressed    [ X  ] overwhelmed    [   ] angry   [   ]accepting    [   ] not accepting          Advance Directives:   Palliative care attending Dr. Torres counselled patient regarding role of HCP on 3/12/25 and provided reassurance that patient will continue to make their own medical decisions and that a proxy would substitute only in the event that the patient was incapable of doing so. Patient amenable to completing HCP paperwork. She appointed her long term friend (since age 2) as her HCP. HCP paperwork located in pt's medical record.     Advanced care planning extensively discussed. Reviewed the risks and benefits of resuscitative measures including cardiopulmonary resuscitation and mechanical ventilation at the end of life in the setting of advanced malignancy/illness. Patient understands that these interventions may pose more burden than benefit and is leaning toward completing MOLST for DNR/DNI however wants to discuss MOLST with Sam prior to completion of her advanced directives.     [   ]  Health Care Surrogate:                            [ X]  Health Care Proxy: Sam Oswald [phone]  [   ]  MOLST  [   ]  Living Will  [   ]  DNR  [   ]  DNI    Social Support:  Evaluated patients social support system. Pt has limited social support system. She is .  She had one son Sam who is now . Her long term friend Sam Oswald is her source of support and care taker. She resides alone in a town house and stays on main level. She reports being essentially home bound. She watches TV and reads books. She is a retried jeweler.  She shared she was no longer able to work after being diagnosed with scleroderma.      Patient Needs:  [ X  ] Supportive Counseling      [   ] Family Meeting     [   ] Education     [ X  ] Advance Care Planning         Caregiver needs:   [   ]  Supportive Counseling    [ X  ] Family Conference     [   ] Education      Referral:    [   ]  Community Resources    [   ]  Cancer Support Group     [   ]  Hospice     [   ]  Bereavement support     [   ]   Pastoral Care      [   ]  Live On [place]    [   ]  Child Life Services   [   ]  Caregiver Support Group   [  ] Supportive Oncology  [   ] Behavioral Health   [ X  ] No needs identified at this time. Will continue to assess      Palliative Care  will continue to remain available as needed for continued psychosocial and emotional support.  Emphatic and active listening provided.     Follow-up:  [ X  ] Bi-Weekly for psychosocial support and on-going goals of care       Yesica Pascual KARIME  Palliative Care   Geriatrics and Palliative Care Division at [facility]  Pager #[phone]

## 2025-03-19 NOTE — BH CONSULTATION LIAISON PROGRESS NOTE - ATTENDING COMMENTS
Chart reviewed. Discusssed with resident. Agree with above assessment/recs. Will continue to follow
Chart reviewed. Discussed with resident. Agree with above assessment/recs. Will continue to follow.

## 2025-03-19 NOTE — PROGRESS NOTE ADULT - PROBLEM SELECTOR PLAN 3
Patient with hx of anxiety/panic d/o for which she shared she has been on xanax before but she prefers not to become dependent on meds  Currently significant anxiety related to dx and with attempted mri  Having panic attacks   PLAN  On klonopin and ativan   f/u BH Having severe  panic attacks   On klonopin and ativan   Haldol PRN   f/u BH

## 2025-03-19 NOTE — PROGRESS NOTE ADULT - ASSESSMENT
65 yo woman, former smoker (47py; quit 12/2024) with h/o pAfib (on Eliquis), Hypothyroidism, ? h/o Sleroderma, h/o pericardial effusion s/p pericardiocentesis (dx in 12/2024; ? viral vs inflammatory, cytology negative for malignant cells, on Colchicine), Asthma/suspected COPD, and recently-dx SCLCa > dx in 2/2025, staging javier not yet completed and pt is tx naive and referred to [facility] from Med Onc clinic for expedited onc workup and urgent initiation of inpatient chemotherapy.    ACTIVE PROBLEMS  Recently dx SCLCa  GOC discussion, counseling  Pericardial effusion   Anxiety  Acute hypoxic resp failure  h/o Asthma/COPD (former smoker)  Hyponatremia 2/2 SIADH  Cancer related pain, anxiety  Constipation  Hypothyroidism  pAfib  Hypercoag state  Severe prot yahir malnutrtion  Immunocompromised 2/2 cancer, autoimmune disease    Recently dx SCLCa  GOC discussion, counseling  Staging w/u in progress   * Pt unable to tolerate MRI Brain, A/P (even with anesthesia- Versed), due to anxiety/claustrophobia   * NC CTH, A/P without overt evidence of mets  In GOC conversation on 3/13, pt expressed uncertainty about pursuing systemic cancer treatment, especially if her cancer is determined to be advanced  However, in f/u conversation about GOC- pt indicated that she would like to pursue systemic (? inpt vs outpt)  Rad Onc also made aware of pt- she is being considered for a 3-week course of RT to the lung mass (pending staging javier and pt's decision on GOC)  Long-term prognosis: gaurded; pt is full code    Pericardial effusion   Dx in 12/2024 (previously thought to be ? autoimmune vs viral with positive Coxsackie B titers in 12/2024)  12/23 pericardial fluid cytology negative for malignant cells  3/12 TTE with small to mod pericardial effusion, without tamponade physiology  Repeat TTE pending  Continuing Colchicine 0.6mg daily    Anxiety/depression  Not currently controlled, pt having regular panic attacks and nightmares (grieving the recent loss of her son)  BHT continues to follow closely  Continuing Klonopin 1mg BID  Continuing Ativan PO/IV/IM 1mg q8  Continuing Remeron  1:1 Observation not required at this time    Acute hypoxic resp failure  h/o Asthma/COPD (former smoker)  Pt desats to mid 80s% on RA and requires 4-6L NC supplemental O2 to maintain O2 sats > 92%  Likely due to burden of disease in lungs + pericardial effusion  Continuing supplemental O2 with weaning as tolerated and supportive resp care prn  Continuing Advair 100-50mcg MDI BID  Continuing duonebs q6/prn   Continuing Nicotine patch 21mg daily (6 weeks)  Mgmt of pericardial effusion as above    Hyponatremia 2/2 SIADH  Na normalized, 135 today; pt is asymptomatic  SIADH confirmed by 3/12 urine lytes  Continuing 1L fluid restriction   Continuing Salt tabs 1gm BID    Cancer related pain  Continuing Morphine ER 30mg q12  Continuing Oxy IR 20mg q8/prn  Continuing Baclofen 5mg q8/prn    Constipation  Improved  Possibly due to opioids +/- AID  3/12 AXR negative for ileus/obstruction  Continuing bowel regimen (Miralax, Senna) for OIC prevention    Hypothyroidism: 3/12 TFTs wnl; continuing LT4 112mcg daily    pAfib  Hypercoag state  Eliquis transitioned to heparin gtt in anticipation of possible pericardial procedure as noted above    Severe prot yahir malnutrtion: appreciate RD tila; continuing regular diet

## 2025-03-19 NOTE — PROGRESS NOTE ADULT - SUBJECTIVE AND OBJECTIVE BOX
SOLID TUMOR ONCOLOGY HOSPITALIST PROGRESS NOTE    S: No acute events overnight.    CURRENT MEDICATIONS  MEDICATIONS  (STANDING):  albuterol/ipratropium for Nebulization 3 milliLiter(s) Nebulizer every 6 hours  baclofen 5 milliGRAM(s) Oral every 8 hours  chlorhexidine 2% Cloths 1 Application(s) Topical daily  clonazePAM  Tablet 1 milliGRAM(s) Oral at bedtime  colchicine 0.6 milliGRAM(s) Oral daily  fluticasone propionate/ salmeterol 100-50 MICROgram(s) Diskus 1 Dose(s) Inhalation two times a day  gabapentin 100 milliGRAM(s) Oral three times a day  influenza  Vaccine (HIGH DOSE) 0.5 milliLiter(s) IntraMuscular once  levothyroxine 112 MICROGram(s) Oral daily  lidocaine   4% Patch 1 Patch Transdermal daily  lidocaine   4% Patch 1 Patch Transdermal every 24 hours  lidocaine   4% Patch 1 Patch Transdermal every 24 hours  methylnaltrexone Injectable 12 milliGRAM(s) SubCutaneous once  morphine ER Tablet 30 milliGRAM(s) Oral every 8 hours  naloxegol 25 milliGRAM(s) Oral daily  nicotine - 21 mG/24Hr(s) Patch 1 Patch Transdermal daily  OLANZapine 5 milliGRAM(s) Oral at bedtime  pantoprazole    Tablet 40 milliGRAM(s) Oral every 12 hours  polyethylene glycol 3350 17 Gram(s) Oral daily  senna 2 Tablet(s) Oral at bedtime  sodium chloride 1 Gram(s) Oral two times a day  sodium chloride 0.65% Nasal 1 Spray(s) Both Nostrils three times a day  MEDICATIONS  (PRN):  aluminum hydroxide/magnesium hydroxide/simethicone Suspension 30 milliLiter(s) Oral every 4 hours PRN Dyspepsia  benzocaine/menthol Lozenge 1 Lozenge Oral three times a day PRN Sore Throat  HYDROmorphone  Injectable 3 milliGRAM(s) IV Push every 4 hours PRN severe breakthrough pain  LORazepam     Tablet 1 milliGRAM(s) Oral every 8 hours PRN Anxiety  melatonin 3 milliGRAM(s) Oral at bedtime PRN Insomnia  ondansetron Injectable 4 milliGRAM(s) IV Push every 6 hours PRN Nausea and/or Vomiting  oxyCODONE    IR 20 milliGRAM(s) Oral every 4 hours PRN Moderate Pain (4 - 6)  oxyCODONE    IR 30 milliGRAM(s) Oral every 4 hours PRN Severe Pain (7 - 10)      PHYSICAL EXAM  T(C): 36.6 (03-20-25 @ 13:55), Max: 36.6 (03-20-25 @ 05:55)  HR: 73 (03-20-25 @ 20:46) (73 - 88)  BP: 124/53 (03-20-25 @ 19:40) (103/62 - 124/53)  RR: 17 (03-20-25 @ 13:55) (16 - 17)  SpO2: 98% (03-20-25 @ 20:46) (92% - 98%)    03-19-25 @ 07:01  -  03-20-25 @ 07:00  --------------------------------------------------------  IN: 450 mL / OUT: 0 mL / NET: 450 mL    03-20-25 @ 07:01  -  03-20-25 @ 21:26  --------------------------------------------------------  IN: 150 mL / OUT: 0 mL / NET: 150 mL  Non-toxic-appearing woman, crying hysterically in bed  Answering all questions appropriately (conversational dyspnea resolved), following commands  Anicteric sclera, no oral lesions/thrush  RRR, no m/r/g, heart sounds faint  Breaths somewhat labored, but not tachypnea; trace RLB crackles, no w/r  Abd soft/nt/nd, hypoactive bowel sounds  No peripheral edema  CN 2-12 grossly intact; no gross focal neuro deficits; pt moves all extremities spontaneously      LABS                        10.4   4.48  )-----------( 189      ( 20 Mar 2025 07:15 )             31.6     03-20    136  |  96[L]  |  8   ----------------------------<  103[H]  3.5   |  31  |  0.42[L]    Ca    8.7      20 Mar 2025 07:15  Phos  3.4     03-20  Mg     1.80     03-20    TPro  6.0  /  Alb  3.3  /  TBili  0.4  /  DBili  x   /  AST  17  /  ALT  7   /  AlkPhos  78  03-20      ADDITIONAL LABS  Dylan 98  Uosm 632  uric acid 2.4    PATHOLOGY  2/21 LN biopsies  1. LUNG, LEFT UPPER LOBE, ENDOBRONCHIAL FORCEPS BIOPSY AND TRANSBRONCHIAL   FNA   POSITIVE FOR MALIGNANT CELLS.   Small cell carcinoma.   Touch Imprint slide, cell block and core biopsy show a cellular specimen   composed of groups and numerous single-lying hyperchromatic cells with   irregular nuclear membranes, nuclear molding and scanty cytoplasm. Crush   artifact and mitotic figures present.   Immunocytochemical studies : The neoplastic cells are positive for Cam   5.2.hrmogranin, synaptophysin, INSM-1, TTF-1 while negative for CD45 and   P40. Ki-67 reveal a proliferation index of 90%.   Case discussed at the daily cytopathology  conference on   03/04/2025.   Case reported to Tumor Registry.   Dr. Webster was informed of the diagnosis via encrypted email on 03/04/2025.   2. LYMPH NODE, LEVEL 7, EBUS-GUIDED FNA   SUSPICIOUS FOR MALIGNANCY.   Suspicious for small cell carcinoma.   Cytology smears and cell block display a hypocellular specimen composed   of rare groups and few single-lying hyperchromatic cells with irregular   nuclear membranes, nuclear molding and scanty cytoplasm with crush   artifact, in a background of rare lymphocytes.   Case discussed at the daily cytopathology  conference on   03/04/2025.   3. LYMPH NODE, 4L, EBUS-GUIDED FNA   POSITIVE FOR MALIGNANT CELLS.   S mall cell carcinoma.   Cytology smears and cell block show a cellular specimen composed of   groups and single-lying hyperchromatic cells with irregular nuclear   membranes, nuclear molding and scanty cytoplasm, in a background of rare   lymphocytes.   Immunocytochemical studies: The neoplastic cells are positive for Cam   5.2, Chromogranin, synaptophysin, INSM-1, TTF-1 while negative for CD45   and P40.Ki-67 reveals a proliferative index of 90%.   In summary the cytomorphology and immunophenotype are consistent with   small cell carcinoma.   4. LUNG, LEFT UPPER LOBE, BRONCHOALVEOLAR LAVAGE   ATYPICAL FINDINGS   Cytology slide and cell block show hyperchromatic groups of poorly   preserved cells among reactive ciliated bronchial cells and alveolar   macrophages.   12/20/24 pericardial fluid cytology negative for malignant cells      MICROBIOLOGY  Abx: none on this admission    Cx Data  None new on this admission.  12/23 Coxsackie B titers positive 1:100-1:1000  12/23 Coxsackie A titers negative  12/23 EBV IgG positive  12/23 viral hep panel non-reactive  12/22 Quant Gold negative      PERTINENT RADIOLOGY  3/20 TTE   1. Limited study to re-evaluate pericardial effusion.   2. There is a moderate pericardial effusion noted adjacent to the posterior left ventricle, a moderate pericardial effusion noted adjacent to the right atrium, a small pericardial effusion noted adjacent to the apex, a moderate pericardial effusion noted adjacent to the lateral left ventricle and a moderate pericardial effusion noted adjacent to the right ventricle with no echocardiographic evidence of tamponade physiology. Moderate pericardial effusion, measuring ~ 1.1 cm posterior to the left ventricle, ~ 1.0 cm lateral to the left ventricle, ~ 1.4 cm adjacent to the RV free wall, and ~ 1.4 cm superior to the right atrium. Small pericardial effusion anterior to the right ventricle and adjacent to the apex. The pericardium adjacent to the RV free wall appears markedly thickened and may reflect the presence of thrombus, inflammatory material, and/or metastatic disease. No RA or RV diastolic collapse seen. However, the inferior vena cava (IVC) displays reduced respirophasic variability in its caliber, consistent with elevated right atrial pressure.   3. The inferior vena cava is normal in size measuring 1.30 cm in diameter, (normal <2.1cm) with abnormal inspiratory collapse (abnormal <50%) consistent with mildly elevated right atrial pressure (~8, range 5-10mmHg).   4. Compared to the transthoracic echocardiogram performed on 3/12/2025, the pericardial effusion has increased slightly in size.  3/18 CT abd/pelv: No acute pathology within the abdomen and pelvis. Partially imaged pericardial effusion. Bilateral trace pleural effusions with adjacent atelectasis. Again demonstrated a T12 vertebral body hemangioma. Degenerative changes of the spine  3/18 CT head: No hydrocephalus, acute intracranial hemorrhage, or mass effect. No compelling evidence of intracranial metastasis on this non-contrast exam.  3/12 CXR: Bilateral upper lobe opacities with 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  No acute pulmonary or cardiovascular disease.  3/12 AXR: There is a nonobstructive gas pattern.  Large stool burden.  No masses or pathologic calcifications.  Surgical clips in the right upper quadrant.  Visualized lungs are clear.  3/12 TTE   1. Left ventricular systolic function is normal with an ejection fraction visually estimated at 65 to 70 %.   2. Small pericardial effusion noted adjacent to the posterior left ventricle, moderate pericardial effusion noted adjacentto the right atrium, moderate pericardial effusion noted adjacent to the anterior right ventricle and moderate pericardial effusion noted adjacent to the apex with no echocardiographic evidence of tamponade physiology.   3. The inferior vena cava is normal in size measuring 1.76 cm in diameter, (normal <2.1cm) with normal inspiratory collapse (normal >50%) consistent with normal right atrial pressure (~3, range 0-5mmHg).   4. No prior echocardiogram is available for comparison.  2/21 CXR: No complications post bronchoscopy.  Increased left upper lobe opacity (pneumonia?)  2/15 NC CT chest: Small circumferential pericardial effusion, mildly increased on today's   study when compared to the prior. Left suprahilar mediastinal mass in the prevascular space, extending into the aortopulmonary window, mediastinal adenopathy, appears more   pronounced on today's study though resolution is limited without   intravenous contrast. There is a new peripheral ill-defined 2.4 x 2.2 cm opacity in the left   upper lobe, differential includes infection/inflammation/neoplasm.  Dilated ascending aorta measuring up to 4 cm, similar to the prior study.  Consider contrast-enhanced CT of the chest if patient is able to tolerate   intravenous contrast.  2/15 B/L LE dopplers: negative for DVT.

## 2025-03-19 NOTE — PROGRESS NOTE ADULT - PROBLEM SELECTOR PLAN 2
c/w zofran PRN  - Recommended brain imaging to r/o brain mets as patient also has headaches: patient was unable to tolerate mri to significant anxiety/claustrophobia; unable to perform mri with sedation as patient high risk given cancer. c/w zofran PRN

## 2025-03-19 NOTE — PROGRESS NOTE ADULT - PROBLEM SELECTOR PLAN 6
- 3/12- See GOC. Patient appointed and completed HCP, named long time friend Sam Contreraskings.  - 3/13: patient had further conversation with primary team and requested a copy of molst to review; expressing concerns about her ability to tolerate cancer tx with underlying scleroderma and overall QOL. - 3/12- See GOC. Patient appointed and completed HCP, named long time friend Sam Oswald.  - 3/13: patient had further conversation with primary team and requested a copy of molst to review; expressing concerns about her ability to tolerate cancer tx with underlying scleroderma and overall QOL.  - 3/20- See GOC. Discussed treatment options, code status.

## 2025-03-19 NOTE — PROGRESS NOTE ADULT - PROBLEM SELECTOR PLAN 1
Chest pain from mediastinal mass   Patient with hx of diffuse pain related to her underlying scleroderma for which she shared at one point she was fentanyl 200 mcg patch and was weaned down  - Home regimen: MS Contin 30 mg BID and oxycodone 20 mg q4h PRN (long term dose) given by outpatient pain specialist originally for scleroderma pain  - Pain currently   [X ] improved or controlled   [ ] uncontrolled   - PRN use in past 24 hours from 8 AM to 8 AM: Oxycodone 20 mg x 1 dose, oxycodone 30 mg x 1 dose, IV dilaudid 3 mg x 1 dose     Plan and Recommendations:   - opioids:   > c/w oxycodone  20 mg q4h PRN for moderate pain and oxycodone IR 30 mg q4h prn for severe pain  > c/w IV dilaudid to 3 mg q4h PRN for severe breakthrough pain pain.   > c/w MS Contin 30 mg TID   - Bowel regimen  - Holistic therapy Chest pain from mediastinal mass   Patient with hx of diffuse pain related to her underlying scleroderma for which she shared at one point she was fentanyl 200 mcg patch and was weaned down  - Home regimen: MS Contin 30 mg BID and oxycodone 20 mg q4h PRN (long term dose) given by outpatient pain specialist originally for scleroderma pain  - PRN use in past 24 hours from 8 AM to 8 AM: oxycodone 30 mg x 3 doses, IV dilaudid 3 mg x 5 doses   - Patient now mainly complains of non cancer pain, concern more severe anxiety rather than physical pain. Improves with haldol     Plan and Recommendations:   - opioids:   > c/w oxycodone  20 mg q4h PRN for moderate pain and oxycodone IR 30 mg q4h prn for severe pain  > c/w IV dilaudid 3 mg q4h PRN for severe breakthrough pain  > c/w MS Contin 30 mg TID   - Bowel regimen  - Holistic therapy  - Patient concerned about how she will receive Rx for opioids outpatient. Explained that will be determined by her goals. If she chooses to pursue treatment, she can f/u with supportive oncology. If she chooses no treatment, then she can receive hospice care. Otherwise patient is free to return to her prior pain doctor

## 2025-03-19 NOTE — BH CONSULTATION LIAISON PROGRESS NOTE - NSBHASSESSMENTFT_PSY_ALL_CORE
63 /yo F w/ longstanding PPHx of anxiety previously on xanax, remote hx of inpatient psych hospitalization for suicidality, PMHx scleroderma with chronic pain manifestations, recent admission for cardiac tamponade, admitted for workup of SOB/chest pain leading to SCLC diagnosis, psychiatry consulted for worsening of anxiety I/s/o multiple medical complications and her son's recent death. Pt likely with multiple comorbid psychiatric disorders including SOURAV, MDD, claustrophobia, most prominent disorder at this time is worsening of anxiety, with panic-like symptoms. Given guarded medical prognosis, recent social stressors, appropriate to treat severe anxiety with standing benzodiazepine regimen at this time. Pt not amenable to starting SSRI treatment, but if becomes amenable may benefit from this as well.     3/15: Reports improved anxiety. No reported changes in breathing. Appears to be tolerating Klonopin. Will continue.  3/17: Endorses decreased panic attacks and no use of PRN ativan over this interval. C/w klonopin, recommend ongoing GOC discussions with pt and her HCP  3/18: Unable to tolerate MRI over this interval, unclear reason. DEC klonopin to 0.5 mg qAM and 1 mg qHS due to pt complaint of sleepiness. Can give extra klonopin 0.5 mg wafer PRN prior to MRI, or ativan 1 mg IV x1 prior to MRI. If pt continues to refuse MRI, would reevaluate pt's capacity to refuse which may be impacted by her severe anxiety, and would involve her HCP.   3/19: Pt calmer over this interval, can continue with klonopin 0.5 mg qD and klonopin 1 mg qHS. Denies SI/HI.     PLAN  - defer obs status to primary team   - c/w klonopin 1 mg BID standing for treatment of anxiety  - ativan 1 mg PO/IV/IM q8h for breakthrough severe anxiety  - please monitor respiratory status closely given pt is also on baclofen and standing oxycodone, with limited baseline pulmonary reserve. HOLD benzodiazepines if concerned for oversedation.   - if pt attempting to leave AMA again, would recommend primary team to reassess pt's capacity at that time

## 2025-03-19 NOTE — PROGRESS NOTE ADULT - PROBLEM SELECTOR PLAN 4
- recently diagnosed  - Unable to tolerate MRI A/P and brain d/t claustrophobia    - CT chest on 2/15- MEDIASTINUM AND JEREMIAS: Anterior subcarinal lymph node measures 1.8 cm in short axis, stable. There is a soft tissue density, measuring up to 4.2 cm in the prevascular space extending into the subcutaneous left main pulmonary artery region, this is more pronounced on today's study.   - Per onc, plan is to start inpatient chemo treatment and RT  - Follows with Dr. Hall at [facility]  - Unable to do MRI, now pending [facility] CT A/P - recently diagnosed  - Unable to tolerate MRI A/P and brain d/t claustrophobia    - CT chest on 2/15- MEDIASTINUM AND JEREMIAS: Anterior subcarinal lymph node measures 1.8 cm in short axis, stable. There is a soft tissue density, measuring up to 4.2 cm in the prevascular space extending into the subcutaneous left main pulmonary artery region, this is more pronounced on today's study.   - Per onc, plan is to start inpatient chemo treatment and RT  - CT imaging showing no mets   - Follows with Dr. Hall at [facility] - recently diagnosed  - Unable to tolerate MRI A/P and brain d/t claustrophobia    - CT chest on 2/15- MEDIASTINUM AND JEREMIAS: Anterior subcarinal lymph node measures 1.8 cm in short axis, stable. There is a soft tissue density, measuring up to 4.2 cm in the prevascular space extending into the subcutaneous left main pulmonary artery region, this is more pronounced on today's study.   - Per onc, plan is to start inpatient chemo treatment and RT- patient thinking about it   - CT imaging showing no mets   - Follows with Dr. Hall at [facility]

## 2025-03-19 NOTE — CHART NOTE - NSCHARTNOTEFT_GEN_A_CORE
NUTRITION FOLLOW UP NOTE    Patient is seen for a follow-up nutrition assessment for severe malnutrition.    SOURCE: [X] Patient  [X] Other (Chart Review)    Medical Course: Per chart review, patient is a 64y Female with PMH Per chart, patient is a 64y Female with PMH smoking, Afib, hypothyroidism, scleroderma, pericardial effusion s/p pericardiocentesis (12/2024), asthma, and newly dx SCLC who presented to [facility] with worsening SOB and expedited onc workup with plan for inpatient chemo.    Diet Order: Regular: 1000mL Fluid Restriction ([id number]) (03-12-25 @ 12:00)      Nutrition Course:  - Patient seen at bedside by writer today. Patient reports a poor appetite and PO intake at meals during course of admission because she is "a picky eater." Reports the only thing she wants to eat is a turkey sandwich for lunch and dinner, every day. Patient with menu booklet at bedside, aware of other food choices available however defers to try any of them. Deferred writer's offer to document any food preferences at this time.  - Per chart review, palliative care and behavioral health teams following patient. Status is full code.    PO Intake per RN flowsheet: 0-25%  Food Preferences: None reported  GI Distress: No report of nausea/vomiting/diarrhea/constipation.   Bowel Movement: 3/15/25 per RN flowsheet. Noted to be on a bowel regimen.   Chewing / Swallowing Difficulty: None      Pertinent Medications: MEDICATIONS  (STANDING):  albuterol/ipratropium for Nebulization 3 milliLiter(s) Nebulizer every 6 hours  apixaban 5 milliGRAM(s) Oral every 12 hours  chlorhexidine 2% Cloths 1 Application(s) Topical daily  chlorhexidine 2% Cloths 1 Application(s) Topical <User Schedule>  clonazePAM  Tablet 0.5 milliGRAM(s) Oral daily  clonazePAM  Tablet 1 milliGRAM(s) Oral at bedtime  colchicine 0.6 milliGRAM(s) Oral daily  fluticasone propionate/ salmeterol 100-50 MICROgram(s) Diskus 1 Dose(s) Inhalation two times a day  influenza  Vaccine (HIGH DOSE) 0.5 milliLiter(s) IntraMuscular once  levothyroxine 112 MICROGram(s) Oral daily  lidocaine   4% Patch 1 Patch Transdermal daily  lidocaine   4% Patch 1 Patch Transdermal every 24 hours  methylnaltrexone Injectable 12 milliGRAM(s) SubCutaneous once  mirtazapine 7.5 milliGRAM(s) Oral at bedtime  morphine ER Tablet 30 milliGRAM(s) Oral every 8 hours  naloxegol 25 milliGRAM(s) Oral daily  nicotine - 21 mG/24Hr(s) Patch 1 Patch Transdermal daily  pantoprazole    Tablet 40 milliGRAM(s) Oral every 12 hours  polyethylene glycol 3350 17 Gram(s) Oral daily  potassium chloride    Tablet ER 40 milliEquivalent(s) Oral once  senna 2 Tablet(s) Oral at bedtime  sodium chloride 1 Gram(s) Oral two times a day  sodium chloride 0.65% Nasal 1 Spray(s) Both Nostrils three times a day    MEDICATIONS  (PRN):  aluminum hydroxide/magnesium hydroxide/simethicone Suspension 30 milliLiter(s) Oral every 4 hours PRN Dyspepsia  baclofen 5 milliGRAM(s) Oral every 8 hours PRN Musculoskeletal Pain  benzocaine/menthol Lozenge 1 Lozenge Oral three times a day PRN Sore Throat  HYDROmorphone  Injectable 3 milliGRAM(s) IV Push every 4 hours PRN severe breakthrough pain  LORazepam     Tablet 1 milliGRAM(s) Oral every 8 hours PRN Anxiety  melatonin 3 milliGRAM(s) Oral at bedtime PRN Insomnia  ondansetron Injectable 4 milliGRAM(s) IV Push every 6 hours PRN Nausea and/or Vomiting  oxyCODONE    IR 20 milliGRAM(s) Oral every 4 hours PRN Moderate Pain (4 - 6)  oxyCODONE    IR 30 milliGRAM(s) Oral every 4 hours PRN Severe Pain (7 - 10)      Pertinent Labs: 03-19 Na140 mmol/L Glu 119 mg/dL[H] K+ 3.2 mmol/L[L] Cr  0.43 mg/dL[L] BUN 10 mg/dL 03-19 Phos 3.5 mg/dL 03-19 Alb 3.7 g/dL      Anthropometrics  Weights per RN flowsheet: 64.4kg (3/11)  Weight Assessment: No weight changes since previous assessment    Physical Assessment, per flowsheets:  Edema: None  Pressure Injury: None    Estimated Needs:   [X] No change since previous assessment  Weight Used: Ideal Body Weight 145lb / 65.7kg  Energy Needs: 1839-2102kcal (based on 28-32kcal/kg)  Protein Needs: 78.84-91.98gms (based on 1.2-1.4gms/kg)    Previous Nutrition Diagnosis: [X] Severe Malnutrition    Nutrition Diagnosis is [X] ongoing    New Nutrition Diagnosis: [X] not applicable     Education: [X] Writer provided verbal education regarding current diet order and nutrition recommendations for after discharge. Patient verbalized understanding to the discussion.     Interventions:   - Continue current diet order, as tolerated  - Recommend Ensure Plus High Protein (provides 350kcal, 20gms protein per serving) BID  - Defer fluid management as per medical team  - Please document % PO intake on flowsheets  - Consider obtaining vitamin D 25OH level to assess nutriture   - Nutrition care to be aligned with patient/family goals of care    Monitor & Evaluate:  PO intake, tolerance to diet/supplement, nutrition related lab values, weight trends, BMs/GI distress, hydration status, skin integrity.    RD remains available, please consult as needed.    Marisol Grullon MS RDN CDN  Available on [employer] Teams (Preferred) or Pager #[phone] NUTRITION FOLLOW UP NOTE    Patient is seen for a follow-up nutrition assessment for severe malnutrition.    SOURCE: [X] Patient  [X] Other (Chart Review)    Medical Course: Per chart review, patient is a 64y Female with PMH Per chart, patient is a 64y Female with PMH smoking, Afib, hypothyroidism, scleroderma, pericardial effusion s/p pericardiocentesis (12/2024), asthma, and newly dx SCLC who presented to [facility] with worsening SOB and expedited onc workup with plan for inpatient chemo.    Diet Order: Regular: 1000mL Fluid Restriction ([id number]) (03-12-25 @ 12:00)      Nutrition Course:  - Patient seen at bedside by writer today. Patient reports a poor appetite and PO intake at meals during course of admission because she is "a picky eater." Reports the only thing she wants to eat is a turkey sandwich for lunch and dinner, every day. Patient with menu booklet at bedside, aware of other food choices available however defers to try any of them. Deferred writer's offer to document any food preferences at this time. Of note, patient is ordered for mirtazapine (appetite stimulant).  - Per chart review, palliative care and behavioral health teams following patient. Status is full code.    PO Intake per RN flowsheet: 0-25%  Food Preferences: None reported  GI Distress: No report of nausea/vomiting/diarrhea/constipation.   Bowel Movement: 3/15/25 per RN flowsheet. Noted to be on a bowel regimen.   Chewing / Swallowing Difficulty: None      Pertinent Medications: MEDICATIONS  (STANDING):  albuterol/ipratropium for Nebulization 3 milliLiter(s) Nebulizer every 6 hours  apixaban 5 milliGRAM(s) Oral every 12 hours  chlorhexidine 2% Cloths 1 Application(s) Topical daily  chlorhexidine 2% Cloths 1 Application(s) Topical <User Schedule>  clonazePAM  Tablet 0.5 milliGRAM(s) Oral daily  clonazePAM  Tablet 1 milliGRAM(s) Oral at bedtime  colchicine 0.6 milliGRAM(s) Oral daily  fluticasone propionate/ salmeterol 100-50 MICROgram(s) Diskus 1 Dose(s) Inhalation two times a day  influenza  Vaccine (HIGH DOSE) 0.5 milliLiter(s) IntraMuscular once  levothyroxine 112 MICROGram(s) Oral daily  lidocaine   4% Patch 1 Patch Transdermal daily  lidocaine   4% Patch 1 Patch Transdermal every 24 hours  methylnaltrexone Injectable 12 milliGRAM(s) SubCutaneous once  mirtazapine 7.5 milliGRAM(s) Oral at bedtime  morphine ER Tablet 30 milliGRAM(s) Oral every 8 hours  naloxegol 25 milliGRAM(s) Oral daily  nicotine - 21 mG/24Hr(s) Patch 1 Patch Transdermal daily  pantoprazole    Tablet 40 milliGRAM(s) Oral every 12 hours  polyethylene glycol 3350 17 Gram(s) Oral daily  potassium chloride    Tablet ER 40 milliEquivalent(s) Oral once  senna 2 Tablet(s) Oral at bedtime  sodium chloride 1 Gram(s) Oral two times a day  sodium chloride 0.65% Nasal 1 Spray(s) Both Nostrils three times a day    MEDICATIONS  (PRN):  aluminum hydroxide/magnesium hydroxide/simethicone Suspension 30 milliLiter(s) Oral every 4 hours PRN Dyspepsia  baclofen 5 milliGRAM(s) Oral every 8 hours PRN Musculoskeletal Pain  benzocaine/menthol Lozenge 1 Lozenge Oral three times a day PRN Sore Throat  HYDROmorphone  Injectable 3 milliGRAM(s) IV Push every 4 hours PRN severe breakthrough pain  LORazepam     Tablet 1 milliGRAM(s) Oral every 8 hours PRN Anxiety  melatonin 3 milliGRAM(s) Oral at bedtime PRN Insomnia  ondansetron Injectable 4 milliGRAM(s) IV Push every 6 hours PRN Nausea and/or Vomiting  oxyCODONE    IR 20 milliGRAM(s) Oral every 4 hours PRN Moderate Pain (4 - 6)  oxyCODONE    IR 30 milliGRAM(s) Oral every 4 hours PRN Severe Pain (7 - 10)      Pertinent Labs: 03-19 Na140 mmol/L Glu 119 mg/dL[H] K+ 3.2 mmol/L[L] Cr  0.43 mg/dL[L] BUN 10 mg/dL 03-19 Phos 3.5 mg/dL 03-19 Alb 3.7 g/dL      Anthropometrics  Weights per RN flowsheet: 64.4kg (3/11)  Weight Assessment: No weight changes since previous assessment    Physical Assessment, per flowsheets:  Edema: None  Pressure Injury: None    Estimated Needs:   [X] No change since previous assessment  Weight Used: Ideal Body Weight 145lb / 65.7kg  Energy Needs: 1839-2102kcal (based on 28-32kcal/kg)  Protein Needs: 78.84-91.98gms (based on 1.2-1.4gms/kg)    Previous Nutrition Diagnosis: [X] Severe Malnutrition    Nutrition Diagnosis is [X] ongoing    New Nutrition Diagnosis: [X] not applicable     Education: [X] Writer provided verbal education regarding current diet order and nutrition recommendations for after discharge. Patient verbalized understanding to the discussion.     Interventions:   - Continue current diet order, as tolerated  - Recommend Ensure Plus High Protein (provides 350kcal, 20gms protein per serving) BID  - Defer fluid management as per medical team  - Please document % PO intake on flowsheets  - Consider obtaining vitamin D 25OH level to assess nutriture   - Nutrition care to be aligned with patient/family goals of care    Monitor & Evaluate:  PO intake, tolerance to diet/supplement, nutrition related lab values, weight trends, BMs/GI distress, hydration status, skin integrity.    RD remains available, please consult as needed.    Marisol Grullon MS RDN CDN  Available on [employer] Teams (Preferred) or Pager #[phone]

## 2025-03-19 NOTE — PROGRESS NOTE ADULT - PROBLEM SELECTOR PLAN 7
For pain management     In the event of worsening symptoms, please contact the Palliative Medicine team via pager (if the patient is at [facility] #[phone] or if the patient is at [facility] #[phone]) The Geriatric and Palliative Medicine service has coverage 24 hours a day/ 7 days a week to provide medical recommendations regarding symptom management needs via telephone.

## 2025-03-19 NOTE — PROGRESS NOTE ADULT - SUBJECTIVE AND OBJECTIVE BOX
Indication of Geriatrics and Palliative Medicine Services:  [  ] Complex Medical Decision Making   [X  ] Symptom/Pain management     DNR on chart:    INTERVAL EVENTS:        -------------------------------------------------------------------------------------------------------    PRESENT SYMPTOMS:     [ ]Unable to self-report - see [ ] CPOT [ ] PAINADS [ ] RDOS  Source if other than patient:  [ ]Family   [ ]Team     PAIN   1. Location- Left chest   2. Radiation-  Left arm, back   3. Quality- Sharp   4. Timing- Constant   5. Minimal acceptable level/pain goal- 4/10   6. Aggravating factors-   7. QOL impact- Severe     Dyspnea:                           [ ]Mild [ ]Moderate [ ]Severe  Anxiety:                             [ ]Mild [ ]Moderate [ ]Severe  Fatigue:                             [ ]Mild [ ]Moderate [ ]Severe  Nausea:                             [ x]Mild [ ]Moderate [ ]Severe  Loss of appetite:                [ ]Mild [x ]Moderate [ ]Severe  Constipation:                     [ ]Mild [ ]Moderate [ ]Severe  Other Symptoms:  [ ]All other review of systems negative     -------------------------------------------------------------------------------------------------------    I STOP: [id number]    Prescription Information      PDI Filter:    PDI	Current Rx	Drug Type	Rx Written	Rx Dispensed	Drug	Quantity	Days Supply	Prescriber Name	Prescriber KHOA #	Payment Method	Dispenser  A	Y	O	02/27/2025	03/08/2025	morphine sulf er 30 mg tablet	60	30	Guru Crowe MD	[id number]	Medicare	[id number]  A	Y	O	02/27/2025	03/08/2025	oxycodone hcl (ir) 20 mg tab	90	30	Sebastien, Guru ROSS	[id number]	Medicare	[id number]  A	N	O	01/30/2025	02/06/2025	morphine sulf er 30 mg tablet	60	30	Sebastien, Guru ROSS	[id number]	Medicare	[id number]  A	N	O	01/30/2025	02/06/2025	oxycodone hcl (ir) 20 mg tab	90	30	Sebastien, Guru ROSS	[id number]	Medicare	[id number]  A	N	O	01/07/2025	01/08/2025	morphine sulf er 30 mg tablet	60	30	Sebastien, Guru ROSS	[id number]	Medicare	[id number]  A	N	O	01/07/2025	01/08/2025	oxycodone hcl (ir) 20 mg tab	90	30	Sebastien, Guru ROSS	[id number]	Medicare	[id number]  A	N	O	11/27/2024	12/06/2024	morphine sulf er 30 mg tablet	60	30	Sebastien, Guru ROSS	[id number]	Medicare	[id number]  A	N	O	11/27/2024	12/06/2024	oxycodone hcl (ir) 20 mg tab	90	30	Sebastien, Guru ROSS	[id number]	Medicare	[id number]  A	N	O	10/31/2024	11/08/2024	morphine sulf er 30 mg tablet	60	30	Sebastien, Guru ROSS	[id number]	Medicare	[id number]  A	N	O	10/31/2024	11/08/2024	oxycodone hcl (ir) 20 mg tab	90	30	Sebastien, Guru ROSS	[id number]	Medicare	[id number]  A	N	O	10/03/2024	10/09/2024	oxycodone hcl (ir) 20 mg tab	90	30	Sebastien, Guru ROSS	[id number]	Medicare	[id number]    -------------------------------------------------------------------------------------------------------    ITEMS UNCHECKED ARE NOT PRESENT    PHYSICAL:  Vital Signs Last 24 Hrs  T(C): 37.1 (18 Mar 2025 06:02), Max: 37.1 (18 Mar 2025 06:02)  T(F): 98.8 (18 Mar 2025 06:02), Max: 98.8 (18 Mar 2025 06:02)  HR: 68 (18 Mar 2025 06:02) (68 - 80)  BP: 105/65 (18 Mar 2025 06:30) (99/64 - 112/59)  BP(mean): --  RR: 18 (18 Mar 2025 06:02) (18 - 18)  SpO2: 98% (18 Mar 2025 06:02) (97% - 98%)    Parameters below as of 18 Mar 2025 06:02  Patient On (Oxygen Delivery Method): nasal cannula      GENERAL:  [ ]Cachexia  [ ] Frail  [X ]Awake  [X ]Oriented   [ ]Lethargic  [ ]Unarousable  [ ]Verbal  [ ]Non-Verbal    BEHAVIORAL:   [ ] Anxiety  [ ] Delirium [ ] Agitation [ ] Other    HEENT:   [X ]Normal   [ ]Dry mouth   [ ]ET Tube/Trach  [ ]Oral lesions    PULMONARY:   [X ]Clear              [ ]Tachypnea  [ ]Audible excessive secretions   [ ]Rhonchi        [ ]Right [ ]Left [ ]Bilateral  [ ]Crackles        [ ]Right [ ]Left [ ]Bilateral  [ ]Wheezing     [ ]Right [ ]Left [ ]Bilateral  [ ]Diminished breath sounds [ ]right [ ]left [ ]bilateral    CARDIOVASCULAR:    [X ]Regular [ ]Irregular [ ]Tachy  [ ]Ridge [ ]Murmur [ ]Other    GASTROINTESTINAL:  [X ]Soft  [ ]Distended   [X ]+BS  [X ]Non tender [ ]Tender  [ ]Other [ ]PEG [ ]OGT/ NGT      GENITOURINARY:  [X ]Normal [ ] Incontinent   [ ]Oliguria/Anuria   [ ]Camargo    MUSCULOSKELETAL:   [X ]Normal   [ ]Weakness  [ ]Bed/Wheelchair bound [ ]Edema    NEUROLOGIC:   [X ]No focal deficits  [ ]Cognitive impairment  [ ]Dysphagia [ ]Dysarthria [ ]Paresis [ ]Other     SKIN:   [X ]Normal  [ ]Rash  [ ]Other  [ ]Pressure ulcer(s)       Present on admission [ ]y [ ]n    -------------------------------------------------------------------------------------------------------    LABS:                                   10.9   4.21  )-----------( 204      ( 18 Mar 2025 07:06 )             33.0     03-18    138  |  100  |  13  ----------------------------<  95  3.5   |  29  |  0.45[L]    Ca    8.9      18 Mar 2025 07:06  Phos  4.1     03-18  Mg     1.90     03-18    TPro  5.8[L]  /  Alb  3.2[L]  /  TBili  0.2  /  DBili  x   /  AST  13  /  ALT  6   /  AlkPhos  74  03-18      -------------------------------------------------------------------------------------------------------    CRITICAL CARE:  [ ]Shock Present  [ ]Septic [ ]Cardiogenic [ ]Neurologic [ ]Hypovolemic [ ]Undifferentiated    [ ]Vasopressors [ ]Inotropes    [ ]Respiratory failure present [ ]Acute  [ ]Chronic [ ]Hypoxic  [ ]Hypercarbic [ ]Mixed   [ ]Mechanical Ventilation  [ ]Trach collar   [ ]Non-invasive ventilatory support   [ ]High-Flow   [ ]Oxygen mask/venti     [ ]Other organ failure     -------------------------------------------------------------------------------------------------------    RADIOLOGY & ADDITIONAL STUDIES:     < from: Xray Chest 1 View-PORTABLE IMMEDIATE (Xray Chest 1 View-PORTABLE IMMEDIATE .) (03.12.25 @ 13:48) >    IMPRESSION:  Bilateral upper lobe opacities with 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  No acute pulmonary or cardiovascular disease.    < end of copied text >    -------------------------------------------------------------------------------------------------------  MEDICATIONS:     MEDICATIONS  (STANDING):  albuterol/ipratropium for Nebulization 3 milliLiter(s) Nebulizer every 6 hours  apixaban 5 milliGRAM(s) Oral every 12 hours  chlorhexidine 2% Cloths 1 Application(s) Topical <User Schedule>  clonazePAM  Tablet 1 milliGRAM(s) Oral two times a day  colchicine 0.6 milliGRAM(s) Oral daily  fluticasone propionate/ salmeterol 100-50 MICROgram(s) Diskus 1 Dose(s) Inhalation two times a day  influenza  Vaccine (HIGH DOSE) 0.5 milliLiter(s) IntraMuscular once  levothyroxine 112 MICROGram(s) Oral daily  lidocaine   4% Patch 1 Patch Transdermal daily  lidocaine   4% Patch 1 Patch Transdermal every 24 hours  methylnaltrexone Injectable 12 milliGRAM(s) SubCutaneous once  mirtazapine 7.5 milliGRAM(s) Oral at bedtime  morphine ER Tablet 30 milliGRAM(s) Oral every 8 hours  naloxegol 25 milliGRAM(s) Oral daily  nicotine - 21 mG/24Hr(s) Patch 1 Patch Transdermal daily  pantoprazole    Tablet 40 milliGRAM(s) Oral every 12 hours  polyethylene glycol 3350 17 Gram(s) Oral daily  senna 2 Tablet(s) Oral at bedtime  sodium chloride 1 Gram(s) Oral two times a day  sodium chloride 0.65% Nasal 1 Spray(s) Both Nostrils three times a day    MEDICATIONS  (PRN):  aluminum hydroxide/magnesium hydroxide/simethicone Suspension 30 milliLiter(s) Oral every 4 hours PRN Dyspepsia  baclofen 5 milliGRAM(s) Oral every 8 hours PRN Musculoskeletal Pain  benzocaine/menthol Lozenge 1 Lozenge Oral three times a day PRN Sore Throat  HYDROmorphone  Injectable 3 milliGRAM(s) IV Push every 4 hours PRN severe breakthrough pain  LORazepam     Tablet 1 milliGRAM(s) Oral every 8 hours PRN Anxiety  melatonin 3 milliGRAM(s) Oral at bedtime PRN Insomnia  ondansetron Injectable 4 milliGRAM(s) IV Push every 6 hours PRN Nausea and/or Vomiting  oxyCODONE    IR 20 milliGRAM(s) Oral every 4 hours PRN Moderate Pain (4 - 6)  oxyCODONE    IR 30 milliGRAM(s) Oral every 4 hours PRN Severe Pain (7 - 10)           Indication of Geriatrics and Palliative Medicine Services:  [X  ] Complex Medical Decision Making   [X  ] Symptom/Pain management     DNR on chart: No    INTERVAL EVENTS: Patient seen this AM with oncology team at bedside. Patient said she was having pain in her hips.       -------------------------------------------------------------------------------------------------------    PRESENT SYMPTOMS:     [ ]Unable to self-report - see [ ] CPOT [ ] PAINADS [ ] RDOS  Source if other than patient:  [ ]Family   [ ]Team     PAIN   1. Location- Left chest   2. Radiation-  Left arm, back   3. Quality- Sharp   4. Timing- Constant   5. Minimal acceptable level/pain goal- 4/10   6. Aggravating factors-   7. QOL impact- Severe     Dyspnea:                           [ ]Mild [ ]Moderate [ ]Severe  Anxiety:                             [ ]Mild [ ]Moderate [ ]Severe  Fatigue:                             [ ]Mild [ ]Moderate [ ]Severe  Nausea:                             [ x]Mild [ ]Moderate [ ]Severe  Loss of appetite:                [ ]Mild [x ]Moderate [ ]Severe  Constipation:                     [ ]Mild [ ]Moderate [ ]Severe  Other Symptoms:  [ ]All other review of systems negative     -------------------------------------------------------------------------------------------------------    I STOP: [id number]    Prescription Information      PDI Filter:    PDI	Current Rx	Drug Type	Rx Written	Rx Dispensed	Drug	Quantity	Days Supply	Prescriber Name	Prescriber KHOA #	Payment Method	Dispenser  A	Y	O	02/27/2025	03/08/2025	morphine sulf er 30 mg tablet	60	30	Guru Crowe MD	[id number]	Medicare	[id number]  A	Y	O	02/27/2025	03/08/2025	oxycodone hcl (ir) 20 mg tab	90	30	Sebastien Guru ROSS	[id number]	Medicare	[id number]  A	N	O	01/30/2025	02/06/2025	morphine sulf er 30 mg tablet	60	30	Sebastien Guru ROSS	[id number]	Medicare	[id number]  A	N	O	01/30/2025	02/06/2025	oxycodone hcl (ir) 20 mg tab	90	30	Sebastien, Guru ROSS	[id number]	Medicare	[facility]  A	N	O	01/07/2025	01/08/2025	morphine sulf er 30 mg tablet	60	30	Sebastien, Guru ROSS	[id number]	Medicare	[id number]  A	N	O	01/07/2025	01/08/2025	oxycodone hcl (ir) 20 mg tab	90	30	Sebastien Guru ROSS	[id number]	Medicare	[facility]  A	N	O	11/27/2024	12/06/2024	morphine sulf er 30 mg tablet	60	30	Sebastien, Guru ROSS	[id number]	Medicare	[id number]  A	N	O	11/27/2024	12/06/2024	oxycodone hcl (ir) 20 mg tab	90	30	Sebastien Guru ROSS	[id number]	Medicare	[facility]  A	N	O	10/31/2024	11/08/2024	morphine sulf er 30 mg tablet	60	30	Sebastien uGru ROSS	[id number]	Medicare	[facility]  A	N	O	10/31/2024	11/08/2024	oxycodone hcl (ir) 20 mg tab	90	30	Sebastien Guru ROSS	[id number]	Medicare	[id number]  A	N	O	10/03/2024	10/09/2024	oxycodone hcl (ir) 20 mg tab	90	30	Sebastien Guru ROSS	[id number]	Medicare	[id number]    -------------------------------------------------------------------------------------------------------    ITEMS UNCHECKED ARE NOT PRESENT    PHYSICAL:  Vital Signs Last 24 Hrs  T(C): 36.3 (19 Mar 2025 20:25), Max: 36.5 (19 Mar 2025 05:32)  T(F): 97.4 (19 Mar 2025 20:25), Max: 97.7 (19 Mar 2025 05:32)  HR: 99 (19 Mar 2025 20:25) (61 - 99)  BP: 157/94 (19 Mar 2025 20:25) (103/65 - 157/94)  BP(mean): --  RR: 17 (19 Mar 2025 20:25) (17 - 18)  SpO2: 98% (19 Mar 2025 20:25) (94% - 98%)    Parameters below as of 19 Mar 2025 20:25  Patient On (Oxygen Delivery Method): nasal cannula  O2 Flow (L/min): 3      GENERAL:  [ ]Cachexia  [ ] Frail  [X ]Awake  [X ]Oriented   [ ]Lethargic  [ ]Unarousable  [ ]Verbal  [ ]Non-Verbal    BEHAVIORAL:   [ ] Anxiety  [ ] Delirium [ ] Agitation [ ] Other    HEENT:   [X ]Normal   [ ]Dry mouth   [ ]ET Tube/Trach  [ ]Oral lesions    PULMONARY:   [X ]Clear              [ ]Tachypnea  [ ]Audible excessive secretions   [ ]Rhonchi        [ ]Right [ ]Left [ ]Bilateral  [ ]Crackles        [ ]Right [ ]Left [ ]Bilateral  [ ]Wheezing     [ ]Right [ ]Left [ ]Bilateral  [ ]Diminished breath sounds [ ]right [ ]left [ ]bilateral    CARDIOVASCULAR:    [X ]Regular [ ]Irregular [ ]Tachy  [ ]Ridge [ ]Murmur [ ]Other    GASTROINTESTINAL:  [X ]Soft  [ ]Distended   [X ]+BS  [X ]Non tender [ ]Tender  [ ]Other [ ]PEG [ ]OGT/ NGT      GENITOURINARY:  [X ]Normal [ ] Incontinent   [ ]Oliguria/Anuria   [ ]Camargo    MUSCULOSKELETAL:   [X ]Normal   [ ]Weakness  [ ]Bed/Wheelchair bound [ ]Edema    NEUROLOGIC:   [X ]No focal deficits  [ ]Cognitive impairment  [ ]Dysphagia [ ]Dysarthria [ ]Paresis [ ]Other     SKIN:   [X ]Normal  [ ]Rash  [ ]Other  [ ]Pressure ulcer(s)       Present on admission [ ]y [ ]n    -------------------------------------------------------------------------------------------------------    LABS:                                   10.9   4.21  )-----------( 204      ( 18 Mar 2025 07:06 )             33.0     03-18    138  |  100  |  13  ----------------------------<  95  3.5   |  29  |  0.45[L]    Ca    8.9      18 Mar 2025 07:06  Phos  4.1     03-18  Mg     1.90     03-18    TPro  5.8[L]  /  Alb  3.2[L]  /  TBili  0.2  /  DBili  x   /  AST  13  /  ALT  6   /  AlkPhos  74  03-18      -------------------------------------------------------------------------------------------------------    CRITICAL CARE:  [ ]Shock Present  [ ]Septic [ ]Cardiogenic [ ]Neurologic [ ]Hypovolemic [ ]Undifferentiated    [ ]Vasopressors [ ]Inotropes    [ ]Respiratory failure present [ ]Acute  [ ]Chronic [ ]Hypoxic  [ ]Hypercarbic [ ]Mixed   [ ]Mechanical Ventilation  [ ]Trach collar   [ ]Non-invasive ventilatory support   [ ]High-Flow   [ ]Oxygen mask/venti     [ ]Other organ failure     -------------------------------------------------------------------------------------------------------    RADIOLOGY & ADDITIONAL STUDIES:     < from: Xray Chest 1 View-PORTABLE IMMEDIATE (Xray Chest 1 View-PORTABLE IMMEDIATE .) (03.12.25 @ 13:48) >    IMPRESSION:  Bilateral upper lobe opacities with 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  No acute pulmonary or cardiovascular disease.    < end of copied text >    -------------------------------------------------------------------------------------------------------  MEDICATIONS:     MEDICATIONS  (STANDING):  albuterol/ipratropium for Nebulization 3 milliLiter(s) Nebulizer every 6 hours  apixaban 5 milliGRAM(s) Oral every 12 hours  chlorhexidine 2% Cloths 1 Application(s) Topical <User Schedule>  clonazePAM  Tablet 1 milliGRAM(s) Oral two times a day  colchicine 0.6 milliGRAM(s) Oral daily  fluticasone propionate/ salmeterol 100-50 MICROgram(s) Diskus 1 Dose(s) Inhalation two times a day  influenza  Vaccine (HIGH DOSE) 0.5 milliLiter(s) IntraMuscular once  levothyroxine 112 MICROGram(s) Oral daily  lidocaine   4% Patch 1 Patch Transdermal daily  lidocaine   4% Patch 1 Patch Transdermal every 24 hours  methylnaltrexone Injectable 12 milliGRAM(s) SubCutaneous once  mirtazapine 7.5 milliGRAM(s) Oral at bedtime  morphine ER Tablet 30 milliGRAM(s) Oral every 8 hours  naloxegol 25 milliGRAM(s) Oral daily  nicotine - 21 mG/24Hr(s) Patch 1 Patch Transdermal daily  pantoprazole    Tablet 40 milliGRAM(s) Oral every 12 hours  polyethylene glycol 3350 17 Gram(s) Oral daily  senna 2 Tablet(s) Oral at bedtime  sodium chloride 1 Gram(s) Oral two times a day  sodium chloride 0.65% Nasal 1 Spray(s) Both Nostrils three times a day    MEDICATIONS  (PRN):  aluminum hydroxide/magnesium hydroxide/simethicone Suspension 30 milliLiter(s) Oral every 4 hours PRN Dyspepsia  baclofen 5 milliGRAM(s) Oral every 8 hours PRN Musculoskeletal Pain  benzocaine/menthol Lozenge 1 Lozenge Oral three times a day PRN Sore Throat  HYDROmorphone  Injectable 3 milliGRAM(s) IV Push every 4 hours PRN severe breakthrough pain  LORazepam     Tablet 1 milliGRAM(s) Oral every 8 hours PRN Anxiety  melatonin 3 milliGRAM(s) Oral at bedtime PRN Insomnia  ondansetron Injectable 4 milliGRAM(s) IV Push every 6 hours PRN Nausea and/or Vomiting  oxyCODONE    IR 20 milliGRAM(s) Oral every 4 hours PRN Moderate Pain (4 - 6)  oxyCODONE    IR 30 milliGRAM(s) Oral every 4 hours PRN Severe Pain (7 - 10)

## 2025-03-19 NOTE — PROGRESS NOTE ADULT - ASSESSMENT
65 y/o F with PMHx of AF, scleroderma, asthma with prior intubations, hypothyroidism, pericardial effusion s/p pericardiocentesis (12/2024) and drain on colchicine presented to the ED due to SOB, chest pain, and back pain. Oncology evaluated her and recommended MRI brain and MRI chest/abdomen/pelvis with contrast to rule out metastasis. Was previously admitted in 12/2024 due to chest pain and SOB and found to have a pericardial effusion with evidence of tamponade physiology. She underwent urgent pericardiocentesis with placement of drain which was removed on 12/26. She was started on colchicine and NSAIDs. Hospital course was complicated by AF with RVR which was new onset and was suspected to be due to drain placement. After drain was removed patient continued to have pAF and was started on Eliquis. Presented to the ED on 02/13/25 for left sided chest pain radiating to the back. Repeat TTE demonstrated interval increase in pericardial effusion (moderate-large adjacent to RA with no evidence of tamponade) and CTS was consulted for possible pericardial window and recommended no acute intervention. 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 Pulmonary consulted and recommended transfer to [facility] for bronchoscopy, EBUS and hilar mass biopsy. Patient transferred to [facility] on 02/20/25 and underwent EBUS on 02/21/25. 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

## 2025-03-19 NOTE — BH CONSULTATION LIAISON PROGRESS NOTE - NSBHFUPINTERVALHXFT_PSY_A_CORE
Patient seen and evaluated, staff consulted, chart, labs, meds reviewed. Over this interval pt describes pain episode yesterday she described as "the worst pain I've ever had" and that she was screaming and got multiple medications, including ativan and various opioids. This episode appeared to also be in setting of pt anxiety surrounding obtaining imaging. Pt now calmer, tolerating klonopin. Denies SI/HI.

## 2025-03-19 NOTE — BH CONSULTATION LIAISON PROGRESS NOTE - NSBHCONSULTFOLLOWAFTERCARE_PSY_A_CORE FT
Would benefit from follow up with [facility] once outpatient: [phone]

## 2025-03-20 ENCOUNTER — RESULT REVIEW (OUTPATIENT)
Age: 65
End: 2025-03-20

## 2025-03-20 LAB
ALBUMIN SERPL ELPH-MCNC: 3.3 G/DL — SIGNIFICANT CHANGE UP (ref 3.3–5)
ALP SERPL-CCNC: 78 U/L — SIGNIFICANT CHANGE UP (ref 40–120)
ALT FLD-CCNC: 7 U/L — SIGNIFICANT CHANGE UP (ref 4–33)
ANION GAP SERPL CALC-SCNC: 9 MMOL/L — SIGNIFICANT CHANGE UP (ref 7–14)
AST SERPL-CCNC: 17 U/L — SIGNIFICANT CHANGE UP (ref 4–32)
BASOPHILS # BLD AUTO: 0.03 K/UL — SIGNIFICANT CHANGE UP (ref 0–0.2)
BASOPHILS NFR BLD AUTO: 0.7 % — SIGNIFICANT CHANGE UP (ref 0–2)
BILIRUB SERPL-MCNC: 0.4 MG/DL — SIGNIFICANT CHANGE UP (ref 0.2–1.2)
BUN SERPL-MCNC: 8 MG/DL — SIGNIFICANT CHANGE UP (ref 7–23)
CALCIUM SERPL-MCNC: 8.7 MG/DL — SIGNIFICANT CHANGE UP (ref 8.4–10.5)
CHLORIDE SERPL-SCNC: 96 MMOL/L — LOW (ref 98–107)
CO2 SERPL-SCNC: 31 MMOL/L — SIGNIFICANT CHANGE UP (ref 22–31)
CREAT SERPL-MCNC: 0.42 MG/DL — LOW (ref 0.5–1.3)
EGFR: 108 ML/MIN/1.73M2 — SIGNIFICANT CHANGE UP
EGFR: 108 ML/MIN/1.73M2 — SIGNIFICANT CHANGE UP
EOSINOPHIL # BLD AUTO: 0.2 K/UL — SIGNIFICANT CHANGE UP (ref 0–0.5)
EOSINOPHIL NFR BLD AUTO: 4.5 % — SIGNIFICANT CHANGE UP (ref 0–6)
GLUCOSE SERPL-MCNC: 103 MG/DL — HIGH (ref 70–99)
HCT VFR BLD CALC: 31.6 % — LOW (ref 34.5–45)
HGB BLD-MCNC: 10.4 G/DL — LOW (ref 11.5–15.5)
IANC: 2.39 K/UL — SIGNIFICANT CHANGE UP (ref 1.8–7.4)
IMM GRANULOCYTES NFR BLD AUTO: 0.2 % — SIGNIFICANT CHANGE UP (ref 0–0.9)
LYMPHOCYTES # BLD AUTO: 1.44 K/UL — SIGNIFICANT CHANGE UP (ref 1–3.3)
LYMPHOCYTES # BLD AUTO: 32.1 % — SIGNIFICANT CHANGE UP (ref 13–44)
MAGNESIUM SERPL-MCNC: 1.8 MG/DL — SIGNIFICANT CHANGE UP (ref 1.6–2.6)
MCHC RBC-ENTMCNC: 29.8 PG — SIGNIFICANT CHANGE UP (ref 27–34)
MCHC RBC-ENTMCNC: 32.9 G/DL — SIGNIFICANT CHANGE UP (ref 32–36)
MCV RBC AUTO: 90.5 FL — SIGNIFICANT CHANGE UP (ref 80–100)
MONOCYTES # BLD AUTO: 0.41 K/UL — SIGNIFICANT CHANGE UP (ref 0–0.9)
MONOCYTES NFR BLD AUTO: 9.2 % — SIGNIFICANT CHANGE UP (ref 2–14)
NEUTROPHILS # BLD AUTO: 2.39 K/UL — SIGNIFICANT CHANGE UP (ref 1.8–7.4)
NEUTROPHILS NFR BLD AUTO: 53.3 % — SIGNIFICANT CHANGE UP (ref 43–77)
NRBC # BLD AUTO: 0 K/UL — SIGNIFICANT CHANGE UP (ref 0–0)
NRBC # FLD: 0 K/UL — SIGNIFICANT CHANGE UP (ref 0–0)
NRBC BLD AUTO-RTO: 0 /100 WBCS — SIGNIFICANT CHANGE UP (ref 0–0)
PHOSPHATE SERPL-MCNC: 3.4 MG/DL — SIGNIFICANT CHANGE UP (ref 2.5–4.5)
PLATELET # BLD AUTO: 189 K/UL — SIGNIFICANT CHANGE UP (ref 150–400)
POTASSIUM SERPL-MCNC: 3.5 MMOL/L — SIGNIFICANT CHANGE UP (ref 3.5–5.3)
POTASSIUM SERPL-SCNC: 3.5 MMOL/L — SIGNIFICANT CHANGE UP (ref 3.5–5.3)
PROT SERPL-MCNC: 6 G/DL — SIGNIFICANT CHANGE UP (ref 6–8.3)
RBC # BLD: 3.49 M/UL — LOW (ref 3.8–5.2)
RBC # FLD: 12.1 % — SIGNIFICANT CHANGE UP (ref 10.3–14.5)
SODIUM SERPL-SCNC: 136 MMOL/L — SIGNIFICANT CHANGE UP (ref 135–145)
WBC # BLD: 4.48 K/UL — SIGNIFICANT CHANGE UP (ref 3.8–10.5)
WBC # FLD AUTO: 4.48 K/UL — SIGNIFICANT CHANGE UP (ref 3.8–10.5)

## 2025-03-20 PROCEDURE — 99223 1ST HOSP IP/OBS HIGH 75: CPT | Mod: FS

## 2025-03-20 PROCEDURE — 99232 SBSQ HOSP IP/OBS MODERATE 35: CPT

## 2025-03-20 PROCEDURE — 71250 CT THORAX DX C-: CPT | Mod: 26

## 2025-03-20 PROCEDURE — 99497 ADVNCD CARE PLAN 30 MIN: CPT | Mod: 25

## 2025-03-20 PROCEDURE — 99233 SBSQ HOSP IP/OBS HIGH 50: CPT

## 2025-03-20 PROCEDURE — 93308 TTE F-UP OR LMTD: CPT | Mod: 26,GC

## 2025-03-20 PROCEDURE — 99233 SBSQ HOSP IP/OBS HIGH 50: CPT | Mod: GC

## 2025-03-20 RX ORDER — HYDROMORPHONE/SOD CHLOR,ISO/PF 2 MG/10 ML
3 SYRINGE (ML) INJECTION ONCE
Refills: 0 | Status: DISCONTINUED | OUTPATIENT
Start: 2025-03-20 | End: 2025-03-20

## 2025-03-20 RX ORDER — OLANZAPINE 10 MG/1
5 TABLET ORAL AT BEDTIME
Refills: 0 | Status: DISCONTINUED | OUTPATIENT
Start: 2025-03-20 | End: 2025-03-21

## 2025-03-20 RX ORDER — CLONAZEPAM 0.5 MG/1
0.5 TABLET ORAL
Refills: 0 | Status: DISCONTINUED | OUTPATIENT
Start: 2025-03-21 | End: 2025-03-21

## 2025-03-20 RX ORDER — LORAZEPAM 4 MG/ML
1 VIAL (ML) INJECTION EVERY 8 HOURS
Refills: 0 | Status: DISCONTINUED | OUTPATIENT
Start: 2025-03-20 | End: 2025-03-27

## 2025-03-20 RX ORDER — OXYCODONE HYDROCHLORIDE 30 MG/1
20 TABLET ORAL EVERY 4 HOURS
Refills: 0 | Status: DISCONTINUED | OUTPATIENT
Start: 2025-03-20 | End: 2025-03-27

## 2025-03-20 RX ORDER — OXYCODONE HYDROCHLORIDE 30 MG/1
30 TABLET ORAL EVERY 4 HOURS
Refills: 0 | Status: DISCONTINUED | OUTPATIENT
Start: 2025-03-20 | End: 2025-03-27

## 2025-03-20 RX ORDER — ACETAMINOPHEN 500 MG/5ML
1000 LIQUID (ML) ORAL ONCE
Refills: 0 | Status: COMPLETED | OUTPATIENT
Start: 2025-03-20 | End: 2025-03-20

## 2025-03-20 RX ORDER — HYDROMORPHONE/SOD CHLOR,ISO/PF 2 MG/10 ML
3 SYRINGE (ML) INJECTION EVERY 4 HOURS
Refills: 0 | Status: DISCONTINUED | OUTPATIENT
Start: 2025-03-20 | End: 2025-03-27

## 2025-03-20 RX ADMIN — Medication 1 SPRAY(S): at 22:54

## 2025-03-20 RX ADMIN — Medication 3 MILLIGRAM(S): at 00:55

## 2025-03-20 RX ADMIN — Medication 2 TABLET(S): at 22:53

## 2025-03-20 RX ADMIN — NICOTINE POLACRILEX 1 PATCH: 4 GUM, CHEWING ORAL at 13:02

## 2025-03-20 RX ADMIN — OXYCODONE HYDROCHLORIDE 30 MILLIGRAM(S): 30 TABLET ORAL at 15:36

## 2025-03-20 RX ADMIN — Medication 1 GRAM(S): at 18:16

## 2025-03-20 RX ADMIN — BACLOFEN 5 MILLIGRAM(S): 10 INJECTION INTRATHECAL at 22:57

## 2025-03-20 RX ADMIN — Medication 30 MILLIGRAM(S): at 00:31

## 2025-03-20 RX ADMIN — CLONAZEPAM 0.5 MILLIGRAM(S): 0.5 TABLET ORAL at 12:08

## 2025-03-20 RX ADMIN — Medication 40 MILLIGRAM(S): at 06:59

## 2025-03-20 RX ADMIN — Medication 400 MILLIGRAM(S): at 16:07

## 2025-03-20 RX ADMIN — OXYCODONE HYDROCHLORIDE 30 MILLIGRAM(S): 30 TABLET ORAL at 20:20

## 2025-03-20 RX ADMIN — OLANZAPINE 5 MILLIGRAM(S): 10 TABLET ORAL at 22:55

## 2025-03-20 RX ADMIN — GABAPENTIN 100 MILLIGRAM(S): 400 CAPSULE ORAL at 15:35

## 2025-03-20 RX ADMIN — Medication 30 MILLIGRAM(S): at 07:00

## 2025-03-20 RX ADMIN — OXYCODONE HYDROCHLORIDE 30 MILLIGRAM(S): 30 TABLET ORAL at 16:06

## 2025-03-20 RX ADMIN — IPRATROPIUM BROMIDE AND ALBUTEROL SULFATE 3 MILLILITER(S): .5; 2.5 SOLUTION RESPIRATORY (INHALATION) at 20:46

## 2025-03-20 RX ADMIN — Medication 30 MILLIGRAM(S): at 22:55

## 2025-03-20 RX ADMIN — GABAPENTIN 100 MILLIGRAM(S): 400 CAPSULE ORAL at 07:00

## 2025-03-20 RX ADMIN — Medication 30 MILLIGRAM(S): at 15:36

## 2025-03-20 RX ADMIN — POLYETHYLENE GLYCOL 3350 17 GRAM(S): 17 POWDER, FOR SOLUTION ORAL at 12:15

## 2025-03-20 RX ADMIN — Medication 1 DOSE(S): at 22:54

## 2025-03-20 RX ADMIN — Medication 3 MILLIGRAM(S): at 12:09

## 2025-03-20 RX ADMIN — BACLOFEN 5 MILLIGRAM(S): 10 INJECTION INTRATHECAL at 15:37

## 2025-03-20 RX ADMIN — Medication 112 MICROGRAM(S): at 06:59

## 2025-03-20 RX ADMIN — APIXABAN 5 MILLIGRAM(S): 2.5 TABLET, FILM COATED ORAL at 07:00

## 2025-03-20 RX ADMIN — Medication 3 MILLIGRAM(S): at 01:10

## 2025-03-20 RX ADMIN — OXYCODONE HYDROCHLORIDE 30 MILLIGRAM(S): 30 TABLET ORAL at 19:46

## 2025-03-20 RX ADMIN — COLCHICINE 0.6 MILLIGRAM(S): 0.6 TABLET, FILM COATED ORAL at 13:02

## 2025-03-20 RX ADMIN — OXYCODONE HYDROCHLORIDE 30 MILLIGRAM(S): 30 TABLET ORAL at 04:11

## 2025-03-20 RX ADMIN — Medication 40 MILLIEQUIVALENT(S): at 15:36

## 2025-03-20 RX ADMIN — NALOXEGOL OXALATE 25 MILLIGRAM(S): 12.5 TABLET, FILM COATED ORAL at 12:14

## 2025-03-20 RX ADMIN — GABAPENTIN 100 MILLIGRAM(S): 400 CAPSULE ORAL at 22:54

## 2025-03-20 RX ADMIN — Medication 1 SPRAY(S): at 15:37

## 2025-03-20 RX ADMIN — NICOTINE POLACRILEX 1 PATCH: 4 GUM, CHEWING ORAL at 12:45

## 2025-03-20 RX ADMIN — Medication 3 MILLIGRAM(S): at 12:39

## 2025-03-20 RX ADMIN — Medication 1 APPLICATION(S): at 12:17

## 2025-03-20 RX ADMIN — CLONAZEPAM 1 MILLIGRAM(S): 0.5 TABLET ORAL at 22:55

## 2025-03-20 RX ADMIN — OXYCODONE HYDROCHLORIDE 30 MILLIGRAM(S): 30 TABLET ORAL at 00:32

## 2025-03-20 RX ADMIN — NICOTINE POLACRILEX 1 PATCH: 4 GUM, CHEWING ORAL at 07:36

## 2025-03-20 RX ADMIN — BACLOFEN 5 MILLIGRAM(S): 10 INJECTION INTRATHECAL at 06:59

## 2025-03-20 RX ADMIN — Medication 30 MILLIGRAM(S): at 16:06

## 2025-03-20 RX ADMIN — Medication 30 MILLIGRAM(S): at 07:35

## 2025-03-20 RX ADMIN — Medication 1000 MILLIGRAM(S): at 16:37

## 2025-03-20 RX ADMIN — OXYCODONE HYDROCHLORIDE 30 MILLIGRAM(S): 30 TABLET ORAL at 04:20

## 2025-03-20 RX ADMIN — NICOTINE POLACRILEX 1 PATCH: 4 GUM, CHEWING ORAL at 18:29

## 2025-03-20 RX ADMIN — Medication 40 MILLIGRAM(S): at 18:16

## 2025-03-20 NOTE — CONSULT NOTE ADULT - TIME BILLING
Direct patient Interaction and evaluation, chart review, image review, medical decision making and care coordination
Reviewing medical chart and results, symptom assessment and management, counseling patient/decision makers/caregivers, coordination of care, documentation. (separate from Advance Care Planning services)

## 2025-03-20 NOTE — DISCHARGE NOTE PROVIDER - CARE PROVIDERS DIRECT ADDRESSES
,[email or web address] to [email or web address],[email or web address]

## 2025-03-20 NOTE — DISCHARGE NOTE PROVIDER - ATTENDING DISCHARGE PHYSICAL EXAMINATION:
CONSTITUTIONAL: NAD, awake, speaking.   RESPIRATORY: Normal respiratory effort; no respiratory distress, CTAB  CARDIOVASCULAR: No visible JVD, No lower extremity edema; S1S2, no m,r,g  ABDOMEN: Not guarding, does not appear distended, BS+  MUSCLOSKELETAL: no clubbing or cyanosis of digits; no joint swelling   PSYCH: AOx3    Will need outpatient follow up. B/O anxiety and panic attacks, full staging workup is incomplete. Can be attempted as outpatient if possible. Discussed with outpatient oncologist.

## 2025-03-20 NOTE — PROGRESS NOTE ADULT - PROBLEM SELECTOR PLAN 1
Chest pain from mediastinal mass   Patient with hx of diffuse pain related to her underlying scleroderma for which she shared at one point she was fentanyl 200 mcg patch and was weaned down  - Home regimen: MS Contin 30 mg BID and oxycodone 20 mg q4h PRN (long term dose) given by outpatient pain specialist originally for scleroderma pain  - PRN use in past 24 hours from 8 AM to 8 AM: oxycodone 30 mg x 3 doses, IV dilaudid 3 mg x 3 doses   - Patient now mainly complains of non cancer pain, concern more severe anxiety rather than physical pain. Improves with haldol     Plan and Recommendations:   - opioids:   > c/w oxycodone  20 mg q4h PRN for moderate pain and oxycodone IR 30 mg q4h prn for severe pain  > c/w IV dilaudid 3 mg q4h PRN for severe breakthrough pain  > c/w MS Contin 30 mg TID   - Bowel regimen  - Holistic therapy  - Patient concerned about how she will receive Rx for opioids outpatient. Explained that will be determined by her goals. If she chooses to pursue treatment, she can f/u with supportive oncology. If she chooses no treatment, then she can receive hospice care. Otherwise patient is free to return to her prior pain doctor

## 2025-03-20 NOTE — DISCHARGE NOTE PROVIDER - NPI NUMBER (FOR SYSADMIN USE ONLY) :
[[id number]]

## 2025-03-20 NOTE — PROGRESS NOTE ADULT - PROBLEM SELECTOR PLAN 7
For pain management     In the event of worsening symptoms, please contact the Palliative Medicine team via pager (if the patient is at [facility] #[phone] or if the patient is at [facility] #[phone]) The Geriatric and Palliative Medicine service has coverage 24 hours a day/ 7 days a week to provide medical recommendations regarding symptom management needs via telephone.

## 2025-03-20 NOTE — BH CONSULTATION LIAISON PROGRESS NOTE - NSBHCONSULTFOLLOWAFTERCARE_PSY_A_CORE FT
Would benefit from follow up with [facility] vs treatment in outpatient psych-onc clinic

## 2025-03-20 NOTE — PROGRESS NOTE ADULT - SUBJECTIVE AND OBJECTIVE BOX
Indication of Geriatrics and Palliative Medicine Services:  [X  ] Complex Medical Decision Making   [X  ] Symptom/Pain management     DNR on chart: No    INTERVAL EVENTS: Patient seen this AM; she said she had a terrible night with several panic attacks and had pain manifest in her lower extremity; she shared pain can improve with pain meds but can also go away on its own   No pain currently.   At conclusion of our visit, followed up by  team who shared that they'll be adjusting her meds to target severe anxiety and panic  GOC as documented separately from today      Analgesic Use (Scheduled and PRNs) for past 24 hours: 1pm-1pm  HYDROmorphone  Injectable   3 milliGRAM(s) IV Push (03-20-25 @ 00:55)   3 milliGRAM(s) IV Push (03-19-25 @ 19:16)    HYDROmorphone  Injectable   3 milliGRAM(s) IV Push (03-20-25 @ 12:09)    oxyCODONE    IR   30 milliGRAM(s) Oral (03-20-25 @ 04:11)   30 milliGRAM(s) Oral (03-19-25 @ 23:28)   30 milliGRAM(s) Oral (03-19-25 @ 17:16)      -------------------------------------------------------------------------------------------------------    PRESENT SYMPTOMS:     [ ]Unable to self-report - see [ ] CPOT [ ] PAINADS [ ] RDOS  Source if other than patient:  [ ]Family   [ ]Team     PAIN   1. Location- Left chest   2. Radiation-  Left arm, back   3. Quality- Sharp   4. Timing- Constant   5. Minimal acceptable level/pain goal- 4/10   6. Aggravating factors-   7. QOL impact- Severe     Dyspnea:                           [ ]Mild [ ]Moderate [ ]Severe  Anxiety:                             [ ]Mild [ ]Moderate [ ]Severe  Fatigue:                             [ ]Mild [ ]Moderate [ ]Severe  Nausea:                             [ x]Mild [ ]Moderate [ ]Severe  Loss of appetite:                [ ]Mild [x ]Moderate [ ]Severe  Constipation:                     [ ]Mild [ ]Moderate [ ]Severe  Other Symptoms:  [ ]All other review of systems negative     -------------------------------------------------------------------------------------------------------    I STOP: [id number]    Prescription Information      PDI Filter:    PDI	Current Rx	Drug Type	Rx Written	Rx Dispensed	Drug	Quantity	Days Supply	Prescriber Name	Prescriber KHOA #	Payment Method	Dispenser  A	Y	O	02/27/2025	03/08/2025	morphine sulf er 30 mg tablet	60	30	Guru Crowe MD	[id number]	Medicare	[id number]  A	Y	O	02/27/2025	03/08/2025	oxycodone hcl (ir) 20 mg tab	90	30	Guru Crowe MD	[id number]	Medicare	[id number]  A	N	O	01/30/2025	02/06/2025	morphine sulf er 30 mg tablet	60	30	Guru Crowe MD	[id number]	Medicare	[id number]  A	N	O	01/30/2025	02/06/2025	oxycodone hcl (ir) 20 mg tab	90	30	Guru Crowe MD	[id number]	Medicare	[id number]  A	N	O	01/07/2025	01/08/2025	morphine sulf er 30 mg tablet	60	30	Guru Crowe MD	[id number]	Medicare	[id number]  A	N	O	01/07/2025	01/08/2025	oxycodone hcl (ir) 20 mg tab	90	30	Guru Crowe MD	[id number]	Medicare	[id number]  A	N	O	11/27/2024	12/06/2024	morphine sulf er 30 mg tablet	60	30	Guru Crowe MD	[id number]	Medicare	[id number]  A	N	O	11/27/2024	12/06/2024	oxycodone hcl (ir) 20 mg tab	90	30	Guru Crowe MD	[id number]	Medicare	[id number]  A	N	O	10/31/2024	11/08/2024	morphine sulf er 30 mg tablet	60	30	Guru Crowe MD	[id number]	Medicare	[id number]  A	N	O	10/31/2024	11/08/2024	oxycodone hcl (ir) 20 mg tab	90	30	Guru Crowe MD	[id number]	Medicare	[id number]  A	N	O	10/03/2024	10/09/2024	oxycodone hcl (ir) 20 mg tab	90	30	Guru Crowe MD	[id number]	Medicare	[id number]    -------------------------------------------------------------------------------------------------------    ITEMS UNCHECKED ARE NOT PRESENT    PHYSICAL:  Vital Signs Last 24 Hrs  T(C): 36.6 (20 Mar 2025 05:55), Max: 36.6 (20 Mar 2025 05:55)  T(F): 97.8 (20 Mar 2025 05:55), Max: 97.8 (20 Mar 2025 05:55)  HR: 81 (20 Mar 2025 05:55) (81 - 99)  BP: 103/62 (20 Mar 2025 05:55) (103/62 - 157/94)  BP(mean): --  RR: 16 (20 Mar 2025 05:55) (16 - 17)  SpO2: 92% (20 Mar 2025 05:55) (92% - 98%)    Parameters below as of 20 Mar 2025 05:55  Patient On (Oxygen Delivery Method): room air      GENERAL:  [ ]Cachexia  [ ] Frail  [X ]Awake  [X ]Oriented   [ ]Lethargic  [ ]Unarousable  [ ]Verbal  [ ]Non-Verbal    BEHAVIORAL:   [ ] Anxiety  [ ] Delirium [ ] Agitation [ ] Other    HEENT:   [X ]Normal   [ ]Dry mouth   [ ]ET Tube/Trach  [ ]Oral lesions    PULMONARY:   [X ]Clear              [ ]Tachypnea  [ ]Audible excessive secretions   [ ]Rhonchi        [ ]Right [ ]Left [ ]Bilateral  [ ]Crackles        [ ]Right [ ]Left [ ]Bilateral  [ ]Wheezing     [ ]Right [ ]Left [ ]Bilateral  [ ]Diminished breath sounds [ ]right [ ]left [ ]bilateral    CARDIOVASCULAR:    [X ]Regular [ ]Irregular [ ]Tachy  [ ]Irdge [ ]Murmur [ ]Other    GASTROINTESTINAL:  [X ]Soft  [ ]Distended   [X ]+BS  [X ]Non tender [ ]Tender  [ ]Other [ ]PEG [ ]OGT/ NGT      GENITOURINARY:  [X ]Normal [ ] Incontinent   [ ]Oliguria/Anuria   [ ]Camargo    MUSCULOSKELETAL:   [X ]Normal   [ ]Weakness  [ ]Bed/Wheelchair bound [ ]Edema    NEUROLOGIC:   [X ]No focal deficits  [ ]Cognitive impairment  [ ]Dysphagia [ ]Dysarthria [ ]Paresis [ ]Other     SKIN:   [X ]Normal  [ ]Rash  [ ]Other  [ ]Pressure ulcer(s)       Present on admission [ ]y [ ]n    -------------------------------------------------------------------------------------------------------      LABS:                          10.4   4.48  )-----------( 189      ( 20 Mar 2025 07:15 )             31.6     03-20    136  |  96[L]  |  8   ----------------------------<  103[H]  3.5   |  31  |  0.42[L]    Ca    8.7      20 Mar 2025 07:15  Phos  3.4     03-20  Mg     1.80     03-20    TPro  6.0  /  Alb  3.3  /  TBili  0.4  /  DBili  x   /  AST  17  /  ALT  7   /  AlkPhos  78  03-20        -------------------------------------------------------------------------------------------------------    CRITICAL CARE:  [ ]Shock Present  [ ]Septic [ ]Cardiogenic [ ]Neurologic [ ]Hypovolemic [ ]Undifferentiated    [ ]Vasopressors [ ]Inotropes    [ ]Respiratory failure present [ ]Acute  [ ]Chronic [ ]Hypoxic  [ ]Hypercarbic [ ]Mixed   [ ]Mechanical Ventilation  [ ]Trach collar   [ ]Non-invasive ventilatory support   [ ]High-Flow   [ ]Oxygen mask/venti     [ ]Other organ failure     -------------------------------------------------------------------------------------------------------    RADIOLOGY & ADDITIONAL STUDIES:     < from: Xray Chest 1 View-PORTABLE IMMEDIATE (Xray Chest 1 View-PORTABLE IMMEDIATE .) (03.12.25 @ 13:48) >    IMPRESSION:  Bilateral upper lobe opacities with 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  No acute pulmonary or cardiovascular disease.    < end of copied text >    -------------------------------------------------------------------------------------------------------  MEDICATIONS  (STANDING):  albuterol/ipratropium for Nebulization 3 milliLiter(s) Nebulizer every 6 hours  apixaban 5 milliGRAM(s) Oral every 12 hours  baclofen 5 milliGRAM(s) Oral every 8 hours  chlorhexidine 2% Cloths 1 Application(s) Topical daily  clonazePAM  Tablet 0.5 milliGRAM(s) Oral daily  clonazePAM  Tablet 1 milliGRAM(s) Oral at bedtime  colchicine 0.6 milliGRAM(s) Oral daily  fluticasone propionate/ salmeterol 100-50 MICROgram(s) Diskus 1 Dose(s) Inhalation two times a day  gabapentin 100 milliGRAM(s) Oral three times a day  influenza  Vaccine (HIGH DOSE) 0.5 milliLiter(s) IntraMuscular once  levothyroxine 112 MICROGram(s) Oral daily  lidocaine   4% Patch 1 Patch Transdermal daily  lidocaine   4% Patch 1 Patch Transdermal every 24 hours  lidocaine   4% Patch 1 Patch Transdermal every 24 hours  methylnaltrexone Injectable 12 milliGRAM(s) SubCutaneous once  mirtazapine 7.5 milliGRAM(s) Oral at bedtime  morphine ER Tablet 30 milliGRAM(s) Oral every 8 hours  naloxegol 25 milliGRAM(s) Oral daily  nicotine - 21 mG/24Hr(s) Patch 1 Patch Transdermal daily  pantoprazole    Tablet 40 milliGRAM(s) Oral every 12 hours  polyethylene glycol 3350 17 Gram(s) Oral daily  potassium chloride   Powder 40 milliEquivalent(s) Oral once  senna 2 Tablet(s) Oral at bedtime  sodium chloride 1 Gram(s) Oral two times a day  sodium chloride 0.65% Nasal 1 Spray(s) Both Nostrils three times a day    MEDICATIONS  (PRN):  aluminum hydroxide/magnesium hydroxide/simethicone Suspension 30 milliLiter(s) Oral every 4 hours PRN Dyspepsia  benzocaine/menthol Lozenge 1 Lozenge Oral three times a day PRN Sore Throat  HYDROmorphone  Injectable 3 milliGRAM(s) IV Push every 4 hours PRN severe breakthrough pain  LORazepam     Tablet 1 milliGRAM(s) Oral every 8 hours PRN Anxiety  melatonin 3 milliGRAM(s) Oral at bedtime PRN Insomnia  ondansetron Injectable 4 milliGRAM(s) IV Push every 6 hours PRN Nausea and/or Vomiting  oxyCODONE    IR 20 milliGRAM(s) Oral every 4 hours PRN Moderate Pain (4 - 6)  oxyCODONE    IR 30 milliGRAM(s) Oral every 4 hours PRN Severe Pain (7 - 10)

## 2025-03-20 NOTE — BH CONSULTATION LIAISON PROGRESS NOTE - NSBHASSESSMENTFT_PSY_ALL_CORE
63 /yo F w/ longstanding PPHx of anxiety previously on xanax, remote hx of inpatient psych hospitalization for suicidality, PMHx scleroderma with chronic pain manifestations, recent admission for cardiac tamponade, admitted for workup of SOB/chest pain leading to SCLC diagnosis, psychiatry consulted for worsening of anxiety I/s/o multiple medical complications and her son's recent death. Pt likely with multiple comorbid psychiatric disorders including SOURAV, MDD, claustrophobia, most prominent disorder at this time is worsening of anxiety, with panic-like symptoms. Given guarded medical prognosis, recent social stressors, appropriate to treat severe anxiety with standing benzodiazepine regimen at this time. Pt not amenable to starting SSRI treatment, but if becomes amenable may benefit from this as well.     3/15: Reports improved anxiety. No reported changes in breathing. Appears to be tolerating Klonopin. Will continue.  3/17: Endorses decreased panic attacks and no use of PRN ativan over this interval. C/w klonopin, recommend ongoing GOC discussions with pt and her HCP  3/18: Unable to tolerate MRI over this interval, unclear reason. DEC klonopin to 0.5 mg qAM and 1 mg qHS due to pt complaint of sleepiness. Can give extra klonopin 0.5 mg wafer PRN prior to MRI, or ativan 1 mg IV x1 prior to MRI. If pt continues to refuse MRI, would reevaluate pt's capacity to refuse which may be impacted by her severe anxiety, and would involve her HCP.   3/19: Pt calmer over this interval, can continue with klonopin 0.5 mg qD and klonopin 1 mg qHS. Denies SI/HI.   3/20: Pt more dysregulated over this interval. STOP remeron at night and START zyprexa 5 mg qHS for control of mood and to augment pain regimen. INC klonopin to 0.5 mg at 9 AM, 0.5 mg at 1PM, and 1 mg qHS.    PLAN  - defer obs status to primary team   - c/w klonopin 1 mg BID standing for treatment of anxiety  - ativan 1 mg PO/IV/IM q8h for breakthrough severe anxiety  - please monitor respiratory status closely given pt is also on baclofen and standing oxycodone, with limited baseline pulmonary reserve. HOLD benzodiazepines if concerned for oversedation.   - if pt attempting to leave AMA again, would recommend primary team to reassess pt's capacity at that time 63 /yo F w/ longstanding PPHx of anxiety previously on xanax, remote hx of inpatient psych hospitalization for suicidality, PMHx scleroderma with chronic pain manifestations, recent admission for cardiac tamponade, admitted for workup of SOB/chest pain leading to SCLC diagnosis, psychiatry consulted for worsening of anxiety I/s/o multiple medical complications and her son's recent death. Pt likely with multiple comorbid psychiatric disorders including SOURAV, MDD, claustrophobia, most prominent disorder at this time is worsening of anxiety, with panic-like symptoms. Given guarded medical prognosis, recent social stressors, appropriate to treat severe anxiety with standing benzodiazepine regimen at this time. Pt not amenable to starting SSRI treatment, but if becomes amenable may benefit from this as well.     3/15: Reports improved anxiety. No reported changes in breathing. Appears to be tolerating Klonopin. Will continue.  3/17: Endorses decreased panic attacks and no use of PRN ativan over this interval. C/w klonopin, recommend ongoing GOC discussions with pt and her HCP  3/18: Unable to tolerate MRI over this interval, unclear reason. DEC klonopin to 0.5 mg qAM and 1 mg qHS due to pt complaint of sleepiness. Can give extra klonopin 0.5 mg wafer PRN prior to MRI, or ativan 1 mg IV x1 prior to MRI. If pt continues to refuse MRI, would reevaluate pt's capacity to refuse which may be impacted by her severe anxiety, and would involve her HCP.   3/19: Pt calmer over this interval, can continue with klonopin 0.5 mg qD and klonopin 1 mg qHS. Denies SI/HI.   3/20: Pt more dysregulated over this interval. STOP remeron at night and START zyprexa 5 mg qHS for control of mood and to augment pain regimen. INC klonopin to 0.5 mg at 9 AM, 0.5 mg at 1PM, and 1 mg qHS.    PLAN  - defer obs status to primary team   - Start Zyprexa 5mg HS  - STOP Remeron  - c/w klonopin 1 mg BID standing for treatment of anxiety  - ativan 1 mg PO/IV/IM q8h for breakthrough severe anxiety  - please monitor respiratory status closely given pt is also on baclofen and standing oxycodone, with limited baseline pulmonary reserve. HOLD benzodiazepines if concerned for oversedation.   - Dispo: has no psych barriers to discharge when med cleared; we support/advocate for a transition to an outpt oncological level of care.

## 2025-03-20 NOTE — PROGRESS NOTE ADULT - SUBJECTIVE AND OBJECTIVE BOX
SOLID TUMOR ONCOLOGY HOSPITALIST PROGRESS NOTE    S: No acute events overnight.  Pt had no new complaints this am.    CURRENT MEDICATIONS  MEDICATIONS  (STANDING):  albuterol/ipratropium for Nebulization 3 milliLiter(s) Nebulizer every 6 hours  baclofen 5 milliGRAM(s) Oral every 8 hours  chlorhexidine 2% Cloths 1 Application(s) Topical daily  clonazePAM  Tablet 1 milliGRAM(s) Oral at bedtime  colchicine 0.6 milliGRAM(s) Oral daily  fluticasone propionate/ salmeterol 100-50 MICROgram(s) Diskus 1 Dose(s) Inhalation two times a day  gabapentin 100 milliGRAM(s) Oral three times a day  influenza  Vaccine (HIGH DOSE) 0.5 milliLiter(s) IntraMuscular once  levothyroxine 112 MICROGram(s) Oral daily  lidocaine   4% Patch 1 Patch Transdermal daily  lidocaine   4% Patch 1 Patch Transdermal every 24 hours  lidocaine   4% Patch 1 Patch Transdermal every 24 hours  methylnaltrexone Injectable 12 milliGRAM(s) SubCutaneous once  morphine ER Tablet 30 milliGRAM(s) Oral every 8 hours  naloxegol 25 milliGRAM(s) Oral daily  nicotine - 21 mG/24Hr(s) Patch 1 Patch Transdermal daily  OLANZapine 5 milliGRAM(s) Oral at bedtime  pantoprazole    Tablet 40 milliGRAM(s) Oral every 12 hours  polyethylene glycol 3350 17 Gram(s) Oral daily  senna 2 Tablet(s) Oral at bedtime  sodium chloride 1 Gram(s) Oral two times a day  sodium chloride 0.65% Nasal 1 Spray(s) Both Nostrils three times a day  MEDICATIONS  (PRN):  aluminum hydroxide/magnesium hydroxide/simethicone Suspension 30 milliLiter(s) Oral every 4 hours PRN Dyspepsia  benzocaine/menthol Lozenge 1 Lozenge Oral three times a day PRN Sore Throat  HYDROmorphone  Injectable 3 milliGRAM(s) IV Push every 4 hours PRN severe breakthrough pain  LORazepam     Tablet 1 milliGRAM(s) Oral every 8 hours PRN Anxiety  melatonin 3 milliGRAM(s) Oral at bedtime PRN Insomnia  ondansetron Injectable 4 milliGRAM(s) IV Push every 6 hours PRN Nausea and/or Vomiting  oxyCODONE    IR 20 milliGRAM(s) Oral every 4 hours PRN Moderate Pain (4 - 6)  oxyCODONE    IR 30 milliGRAM(s) Oral every 4 hours PRN Severe Pain (7 - 10)      PHYSICAL EXAM  T(C): 36.6 (03-20-25 @ 13:55), Max: 36.6 (03-20-25 @ 05:55)  HR: 78 (03-20-25 @ 13:55) (78 - 99)  BP: 122/76 (03-20-25 @ 13:55) (103/62 - 157/94)  RR: 17 (03-20-25 @ 13:55) (16 - 17)  SpO2: 96% (03-20-25 @ 13:55) (92% - 98%)    03-19-25 @ 07:01  -  03-20-25 @ 07:00  --------------------------------------------------------  IN: 450 mL / OUT: 0 mL / NET: 450 mL  Non-toxic-appearing woman, crying hysterically in bed  Answering all questions appropriately (conversational dyspnea resolved), following commands  Anicteric sclera, no oral lesions/thrush  RRR, no m/r/g, heart sounds faint  Breaths somewhat labored, but not tachypnea; trace RLB crackles, no w/r  Abd soft/nt/nd, hypoactive bowel sounds  No peripheral edema  CN 2-12 grossly intact; no gross focal neuro deficits; pt moves all extremities spontaneously      LABS                        10.4   4.48  )-----------( 189      ( 20 Mar 2025 07:15 )             31.6     03-20    136  |  96[L]  |  8   ----------------------------<  103[H]  3.5   |  31  |  0.42[L]    Ca    8.7      20 Mar 2025 07:15  Phos  3.4     03-20  Mg     1.80     03-20    TPro  6.0  /  Alb  3.3  /  TBili  0.4  /  DBili  x   /  AST  17  /  ALT  7   /  AlkPhos  78  03-20      ADDITIONAL LABS  Dylan 98  Uosm 632  uric acid 2.4    PATHOLOGY  2/21 LN biopsies  1. LUNG, LEFT UPPER LOBE, ENDOBRONCHIAL FORCEPS BIOPSY AND TRANSBRONCHIAL   FNA   POSITIVE FOR MALIGNANT CELLS.   Small cell carcinoma.   Touch Imprint slide, cell block and core biopsy show a cellular specimen   composed of groups and numerous single-lying hyperchromatic cells with   irregular nuclear membranes, nuclear molding and scanty cytoplasm. Crush   artifact and mitotic figures present.   Immunocytochemical studies : The neoplastic cells are positive for Cam   5.2.hrmogranin, synaptophysin, INSM-1, TTF-1 while negative for CD45 and   P40. Ki-67 reveal a proliferation index of 90%.   Case discussed at the daily cytopathology  conference on   03/04/2025.   Case reported to Tumor Registry.   Dr. Webster was informed of the diagnosis via encrypted email on 03/04/2025.   2. LYMPH NODE, LEVEL 7, EBUS-GUIDED FNA   SUSPICIOUS FOR MALIGNANCY.   Suspicious for small cell carcinoma.   Cytology smears and cell block display a hypocellular specimen composed   of rare groups and few single-lying hyperchromatic cells with irregular   nuclear membranes, nuclear molding and scanty cytoplasm with crush   artifact, in a background of rare lymphocytes.   Case discussed at the daily cytopathology  conference on   03/04/2025.   3. LYMPH NODE, 4L, EBUS-GUIDED FNA   POSITIVE FOR MALIGNANT CELLS.   S mall cell carcinoma.   Cytology smears and cell block show a cellular specimen composed of   groups and single-lying hyperchromatic cells with irregular nuclear   membranes, nuclear molding and scanty cytoplasm, in a background of rare   lymphocytes.   Immunocytochemical studies: The neoplastic cells are positive for Cam   5.2, Chromogranin, synaptophysin, INSM-1, TTF-1 while negative for CD45   and P40.Ki-67 reveals a proliferative index of 90%.   In summary the cytomorphology and immunophenotype are consistent with   small cell carcinoma.   4. LUNG, LEFT UPPER LOBE, BRONCHOALVEOLAR LAVAGE   ATYPICAL FINDINGS   Cytology slide and cell block show hyperchromatic groups of poorly   preserved cells among reactive ciliated bronchial cells and alveolar   macrophages.   12/20/24 pericardial fluid cytology negative for malignant cells      MICROBIOLOGY  Abx: none on this admission    Cx Data  12/23 Coxsackie B titers positive 1:100-1:1000  12/23 Coxsackie A titers negative  12/23 EBV IgG positive  12/23 viral hep panel non-reactive  12/22 Quant Gold negative      PERTINENT RADIOLOGY    3/12 CXR: Bilateral upper lobe opacities with 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  No acute pulmonary or cardiovascular disease.  3/12 AXR: There is a nonobstructive gas pattern.  Large stool burden.  No masses or pathologic calcifications.  Surgical clips in the right upper quadrant.  Visualized lungs are clear.  3/12 TTE   1. Left ventricular systolic function is normal with an ejection fraction visually estimated at 65 to 70 %.   2. Small pericardial effusion noted adjacent to the posterior left ventricle, moderate pericardial effusion noted adjacentto the right atrium, moderate pericardial effusion noted adjacent to the anterior right ventricle and moderate pericardial effusion noted adjacent to the apex with no echocardiographic evidence of tamponade physiology.   3. The inferior vena cava is normal in size measuring 1.76 cm in diameter, (normal <2.1cm) with normal inspiratory collapse (normal >50%) consistent with normal right atrial pressure (~3, range 0-5mmHg).   4. No prior echocardiogram is available for comparison.  2/21 CXR: No complications post bronchoscopy.  Increased left upper lobe opacity (pneumonia?)  2/15 NC CT chest: Small circumferential pericardial effusion, mildly increased on today's   study when compared to the prior. Left suprahilar mediastinal mass in the prevascular space, extending into the aortopulmonary window, mediastinal adenopathy, appears more   pronounced on today's study though resolution is limited without   intravenous contrast. There is a new peripheral ill-defined 2.4 x 2.2 cm opacity in the left   upper lobe, differential includes infection/inflammation/neoplasm.  Dilated ascending aorta measuring up to 4 cm, similar to the prior study.  Consider contrast-enhanced CT of the chest if patient is able to tolerate   intravenous contrast.  2/15 B/L LE dopplers: negative for DVT. SOLID TUMOR ONCOLOGY HOSPITALIST PROGRESS NOTE    S: No acute events overnight.  Pt had no new complaints this am.    CURRENT MEDICATIONS  MEDICATIONS  (STANDING):  albuterol/ipratropium for Nebulization 3 milliLiter(s) Nebulizer every 6 hours  baclofen 5 milliGRAM(s) Oral every 8 hours  chlorhexidine 2% Cloths 1 Application(s) Topical daily  clonazePAM  Tablet 1 milliGRAM(s) Oral at bedtime  colchicine 0.6 milliGRAM(s) Oral daily  fluticasone propionate/ salmeterol 100-50 MICROgram(s) Diskus 1 Dose(s) Inhalation two times a day  gabapentin 100 milliGRAM(s) Oral three times a day  influenza  Vaccine (HIGH DOSE) 0.5 milliLiter(s) IntraMuscular once  levothyroxine 112 MICROGram(s) Oral daily  lidocaine   4% Patch 1 Patch Transdermal daily  lidocaine   4% Patch 1 Patch Transdermal every 24 hours  lidocaine   4% Patch 1 Patch Transdermal every 24 hours  methylnaltrexone Injectable 12 milliGRAM(s) SubCutaneous once  morphine ER Tablet 30 milliGRAM(s) Oral every 8 hours  naloxegol 25 milliGRAM(s) Oral daily  nicotine - 21 mG/24Hr(s) Patch 1 Patch Transdermal daily  OLANZapine 5 milliGRAM(s) Oral at bedtime  pantoprazole    Tablet 40 milliGRAM(s) Oral every 12 hours  polyethylene glycol 3350 17 Gram(s) Oral daily  senna 2 Tablet(s) Oral at bedtime  sodium chloride 1 Gram(s) Oral two times a day  sodium chloride 0.65% Nasal 1 Spray(s) Both Nostrils three times a day  MEDICATIONS  (PRN):  aluminum hydroxide/magnesium hydroxide/simethicone Suspension 30 milliLiter(s) Oral every 4 hours PRN Dyspepsia  benzocaine/menthol Lozenge 1 Lozenge Oral three times a day PRN Sore Throat  HYDROmorphone  Injectable 3 milliGRAM(s) IV Push every 4 hours PRN severe breakthrough pain  LORazepam     Tablet 1 milliGRAM(s) Oral every 8 hours PRN Anxiety  melatonin 3 milliGRAM(s) Oral at bedtime PRN Insomnia  ondansetron Injectable 4 milliGRAM(s) IV Push every 6 hours PRN Nausea and/or Vomiting  oxyCODONE    IR 20 milliGRAM(s) Oral every 4 hours PRN Moderate Pain (4 - 6)  oxyCODONE    IR 30 milliGRAM(s) Oral every 4 hours PRN Severe Pain (7 - 10)      PHYSICAL EXAM  T(C): 36.6 (03-20-25 @ 13:55), Max: 36.6 (03-20-25 @ 05:55)  HR: 78 (03-20-25 @ 13:55) (78 - 99)  BP: 122/76 (03-20-25 @ 13:55) (103/62 - 157/94)  RR: 17 (03-20-25 @ 13:55) (16 - 17)  SpO2: 96% (03-20-25 @ 13:55) (92% - 98%)    03-19-25 @ 07:01  -  03-20-25 @ 07:00  --------------------------------------------------------  IN: 450 mL / OUT: 0 mL / NET: 450 mL  Non-toxic-appearing woman, crying hysterically in bed  Answering all questions appropriately (conversational dyspnea resolved), following commands  Anicteric sclera, no oral lesions/thrush  RRR, no m/r/g, heart sounds faint  Breaths somewhat labored, but not tachypnea; trace RLB crackles, no w/r  Abd soft/nt/nd, hypoactive bowel sounds  No peripheral edema  CN 2-12 grossly intact; no gross focal neuro deficits; pt moves all extremities spontaneously      LABS                        10.4   4.48  )-----------( 189      ( 20 Mar 2025 07:15 )             31.6     03-20    136  |  96[L]  |  8   ----------------------------<  103[H]  3.5   |  31  |  0.42[L]    Ca    8.7      20 Mar 2025 07:15  Phos  3.4     03-20  Mg     1.80     03-20    TPro  6.0  /  Alb  3.3  /  TBili  0.4  /  DBili  x   /  AST  17  /  ALT  7   /  AlkPhos  78  03-20      ADDITIONAL LABS  Dylan 98  Uosm 632  uric acid 2.4    PATHOLOGY  2/21 LN biopsies  1. LUNG, LEFT UPPER LOBE, ENDOBRONCHIAL FORCEPS BIOPSY AND TRANSBRONCHIAL   FNA   POSITIVE FOR MALIGNANT CELLS.   Small cell carcinoma.   Touch Imprint slide, cell block and core biopsy show a cellular specimen   composed of groups and numerous single-lying hyperchromatic cells with   irregular nuclear membranes, nuclear molding and scanty cytoplasm. Crush   artifact and mitotic figures present.   Immunocytochemical studies : The neoplastic cells are positive for Cam   5.2.hrmogranin, synaptophysin, INSM-1, TTF-1 while negative for CD45 and   P40. Ki-67 reveal a proliferation index of 90%.   Case discussed at the daily cytopathology  conference on   03/04/2025.   Case reported to Tumor Registry.   Dr. Webster was informed of the diagnosis via encrypted email on 03/04/2025.   2. LYMPH NODE, LEVEL 7, EBUS-GUIDED FNA   SUSPICIOUS FOR MALIGNANCY.   Suspicious for small cell carcinoma.   Cytology smears and cell block display a hypocellular specimen composed   of rare groups and few single-lying hyperchromatic cells with irregular   nuclear membranes, nuclear molding and scanty cytoplasm with crush   artifact, in a background of rare lymphocytes.   Case discussed at the daily cytopathology  conference on   03/04/2025.   3. LYMPH NODE, 4L, EBUS-GUIDED FNA   POSITIVE FOR MALIGNANT CELLS.   S mall cell carcinoma.   Cytology smears and cell block show a cellular specimen composed of   groups and single-lying hyperchromatic cells with irregular nuclear   membranes, nuclear molding and scanty cytoplasm, in a background of rare   lymphocytes.   Immunocytochemical studies: The neoplastic cells are positive for Cam   5.2, Chromogranin, synaptophysin, INSM-1, TTF-1 while negative for CD45   and P40.Ki-67 reveals a proliferative index of 90%.   In summary the cytomorphology and immunophenotype are consistent with   small cell carcinoma.   4. LUNG, LEFT UPPER LOBE, BRONCHOALVEOLAR LAVAGE   ATYPICAL FINDINGS   Cytology slide and cell block show hyperchromatic groups of poorly   preserved cells among reactive ciliated bronchial cells and alveolar   macrophages.   12/20/24 pericardial fluid cytology negative for malignant cells      MICROBIOLOGY  Abx: none on this admission    Cx Data  None new on this admission.  12/23 Coxsackie B titers positive 1:100-1:1000  12/23 Coxsackie A titers negative  12/23 EBV IgG positive  12/23 viral hep panel non-reactive  12/22 Quant Gold negative      PERTINENT RADIOLOGY  3/20 TTE   1. Limited study to re-evaluate pericardial effusion.   2. There is a moderate pericardial effusion noted adjacent to the posterior left ventricle, a moderate pericardial effusion noted adjacent to the right atrium, a small pericardial effusion noted adjacent to the apex, a moderate pericardial effusion noted adjacent to the lateral left ventricle and a moderate pericardial effusion noted adjacent to the right ventricle with no echocardiographic evidence of tamponade physiology. Moderate pericardial effusion, measuring ~ 1.1 cm posterior to the left ventricle, ~ 1.0 cm lateral to the left ventricle, ~ 1.4 cm adjacent to the RV free wall, and ~ 1.4 cm superior to the right atrium. Small pericardial effusion anterior to the right ventricle and adjacent to the apex. The pericardium adjacent to the RV free wall appears markedly thickened and may reflect the presence of thrombus, inflammatory material, and/or metastatic disease. No RA or RV diastolic collapse seen. However, the inferior vena cava (IVC) displays reduced respirophasic variability in its caliber, consistent with elevated right atrial pressure.   3. The inferior vena cava is normal in size measuring 1.30 cm in diameter, (normal <2.1cm) with abnormal inspiratory collapse (abnormal <50%) consistent with mildly elevated right atrial pressure (~8, range 5-10mmHg).   4. Compared to the transthoracic echocardiogram performed on 3/12/2025, the pericardial effusion has increased slightly in size.  3/18 CT abd/pelv: No acute pathology within the abdomen and pelvis. Partially imaged pericardial effusion. Bilateral trace pleural effusions with adjacent atelectasis. Again demonstrated a T12 vertebral body hemangioma. Degenerative changes of the spine  3/18 CT head: No hydrocephalus, acute intracranial hemorrhage, or mass effect. No compelling evidence of intracranial metastasis on this non-contrast exam.  3/12 CXR: Bilateral upper lobe opacities with 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  No acute pulmonary or cardiovascular disease.  3/12 AXR: There is a nonobstructive gas pattern.  Large stool burden.  No masses or pathologic calcifications.  Surgical clips in the right upper quadrant.  Visualized lungs are clear.  3/12 TTE   1. Left ventricular systolic function is normal with an ejection fraction visually estimated at 65 to 70 %.   2. Small pericardial effusion noted adjacent to the posterior left ventricle, moderate pericardial effusion noted adjacentto the right atrium, moderate pericardial effusion noted adjacent to the anterior right ventricle and moderate pericardial effusion noted adjacent to the apex with no echocardiographic evidence of tamponade physiology.   3. The inferior vena cava is normal in size measuring 1.76 cm in diameter, (normal <2.1cm) with normal inspiratory collapse (normal >50%) consistent with normal right atrial pressure (~3, range 0-5mmHg).   4. No prior echocardiogram is available for comparison.  2/21 CXR: No complications post bronchoscopy.  Increased left upper lobe opacity (pneumonia?)  2/15 NC CT chest: Small circumferential pericardial effusion, mildly increased on today's   study when compared to the prior. Left suprahilar mediastinal mass in the prevascular space, extending into the aortopulmonary window, mediastinal adenopathy, appears more   pronounced on today's study though resolution is limited without   intravenous contrast. There is a new peripheral ill-defined 2.4 x 2.2 cm opacity in the left   upper lobe, differential includes infection/inflammation/neoplasm.  Dilated ascending aorta measuring up to 4 cm, similar to the prior study.  Consider contrast-enhanced CT of the chest if patient is able to tolerate   intravenous contrast.  2/15 B/L LE dopplers: negative for DVT.

## 2025-03-20 NOTE — GOALS OF CARE CONVERSATION - ADVANCED CARE PLANNING - CONVERSATION DETAILS
Referral to palliative care for complex decision making and symptom management in setting newly dx advanced malignancy. Pt is known to palliative care service. Maryann shared that after much consideration she has decided to pursue initiation of chemotherapy. She shared that she is amenable to trailing DMT and hopes that the treatment will provide extension of life with quality.   Followed up on advanced directive conversation from yesterday.  She shared she was not able to have a productive discussion with Sam last night regarding her decision about her preferences for resuscitative interventions. She shared she would not want CPR or intubation however does not wish to complete a MOLST  going against what Sam would want her to do.  She said he asked, "If you can have chest compressions and live why wouldn't you try?" Maryann feels very conflicted and needs more time to talk to Sam about her preferences. Reassured her that the MOLST can be completed when she feels comfortable.  Pt remains a full code at this time. Palliative team remains available for further symptom mgmt and goal of care as needed. Referral to palliative care for complex decision making and symptom management in setting newly dx advanced malignancy. Pt is known to palliative care service. Follow up conversation with patient this morning regarding goals and advance directives. Maryann shared that after much consideration she has decided to pursue initiation of chemotherapy. She shared that she is amenable to trailing DMT and hopes that the treatment will provide extension of life with quality.     Followed up on advanced directive conversation from yesterday.  She shared she was not able to have a productive discussion with Sam, her HCP,  last night regarding her decision about her preferences for resuscitative interventions. She shared she would not want CPR or intubation however does not wish to complete a MOLST  going against what Sam would want her to do.  She said he asked, "If you can have chest compressions and live why wouldn't you try?" Maryann feels very conflicted and needs more time to talk to Sam about her preferences. Reassured her that the MOLST can be completed when she feels comfortable.  Pt remains a full code at this time. Palliative team remains available for further symptom mgmt and goal of care as needed.

## 2025-03-20 NOTE — PROGRESS NOTE ADULT - PROBLEM SELECTOR PLAN 4
- recently diagnosed  - Unable to tolerate MRI A/P and brain d/t claustrophobia    - CT chest on 2/15- MEDIASTINUM AND JEREMIAS: Anterior subcarinal lymph node measures 1.8 cm in short axis, stable. There is a soft tissue density, measuring up to 4.2 cm in the prevascular space extending into the subcutaneous left main pulmonary artery region, this is more pronounced on today's study.   - Per onc, plan is to start inpatient chemo treatment and RT- patient thinking about it   - CT imaging showing no mets   - Follows with Dr. Hall at [facility]  PLAN:  3/20: patient now amenable to initiating inpatient chemo

## 2025-03-20 NOTE — DISCHARGE NOTE PROVIDER - DETAILS OF MALNUTRITION DIAGNOSIS/DIAGNOSES
This patient has been assessed with a concern for Malnutrition and was treated during this hospitalization for the following Nutrition diagnosis/diagnoses:     -  03/14/2025: Severe protein-calorie malnutrition

## 2025-03-20 NOTE — PROGRESS NOTE ADULT - ASSESSMENT
65 y/o F with PMHx of AF, scleroderma, asthma with prior intubations, hypothyroidism, pericardial effusion s/p pericardiocentesis (12/2024) and drain on colchicine presented to the ED due to SOB, chest pain, and back pain. Oncology evaluated her and recommended MRI brain and MRI chest/abdomen/pelvis with contrast to rule out metastasis. Was previously admitted in 12/2024 due to chest pain and SOB and found to have a pericardial effusion with evidence of tamponade physiology. She underwent urgent pericardiocentesis with placement of drain which was removed on 12/26. She was started on colchicine and NSAIDs. Hospital course was complicated by AF with RVR which was new onset and was suspected to be due to drain placement. After drain was removed patient continued to have pAF and was started on Eliquis. Presented to the ED on 02/13/25 for left sided chest pain radiating to the back. Repeat TTE demonstrated interval increase in pericardial effusion (moderate-large adjacent to RA with no evidence of tamponade) and CTS was consulted for possible pericardial window and recommended no acute intervention. 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 Pulmonary consulted and recommended transfer to [facility] for bronchoscopy, EBUS and hilar mass biopsy. Patient transferred to [facility] on 02/20/25 and underwent EBUS on 02/21/25. 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

## 2025-03-20 NOTE — CONSULT NOTE ADULT - NS ATTEND AMEND GEN_ALL_CORE FT
Patient seen and examined agree with above note as modified, where appropriate, by me. recurrent small pericardial effusion without evidence of hemodynamic compromise. No indication for surgical drainage at this time.

## 2025-03-20 NOTE — PROGRESS NOTE ADULT - PROBLEM SELECTOR PLAN 3
Having severe  panic attacks   On klonopin and ativan   Haldol PRN   Appreciate  recs- plan for further adjust medication today; Remeron can be dc'd

## 2025-03-20 NOTE — DISCHARGE NOTE PROVIDER - CARE PROVIDER_API CALL
Hernan Hall  Medical Oncology  [address] [place] [address]  Phone: [phone]  Fax: [phone]  Established Patient  Follow Up Time:     Gregory Bernal  [facility] Medicine  [address] [facility], [address]  [place] [address]  Phone: [phone]  Fax: [phone]  Established Patient  Follow Up Time: 2 weeks    Josselyn Metzger  Rheumatology  Established Patient  Follow Up Time: 2 weeks    Nahid Potts  Radiation Oncology  [address] [place] [address]  Phone: [phone]  Fax: [phone]  Follow Up Time:     César Mercado  Psychiatry  [address] [place] [address]  Phone: [phone]  Fax: [phone]  Follow Up Time:     Mahsa Scales  Hospice/Palliative Medicine  [address] [facility] [facility] [address]  [place] [address]  Phone: [phone]  Fax: [phone]  Scheduled Appointment: 04/11/2025 03:00 PM    Dorota Lopes  Rheumatology  [address], [address]  [place] [address]  Phone: [phone]  Fax: [phone]  Follow Up Time:     Yuan Núñez  Thoracic Surgery  [address] [place] [address], [address] [place] [address]  Phone: [phone]  Fax: [phone]  Follow Up Time:

## 2025-03-20 NOTE — CHART NOTE - NSCHARTNOTEFT_GEN_A_CORE
Follow-up with patient to address psychosocial needs, assess coping with recent diagnosis & prognosis, and provide emotional support.  Maryann shared that she had a rough night.  She was emotional and somewhat tearful during encounter.  She shared she was not able to have a productive discussion with Sam last night regarding her decision to initiate chemotherapy or about her preferences for resuscitative interventions. She shared she would not want CPR or intubation however does not wish to complete a MOLST  going against what Sam would want her to do.  She said he asked, "If you can have chest compressions and live why wouldn't you try?" Maryann feels very conflicted and needs more time to talk to Sam about her preferences. Reassured her that the MOLST can be completed when she feels comfortable.  [facility] severe [facility]/panic [facility].  They plan to adjust medications.   Emotional support provided.

## 2025-03-20 NOTE — CONSULT NOTE ADULT - REASON FOR ADMISSION
Shortness of breath and chest pain
SOB
Shortness of breath and chest pain
Shortness of breath and chest pain

## 2025-03-20 NOTE — DISCHARGE NOTE PROVIDER - NSDCFUADDAPPT_GEN_ALL_CORE_FT
**You will need close followup with outpatient Rheumatologist for further management     **Followup with pain Dr. Bernal. You also have an appointment with palliative/pain Dr. Scales on 4/11/25 at 3pm to discuss further pain regimen*****    ***Recommend followup with outpatient Radiation Oncologist for evaluation and further management for radiation therapy     ****Close followup with Psychiatrist Dr Mercado for further adjustment for your medications, call office for appointment once discharged from rehab*******    ****Chemotherapy every 3 weeks-- please followup with Dr Hall for further management *****

## 2025-03-20 NOTE — PROGRESS NOTE ADULT - ASSESSMENT
63 yo woman, former smoker (47py; quit 12/2024) with h/o pAfib (on Eliquis), Hypothyroidism, ? h/o Sleroderma, h/o pericardial effusion s/p pericardiocentesis (dx in 12/2024; ? viral vs inflammatory, cytology negative for malignant cells, on Colchicine), Asthma/suspected COPD, and recently-dx SCLCa > dx in 2/2025, staging javier not yet completed and pt is tx naive and referred to EDITH from Med Onc clinic for expedited onc workup and urgent initiation of inpatient chemotherapy.    ACTIVE PROBLEMS  Recently dx SCLCa  GOC discussion, counseling  Anxiety  Acute hypoxic resp failure  h/o Asthma/COPD (former smoker)  Hyponatremia 2/2 SIADH  h/o Pericardial effusion (viral with positive Coxsackie B titers in 12/2024)  Cancer related pain, anxiety  Constipation  Hypothyroidism  pAfib  Hypercoag state  Severe prot yahir malnutrtion  Immunocompromised 2/2 cancer, autoimmune disease    Recently dx SCLCa  GOC discussion, counseling  Staging w/u in progress   * Pt unable to tolerate MRI Brain, A/P (even with anesthesia- Versed), due to anxiety/claustrophobia   * NC CTH, A/P ordered (pt has allergy to iodinated contrast- anaphylaxis) in an attempt to stage- but pt also with difficulty tolerated due to anxiety  In GOC conversation on 3/13, pt expressed uncertainty about pursuing systemic cancer treatment, especially if her cancer is determined to be advanced, noting that she wanted to know the cancer stage before making any decisions about this; she also expressed interest in reviewing the MOLST form and indicated that she would discuss with her HCP Yogi before making any final decisions about code status  Rad Onc also made aware of pt- she is being considered for a 3-week course of RT to the lung mass (pending staging javier and pt's decision on GOC)  Long-term prognosis: gaurded; pt is full code    Anxiety  Not currently controlled, pt having daily panic attacks which is hindering her ability to go for staging scans  BHT following  Continuing Klonopin 1mg BID  Continuing Ativan PO/IV/IM 1mg q8/prn  1:1 Observation not required at this time    Acute hypoxic resp failure  h/o Asthma/COPD (former smoker)  Pt desats to mid 80s% on RA and requires 4-6L NC supplemental O2 to maintain O2 sats > 92%  Likely due to burden of disease in lungs + pericardial effusion  Continuing supplemental O2 with weaning as tolerated and supportive resp care prn  Continuing Advair 100-50mcg MDI BID  Continuing duonebs q6/prn   Continuing Nicotine patch 21mg daily (6 weeks)    Hyponatremia 2/2 SIADH  Na normalized, 138 today; pt is asymptomatic  SIADH confirmed by 3/12 urine lytes  Continuing 1L fluid restriction  Continuing Salt tabs 1gm BID    h/o Pericardial effusion in 12/2024 (? autoimmune vs viral with positive Coxsackie B titers in 12/2024)  12/23 pericardial fluid cytology negative for malignant cells  3/12 TTE with small to mod pericardial effusion, without tamponade physiology  Continuing Colchicine 0.6mg daily    Cancer related pain, anxiety  Continuing Morphine ER 30mg q12  Continuing Oxy IR 20mg q8/prn  Continuing Baclofen 5mg q8/prn  Appreciate BHT consult:  Ativan PO to 0.5mg q8/prn and 1mg PO prn (prior to MRI)    Constipation  Improved, pt s/p large bm on 3/13  Possibly due to opioids +/- AID  3/12 AXR negative for ileus/obstruction  Continuing bowel regimen (Miralax, Senna) for OIC prevention    Hypothyroidism: 3/12 TFTs wnl; continuing LT4 112mcg daily    pAfib  Hypercoag state  Continuing Eliquis 5mg q12    Severe prot yahir malnutrtion: appreciate DELFINA adorno; continuing regular diet 65 yo woman, former smoker (47py; quit 12/2024) with h/o pAfib (on Eliquis), Hypothyroidism, ? h/o Sleroderma, h/o pericardial effusion s/p pericardiocentesis (dx in 12/2024; ? viral vs inflammatory, cytology negative for malignant cells, on Colchicine), Asthma/suspected COPD, and recently-dx SCLCa > dx in 2/2025, staging javier not yet completed and pt is tx naive and referred to EDITH from Med Onc clinic for expedited onc workup and urgent initiation of inpatient chemotherapy.    ACTIVE PROBLEMS  Recently dx SCLCa  GOC discussion, counseling  Pericardial effusion   Anxiety  Acute hypoxic resp failure  h/o Asthma/COPD (former smoker)  Hyponatremia 2/2 SIADH  Cancer related pain, anxiety  Constipation  Hypothyroidism  pAfib  Hypercoag state  Severe prot yahir malnutrtion  Immunocompromised 2/2 cancer, autoimmune disease    Recently dx SCLCa  GOC discussion, counseling  Staging w/u in progress   * Pt unable to tolerate MRI Brain, A/P (even with anesthesia- Versed), due to anxiety/claustrophobia   * NC CTH, A/P without overt evidence of mets   * TTE showing RV thickening with pericardial effusion, c/f mets; w/u in progress with Thoracic Surgery as below  In GOC conversation on 3/13, pt expressed uncertainty about pursuing systemic cancer treatment, especially if her cancer is determined to be advanced  However, in f/u conversation about GOC- pt indicated that she would like to pursue systemic (? inpt vs outpt)  Rad Onc also made aware of pt- she is being considered for a 3-week course of RT to the lung mass (pending staging javier and pt's decision on GOC)  Long-term prognosis: gaurded; pt is full code    Pericardial effusion   Dx in 12/2024 (previously thought to be ? autoimmune vs viral with positive Coxsackie B titers in 12/2024)  12/23 pericardial fluid cytology negative for malignant cells  3/12 TTE with small to mod pericardial effusion, without tamponade physiology  3/20 TTE with RV thickening and mod multifocal pericardial effusion, c/f mets by appearance  Appreciate Thoracic Surgery consult: NC CT chest ordered for further eval (Eliquis transitioned to Heparin gtt in anticipation of possible pericardiocentesis vs pericardial window)  Continuing Colchicine 0.6mg daily    Anxiety/depression  Not currently controlled, pt having regular panic attacks and nightmares (grieving the recent loss of her son)  BHT continues to follow closely  Continuing Klonopin 1mg BID  Continuing Ativan PO/IV/IM 1mg q8  Remeron dced  Starting Zyprexa 5mg nightly  1:1 Observation not required at this time    Acute hypoxic resp failure  h/o Asthma/COPD (former smoker)  Pt desats to mid 80s% on RA and requires 4-6L NC supplemental O2 to maintain O2 sats > 92%  Likely due to burden of disease in lungs + pericardial effusion  Continuing supplemental O2 with weaning as tolerated and supportive resp care prn  Continuing Advair 100-50mcg MDI BID  Continuing duonebs q6/prn   Continuing Nicotine patch 21mg daily (6 weeks)  Mgmt of pericardial effusion as above    Hyponatremia 2/2 SIADH  Na normalized, 135 today; pt is asymptomatic  SIADH confirmed by 3/12 urine lytes  1L fluid restriction dced  Continuing Salt tabs 1gm BID    Cancer related pain  Continuing Morphine ER 30mg q12  Continuing Oxy IR 20mg q8/prn  Continuing Baclofen 5mg q8/prn    Constipation  Improved  Possibly due to opioids +/- AID  3/12 AXR negative for ileus/obstruction  Continuing bowel regimen (Miralax, Senna) for OIC prevention    Hypothyroidism: 3/12 TFTs wnl; continuing LT4 112mcg daily    pAfib  Hypercoag state  Eliquis transitioned to heparin gtt in anticipation of possible pericardial procedure as noted above    Severe prot yahir malnutrtion: appreciate RD tila; continuing regular diet

## 2025-03-20 NOTE — BH CONSULTATION LIAISON PROGRESS NOTE - NSBHFUPINTERVALHXFT_PSY_A_CORE
Patient seen and evaluated, staff consulted, chart, labs, meds reviewed. Over this interval pt received multiple PRNs due to agitation I/s/o back and leg pain. Per primary team pt's pain unresponsive to dilaudid PRNs, upon receiving haldol and ativan w/ improvement. Suspect pt's pain made worse by severe anxiety. Pt calm on interview but tearful when discussing pain episodes. Denies SI/HI.

## 2025-03-20 NOTE — DISCHARGE NOTE PROVIDER - PROVIDER TOKENS
PROVIDER:[TOKEN:[[id number]],ESTABLISHEDPATIENT:[T]],PROVIDER:[TOKEN:[[id number]],FOLLOWUP:[2 weeks],ESTABLISHEDPATIENT:[T]],PROVIDER:[TOKEN:[[id number]],FOLLOWUP:[2 weeks],ESTABLISHEDPATIENT:[T]],PROVIDER:[TOKEN:[[id number]]],PROVIDER:[TOKEN:[[id number]]],PROVIDER:[TOKEN:[[id number]],SCHEDULEDAPPT:[04/11/2025],SCHEDULEDAPPTTIME:[03:00 PM]],PROVIDER:[TOKEN:[[id number]]],PROVIDER:[TOKEN:[[id number]]]

## 2025-03-20 NOTE — DISCHARGE NOTE PROVIDER - NSDCCPCAREPLAN_GEN_ALL_CORE_FT
PRINCIPAL DISCHARGE DIAGNOSIS  Diagnosis: Chest pain  Assessment and Plan of Treatment: Resolved. Pain due to newly diagnosed cancer and scleroderma, you were seen by the pallaitive care doctor and rheumatology, pleae continue adjusted medications as prescribed.    followup with pain Dr. Bernal and Rheumatology  Also you have an appt with Pallaitive Dr Scales on 4/11 at 3pm      SECONDARY DISCHARGE DIAGNOSES  Diagnosis: Pericardial effusion  Assessment and Plan of Treatment: You Echocardiogram (ultrasound of heart) noted global pericardial effusion without tamponade, normal Ejection fraction, as per Thoracic surgeon no intervention/drain was indicated at this time, as your repeat Echo stable, continue Colchicine 0.6mg daily    Diagnosis: Asthma  Assessment and Plan of Treatment: stable, continue supplemental oxygen as directed.   Continue your inhalers and annual pulmonary function tests with your outpatient provider. Monitor for asthma exacerbation, such as, shortness of breath, hyperventilation, or any other difficulties breathing and report to the emergency room in the event that your rescue inhaler has not resolved your symptoms  Recommend outpatient pulmonary followup    Diagnosis: Scleroderma  Assessment and Plan of Treatment: You were seen by the rheumatologist, recommend xrays of  Xray c-spipe, hand, feet pending, however you could not tolerate at this time. Recommend clsoe outpatient followup with Rheumatology for further maangement Dr Metzger or at [facility], call for appointment once discharged from rehab ***    Diagnosis: Small cell lung cancer  Assessment and Plan of Treatment: CAT Scan head and abdomen/pelvis showed no gross mets/abnormalities. As discused with your oncologist Dr. Hall you received Cycle Chemo with carbo/etop 3/23-3/25 with fulphila 3/26, tolerated well; this is a every 3week week cycles, please followup with Dr Hall for further plan/treatment*****  Radiation oncologist consutled and recommend outpatient followup in Clinic with Dr. Potts or Dr. Carrasco to evaluate for radiation (possibly 3week course of radiation to lung, further discussion per Rad-Onc doctor), call office to make appointment [phone] **    Diagnosis: Severe anxiety  Assessment and Plan of Treatment: You were closely evaluated and monitoring by the psychiatrist in the hospital while your medications were adjusted, please continue your medications as directed and follow up with your PCP and psychiatrist for further evaluation and medical management. If you are ever in need of immediate psychiatric assistance you may reach out to the [facility] [phone]. [facility] [address] [facility] [facility] [address] [facility] [address]. [phone]  ***Recommend close outpatient psych followup with Dr Mercado for further management**    Diagnosis: Atrial fibrillation  Assessment and Plan of Treatment: stable Please continue your medications as directed and follow-up with your primary provider/cardiologist to further manage your care. Monitor for signs/symptoms of uncontrolled atrial fibrillation, such as, increased heart rate, palpitations, chest pain, dizziness, or shortness of breath - Return to emergency room if these signs/symptoms are present    Diagnosis: Constipation  Assessment and Plan of Treatment: resolved, continue bowel regimen as prescribed   Can do Reliastor as needed    Diagnosis: Hyponatremia  Assessment and Plan of Treatment: Low sodium levels due to SIADH- improved now to 134 on discharge.   please continue salt tablets as preccribed, adjust as outpatient depending on your sodium levels.

## 2025-03-20 NOTE — DISCHARGE NOTE PROVIDER - NSDCMRMEDTOKEN_GEN_ALL_CORE_FT
acetaminophen 325 mg oral tablet: 2 tab(s) orally every 6 hours As needed Temp greater or equal to 38C (100.4F), Mild Pain (1 - 3)  albuterol 90 mcg/inh inhalation aerosol: 2 puff(s) inhaled every 6 hours As needed Shortness of Breath and/or Wheezing  apixaban 5 mg oral tablet: 1 tab(s) orally every 12 hours  baclofen 5 mg oral tablet: 1 tab(s) orally every 8 hours As needed Musculoskeletal Pain  colchicine 0.6 mg oral tablet: 1 tab(s) orally once a day  cyanocobalamin: 1,000 milligram(s) intramuscular once a day for 4 more days  fluticasone-salmeterol: 1 inhaler(s) inhaled 2 times a day  levothyroxine 112 mcg (0.112 mg) oral tablet: 1 tab(s) orally once a day  lidocaine 4% topical film: Apply topically to affected area once a day  morphine 30 mg/8 to 12 hr oral tablet, extended release: 1 tab(s) orally every 12 hours  naloxegol 25 mg oral tablet: 1 tab(s) orally once a day  nicotine 21 mg/24 hr transdermal film, extended release: 1 patch transdermal once a day  oxyCODONE 20 mg oral tablet: 1 tab(s) orally every 8 hours As needed for severe pain  pantoprazole 40 mg oral delayed release tablet: 1 tab(s) orally every 12 hours  traMADol 50 mg oral tablet: 0.5 tab(s) orally 3 times a day   albuterol 90 mcg/inh inhalation aerosol: 2 puff(s) inhaled every 6 hours As needed Shortness of Breath and/or Wheezing  aluminum hydroxide-magnesium hydroxide 200 mg-200 mg/5 mL oral suspension: 30 milliliter(s) orally every 4 hours As needed Dyspepsia  apixaban 5 mg oral tablet: 1 tab(s) orally every 12 hours  baclofen 5 mg oral tablet: 1 tab(s) orally every 8 hours  bisacodyl 5 mg oral delayed release tablet: 1 tab(s) orally once a day (at bedtime)  clonazePAM 0.5 mg oral tablet: 1 tab(s) orally 2 times a day 7am, 3pm  clonazePAM 1 mg oral tablet: 1 tab(s) orally once a day (at bedtime)  colchicine 0.6 mg oral tablet: 1 tab(s) orally once a day  diphenhydramine/lidocaine/aluminum hydroxide/magnesium hydroxide/simethicone mucous membrane suspension: 5 milliliter(s) mucous membrane 4 times a day swish and spit  fluticasone-salmeterol: 1 dose(s) inhaled 2 times a day 100-50 mcg diskus  gabapentin 100 mg oral capsule: 1 cap(s) orally 3 times a day  levothyroxine 112 mcg (0.112 mg) oral tablet: 1 tab(s) orally once a day  lidocaine 4% topical film: Apply topically to affected area once a day  LORazepam 1 mg oral tablet: 1 tab(s) orally every 8 hours As needed Anxiety  melatonin 3 mg oral tablet: 1 tab(s) orally once a day (at bedtime) As needed Insomnia  menthol-benzocaine: 1 lozenge orally 3 times a day as needed for SORE THROAT  morphine 30 mg/8 to 12 hr oral tablet, extended release: 1 tab(s) orally every 8 hours  naloxegol 25 mg oral tablet: 1 tab(s) orally once a day  nicotine 21 mg/24 hr transdermal film, extended release: 1 patch transdermal once a day  OLANZapine 5 mg oral tablet: 1 tab(s) orally once a day (at bedtime)  oxyCODONE 20 mg oral tablet: 1 tab(s) orally every 4 hours As needed Moderate Pain (4 - 6)  oxyCODONE 30 mg oral tablet: 1 tab(s) orally every 4 hours As needed Severe Pain (7 - 10)  pantoprazole 40 mg oral delayed release tablet: 1 tab(s) orally every 12 hours  polyethylene glycol 3350 oral powder for reconstitution: 17 gram(s) orally 2 times a day  senna leaf extract oral tablet: 2 tab(s) orally once a day (at bedtime)  sodium chloride 0.65% nasal spray: 1 spray(s) in each affected nostril 3 times a day  sodium chloride 1 g oral tablet: 2 tab(s) orally 3 times a day adjust as needed at rehab and with your PCP depending on your sodium levels   albuterol 90 mcg/inh inhalation aerosol: 2 puff(s) inhaled every 6 hours As needed Shortness of Breath and/or Wheezing  aluminum hydroxide-magnesium hydroxide 200 mg-200 mg/5 mL oral suspension: 30 milliliter(s) orally every 4 hours As needed Dyspepsia  apixaban 5 mg oral tablet: 1 tab(s) orally every 12 hours  baclofen 5 mg oral tablet: 1 tab(s) orally every 8 hours  bisacodyl 5 mg oral delayed release tablet: 1 tab(s) orally once a day (at bedtime)  clonazePAM 0.5 mg oral tablet: 1 tab(s) orally 2 times a day 7am, 3pm MDD: 2  clonazePAM 1 mg oral tablet: 1 tab(s) orally once a day (at bedtime) MDD: 1  colchicine 0.6 mg oral tablet: 1 tab(s) orally once a day  diphenhydramine/lidocaine/aluminum hydroxide/magnesium hydroxide/simethicone mucous membrane suspension: 5 milliliter(s) mucous membrane 4 times a day swish and spit  fluticasone-salmeterol: 1 dose(s) inhaled 2 times a day 100-50 mcg diskus  gabapentin 100 mg oral capsule: 1 cap(s) orally 3 times a day  levothyroxine 112 mcg (0.112 mg) oral tablet: 1 tab(s) orally once a day  lidocaine 4% topical film: Apply topically to affected area once a day  LORazepam 1 mg oral tablet: 1 tab(s) orally every 8 hours As needed Anxiety  melatonin 3 mg oral tablet: 1 tab(s) orally once a day (at bedtime) As needed Insomnia  menthol-benzocaine: 1 lozenge orally 3 times a day as needed for SORE THROAT  morphine 30 mg/8 to 12 hr oral tablet, extended release: 1 tab(s) orally every 8 hours MDD: 3  naloxegol 25 mg oral tablet: 1 tab(s) orally once a day  nicotine 21 mg/24 hr transdermal film, extended release: 1 patch transdermal once a day  OLANZapine 5 mg oral tablet: 1 tab(s) orally once a day (at bedtime)  oxyCODONE 20 mg oral tablet: 1 tab(s) orally every 4 hours as needed for Moderate Pain (4 - 6) MDD: 6  oxyCODONE 30 mg oral tablet: 1 tab(s) orally every 4 hours as needed for Severe Pain (7 - 10) MDD: 6  pantoprazole 40 mg oral delayed release tablet: 1 tab(s) orally every 12 hours  polyethylene glycol 3350 oral powder for reconstitution: 17 gram(s) orally 2 times a day  senna leaf extract oral tablet: 2 tab(s) orally once a day (at bedtime)  sodium chloride 0.65% nasal spray: 1 spray(s) in each affected nostril 3 times a day  sodium chloride 1 g oral tablet: 2 tab(s) orally 3 times a day adjust as needed at rehab and with your PCP depending on your sodium levels

## 2025-03-20 NOTE — DISCHARGE NOTE PROVIDER - NSFOLLOWUPCLINICS_GEN_ALL_ED_FT
[facility] Rheumatology  Rheumatology  [address]  [place] [address]  Phone: [phone]  Fax:   Follow Up Time: 2 weeks    [facility]  Behavioral Health  [address] [place] [address]  Phone: [phone]  Fax:     [facility] Pulmonolgy and Sleep Medicine  Pulmonology  [address] [place] [address] [address]  [place] [address]  Phone: [phone]  Fax:

## 2025-03-20 NOTE — CONSULT NOTE ADULT - ASSESSMENT
63 yo F, former smoker, PMHx pAfib (on Eliquis, diagnoses after pericardial drain 12/24), Hypothyroidism, Scleroderma, h/o pericardial effusion s/p pericardiocentesis (dx in 12/2024; ? viral vs inflammatory, cytology negative for malignant cells, on Colchicine), Asthma/suspected COPD, and newly diagnoses SCLCa (Dx in 2/2025), admitted for expedited staging workup and urgent initiation of inpatient chemotherapy. Thoracic Surgery consulted for evaluation of pericardial effusion for drain v pericardectomy.     Plan:  - Discussed with Dr. Montgomery   - CT chest noncontrast ordered for further evaluation of pericardial effusion  - HOLD eliquis (would need to be held >48 hr prior to any intervention)  - OK to start heparin ggt from thoracic standpoint  - Global care per primary   - Further recs to follow     Jeremías Valencia PA-C  Department of Thoracic Surgery  [phone]  65 yo F, former smoker, PMHx pAfib (on Eliquis, diagnoses after pericardial drain 12/24), Hypothyroidism, Scleroderma, h/o pericardial effusion s/p pericardiocentesis (dx in 12/2024; ? viral vs inflammatory, cytology negative for malignant cells, on Colchicine), Asthma/suspected COPD, and newly diagnoses SCLCa (Dx in 2/2025), admitted for expedited staging workup and urgent initiation of inpatient chemotherapy. Thoracic Surgery consulted for evaluation of pericardial effusion for drain v pericardectomy.     Plan:  - Discussed with Dr. Montgomery   - CT chest noncontrast ordered for further evaluation of pericardial effusion  - HOLD eliquis (would need to be held >48 hr prior to any intervention)  - OK to start heparin ggt from thoracic standpoint  - Global care per primary   - Further recs to follow   - ACP Johanna aware of above     Jeremías Valencia PA-C  Department of Thoracic Surgery  [id number]

## 2025-03-20 NOTE — DISCHARGE NOTE PROVIDER - HOSPITAL COURSE
65 y/o F w/ 47ppy smoking hx (quit 12/2024), PMHx A. Fib (Eliquis), Hypothyroidism, Scleroderma, Pericardial effusion s/p pericardiocentesis (12/2024, on Colchicine), Asthma (on home O2), and Newly dx SCLC, p/w worsening SOB and expedited onc workup w/ plan for inpatient chemo.  C/b chest discomfort w/ distant heart sounds, repeat TTE showed global pericardial effusion w/o e/o tamponade; serial echo noted increase pericardial effusion w/ RV wall thickening, per thoracic no plans to drain, CT Chest w/ unchanged mod-sized pericardial effusion. C/c/b hyponatremia c/w SIADH, started salt tabs and fluid restriction. Unable to tolerate MRI, CTH/Abd/pelv w/o showed no gross abnormalities. S/P inpt carbo/etop 3/23-3/25 with fulphila 3/26. Pall care consulted for pain mangement, cont MS Contin, cont Oxy PRN w/ Dilaudid for breakthrough. Psych consulted for Anxiety, adjusted zyprexa, Klonopin and ativan PRN. PT evaluated and recommend SAVITA, medically cleared for rehab    ---HEME/ONC---  #Newly Dx SCLCa  - Unable to tolerate MRI  - CTH and CT A/P w/o showed no gross mets/abnormalities     - S/P inpatient carbo/etop 3/23-3/25 with fulphila 3/26, tolerated well; q3w week cycles; concurrent immunotherapy is relatively contraindicated  - Rad Onc consulted, plan for projected 3wk course of RT to lung mass -followup outpatient  - Overall plan per Dr. Hall      #Pain 2/2 newly dx cancer and scleroderma    - Fu with Pain management as outpt  - Previously failed Gabapentin and Antidepressants for neuropathic pain  - s/p IV dilaudid, Cont MS Contin 30mg tid, Oxy 20/30mg PO q4h PRN  - Cont Baclofen 5mg TID, gabapentin 100mg TID, lidocaine patch   - followup with pain Dr. Bernal and Rheum    ---PSYCH---  #Anxiety  - Psych consulted, appreciate reccs  - klonopin 0.5mg QAM, 0.5mg Qafternoon and 1mg Qhs  - C/W Zyprexa 5mg HS  - ativan 1 mg PO/IV/IM q8h for breakthrough severe anxiety  - outpatient psych followup with Dr Mercado     ---RENAL---  #Hyponatremia 2/2 SIADH- improved  - Started Salt tab 1gm BID --> 1g TID -> 2g TID (3/26)  - Initially 1L fluid restriction, d/c'ed 3/19 --> restarted 3/23 as Na dropped now dced 3/26  - SOsm 271, urine studies c/w SIADH     ---CV---  #Pericardial effusion   #Hx of Pericardial effusion 12/2024 likely autoimmune vs viral (+ Coxsackie B titers)  - 12/23/2024 pericardial fluid cytology neg for malignant cells  - Repeat Echo showed global pericardial effusion w/o e/o tamponade, normal EF  - Repeat Echo 3/20 noted increase in pericardial effusion compare to earlier, as well as RV wall thickening  - Cont Colchicine 0.6mg daily  - CT Chest w/ Moderate-sized pericardial effusion appears unchanged compared to partially imaged heart on 3/18/2025 but increase insize since 2/15/2025. Interval increase in size of previously described left suprahilar mediastinal mass which extends into the AP window and paramediastinal left upper lobe. Some of the other left upper lobe nodules are decrease and some increase. Unchanged sclerotic lesions within the sternum and T12 vertebral body  - Per thoracic (Dr. Núñez) holding off drain for now    #A. Fib- stable   - held Eliquis 5mg BID, heparin gtt for possible proceure, resumed eliquis 3/25  - LE duplex- no DVT       ---PULM---  #Hx Asthma/COPD (former smoker)  #Acute hypoxic resp failure- stable   - Pt desats to mid 80s% on RA and requires 4-6L NC supplemental O2 to maintain O2 sats > 92%  - Likely due to burden of disease in lungs + pericardial effusion  - Cont  Advair 100-50mcg MDI BID  - cont Duoneb PRN  - Cont Nicotine patch 21mg daily x 6 weeks, then 14mg --> 7mg  - continue supplemental oxygen w/ NC     ---GI---  #Constipation- resolved   - Per pt, chronic at baseline  - Cont senna, miralax BID, Movantik 25 qd  - Reliastor PRN    - AXR: nonobstructive gas pattern. Large stool burden, negative for ileus/obstruction     ---ENDO---  #Hypothyroidism  - Cont Synthroid 112mcg  - TSH and Free T4 WNL    ---RHEUM---  #Hx of Scleroderma  - Rheum consulted, appreciate reccs  - Seriologies from 12/2024 were neg  - ANGELA 1:640  - Xray c-spipe, hand, feet pending, pt can't tolerate   - outpatient followup with Rheumatology    Dispo: rehab    On 03/31/2025, results and case was discussed with Dr. Archuleta, patient is medically cleared and optimized for discharge today. As per palliative Dr Farrell continue current regimen and followup with palliative Dr Scales (appt on 4/11 at 3pm) and with Rheumatology. All medications were reviewed with attending (continue current meds).

## 2025-03-21 LAB
ALBUMIN SERPL ELPH-MCNC: 3.4 G/DL — SIGNIFICANT CHANGE UP (ref 3.3–5)
ALP SERPL-CCNC: 79 U/L — SIGNIFICANT CHANGE UP (ref 40–120)
ALT FLD-CCNC: 8 U/L — SIGNIFICANT CHANGE UP (ref 4–33)
ANION GAP SERPL CALC-SCNC: 10 MMOL/L — SIGNIFICANT CHANGE UP (ref 7–14)
APTT BLD: 31.8 SEC — SIGNIFICANT CHANGE UP (ref 24.5–35.6)
APTT BLD: 46.2 SEC — HIGH (ref 24.5–35.6)
APTT BLD: 57.9 SEC — HIGH (ref 24.5–35.6)
AST SERPL-CCNC: 16 U/L — SIGNIFICANT CHANGE UP (ref 4–32)
BASOPHILS # BLD AUTO: 0.03 K/UL — SIGNIFICANT CHANGE UP (ref 0–0.2)
BASOPHILS NFR BLD AUTO: 0.6 % — SIGNIFICANT CHANGE UP (ref 0–2)
BILIRUB SERPL-MCNC: 0.5 MG/DL — SIGNIFICANT CHANGE UP (ref 0.2–1.2)
BUN SERPL-MCNC: 8 MG/DL — SIGNIFICANT CHANGE UP (ref 7–23)
CALCIUM SERPL-MCNC: 8.8 MG/DL — SIGNIFICANT CHANGE UP (ref 8.4–10.5)
CHLORIDE SERPL-SCNC: 95 MMOL/L — LOW (ref 98–107)
CO2 SERPL-SCNC: 30 MMOL/L — SIGNIFICANT CHANGE UP (ref 22–31)
CREAT SERPL-MCNC: 0.48 MG/DL — LOW (ref 0.5–1.3)
EGFR: 104 ML/MIN/1.73M2 — SIGNIFICANT CHANGE UP
EGFR: 104 ML/MIN/1.73M2 — SIGNIFICANT CHANGE UP
EOSINOPHIL # BLD AUTO: 0.17 K/UL — SIGNIFICANT CHANGE UP (ref 0–0.5)
EOSINOPHIL NFR BLD AUTO: 3.6 % — SIGNIFICANT CHANGE UP (ref 0–6)
GLUCOSE SERPL-MCNC: 96 MG/DL — SIGNIFICANT CHANGE UP (ref 70–99)
HCT VFR BLD CALC: 31.8 % — LOW (ref 34.5–45)
HCT VFR BLD CALC: 32.4 % — LOW (ref 34.5–45)
HGB BLD-MCNC: 10.6 G/DL — LOW (ref 11.5–15.5)
HGB BLD-MCNC: 10.7 G/DL — LOW (ref 11.5–15.5)
IANC: 2.74 K/UL — SIGNIFICANT CHANGE UP (ref 1.8–7.4)
IMM GRANULOCYTES NFR BLD AUTO: 0.2 % — SIGNIFICANT CHANGE UP (ref 0–0.9)
INR BLD: 1.17 RATIO — HIGH (ref 0.85–1.16)
LYMPHOCYTES # BLD AUTO: 1.42 K/UL — SIGNIFICANT CHANGE UP (ref 1–3.3)
LYMPHOCYTES # BLD AUTO: 29.7 % — SIGNIFICANT CHANGE UP (ref 13–44)
MAGNESIUM SERPL-MCNC: 1.7 MG/DL — SIGNIFICANT CHANGE UP (ref 1.6–2.6)
MCHC RBC-ENTMCNC: 29.9 PG — SIGNIFICANT CHANGE UP (ref 27–34)
MCHC RBC-ENTMCNC: 30 PG — SIGNIFICANT CHANGE UP (ref 27–34)
MCHC RBC-ENTMCNC: 33 G/DL — SIGNIFICANT CHANGE UP (ref 32–36)
MCHC RBC-ENTMCNC: 33.3 G/DL — SIGNIFICANT CHANGE UP (ref 32–36)
MCV RBC AUTO: 90.1 FL — SIGNIFICANT CHANGE UP (ref 80–100)
MCV RBC AUTO: 90.5 FL — SIGNIFICANT CHANGE UP (ref 80–100)
MONOCYTES # BLD AUTO: 0.41 K/UL — SIGNIFICANT CHANGE UP (ref 0–0.9)
MONOCYTES NFR BLD AUTO: 8.6 % — SIGNIFICANT CHANGE UP (ref 2–14)
NEUTROPHILS # BLD AUTO: 2.74 K/UL — SIGNIFICANT CHANGE UP (ref 1.8–7.4)
NEUTROPHILS NFR BLD AUTO: 57.3 % — SIGNIFICANT CHANGE UP (ref 43–77)
NRBC # BLD AUTO: 0 K/UL — SIGNIFICANT CHANGE UP (ref 0–0)
NRBC # BLD AUTO: 0 K/UL — SIGNIFICANT CHANGE UP (ref 0–0)
NRBC # FLD: 0 K/UL — SIGNIFICANT CHANGE UP (ref 0–0)
NRBC # FLD: 0 K/UL — SIGNIFICANT CHANGE UP (ref 0–0)
NRBC BLD AUTO-RTO: 0 /100 WBCS — SIGNIFICANT CHANGE UP (ref 0–0)
NRBC BLD AUTO-RTO: 0 /100 WBCS — SIGNIFICANT CHANGE UP (ref 0–0)
PHOSPHATE SERPL-MCNC: 3.3 MG/DL — SIGNIFICANT CHANGE UP (ref 2.5–4.5)
PLATELET # BLD AUTO: 196 K/UL — SIGNIFICANT CHANGE UP (ref 150–400)
PLATELET # BLD AUTO: 198 K/UL — SIGNIFICANT CHANGE UP (ref 150–400)
POTASSIUM SERPL-MCNC: 3.6 MMOL/L — SIGNIFICANT CHANGE UP (ref 3.5–5.3)
POTASSIUM SERPL-SCNC: 3.6 MMOL/L — SIGNIFICANT CHANGE UP (ref 3.5–5.3)
PROT SERPL-MCNC: 5.9 G/DL — LOW (ref 6–8.3)
PROTHROM AB SERPL-ACNC: 13.6 SEC — HIGH (ref 9.9–13.4)
RBC # BLD: 3.53 M/UL — LOW (ref 3.8–5.2)
RBC # BLD: 3.58 M/UL — LOW (ref 3.8–5.2)
RBC # FLD: 12 % — SIGNIFICANT CHANGE UP (ref 10.3–14.5)
RBC # FLD: 12.2 % — SIGNIFICANT CHANGE UP (ref 10.3–14.5)
SODIUM SERPL-SCNC: 135 MMOL/L — SIGNIFICANT CHANGE UP (ref 135–145)
WBC # BLD: 4.78 K/UL — SIGNIFICANT CHANGE UP (ref 3.8–10.5)
WBC # BLD: 4.86 K/UL — SIGNIFICANT CHANGE UP (ref 3.8–10.5)
WBC # FLD AUTO: 4.78 K/UL — SIGNIFICANT CHANGE UP (ref 3.8–10.5)
WBC # FLD AUTO: 4.86 K/UL — SIGNIFICANT CHANGE UP (ref 3.8–10.5)

## 2025-03-21 PROCEDURE — 99233 SBSQ HOSP IP/OBS HIGH 50: CPT

## 2025-03-21 PROCEDURE — 99232 SBSQ HOSP IP/OBS MODERATE 35: CPT

## 2025-03-21 RX ORDER — HEPARIN SODIUM 1000 [USP'U]/ML
5000 INJECTION INTRAVENOUS; SUBCUTANEOUS EVERY 6 HOURS
Refills: 0 | Status: DISCONTINUED | OUTPATIENT
Start: 2025-03-21 | End: 2025-03-25

## 2025-03-21 RX ORDER — HEPARIN SODIUM 1000 [USP'U]/ML
INJECTION INTRAVENOUS; SUBCUTANEOUS
Qty: 25000 | Refills: 0 | Status: DISCONTINUED | OUTPATIENT
Start: 2025-03-21 | End: 2025-03-25

## 2025-03-21 RX ORDER — CLONAZEPAM 0.5 MG/1
0.5 TABLET ORAL
Refills: 0 | Status: DISCONTINUED | OUTPATIENT
Start: 2025-03-21 | End: 2025-03-27

## 2025-03-21 RX ORDER — GABAPENTIN 400 MG/1
100 CAPSULE ORAL ONCE
Refills: 0 | Status: COMPLETED | OUTPATIENT
Start: 2025-03-21 | End: 2025-03-21

## 2025-03-21 RX ORDER — BACLOFEN 10 MG/20ML
5 INJECTION INTRATHECAL ONCE
Refills: 0 | Status: COMPLETED | OUTPATIENT
Start: 2025-03-21 | End: 2025-03-21

## 2025-03-21 RX ORDER — OLANZAPINE 10 MG/1
5 TABLET ORAL AT BEDTIME
Refills: 0 | Status: DISCONTINUED | OUTPATIENT
Start: 2025-03-21 | End: 2025-03-31

## 2025-03-21 RX ORDER — HEPARIN SODIUM 1000 [USP'U]/ML
2500 INJECTION INTRAVENOUS; SUBCUTANEOUS EVERY 6 HOURS
Refills: 0 | Status: DISCONTINUED | OUTPATIENT
Start: 2025-03-21 | End: 2025-03-25

## 2025-03-21 RX ADMIN — Medication 112 MICROGRAM(S): at 06:35

## 2025-03-21 RX ADMIN — HEPARIN SODIUM 1200 UNIT(S)/HR: 1000 INJECTION INTRAVENOUS; SUBCUTANEOUS at 12:13

## 2025-03-21 RX ADMIN — Medication 30 MILLIGRAM(S): at 22:53

## 2025-03-21 RX ADMIN — HEPARIN SODIUM UNIT(S)/HR: 1000 INJECTION INTRAVENOUS; SUBCUTANEOUS at 20:11

## 2025-03-21 RX ADMIN — OXYCODONE HYDROCHLORIDE 30 MILLIGRAM(S): 30 TABLET ORAL at 23:39

## 2025-03-21 RX ADMIN — GABAPENTIN 100 MILLIGRAM(S): 400 CAPSULE ORAL at 06:35

## 2025-03-21 RX ADMIN — OXYCODONE HYDROCHLORIDE 30 MILLIGRAM(S): 30 TABLET ORAL at 08:32

## 2025-03-21 RX ADMIN — Medication 3 MILLIGRAM(S): at 17:04

## 2025-03-21 RX ADMIN — BACLOFEN 5 MILLIGRAM(S): 10 INJECTION INTRATHECAL at 21:53

## 2025-03-21 RX ADMIN — OLANZAPINE 5 MILLIGRAM(S): 10 TABLET ORAL at 21:52

## 2025-03-21 RX ADMIN — Medication 1 GRAM(S): at 18:08

## 2025-03-21 RX ADMIN — BACLOFEN 5 MILLIGRAM(S): 10 INJECTION INTRATHECAL at 06:35

## 2025-03-21 RX ADMIN — Medication 3 MILLIGRAM(S): at 01:45

## 2025-03-21 RX ADMIN — Medication 30 MILLIGRAM(S): at 15:26

## 2025-03-21 RX ADMIN — Medication 30 MILLIGRAM(S): at 06:35

## 2025-03-21 RX ADMIN — HEPARIN SODIUM 1100 UNIT(S)/HR: 1000 INJECTION INTRAVENOUS; SUBCUTANEOUS at 08:07

## 2025-03-21 RX ADMIN — Medication 1 DOSE(S): at 21:53

## 2025-03-21 RX ADMIN — Medication 1 APPLICATION(S): at 12:15

## 2025-03-21 RX ADMIN — Medication 3 MILLIGRAM(S): at 17:35

## 2025-03-21 RX ADMIN — OXYCODONE HYDROCHLORIDE 30 MILLIGRAM(S): 30 TABLET ORAL at 16:00

## 2025-03-21 RX ADMIN — HEPARIN SODIUM 2500 UNIT(S): 1000 INJECTION INTRAVENOUS; SUBCUTANEOUS at 12:17

## 2025-03-21 RX ADMIN — Medication 3 MILLIGRAM(S): at 00:45

## 2025-03-21 RX ADMIN — Medication 1 GRAM(S): at 06:35

## 2025-03-21 RX ADMIN — NICOTINE POLACRILEX 1 PATCH: 4 GUM, CHEWING ORAL at 12:15

## 2025-03-21 RX ADMIN — HEPARIN SODIUM 1100 UNIT(S)/HR: 1000 INJECTION INTRAVENOUS; SUBCUTANEOUS at 05:33

## 2025-03-21 RX ADMIN — OXYCODONE HYDROCHLORIDE 30 MILLIGRAM(S): 30 TABLET ORAL at 22:39

## 2025-03-21 RX ADMIN — Medication 40 MILLIGRAM(S): at 06:35

## 2025-03-21 RX ADMIN — IPRATROPIUM BROMIDE AND ALBUTEROL SULFATE 3 MILLILITER(S): .5; 2.5 SOLUTION RESPIRATORY (INHALATION) at 03:17

## 2025-03-21 RX ADMIN — NICOTINE POLACRILEX 1 PATCH: 4 GUM, CHEWING ORAL at 12:30

## 2025-03-21 RX ADMIN — CLONAZEPAM 1 MILLIGRAM(S): 0.5 TABLET ORAL at 21:52

## 2025-03-21 RX ADMIN — OXYCODONE HYDROCHLORIDE 30 MILLIGRAM(S): 30 TABLET ORAL at 15:27

## 2025-03-21 RX ADMIN — GABAPENTIN 100 MILLIGRAM(S): 400 CAPSULE ORAL at 15:27

## 2025-03-21 RX ADMIN — OXYCODONE HYDROCHLORIDE 30 MILLIGRAM(S): 30 TABLET ORAL at 09:05

## 2025-03-21 RX ADMIN — COLCHICINE 0.6 MILLIGRAM(S): 0.6 TABLET, FILM COATED ORAL at 12:14

## 2025-03-21 RX ADMIN — Medication 30 MILLIGRAM(S): at 07:35

## 2025-03-21 RX ADMIN — HEPARIN SODIUM UNIT(S)/HR: 1000 INJECTION INTRAVENOUS; SUBCUTANEOUS at 19:02

## 2025-03-21 RX ADMIN — NICOTINE POLACRILEX 1 PATCH: 4 GUM, CHEWING ORAL at 07:52

## 2025-03-21 RX ADMIN — Medication 40 MILLIGRAM(S): at 18:08

## 2025-03-21 RX ADMIN — CLONAZEPAM 0.5 MILLIGRAM(S): 0.5 TABLET ORAL at 06:35

## 2025-03-21 RX ADMIN — GABAPENTIN 100 MILLIGRAM(S): 400 CAPSULE ORAL at 21:53

## 2025-03-21 RX ADMIN — IPRATROPIUM BROMIDE AND ALBUTEROL SULFATE 3 MILLILITER(S): .5; 2.5 SOLUTION RESPIRATORY (INHALATION) at 20:49

## 2025-03-21 RX ADMIN — Medication 30 MILLIGRAM(S): at 21:52

## 2025-03-21 RX ADMIN — Medication 1 SPRAY(S): at 21:53

## 2025-03-21 RX ADMIN — Medication 1 SPRAY(S): at 06:36

## 2025-03-21 RX ADMIN — CLONAZEPAM 0.5 MILLIGRAM(S): 0.5 TABLET ORAL at 17:04

## 2025-03-21 RX ADMIN — NICOTINE POLACRILEX 1 PATCH: 4 GUM, CHEWING ORAL at 18:38

## 2025-03-21 RX ADMIN — Medication 30 MILLIGRAM(S): at 16:00

## 2025-03-21 NOTE — DISCHARGE NOTE NURSING/CASE MANAGEMENT/SOCIAL WORK - PATIENT PORTAL LINK FT
You can access the FollowMyHealth Patient Portal offered by [facility] by registering at the following website: [email or web address]. By joining [facility]’s FollowMyHealth portal, you will also be able to view your health information using other applications (apps) compatible with our system.

## 2025-03-21 NOTE — BH CONSULTATION LIAISON PROGRESS NOTE - NSBHCHARTREVIEWLAB_PSY_A_CORE FT
LABS:                        11.1   4.39  )-----------( 218      ( 19 Mar 2025 06:10 )             34.6     03-19    140  |  98  |  10  ----------------------------<  119[H]  3.2[L]   |  33[H]  |  0.43[L]    Ca    9.0      19 Mar 2025 06:10  Phos  3.5     03-19  Mg     1.80     03-19    TPro  6.5  /  Alb  3.7  /  TBili  0.3  /  DBili  x   /  AST  14  /  ALT  7   /  AlkPhos  84  03-19    
  LABS:                        10.4   4.48  )-----------( 189      ( 20 Mar 2025 07:15 )             31.6     03-20    136  |  96[L]  |  8   ----------------------------<  103[H]  3.5   |  31  |  0.42[L]    Ca    8.7      20 Mar 2025 07:15  Phos  3.4     03-20  Mg     1.80     03-20    TPro  6.0  /  Alb  3.3  /  TBili  0.4  /  DBili  x   /  AST  17  /  ALT  7   /  AlkPhos  78  03-20    
  LABS:                        10.6   4.86  )-----------( 198      ( 21 Mar 2025 11:20 )             31.8     03-21    135  |  95[L]  |  8   ----------------------------<  96  3.6   |  30  |  0.48[L]    Ca    8.8      21 Mar 2025 06:16  Phos  3.3     03-21  Mg     1.70     03-21    TPro  5.9[L]  /  Alb  3.4  /  TBili  0.5  /  DBili  x   /  AST  16  /  ALT  8   /  AlkPhos  79  03-21    PT/INR - ( 20 Mar 2025 22:47 )   PT: 13.6 sec;   INR: 1.17 ratio         PTT - ( 21 Mar 2025 11:20 )  PTT:46.2 sec
  LABS:                        11.5   4.81  )-----------( 265      ( 17 Mar 2025 06:11 )             34.9     03-17    138  |  100  |  13  ----------------------------<  90  3.8   |  26  |  0.44[L]    Ca    8.8      17 Mar 2025 06:11  Phos  4.2     03-17  Mg     2.00     03-17    TPro  6.4  /  Alb  3.6  /  TBili  0.3  /  DBili  x   /  AST  16  /  ALT  7   /  AlkPhos  81  03-17    
  LABS:  cret                        10.9   4.21  )-----------( 204      ( 18 Mar 2025 07:06 )             33.0     03-18    138  |  100  |  13  ----------------------------<  95  3.5   |  29  |  0.45[L]    Ca    8.9      18 Mar 2025 07:06  Phos  4.1     03-18  Mg     1.90     03-18    TPro  5.8[L]  /  Alb  3.2[L]  /  TBili  0.2  /  DBili  x   /  AST  13  /  ALT  6   /  AlkPhos  74  03-18

## 2025-03-21 NOTE — PROGRESS NOTE ADULT - SUBJECTIVE AND OBJECTIVE BOX
Indication of Geriatrics and Palliative Medicine Services:  [X  ] Complex Medical Decision Making   [X  ] Symptom/Pain management     DNR on chart: No    INTERVAL EVENTS: Patient seen this AM, comfortable in no distress.       -------------------------------------------------------------------------------------------------------    PRESENT SYMPTOMS:     [ ]Unable to self-report - see [ ] CPOT [ ] PAINADS [ ] RDOS  Source if other than patient:  [ ]Family   [ ]Team     PAIN   1. Location- Left chest   2. Radiation-  Left arm, back   3. Quality- Sharp   4. Timing- Constant   5. Minimal acceptable level/pain goal- 4/10   6. Aggravating factors-   7. QOL impact- Severe     Dyspnea:                           [ ]Mild [ ]Moderate [ ]Severe  Anxiety:                             [ ]Mild [ ]Moderate [ ]Severe  Fatigue:                             [ ]Mild [ ]Moderate [ ]Severe  Nausea:                             [ x]Mild [ ]Moderate [ ]Severe  Loss of appetite:                [ ]Mild [x ]Moderate [ ]Severe  Constipation:                     [ ]Mild [ ]Moderate [ ]Severe  Other Symptoms:  [ ]All other review of systems negative     -------------------------------------------------------------------------------------------------------    I STOP: [id number]    Prescription Information      PDI Filter:    PDI	Current Rx	Drug Type	Rx Written	Rx Dispensed	Drug	Quantity	Days Supply	Prescriber Name	Prescriber KHOA #	Payment Method	Dispenser  A	Y	O	02/27/2025	03/08/2025	morphine sulf er 30 mg tablet	60	30	Guru Crowe MD	[id number]	Medicare	[id number]  A	Y	O	02/27/2025	03/08/2025	oxycodone hcl (ir) 20 mg tab	90	30	Sebastien Guru ROSS	[id number]	Medicare	[id number]  A	N	O	01/30/2025	02/06/2025	morphine sulf er 30 mg tablet	60	30	Sebastien Guru ROSS	[id number]	Medicare	[id number]  A	N	O	01/30/2025	02/06/2025	oxycodone hcl (ir) 20 mg tab	90	30	Sebastien Guru ROSS	[id number]	Medicare	[id number]  A	N	O	01/07/2025	01/08/2025	morphine sulf er 30 mg tablet	60	30	Sebastien, Guru ROSS	[id number]	Medicare	[id number]  A	N	O	01/07/2025	01/08/2025	oxycodone hcl (ir) 20 mg tab	90	30	Sebastien Guru ROSS	[id number]	Medicare	[id number]  A	N	O	11/27/2024	12/06/2024	morphine sulf er 30 mg tablet	60	30	Sebastien, Guru ROSS	[id number]	Medicare	[id number]  A	N	O	11/27/2024	12/06/2024	oxycodone hcl (ir) 20 mg tab	90	30	Sebastien Guru ROSS	[id number]	Medicare	[id number]  A	N	O	10/31/2024	11/08/2024	morphine sulf er 30 mg tablet	60	30	Sebastien Guru ROSS	[id number]	Medicare	[id number]  A	N	O	10/31/2024	11/08/2024	oxycodone hcl (ir) 20 mg tab	90	30	Sebastien Guru ROSS	[id number]	Medicare	[id number]  A	N	O	10/03/2024	10/09/2024	oxycodone hcl (ir) 20 mg tab	90	30	Sebastien Guru ROSS	[id number]	Medicare	[id number]    -------------------------------------------------------------------------------------------------------    ITEMS UNCHECKED ARE NOT PRESENT    PHYSICAL:  Vital Signs Last 24 Hrs  T(C): 36.7 (21 Mar 2025 11:55), Max: 36.8 (21 Mar 2025 00:53)  T(F): 98.1 (21 Mar 2025 11:55), Max: 98.2 (21 Mar 2025 00:53)  HR: 81 (21 Mar 2025 11:55) (71 - 90)  BP: 103/64 (21 Mar 2025 11:55) (103/64 - 127/72)  BP(mean): --  RR: 17 (21 Mar 2025 11:55) (17 - 18)  SpO2: 96% (21 Mar 2025 11:55) (95% - 98%)    Parameters below as of 21 Mar 2025 11:55  Patient On (Oxygen Delivery Method): nasal cannula  O2 Flow (L/min): 2      GENERAL:  [ ]Cachexia  [ ] Frail  [X ]Awake  [X ]Oriented   [ ]Lethargic  [ ]Unarousable  [ ]Verbal  [ ]Non-Verbal    BEHAVIORAL:   [ ] Anxiety  [ ] Delirium [ ] Agitation [ ] Other    HEENT:   [X ]Normal   [ ]Dry mouth   [ ]ET Tube/Trach  [ ]Oral lesions    PULMONARY:   [X ]Clear              [ ]Tachypnea  [ ]Audible excessive secretions   [ ]Rhonchi        [ ]Right [ ]Left [ ]Bilateral  [ ]Crackles        [ ]Right [ ]Left [ ]Bilateral  [ ]Wheezing     [ ]Right [ ]Left [ ]Bilateral  [ ]Diminished breath sounds [ ]right [ ]left [ ]bilateral    CARDIOVASCULAR:    [X ]Regular [ ]Irregular [ ]Tachy  [ ]Ridge [ ]Murmur [ ]Other    GASTROINTESTINAL:  [X ]Soft  [ ]Distended   [X ]+BS  [X ]Non tender [ ]Tender  [ ]Other [ ]PEG [ ]OGT/ NGT      GENITOURINARY:  [X ]Normal [ ] Incontinent   [ ]Oliguria/Anuria   [ ]Camargo    MUSCULOSKELETAL:   [X ]Normal   [ ]Weakness  [ ]Bed/Wheelchair bound [ ]Edema    NEUROLOGIC:   [X ]No focal deficits  [ ]Cognitive impairment  [ ]Dysphagia [ ]Dysarthria [ ]Paresis [ ]Other     SKIN:   [X ]Normal  [ ]Rash  [ ]Other  [ ]Pressure ulcer(s)       Present on admission [ ]y [ ]n    -------------------------------------------------------------------------------------------------------    LABS:                                   10.9   4.21  )-----------( 204      ( 18 Mar 2025 07:06 )             33.0     03-18    138  |  100  |  13  ----------------------------<  95  3.5   |  29  |  0.45[L]    Ca    8.9      18 Mar 2025 07:06  Phos  4.1     03-18  Mg     1.90     03-18    TPro  5.8[L]  /  Alb  3.2[L]  /  TBili  0.2  /  DBili  x   /  AST  13  /  ALT  6   /  AlkPhos  74  03-18      -------------------------------------------------------------------------------------------------------    CRITICAL CARE:  [ ]Shock Present  [ ]Septic [ ]Cardiogenic [ ]Neurologic [ ]Hypovolemic [ ]Undifferentiated    [ ]Vasopressors [ ]Inotropes    [ ]Respiratory failure present [ ]Acute  [ ]Chronic [ ]Hypoxic  [ ]Hypercarbic [ ]Mixed   [ ]Mechanical Ventilation  [ ]Trach collar   [ ]Non-invasive ventilatory support   [ ]High-Flow   [ ]Oxygen mask/venti     [ ]Other organ failure     -------------------------------------------------------------------------------------------------------    RADIOLOGY & ADDITIONAL STUDIES:     < from: Xray Chest 1 View-PORTABLE IMMEDIATE (Xray Chest 1 View-PORTABLE IMMEDIATE .) (03.12.25 @ 13:48) >    IMPRESSION:  Bilateral upper lobe opacities with 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  No acute pulmonary or cardiovascular disease.    < end of copied text >    -------------------------------------------------------------------------------------------------------  MEDICATIONS:     MEDICATIONS  (STANDING):  albuterol/ipratropium for Nebulization 3 milliLiter(s) Nebulizer every 6 hours  baclofen 5 milliGRAM(s) Oral every 8 hours  baclofen 5 milliGRAM(s) Oral once  chlorhexidine 2% Cloths 1 Application(s) Topical daily  clonazePAM  Tablet 1 milliGRAM(s) Oral at bedtime  clonazePAM  Tablet 0.5 milliGRAM(s) Oral <User Schedule>  colchicine 0.6 milliGRAM(s) Oral daily  fluticasone propionate/ salmeterol 100-50 MICROgram(s) Diskus 1 Dose(s) Inhalation two times a day  gabapentin 100 milliGRAM(s) Oral three times a day  gabapentin 100 milliGRAM(s) Oral once  heparin  Infusion.  Unit(s)/Hr (11 mL/Hr) IV Continuous <Continuous>  influenza  Vaccine (HIGH DOSE) 0.5 milliLiter(s) IntraMuscular once  levothyroxine 112 MICROGram(s) Oral daily  lidocaine   4% Patch 1 Patch Transdermal daily  lidocaine   4% Patch 1 Patch Transdermal every 24 hours  lidocaine   4% Patch 1 Patch Transdermal every 24 hours  methylnaltrexone Injectable 12 milliGRAM(s) SubCutaneous once  morphine ER Tablet 30 milliGRAM(s) Oral every 8 hours  naloxegol 25 milliGRAM(s) Oral daily  nicotine - 21 mG/24Hr(s) Patch 1 Patch Transdermal daily  OLANZapine 5 milliGRAM(s) Oral at bedtime  pantoprazole    Tablet 40 milliGRAM(s) Oral every 12 hours  polyethylene glycol 3350 17 Gram(s) Oral daily  senna 2 Tablet(s) Oral at bedtime  sodium chloride 1 Gram(s) Oral two times a day  sodium chloride 0.65% Nasal 1 Spray(s) Both Nostrils three times a day    MEDICATIONS  (PRN):  aluminum hydroxide/magnesium hydroxide/simethicone Suspension 30 milliLiter(s) Oral every 4 hours PRN Dyspepsia  benzocaine/menthol Lozenge 1 Lozenge Oral three times a day PRN Sore Throat  heparin   Injectable 5000 Unit(s) IV Push every 6 hours PRN For aPTT less than 40  heparin   Injectable 2500 Unit(s) IV Push every 6 hours PRN For aPTT between 40 - 57  HYDROmorphone  Injectable 3 milliGRAM(s) IV Push every 4 hours PRN severe breakthrough pain  LORazepam     Tablet 1 milliGRAM(s) Oral every 8 hours PRN Anxiety  melatonin 3 milliGRAM(s) Oral at bedtime PRN Insomnia  ondansetron Injectable 4 milliGRAM(s) IV Push every 6 hours PRN Nausea and/or Vomiting  oxyCODONE    IR 20 milliGRAM(s) Oral every 4 hours PRN Moderate Pain (4 - 6)  oxyCODONE    IR 30 milliGRAM(s) Oral every 4 hours PRN Severe Pain (7 - 10)

## 2025-03-21 NOTE — PROGRESS NOTE ADULT - ASSESSMENT
65 yo woman, former smoker (47py; quit 12/2024) with h/o pAfib (on Eliquis), Hypothyroidism, ? h/o Sleroderma, h/o pericardial effusion s/p pericardiocentesis (dx in 12/2024; ? viral vs inflammatory, cytology negative for malignant cells, on Colchicine), Asthma/suspected COPD, and recently-dx SCLCa > dx in 2/2025, staging javier not yet completed and pt is tx naive and referred to [facility] from Med Onc clinic for expedited onc workup and urgent initiation of inpatient chemotherapy.    ACTIVE PROBLEMS  Recently dx SCLCa  GOC discussion, counseling  Pericardial effusion   Anxiety  Acute hypoxic resp failure  h/o Asthma/COPD (former smoker)  Hyponatremia 2/2 SIADH  Cancer related pain, anxiety  Constipation  Hypothyroidism  pAfib  Hypercoag state  Severe prot yahir malnutrtion  Immunocompromised 2/2 cancer, autoimmune disease    Recently dx SCLCa  GOC discussion, counseling  Staging w/u in progress   * Pt unable to tolerate MRI Brain, A/P (even with anesthesia- Versed), due to anxiety/claustrophobia   * NC CTH, A/P without overt evidence of mets   * TTE showing RV thickening with pericardial effusion, c/f mets; w/u in progress with Thoracic Surgery as below  In GOC conversation on 3/13, pt expressed uncertainty about pursuing systemic cancer treatment, especially if her cancer is determined to be advanced  However, in f/u conversation on 3/19 pt indicated that she would like to pursue systemic (? inpt vs outpt)  Rad Onc also made aware of pt- she is being considered for a 3-week course of RT to the lung mass (pending staging javier and pt's decision on GOC)  Long-term prognosis: gaurded; pt is full code    Pericardial effusion   Dx in 12/2024 (previously thought to be ? autoimmune vs viral with positive Coxsackie B titers in 12/2024)  12/23 pericardial fluid cytology negative for malignant cells  3/12 TTE with small to mod pericardial effusion, without tamponade physiology  3/20 TTE with RV thickening and mod multifocal pericardial effusion, c/f mets by appearance  3/20 NC CT chest stable effusion (compared to 3/18 but enlarging compared to 2/15) with enlarging L suprahilar mass  Appreciate Thoracic Surgery consult: no role for pericardiocentesis vs pericardial window at this time (continuing heparin gtt in lieu of Eliquis in case emergent procedure becomes necessary)  Continuing Colchicine 0.6mg daily    Anxiety/depression  Not currently controlled, pt having regular panic attacks and nightmares (grieving the recent loss of her son)  BHT continues to follow closely  Continuing Klonopin 1mg BID  Continuing Ativan PO/IV/IM 1mg q8  Continuing Zyprexa 5mg nightly  1:1 Observation not required at this time    Acute hypoxic resp failure  h/o Asthma/COPD (former smoker)  Pt desats to mid 80s% on RA and requires 4-6L NC supplemental O2 to maintain O2 sats > 92%  Likely due to burden of disease in lungs + pericardial effusion  Continuing supplemental O2 with weaning as tolerated and supportive resp care prn  Continuing Advair 100-50mcg MDI BID  Continuing duonebs q6/prn   Continuing Nicotine patch 21mg daily (6 weeks)  Mgmt of pericardial effusion as above    Hyponatremia 2/2 SIADH  Na normalized/stable at 13; pt is asymptomatic  SIADH confirmed by 3/12 urine lytes  1L fluid restriction dced  on 3/20  Continuing Salt tabs 1gm BID    Cancer related pain  Continuing Morphine ER 30mg q12  Continuing Oxy IR 20mg q8/prn  Continuing Gabapentin 100mg TID  Continuing Baclofen 5mg q8/prn    Constipation  Improved  Possibly due to opioids +/- AID  3/12 AXR negative for ileus/obstruction  Continuing bowel regimen (Miralax, Senna) for OIC prevention    Hypothyroidism: 3/12 TFTs wnl; continuing LT4 112mcg daily    pAfib  Hypercoag state  Eliquis transitioned to heparin gtt in anticipation of possible pericardial procedure as noted above    Severe prot yahir malnutrtion: appreciate RD eval; continuing regular diet

## 2025-03-21 NOTE — PROGRESS NOTE ADULT - ASSESSMENT
63 y/o F with PMHx of AF, scleroderma, asthma with prior intubations, hypothyroidism, pericardial effusion s/p pericardiocentesis (12/2024) and drain on colchicine presented to the ED due to SOB, chest pain, and back pain. Oncology evaluated her and recommended MRI brain and MRI chest/abdomen/pelvis with contrast to rule out metastasis. Was previously admitted in 12/2024 due to chest pain and SOB and found to have a pericardial effusion with evidence of tamponade physiology. She underwent urgent pericardiocentesis with placement of drain which was removed on 12/26. She was started on colchicine and NSAIDs. Hospital course was complicated by AF with RVR which was new onset and was suspected to be due to drain placement. After drain was removed patient continued to have pAF and was started on Eliquis. Presented to the ED on 02/13/25 for left sided chest pain radiating to the back. Repeat TTE demonstrated interval increase in pericardial effusion (moderate-large adjacent to RA with no evidence of tamponade) and CTS was consulted for possible pericardial window and recommended no acute intervention. 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 Pulmonary consulted and recommended transfer to [facility] for bronchoscopy, EBUS and hilar mass biopsy. Patient transferred to [facility] on 02/20/25 and underwent EBUS on 02/21/25. 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

## 2025-03-21 NOTE — BH CONSULTATION LIAISON PROGRESS NOTE - NSBHASSESSMENTFT_PSY_ALL_CORE
63 /yo F w/ longstanding PPHx of anxiety previously on xanax, remote hx of inpatient psych hospitalization for suicidality, PMHx scleroderma with chronic pain manifestations, recent admission for cardiac tamponade, admitted for workup of SOB/chest pain leading to SCLC diagnosis, psychiatry consulted for worsening of anxiety I/s/o multiple medical complications and her son's recent death. Pt likely with multiple comorbid psychiatric disorders including SOURAV, MDD, claustrophobia, most prominent disorder at this time is worsening of anxiety, with panic-like symptoms. Given guarded medical prognosis, recent social stressors, appropriate to treat severe anxiety with standing benzodiazepine regimen at this time. Pt not amenable to starting SSRI treatment, but if becomes amenable may benefit from this as well.     3/15: Reports improved anxiety. No reported changes in breathing. Appears to be tolerating Klonopin. Will continue.  3/17: Endorses decreased panic attacks and no use of PRN ativan over this interval. C/w klonopin, recommend ongoing GOC discussions with pt and her HCP  3/18: Unable to tolerate MRI over this interval, unclear reason. DEC klonopin to 0.5 mg qAM and 1 mg qHS due to pt complaint of sleepiness. Can give extra klonopin 0.5 mg wafer PRN prior to MRI, or ativan 1 mg IV x1 prior to MRI. If pt continues to refuse MRI, would reevaluate pt's capacity to refuse which may be impacted by her severe anxiety, and would involve her HCP.   3/19: Pt calmer over this interval, can continue with klonopin 0.5 mg qD and klonopin 1 mg qHS. Denies SI/HI.   3/20: Pt more dysregulated over this interval. STOP remeron at night and START zyprexa 5 mg qHS for control of mood and to augment pain regimen. INC klonopin to 0.5 mg at 9 AM, 0.5 mg at 1PM, and 1 mg qHS.  3/21: Calmer over this interval. Continue meds, would hold if pt appears sedated.     PLAN  - defer obs status to primary team   - Start Zyprexa 5mg HS  - STOP Remeron  - c/w klonopin 1 mg BID standing for treatment of anxiety  - ativan 1 mg PO/IV/IM q8h for breakthrough severe anxiety  - please monitor respiratory status closely given pt is also on baclofen and standing oxycodone, with limited baseline pulmonary reserve. HOLD benzodiazepines if concerned for oversedation.   - Dispo: has no psych barriers to discharge when med cleared; we support/advocate for a transition to an outpt oncological level of care. 63 /yo F w/ longstanding PPHx of anxiety previously on xanax, remote hx of inpatient psych hospitalization for suicidality, PMHx scleroderma with chronic pain manifestations, recent admission for cardiac tamponade, admitted for workup of SOB/chest pain leading to SCLC diagnosis, psychiatry consulted for worsening of anxiety I/s/o multiple medical complications and her son's recent death. Pt likely with multiple comorbid psychiatric disorders including SOURAV, MDD, claustrophobia, most prominent disorder at this time is worsening of anxiety, with panic-like symptoms. Given guarded medical prognosis, recent social stressors, appropriate to treat severe anxiety with standing benzodiazepine regimen at this time. Pt not amenable to starting SSRI treatment, but if becomes amenable may benefit from this as well.     3/15: Reports improved anxiety. No reported changes in breathing. Appears to be tolerating Klonopin. Will continue.  3/17: Endorses decreased panic attacks and no use of PRN ativan over this interval. C/w klonopin, recommend ongoing GOC discussions with pt and her HCP  3/18: Unable to tolerate MRI over this interval, unclear reason. DEC klonopin to 0.5 mg qAM and 1 mg qHS due to pt complaint of sleepiness. Can give extra klonopin 0.5 mg wafer PRN prior to MRI, or ativan 1 mg IV x1 prior to MRI. If pt continues to refuse MRI, would reevaluate pt's capacity to refuse which may be impacted by her severe anxiety, and would involve her HCP.   3/19: Pt calmer over this interval, can continue with klonopin 0.5 mg qD and klonopin 1 mg qHS. Denies SI/HI.   3/20: Pt more dysregulated over this interval. STOP remeron at night and START zyprexa 5 mg qHS for control of mood and to augment pain regimen. INC klonopin to 0.5 mg at 9 AM, 0.5 mg at 1PM, and 1 mg qHS.  3/21: Calmer over this interval. Continue meds, would hold if pt appears sedated.     PLAN  - defer obs status to primary team   - C/W Zyprexa 5mg HS  - STOP Remeron  - c/w klonopin 1 mg BID standing for treatment of anxiety  - ativan 1 mg PO/IV/IM q8h for breakthrough severe anxiety  - please monitor respiratory status closely given pt is also on baclofen and standing oxycodone, with limited baseline pulmonary reserve. HOLD benzodiazepines if concerned for oversedation.   - Dispo: has no psych barriers to discharge when med cleared; we support/advocate for a transition to an outpt oncological level of care.

## 2025-03-21 NOTE — PROGRESS NOTE ADULT - PROBLEM SELECTOR PLAN 1
Chest pain from mediastinal mass   Patient with hx of diffuse pain related to her underlying scleroderma for which she shared at one point she was fentanyl 200 mcg patch and was weaned down  - Home regimen: MS Contin 30 mg BID and oxycodone 20 mg q4h PRN (long term dose) given by outpatient pain specialist originally for scleroderma pain  - PRN use in past 24 hours from 8 AM to 8 AM: oxycodone 30 mg x 1 dose, IV dilaudid 3 mg x 2 doses   - Patient now mainly complains of non cancer pain, concern more severe anxiety rather than physical pain. Improves with haldol     Plan and Recommendations:   - opioids:   > c/w oxycodone  20 mg q4h PRN for moderate pain and oxycodone IR 30 mg q4h prn for severe pain  > c/w IV dilaudid 3 mg q4h PRN for severe breakthrough pain  > c/w MS Contin 30 mg TID   - Bowel regimen  - Holistic therapy  - Patient concerned about how she will receive Rx for opioids outpatient. Explained that will be determined by her goals. If she chooses to pursue treatment, she can f/u with supportive oncology. If she chooses no treatment, then she can receive hospice care. Otherwise patient is free to return to her prior pain doctor

## 2025-03-21 NOTE — PROGRESS NOTE ADULT - PROBLEM SELECTOR PLAN 6
- 3/12- See GOC. Patient appointed and completed HCP, named long time friend Sam Oswald.  - 3/13: patient had further conversation with primary team and requested a copy of molst to review; expressing concerns about her ability to tolerate cancer tx with underlying scleroderma and overall QOL.  - 3/20- See GOC. Discussed treatment options, code status.

## 2025-03-21 NOTE — BH CONSULTATION LIAISON PROGRESS NOTE - NSBHCONSULTFOLLOWAFTERCARE_PSY_A_CORE FT
Would benefit from follow up with [facility] vs treatment in outpatient psych-onc clinic

## 2025-03-21 NOTE — PROGRESS NOTE ADULT - SUBJECTIVE AND OBJECTIVE BOX
SOLID TUMOR ONCOLOGY HOSPITALIST PROGRESS NOTE    S: No acute events overnight.  Pt was drowsy/lethargic but arousable this am; she admitted that she didn't sleep well the night before.  She indicated that she was hoping to be able to go home for a day so that she can "settle her affairs" with a  and a notary as she wants to make sure the state doesn't take her assets when she passes.  However, pt experienced another panic attack this afternoon after waking up from her nap.  Pt reported that she last had a bm ~2 days ago.    CURRENT MEDICATIONS  MEDICATIONS  (STANDING):  albuterol/ipratropium for Nebulization 3 milliLiter(s) Nebulizer every 6 hours  baclofen 5 milliGRAM(s) Oral every 8 hours  chlorhexidine 2% Cloths 1 Application(s) Topical daily  clonazePAM  Tablet 1 milliGRAM(s) Oral at bedtime  clonazePAM  Tablet 0.5 milliGRAM(s) Oral <User Schedule>  colchicine 0.6 milliGRAM(s) Oral daily  fluticasone propionate/ salmeterol 100-50 MICROgram(s) Diskus 1 Dose(s) Inhalation two times a day  gabapentin 100 milliGRAM(s) Oral three times a day  heparin  Infusion.  Unit(s)/Hr (11 mL/Hr) IV Continuous <Continuous>  influenza  Vaccine (HIGH DOSE) 0.5 milliLiter(s) IntraMuscular once  levothyroxine 112 MICROGram(s) Oral daily  methylnaltrexone Injectable 12 milliGRAM(s) SubCutaneous once  morphine ER Tablet 30 milliGRAM(s) Oral every 8 hours  naloxegol 25 milliGRAM(s) Oral daily  nicotine - 21 mG/24Hr(s) Patch 1 Patch Transdermal daily  OLANZapine 5 milliGRAM(s) Oral at bedtime  pantoprazole    Tablet 40 milliGRAM(s) Oral every 12 hours  polyethylene glycol 3350 17 Gram(s) Oral daily  senna 2 Tablet(s) Oral at bedtime  sodium chloride 1 Gram(s) Oral two times a day  sodium chloride 0.65% Nasal 1 Spray(s) Both Nostrils three times a day  MEDICATIONS  (PRN):  aluminum hydroxide/magnesium hydroxide/simethicone Suspension 30 milliLiter(s) Oral every 4 hours PRN Dyspepsia  benzocaine/menthol Lozenge 1 Lozenge Oral three times a day PRN Sore Throat  heparin   Injectable 5000 Unit(s) IV Push every 6 hours PRN For aPTT less than 40  heparin   Injectable 2500 Unit(s) IV Push every 6 hours PRN For aPTT between 40 - 57  HYDROmorphone  Injectable 3 milliGRAM(s) IV Push every 4 hours PRN severe breakthrough pain  LORazepam     Tablet 1 milliGRAM(s) Oral every 8 hours PRN Anxiety  melatonin 3 milliGRAM(s) Oral at bedtime PRN Insomnia  ondansetron Injectable 4 milliGRAM(s) IV Push every 6 hours PRN Nausea and/or Vomiting  oxyCODONE    IR 20 milliGRAM(s) Oral every 4 hours PRN Moderate Pain (4 - 6)  oxyCODONE    IR 30 milliGRAM(s) Oral every 4 hours PRN Severe Pain (7 - 10)      PHYSICAL EXAM  T(C): 36.7 (03-21-25 @ 11:55), Max: 36.8 (03-21-25 @ 00:53)  HR: 81 (03-21-25 @ 20:50) (71 - 90)  BP: 103/71 (03-21-25 @ 15:30) (103/64 - 127/72)  RR: 17 (03-21-25 @ 11:55) (17 - 18)  SpO2: 96% (03-21-25 @ 20:50) (95% - 98%)    03-20-25 @ 07:01  -  03-21-25 @ 07:00  --------------------------------------------------------  IN: 150 mL / OUT: 0 mL / NET: 150 mL    03-21-25 @ 07:01  -  03-21-25 @ 20:58  --------------------------------------------------------  IN: 439 mL / OUT: 0 mL / NET: 439 mL  Non-toxic-appearing woman, crying hysterically in bed  Answering all questions appropriately (conversational dyspnea resolved), following commands  Anicteric sclera, no oral lesions/thrush  RRR, no m/r/g, heart sounds faint  Breaths somewhat labored, but not tachypnea; trace RLB crackles, no w/r  Abd soft/nt/nd, hypoactive bowel sounds  No peripheral edema; DIP/PIP, ankle joint deformities unchanged  CN 2-12 grossly intact; no gross focal neuro deficits; pt moves all extremities spontaneously        LABS                        10.6   4.86  )-----------( 198      ( 21 Mar 2025 11:20 )             31.8     03-21    135  |  95[L]  |  8   ----------------------------<  96  3.6   |  30  |  0.48[L]    Ca    8.8      21 Mar 2025 06:16  Phos  3.3     03-21  Mg     1.70     03-21    TPro  5.9[L]  /  Alb  3.4  /  TBili  0.5  /  DBili  x   /  AST  16  /  ALT  8   /  AlkPhos  79  03-21    PT/INR - ( 20 Mar 2025 22:47 )   PT: 13.6 sec;   INR: 1.17 ratio    PTT - ( 21 Mar 2025 16:00 )  PTT:57.9 sec      ADDITIONAL LABS  Dylan 98  Uosm 632  uric acid 2.4    PATHOLOGY  2/21 LN biopsies  1. LUNG, LEFT UPPER LOBE, ENDOBRONCHIAL FORCEPS BIOPSY AND TRANSBRONCHIAL   FNA   POSITIVE FOR MALIGNANT CELLS.   Small cell carcinoma.   Touch Imprint slide, cell block and core biopsy show a cellular specimen   composed of groups and numerous single-lying hyperchromatic cells with   irregular nuclear membranes, nuclear molding and scanty cytoplasm. Crush   artifact and mitotic figures present.   Immunocytochemical studies : The neoplastic cells are positive for Cam   5.2.hrmogranin, synaptophysin, INSM-1, TTF-1 while negative for CD45 and   P40. Ki-67 reveal a proliferation index of 90%.   Case discussed at the daily cytopathology  conference on   03/04/2025.   Case reported to Tumor Registry.   Dr. Webster was informed of the diagnosis via encrypted email on 03/04/2025.   2. LYMPH NODE, LEVEL 7, EBUS-GUIDED FNA   SUSPICIOUS FOR MALIGNANCY.   Suspicious for small cell carcinoma.   Cytology smears and cell block display a hypocellular specimen composed   of rare groups and few single-lying hyperchromatic cells with irregular   nuclear membranes, nuclear molding and scanty cytoplasm with crush   artifact, in a background of rare lymphocytes.   Case discussed at the daily cytopathology  conference on   03/04/2025.   3. LYMPH NODE, 4L, EBUS-GUIDED FNA   POSITIVE FOR MALIGNANT CELLS.   S mall cell carcinoma.   Cytology smears and cell block show a cellular specimen composed of   groups and single-lying hyperchromatic cells with irregular nuclear   membranes, nuclear molding and scanty cytoplasm, in a background of rare   lymphocytes.   Immunocytochemical studies: The neoplastic cells are positive for Cam   5.2, Chromogranin, synaptophysin, INSM-1, TTF-1 while negative for CD45   and P40.Ki-67 reveals a proliferative index of 90%.   In summary the cytomorphology and immunophenotype are consistent with   small cell carcinoma.   4. LUNG, LEFT UPPER LOBE, BRONCHOALVEOLAR LAVAGE   ATYPICAL FINDINGS   Cytology slide and cell block show hyperchromatic groups of poorly   preserved cells among reactive ciliated bronchial cells and alveolar   macrophages.   12/20/24 pericardial fluid cytology negative for malignant cells      MICROBIOLOGY  Abx: none on this admission    Cx Data  None new on this admission.  12/23 Coxsackie B titers positive 1:100-1:1000  12/23 Coxsackie A titers negative  12/23 EBV IgG positive  12/23 viral hep panel non-reactive  12/22 Quant Gold negative      PERTINENT RADIOLOGY  3/20 NC CT chest  1.  Moderate-sized pericardial effusion appears unchanged compared to   partially imaged heart on 3/18/2025 but increase insize since 2/15/2025.  2.  Interval increase in size of previously described left suprahilar   mediastinal mass which extends into the AP window and paramediastinal   left upper lobe.  3.  Some of the other left upper lobe nodules are decrease and some   increase.  4.  Unchanged sclerotic lesions within the sternum and T12 vertebral body.  3/20 TTE   1. Limited study to re-evaluate pericardial effusion.   2. There is a moderate pericardial effusion noted adjacent to the posterior left ventricle, a moderate pericardial effusion noted adjacent to the right atrium, a small pericardial effusion noted adjacent to the apex, a moderate pericardial effusion noted adjacent to the lateral left ventricle and a moderate pericardial effusion noted adjacent to the right ventricle with no echocardiographic evidence of tamponade physiology. Moderate pericardial effusion, measuring ~ 1.1 cm posterior to the left ventricle, ~ 1.0 cm lateral to the left ventricle, ~ 1.4 cm adjacent to the RV free wall, and ~ 1.4 cm superior to the right atrium. Small pericardial effusion anterior to the right ventricle and adjacent to the apex. The pericardium adjacent to the RV free wall appears markedly thickened and may reflect the presence of thrombus, inflammatory material, and/or metastatic disease. No RA or RV diastolic collapse seen. However, the inferior vena cava (IVC) displays reduced respirophasic variability in its caliber, consistent with elevated right atrial pressure.   3. The inferior vena cava is normal in size measuring 1.30 cm in diameter, (normal <2.1cm) with abnormal inspiratory collapse (abnormal <50%) consistent with mildly elevated right atrial pressure (~8, range 5-10mmHg).   4. Compared to the transthoracic echocardiogram performed on 3/12/2025, the pericardial effusion has increased slightly in size.  3/18 CT abd/pelv: No acute pathology within the abdomen and pelvis. Partially imaged pericardial effusion. Bilateral trace pleural effusions with adjacent atelectasis. Again demonstrated a T12 vertebral body hemangioma. Degenerative changes of the spine  3/18 CT head: No hydrocephalus, acute intracranial hemorrhage, or mass effect. No compelling evidence of intracranial metastasis on this non-contrast exam.  3/12 CXR: Bilateral upper lobe opacities with 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  No acute pulmonary or cardiovascular disease.  3/12 AXR: There is a nonobstructive gas pattern.  Large stool burden.  No masses or pathologic calcifications.  Surgical clips in the right upper quadrant.  Visualized lungs are clear.  3/12 TTE   1. Left ventricular systolic function is normal with an ejection fraction visually estimated at 65 to 70 %.   2. Small pericardial effusion noted adjacent to the posterior left ventricle, moderate pericardial effusion noted adjacentto the right atrium, moderate pericardial effusion noted adjacent to the anterior right ventricle and moderate pericardial effusion noted adjacent to the apex with no echocardiographic evidence of tamponade physiology.   3. The inferior vena cava is normal in size measuring 1.76 cm in diameter, (normal <2.1cm) with normal inspiratory collapse (normal >50%) consistent with normal right atrial pressure (~3, range 0-5mmHg).   4. No prior echocardiogram is available for comparison.  2/21 CXR: No complications post bronchoscopy.  Increased left upper lobe opacity (pneumonia?)  2/15 NC CT chest: Small circumferential pericardial effusion, mildly increased on today's   study when compared to the prior. Left suprahilar mediastinal mass in the prevascular space, extending into the aortopulmonary window, mediastinal adenopathy, appears more   pronounced on today's study though resolution is limited without   intravenous contrast. There is a new peripheral ill-defined 2.4 x 2.2 cm opacity in the left   upper lobe, differential includes infection/inflammation/neoplasm.  Dilated ascending aorta measuring up to 4 cm, similar to the prior study.  Consider contrast-enhanced CT of the chest if patient is able to tolerate   intravenous contrast.  2/15 B/L LE dopplers: negative for DVT.

## 2025-03-21 NOTE — BH CONSULTATION LIAISON PROGRESS NOTE - NSBHFUPINTERVALHXFT_PSY_A_CORE
Patient seen and evaluated, staff consulted, chart, labs, meds reviewed. Denies pain episodes over this interval. Now agreeable to pursue chemotherapy. Denies panic attacks over this interval, notes she is able to distract herself when feeling more anxious. Appears mildly more sedated on zyprexa but arousable to verbal stimuli. Denies SI/HI.

## 2025-03-21 NOTE — DISCHARGE NOTE NURSING/CASE MANAGEMENT/SOCIAL WORK - FINANCIAL ASSISTANCE
[facility] provides services at a reduced cost to those who are determined to be eligible through [facility]’s financial assistance program. Information regarding [facility]’s financial assistance program can be found by going to [email or web address] or by calling [phone].

## 2025-03-21 NOTE — PROGRESS NOTE ADULT - PROBLEM SELECTOR PLAN 4
- recently diagnosed  - Unable to tolerate MRI A/P and brain d/t claustrophobia    - CT chest on 2/15- MEDIASTINUM AND JEREMIAS: Anterior subcarinal lymph node measures 1.8 cm in short axis, stable. There is a soft tissue density, measuring up to 4.2 cm in the prevascular space extending into the subcutaneous left main pulmonary artery region, this is more pronounced on today's study.   - Per onc, plan is to start inpatient chemo treatment and RT- patient thinking about it   - CT imaging showing no mets   - Follows with Dr. Hall at [facility]  - pericardial effusion, pending CT surg recs

## 2025-03-21 NOTE — PROGRESS NOTE ADULT - PROBLEM SELECTOR PLAN 7
For pain management     In the event of worsening symptoms, please contact the Palliative Medicine team via pager (if the patient is at [facility] #[phone] or if the patient is at [facility] #[phone]) The Geriatric and Palliative Medicine service has coverage 24 hours a day/ 7 days a week to provide medical recommendations regarding symptom management needs via telephone.

## 2025-03-21 NOTE — CHART NOTE - NSCHARTNOTEFT_GEN_A_CORE
Pt seen with Dr Montgomery  Sitting at edge of bed. No obvious distress.   C/o generalized pain. No current CP or SOB.   CT scan and echo reviewed by Dr. Montgomery  Pt without clinical signs of tamponade. No s/s of tamponade on Echo  No indication for drainage at this time.   Recommend continuing to hold Eliquis for now in case clinical status   changes and effusion worsens.   Monitor over the weekend.   Dr. Núñez to return on Monday and can review pt's case and scans.   Please call with any new events or worsening symptoms over the weekend.   Will follow.

## 2025-03-22 ENCOUNTER — RESULT REVIEW (OUTPATIENT)
Age: 65
End: 2025-03-22

## 2025-03-22 LAB
ALBUMIN SERPL ELPH-MCNC: 3.1 G/DL — LOW (ref 3.3–5)
ALP SERPL-CCNC: 75 U/L — SIGNIFICANT CHANGE UP (ref 40–120)
ALT FLD-CCNC: 7 U/L — SIGNIFICANT CHANGE UP (ref 4–33)
ANION GAP SERPL CALC-SCNC: 11 MMOL/L — SIGNIFICANT CHANGE UP (ref 7–14)
APTT BLD: 64.6 SEC — HIGH (ref 24.5–35.6)
APTT BLD: 65.8 SEC — HIGH (ref 24.5–35.6)
AST SERPL-CCNC: 14 U/L — SIGNIFICANT CHANGE UP (ref 4–32)
BILIRUB SERPL-MCNC: 0.3 MG/DL — SIGNIFICANT CHANGE UP (ref 0.2–1.2)
BUN SERPL-MCNC: 6 MG/DL — LOW (ref 7–23)
CALCIUM SERPL-MCNC: 8.6 MG/DL — SIGNIFICANT CHANGE UP (ref 8.4–10.5)
CHLORIDE SERPL-SCNC: 94 MMOL/L — LOW (ref 98–107)
CO2 SERPL-SCNC: 30 MMOL/L — SIGNIFICANT CHANGE UP (ref 22–31)
CREAT SERPL-MCNC: 0.41 MG/DL — LOW (ref 0.5–1.3)
CULTURE RESULTS: SIGNIFICANT CHANGE UP
EGFR: 108 ML/MIN/1.73M2 — SIGNIFICANT CHANGE UP
EGFR: 108 ML/MIN/1.73M2 — SIGNIFICANT CHANGE UP
GLUCOSE SERPL-MCNC: 120 MG/DL — HIGH (ref 70–99)
HCT VFR BLD CALC: 30.2 % — LOW (ref 34.5–45)
HGB BLD-MCNC: 10.1 G/DL — LOW (ref 11.5–15.5)
MAGNESIUM SERPL-MCNC: 1.7 MG/DL — SIGNIFICANT CHANGE UP (ref 1.6–2.6)
MCHC RBC-ENTMCNC: 29.8 PG — SIGNIFICANT CHANGE UP (ref 27–34)
MCHC RBC-ENTMCNC: 33.4 G/DL — SIGNIFICANT CHANGE UP (ref 32–36)
MCV RBC AUTO: 89.1 FL — SIGNIFICANT CHANGE UP (ref 80–100)
NRBC # BLD AUTO: 0 K/UL — SIGNIFICANT CHANGE UP (ref 0–0)
NRBC # FLD: 0 K/UL — SIGNIFICANT CHANGE UP (ref 0–0)
NRBC BLD AUTO-RTO: 0 /100 WBCS — SIGNIFICANT CHANGE UP (ref 0–0)
PHOSPHATE SERPL-MCNC: 3.4 MG/DL — SIGNIFICANT CHANGE UP (ref 2.5–4.5)
PLATELET # BLD AUTO: 189 K/UL — SIGNIFICANT CHANGE UP (ref 150–400)
POTASSIUM SERPL-MCNC: 3.1 MMOL/L — LOW (ref 3.5–5.3)
POTASSIUM SERPL-SCNC: 3.1 MMOL/L — LOW (ref 3.5–5.3)
PROT SERPL-MCNC: 5.5 G/DL — LOW (ref 6–8.3)
RBC # BLD: 3.39 M/UL — LOW (ref 3.8–5.2)
RBC # FLD: 12.1 % — SIGNIFICANT CHANGE UP (ref 10.3–14.5)
SODIUM SERPL-SCNC: 135 MMOL/L — SIGNIFICANT CHANGE UP (ref 135–145)
SPECIMEN SOURCE: SIGNIFICANT CHANGE UP
WBC # BLD: 4.1 K/UL — SIGNIFICANT CHANGE UP (ref 3.8–10.5)
WBC # FLD AUTO: 4.1 K/UL — SIGNIFICANT CHANGE UP (ref 3.8–10.5)

## 2025-03-22 PROCEDURE — 93970 EXTREMITY STUDY: CPT | Mod: 26

## 2025-03-22 PROCEDURE — 99233 SBSQ HOSP IP/OBS HIGH 50: CPT

## 2025-03-22 PROCEDURE — 93010 ELECTROCARDIOGRAM REPORT: CPT

## 2025-03-22 RX ORDER — LIDOCAINE HYDROCHLORIDE 20 MG/ML
1 JELLY TOPICAL EVERY 24 HOURS
Refills: 0 | Status: DISCONTINUED | OUTPATIENT
Start: 2025-03-22 | End: 2025-03-31

## 2025-03-22 RX ORDER — MAGNESIUM SULFATE 500 MG/ML
2 SYRINGE (ML) INJECTION ONCE
Refills: 0 | Status: COMPLETED | OUTPATIENT
Start: 2025-03-22 | End: 2025-03-22

## 2025-03-22 RX ADMIN — BACLOFEN 5 MILLIGRAM(S): 10 INJECTION INTRATHECAL at 21:36

## 2025-03-22 RX ADMIN — GABAPENTIN 100 MILLIGRAM(S): 400 CAPSULE ORAL at 07:32

## 2025-03-22 RX ADMIN — NICOTINE POLACRILEX 1 PATCH: 4 GUM, CHEWING ORAL at 15:04

## 2025-03-22 RX ADMIN — Medication 40 MILLIGRAM(S): at 17:26

## 2025-03-22 RX ADMIN — CLONAZEPAM 0.5 MILLIGRAM(S): 0.5 TABLET ORAL at 16:09

## 2025-03-22 RX ADMIN — OXYCODONE HYDROCHLORIDE 30 MILLIGRAM(S): 30 TABLET ORAL at 17:24

## 2025-03-22 RX ADMIN — NICOTINE POLACRILEX 1 PATCH: 4 GUM, CHEWING ORAL at 15:05

## 2025-03-22 RX ADMIN — Medication 20 MILLILITER(S): at 15:06

## 2025-03-22 RX ADMIN — OLANZAPINE 5 MILLIGRAM(S): 10 TABLET ORAL at 21:35

## 2025-03-22 RX ADMIN — GABAPENTIN 100 MILLIGRAM(S): 400 CAPSULE ORAL at 15:04

## 2025-03-22 RX ADMIN — HEPARIN SODIUM UNIT(S)/HR: 1000 INJECTION INTRAVENOUS; SUBCUTANEOUS at 08:45

## 2025-03-22 RX ADMIN — Medication 3 MILLIGRAM(S): at 20:45

## 2025-03-22 RX ADMIN — BACLOFEN 5 MILLIGRAM(S): 10 INJECTION INTRATHECAL at 15:05

## 2025-03-22 RX ADMIN — Medication 40 MILLIGRAM(S): at 07:33

## 2025-03-22 RX ADMIN — Medication 3 MILLIGRAM(S): at 14:40

## 2025-03-22 RX ADMIN — Medication 100 MILLIEQUIVALENT(S): at 11:23

## 2025-03-22 RX ADMIN — Medication 1 GRAM(S): at 17:26

## 2025-03-22 RX ADMIN — Medication 1 SPRAY(S): at 07:34

## 2025-03-22 RX ADMIN — HEPARIN SODIUM UNIT(S)/HR: 1000 INJECTION INTRAVENOUS; SUBCUTANEOUS at 04:17

## 2025-03-22 RX ADMIN — BACLOFEN 5 MILLIGRAM(S): 10 INJECTION INTRATHECAL at 07:32

## 2025-03-22 RX ADMIN — NICOTINE POLACRILEX 1 PATCH: 4 GUM, CHEWING ORAL at 18:31

## 2025-03-22 RX ADMIN — Medication 100 MILLIEQUIVALENT(S): at 15:04

## 2025-03-22 RX ADMIN — HEPARIN SODIUM UNIT(S)/HR: 1000 INJECTION INTRAVENOUS; SUBCUTANEOUS at 01:54

## 2025-03-22 RX ADMIN — Medication 3 MILLIGRAM(S): at 19:59

## 2025-03-22 RX ADMIN — OXYCODONE HYDROCHLORIDE 30 MILLIGRAM(S): 30 TABLET ORAL at 11:57

## 2025-03-22 RX ADMIN — GABAPENTIN 100 MILLIGRAM(S): 400 CAPSULE ORAL at 21:35

## 2025-03-22 RX ADMIN — OXYCODONE HYDROCHLORIDE 30 MILLIGRAM(S): 30 TABLET ORAL at 18:24

## 2025-03-22 RX ADMIN — Medication 112 MICROGRAM(S): at 07:33

## 2025-03-22 RX ADMIN — Medication 30 MILLIGRAM(S): at 22:35

## 2025-03-22 RX ADMIN — Medication 30 MILLIGRAM(S): at 21:35

## 2025-03-22 RX ADMIN — Medication 100 MILLIEQUIVALENT(S): at 18:27

## 2025-03-22 RX ADMIN — Medication 25 GRAM(S): at 11:17

## 2025-03-22 RX ADMIN — Medication 1 SPRAY(S): at 21:38

## 2025-03-22 RX ADMIN — Medication 1 APPLICATION(S): at 18:29

## 2025-03-22 RX ADMIN — Medication 1 GRAM(S): at 07:33

## 2025-03-22 RX ADMIN — Medication 30 MILLIGRAM(S): at 07:32

## 2025-03-22 RX ADMIN — OXYCODONE HYDROCHLORIDE 30 MILLIGRAM(S): 30 TABLET ORAL at 10:57

## 2025-03-22 RX ADMIN — OXYCODONE HYDROCHLORIDE 30 MILLIGRAM(S): 30 TABLET ORAL at 22:34

## 2025-03-22 RX ADMIN — Medication 30 MILLIGRAM(S): at 15:04

## 2025-03-22 RX ADMIN — CLONAZEPAM 0.5 MILLIGRAM(S): 0.5 TABLET ORAL at 07:32

## 2025-03-22 RX ADMIN — Medication 1 SPRAY(S): at 15:06

## 2025-03-22 RX ADMIN — HEPARIN SODIUM UNIT(S)/HR: 1000 INJECTION INTRAVENOUS; SUBCUTANEOUS at 08:04

## 2025-03-22 RX ADMIN — Medication 3 MILLIGRAM(S): at 12:40

## 2025-03-22 RX ADMIN — HEPARIN SODIUM UNIT(S)/HR: 1000 INJECTION INTRAVENOUS; SUBCUTANEOUS at 20:01

## 2025-03-22 RX ADMIN — OXYCODONE HYDROCHLORIDE 30 MILLIGRAM(S): 30 TABLET ORAL at 21:34

## 2025-03-22 RX ADMIN — Medication 30 MILLIGRAM(S): at 19:54

## 2025-03-22 RX ADMIN — COLCHICINE 0.6 MILLIGRAM(S): 0.6 TABLET, FILM COATED ORAL at 15:06

## 2025-03-22 RX ADMIN — Medication 30 MILLIGRAM(S): at 17:29

## 2025-03-22 RX ADMIN — CLONAZEPAM 1 MILLIGRAM(S): 0.5 TABLET ORAL at 21:35

## 2025-03-22 NOTE — PROGRESS NOTE ADULT - ASSESSMENT
63 yo woman, former smoker (47py; quit 12/2024) with h/o pAfib (on Eliquis), Hypothyroidism, ? h/o Sleroderma, h/o pericardial effusion s/p pericardiocentesis (dx in 12/2024; ? viral vs inflammatory, cytology negative for malignant cells, on Colchicine), Asthma/suspected COPD, and recently-dx SCLCa > dx in 2/2025, staging javier not yet completed and pt is tx naive and referred to [facility] from [facility] Onc clinic for expedited onc workup and urgent initiation of inpatient chemotherapy.    ACTIVE PROBLEMS  Recently dx SCLCa  GOC discussion, counseling  Pericardial effusion   Anxiety  Acute hypoxic resp failure  h/o Asthma/COPD (former smoker)  Hyponatremia 2/2 SIADH  Cancer related pain, anxiety  Constipation  Hypothyroidism  pAfib  Hypercoag state  Severe prot yahir malnutrtion  Immunocompromised 2/2 cancer, autoimmune disease    Recently dx SCLCa  GOC discussion, counseling  Staging w/u in progress   * Pt unable to tolerate MRI Brain, A/P (even with anesthesia- Versed), due to anxiety/claustrophobia   * NC CTH, A/P without overt evidence of mets   * TTE showing RV thickening with pericardial effusion, c/f mets; w/u in progress with Thoracic Surgery as below  In GOC conversation on 3/13, pt expressed uncertainty about pursuing systemic cancer treatment, especially if her cancer is determined to be advanced  However, in f/u conversation on 3/19 pt indicated that she would like to pursue systemic    * Will plan for inpt Carbo/Etop on 3/23 if pt is still amenable  Rad Onc also made aware of pt- she is being considered for a 3-week course of RT to the lung mass (pending staging javier and pt's decision on GOC)  Long-term prognosis: gaurded; pt is full code    Pericardial effusion   Dx in 12/2024 (previously thought to be ? autoimmune vs viral with positive Coxsackie B titers in 12/2024)  12/23 pericardial fluid cytology negative for malignant cells  3/12 TTE with small to mod pericardial effusion, without tamponade physiology  3/20 TTE with RV thickening and mod multifocal pericardial effusion, c/f mets by appearance  3/20 NC CT chest stable effusion (compared to 3/18 but enlarging compared to 2/15) with enlarging L suprahilar mass  Appreciate Thoracic Surgery consult: no role for pericardiocentesis vs pericardial window at this time (continuing heparin gtt in lieu of Eliquis in case emergent procedure becomes necessary)  Continuing Colchicine 0.6mg daily    Anxiety/depression  Not currently controlled, pt having regular panic attacks and nightmares (grieving the recent loss of her son)  BHT continues to follow closely  Continuing Klonopin 1mg BID  Continuing Ativan PO/IV/IM 1mg q8  Continuing Zyprexa 5mg nightly  1:1 Observation not required at this time    Acute hypoxic resp failure  h/o Asthma/COPD (former smoker)  Pt desats to mid 80s% on RA and requires 4-6L NC supplemental O2 to maintain O2 sats > 92%  Likely due to burden of disease in lungs + pericardial effusion  Continuing supplemental O2 with weaning as tolerated and supportive resp care prn  Continuing Advair 100-50mcg MDI BID  Continuing duonebs q6/prn   Continuing Nicotine patch 21mg daily (6 weeks)  Mgmt of pericardial effusion as above    Hyponatremia 2/2 SIADH  Na normalized/stable at 135  Pt is asymptomatic  SIADH confirmed by 3/12 urine lytes  1L fluid restriction dced  on 3/20  Continuing Salt tabs 1gm BID    Cancer related pain  Continuing Morphine ER 30mg q12  Continuing Oxy IR 20mg q8/prn  Continuing Gabapentin 100mg TID  Continuing Baclofen 5mg q8/prn    Constipation  Improved  Possibly due to opioids +/- AID  3/12 AXR negative for ileus/obstruction  Continuing bowel regimen (Miralax, Senna) for OIC prevention    Hypothyroidism: 3/12 TFTs wnl; continuing LT4 112mcg daily    pAfib  Hypercoag state  Eliquis transitioned to heparin gtt in anticipation of possible pericardial procedure as noted above    Severe prot yahir malnutrtion: appreciate RD eval; continuing regular diet 65 yo woman, former smoker (47py; quit 12/2024) with h/o pAfib (on Eliquis), Hypothyroidism, ? h/o Sleroderma, h/o pericardial effusion s/p pericardiocentesis (dx in 12/2024; ? viral vs inflammatory, cytology negative for malignant cells, on Colchicine), Asthma/suspected COPD, and recently-dx SCLCa > dx in 2/2025, staging javier not yet completed and pt is tx naive and referred to [facility] from Med Onc clinic for expedited onc workup and urgent initiation of inpatient chemotherapy.    ACTIVE PROBLEMS  Recently dx SCLCa  GOC discussion, counseling  Encounter for antineoplastic chemotherapy  Pericardial effusion   Anxiety  Acute hypoxic resp failure  h/o Asthma/COPD (former smoker)  Hyponatremia 2/2 SIADH  Cancer related pain, anxiety  Constipation  Hypothyroidism  pAfib  Hypercoag state  Severe prot yahir malnutrtion  Immunocompromised 2/2 cancer, autoimmune disease    Recently dx SCLCa  GOC discussion, counseling  Encounter for antineoplastic chemotherapy  Staging w/u in progress   * Pt unable to tolerate MRI Brain, A/P (even with anesthesia- Versed), due to anxiety/claustrophobia   * NC CTH, A/P without overt evidence of mets   * TTE showing RV thickening with pericardial effusion, c/f mets; w/u in progress with Thoracic Surgery as below  In GOC conversation on 3/13, pt expressed uncertainty about pursuing systemic cancer treatment, especially if her cancer is determined to be advanced  However, in f/u conversation on 3/19 pt indicated that she would like to pursue systemic    * Will plan for inpt Carbo/Etop on 3/23 if pt is still amenable  Rad Onc also made aware of pt- she is being considered for a 3-week course of RT to the lung mass (pending staging javier and pt's decision on GOC)  Long-term prognosis: gaurded; pt is full code    Pericardial effusion   Dx in 12/2024 (previously thought to be ? autoimmune vs viral with positive Coxsackie B titers in 12/2024)  12/23 pericardial fluid cytology negative for malignant cells  3/12 TTE with small to mod pericardial effusion, without tamponade physiology  3/20 TTE with RV thickening and mod multifocal pericardial effusion, c/f mets by appearance  3/20 NC CT chest stable effusion (compared to 3/18 but enlarging compared to 2/15) with enlarging L suprahilar mass  Appreciate Thoracic Surgery consult: no role for pericardiocentesis vs pericardial window at this time (continuing heparin gtt in lieu of Eliquis in case emergent procedure becomes necessary)  Continuing Colchicine 0.6mg daily    Anxiety/depression  Not currently controlled, pt having regular panic attacks and nightmares (grieving the recent loss of her son)  BHT continues to follow closely  Continuing Klonopin 1mg BID  Continuing Ativan PO/IV/IM 1mg q8  Continuing Zyprexa 5mg nightly  1:1 Observation not required at this time    Acute hypoxic resp failure  h/o Asthma/COPD (former smoker)  Pt desats to mid 80s% on RA and requires 4-6L NC supplemental O2 to maintain O2 sats > 92%  Likely due to burden of disease in lungs + pericardial effusion  Continuing supplemental O2 with weaning as tolerated and supportive resp care prn  Continuing Advair 100-50mcg MDI BID  Continuing duonebs q6/prn   Continuing Nicotine patch 21mg daily (6 weeks)  Mgmt of pericardial effusion as above    Hyponatremia 2/2 SIADH  Na normalized/stable at 135  Pt is asymptomatic  SIADH confirmed by 3/12 urine lytes  1L fluid restriction dced  on 3/20  Continuing Salt tabs 1gm BID    Cancer related pain  Continuing Morphine ER 30mg q12  Continuing Oxy IR 20mg q8/prn  Continuing Gabapentin 100mg TID  Continuing Baclofen 5mg q8/prn    Constipation  Improved  Possibly due to opioids +/- AID  3/12 AXR negative for ileus/obstruction  Continuing bowel regimen (Miralax, Senna) for OIC prevention    Hypothyroidism: 3/12 TFTs wnl; continuing LT4 112mcg daily    pAfib  Hypercoag state  Eliquis transitioned to heparin gtt in anticipation of possible pericardial procedure as noted above    Severe prot yahir malnutrtion: appreciate RD eval; continuing regular diet

## 2025-03-22 NOTE — PHYSICAL THERAPY INITIAL EVALUATION ADULT - PLANNED THERAPY INTERVENTIONS, PT EVAL
balance training/bed mobility training/gait training/postural re-education/ROM/strengthening/transfer training Long QT syndrome    No pertinent past medical history

## 2025-03-22 NOTE — CHART NOTE - NSCHARTNOTEFT_GEN_A_CORE
S: Pt seen at bedside, family member present.   Pt appears to be stable in NAD, does c/o of shoulder pain similar to pain that made her come to ER in the beginning.   Otherwise on room air, VSS     ICU Vital Signs Last 24 Hrs  T(C): 36.6 (22 Mar 2025 12:34), Max: 36.7 (22 Mar 2025 05:29)  T(F): 97.8 (22 Mar 2025 12:34), Max: 98 (22 Mar 2025 05:29)  HR: 86 (22 Mar 2025 12:34) (72 - 86)  BP: 129/68 (22 Mar 2025 12:34) (99/60 - 129/68)  BP(mean): --  ABP: --  ABP(mean): --  RR: 18 (22 Mar 2025 12:34) (18 - 18)  SpO2: 93% (22 Mar 2025 12:34) (93% - 98%)    O2 Parameters below as of 22 Mar 2025 12:34  Patient On (Oxygen Delivery Method): nasal cannula  O2 Flow (L/min): 2        A/P 63 yo F, former smoker, PMHx pAfib (on Eliquis, diagnoses after pericardial drain 12/24), Hypothyroidism, Scleroderma, h/o pericardial effusion s/p pericardiocentesis (dx in 12/2024; ? viral vs inflammatory, cytology negative for malignant cells, on Colchicine), Asthma/suspected COPD, and newly diagnoses SCLCa (Dx in 2/2025), admitted for expedited staging workup and urgent initiation of inpatient chemotherapy. Thoracic Surgery consulted for evaluation of pericardial effusion for drain v pericardectomy.     Images reviewed by Dr Montgomery yesterday  Pt without clinical signs of tamponade. No s/s of tamponade on Echo  No indication for drainage at this time.   Please call with any new events or worsening symptoms over the weekend. Images to be reviewed by Dr Núñez    Thoracic   [id number]

## 2025-03-22 NOTE — PHYSICAL THERAPY INITIAL EVALUATION ADULT - PERTINENT HX OF CURRENT PROBLEM, REHAB EVAL
Patient is a 64 year old Female, PMH stated below, presents with SOB and expedited oncology workup with plan for inpatient chemo.

## 2025-03-22 NOTE — PHYSICAL THERAPY INITIAL EVALUATION ADULT - ACTIVE RANGE OF MOTION EXAMINATION, REHAB EVAL
odalys. upper extremity Active ROM was WNL (within normal limits)/bilateral lower extremity Active ROM was WNL (within normal limits)

## 2025-03-22 NOTE — PROGRESS NOTE ADULT - SUBJECTIVE AND OBJECTIVE BOX
SOLID TUMOR ONCOLOGY HOSPITALIST PROGRESS NOTE    S: No acute events overnight.  Pt expressed feeling anxious about the potential for experiencing pain or side effects from chemotherapy.  However, she ultimately expressed that she would proceed with chemo if it was being offered.  She also reported that she woke up with L sided chest pain and shoulder pain which is the same pain she experienced when she was first diagnosed with cancer and the pericardial effusion.    CURRENT MEDICATIONS  MEDICATIONS  (STANDING):  albuterol/ipratropium for Nebulization 3 milliLiter(s) Nebulizer every 6 hours  baclofen 5 milliGRAM(s) Oral every 8 hours  chlorhexidine 2% Cloths 1 Application(s) Topical daily  clonazePAM  Tablet 1 milliGRAM(s) Oral at bedtime  clonazePAM  Tablet 0.5 milliGRAM(s) Oral <User Schedule>  colchicine 0.6 milliGRAM(s) Oral daily  fluticasone propionate/ salmeterol 100-50 MICROgram(s) Diskus 1 Dose(s) Inhalation two times a day  gabapentin 100 milliGRAM(s) Oral three times a day  heparin  Infusion.  Unit(s)/Hr (11 mL/Hr) IV Continuous <Continuous>  influenza  Vaccine (HIGH DOSE) 0.5 milliLiter(s) IntraMuscular once  levothyroxine 112 MICROGram(s) Oral daily  lidocaine   4% Patch 1 Patch Transdermal every 24 hours  methylnaltrexone Injectable 12 milliGRAM(s) SubCutaneous once  morphine ER Tablet 30 milliGRAM(s) Oral every 8 hours  naloxegol 25 milliGRAM(s) Oral daily  nicotine - 21 mG/24Hr(s) Patch 1 Patch Transdermal daily  OLANZapine 5 milliGRAM(s) Oral at bedtime  pantoprazole    Tablet 40 milliGRAM(s) Oral every 12 hours  polyethylene glycol 3350 17 Gram(s) Oral daily  senna 2 Tablet(s) Oral at bedtime  sodium chloride 1 Gram(s) Oral two times a day  sodium chloride 0.65% Nasal 1 Spray(s) Both Nostrils three times a day  MEDICATIONS  (PRN):  aluminum hydroxide/magnesium hydroxide/simethicone Suspension 30 milliLiter(s) Oral every 4 hours PRN Dyspepsia  benzocaine/menthol Lozenge 1 Lozenge Oral three times a day PRN Sore Throat  heparin   Injectable 5000 Unit(s) IV Push every 6 hours PRN For aPTT less than 40  heparin   Injectable 2500 Unit(s) IV Push every 6 hours PRN For aPTT between 40 - 57  HYDROmorphone  Injectable 3 milliGRAM(s) IV Push every 4 hours PRN severe breakthrough pain  LORazepam     Tablet 1 milliGRAM(s) Oral every 8 hours PRN Anxiety  melatonin 3 milliGRAM(s) Oral at bedtime PRN Insomnia  ondansetron Injectable 4 milliGRAM(s) IV Push every 6 hours PRN Nausea and/or Vomiting  oxyCODONE    IR 20 milliGRAM(s) Oral every 4 hours PRN Moderate Pain (4 - 6)  oxyCODONE    IR 30 milliGRAM(s) Oral every 4 hours PRN Severe Pain (7 - 10)      PHYSICAL EXAM  T(C): 36.7 (03-22-25 @ 20:30), Max: 36.7 (03-22-25 @ 05:29)  HR: 92 (03-22-25 @ 20:30) (72 - 93)  BP: 134/66 (03-22-25 @ 20:30) (99/60 - 134/66)  RR: 18 (03-22-25 @ 20:30) (18 - 18)  SpO2: 97% (03-22-25 @ 20:30) (93% - 98%)    03-21-25 @ 07:01  -  03-22-25 @ 07:00  --------------------------------------------------------  IN: 439 mL / OUT: 0 mL / NET: 439 mL    03-22-25 @ 07:01  -  03-22-25 @ 20:43  --------------------------------------------------------  IN: 0 mL / OUT: 600 mL / NET: -600 mL  Non-toxic-appearing woman, crying hysterically in bed  Answering all questions appropriately (conversational dyspnea resolved), following commands  Anicteric sclera, no oral lesions/thrush  RRR, no m/r/g, heart sounds faint  Breaths somewhat labored, but not tachypnea; trace RLB crackles, no w/r  Abd soft/nt/nd, hypoactive bowel sounds  No peripheral edema; DIP/PIP, ankle joint deformities unchanged  CN 2-12 grossly intact; no gross focal neuro deficits; pt moves all extremities spontaneously      LABS                        10.1   4.10  )-----------( 189      ( 22 Mar 2025 07:30 )             30.2     03-22    135  |  94[L]  |  6[L]  ----------------------------<  120[H]  3.1[L]   |  30  |  0.41[L]    Ca    8.6      22 Mar 2025 07:30  Phos  3.4     03-22  Mg     1.70     03-22    TPro  5.5[L]  /  Alb  3.1[L]  /  TBili  0.3  /  DBili  x   /  AST  14  /  ALT  7   /  AlkPhos  75  03-22    PT/INR - ( 20 Mar 2025 22:47 )   PT: 13.6 sec;   INR: 1.17 ratio    PTT - ( 22 Mar 2025 07:30 )  PTT:65.8 sec      ADDITIONAL LABS  Dylan 98  Uosm 632  uric acid 2.4    PATHOLOGY  2/21 LN biopsies  1. LUNG, LEFT UPPER LOBE, ENDOBRONCHIAL FORCEPS BIOPSY AND TRANSBRONCHIAL   FNA   POSITIVE FOR MALIGNANT CELLS.   Small cell carcinoma.   Touch Imprint slide, cell block and core biopsy show a cellular specimen   composed of groups and numerous single-lying hyperchromatic cells with   irregular nuclear membranes, nuclear molding and scanty cytoplasm. Crush   artifact and mitotic figures present.   Immunocytochemical studies : The neoplastic cells are positive for Cam   5.2.hrmogranin, synaptophysin, INSM-1, TTF-1 while negative for CD45 and   P40. Ki-67 reveal a proliferation index of 90%.   Case discussed at the daily cytopathology  conference on   03/04/2025.   Case reported to Tumor Registry.   Dr. Webster was informed of the diagnosis via encrypted email on 03/04/2025.   2. LYMPH NODE, LEVEL 7, EBUS-GUIDED FNA   SUSPICIOUS FOR MALIGNANCY.   Suspicious for small cell carcinoma.   Cytology smears and cell block display a hypocellular specimen composed   of rare groups and few single-lying hyperchromatic cells with irregular   nuclear membranes, nuclear molding and scanty cytoplasm with crush   artifact, in a background of rare lymphocytes.   Case discussed at the daily cytopathology  conference on   03/04/2025.   3. LYMPH NODE, 4L, EBUS-GUIDED FNA   POSITIVE FOR MALIGNANT CELLS.   S mall cell carcinoma.   Cytology smears and cell block show a cellular specimen composed of   groups and single-lying hyperchromatic cells with irregular nuclear   membranes, nuclear molding and scanty cytoplasm, in a background of rare   lymphocytes.   Immunocytochemical studies: The neoplastic cells are positive for Cam   5.2, Chromogranin, synaptophysin, INSM-1, TTF-1 while negative for CD45   and P40.Ki-67 reveals a proliferative index of 90%.   In summary the cytomorphology and immunophenotype are consistent with   small cell carcinoma.   4. LUNG, LEFT UPPER LOBE, BRONCHOALVEOLAR LAVAGE   ATYPICAL FINDINGS   Cytology slide and cell block show hyperchromatic groups of poorly   preserved cells among reactive ciliated bronchial cells and alveolar   macrophages.   12/20/24 pericardial fluid cytology negative for malignant cells      MICROBIOLOGY  Abx: none on this admission    Cx Data  None new on this admission.  12/23 Coxsackie B titers positive 1:100-1:1000  12/23 Coxsackie A titers negative  12/23 EBV IgG positive  12/23 viral hep panel non-reactive  12/22 Quant Gold negative      PERTINENT RADIOLOGY  3/20 NC CT chest  1.  Moderate-sized pericardial effusion appears unchanged compared to   partially imaged heart on 3/18/2025 but increase insize since 2/15/2025.  2.  Interval increase in size of previously described left suprahilar   mediastinal mass which extends into the AP window and paramediastinal   left upper lobe.  3.  Some of the other left upper lobe nodules are decrease and some   increase.  4.  Unchanged sclerotic lesions within the sternum and T12 vertebral body.  3/20 TTE   1. Limited study to re-evaluate pericardial effusion.   2. There is a moderate pericardial effusion noted adjacent to the posterior left ventricle, a moderate pericardial effusion noted adjacent to the right atrium, a small pericardial effusion noted adjacent to the apex, a moderate pericardial effusion noted adjacent to the lateral left ventricle and a moderate pericardial effusion noted adjacent to the right ventricle with no echocardiographic evidence of tamponade physiology. Moderate pericardial effusion, measuring ~ 1.1 cm posterior to the left ventricle, ~ 1.0 cm lateral to the left ventricle, ~ 1.4 cm adjacent to the RV free wall, and ~ 1.4 cm superior to the right atrium. Small pericardial effusion anterior to the right ventricle and adjacent to the apex. The pericardium adjacent to the RV free wall appears markedly thickened and may reflect the presence of thrombus, inflammatory material, and/or metastatic disease. No RA or RV diastolic collapse seen. However, the inferior vena cava (IVC) displays reduced respirophasic variability in its caliber, consistent with elevated right atrial pressure.   3. The inferior vena cava is normal in size measuring 1.30 cm in diameter, (normal <2.1cm) with abnormal inspiratory collapse (abnormal <50%) consistent with mildly elevated right atrial pressure (~8, range 5-10mmHg).   4. Compared to the transthoracic echocardiogram performed on 3/12/2025, the pericardial effusion has increased slightly in size.  3/18 CT abd/pelv: No acute pathology within the abdomen and pelvis. Partially imaged pericardial effusion. Bilateral trace pleural effusions with adjacent atelectasis. Again demonstrated a T12 vertebral body hemangioma. Degenerative changes of the spine  3/18 CT head: No hydrocephalus, acute intracranial hemorrhage, or mass effect. No compelling evidence of intracranial metastasis on this non-contrast exam.  3/12 CXR: Bilateral upper lobe opacities with 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  No acute pulmonary or cardiovascular disease.  3/12 AXR: There is a nonobstructive gas pattern.  Large stool burden.  No masses or pathologic calcifications.  Surgical clips in the right upper quadrant.  Visualized lungs are clear.  3/12 TTE   1. Left ventricular systolic function is normal with an ejection fraction visually estimated at 65 to 70 %.   2. Small pericardial effusion noted adjacent to the posterior left ventricle, moderate pericardial effusion noted adjacentto the right atrium, moderate pericardial effusion noted adjacent to the anterior right ventricle and moderate pericardial effusion noted adjacent to the apex with no echocardiographic evidence of tamponade physiology.   3. The inferior vena cava is normal in size measuring 1.76 cm in diameter, (normal <2.1cm) with normal inspiratory collapse (normal >50%) consistent with normal right atrial pressure (~3, range 0-5mmHg).   4. No prior echocardiogram is available for comparison.  2/21 CXR: No complications post bronchoscopy.  Increased left upper lobe opacity (pneumonia?)  2/15 NC CT chest: Small circumferential pericardial effusion, mildly increased on today's   study when compared to the prior. Left suprahilar mediastinal mass in the prevascular space, extending into the aortopulmonary window, mediastinal adenopathy, appears more   pronounced on today's study though resolution is limited without   intravenous contrast. There is a new peripheral ill-defined 2.4 x 2.2 cm opacity in the left   upper lobe, differential includes infection/inflammation/neoplasm.  Dilated ascending aorta measuring up to 4 cm, similar to the prior study.  Consider contrast-enhanced CT of the chest if patient is able to tolerate   intravenous contrast.  2/15 B/L LE dopplers: negative for DVT. SOLID TUMOR ONCOLOGY HOSPITALIST PROGRESS NOTE    S: No acute events overnight.  Pt expressed feeling anxious about the potential for experiencing pain or side effects from chemotherapy.  However, she ultimately expressed that she would proceed with chemo if it was being offered.  She also reported that she woke up with L sided chest pain and shoulder pain which is the same pain she experienced when she was first diagnosed with cancer and the pericardial effusion.  Of note, pt desatted to 80% on RA while she was eating her ice cream (she took her NC off to do so; she was asymptomatic when she became hypoxic); her O2 sat improved to > 92% when she put her NC back on at 4-6L.    CURRENT MEDICATIONS  MEDICATIONS  (STANDING):  albuterol/ipratropium for Nebulization 3 milliLiter(s) Nebulizer every 6 hours  baclofen 5 milliGRAM(s) Oral every 8 hours  chlorhexidine 2% Cloths 1 Application(s) Topical daily  clonazePAM  Tablet 1 milliGRAM(s) Oral at bedtime  clonazePAM  Tablet 0.5 milliGRAM(s) Oral <User Schedule>  colchicine 0.6 milliGRAM(s) Oral daily  fluticasone propionate/ salmeterol 100-50 MICROgram(s) Diskus 1 Dose(s) Inhalation two times a day  gabapentin 100 milliGRAM(s) Oral three times a day  heparin  Infusion.  Unit(s)/Hr (11 mL/Hr) IV Continuous <Continuous>  influenza  Vaccine (HIGH DOSE) 0.5 milliLiter(s) IntraMuscular once  levothyroxine 112 MICROGram(s) Oral daily  lidocaine   4% Patch 1 Patch Transdermal every 24 hours  methylnaltrexone Injectable 12 milliGRAM(s) SubCutaneous once  morphine ER Tablet 30 milliGRAM(s) Oral every 8 hours  naloxegol 25 milliGRAM(s) Oral daily  nicotine - 21 mG/24Hr(s) Patch 1 Patch Transdermal daily  OLANZapine 5 milliGRAM(s) Oral at bedtime  pantoprazole    Tablet 40 milliGRAM(s) Oral every 12 hours  polyethylene glycol 3350 17 Gram(s) Oral daily  senna 2 Tablet(s) Oral at bedtime  sodium chloride 1 Gram(s) Oral two times a day  sodium chloride 0.65% Nasal 1 Spray(s) Both Nostrils three times a day  MEDICATIONS  (PRN):  aluminum hydroxide/magnesium hydroxide/simethicone Suspension 30 milliLiter(s) Oral every 4 hours PRN Dyspepsia  benzocaine/menthol Lozenge 1 Lozenge Oral three times a day PRN Sore Throat  heparin   Injectable 5000 Unit(s) IV Push every 6 hours PRN For aPTT less than 40  heparin   Injectable 2500 Unit(s) IV Push every 6 hours PRN For aPTT between 40 - 57  HYDROmorphone  Injectable 3 milliGRAM(s) IV Push every 4 hours PRN severe breakthrough pain  LORazepam     Tablet 1 milliGRAM(s) Oral every 8 hours PRN Anxiety  melatonin 3 milliGRAM(s) Oral at bedtime PRN Insomnia  ondansetron Injectable 4 milliGRAM(s) IV Push every 6 hours PRN Nausea and/or Vomiting  oxyCODONE    IR 20 milliGRAM(s) Oral every 4 hours PRN Moderate Pain (4 - 6)  oxyCODONE    IR 30 milliGRAM(s) Oral every 4 hours PRN Severe Pain (7 - 10)      PHYSICAL EXAM  T(C): 36.7 (03-22-25 @ 20:30), Max: 36.7 (03-22-25 @ 05:29)  HR: 92 (03-22-25 @ 20:30) (72 - 93)  BP: 134/66 (03-22-25 @ 20:30) (99/60 - 134/66)  RR: 18 (03-22-25 @ 20:30) (18 - 18)  SpO2: 97% (03-22-25 @ 20:30) (93% - 98%)    03-21-25 @ 07:01  -  03-22-25 @ 07:00  --------------------------------------------------------  IN: 439 mL / OUT: 0 mL / NET: 439 mL    03-22-25 @ 07:01  -  03-22-25 @ 20:43  --------------------------------------------------------  IN: 0 mL / OUT: 600 mL / NET: -600 mL  Non-toxic-appearing woman, crying hysterically in bed  Answering all questions appropriately (conversational dyspnea resolved), following commands  Anicteric sclera, no oral lesions/thrush  RRR, no m/r/g, heart sounds faint  Breaths somewhat labored, but not tachypnea; trace RLB crackles, no w/r  Abd soft/nt/nd, hypoactive bowel sounds  No peripheral edema; DIP/PIP, ankle joint deformities unchanged  CN 2-12 grossly intact; no gross focal neuro deficits; pt moves all extremities spontaneously      LABS                        10.1   4.10  )-----------( 189      ( 22 Mar 2025 07:30 )             30.2     03-22    135  |  94[L]  |  6[L]  ----------------------------<  120[H]  3.1[L]   |  30  |  0.41[L]    Ca    8.6      22 Mar 2025 07:30  Phos  3.4     03-22  Mg     1.70     03-22    TPro  5.5[L]  /  Alb  3.1[L]  /  TBili  0.3  /  DBili  x   /  AST  14  /  ALT  7   /  AlkPhos  75  03-22    PT/INR - ( 20 Mar 2025 22:47 )   PT: 13.6 sec;   INR: 1.17 ratio    PTT - ( 22 Mar 2025 07:30 )  PTT:65.8 sec      ADDITIONAL LABS  Dylan 98  Uosm 632  uric acid 2.4    PATHOLOGY  2/21 LN biopsies  1. LUNG, LEFT UPPER LOBE, ENDOBRONCHIAL FORCEPS BIOPSY AND TRANSBRONCHIAL   FNA   POSITIVE FOR MALIGNANT CELLS.   Small cell carcinoma.   Touch Imprint slide, cell block and core biopsy show a cellular specimen   composed of groups and numerous single-lying hyperchromatic cells with   irregular nuclear membranes, nuclear molding and scanty cytoplasm. Crush   artifact and mitotic figures present.   Immunocytochemical studies : The neoplastic cells are positive for Cam   5.2.hrmogranin, synaptophysin, INSM-1, TTF-1 while negative for CD45 and   P40. Ki-67 reveal a proliferation index of 90%.   Case discussed at the daily cytopathology  conference on   03/04/2025.   Case reported to Tumor Registry.   Dr. Webster was informed of the diagnosis via encrypted email on 03/04/2025.   2. LYMPH NODE, LEVEL 7, EBUS-GUIDED FNA   SUSPICIOUS FOR MALIGNANCY.   Suspicious for small cell carcinoma.   Cytology smears and cell block display a hypocellular specimen composed   of rare groups and few single-lying hyperchromatic cells with irregular   nuclear membranes, nuclear molding and scanty cytoplasm with crush   artifact, in a background of rare lymphocytes.   Case discussed at the daily cytopathology  conference on   03/04/2025.   3. LYMPH NODE, 4L, EBUS-GUIDED FNA   POSITIVE FOR MALIGNANT CELLS.   S mall cell carcinoma.   Cytology smears and cell block show a cellular specimen composed of   groups and single-lying hyperchromatic cells with irregular nuclear   membranes, nuclear molding and scanty cytoplasm, in a background of rare   lymphocytes.   Immunocytochemical studies: The neoplastic cells are positive for Cam   5.2, Chromogranin, synaptophysin, INSM-1, TTF-1 while negative for CD45   and P40.Ki-67 reveals a proliferative index of 90%.   In summary the cytomorphology and immunophenotype are consistent with   small cell carcinoma.   4. LUNG, LEFT UPPER LOBE, BRONCHOALVEOLAR LAVAGE   ATYPICAL FINDINGS   Cytology slide and cell block show hyperchromatic groups of poorly   preserved cells among reactive ciliated bronchial cells and alveolar   macrophages.   12/20/24 pericardial fluid cytology negative for malignant cells      MICROBIOLOGY  Abx: none on this admission    Cx Data  None new on this admission.  12/23 Coxsackie B titers positive 1:100-1:1000  12/23 Coxsackie A titers negative  12/23 EBV IgG positive  12/23 viral hep panel non-reactive  12/22 Quant Gold negative      PERTINENT RADIOLOGY  3/20 NC CT chest  1.  Moderate-sized pericardial effusion appears unchanged compared to   partially imaged heart on 3/18/2025 but increase insize since 2/15/2025.  2.  Interval increase in size of previously described left suprahilar   mediastinal mass which extends into the AP window and paramediastinal   left upper lobe.  3.  Some of the other left upper lobe nodules are decrease and some   increase.  4.  Unchanged sclerotic lesions within the sternum and T12 vertebral body.  3/20 TTE   1. Limited study to re-evaluate pericardial effusion.   2. There is a moderate pericardial effusion noted adjacent to the posterior left ventricle, a moderate pericardial effusion noted adjacent to the right atrium, a small pericardial effusion noted adjacent to the apex, a moderate pericardial effusion noted adjacent to the lateral left ventricle and a moderate pericardial effusion noted adjacent to the right ventricle with no echocardiographic evidence of tamponade physiology. Moderate pericardial effusion, measuring ~ 1.1 cm posterior to the left ventricle, ~ 1.0 cm lateral to the left ventricle, ~ 1.4 cm adjacent to the RV free wall, and ~ 1.4 cm superior to the right atrium. Small pericardial effusion anterior to the right ventricle and adjacent to the apex. The pericardium adjacent to the RV free wall appears markedly thickened and may reflect the presence of thrombus, inflammatory material, and/or metastatic disease. No RA or RV diastolic collapse seen. However, the inferior vena cava (IVC) displays reduced respirophasic variability in its caliber, consistent with elevated right atrial pressure.   3. The inferior vena cava is normal in size measuring 1.30 cm in diameter, (normal <2.1cm) with abnormal inspiratory collapse (abnormal <50%) consistent with mildly elevated right atrial pressure (~8, range 5-10mmHg).   4. Compared to the transthoracic echocardiogram performed on 3/12/2025, the pericardial effusion has increased slightly in size.  3/18 CT abd/pelv: No acute pathology within the abdomen and pelvis. Partially imaged pericardial effusion. Bilateral trace pleural effusions with adjacent atelectasis. Again demonstrated a T12 vertebral body hemangioma. Degenerative changes of the spine  3/18 CT head: No hydrocephalus, acute intracranial hemorrhage, or mass effect. No compelling evidence of intracranial metastasis on this non-contrast exam.  3/12 CXR: Bilateral upper lobe opacities with 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  No acute pulmonary or cardiovascular disease.  3/12 AXR: There is a nonobstructive gas pattern.  Large stool burden.  No masses or pathologic calcifications.  Surgical clips in the right upper quadrant.  Visualized lungs are clear.  3/12 TTE   1. Left ventricular systolic function is normal with an ejection fraction visually estimated at 65 to 70 %.   2. Small pericardial effusion noted adjacent to the posterior left ventricle, moderate pericardial effusion noted adjacentto the right atrium, moderate pericardial effusion noted adjacent to the anterior right ventricle and moderate pericardial effusion noted adjacent to the apex with no echocardiographic evidence of tamponade physiology.   3. The inferior vena cava is normal in size measuring 1.76 cm in diameter, (normal <2.1cm) with normal inspiratory collapse (normal >50%) consistent with normal right atrial pressure (~3, range 0-5mmHg).   4. No prior echocardiogram is available for comparison.  2/21 CXR: No complications post bronchoscopy.  Increased left upper lobe opacity (pneumonia?)  2/15 NC CT chest: Small circumferential pericardial effusion, mildly increased on today's   study when compared to the prior. Left suprahilar mediastinal mass in the prevascular space, extending into the aortopulmonary window, mediastinal adenopathy, appears more   pronounced on today's study though resolution is limited without   intravenous contrast. There is a new peripheral ill-defined 2.4 x 2.2 cm opacity in the left   upper lobe, differential includes infection/inflammation/neoplasm.  Dilated ascending aorta measuring up to 4 cm, similar to the prior study.  Consider contrast-enhanced CT of the chest if patient is able to tolerate   intravenous contrast.  2/15 B/L LE dopplers: negative for DVT.

## 2025-03-23 LAB
ALBUMIN SERPL ELPH-MCNC: 3.4 G/DL — SIGNIFICANT CHANGE UP (ref 3.3–5)
ALP SERPL-CCNC: 88 U/L — SIGNIFICANT CHANGE UP (ref 40–120)
ALT FLD-CCNC: 9 U/L — SIGNIFICANT CHANGE UP (ref 4–33)
ANION GAP SERPL CALC-SCNC: 10 MMOL/L — SIGNIFICANT CHANGE UP (ref 7–14)
ANION GAP SERPL CALC-SCNC: 8 MMOL/L — SIGNIFICANT CHANGE UP (ref 7–14)
APTT BLD: 83.9 SEC — HIGH (ref 24.5–35.6)
AST SERPL-CCNC: 16 U/L — SIGNIFICANT CHANGE UP (ref 4–32)
BILIRUB SERPL-MCNC: 0.4 MG/DL — SIGNIFICANT CHANGE UP (ref 0.2–1.2)
BUN SERPL-MCNC: 5 MG/DL — LOW (ref 7–23)
BUN SERPL-MCNC: 6 MG/DL — LOW (ref 7–23)
CALCIUM SERPL-MCNC: 8.1 MG/DL — LOW (ref 8.4–10.5)
CALCIUM SERPL-MCNC: 8.9 MG/DL — SIGNIFICANT CHANGE UP (ref 8.4–10.5)
CHLORIDE SERPL-SCNC: 92 MMOL/L — LOW (ref 98–107)
CHLORIDE SERPL-SCNC: 97 MMOL/L — LOW (ref 98–107)
CO2 SERPL-SCNC: 27 MMOL/L — SIGNIFICANT CHANGE UP (ref 22–31)
CO2 SERPL-SCNC: 30 MMOL/L — SIGNIFICANT CHANGE UP (ref 22–31)
CREAT SERPL-MCNC: 0.34 MG/DL — LOW (ref 0.5–1.3)
CREAT SERPL-MCNC: 0.43 MG/DL — LOW (ref 0.5–1.3)
EGFR: 107 ML/MIN/1.73M2 — SIGNIFICANT CHANGE UP
EGFR: 107 ML/MIN/1.73M2 — SIGNIFICANT CHANGE UP
EGFR: 113 ML/MIN/1.73M2 — SIGNIFICANT CHANGE UP
EGFR: 113 ML/MIN/1.73M2 — SIGNIFICANT CHANGE UP
GLUCOSE SERPL-MCNC: 106 MG/DL — HIGH (ref 70–99)
GLUCOSE SERPL-MCNC: 121 MG/DL — HIGH (ref 70–99)
HCT VFR BLD CALC: 33 % — LOW (ref 34.5–45)
HGB BLD-MCNC: 10.9 G/DL — LOW (ref 11.5–15.5)
MAGNESIUM SERPL-MCNC: 1.6 MG/DL — SIGNIFICANT CHANGE UP (ref 1.6–2.6)
MAGNESIUM SERPL-MCNC: 1.8 MG/DL — SIGNIFICANT CHANGE UP (ref 1.6–2.6)
MCHC RBC-ENTMCNC: 29.6 PG — SIGNIFICANT CHANGE UP (ref 27–34)
MCHC RBC-ENTMCNC: 33 G/DL — SIGNIFICANT CHANGE UP (ref 32–36)
MCV RBC AUTO: 89.7 FL — SIGNIFICANT CHANGE UP (ref 80–100)
NRBC # BLD AUTO: 0 K/UL — SIGNIFICANT CHANGE UP (ref 0–0)
NRBC # FLD: 0 K/UL — SIGNIFICANT CHANGE UP (ref 0–0)
NRBC BLD AUTO-RTO: 0 /100 WBCS — SIGNIFICANT CHANGE UP (ref 0–0)
PHOSPHATE SERPL-MCNC: 3.2 MG/DL — SIGNIFICANT CHANGE UP (ref 2.5–4.5)
PHOSPHATE SERPL-MCNC: 3.3 MG/DL — SIGNIFICANT CHANGE UP (ref 2.5–4.5)
PLATELET # BLD AUTO: 199 K/UL — SIGNIFICANT CHANGE UP (ref 150–400)
POTASSIUM SERPL-MCNC: 3.7 MMOL/L — SIGNIFICANT CHANGE UP (ref 3.5–5.3)
POTASSIUM SERPL-MCNC: 4 MMOL/L — SIGNIFICANT CHANGE UP (ref 3.5–5.3)
POTASSIUM SERPL-SCNC: 3.7 MMOL/L — SIGNIFICANT CHANGE UP (ref 3.5–5.3)
POTASSIUM SERPL-SCNC: 4 MMOL/L — SIGNIFICANT CHANGE UP (ref 3.5–5.3)
PROT SERPL-MCNC: 5.9 G/DL — LOW (ref 6–8.3)
RBC # BLD: 3.68 M/UL — LOW (ref 3.8–5.2)
RBC # FLD: 12 % — SIGNIFICANT CHANGE UP (ref 10.3–14.5)
SODIUM SERPL-SCNC: 132 MMOL/L — LOW (ref 135–145)
SODIUM SERPL-SCNC: 132 MMOL/L — LOW (ref 135–145)
WBC # BLD: 3.56 K/UL — LOW (ref 3.8–10.5)
WBC # FLD AUTO: 3.56 K/UL — LOW (ref 3.8–10.5)

## 2025-03-23 PROCEDURE — 99233 SBSQ HOSP IP/OBS HIGH 50: CPT

## 2025-03-23 RX ORDER — ETOPOSIDE 20 MG/ML
142.4 VIAL (ML) INTRAVENOUS EVERY 24 HOURS
Refills: 0 | Status: COMPLETED | OUTPATIENT
Start: 2025-03-23 | End: 2025-03-25

## 2025-03-23 RX ORDER — ACETAMINOPHEN 500 MG/5ML
1000 LIQUID (ML) ORAL ONCE
Refills: 0 | Status: COMPLETED | OUTPATIENT
Start: 2025-03-23 | End: 2025-03-23

## 2025-03-23 RX ORDER — METHYLPREDNISOLONE ACETATE 80 MG/ML
125 INJECTION, SUSPENSION INTRA-ARTICULAR; INTRALESIONAL; INTRAMUSCULAR; SOFT TISSUE ONCE
Refills: 0 | Status: DISCONTINUED | OUTPATIENT
Start: 2025-03-23 | End: 2025-03-31

## 2025-03-23 RX ORDER — CARBOPLATIN 10 MG/ML
470 INJECTION, SOLUTION INTRAVENOUS ONCE
Refills: 0 | Status: COMPLETED | OUTPATIENT
Start: 2025-03-23 | End: 2025-03-23

## 2025-03-23 RX ORDER — DIPHENHYDRAMINE HCL 12.5MG/5ML
25 ELIXIR ORAL ONCE
Refills: 0 | Status: DISCONTINUED | OUTPATIENT
Start: 2025-03-23 | End: 2025-03-31

## 2025-03-23 RX ORDER — DEXAMETHASONE 0.5 MG/1
8 TABLET ORAL EVERY 24 HOURS
Refills: 0 | Status: COMPLETED | OUTPATIENT
Start: 2025-03-23 | End: 2025-03-25

## 2025-03-23 RX ORDER — ONDANSETRON HCL/PF 4 MG/2 ML
8 VIAL (ML) INJECTION EVERY 24 HOURS
Refills: 0 | Status: COMPLETED | OUTPATIENT
Start: 2025-03-23 | End: 2025-03-25

## 2025-03-23 RX ORDER — ACETAMINOPHEN 500 MG/5ML
650 LIQUID (ML) ORAL ONCE
Refills: 0 | Status: DISCONTINUED | OUTPATIENT
Start: 2025-03-23 | End: 2025-03-27

## 2025-03-23 RX ADMIN — Medication 40 MILLIGRAM(S): at 18:27

## 2025-03-23 RX ADMIN — Medication 8 MILLIGRAM(S): at 13:44

## 2025-03-23 RX ADMIN — OXYCODONE HYDROCHLORIDE 30 MILLIGRAM(S): 30 TABLET ORAL at 14:57

## 2025-03-23 RX ADMIN — GABAPENTIN 100 MILLIGRAM(S): 400 CAPSULE ORAL at 22:42

## 2025-03-23 RX ADMIN — Medication 30 MILLIGRAM(S): at 06:28

## 2025-03-23 RX ADMIN — NICOTINE POLACRILEX 1 PATCH: 4 GUM, CHEWING ORAL at 07:53

## 2025-03-23 RX ADMIN — COLCHICINE 0.6 MILLIGRAM(S): 0.6 TABLET, FILM COATED ORAL at 12:38

## 2025-03-23 RX ADMIN — BACLOFEN 5 MILLIGRAM(S): 10 INJECTION INTRATHECAL at 13:57

## 2025-03-23 RX ADMIN — Medication 400 MILLIGRAM(S): at 18:26

## 2025-03-23 RX ADMIN — GABAPENTIN 100 MILLIGRAM(S): 400 CAPSULE ORAL at 05:27

## 2025-03-23 RX ADMIN — Medication 1 GRAM(S): at 05:29

## 2025-03-23 RX ADMIN — Medication 30 MILLIGRAM(S): at 05:28

## 2025-03-23 RX ADMIN — Medication 30 MILLIGRAM(S): at 22:42

## 2025-03-23 RX ADMIN — DEXAMETHASONE 101.6 MILLIGRAM(S): 0.5 TABLET ORAL at 13:44

## 2025-03-23 RX ADMIN — OXYCODONE HYDROCHLORIDE 30 MILLIGRAM(S): 30 TABLET ORAL at 13:57

## 2025-03-23 RX ADMIN — HEPARIN SODIUM UNIT(S)/HR: 1000 INJECTION INTRAVENOUS; SUBCUTANEOUS at 20:22

## 2025-03-23 RX ADMIN — CLONAZEPAM 0.5 MILLIGRAM(S): 0.5 TABLET ORAL at 14:54

## 2025-03-23 RX ADMIN — Medication 2 TABLET(S): at 22:42

## 2025-03-23 RX ADMIN — CARBOPLATIN 470 MILLIGRAM(S): 10 INJECTION, SOLUTION INTRAVENOUS at 15:01

## 2025-03-23 RX ADMIN — OLANZAPINE 5 MILLIGRAM(S): 10 TABLET ORAL at 22:42

## 2025-03-23 RX ADMIN — Medication 1 DOSE(S): at 22:42

## 2025-03-23 RX ADMIN — HEPARIN SODIUM UNIT(S)/HR: 1000 INJECTION INTRAVENOUS; SUBCUTANEOUS at 08:20

## 2025-03-23 RX ADMIN — Medication 1 GRAM(S): at 22:43

## 2025-03-23 RX ADMIN — OXYCODONE HYDROCHLORIDE 30 MILLIGRAM(S): 30 TABLET ORAL at 19:31

## 2025-03-23 RX ADMIN — Medication 1 SPRAY(S): at 05:30

## 2025-03-23 RX ADMIN — CLONAZEPAM 1 MILLIGRAM(S): 0.5 TABLET ORAL at 22:42

## 2025-03-23 RX ADMIN — BACLOFEN 5 MILLIGRAM(S): 10 INJECTION INTRATHECAL at 22:43

## 2025-03-23 RX ADMIN — HEPARIN SODIUM UNIT(S)/HR: 1000 INJECTION INTRAVENOUS; SUBCUTANEOUS at 07:31

## 2025-03-23 RX ADMIN — OXYCODONE HYDROCHLORIDE 30 MILLIGRAM(S): 30 TABLET ORAL at 09:31

## 2025-03-23 RX ADMIN — NICOTINE POLACRILEX 1 PATCH: 4 GUM, CHEWING ORAL at 19:00

## 2025-03-23 RX ADMIN — Medication 1 SPRAY(S): at 22:45

## 2025-03-23 RX ADMIN — Medication 30 MILLIGRAM(S): at 14:53

## 2025-03-23 RX ADMIN — Medication 30 MILLIGRAM(S): at 23:42

## 2025-03-23 RX ADMIN — Medication 1000 MILLIGRAM(S): at 19:26

## 2025-03-23 RX ADMIN — Medication 3 MILLIGRAM(S): at 19:53

## 2025-03-23 RX ADMIN — NICOTINE POLACRILEX 1 PATCH: 4 GUM, CHEWING ORAL at 12:38

## 2025-03-23 RX ADMIN — Medication 1 GRAM(S): at 13:58

## 2025-03-23 RX ADMIN — OXYCODONE HYDROCHLORIDE 30 MILLIGRAM(S): 30 TABLET ORAL at 10:31

## 2025-03-23 RX ADMIN — CLONAZEPAM 0.5 MILLIGRAM(S): 0.5 TABLET ORAL at 07:30

## 2025-03-23 RX ADMIN — BACLOFEN 5 MILLIGRAM(S): 10 INJECTION INTRATHECAL at 05:28

## 2025-03-23 RX ADMIN — Medication 1 APPLICATION(S): at 12:39

## 2025-03-23 RX ADMIN — HEPARIN SODIUM UNIT(S)/HR: 1000 INJECTION INTRAVENOUS; SUBCUTANEOUS at 03:59

## 2025-03-23 RX ADMIN — Medication 112 MICROGRAM(S): at 05:28

## 2025-03-23 RX ADMIN — GABAPENTIN 100 MILLIGRAM(S): 400 CAPSULE ORAL at 13:57

## 2025-03-23 RX ADMIN — OXYCODONE HYDROCHLORIDE 30 MILLIGRAM(S): 30 TABLET ORAL at 18:31

## 2025-03-23 RX ADMIN — Medication 142.4 MILLIGRAM(S): at 15:38

## 2025-03-23 RX ADMIN — Medication 40 MILLIGRAM(S): at 05:27

## 2025-03-23 RX ADMIN — Medication 30 MILLIGRAM(S): at 15:53

## 2025-03-23 RX ADMIN — Medication 1 SPRAY(S): at 13:58

## 2025-03-23 NOTE — PROGRESS NOTE ADULT - PARTICIPANTS
Patient/Staff

## 2025-03-23 NOTE — PROGRESS NOTE ADULT - SUBJECTIVE AND OBJECTIVE BOX
SOLID TUMOR ONCOLOGY HOSPITALIST PROGRESS NOTE    S: No acute events overnight.  Pt was tearful this afternoon in expressing her fear about starting chemotherapy today (stating specific concerns about the chemotherapy causing flares of her scleroderma), but she confirmed that she is going to go through with it because she feels that it's the right thing to do. Reassurance was provided.  Of note, pt desatted to 80% on RA while she was eating her ice cream (she took her NC off to do so; she was asymptomatic when she became hypoxic); her O2 sat improved to > 92% when she put her NC back on at 4-6L.    CURRENT MEDICATIONS  MEDICATIONS  (STANDING):  albuterol/ipratropium for Nebulization 3 milliLiter(s) Nebulizer every 6 hours  baclofen 5 milliGRAM(s) Oral every 8 hours  chlorhexidine 2% Cloths 1 Application(s) Topical daily  clonazePAM  Tablet 1 milliGRAM(s) Oral at bedtime  clonazePAM  Tablet 0.5 milliGRAM(s) Oral <User Schedule>  colchicine 0.6 milliGRAM(s) Oral daily  dexAMETHasone  IVPB 8 milliGRAM(s) IV Intermittent every 24 hours  etoposide (TOPOSAR) IVPB (eMAR) 142.4 milliGRAM(s) IV Intermittent every 24 hours  fluticasone propionate/ salmeterol 100-50 MICROgram(s) Diskus 1 Dose(s) Inhalation two times a day  gabapentin 100 milliGRAM(s) Oral three times a day  heparin  Infusion.  Unit(s)/Hr (11 mL/Hr) IV Continuous <Continuous>  influenza  Vaccine (HIGH DOSE) 0.5 milliLiter(s) IntraMuscular once  levothyroxine 112 MICROGram(s) Oral daily  lidocaine   4% Patch 1 Patch Transdermal every 24 hours  methylnaltrexone Injectable 12 milliGRAM(s) SubCutaneous once  morphine ER Tablet 30 milliGRAM(s) Oral every 8 hours  naloxegol 25 milliGRAM(s) Oral daily  nicotine - 21 mG/24Hr(s) Patch 1 Patch Transdermal daily  OLANZapine 5 milliGRAM(s) Oral at bedtime  ondansetron Injectable 8 milliGRAM(s) IV Push every 24 hours  pantoprazole    Tablet 40 milliGRAM(s) Oral every 12 hours  polyethylene glycol 3350 17 Gram(s) Oral daily  senna 2 Tablet(s) Oral at bedtime  sodium chloride 1 Gram(s) Oral three times a day  sodium chloride 0.65% Nasal 1 Spray(s) Both Nostrils three times a day  MEDICATIONS  (PRN):  acetaminophen     Tablet .. 650 milliGRAM(s) Oral once PRN chemotherapy reaction  aluminum hydroxide/magnesium hydroxide/simethicone Suspension 30 milliLiter(s) Oral every 4 hours PRN Dyspepsia  benzocaine/menthol Lozenge 1 Lozenge Oral three times a day PRN Sore Throat  diphenhydrAMINE Injectable 25 milliGRAM(s) IV Push once PRN chemotherapy reaction  famotidine Injectable 20 milliGRAM(s) IV Push once PRN chemotherapy reaction  heparin   Injectable 5000 Unit(s) IV Push every 6 hours PRN For aPTT less than 40  heparin   Injectable 2500 Unit(s) IV Push every 6 hours PRN For aPTT between 40 - 57  HYDROmorphone  Injectable 3 milliGRAM(s) IV Push every 4 hours PRN severe breakthrough pain  LORazepam     Tablet 1 milliGRAM(s) Oral every 8 hours PRN Anxiety  melatonin 3 milliGRAM(s) Oral at bedtime PRN Insomnia  methylPREDNISolone sodium succinate Injectable 125 milliGRAM(s) IV Push once PRN chemotherapy reaction  ondansetron Injectable 4 milliGRAM(s) IV Push every 6 hours PRN Nausea and/or Vomiting  oxyCODONE    IR 20 milliGRAM(s) Oral every 4 hours PRN Moderate Pain (4 - 6)  oxyCODONE    IR 30 milliGRAM(s) Oral every 4 hours PRN Severe Pain (7 - 10)      PHYSICAL EXAM  T(C): 36.8 (03-23-25 @ 20:23), Max: 36.8 (03-23-25 @ 20:23)  HR: 86 (03-23-25 @ 20:23) (61 - 86)  BP: 108/54 (03-23-25 @ 20:23) (108/54 - 129/69)  RR: 18 (03-23-25 @ 20:23) (18 - 18)  SpO2: 96% (03-23-25 @ 20:23) (96% - 100%)    03-22-25 @ 07:01  -  03-23-25 @ 07:00  --------------------------------------------------------  IN: 500 mL / OUT: 600 mL / NET: -100 mL    03-23-25 @ 07:01  -  03-23-25 @ 22:41  --------------------------------------------------------  IN: 695.8 mL / OUT: 1200 mL / NET: -504.2 mL  Non-toxic-appearing woman, crying hysterically in bed  Answering all questions appropriately (conversational dyspnea resolved), following commands  Anicteric sclera, no oral lesions/thrush  RRR, no m/r/g, heart sounds faint  Breaths somewhat labored, but not tachypnea; trace RLB crackles, no w/r  Abd soft/nt/nd, hypoactive bowel sounds  No peripheral edema; DIP/PIP, ankle joint deformities unchanged  CN 2-12 grossly intact; no gross focal neuro deficits; pt moves all extremities spontaneously      LABS                        10.9   3.56  )-----------( 199      ( 23 Mar 2025 06:05 )             33.0     03-23    132[L]  |  97[L]  |  6[L]  ----------------------------<  121[H]  4.0   |  27  |  0.34[L]    Ca    8.1[L]      23 Mar 2025 17:02  Phos  3.2     03-23  Mg     1.60     03-23    TPro  5.9[L]  /  Alb  3.4  /  TBili  0.4  /  DBili  x   /  AST  16  /  ALT  9   /  AlkPhos  88  03-23    PTT - ( 23 Mar 2025 06:05 )  PTT:83.9 sec      ADDITIONAL LABS  Dylan 98  Uosm 632  uric acid 2.4    PATHOLOGY  2/21 LN biopsies  1. LUNG, LEFT UPPER LOBE, ENDOBRONCHIAL FORCEPS BIOPSY AND TRANSBRONCHIAL   FNA   POSITIVE FOR MALIGNANT CELLS.   Small cell carcinoma.   Touch Imprint slide, cell block and core biopsy show a cellular specimen   composed of groups and numerous single-lying hyperchromatic cells with   irregular nuclear membranes, nuclear molding and scanty cytoplasm. Crush   artifact and mitotic figures present.   Immunocytochemical studies : The neoplastic cells are positive for Cam   5.2.hrmogranin, synaptophysin, INSM-1, TTF-1 while negative for CD45 and   P40. Ki-67 reveal a proliferation index of 90%.   Case discussed at the daily cytopathology  conference on   03/04/2025.   Case reported to Tumor Registry.   Dr. Webster was informed of the diagnosis via encrypted email on 03/04/2025.   2. LYMPH NODE, LEVEL 7, EBUS-GUIDED FNA   SUSPICIOUS FOR MALIGNANCY.   Suspicious for small cell carcinoma.   Cytology smears and cell block display a hypocellular specimen composed   of rare groups and few single-lying hyperchromatic cells with irregular   nuclear membranes, nuclear molding and scanty cytoplasm with crush   artifact, in a background of rare lymphocytes.   Case discussed at the daily cytopathology  conference on   03/04/2025.   3. LYMPH NODE, 4L, EBUS-GUIDED FNA   POSITIVE FOR MALIGNANT CELLS.   S mall cell carcinoma.   Cytology smears and cell block show a cellular specimen composed of   groups and single-lying hyperchromatic cells with irregular nuclear   membranes, nuclear molding and scanty cytoplasm, in a background of rare   lymphocytes.   Immunocytochemical studies: The neoplastic cells are positive for Cam   5.2, Chromogranin, synaptophysin, INSM-1, TTF-1 while negative for CD45   and P40.Ki-67 reveals a proliferative index of 90%.   In summary the cytomorphology and immunophenotype are consistent with   small cell carcinoma.   4. LUNG, LEFT UPPER LOBE, BRONCHOALVEOLAR LAVAGE   ATYPICAL FINDINGS   Cytology slide and cell block show hyperchromatic groups of poorly   preserved cells among reactive ciliated bronchial cells and alveolar   macrophages.   12/20/24 pericardial fluid cytology negative for malignant cells      MICROBIOLOGY  Abx: none on this admission    Cx Data  None new on this admission.  12/23 Coxsackie B titers positive 1:100-1:1000  12/23 Coxsackie A titers negative  12/23 EBV IgG positive  12/23 viral hep panel non-reactive  12/22 Quant Gold negative      PERTINENT RADIOLOGY  3/20 NC CT chest  1.  Moderate-sized pericardial effusion appears unchanged compared to   partially imaged heart on 3/18/2025 but increase insize since 2/15/2025.  2.  Interval increase in size of previously described left suprahilar   mediastinal mass which extends into the AP window and paramediastinal   left upper lobe.  3.  Some of the other left upper lobe nodules are decrease and some   increase.  4.  Unchanged sclerotic lesions within the sternum and T12 vertebral body.  3/20 TTE   1. Limited study to re-evaluate pericardial effusion.   2. There is a moderate pericardial effusion noted adjacent to the posterior left ventricle, a moderate pericardial effusion noted adjacent to the right atrium, a small pericardial effusion noted adjacent to the apex, a moderate pericardial effusion noted adjacent to the lateral left ventricle and a moderate pericardial effusion noted adjacent to the right ventricle with no echocardiographic evidence of tamponade physiology. Moderate pericardial effusion, measuring ~ 1.1 cm posterior to the left ventricle, ~ 1.0 cm lateral to the left ventricle, ~ 1.4 cm adjacent to the RV free wall, and ~ 1.4 cm superior to the right atrium. Small pericardial effusion anterior to the right ventricle and adjacent to the apex. The pericardium adjacent to the RV free wall appears markedly thickened and may reflect the presence of thrombus, inflammatory material, and/or metastatic disease. No RA or RV diastolic collapse seen. However, the inferior vena cava (IVC) displays reduced respirophasic variability in its caliber, consistent with elevated right atrial pressure.   3. The inferior vena cava is normal in size measuring 1.30 cm in diameter, (normal <2.1cm) with abnormal inspiratory collapse (abnormal <50%) consistent with mildly elevated right atrial pressure (~8, range 5-10mmHg).   4. Compared to the transthoracic echocardiogram performed on 3/12/2025, the pericardial effusion has increased slightly in size.  3/18 CT abd/pelv: No acute pathology within the abdomen and pelvis. Partially imaged pericardial effusion. Bilateral trace pleural effusions with adjacent atelectasis. Again demonstrated a T12 vertebral body hemangioma. Degenerative changes of the spine  3/18 CT head: No hydrocephalus, acute intracranial hemorrhage, or mass effect. No compelling evidence of intracranial metastasis on this non-contrast exam.  3/12 CXR: Bilateral upper lobe opacities with 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  No acute pulmonary or cardiovascular disease.  3/12 AXR: There is a nonobstructive gas pattern.  Large stool burden.  No masses or pathologic calcifications.  Surgical clips in the right upper quadrant.  Visualized lungs are clear.  3/12 TTE   1. Left ventricular systolic function is normal with an ejection fraction visually estimated at 65 to 70 %.   2. Small pericardial effusion noted adjacent to the posterior left ventricle, moderate pericardial effusion noted adjacentto the right atrium, moderate pericardial effusion noted adjacent to the anterior right ventricle and moderate pericardial effusion noted adjacent to the apex with no echocardiographic evidence of tamponade physiology.   3. The inferior vena cava is normal in size measuring 1.76 cm in diameter, (normal <2.1cm) with normal inspiratory collapse (normal >50%) consistent with normal right atrial pressure (~3, range 0-5mmHg).   4. No prior echocardiogram is available for comparison.  2/21 CXR: No complications post bronchoscopy.  Increased left upper lobe opacity (pneumonia?)  2/15 NC CT chest: Small circumferential pericardial effusion, mildly increased on today's   study when compared to the prior. Left suprahilar mediastinal mass in the prevascular space, extending into the aortopulmonary window, mediastinal adenopathy, appears more   pronounced on today's study though resolution is limited without   intravenous contrast. There is a new peripheral ill-defined 2.4 x 2.2 cm opacity in the left   upper lobe, differential includes infection/inflammation/neoplasm.  Dilated ascending aorta measuring up to 4 cm, similar to the prior study.  Consider contrast-enhanced CT of the chest if patient is able to tolerate   intravenous contrast.  2/15 B/L LE dopplers: negative for DVT.

## 2025-03-23 NOTE — PROGRESS NOTE ADULT - CONVERSATION/DISCUSSION
Diagnosis/MOLST Discussed
Diagnosis/Prognosis/MOLST Discussed/Treatment Options

## 2025-03-23 NOTE — PROGRESS NOTE ADULT - ASSESSMENT
65 yo woman, former smoker (47py; quit 12/2024) with h/o pAfib (on Eliquis), Hypothyroidism, ? h/o Sleroderma, h/o pericardial effusion s/p pericardiocentesis (dx in 12/2024; ? viral vs inflammatory, cytology negative for malignant cells, on Colchicine), Asthma/suspected COPD, and recently-dx SCLCa > dx in 2/2025, staging javier not yet completed and pt is tx naive and referred to [facility] from Med Onc clinic for expedited onc workup and urgent initiation of inpatient chemotherapy.    ACTIVE PROBLEMS  Recently dx SCLCa  GOC discussion, counseling  Encounter for antineoplastic chemotherapy  Pericardial effusion   Anxiety  Acute hypoxic resp failure  h/o Asthma/COPD (former smoker)  Hyponatremia 2/2 SIADH  Cancer related pain, anxiety  Constipation  Hypothyroidism  pAfib  Hypercoag state  Severe prot yahir malnutrtion  Immunocompromised 2/2 cancer, autoimmune disease    Recently dx SCLCa  GOC discussion, counseling  Encounter for antineoplastic chemotherapy  Staging w/u in progress   * Pt unable to tolerate MRI Brain, A/P (even with anesthesia- Versed), due to anxiety/claustrophobia   * NCHCT and NC CT A/P without overt evidence of mets   * TTE showing RV thickening with pericardial effusion, c/f mets; w/u in progress with Thoracic Surgery as below  In GOC conversation on 3/13, pt expressed uncertainty about pursuing systemic cancer treatment, especially if her cancer is determined to be advanced  However, in f/u conversation on 3/19 pt indicated that she would like to pursue systemic    * Will proceed with inpt induction Carbo/Etop today     - q3w week cycles; concurrent immunotherapy is relatively contraindicated     - 3d infusion 3/23-25 with Dex/Zofran premeds     - Carbo AUC 4 d1, Etoposide 80mg/m2 d1-3 (both 20% dose reduction)     - Pt should receive Fluphila growth factor on 3/26  Rad Onc also made aware of pt- she is being considered for a 3-week course of RT to the lung mass (pt to f/u outpt)  Long-term prognosis: gaurded; pt is full code    Pericardial effusion   Dx in 12/2024 (previously thought to be ? autoimmune vs viral with positive Coxsackie B titers in 12/2024)  12/23 pericardial fluid cytology negative for malignant cells  3/12 TTE with small to mod pericardial effusion, without tamponade physiology  3/20 TTE with RV thickening and mod multifocal pericardial effusion, c/f mets by appearance  3/20 NC CT chest stable effusion (compared to 3/18 but enlarging compared to 2/15) with enlarging L suprahilar mass  Appreciate Thoracic Surgery consult: no role for pericardiocentesis vs pericardial window at this time (continuing heparin gtt in lieu of Eliquis in case emergent procedure becomes necessary)- Dr. Sherwood to review images on 3/24  Continuing Colchicine 0.6mg daily    Anxiety/depression  Not currently controlled, pt having regular panic attacks and nightmares (grieving the recent loss of her son)  BHT continues to follow closely  Continuing Klonopin 1mg BID  Continuing Ativan PO/IV/IM 1mg q8  Continuing Zyprexa 5mg nightly  1:1 Observation not required at this time    Acute hypoxic resp failure  h/o Asthma/COPD (former smoker)  Pt desats to mid 80s% on RA and requires 4-6L NC supplemental O2 to maintain O2 sats > 92%  Likely due to burden of disease in lungs + pericardial effusion  Continuing supplemental O2 with weaning as tolerated and supportive resp care prn  Continuing Advair 100-50mcg MDI BID  Continuing duonebs q6/prn   Continuing Nicotine patch 21mg daily (6 weeks)  Mgmt of pericardial effusion as above    Hyponatremia 2/2 SIADH  Na normalized/stable at 135  Pt is asymptomatic  SIADH confirmed by 3/12 urine lytes  1L fluid restriction dced  on 3/20  Continuing Salt tabs 1gm BID    Cancer related pain  Continuing Morphine ER 30mg q12  Continuing Oxy IR 20mg q8/prn  Continuing Gabapentin 100mg TID  Continuing Baclofen 5mg q8/prn    Constipation  Improved  Possibly due to opioids +/- AID  3/12 AXR negative for ileus/obstruction  Continuing bowel regimen (Miralax, Senna) for OIC prevention    Hypothyroidism: 3/12 TFTs wnl; continuing LT4 112mcg daily    pAfib  Hypercoag state  Eliquis transitioned to heparin gtt in anticipation of possible pericardial procedure as noted above    Severe prot yahir malnutrtion: appreciate RD eval; continuing regular diet

## 2025-03-23 NOTE — PROGRESS NOTE ADULT - CONVERSATION DETAILS
On 3/13- Pt expressed tearfully that she's uncertain if she wants to pursue any systemic cancer treatment because she is aware that her scleroderm is advanced and she believes that she may have a difficult time tolerating chemo given her underlaying condition. She also indicated that she lives alone and doesn't have much family support (particularly after her son's passing). She indicated that she would not want to be in pain/suffer at the end of life, and that she would discuss code status with her HCP Yogi before making any final decisions. She also expressed interest in reviewing the MOLST form before making a final decision about code status.
Palliative consulted for complex medical decision making in the setting of serious illness.     Oncology discussed extensively prognosis and potential treatment plan with inpatient chemo. Patient is considering treatment vs no treatment and prioritizing comfort with hospice services. Patient worried about potential side effects from chemo that would lead to discomfort.     Introduced Advance Care Planning to discuss future medical decisions and treatment options in the event patient has further deterioration in condition and Advanced Directives such as MOLST to discuss specific preferences for Life Sustaining Treatments in the future.     Discussed code status, recommended DNR/DNI as CPR/intubation have poor outcomes in a patient with such serious illness, would not reverse underlying diseases and can be burdensome at end of life. Patient leaning towards DNR/DNI. However for all decisions discussed patient says she must discuss with her partner/HCP Sam breaux.

## 2025-03-23 NOTE — PROGRESS NOTE ADULT - STAFF/PROVIDER
Anna Marie Tellez MD; Johanna Velázquez ACP; Nohemy Griffin NP student
Anna Marie Tellze MD; Johanna Velázquez ACP; Nohemy Griffin NP student
Anna Marie Tellez MD; Johanna Velázquez ACP; Nohemy Griffin NP student
Dr. Tellez and PAMELLA Velázquez (oncology), Dr. Farrell (palliative)

## 2025-03-24 LAB
ALBUMIN SERPL ELPH-MCNC: 3.4 G/DL — SIGNIFICANT CHANGE UP (ref 3.3–5)
ALP SERPL-CCNC: 97 U/L — SIGNIFICANT CHANGE UP (ref 40–120)
ALT FLD-CCNC: 10 U/L — SIGNIFICANT CHANGE UP (ref 4–33)
ANION GAP SERPL CALC-SCNC: 12 MMOL/L — SIGNIFICANT CHANGE UP (ref 7–14)
APTT BLD: 77.1 SEC — HIGH (ref 24.5–35.6)
AST SERPL-CCNC: 17 U/L — SIGNIFICANT CHANGE UP (ref 4–32)
BILIRUB SERPL-MCNC: 0.4 MG/DL — SIGNIFICANT CHANGE UP (ref 0.2–1.2)
BUN SERPL-MCNC: 6 MG/DL — LOW (ref 7–23)
CALCIUM SERPL-MCNC: 8.8 MG/DL — SIGNIFICANT CHANGE UP (ref 8.4–10.5)
CHLORIDE SERPL-SCNC: 94 MMOL/L — LOW (ref 98–107)
CO2 SERPL-SCNC: 25 MMOL/L — SIGNIFICANT CHANGE UP (ref 22–31)
CREAT SERPL-MCNC: 0.35 MG/DL — LOW (ref 0.5–1.3)
EGFR: 113 ML/MIN/1.73M2 — SIGNIFICANT CHANGE UP
EGFR: 113 ML/MIN/1.73M2 — SIGNIFICANT CHANGE UP
GLUCOSE SERPL-MCNC: 160 MG/DL — HIGH (ref 70–99)
HCT VFR BLD CALC: 30.8 % — LOW (ref 34.5–45)
HGB BLD-MCNC: 10.6 G/DL — LOW (ref 11.5–15.5)
MAGNESIUM SERPL-MCNC: 1.6 MG/DL — SIGNIFICANT CHANGE UP (ref 1.6–2.6)
MCHC RBC-ENTMCNC: 29.7 PG — SIGNIFICANT CHANGE UP (ref 27–34)
MCHC RBC-ENTMCNC: 34.4 G/DL — SIGNIFICANT CHANGE UP (ref 32–36)
MCV RBC AUTO: 86.3 FL — SIGNIFICANT CHANGE UP (ref 80–100)
NRBC # BLD AUTO: 0 K/UL — SIGNIFICANT CHANGE UP (ref 0–0)
NRBC # FLD: 0 K/UL — SIGNIFICANT CHANGE UP (ref 0–0)
NRBC BLD AUTO-RTO: 0 /100 WBCS — SIGNIFICANT CHANGE UP (ref 0–0)
PHOSPHATE SERPL-MCNC: 2.8 MG/DL — SIGNIFICANT CHANGE UP (ref 2.5–4.5)
PLATELET # BLD AUTO: 209 K/UL — SIGNIFICANT CHANGE UP (ref 150–400)
POTASSIUM SERPL-MCNC: 3.8 MMOL/L — SIGNIFICANT CHANGE UP (ref 3.5–5.3)
POTASSIUM SERPL-SCNC: 3.8 MMOL/L — SIGNIFICANT CHANGE UP (ref 3.5–5.3)
PROT SERPL-MCNC: 6.3 G/DL — SIGNIFICANT CHANGE UP (ref 6–8.3)
RBC # BLD: 3.57 M/UL — LOW (ref 3.8–5.2)
RBC # FLD: 11.6 % — SIGNIFICANT CHANGE UP (ref 10.3–14.5)
SODIUM SERPL-SCNC: 131 MMOL/L — LOW (ref 135–145)
WBC # BLD: 2.63 K/UL — LOW (ref 3.8–10.5)
WBC # FLD AUTO: 2.63 K/UL — LOW (ref 3.8–10.5)

## 2025-03-24 PROCEDURE — 99232 SBSQ HOSP IP/OBS MODERATE 35: CPT

## 2025-03-24 PROCEDURE — G0316 PROLONG INPT EVAL ADD15 M: CPT

## 2025-03-24 PROCEDURE — 99233 SBSQ HOSP IP/OBS HIGH 50: CPT

## 2025-03-24 RX ORDER — ALBUTEROL SULFATE 2.5 MG/3ML
2 VIAL, NEBULIZER (ML) INHALATION EVERY 6 HOURS
Refills: 0 | Status: COMPLETED | OUTPATIENT
Start: 2025-03-24 | End: 2025-03-24

## 2025-03-24 RX ORDER — HALOPERIDOL 10 MG/1
0.5 TABLET ORAL ONCE
Refills: 0 | Status: DISCONTINUED | OUTPATIENT
Start: 2025-03-24 | End: 2025-03-24

## 2025-03-24 RX ORDER — PEGFILGRASTIM-CBQV 6 MG/.6ML
6 INJECTION, SOLUTION SUBCUTANEOUS ONCE
Refills: 0 | Status: COMPLETED | OUTPATIENT
Start: 2025-03-26 | End: 2025-03-26

## 2025-03-24 RX ORDER — HALOPERIDOL 10 MG/1
1 TABLET ORAL ONCE
Refills: 0 | Status: COMPLETED | OUTPATIENT
Start: 2025-03-24 | End: 2025-03-24

## 2025-03-24 RX ADMIN — CLONAZEPAM 0.5 MILLIGRAM(S): 0.5 TABLET ORAL at 06:08

## 2025-03-24 RX ADMIN — Medication 2 PUFF(S): at 04:35

## 2025-03-24 RX ADMIN — Medication 1 GRAM(S): at 14:08

## 2025-03-24 RX ADMIN — HALOPERIDOL 1 MILLIGRAM(S): 10 TABLET ORAL at 03:16

## 2025-03-24 RX ADMIN — OXYCODONE HYDROCHLORIDE 30 MILLIGRAM(S): 30 TABLET ORAL at 12:50

## 2025-03-24 RX ADMIN — Medication 3 MILLIGRAM(S): at 23:51

## 2025-03-24 RX ADMIN — Medication 112 MICROGRAM(S): at 06:09

## 2025-03-24 RX ADMIN — Medication 30 MILLIGRAM(S): at 15:08

## 2025-03-24 RX ADMIN — GABAPENTIN 100 MILLIGRAM(S): 400 CAPSULE ORAL at 06:08

## 2025-03-24 RX ADMIN — NICOTINE POLACRILEX 1 PATCH: 4 GUM, CHEWING ORAL at 19:00

## 2025-03-24 RX ADMIN — COLCHICINE 0.6 MILLIGRAM(S): 0.6 TABLET, FILM COATED ORAL at 11:51

## 2025-03-24 RX ADMIN — OLANZAPINE 5 MILLIGRAM(S): 10 TABLET ORAL at 22:09

## 2025-03-24 RX ADMIN — Medication 1 GRAM(S): at 06:09

## 2025-03-24 RX ADMIN — Medication 1 MILLIGRAM(S): at 02:34

## 2025-03-24 RX ADMIN — OXYCODONE HYDROCHLORIDE 30 MILLIGRAM(S): 30 TABLET ORAL at 01:23

## 2025-03-24 RX ADMIN — Medication 3 MILLIGRAM(S): at 03:45

## 2025-03-24 RX ADMIN — Medication 1 SPRAY(S): at 14:08

## 2025-03-24 RX ADMIN — Medication 2 TABLET(S): at 22:08

## 2025-03-24 RX ADMIN — Medication 1 SPRAY(S): at 06:09

## 2025-03-24 RX ADMIN — CLONAZEPAM 0.5 MILLIGRAM(S): 0.5 TABLET ORAL at 16:07

## 2025-03-24 RX ADMIN — Medication 3 MILLIGRAM(S): at 02:45

## 2025-03-24 RX ADMIN — Medication 1 GRAM(S): at 22:08

## 2025-03-24 RX ADMIN — OXYCODONE HYDROCHLORIDE 30 MILLIGRAM(S): 30 TABLET ORAL at 21:28

## 2025-03-24 RX ADMIN — GABAPENTIN 100 MILLIGRAM(S): 400 CAPSULE ORAL at 22:09

## 2025-03-24 RX ADMIN — Medication 30 MILLIGRAM(S): at 06:08

## 2025-03-24 RX ADMIN — Medication 1 APPLICATION(S): at 11:55

## 2025-03-24 RX ADMIN — HEPARIN SODIUM UNIT(S)/HR: 1000 INJECTION INTRAVENOUS; SUBCUTANEOUS at 08:10

## 2025-03-24 RX ADMIN — Medication 30 MILLIGRAM(S): at 23:09

## 2025-03-24 RX ADMIN — OXYCODONE HYDROCHLORIDE 30 MILLIGRAM(S): 30 TABLET ORAL at 06:07

## 2025-03-24 RX ADMIN — Medication 8 MILLIGRAM(S): at 12:06

## 2025-03-24 RX ADMIN — HEPARIN SODIUM UNIT(S)/HR: 1000 INJECTION INTRAVENOUS; SUBCUTANEOUS at 20:06

## 2025-03-24 RX ADMIN — OXYCODONE HYDROCHLORIDE 30 MILLIGRAM(S): 30 TABLET ORAL at 11:50

## 2025-03-24 RX ADMIN — BACLOFEN 5 MILLIGRAM(S): 10 INJECTION INTRATHECAL at 22:08

## 2025-03-24 RX ADMIN — OXYCODONE HYDROCHLORIDE 30 MILLIGRAM(S): 30 TABLET ORAL at 07:07

## 2025-03-24 RX ADMIN — NICOTINE POLACRILEX 1 PATCH: 4 GUM, CHEWING ORAL at 11:51

## 2025-03-24 RX ADMIN — Medication 142.4 MILLIGRAM(S): at 13:03

## 2025-03-24 RX ADMIN — HEPARIN SODIUM UNIT(S)/HR: 1000 INJECTION INTRAVENOUS; SUBCUTANEOUS at 07:43

## 2025-03-24 RX ADMIN — Medication 40 MILLIGRAM(S): at 19:26

## 2025-03-24 RX ADMIN — CLONAZEPAM 1 MILLIGRAM(S): 0.5 TABLET ORAL at 22:08

## 2025-03-24 RX ADMIN — DEXAMETHASONE 101.6 MILLIGRAM(S): 0.5 TABLET ORAL at 12:04

## 2025-03-24 RX ADMIN — BACLOFEN 5 MILLIGRAM(S): 10 INJECTION INTRATHECAL at 14:09

## 2025-03-24 RX ADMIN — Medication 1 SPRAY(S): at 22:09

## 2025-03-24 RX ADMIN — Medication 40 MILLIGRAM(S): at 06:08

## 2025-03-24 RX ADMIN — HEPARIN SODIUM UNIT(S)/HR: 1000 INJECTION INTRAVENOUS; SUBCUTANEOUS at 01:28

## 2025-03-24 RX ADMIN — NICOTINE POLACRILEX 1 PATCH: 4 GUM, CHEWING ORAL at 12:00

## 2025-03-24 RX ADMIN — NICOTINE POLACRILEX 1 PATCH: 4 GUM, CHEWING ORAL at 08:41

## 2025-03-24 RX ADMIN — Medication 30 MILLIGRAM(S): at 22:09

## 2025-03-24 RX ADMIN — OXYCODONE HYDROCHLORIDE 30 MILLIGRAM(S): 30 TABLET ORAL at 02:20

## 2025-03-24 RX ADMIN — GABAPENTIN 100 MILLIGRAM(S): 400 CAPSULE ORAL at 14:08

## 2025-03-24 RX ADMIN — Medication 30 MILLIGRAM(S): at 07:00

## 2025-03-24 RX ADMIN — IPRATROPIUM BROMIDE AND ALBUTEROL SULFATE 3 MILLILITER(S): .5; 2.5 SOLUTION RESPIRATORY (INHALATION) at 10:02

## 2025-03-24 RX ADMIN — Medication 30 MILLIGRAM(S): at 14:08

## 2025-03-24 RX ADMIN — BACLOFEN 5 MILLIGRAM(S): 10 INJECTION INTRATHECAL at 06:10

## 2025-03-24 RX ADMIN — OXYCODONE HYDROCHLORIDE 30 MILLIGRAM(S): 30 TABLET ORAL at 20:28

## 2025-03-24 NOTE — PROGRESS NOTE ADULT - PROBLEM SELECTOR PLAN 4
- recently diagnosed  - Unable to tolerate MRI A/P and brain d/t claustrophobia    - CT chest on 2/15- MEDIASTINUM AND JEREMIAS: Anterior subcarinal lymph node measures 1.8 cm in short axis, stable. There is a soft tissue density, measuring up to 4.2 cm in the prevascular space extending into the subcutaneous left main pulmonary artery region, this is more pronounced on today's study.   - Per onc, plan is to start inpatient chemo treatment and RT- patient thinking about it   - CT imaging showing no mets   - Follows with Dr. Hall at [facility]  - pericardial effusion, f/u CT surg recs - recently diagnosed  - Unable to tolerate MRI A/P and brain d/t claustrophobia    - CT chest on 2/15- MEDIASTINUM AND JEREMIAS: Anterior subcarinal lymph node measures 1.8 cm in short axis, stable. There is a soft tissue density, measuring up to 4.2 cm in the prevascular space extending into the subcutaneous left main pulmonary artery region, this is more pronounced on today's study.   - Per onc, plan is to start inpatient chemo treatment and RT- patient thinking about it   - CT imaging showing no mets   - Follows with Dr. Hall at [facility]  - pericardial effusion, f/u CT surg recs  - Inpatient chemo started

## 2025-03-24 NOTE — BH CONSULTATION LIAISON PROGRESS NOTE - NSBHASSESSMENTFT_PSY_ALL_CORE
Assessment	63 /yo F w/ longstanding PPHx of anxiety previously on xanax, remote hx of inpatient psych hospitalization for suicidality, PMHx scleroderma with chronic pain manifestations, recent admission for cardiac tamponade, admitted for workup of SOB/chest pain leading to SCLC diagnosis, psychiatry consulted for worsening of anxiety I/s/o multiple medical complications and her son's recent death. Pt likely with multiple comorbid psychiatric disorders including SOURAV, MDD, claustrophobia, most prominent disorder at this time is worsening of anxiety, with panic-like symptoms. Given guarded medical prognosis, recent social stressors, appropriate to treat severe anxiety with standing benzodiazepine regimen at this time. Pt not amenable to starting SSRI treatment, but if becomes amenable may benefit from this as well.     3/15: Reports improved anxiety. No reported changes in breathing. Appears to be tolerating Klonopin. Will continue.  3/17: Endorses decreased panic attacks and no use of PRN ativan over this interval. C/w klonopin, recommend ongoing GOC discussions with pt and her HCP  3/18: Unable to tolerate MRI over this interval, unclear reason. DEC klonopin to 0.5 mg qAM and 1 mg qHS due to pt complaint of sleepiness. Can give extra klonopin 0.5 mg wafer PRN prior to MRI, or ativan 1 mg IV x1 prior to MRI. If pt continues to refuse MRI, would reevaluate pt's capacity to refuse which may be impacted by her severe anxiety, and would involve her HCP.   3/19: Pt calmer over this interval, can continue with klonopin 0.5 mg qD and klonopin 1 mg qHS. Denies SI/HI.   3/20: Pt more dysregulated over this interval. STOP remeron at night and START zyprexa 5 mg qHS for control of mood and to augment pain regimen. INC klonopin to 0.5 mg at 9 AM, 0.5 mg at 1PM, and 1 mg qHS.  3/21: Calmer over this interval. Continue meds, would hold if pt appears sedated.   3/24: intermittent panic episodes but agreeing with care largely.     PLAN  - defer obs status to primary team   - C/W Zyprexa 5mg HS  - c/w klonopin 1 mg BID standing for treatment of anxiety  - ativan 1 mg PO/IV/IM q8h for breakthrough severe anxiety  - please monitor respiratory status closely given pt is also on baclofen and standing oxycodone, with limited baseline pulmonary reserve. HOLD benzodiazepines if concerned for oversedation.   - Dispo: has no psych barriers to discharge when med cleared; we support/advocate for a transition to an outpt oncological level of care.

## 2025-03-24 NOTE — BH CONSULTATION LIAISON PROGRESS NOTE - NSBHFUPINTERVALHXFT_PSY_A_CORE
Chart reviewed. Haldol PRN overnight. On exam, she is calm, cooperative, though a bit dramatic/playful at times. Endorses anxiety/insomnia. Denied safety concerns or AVH, SI, HI. Open to ongoing medical care. Insight fair.   Discussed care with primary team.

## 2025-03-24 NOTE — CHART NOTE - NSCHARTNOTEFT_GEN_A_CORE
Follow-up with patient to address psychosocial needs, assess coping with recent diagnosis & prognosis, and provide emotional support. Maryann has decided to initiate chemotherapy feeling, "It's the right thing to do." Chemotherapy began yesterday 3/23 and is planned to continue over the next couple of days. She continues to have panic/anxiety attacks for which she is being followed by behavioral health for management.    This writer provided active and empathic listening and continues to allow Maryann to express her fears while reminding her that she is not alone. This writer validated her feelings while reminding her to lean into her strength and resilience. Encouraged her to continue to talk to her team members and the  team if her anxiety becomes overwhelming. Will remain available for continued emotional support.

## 2025-03-24 NOTE — PROGRESS NOTE ADULT - ASSESSMENT
65 y/o F with PMHx of AF, scleroderma, asthma with prior intubations, hypothyroidism, pericardial effusion s/p pericardiocentesis (12/2024) and drain on colchicine presented to the ED due to SOB, chest pain, and back pain. Oncology evaluated her and recommended MRI brain and MRI chest/abdomen/pelvis with contrast to rule out metastasis. Was previously admitted in 12/2024 due to chest pain and SOB and found to have a pericardial effusion with evidence of tamponade physiology. She underwent urgent pericardiocentesis with placement of drain which was removed on 12/26. She was started on colchicine and NSAIDs. Hospital course was complicated by AF with RVR which was new onset and was suspected to be due to drain placement. After drain was removed patient continued to have pAF and was started on Eliquis. Presented to the ED on 02/13/25 for left sided chest pain radiating to the back. Repeat TTE demonstrated interval increase in pericardial effusion (moderate-large adjacent to RA with no evidence of tamponade) and CTS was consulted for possible pericardial window and recommended no acute intervention. 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 Pulmonary consulted and recommended transfer to [facility] for bronchoscopy, EBUS and hilar mass biopsy. Patient transferred to [facility] on 02/20/25 and underwent EBUS on 02/21/25. 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

## 2025-03-24 NOTE — PROGRESS NOTE ADULT - SUBJECTIVE AND OBJECTIVE BOX
Deven Archuleta MD  Academ[employer] Hospitalist  Pager [phone]  Email: [email or web address]  Available on [employer] Teams        PROGRESS NOTE:     Patient is a 64y old  Female who presents with a chief complaint of Shortness of breath and chest pain (24 Mar 2025 12:30)      SUBJECTIVE / OVERNIGHT EVENTS:  Patient seen and examined this morning. Patient with ongoing pain and panic attacks, sleeping soundly before encounter (difficult to wake up).  ADDITIONAL REVIEW OF SYSTEMS:  No f/c    MEDICATIONS  (STANDING):  albuterol/ipratropium for Nebulization 3 milliLiter(s) Nebulizer every 6 hours  baclofen 5 milliGRAM(s) Oral every 8 hours  chlorhexidine 2% Cloths 1 Application(s) Topical daily  clonazePAM  Tablet 0.5 milliGRAM(s) Oral <User Schedule>  clonazePAM  Tablet 1 milliGRAM(s) Oral at bedtime  colchicine 0.6 milliGRAM(s) Oral daily  dexAMETHasone  IVPB 8 milliGRAM(s) IV Intermittent every 24 hours  etoposide (TOPOSAR) IVPB (eMAR) 142.4 milliGRAM(s) IV Intermittent every 24 hours  fluticasone propionate/ salmeterol 100-50 MICROgram(s) Diskus 1 Dose(s) Inhalation two times a day  gabapentin 100 milliGRAM(s) Oral three times a day  heparin  Infusion.  Unit(s)/Hr (11 mL/Hr) IV Continuous <Continuous>  influenza  Vaccine (HIGH DOSE) 0.5 milliLiter(s) IntraMuscular once  levothyroxine 112 MICROGram(s) Oral daily  lidocaine   4% Patch 1 Patch Transdermal every 24 hours  methylnaltrexone Injectable 12 milliGRAM(s) SubCutaneous once  morphine ER Tablet 30 milliGRAM(s) Oral every 8 hours  naloxegol 25 milliGRAM(s) Oral daily  nicotine - 21 mG/24Hr(s) Patch 1 Patch Transdermal daily  OLANZapine 5 milliGRAM(s) Oral at bedtime  ondansetron Injectable 8 milliGRAM(s) IV Push every 24 hours  pantoprazole    Tablet 40 milliGRAM(s) Oral every 12 hours  polyethylene glycol 3350 17 Gram(s) Oral daily  senna 2 Tablet(s) Oral at bedtime  sodium chloride 1 Gram(s) Oral three times a day  sodium chloride 0.65% Nasal 1 Spray(s) Both Nostrils three times a day    MEDICATIONS  (PRN):  acetaminophen     Tablet .. 650 milliGRAM(s) Oral once PRN chemotherapy reaction  aluminum hydroxide/magnesium hydroxide/simethicone Suspension 30 milliLiter(s) Oral every 4 hours PRN Dyspepsia  benzocaine/menthol Lozenge 1 Lozenge Oral three times a day PRN Sore Throat  diphenhydrAMINE Injectable 25 milliGRAM(s) IV Push once PRN chemotherapy reaction  famotidine Injectable 20 milliGRAM(s) IV Push once PRN chemotherapy reaction  heparin   Injectable 5000 Unit(s) IV Push every 6 hours PRN For aPTT less than 40  heparin   Injectable 2500 Unit(s) IV Push every 6 hours PRN For aPTT between 40 - 57  HYDROmorphone  Injectable 3 milliGRAM(s) IV Push every 4 hours PRN severe breakthrough pain  LORazepam     Tablet 1 milliGRAM(s) Oral every 8 hours PRN Anxiety  melatonin 3 milliGRAM(s) Oral at bedtime PRN Insomnia  methylPREDNISolone sodium succinate Injectable 125 milliGRAM(s) IV Push once PRN chemotherapy reaction  ondansetron Injectable 4 milliGRAM(s) IV Push every 6 hours PRN Nausea and/or Vomiting  oxyCODONE    IR 20 milliGRAM(s) Oral every 4 hours PRN Moderate Pain (4 - 6)  oxyCODONE    IR 30 milliGRAM(s) Oral every 4 hours PRN Severe Pain (7 - 10)      CAPILLARY BLOOD GLUCOSE        I&O's Summary    23 Mar 2025 07:01  -  24 Mar 2025 07:00  --------------------------------------------------------  IN: 1445.8 mL / OUT: 2000 mL / NET: -554.2 mL        PHYSICAL EXAM:  Vital Signs Last 24 Hrs  T(C): 36.4 (24 Mar 2025 13:04), Max: 36.8 (23 Mar 2025 20:23)  T(F): 97.5 (24 Mar 2025 13:04), Max: 98.3 (23 Mar 2025 20:23)  HR: 90 (24 Mar 2025 13:04) (80 - 99)  BP: 104/61 (24 Mar 2025 13:04) (104/61 - 110/61)  BP(mean): --  RR: 18 (24 Mar 2025 13:04) (16 - 18)  SpO2: 94% (24 Mar 2025 13:04) (93% - 98%)    Parameters below as of 24 Mar 2025 13:04  Patient On (Oxygen Delivery Method): nasal cannula        CONSTITUTIONAL: NAD, sleeping soundly, difficult to wake up. But upon awakening complains of intractable pain.  RESPIRATORY: Normal respiratory effort; no respiratory distress, CTAB  CARDIOVASCULAR: No visible JVD, No lower extremity edema; S1S2, no m,r,g  ABDOMEN: Not guarding, does not appear distended, BS+  MUSCLOSKELETAL: no clubbing or cyanosis of digits; no joint swelling   PSYCH: AOx3, anxious    LABS:                        10.6   2.63  )-----------( 209      ( 24 Mar 2025 05:39 )             30.8     03-24    131[L]  |  94[L]  |  6[L]  ----------------------------<  160[H]  3.8   |  25  |  0.35[L]    Ca    8.8      24 Mar 2025 05:39  Phos  2.8     03-24  Mg     1.60     03-24    TPro  6.3  /  Alb  3.4  /  TBili  0.4  /  DBili  x   /  AST  17  /  ALT  10  /  AlkPhos  97  03-24    PTT - ( 24 Mar 2025 05:39 )  PTT:77.1 sec      Urinalysis Basic - ( 24 Mar 2025 05:39 )    Color: x / Appearance: x / SG: x / pH: x  Gluc: 160 mg/dL / Ketone: x  / Bili: x / Urobili: x   Blood: x / Protein: x / Nitrite: x   Leuk Esterase: x / RBC: x / WBC x   Sq Epi: x / Non Sq Epi: x / Bacteria: x          RADIOLOGY & ADDITIONAL TESTS:  Results Reviewed:   Imaging Personally Reviewed:  Electrocardiogram Personally Reviewed:    COORDINATION OF CARE:  Care Discussed with Consultants/Other Providers [Y/N]: Case discussed during interdisciplinary rounds with social work and case management. Discussed with palliative care team, Dr. Farrell and Dr. Bergman (). Pain and much symptoms possibly related to underling psychiatric illness.   Prior or Outpatient Records Reviewed [Y/N]:

## 2025-03-24 NOTE — PROGRESS NOTE ADULT - PROBLEM SELECTOR PLAN 7
For pain management     In the event of worsening symptoms, please contact the Palliative Medicine team via pager (if the patient is at [facility] #[phone] or if the patient is at [facility] #[phone]) The Geriatric and Palliative Medicine service has coverage 24 hours a day/ 7 days a week to provide medical recommendations regarding symptom management needs via telephone. For pain management   Discussed case with patient's outpatient pain management/spine doctor Dr. Guru Crowe, who is now aware of patient's new cancer ddx.      In the event of worsening symptoms, please contact the Palliative Medicine team via pager (if the patient is at [facility] #[phone] or if the patient is at [facility] #[phone]) The Geriatric and Palliative Medicine service has coverage 24 hours a day/ 7 days a week to provide medical recommendations regarding symptom management needs via telephone.

## 2025-03-24 NOTE — PROGRESS NOTE ADULT - PROBLEM SELECTOR PLAN 1
Chest pain from mediastinal mass   Patient with hx of diffuse pain related to her underlying scleroderma for which she shared at one point she was fentanyl 200 mcg patch and was weaned down  - Home regimen: MS Contin 30 mg BID and oxycodone 20 mg q4h PRN (long term dose) given by outpatient pain specialist originally for scleroderma pain  - PRN use in past 24 hours from 8 AM to 8 AM: oxycodone 30 mg x 1 dose, IV dilaudid 3 mg x 2 doses   - Patient now mainly complains of non cancer pain, concern more severe anxiety rather than physical pain. Improves with haldol     Plan and Recommendations:   - opioids:   > c/w oxycodone  20 mg q4h PRN for moderate pain and oxycodone IR 30 mg q4h prn for severe pain  > c/w IV dilaudid 3 mg q4h PRN for severe breakthrough pain  > c/w MS Contin 30 mg TID   - Bowel regimen  - Holistic therapy  - Patient concerned about how she will receive Rx for opioids outpatient. Explained that will be determined by her goals. If she chooses to pursue treatment, she can f/u with supportive oncology. If she chooses no treatment, then she can receive hospice care. Otherwise patient is free to return to her prior pain doctor Chest pain from mediastinal mass   Patient with hx of diffuse pain related to her underlying scleroderma for which she shared at one point she was fentanyl 200 mcg patch and was weaned down  - Home regimen: MS Contin 30 mg BID and oxycodone 20 mg q4h PRN (long term dose) given by outpatient pain specialist originally for scleroderma pain  - PRN use in past 24 hours from 8 AM to 8 AM: oxycodone 30 mg x 5 doses, IV dilaudid 3 mg x 2 doses   - Patient sometimes complains of non cancer pain, concern more severe anxiety rather than physical pain. Improves with haldol     Plan and Recommendations:   - opioids:   > c/w oxycodone IR 20 mg q4h PRN for moderate pain and oxycodone IR 30 mg q4h prn for severe pain  > c/w IV dilaudid 3 mg q4h PRN for severe breakthrough pain  > c/w MS Contin 30 mg TID   - Bowel regimen As above.

## 2025-03-24 NOTE — PROGRESS NOTE ADULT - ASSESSMENT
65 yo woman, former smoker (47py; quit 12/2024) with h/o pAfib (on Eliquis), Hypothyroidism, ? h/o Sleroderma, h/o pericardial effusion s/p pericardiocentesis (dx in 12/2024; ? viral vs inflammatory, cytology negative for malignant cells, on Colchicine), Asthma/suspected COPD, and recently-dx SCLCa > dx in 2/2025, staging javier not yet completed and pt is tx naive and referred to [facility] from Med Onc clinic for expedited onc workup and urgent initiation of inpatient chemotherapy.    ACTIVE PROBLEMS  Recently dx SCLCa  GOC discussion, counseling  Encounter for antineoplastic chemotherapy  Pericardial effusion   Anxiety  Acute hypoxic resp failure  h/o Asthma/COPD (former smoker)  Hyponatremia 2/2 SIADH  Cancer related pain, anxiety  Constipation  Hypothyroidism  pAfib  Hypercoag state  Severe prot yahir malnutrtion  Immunocompromised 2/2 cancer, autoimmune disease    Recently dx SCLCa  GOC discussion, counseling  Encounter for antineoplastic chemotherapy  Staging w/u in progress   * Pt unable to tolerate MRI Brain, A/P (even with anesthesia- Versed), due to anxiety/claustrophobia   * NCHCT and NC CT A/P without overt evidence of mets   * TTE showing RV thickening with pericardial effusion, c/f mets; w/u in progress with Thoracic Surgery as below  In GOC conversation on 3/13, pt expressed uncertainty about pursuing systemic cancer treatment, especially if her cancer is determined to be advanced  However, in f/u conversation on 3/19 pt indicated that she would like to pursue systemic    inpt induction Carbo/Etop today     - q3w week cycles; concurrent immunotherapy is relatively contraindicated     - 3d infusion 3/23-25 with Dex/Zofran premeds     - Carbo AUC 4 d1, Etoposide 80mg/m2 d1-3 (both 20% dose reduction)     - Pt should receive Fluphila growth factor on 3/26  Rad Onc also made aware of pt- she is being considered for a 3-week course of RT to the lung mass (pt to f/u outpt)  Long-term prognosis: gaurded; pt is full code    Pericardial effusion   Dx in 12/2024 (previously thought to be ? autoimmune vs viral with positive Coxsackie B titers in 12/2024)  12/23 pericardial fluid cytology negative for malignant cells  3/12 TTE with small to mod pericardial effusion, without tamponade physiology  3/20 TTE with RV thickening and mod multifocal pericardial effusion, c/f mets by appearance  3/20 NC CT chest stable effusion (compared to 3/18 but enlarging compared to 2/15) with enlarging L suprahilar mass  Appreciate Thoracic Surgery consult: no role for pericardiocentesis vs pericardial window at this time (continuing heparin gtt in lieu of Eliquis in case emergent procedure becomes necessary)- Dr. Sherwood to review images on 3/24  Continuing Colchicine 0.6mg daily    Anxiety/depression  Not currently controlled, pt having regular panic attacks and nightmares (grieving the recent loss of her son)  BHT continues to follow closely  Continuing Klonopin 1mg BID  Continuing Ativan PO/IV/IM 1mg q8  Continuing Zyprexa 5mg nightly  1:1 Observation not required at this time    Acute hypoxic resp failure  h/o Asthma/COPD (former smoker)  Pt desats to mid 80s% on RA and requires 4-6L NC supplemental O2 to maintain O2 sats > 92%  Likely due to burden of disease in lungs + pericardial effusion  Continuing supplemental O2 with weaning as tolerated and supportive resp care prn  Continuing Advair 100-50mcg MDI BID  Continuing duonebs q6/prn   Continuing Nicotine patch 21mg daily (6 weeks)  Mgmt of pericardial effusion as above    Hyponatremia 2/2 SIADH  Na normalized/stable at 135  Pt is asymptomatic  SIADH confirmed by 3/12 urine lytes  1L fluid restriction dced  on 3/20  Continuing Salt tabs 1gm BID    Cancer related pain  Continuing Morphine ER 30mg q12  Continuing Oxy IR 20mg q8/prn  Continuing Gabapentin 100mg TID  Continuing Baclofen 5mg q8/prn    Constipation  Improved  Possibly due to opioids +/- AID  3/12 AXR negative for ileus/obstruction  Continuing bowel regimen (Miralax, Senna) for OIC prevention    Hypothyroidism: 3/12 TFTs wnl; continuing LT4 112mcg daily    pAfib  Hypercoag state  Eliquis transitioned to heparin gtt in anticipation of possible pericardial procedure as noted above    Severe prot yahir malnutrtion: appreciate RD eval; continuing regular diet

## 2025-03-24 NOTE — CHART NOTE - NSCHARTNOTEFT_GEN_A_CORE
Pt reviewed with Dr. Núñez  After review of the case and scans decided that there is no need for thoracic intervention  No objection to continuing AC given no plan for immediate intervention  Thoracic to sign off  Please recall with any questions  Rest of care per primary team     Thoracic [id number]

## 2025-03-24 NOTE — PROGRESS NOTE ADULT - SUBJECTIVE AND OBJECTIVE BOX
Indication of Geriatrics and Palliative Medicine Services:  [X  ] Complex Medical Decision Making   [X  ] Symptom/Pain management     DNR on chart: No    INTERVAL EVENTS: Patient seen this AM, comfortable in no distress.     -------------------------------------------------------------------------------------------------------    PRESENT SYMPTOMS:     [ ]Unable to self-report - see [ ] CPOT [ ] PAINADS [ ] RDOS  Source if other than patient:  [ ]Family   [ ]Team     PAIN   1. Location- Left chest   2. Radiation-  Left arm, back   3. Quality- Sharp   4. Timing- Constant   5. Minimal acceptable level/pain goal- 4/10   6. Aggravating factors-   7. QOL impact- Severe     Dyspnea:                           [ ]Mild [ ]Moderate [ ]Severe  Anxiety:                             [ ]Mild [ ]Moderate [ ]Severe  Fatigue:                             [ ]Mild [ ]Moderate [ ]Severe  Nausea:                             [ x]Mild [ ]Moderate [ ]Severe  Loss of appetite:                [ ]Mild [x ]Moderate [ ]Severe  Constipation:                     [ ]Mild [ ]Moderate [ ]Severe  Other Symptoms:  [ ]All other review of systems negative     -------------------------------------------------------------------------------------------------------    I STOP: [id number]    Prescription Information      PDI Filter:    PDI	Current Rx	Drug Type	Rx Written	Rx Dispensed	Drug	Quantity	Days Supply	Prescriber Name	Prescriber KHOA #	Payment Method	Dispenser  A	Y	O	02/27/2025	03/08/2025	morphine sulf er 30 mg tablet	60	30	Guru Crowe MD	[id number]	Medicare	[id number]  A	Y	O	02/27/2025	03/08/2025	oxycodone hcl (ir) 20 mg tab	90	30	Sebastien Guru ROSS	[id number]	Medicare	[id number]  A	N	O	01/30/2025	02/06/2025	morphine sulf er 30 mg tablet	60	30	Sebastien Guru ROSS	[id number]	Medicare	[id number]  A	N	O	01/30/2025	02/06/2025	oxycodone hcl (ir) 20 mg tab	90	30	Sebastien Guru ROSS	[id number]	Medicare	[id number]  A	N	O	01/07/2025	01/08/2025	morphine sulf er 30 mg tablet	60	30	Sebastien, Guru ROSS	[id number]	Medicare	[id number]  A	N	O	01/07/2025	01/08/2025	oxycodone hcl (ir) 20 mg tab	90	30	Sebastien Guru ROSS	[id number]	Medicare	[id number]  A	N	O	11/27/2024	12/06/2024	morphine sulf er 30 mg tablet	60	30	Sebastien, Guru ROSS	[id number]	Medicare	[id number]  A	N	O	11/27/2024	12/06/2024	oxycodone hcl (ir) 20 mg tab	90	30	Sebastien Guru ROSS	[id number]	Medicare	[id number]  A	N	O	10/31/2024	11/08/2024	morphine sulf er 30 mg tablet	60	30	Sebastien Guru ROSS	[id number]	Medicare	[id number]  A	N	O	10/31/2024	11/08/2024	oxycodone hcl (ir) 20 mg tab	90	30	Sebastien Guru ROSS	[id number]	Medicare	[id number]  A	N	O	10/03/2024	10/09/2024	oxycodone hcl (ir) 20 mg tab	90	30	Sebastien Guru ROSS	[id number]	Medicare	[id number]    -------------------------------------------------------------------------------------------------------    ITEMS UNCHECKED ARE NOT PRESENT    PHYSICAL:  Vital Signs Last 24 Hrs  T(C): 36.4 (24 Mar 2025 05:30), Max: 36.8 (23 Mar 2025 20:23)  T(F): 97.5 (24 Mar 2025 05:30), Max: 98.3 (23 Mar 2025 20:23)  HR: 80 (24 Mar 2025 10:30) (73 - 99)  BP: 107/64 (24 Mar 2025 05:30) (107/64 - 110/61)  BP(mean): --  RR: 16 (24 Mar 2025 05:30) (16 - 18)  SpO2: 98% (24 Mar 2025 10:30) (93% - 100%)    Parameters below as of 24 Mar 2025 10:30  Patient On (Oxygen Delivery Method): nasal cannula      GENERAL:  [ ]Cachexia  [ ] Frail  [X ]Awake  [X ]Oriented   [ ]Lethargic  [ ]Unarousable  [ ]Verbal  [ ]Non-Verbal    BEHAVIORAL:   [ ] Anxiety  [ ] Delirium [ ] Agitation [ ] Other    HEENT:   [X ]Normal   [ ]Dry mouth   [ ]ET Tube/Trach  [ ]Oral lesions    PULMONARY:   [X ]Clear              [ ]Tachypnea  [ ]Audible excessive secretions   [ ]Rhonchi        [ ]Right [ ]Left [ ]Bilateral  [ ]Crackles        [ ]Right [ ]Left [ ]Bilateral  [ ]Wheezing     [ ]Right [ ]Left [ ]Bilateral  [ ]Diminished breath sounds [ ]right [ ]left [ ]bilateral    CARDIOVASCULAR:    [X ]Regular [ ]Irregular [ ]Tachy  [ ]Ridge [ ]Murmur [ ]Other    GASTROINTESTINAL:  [X ]Soft  [ ]Distended   [X ]+BS  [X ]Non tender [ ]Tender  [ ]Other [ ]PEG [ ]OGT/ NGT      GENITOURINARY:  [X ]Normal [ ] Incontinent   [ ]Oliguria/Anuria   [ ]Camargo    MUSCULOSKELETAL:   [X ]Normal   [ ]Weakness  [ ]Bed/Wheelchair bound [ ]Edema    NEUROLOGIC:   [X ]No focal deficits  [ ]Cognitive impairment  [ ]Dysphagia [ ]Dysarthria [ ]Paresis [ ]Other     SKIN:   [X ]Normal  [ ]Rash  [ ]Other  [ ]Pressure ulcer(s)       Present on admission [ ]y [ ]n    -------------------------------------------------------------------------------------------------------    LABS:                                   10.9   4.21  )-----------( 204      ( 18 Mar 2025 07:06 )             33.0     03-18    138  |  100  |  13  ----------------------------<  95  3.5   |  29  |  0.45[L]    Ca    8.9      18 Mar 2025 07:06  Phos  4.1     03-18  Mg     1.90     03-18    TPro  5.8[L]  /  Alb  3.2[L]  /  TBili  0.2  /  DBili  x   /  AST  13  /  ALT  6   /  AlkPhos  74  03-18      -------------------------------------------------------------------------------------------------------    CRITICAL CARE:  [ ]Shock Present  [ ]Septic [ ]Cardiogenic [ ]Neurologic [ ]Hypovolemic [ ]Undifferentiated    [ ]Vasopressors [ ]Inotropes    [ ]Respiratory failure present [ ]Acute  [ ]Chronic [ ]Hypoxic  [ ]Hypercarbic [ ]Mixed   [ ]Mechanical Ventilation  [ ]Trach collar   [ ]Non-invasive ventilatory support   [ ]High-Flow   [ ]Oxygen mask/venti     [ ]Other organ failure     -------------------------------------------------------------------------------------------------------    RADIOLOGY & ADDITIONAL STUDIES:     < from: Xray Chest 1 View-PORTABLE IMMEDIATE (Xray Chest 1 View-PORTABLE IMMEDIATE .) (03.12.25 @ 13:48) >    IMPRESSION:  Bilateral upper lobe opacities with 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  No acute pulmonary or cardiovascular disease.    < end of copied text >    -------------------------------------------------------------------------------------------------------  MEDICATIONS:     MEDICATIONS  (STANDING):  albuterol/ipratropium for Nebulization 3 milliLiter(s) Nebulizer every 6 hours  baclofen 5 milliGRAM(s) Oral every 8 hours  baclofen 5 milliGRAM(s) Oral once  chlorhexidine 2% Cloths 1 Application(s) Topical daily  clonazePAM  Tablet 1 milliGRAM(s) Oral at bedtime  clonazePAM  Tablet 0.5 milliGRAM(s) Oral <User Schedule>  colchicine 0.6 milliGRAM(s) Oral daily  fluticasone propionate/ salmeterol 100-50 MICROgram(s) Diskus 1 Dose(s) Inhalation two times a day  gabapentin 100 milliGRAM(s) Oral three times a day  gabapentin 100 milliGRAM(s) Oral once  heparin  Infusion.  Unit(s)/Hr (11 mL/Hr) IV Continuous <Continuous>  influenza  Vaccine (HIGH DOSE) 0.5 milliLiter(s) IntraMuscular once  levothyroxine 112 MICROGram(s) Oral daily  lidocaine   4% Patch 1 Patch Transdermal daily  lidocaine   4% Patch 1 Patch Transdermal every 24 hours  lidocaine   4% Patch 1 Patch Transdermal every 24 hours  methylnaltrexone Injectable 12 milliGRAM(s) SubCutaneous once  morphine ER Tablet 30 milliGRAM(s) Oral every 8 hours  naloxegol 25 milliGRAM(s) Oral daily  nicotine - 21 mG/24Hr(s) Patch 1 Patch Transdermal daily  OLANZapine 5 milliGRAM(s) Oral at bedtime  pantoprazole    Tablet 40 milliGRAM(s) Oral every 12 hours  polyethylene glycol 3350 17 Gram(s) Oral daily  senna 2 Tablet(s) Oral at bedtime  sodium chloride 1 Gram(s) Oral two times a day  sodium chloride 0.65% Nasal 1 Spray(s) Both Nostrils three times a day    MEDICATIONS  (PRN):  aluminum hydroxide/magnesium hydroxide/simethicone Suspension 30 milliLiter(s) Oral every 4 hours PRN Dyspepsia  benzocaine/menthol Lozenge 1 Lozenge Oral three times a day PRN Sore Throat  heparin   Injectable 5000 Unit(s) IV Push every 6 hours PRN For aPTT less than 40  heparin   Injectable 2500 Unit(s) IV Push every 6 hours PRN For aPTT between 40 - 57  HYDROmorphone  Injectable 3 milliGRAM(s) IV Push every 4 hours PRN severe breakthrough pain  LORazepam     Tablet 1 milliGRAM(s) Oral every 8 hours PRN Anxiety  melatonin 3 milliGRAM(s) Oral at bedtime PRN Insomnia  ondansetron Injectable 4 milliGRAM(s) IV Push every 6 hours PRN Nausea and/or Vomiting  oxyCODONE    IR 20 milliGRAM(s) Oral every 4 hours PRN Moderate Pain (4 - 6)  oxyCODONE    IR 30 milliGRAM(s) Oral every 4 hours PRN Severe Pain (7 - 10)     Indication of Geriatrics and Palliative Medicine Services:  [X  ] Complex Medical Decision Making   [X  ] Symptom/Pain management     DNR on chart: No    INTERVAL EVENTS: Patient seen this AM, in no distress. Patient states she has been coughing more, coughed up sputum has a sore throat. Patient reporting pain in her chest.   She states she started chemo yesterday, said it went better than she thought it would.     -------------------------------------------------------------------------------------------------------    PRESENT SYMPTOMS:     [ ]Unable to self-report - see [ ] CPOT [ ] PAINADS [ ] RDOS  Source if other than patient:  [ ]Family   [ ]Team     PAIN   1. Location- Left chest   2. Radiation-  Left arm, back   3. Quality- Sharp   4. Timing- Constant   5. Minimal acceptable level/pain goal- 4/10   6. Aggravating factors-   7. QOL impact- Severe     Dyspnea:                           [ ]Mild [ ]Moderate [ ]Severe  Anxiety:                             [ ]Mild [ ]Moderate [ ]Severe  Fatigue:                             [ ]Mild [ ]Moderate [ ]Severe  Nausea:                             [ x]Mild [ ]Moderate [ ]Severe  Loss of appetite:                [ ]Mild [x ]Moderate [ ]Severe  Constipation:                     [ ]Mild [ ]Moderate [ ]Severe  Other Symptoms:  [X ]All other review of systems negative     -------------------------------------------------------------------------------------------------------    I STOP: [id number]    Prescription Information      PDI Filter:    PDI	Current Rx	Drug Type	Rx Written	Rx Dispensed	Drug	Quantity	Days Supply	Prescriber Name	Prescriber KHOA #	Payment Method	Dispenser  A	Y	O	02/27/2025	03/08/2025	morphine sulf er 30 mg tablet	60	30	Guru Crowe MD	[id number]	Medicare	[id number]  A	Y	O	02/27/2025	03/08/2025	oxycodone hcl (ir) 20 mg tab	90	30	Guru Crowe MD	[id number]	Medicare	[id number]  A	N	O	01/30/2025	02/06/2025	morphine sulf er 30 mg tablet	60	30	Guru Crowe MD	[id number]	Medicare	[id number]  A	N	O	01/30/2025	02/06/2025	oxycodone hcl (ir) 20 mg tab	90	30	Guru Crowe MD	[id number]	Medicare	[id number]  A	N	O	01/07/2025	01/08/2025	morphine sulf er 30 mg tablet	60	30	Guru Crowe MD	[id number]	Medicare	[id number]  A	N	O	01/07/2025	01/08/2025	oxycodone hcl (ir) 20 mg tab	90	30	Guru Crowe MD	[id number]	Medicare	[id number]  A	N	O	11/27/2024	12/06/2024	morphine sulf er 30 mg tablet	60	30	Guru Crowe MD	[id number]	Medicare	[id number]  A	N	O	11/27/2024	12/06/2024	oxycodone hcl (ir) 20 mg tab	90	30	Guru Crowe MD	[id number]	Medicare	[id number]  A	N	O	10/31/2024	11/08/2024	morphine sulf er 30 mg tablet	60	30	Guru Crowe MD	[id number]	Medicare	[id number]  A	N	O	10/31/2024	11/08/2024	oxycodone hcl (ir) 20 mg tab	90	30	Guru Crowe MD	[id number]	Medicare	[id number]  A	N	O	10/03/2024	10/09/2024	oxycodone hcl (ir) 20 mg tab	90	30	Guru Crowe MD	[id number]	Medicare	[id number]    -------------------------------------------------------------------------------------------------------    ITEMS UNCHECKED ARE NOT PRESENT    PHYSICAL:  Vital Signs Last 24 Hrs  T(C): 36.4 (24 Mar 2025 05:30), Max: 36.8 (23 Mar 2025 20:23)  T(F): 97.5 (24 Mar 2025 05:30), Max: 98.3 (23 Mar 2025 20:23)  HR: 80 (24 Mar 2025 10:30) (73 - 99)  BP: 107/64 (24 Mar 2025 05:30) (107/64 - 110/61)  BP(mean): --  RR: 16 (24 Mar 2025 05:30) (16 - 18)  SpO2: 98% (24 Mar 2025 10:30) (93% - 100%)    Parameters below as of 24 Mar 2025 10:30  Patient On (Oxygen Delivery Method): nasal cannula      GENERAL:  [ ]Cachexia  [ ] Frail  [X ]Awake  [X ]Oriented   [ ]Lethargic  [ ]Unarousable  [ ]Verbal  [ ]Non-Verbal    BEHAVIORAL:   [ ] Anxiety  [ ] Delirium [ ] Agitation [ ] Other    HEENT:   [X ]Normal   [ ]Dry mouth   [ ]ET Tube/Trach  [ ]Oral lesions    PULMONARY:   [X ]Clear              [ ]Tachypnea  [ ]Audible excessive secretions   [ ]Rhonchi        [ ]Right [ ]Left [ ]Bilateral  [ ]Crackles        [ ]Right [ ]Left [ ]Bilateral  [ ]Wheezing     [ ]Right [ ]Left [ ]Bilateral  [ ]Diminished breath sounds [ ]right [ ]left [ ]bilateral    CARDIOVASCULAR:    [X ]Regular [ ]Irregular [ ]Tachy  [ ]Ridge [ ]Murmur [ ]Other    GASTROINTESTINAL:  [X ]Soft  [ ]Distended   [X ]+BS  [X ]Non tender [ ]Tender  [ ]Other [ ]PEG [ ]OGT/ NGT      GENITOURINARY:  [X ]Normal [ ] Incontinent   [ ]Oliguria/Anuria   [ ]Camargo    MUSCULOSKELETAL:   [X ]Normal   [ ]Weakness  [ ]Bed/Wheelchair bound [ ]Edema    NEUROLOGIC:   [X ]No focal deficits  [ ]Cognitive impairment  [ ]Dysphagia [ ]Dysarthria [ ]Paresis [ ]Other     SKIN:   [X ]Normal  [ ]Rash  [ ]Other  [ ]Pressure ulcer(s)       Present on admission [ ]y [ ]n    -------------------------------------------------------------------------------------------------------    LABS:                                   10.9   4.21  )-----------( 204      ( 18 Mar 2025 07:06 )             33.0     03-18    138  |  100  |  13  ----------------------------<  95  3.5   |  29  |  0.45[L]    Ca    8.9      18 Mar 2025 07:06  Phos  4.1     03-18  Mg     1.90     03-18    TPro  5.8[L]  /  Alb  3.2[L]  /  TBili  0.2  /  DBili  x   /  AST  13  /  ALT  6   /  AlkPhos  74  03-18      -------------------------------------------------------------------------------------------------------    CRITICAL CARE:  [ ]Shock Present  [ ]Septic [ ]Cardiogenic [ ]Neurologic [ ]Hypovolemic [ ]Undifferentiated    [ ]Vasopressors [ ]Inotropes    [ ]Respiratory failure present [ ]Acute  [ ]Chronic [ ]Hypoxic  [ ]Hypercarbic [ ]Mixed   [ ]Mechanical Ventilation  [ ]Trach collar   [ ]Non-invasive ventilatory support   [ ]High-Flow   [ ]Oxygen mask/venti     [ ]Other organ failure     -------------------------------------------------------------------------------------------------------    RADIOLOGY & ADDITIONAL STUDIES:     < from: Xray Chest 1 View-PORTABLE IMMEDIATE (Xray Chest 1 View-PORTABLE IMMEDIATE .) (03.12.25 @ 13:48) >    IMPRESSION:  Bilateral upper lobe opacities with 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  No acute pulmonary or cardiovascular disease.    < end of copied text >    -------------------------------------------------------------------------------------------------------  MEDICATIONS:     MEDICATIONS  (STANDING):  albuterol/ipratropium for Nebulization 3 milliLiter(s) Nebulizer every 6 hours  baclofen 5 milliGRAM(s) Oral every 8 hours  baclofen 5 milliGRAM(s) Oral once  chlorhexidine 2% Cloths 1 Application(s) Topical daily  clonazePAM  Tablet 1 milliGRAM(s) Oral at bedtime  clonazePAM  Tablet 0.5 milliGRAM(s) Oral <User Schedule>  colchicine 0.6 milliGRAM(s) Oral daily  fluticasone propionate/ salmeterol 100-50 MICROgram(s) Diskus 1 Dose(s) Inhalation two times a day  gabapentin 100 milliGRAM(s) Oral three times a day  gabapentin 100 milliGRAM(s) Oral once  heparin  Infusion.  Unit(s)/Hr (11 mL/Hr) IV Continuous <Continuous>  influenza  Vaccine (HIGH DOSE) 0.5 milliLiter(s) IntraMuscular once  levothyroxine 112 MICROGram(s) Oral daily  lidocaine   4% Patch 1 Patch Transdermal daily  lidocaine   4% Patch 1 Patch Transdermal every 24 hours  lidocaine   4% Patch 1 Patch Transdermal every 24 hours  methylnaltrexone Injectable 12 milliGRAM(s) SubCutaneous once  morphine ER Tablet 30 milliGRAM(s) Oral every 8 hours  naloxegol 25 milliGRAM(s) Oral daily  nicotine - 21 mG/24Hr(s) Patch 1 Patch Transdermal daily  OLANZapine 5 milliGRAM(s) Oral at bedtime  pantoprazole    Tablet 40 milliGRAM(s) Oral every 12 hours  polyethylene glycol 3350 17 Gram(s) Oral daily  senna 2 Tablet(s) Oral at bedtime  sodium chloride 1 Gram(s) Oral two times a day  sodium chloride 0.65% Nasal 1 Spray(s) Both Nostrils three times a day    MEDICATIONS  (PRN):  aluminum hydroxide/magnesium hydroxide/simethicone Suspension 30 milliLiter(s) Oral every 4 hours PRN Dyspepsia  benzocaine/menthol Lozenge 1 Lozenge Oral three times a day PRN Sore Throat  heparin   Injectable 5000 Unit(s) IV Push every 6 hours PRN For aPTT less than 40  heparin   Injectable 2500 Unit(s) IV Push every 6 hours PRN For aPTT between 40 - 57  HYDROmorphone  Injectable 3 milliGRAM(s) IV Push every 4 hours PRN severe breakthrough pain  LORazepam     Tablet 1 milliGRAM(s) Oral every 8 hours PRN Anxiety  melatonin 3 milliGRAM(s) Oral at bedtime PRN Insomnia  ondansetron Injectable 4 milliGRAM(s) IV Push every 6 hours PRN Nausea and/or Vomiting  oxyCODONE    IR 20 milliGRAM(s) Oral every 4 hours PRN Moderate Pain (4 - 6)  oxyCODONE    IR 30 milliGRAM(s) Oral every 4 hours PRN Severe Pain (7 - 10)

## 2025-03-24 NOTE — PROGRESS NOTE ADULT - PROBLEM SELECTOR PLAN 3
Having severe  panic attacks   On klonopin and ativan   Haldol PRN   f/u BH Having severe panic attacks   On klonopin and ativan   Haldol PRN   f/u BH

## 2025-03-24 NOTE — CHART NOTE - NSCHARTNOTEFT_GEN_A_CORE
LAB ORDER DONE 63 y/o F former smoker, A. Fib, Scleroderma, Pericardial effusion on Colchicine, Asthma, and Newly dx SCLC p/w dyspnea. CTH and abd/pelv w/o gross mets, s/p carbo/etop 3/23. Psych following re anxiety, on Klonopin, C/b increasing pericardial effusion, thoracic consulted, no recs for drain. Started hep gtt pending potential emergent procedure for pericardial effusion.      Notified by RN that patient experiencing panic attack and hypoxic to the 80's on 6L nasal cannula. Attempted to place on NRB while paging RT for high flow nasal cannula.     Patient seen and assessed at bedside. Patient states that she is claustrophobic. 63 y/o F former smoker, A. Fib, Scleroderma, Pericardial effusion on Colchicine, Asthma, and Newly dx SCLC p/w dyspnea. CTH and abd/pelv w/o gross mets, s/p carbo/etop 3/23. Psych following re anxiety, on Klonopin, C/b increasing pericardial effusion, thoracic consulted, no recs for drain. Started hep gtt pending potential emergent procedure for pericardial effusion.      Notified by RN that patient experiencing panic attack and hypoxic to the 80's on 6L nasal cannula. Attempted to place on NRB while paging RT for high flow nasal cannula.     Patient seen and assessed at bedside. RT at bedside. Patient states that she is claustrophobic and can not tolerate NRB or high flow 3nasal cannula.    Lungs clear to auscultation b/l,     1mg of Haldol given, panic attack subsided. Stat albuterol inhaler given, pulse oximetry initially on toe, replaced on eat and patient saturating at 100%, will wean O2 as tolerated. Will continue with continuous pulse oximetry on the ear. 63 y/o F former smoker, A. Fib, Scleroderma, Pericardial effusion on Colchicine, Asthma, and Newly dx SCLC p/w dyspnea. CTH and abd/pelv w/o gross mets, s/p carbo/etop 3/23. Psych following re anxiety, on Klonopin, C/b increasing pericardial effusion, thoracic consulted, no recs for drain. Started hep gtt pending potential emergent procedure for pericardial effusion.      Notified by RN that patient experiencing panic attack and hypoxic to the 80's on 6L nasal cannula. Attempted to place on NRB while paging RT for high flow nasal cannula.     Patient seen and assessed at bedside. RT at bedside. Patient states that she is claustrophobic and can not tolerate NRB or high flow 3nasal cannula.    PE:  Lungs clear to auscultation b/l, denies shortness of breath, no signs of increased work of breathing.   Cardiac: Regular rate and rhythm, S1S2 present.   Abdominal:     1mg of Haldol ordered stat, panic attack subsided. Stat albuterol inhaler given, pulse oximetry initially on toe, replaced on eat and patient saturating at 100%, will wean O2 as tolerated. Will continue with continuous pulse oximetry on the ear. Patient is a 65 y/o F former smoker, A. Fib, Scleroderma, Pericardial effusion on Colchicine, Asthma, and Newly dx SCLC p/w dyspnea. CTH and abd/pelv w/o gross mets, s/p carbo/etop 3/23. Psych following re anxiety, on Klonopin, C/b increasing pericardial effusion, thoracic consulted, no recs for drain. Started hep gtt pending potential emergent procedure for pericardial effusion.      Notified by RN that patient experiencing panic attack and hypoxic to the 80's on 6L nasal cannula. Attempted to place on NRB while paging RT for high flow nasal cannula.     Patient seen and assessed at bedside. RT at bedside. Patient states that she is claustrophobic and can not tolerate NRB or high flow 3nasal cannula.    PE:  A&Ox4  Lungs clear to auscultation b/l, denies shortness of breath or wheezing, no accessory muscle use, no signs of increased work of breathing.   Cardiac: Regular rate and rhythm, S1S2 present.   Abdominal: normal bowel sounds, soft, nontender    -1mg of Haldol ordered stat, panic attack subsided before writer at bedside.   -Stat albuterol inhaler given, pulse oximetry initially on toe, now placed on ear and patient saturating at 100% on 3L nasal cannula.  -Will wean O2 as tolerated.   -Will continue with continuous pulse oximetry on the ear.  -Will continue to monitor closely.    Althea Aguilar PA-C   Medicine, p.[id number]

## 2025-03-25 LAB
ALBUMIN SERPL ELPH-MCNC: 3.2 G/DL — LOW (ref 3.3–5)
ALP SERPL-CCNC: 86 U/L — SIGNIFICANT CHANGE UP (ref 40–120)
ALT FLD-CCNC: 10 U/L — SIGNIFICANT CHANGE UP (ref 4–33)
ANION GAP SERPL CALC-SCNC: 10 MMOL/L — SIGNIFICANT CHANGE UP (ref 7–14)
APTT BLD: 72.9 SEC — HIGH (ref 24.5–35.6)
AST SERPL-CCNC: 23 U/L — SIGNIFICANT CHANGE UP (ref 4–32)
BASOPHILS # BLD AUTO: 0 K/UL — SIGNIFICANT CHANGE UP (ref 0–0.2)
BASOPHILS NFR BLD AUTO: 0 % — SIGNIFICANT CHANGE UP (ref 0–2)
BILIRUB SERPL-MCNC: 0.4 MG/DL — SIGNIFICANT CHANGE UP (ref 0.2–1.2)
BUN SERPL-MCNC: 8 MG/DL — SIGNIFICANT CHANGE UP (ref 7–23)
CALCIUM SERPL-MCNC: 8.6 MG/DL — SIGNIFICANT CHANGE UP (ref 8.4–10.5)
CHLORIDE SERPL-SCNC: 93 MMOL/L — LOW (ref 98–107)
CHLORIDE UR-SCNC: 174 MMOL/L — SIGNIFICANT CHANGE UP
CO2 SERPL-SCNC: 26 MMOL/L — SIGNIFICANT CHANGE UP (ref 22–31)
CREAT ?TM UR-MCNC: 64 MG/DL — SIGNIFICANT CHANGE UP
CREAT SERPL-MCNC: 0.36 MG/DL — LOW (ref 0.5–1.3)
EGFR: 112 ML/MIN/1.73M2 — SIGNIFICANT CHANGE UP
EGFR: 112 ML/MIN/1.73M2 — SIGNIFICANT CHANGE UP
EOSINOPHIL # BLD AUTO: 0 K/UL — SIGNIFICANT CHANGE UP (ref 0–0.5)
EOSINOPHIL NFR BLD AUTO: 0 % — SIGNIFICANT CHANGE UP (ref 0–6)
GLUCOSE SERPL-MCNC: 128 MG/DL — HIGH (ref 70–99)
HCT VFR BLD CALC: 29.1 % — LOW (ref 34.5–45)
HGB BLD-MCNC: 9.9 G/DL — LOW (ref 11.5–15.5)
IANC: 2.89 K/UL — SIGNIFICANT CHANGE UP (ref 1.8–7.4)
IMM GRANULOCYTES NFR BLD AUTO: 0.2 % — SIGNIFICANT CHANGE UP (ref 0–0.9)
LYMPHOCYTES # BLD AUTO: 1.12 K/UL — SIGNIFICANT CHANGE UP (ref 1–3.3)
LYMPHOCYTES # BLD AUTO: 26.2 % — SIGNIFICANT CHANGE UP (ref 13–44)
MAGNESIUM SERPL-MCNC: 1.7 MG/DL — SIGNIFICANT CHANGE UP (ref 1.6–2.6)
MCHC RBC-ENTMCNC: 29.6 PG — SIGNIFICANT CHANGE UP (ref 27–34)
MCHC RBC-ENTMCNC: 34 G/DL — SIGNIFICANT CHANGE UP (ref 32–36)
MCV RBC AUTO: 87.1 FL — SIGNIFICANT CHANGE UP (ref 80–100)
MONOCYTES # BLD AUTO: 0.26 K/UL — SIGNIFICANT CHANGE UP (ref 0–0.9)
MONOCYTES NFR BLD AUTO: 6.1 % — SIGNIFICANT CHANGE UP (ref 2–14)
NEUTROPHILS # BLD AUTO: 2.89 K/UL — SIGNIFICANT CHANGE UP (ref 1.8–7.4)
NEUTROPHILS NFR BLD AUTO: 67.5 % — SIGNIFICANT CHANGE UP (ref 43–77)
NRBC # BLD AUTO: 0 K/UL — SIGNIFICANT CHANGE UP (ref 0–0)
NRBC # FLD: 0 K/UL — SIGNIFICANT CHANGE UP (ref 0–0)
NRBC BLD AUTO-RTO: 0 /100 WBCS — SIGNIFICANT CHANGE UP (ref 0–0)
OSMOLALITY UR: 734 MOSM/KG — SIGNIFICANT CHANGE UP (ref 50–1200)
PHOSPHATE SERPL-MCNC: 2.6 MG/DL — SIGNIFICANT CHANGE UP (ref 2.5–4.5)
PLATELET # BLD AUTO: 214 K/UL — SIGNIFICANT CHANGE UP (ref 150–400)
POTASSIUM SERPL-MCNC: 3.9 MMOL/L — SIGNIFICANT CHANGE UP (ref 3.5–5.3)
POTASSIUM SERPL-SCNC: 3.9 MMOL/L — SIGNIFICANT CHANGE UP (ref 3.5–5.3)
POTASSIUM UR-SCNC: 33.7 MMOL/L — SIGNIFICANT CHANGE UP
PROT SERPL-MCNC: 6.1 G/DL — SIGNIFICANT CHANGE UP (ref 6–8.3)
RBC # BLD: 3.34 M/UL — LOW (ref 3.8–5.2)
RBC # FLD: 11.9 % — SIGNIFICANT CHANGE UP (ref 10.3–14.5)
SODIUM SERPL-SCNC: 129 MMOL/L — LOW (ref 135–145)
SODIUM UR-SCNC: 217 MMOL/L — SIGNIFICANT CHANGE UP
WBC # BLD: 4.28 K/UL — SIGNIFICANT CHANGE UP (ref 3.8–10.5)
WBC # FLD AUTO: 4.28 K/UL — SIGNIFICANT CHANGE UP (ref 3.8–10.5)

## 2025-03-25 PROCEDURE — 99232 SBSQ HOSP IP/OBS MODERATE 35: CPT

## 2025-03-25 PROCEDURE — 99233 SBSQ HOSP IP/OBS HIGH 50: CPT

## 2025-03-25 RX ORDER — APIXABAN 2.5 MG/1
5 TABLET, FILM COATED ORAL EVERY 12 HOURS
Refills: 0 | Status: DISCONTINUED | OUTPATIENT
Start: 2025-03-25 | End: 2025-03-31

## 2025-03-25 RX ORDER — CLONAZEPAM 0.5 MG/1
1 TABLET ORAL AT BEDTIME
Refills: 0 | Status: DISCONTINUED | OUTPATIENT
Start: 2025-03-25 | End: 2025-03-27

## 2025-03-25 RX ADMIN — HEPARIN SODIUM UNIT(S)/HR: 1000 INJECTION INTRAVENOUS; SUBCUTANEOUS at 08:13

## 2025-03-25 RX ADMIN — NICOTINE POLACRILEX 1 PATCH: 4 GUM, CHEWING ORAL at 07:52

## 2025-03-25 RX ADMIN — Medication 40 MILLIGRAM(S): at 18:18

## 2025-03-25 RX ADMIN — OLANZAPINE 5 MILLIGRAM(S): 10 TABLET ORAL at 22:06

## 2025-03-25 RX ADMIN — Medication 142.4 MILLIGRAM(S): at 13:20

## 2025-03-25 RX ADMIN — NICOTINE POLACRILEX 1 PATCH: 4 GUM, CHEWING ORAL at 19:16

## 2025-03-25 RX ADMIN — OXYCODONE HYDROCHLORIDE 30 MILLIGRAM(S): 30 TABLET ORAL at 18:19

## 2025-03-25 RX ADMIN — Medication 30 MILLIGRAM(S): at 22:07

## 2025-03-25 RX ADMIN — Medication 1 APPLICATION(S): at 11:32

## 2025-03-25 RX ADMIN — OXYCODONE HYDROCHLORIDE 30 MILLIGRAM(S): 30 TABLET ORAL at 11:28

## 2025-03-25 RX ADMIN — Medication 3 MILLIGRAM(S): at 00:51

## 2025-03-25 RX ADMIN — Medication 1 SPRAY(S): at 12:10

## 2025-03-25 RX ADMIN — Medication 3 MILLIGRAM(S): at 22:06

## 2025-03-25 RX ADMIN — Medication 30 MILLIGRAM(S): at 15:04

## 2025-03-25 RX ADMIN — Medication 3 MILLIGRAM(S): at 15:29

## 2025-03-25 RX ADMIN — Medication 1 GRAM(S): at 14:04

## 2025-03-25 RX ADMIN — APIXABAN 5 MILLIGRAM(S): 2.5 TABLET, FILM COATED ORAL at 18:18

## 2025-03-25 RX ADMIN — Medication 1 SPRAY(S): at 05:55

## 2025-03-25 RX ADMIN — BACLOFEN 5 MILLIGRAM(S): 10 INJECTION INTRATHECAL at 22:06

## 2025-03-25 RX ADMIN — GABAPENTIN 100 MILLIGRAM(S): 400 CAPSULE ORAL at 22:07

## 2025-03-25 RX ADMIN — CLONAZEPAM 0.5 MILLIGRAM(S): 0.5 TABLET ORAL at 05:55

## 2025-03-25 RX ADMIN — OXYCODONE HYDROCHLORIDE 30 MILLIGRAM(S): 30 TABLET ORAL at 19:19

## 2025-03-25 RX ADMIN — CLONAZEPAM 0.5 MILLIGRAM(S): 0.5 TABLET ORAL at 15:18

## 2025-03-25 RX ADMIN — Medication 1 GRAM(S): at 22:06

## 2025-03-25 RX ADMIN — Medication 30 MILLIGRAM(S): at 23:55

## 2025-03-25 RX ADMIN — GABAPENTIN 100 MILLIGRAM(S): 400 CAPSULE ORAL at 05:54

## 2025-03-25 RX ADMIN — CLONAZEPAM 1 MILLIGRAM(S): 0.5 TABLET ORAL at 22:07

## 2025-03-25 RX ADMIN — NICOTINE POLACRILEX 1 PATCH: 4 GUM, CHEWING ORAL at 11:30

## 2025-03-25 RX ADMIN — Medication 40 MILLIGRAM(S): at 05:54

## 2025-03-25 RX ADMIN — DEXAMETHASONE 101.6 MILLIGRAM(S): 0.5 TABLET ORAL at 12:09

## 2025-03-25 RX ADMIN — Medication 8 MILLIGRAM(S): at 12:09

## 2025-03-25 RX ADMIN — COLCHICINE 0.6 MILLIGRAM(S): 0.6 TABLET, FILM COATED ORAL at 11:29

## 2025-03-25 RX ADMIN — BACLOFEN 5 MILLIGRAM(S): 10 INJECTION INTRATHECAL at 14:06

## 2025-03-25 RX ADMIN — NICOTINE POLACRILEX 1 PATCH: 4 GUM, CHEWING ORAL at 11:31

## 2025-03-25 RX ADMIN — Medication 3 MILLIGRAM(S): at 14:29

## 2025-03-25 RX ADMIN — GABAPENTIN 100 MILLIGRAM(S): 400 CAPSULE ORAL at 14:04

## 2025-03-25 RX ADMIN — Medication 30 MILLIGRAM(S): at 05:54

## 2025-03-25 RX ADMIN — Medication 30 MILLIGRAM(S): at 23:07

## 2025-03-25 RX ADMIN — OXYCODONE HYDROCHLORIDE 30 MILLIGRAM(S): 30 TABLET ORAL at 12:28

## 2025-03-25 RX ADMIN — Medication 1 GRAM(S): at 05:55

## 2025-03-25 RX ADMIN — Medication 3 MILLIGRAM(S): at 19:58

## 2025-03-25 RX ADMIN — Medication 1 SPRAY(S): at 22:07

## 2025-03-25 RX ADMIN — BACLOFEN 5 MILLIGRAM(S): 10 INJECTION INTRATHECAL at 05:55

## 2025-03-25 RX ADMIN — Medication 30 MILLIGRAM(S): at 14:04

## 2025-03-25 RX ADMIN — Medication 112 MICROGRAM(S): at 05:54

## 2025-03-25 RX ADMIN — Medication 30 MILLIGRAM(S): at 06:54

## 2025-03-25 NOTE — PROGRESS NOTE ADULT - PROBLEM SELECTOR PLAN 7
For pain management   Plan for rehab after chemo    On discharge, patient to f/u with outpatient palliative care/supportive oncology with Dr. Kath Durand - emailed for appt    Palliative signing off

## 2025-03-25 NOTE — PROGRESS NOTE ADULT - ASSESSMENT
65 y/o F with PMHx of AF, scleroderma, asthma with prior intubations, hypothyroidism, pericardial effusion s/p pericardiocentesis (12/2024) and drain on colchicine presented to the ED due to SOB, chest pain, and back pain. Oncology evaluated her and recommended MRI brain and MRI chest/abdomen/pelvis with contrast to rule out metastasis. Was previously admitted in 12/2024 due to chest pain and SOB and found to have a pericardial effusion with evidence of tamponade physiology. She underwent urgent pericardiocentesis with placement of drain which was removed on 12/26. She was started on colchicine and NSAIDs. Hospital course was complicated by AF with RVR which was new onset and was suspected to be due to drain placement. After drain was removed patient continued to have pAF and was started on Eliquis. Presented to the ED on 02/13/25 for left sided chest pain radiating to the back. Repeat TTE demonstrated interval increase in pericardial effusion (moderate-large adjacent to RA with no evidence of tamponade) and CTS was consulted for possible pericardial window and recommended no acute intervention. 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 Pulmonary consulted and recommended transfer to [facility] for bronchoscopy, EBUS and hilar mass biopsy. Patient transferred to [facility] on 02/20/25 and underwent EBUS on 02/21/25. 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

## 2025-03-25 NOTE — PROGRESS NOTE ADULT - PROBLEM SELECTOR PLAN 4
- recently diagnosed  - Unable to tolerate MRI A/P and brain d/t claustrophobia    - CT chest on 2/15- MEDIASTINUM AND JEREMIAS: Anterior subcarinal lymph node measures 1.8 cm in short axis, stable. There is a soft tissue density, measuring up to 4.2 cm in the prevascular space extending into the subcutaneous left main pulmonary artery region, this is more pronounced on today's study.   - Per onc, plan is to start inpatient chemo treatment and RT- patient thinking about it   - CT imaging showing no mets   - Follows with Dr. Hall at [facility]  - pericardial effusion, f/u CT surg recs  - Inpatient chemo started

## 2025-03-25 NOTE — PROGRESS NOTE ADULT - SUBJECTIVE AND OBJECTIVE BOX
Indication of Geriatrics and Palliative Medicine Services:  [X  ] Complex Medical Decision Making   [X  ] Symptom/Pain management     DNR on chart: No    INTERVAL EVENTS: Patient seen this AM, in no distress. Patient was about to start PT but says she will only do it with her friend Sam here. When asking is she wants to go to rehab, she says she has to discuss with Sam.     -------------------------------------------------------------------------------------------------------    PRESENT SYMPTOMS:     [ ]Unable to self-report - see [ ] CPOT [ ] PAINADS [ ] RDOS  Source if other than patient:  [ ]Family   [ ]Team     PAIN   1. Location- Left chest   2. Radiation-  Left arm, back   3. Quality- Sharp   4. Timing- Constant   5. Minimal acceptable level/pain goal- 4/10   6. Aggravating factors-   7. QOL impact- Severe     Dyspnea:                           [ ]Mild [ ]Moderate [ ]Severe  Anxiety:                             [ ]Mild [ ]Moderate [ ]Severe  Fatigue:                             [ ]Mild [ ]Moderate [ ]Severe  Nausea:                             [ x]Mild [ ]Moderate [ ]Severe  Loss of appetite:                [ ]Mild [x ]Moderate [ ]Severe  Constipation:                     [ ]Mild [ ]Moderate [ ]Severe  Other Symptoms:  [X ]All other review of systems negative     -------------------------------------------------------------------------------------------------------    I STOP: [id number]    Prescription Information      PDI Filter:    PDI	Current Rx	Drug Type	Rx Written	Rx Dispensed	Drug	Quantity	Days Supply	Prescriber Name	Prescriber KHOA #	Payment Method	Dispenser  A	Y	O	02/27/2025	03/08/2025	morphine sulf er 30 mg tablet	60	30	Guru Crowe MD	[id number]	Medicare	[id number]  A	Y	O	02/27/2025	03/08/2025	oxycodone hcl (ir) 20 mg tab	90	30	Guru Crowe MD	[id number]	Medicare	[id number]  A	N	O	01/30/2025	02/06/2025	morphine sulf er 30 mg tablet	60	30	Guru Crowe MD	[id number]	Medicare	[id number]  A	N	O	01/30/2025	02/06/2025	oxycodone hcl (ir) 20 mg tab	90	30	Guru Crowe MD	[id number]	Medicare	[id number]  A	N	O	01/07/2025	01/08/2025	morphine sulf er 30 mg tablet	60	30	Guru Crowe MD	[id number]	Medicare	[id number]  A	N	O	01/07/2025	01/08/2025	oxycodone hcl (ir) 20 mg tab	90	30	Guru Crowe MD	[id number]	Medicare	[id number]  A	N	O	11/27/2024	12/06/2024	morphine sulf er 30 mg tablet	60	30	Guru Crowe MD	[id number]	Medicare	[id number]  A	N	O	11/27/2024	12/06/2024	oxycodone hcl (ir) 20 mg tab	90	30	Guru Crowe MD	[id number]	Medicare	[id number]  A	N	O	10/31/2024	11/08/2024	morphine sulf er 30 mg tablet	60	30	Guru Crowe MD	[id number]	Medicare	[id number]  A	N	O	10/31/2024	11/08/2024	oxycodone hcl (ir) 20 mg tab	90	30	Guru Crowe MD	[id number]	Medicare	[id number]  A	N	O	10/03/2024	10/09/2024	oxycodone hcl (ir) 20 mg tab	90	30	Guru Crowe MD	[id number]	Medicare	[id number]    -------------------------------------------------------------------------------------------------------    ITEMS UNCHECKED ARE NOT PRESENT    PHYSICAL:  Vital Signs Last 24 Hrs  T(C): 36.5 (25 Mar 2025 13:15), Max: 37.1 (24 Mar 2025 20:34)  T(F): 97.7 (25 Mar 2025 13:15), Max: 98.7 (24 Mar 2025 20:34)  HR: 63 (25 Mar 2025 13:15) (54 - 68)  BP: 107/62 (25 Mar 2025 13:15) (107/62 - 126/60)  BP(mean): --  RR: 18 (25 Mar 2025 13:15) (17 - 18)  SpO2: 95% (25 Mar 2025 13:15) (95% - 98%)    Parameters below as of 25 Mar 2025 13:15  Patient On (Oxygen Delivery Method): nasal cannula  O2 Flow (L/min): 3    GENERAL:  [ ]Cachexia  [ ] Frail  [X ]Awake  [X ]Oriented   [ ]Lethargic  [ ]Unarousable  [ ]Verbal  [ ]Non-Verbal    BEHAVIORAL:   [ ] Anxiety  [ ] Delirium [ ] Agitation [ ] Other    HEENT:   [X ]Normal   [ ]Dry mouth   [ ]ET Tube/Trach  [ ]Oral lesions    PULMONARY:   [X ]Clear              [ ]Tachypnea  [ ]Audible excessive secretions   [ ]Rhonchi        [ ]Right [ ]Left [ ]Bilateral  [ ]Crackles        [ ]Right [ ]Left [ ]Bilateral  [ ]Wheezing     [ ]Right [ ]Left [ ]Bilateral  [ ]Diminished breath sounds [ ]right [ ]left [ ]bilateral    CARDIOVASCULAR:    [X ]Regular [ ]Irregular [ ]Tachy  [ ]Ridge [ ]Murmur [ ]Other    GASTROINTESTINAL:  [X ]Soft  [ ]Distended   [X ]+BS  [X ]Non tender [ ]Tender  [ ]Other [ ]PEG [ ]OGT/ NGT      GENITOURINARY:  [X ]Normal [ ] Incontinent   [ ]Oliguria/Anuria   [ ]Camargo    MUSCULOSKELETAL:   [X ]Normal   [ ]Weakness  [ ]Bed/Wheelchair bound [ ]Edema    NEUROLOGIC:   [X ]No focal deficits  [ ]Cognitive impairment  [ ]Dysphagia [ ]Dysarthria [ ]Paresis [ ]Other     SKIN:   [X ]Normal  [ ]Rash  [ ]Other  [ ]Pressure ulcer(s)       Present on admission [ ]y [ ]n    -------------------------------------------------------------------------------------------------------    LABS:                          9.9    4.28  )-----------( 214      ( 25 Mar 2025 06:29 )             29.1     03-25    129[L]  |  93[L]  |  8   ----------------------------<  128[H]  3.9   |  26  |  0.36[L]    Ca    8.6      25 Mar 2025 06:29  Phos  2.6     03-25  Mg     1.70     03-25    TPro  6.1  /  Alb  3.2[L]  /  TBili  0.4  /  DBili  x   /  AST  23  /  ALT  10  /  AlkPhos  86  03-25    -------------------------------------------------------------------------------------------------------    CRITICAL CARE:  [ ]Shock Present  [ ]Septic [ ]Cardiogenic [ ]Neurologic [ ]Hypovolemic [ ]Undifferentiated    [ ]Vasopressors [ ]Inotropes    [ ]Respiratory failure present [ ]Acute  [ ]Chronic [ ]Hypoxic  [ ]Hypercarbic [ ]Mixed   [ ]Mechanical Ventilation  [ ]Trach collar   [ ]Non-invasive ventilatory support   [ ]High-Flow   [ ]Oxygen mask/venti     [ ]Other organ failure     -------------------------------------------------------------------------------------------------------    RADIOLOGY & ADDITIONAL STUDIES:     < from: Xray Chest 1 View-PORTABLE IMMEDIATE (Xray Chest 1 View-PORTABLE IMMEDIATE .) (03.12.25 @ 13:48) >    IMPRESSION:  Bilateral upper lobe opacities with 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  No acute pulmonary or cardiovascular disease.    < end of copied text >    -------------------------------------------------------------------------------------------------------  MEDICATIONS:     MEDICATIONS  (STANDING):  albuterol/ipratropium for Nebulization 3 milliLiter(s) Nebulizer every 6 hours  baclofen 5 milliGRAM(s) Oral every 8 hours  baclofen 5 milliGRAM(s) Oral once  chlorhexidine 2% Cloths 1 Application(s) Topical daily  clonazePAM  Tablet 1 milliGRAM(s) Oral at bedtime  clonazePAM  Tablet 0.5 milliGRAM(s) Oral <User Schedule>  colchicine 0.6 milliGRAM(s) Oral daily  fluticasone propionate/ salmeterol 100-50 MICROgram(s) Diskus 1 Dose(s) Inhalation two times a day  gabapentin 100 milliGRAM(s) Oral three times a day  gabapentin 100 milliGRAM(s) Oral once  heparin  Infusion.  Unit(s)/Hr (11 mL/Hr) IV Continuous <Continuous>  influenza  Vaccine (HIGH DOSE) 0.5 milliLiter(s) IntraMuscular once  levothyroxine 112 MICROGram(s) Oral daily  lidocaine   4% Patch 1 Patch Transdermal daily  lidocaine   4% Patch 1 Patch Transdermal every 24 hours  lidocaine   4% Patch 1 Patch Transdermal every 24 hours  methylnaltrexone Injectable 12 milliGRAM(s) SubCutaneous once  morphine ER Tablet 30 milliGRAM(s) Oral every 8 hours  naloxegol 25 milliGRAM(s) Oral daily  nicotine - 21 mG/24Hr(s) Patch 1 Patch Transdermal daily  OLANZapine 5 milliGRAM(s) Oral at bedtime  pantoprazole    Tablet 40 milliGRAM(s) Oral every 12 hours  polyethylene glycol 3350 17 Gram(s) Oral daily  senna 2 Tablet(s) Oral at bedtime  sodium chloride 1 Gram(s) Oral two times a day  sodium chloride 0.65% Nasal 1 Spray(s) Both Nostrils three times a day    MEDICATIONS  (PRN):  aluminum hydroxide/magnesium hydroxide/simethicone Suspension 30 milliLiter(s) Oral every 4 hours PRN Dyspepsia  benzocaine/menthol Lozenge 1 Lozenge Oral three times a day PRN Sore Throat  heparin   Injectable 5000 Unit(s) IV Push every 6 hours PRN For aPTT less than 40  heparin   Injectable 2500 Unit(s) IV Push every 6 hours PRN For aPTT between 40 - 57  HYDROmorphone  Injectable 3 milliGRAM(s) IV Push every 4 hours PRN severe breakthrough pain  LORazepam     Tablet 1 milliGRAM(s) Oral every 8 hours PRN Anxiety  melatonin 3 milliGRAM(s) Oral at bedtime PRN Insomnia  ondansetron Injectable 4 milliGRAM(s) IV Push every 6 hours PRN Nausea and/or Vomiting  oxyCODONE    IR 20 milliGRAM(s) Oral every 4 hours PRN Moderate Pain (4 - 6)  oxyCODONE    IR 30 milliGRAM(s) Oral every 4 hours PRN Severe Pain (7 - 10)

## 2025-03-25 NOTE — CHART NOTE - NSCHARTNOTEFT_GEN_A_CORE
Follow-up with patient and family to address psychosocial needs, assess coping with recent diagnosis & prognosis, and provide emotional support. Patient receiving chemotherapy in patient and plan for next treatment in 3 weeks. (Carboplatin Etoposide). Pt and her HCP, Sam Mcmanus, are pursuing sub acute rehab placement upon d/c from [facility].  Sam shared being hopeful that DMT will improve pt's quality of life and prognosis.  He shared having a close friend who had same dx and states that cancer tx was helpful. Pt continues to be followed by  for anxiety and panic attacks. She was in calm spirits during our encounter today about to work with PT.   Palliative care team to sign off; goals are established.   Pt to be referred to out-pt palliative care team for on-going symptom management. She will f/u with [facility] for on-going DMT.

## 2025-03-25 NOTE — PROVIDER CONTACT NOTE (OTHER) - ACTION/TREATMENT ORDERED:
Provider order EKG and continuing to monitor Provider order EKG and continuing to monitor. ACP at bedside.

## 2025-03-25 NOTE — BH CONSULTATION LIAISON PROGRESS NOTE - NSBHASSESSMENTFT_PSY_ALL_CORE
Assessment: 64 /yo F w/ longstanding PPHx of anxiety previously on xanax, remote hx of inpatient psych hospitalization for suicidality, PMHx scleroderma with chronic pain manifestations, recent admission for cardiac tamponade, admitted for workup of SOB/chest pain leading to SCLC diagnosis, psychiatry consulted for worsening of anxiety I/s/o multiple medical complications and her son's recent death. Pt likely with multiple comorbid psychiatric disorders including SOURAV, MDD, claustrophobia, most prominent disorder at this time is worsening of anxiety, with panic-like symptoms. Given guarded medical prognosis, recent social stressors, appropriate to treat severe anxiety with standing benzodiazepine regimen at this time. Pt not amenable to starting SSRI treatment, but if becomes amenable may benefit from this as well.     3/15: Reports improved anxiety. No reported changes in breathing. Appears to be tolerating Klonopin. Will continue.  3/17: Endorses decreased panic attacks and no use of PRN ativan over this interval. C/w klonopin, recommend ongoing GOC discussions with pt and her HCP  3/18: Unable to tolerate MRI over this interval, unclear reason. DEC klonopin to 0.5 mg qAM and 1 mg qHS due to pt complaint of sleepiness. Can give extra klonopin 0.5 mg wafer PRN prior to MRI, or ativan 1 mg IV x1 prior to MRI. If pt continues to refuse MRI, would reevaluate pt's capacity to refuse which may be impacted by her severe anxiety, and would involve her HCP.   3/19: Pt calmer over this interval, can continue with klonopin 0.5 mg qD and klonopin 1 mg qHS. Denies SI/HI.   3/20: Pt more dysregulated over this interval. STOP remeron at night and START zyprexa 5 mg qHS for control of mood and to augment pain regimen. INC klonopin to 0.5 mg at 9 AM, 0.5 mg at 1PM, and 1 mg qHS.  3/21: Calmer over this interval. Continue meds, would hold if pt appears sedated.   3/24: intermittent panic episodes but agreeing with care largely.   3/25: no psychiatric PRNS required. Sleepy on examination, mentioned having pain overnight. Continue current regimen.     PLAN  - defer obs status to primary team   - C/W Zyprexa 5mg HS  - c/w klonopin 1 mg BID standing for treatment of anxiety  - ativan 1 mg PO/IV/IM q8h for breakthrough severe anxiety  - please monitor respiratory status closely given pt is also on baclofen and standing oxycodone, with limited baseline pulmonary reserve. HOLD benzodiazepines if concerned for oversedation.   - Dispo: has no psych barriers to discharge when med cleared; we support/advocate for a transition to an outpt oncological level of care.     Assessment: 64 /yo F w/ longstanding PPHx of anxiety previously on xanax, remote hx of inpatient psych hospitalization for suicidality, PMHx scleroderma with chronic pain manifestations, recent admission for cardiac tamponade, admitted for workup of SOB/chest pain leading to SCLC diagnosis, psychiatry consulted for worsening of anxiety I/s/o multiple medical complications and her son's recent death. Pt likely with multiple comorbid psychiatric disorders including SOURAV, MDD, claustrophobia, most prominent disorder at this time is worsening of anxiety, with panic-like symptoms. Given guarded medical prognosis, recent social stressors, appropriate to treat severe anxiety with standing benzodiazepine regimen at this time. Pt not amenable to starting SSRI treatment, but if becomes amenable may benefit from this as well.     3/15: Reports improved anxiety. No reported changes in breathing. Appears to be tolerating Klonopin. Will continue.  3/17: Endorses decreased panic attacks and no use of PRN ativan over this interval. C/w klonopin, recommend ongoing GOC discussions with pt and her HCP  3/18: Unable to tolerate MRI over this interval, unclear reason. DEC klonopin to 0.5 mg qAM and 1 mg qHS due to pt complaint of sleepiness. Can give extra klonopin 0.5 mg wafer PRN prior to MRI, or ativan 1 mg IV x1 prior to MRI. If pt continues to refuse MRI, would reevaluate pt's capacity to refuse which may be impacted by her severe anxiety, and would involve her HCP.   3/19: Pt calmer over this interval, can continue with klonopin 0.5 mg qD and klonopin 1 mg qHS. Denies SI/HI.   3/20: Pt more dysregulated over this interval. STOP remeron at night and START zyprexa 5 mg qHS for control of mood and to augment pain regimen. INC klonopin to 0.5 mg at 9 AM, 0.5 mg at 1PM, and 1 mg qHS.  3/21: Calmer over this interval. Continue meds, would hold if pt appears sedated.   3/24: intermittent panic episodes but agreeing with care largely.   3/25: no psychiatric PRNS required. Sleepy on examination, mentioned having pain overnight. Continue current regimen.     PLAN  - defer obs status to primary team   - C/W Zyprexa 5mg HS  - c/w klonopin 0.5mg BID plus 1mg qhs standing for treatment of anxiety  - ativan 1 mg PO/IV/IM q8h for breakthrough severe anxiety  - please monitor respiratory status closely given pt is also on baclofen and standing oxycodone, with limited baseline pulmonary reserve. HOLD benzodiazepines if concerned for oversedation.   - Dispo: has no psych barriers to discharge when med cleared; we support/advocate for a transition to an outpt oncological level of care.

## 2025-03-25 NOTE — PROGRESS NOTE ADULT - ASSESSMENT
63 yo woman, former smoker (47py; quit 12/2024) with h/o pAfib (on Eliquis), Hypothyroidism, ? h/o Sleroderma, h/o pericardial effusion s/p pericardiocentesis (dx in 12/2024; ? viral vs inflammatory, cytology negative for malignant cells, on Colchicine), Asthma/suspected COPD, and recently-dx SCLCa > dx in 2/2025, staging javier not yet completed and pt is tx naive and referred to [facility] from Med Onc clinic for expedited onc workup and urgent initiation of inpatient chemotherapy.    ACTIVE PROBLEMS  Recently dx SCLCa  GOC discussion, counseling  Encounter for antineoplastic chemotherapy  Pericardial effusion   Anxiety  Acute hypoxic resp failure  h/o Asthma/COPD (former smoker)  Hyponatremia 2/2 SIADH  Cancer related pain, anxiety  Substance use disorder  Constipation  Hypothyroidism  pAfib  Hypercoag state  Severe prot yahir malnutrtion  Immunocompromised 2/2 cancer, autoimmune disease    Recently dx SCLCa  GOC discussion, counseling  Encounter for antineoplastic chemotherapy  Staging w/u in progress   * Pt unable to tolerate MRI Brain, A/P (even with anesthesia- Versed), due to anxiety/claustrophobia   * NCHCT and NC CT A/P without overt evidence of mets   * TTE showing RV thickening with pericardial effusion, c/f mets; w/u in progress with Thoracic Surgery as below  In GOC conversation on 3/13, pt expressed uncertainty about pursuing systemic cancer treatment, especially if her cancer is determined to be advanced  However, in f/u conversation on 3/19 pt indicated that she would like to pursue systemic    inpt induction Carbo/Etop today     - q3w week cycles; concurrent immunotherapy is relatively contraindicated     - 3d infusion 3/23-25 with Dex/Zofran premeds     - Carbo AUC 4 d1, Etoposide 80mg/m2 d1-3 (both 20% dose reduction)     - Pt should receive Fluphila growth factor on 3/26  Rad Onc also made aware of pt- she is being considered for a 3-week course of RT to the lung mass (pt to f/u outpt)  Long-term prognosis: gaurded; pt is full code    Pericardial effusion   Dx in 12/2024 (previously thought to be ? autoimmune vs viral with positive Coxsackie B titers in 12/2024)  12/23 pericardial fluid cytology negative for malignant cells  3/12 TTE with small to mod pericardial effusion, without tamponade physiology  3/20 TTE with RV thickening and mod multifocal pericardial effusion, c/f mets by appearance  3/20 NC CT chest stable effusion (compared to 3/18 but enlarging compared to 2/15) with enlarging L suprahilar mass  Appreciate Thoracic Surgery consult: no role for pericardiocentesis vs pericardial window at this time (continuing heparin gtt in lieu of Eliquis in case emergent procedure becomes necessary)- Dr. Sherwood to review images on 3/24  Continuing Colchicine 0.6mg daily    Anxiety/depression  Not currently controlled, pt having regular panic attacks and nightmares (grieving the recent loss of her son)  BHT continues to follow closely  Continuing Klonopin 1mg BID  Continuing Ativan PO/IV/IM 1mg q8  Continuing Zyprexa 5mg nightly  1:1 Observation not required at this time  Worsening anxiety      Acute hypoxic resp failure  h/o Asthma/COPD (former smoker)  Pt desats to mid 80s% on RA and requires 4-6L NC supplemental O2 to maintain O2 sats > 92%  Likely due to burden of disease in lungs + pericardial effusion  Continuing supplemental O2 with weaning as tolerated and supportive resp care prn  Continuing Advair 100-50mcg MDI BID  Continuing duonebs q6/prn   Continuing Nicotine patch 21mg daily (6 weeks)  Mgmt of pericardial effusion as above    Hyponatremia 2/2 SIADH  Na normalized/stable at 135  Pt is asymptomatic  SIADH confirmed by 3/12 urine lytes  1L fluid restriction dced  on 3/20  Continuing Salt tabs 1gm BID, slight downtrend. Continue to monitor    Cancer related pain  Substance use disorder  Continuing Morphine ER 30mg q12 and dilaudid IV for PRN  Patient with underlying psychiatric disorder, has been medicated with opioids for extensive period of time, likely developed degree of dependency   Continuing Oxy IR 20mg q8/prn  Continuing Gabapentin 100mg TID  Continuing Baclofen 5mg q8/prn    Constipation  Improved  Possibly due to opioids +/- AID  3/12 AXR negative for ileus/obstruction  Continuing bowel regimen (Miralax, Senna) for OIC prevention    Hypothyroidism: 3/12 TFTs wnl; continuing LT4 112mcg daily    pAfib  Hypercoag state  Eliquis transitioned to heparin gtt in anticipation of possible pericardial procedure as noted above    Severe prot yahir malnutrtion: appreciate RD eval; continuing regular diet

## 2025-03-25 NOTE — CHART NOTE - NSCHARTNOTEFT_GEN_A_CORE
NUTRITION FOLLOW UP NOTE          Source: Patient A&Ox 3   Family [ ]     RN [ x ]    Chart/Medical Record [ x ]     Pt seen for nutrition follow up due to severe malnutrition.    MEDICAL COURSE  Per chart review, patient is a 63 yo woman, former smoker (47py; quit 12/2024) with h/o pAfib (on Eliquis), Hypothyroidism, ? h/o Sleroderma, h/o pericardial effusion s/p pericardiocentesis (dx in 12/2024; ? viral vs inflammatory, cytology negative for malignant cells, on Colchicine), Asthma/suspected COPD, and recently-dx SCLCa > dx in 2/2025, staging javier not yet completed and pt is tx naive and referred to [facility] from Med Onc clinic for expedited onc workup and urgent initiation of inpatient chemotherapy.    ACTIVE DIET ORDER  Diet, Regular:   1000mL Fluid Restriction ([id number]) (03-23-25 @ 18:24)    NUTRITION COURSE  Patient seen in her room for severe protein calorie malnutrition follow up. Patient endorses poor oral intake in house, noted 0-25%, 51-75% per RN flow sheet. Intake varies from meals. Food and fluid preferences explored this tour and entered in [facility]. No reported GI issues such as nausea/vomiting/diarrhea/constipation, on bowel regimen (Senna, polyethylene glycol) and ondasectron PRN; last bowel movement documented 3/19 per RN flow sheet. Patient unable to recall last bowel movement, stated last bowel movement was within a week. Patient continues NaCl 1gm TID for hyponatremia on fluid restriction. Per care note, Patient requested to be discharged to home when medically optimized. RDN to remain available for further nutrition intervention as indicated.     PERTINENT MEDICATION  MEDICATIONS  (STANDING):  albuterol/ipratropium for Nebulization 3 milliLiter(s) Nebulizer every 6 hours  apixaban 5 milliGRAM(s) Oral every 12 hours  baclofen 5 milliGRAM(s) Oral every 8 hours  chlorhexidine 2% Cloths 1 Application(s) Topical daily  clonazePAM  Tablet 1 milliGRAM(s) Oral at bedtime  clonazePAM  Tablet 0.5 milliGRAM(s) Oral <User Schedule>  colchicine 0.6 milliGRAM(s) Oral daily  fluticasone propionate/ salmeterol 100-50 MICROgram(s) Diskus 1 Dose(s) Inhalation two times a day  gabapentin 100 milliGRAM(s) Oral three times a day  heparin  Infusion.  Unit(s)/Hr (11 mL/Hr) IV Continuous <Continuous>  influenza  Vaccine (HIGH DOSE) 0.5 milliLiter(s) IntraMuscular once  levothyroxine 112 MICROGram(s) Oral daily  lidocaine   4% Patch 1 Patch Transdermal every 24 hours  methylnaltrexone Injectable 12 milliGRAM(s) SubCutaneous once  morphine ER Tablet 30 milliGRAM(s) Oral every 8 hours  naloxegol 25 milliGRAM(s) Oral daily  OLANZapine 5 milliGRAM(s) Oral at bedtime  pantoprazole    Tablet 40 milliGRAM(s) Oral every 12 hours  polyethylene glycol 3350 17 Gram(s) Oral daily  senna 2 Tablet(s) Oral at bedtime  sodium chloride 1 Gram(s) Oral three times a day  sodium chloride 0.65% Nasal 1 Spray(s) Both Nostrils three times a day    MEDICATIONS  (PRN):  acetaminophen     Tablet .. 650 milliGRAM(s) Oral once PRN chemotherapy reaction  aluminum hydroxide/magnesium hydroxide/simethicone Suspension 30 milliLiter(s) Oral every 4 hours PRN Dyspepsia  benzocaine/menthol Lozenge 1 Lozenge Oral three times a day PRN Sore Throat  diphenhydrAMINE Injectable 25 milliGRAM(s) IV Push once PRN chemotherapy reaction  famotidine Injectable 20 milliGRAM(s) IV Push once PRN chemotherapy reaction  heparin   Injectable 5000 Unit(s) IV Push every 6 hours PRN For aPTT less than 40  heparin   Injectable 2500 Unit(s) IV Push every 6 hours PRN For aPTT between 40 - 57  HYDROmorphone  Injectable 3 milliGRAM(s) IV Push every 4 hours PRN severe breakthrough pain  LORazepam     Tablet 1 milliGRAM(s) Oral every 8 hours PRN Anxiety  melatonin 3 milliGRAM(s) Oral at bedtime PRN Insomnia  methylPREDNISolone sodium succinate Injectable 125 milliGRAM(s) IV Push once PRN chemotherapy reaction  ondansetron Injectable 4 milliGRAM(s) IV Push every 6 hours PRN Nausea and/or Vomiting  oxyCODONE    IR 20 milliGRAM(s) Oral every 4 hours PRN Moderate Pain (4 - 6)  oxyCODONE    IR 30 milliGRAM(s) Oral every 4 hours PRN Severe Pain (7 - 10)      PERTINENT LABS  03-25    129[L]  |  93[L]  |  8   ----------------------------<  128[H]  3.9   |  26  |  0.36[L]    Ca    8.6      25 Mar 2025 06:29  Phos  2.6     03-25  Mg     1.70     03-25    TPro  6.1  /  Alb  3.2[L]  /  TBili  0.4  /  DBili  x   /  AST  23  /  ALT  10  /  AlkPhos  86  03-25                          9.9    4.28  )-----------( 214      ( 25 Mar 2025 06:29 )             29.1        POCT TRENDING x 72 hrs      PHYSICAL ASSESSMENT  EDEMA:  SKIN:     ANTHROPOMETRICS  Height (cm): 175.3 (03-11-25), 175.26 (03-11-25), 175.3 (02-21-25), 175.3 (02-13-25), 175.3 (12-20-24)  Weight (kg): 63.5 (03-11-25), 69.85 (03-11-25), 65.9 (02-21-25), 77.1 (02-13-25), 67.1 (12-20-24)  BMI (kg/m2): 20.7 (03-11-25), 22.7 (03-11-25), 21.4 (02-21-25), 25.1 (02-13-25), 21.8 (12-20-24)  IBW:   +/- 10%    WEIGHT ASSESSMENT: per RN flow sheet in KG  Daily     Daily   % weight change :     ESTIMATED NEEDS ASSESSMENT  [  ] No change in need assessment, weight used:   [  ] recalculated, weight used:   Estimated Energy:  Kcal/kg/day (  kcal/kg)  Estimated Protein:  gm/kg/day (  gm/kg)    NUTRITION FOCUSED PHYSICAL EXAM  [  ] conducted  [  ] deferred  [  ] not applicable;  previously conducted by RD dated   Muscle wasting : [  ] temporal [  ] clavicle [  ] shoulder [  ] scapula [  ] thigh [  ] calf [  ] dorsal hand    Fat Loss :  [  ] buccal [  ] orbital [  ] triceps [  ] ribs     PREVIOUS NUTRITION DIAGNOSIS  [  ] Severe/Moderate malnutrition  [  ] Increased Nutrient Needs  [  ] Altered Nutrition Related Labs  [  ] Unintentional Weight Loss  [  ] Inadequate Oral Intake  [  ] Inadequate Protein Energy Intake  [  ] Inadequate Energy Intake  [  ] Food and Nutrition Knowledge Deficit  [  ] Underweight/Morbid Obesity  [  ] No active nutrition diagnosis at this time   Nutrition Diagnosis is : [  ] ongoing  [  ] resolved [  ] not applicable   New Nutrition Diagnosis : [  ] ongoing  [  ] resolved [  ] not applicable     EDUCATION  [  ] Given today        Type of education provided:   [  ] Given on previous assessment by RD   [  ] Not applicable 2/2 cognitive deficit  [  ] Pt refusal of education offered   [  ] Not applicable 2/2 current prognosis  [  ] Not warranted at present    RECOMMENDATION  [ x ] Continue present PO diet/EN order as prescribed  [ x ] Please consider adding appetite stimulant/multivitamin/bowel regimen  [ x ] Honor food and fluid preferences as able.  [ x ] Replete electrolytes (Na, K, Phos, Mg) PRN    MONITORING AND EVALUATION   [ x ] Monitor PO intake/EN tolerance, skin integrity, bowel regimen, and nutrition pertinent labs.  [ x ] Tolerance to diet prescription [ x ] weights [ x ] follow up per protocol

## 2025-03-25 NOTE — CHART NOTE - NSCHARTNOTEFT_GEN_A_CORE
Rheumatology signing off  Patient with active cancer, planned for chemo with ongoing GOC discussion.   -Please obtain XRay of c-spine, L-spine, hands, and feet whenever possible during the stay and call back rheumatology for re review if necessary.

## 2025-03-25 NOTE — PROGRESS NOTE ADULT - PROBLEM SELECTOR PLAN 1
Chest pain from mediastinal mass   Patient with hx of diffuse pain related to her underlying scleroderma for which she shared at one point she was fentanyl 200 mcg patch and was weaned down  - Home regimen: MS Contin 30 mg BID and oxycodone 20 mg q4h PRN (long term dose) given by outpatient pain specialist originally for scleroderma pain  - PRN use in past 24 hours from 8 AM to 8 AM: oxycodone 30 mg x 2 doses, IV dilaudid 3 mg x 2 doses   - Patient sometimes complains of non cancer pain, concern more severe anxiety rather than physical pain. Improves with haldol     Plan and Recommendations:   - opioids:   > c/w oxycodone IR 20 mg q4h PRN for moderate pain and oxycodone IR 30 mg q4h prn for severe pain  > c/w IV dilaudid 3 mg q4h PRN for severe breakthrough pain  > c/w MS Contin 30 mg TID   - Bowel regimen    On discharge-  > c/w oxycodone IR 30 mg q4h prn for severe pain  > c/w MS Contin 30 mg TID   - Bowel regimen

## 2025-03-25 NOTE — BH CONSULTATION LIAISON PROGRESS NOTE - NSBHFUPINTERVALHXFT_PSY_A_CORE
Chart reviewed. No lorazepem or haloperidol PRNs overnight/intervally. Started etoposide yesterday. On exam, she has her eyes closed, sitting upright in bed, not willing to engage in interview after multiple attempts to engage her through both verbal and tactile stimulation. States that she was in pain last night and wants to sleep now.

## 2025-03-25 NOTE — BH CONSULTATION LIAISON PROGRESS NOTE - MSE UNSTRUCTURED FT
The patient appears stated age, fair hygiene, dressed appropriately. She was calm, cooperative with the interview. She maintained appropriate eye contact. No psychomotor agitation or retardation. Steady gait. The patient's speech was fluent, normal in tone, rate, and volume. The patient's mood is "fine." Affect is anxious. The patient's thoughts are goal directed. She denies any delusions or hallucinations. She denies any suicidal or homicidal ideation, intent, or plan. Insight is fair. Judgement is fair. Impulse control has been somewhat impaired over this interval. 
Not cooperating with exam as sleepy and didn't want to be engaged. 
On exam, she is calm, cooperative, though a bit dramatic/playful at times. Endorses anxiety/insomnia. Denied safety concerns or AVH, SI, HI. Open to ongoing medical care. Insight fair. 
The patient appears stated age, fair hygiene, dressed appropriately. She was calm, cooperative with the interview. She maintained appropriate eye contact. No psychomotor agitation or retardation. The patient's speech was fluent, normal in tone, rate, and volume. The patient's mood is "fine." Affect is anxious. The patient's thoughts are goal directed. She denies any delusions or hallucinations. She denies any suicidal or homicidal ideation, intent, or plan. Insight is fair. Judgement is fair. Impulse control is improved over this interval.
The patient appears stated age, fair hygiene, dressed appropriately. She was calm, cooperative with the interview. She maintained appropriate eye contact. No psychomotor agitation or retardation. Steady gait. The patient's speech was fluent, normal in tone, rate, and volume. The patient's mood is "fine." Affect is anxious. The patient's thoughts are goal directed. She denies any delusions or hallucinations. She denies any suicidal or homicidal ideation, intent, or plan. Insight is fair. Judgement is fair. Impulse control has been somewhat impaired over this interval.

## 2025-03-25 NOTE — PROGRESS NOTE ADULT - SUBJECTIVE AND OBJECTIVE BOX
Deven Archuleta MD  Academic Hospitalist  Pager [phone]  Email: [email or web address]  Available on [employer] Teams        PROGRESS NOTE:     Patient is a 64y old  Female who presents with a chief complaint of Shortness of breath and chest pain (25 Mar 2025 13:44)      SUBJECTIVE / OVERNIGHT EVENTS:  Patient seen and examined this morning. Patient more lethargic this morning. Does not wish to engage.   ADDITIONAL REVIEW OF SYSTEMS:  No f/c    MEDICATIONS  (STANDING):  albuterol/ipratropium for Nebulization 3 milliLiter(s) Nebulizer every 6 hours  apixaban 5 milliGRAM(s) Oral every 12 hours  baclofen 5 milliGRAM(s) Oral every 8 hours  chlorhexidine 2% Cloths 1 Application(s) Topical daily  clonazePAM  Tablet 0.5 milliGRAM(s) Oral <User Schedule>  clonazePAM  Tablet 1 milliGRAM(s) Oral at bedtime  colchicine 0.6 milliGRAM(s) Oral daily  fluticasone propionate/ salmeterol 100-50 MICROgram(s) Diskus 1 Dose(s) Inhalation two times a day  gabapentin 100 milliGRAM(s) Oral three times a day  heparin  Infusion.  Unit(s)/Hr (11 mL/Hr) IV Continuous <Continuous>  influenza  Vaccine (HIGH DOSE) 0.5 milliLiter(s) IntraMuscular once  levothyroxine 112 MICROGram(s) Oral daily  lidocaine   4% Patch 1 Patch Transdermal every 24 hours  methylnaltrexone Injectable 12 milliGRAM(s) SubCutaneous once  morphine ER Tablet 30 milliGRAM(s) Oral every 8 hours  naloxegol 25 milliGRAM(s) Oral daily  OLANZapine 5 milliGRAM(s) Oral at bedtime  pantoprazole    Tablet 40 milliGRAM(s) Oral every 12 hours  polyethylene glycol 3350 17 Gram(s) Oral daily  senna 2 Tablet(s) Oral at bedtime  sodium chloride 1 Gram(s) Oral three times a day  sodium chloride 0.65% Nasal 1 Spray(s) Both Nostrils three times a day    MEDICATIONS  (PRN):  acetaminophen     Tablet .. 650 milliGRAM(s) Oral once PRN chemotherapy reaction  aluminum hydroxide/magnesium hydroxide/simethicone Suspension 30 milliLiter(s) Oral every 4 hours PRN Dyspepsia  benzocaine/menthol Lozenge 1 Lozenge Oral three times a day PRN Sore Throat  diphenhydrAMINE Injectable 25 milliGRAM(s) IV Push once PRN chemotherapy reaction  famotidine Injectable 20 milliGRAM(s) IV Push once PRN chemotherapy reaction  heparin   Injectable 5000 Unit(s) IV Push every 6 hours PRN For aPTT less than 40  heparin   Injectable 2500 Unit(s) IV Push every 6 hours PRN For aPTT between 40 - 57  HYDROmorphone  Injectable 3 milliGRAM(s) IV Push every 4 hours PRN severe breakthrough pain  LORazepam     Tablet 1 milliGRAM(s) Oral every 8 hours PRN Anxiety  melatonin 3 milliGRAM(s) Oral at bedtime PRN Insomnia  methylPREDNISolone sodium succinate Injectable 125 milliGRAM(s) IV Push once PRN chemotherapy reaction  ondansetron Injectable 4 milliGRAM(s) IV Push every 6 hours PRN Nausea and/or Vomiting  oxyCODONE    IR 20 milliGRAM(s) Oral every 4 hours PRN Moderate Pain (4 - 6)  oxyCODONE    IR 30 milliGRAM(s) Oral every 4 hours PRN Severe Pain (7 - 10)      CAPILLARY BLOOD GLUCOSE        I&O's Summary    24 Mar 2025 07:01  -  25 Mar 2025 07:00  --------------------------------------------------------  IN: 150 mL / OUT: 1400 mL / NET: -1250 mL        PHYSICAL EXAM:  Vital Signs Last 24 Hrs  T(C): 36.5 (25 Mar 2025 13:15), Max: 37.1 (24 Mar 2025 20:34)  T(F): 97.7 (25 Mar 2025 13:15), Max: 98.7 (24 Mar 2025 20:34)  HR: 63 (25 Mar 2025 13:15) (54 - 68)  BP: 107/62 (25 Mar 2025 13:15) (107/62 - 126/60)  BP(mean): --  RR: 18 (25 Mar 2025 13:15) (17 - 18)  SpO2: 95% (25 Mar 2025 13:15) (95% - 98%)    Parameters below as of 25 Mar 2025 13:15  Patient On (Oxygen Delivery Method): nasal cannula  O2 Flow (L/min): 3    CONSTITUTIONAL: NAD, sleeping soundly, difficult to wake up. But upon awakening complains of intractable pain. More lethargic than yesterday in the morning.   RESPIRATORY: Normal respiratory effort; no respiratory distress, CTAB  CARDIOVASCULAR: No visible JVD, No lower extremity edema; S1S2, no m,r,g  ABDOMEN: Not guarding, does not appear distended, BS+  MUSCLOSKELETAL: no clubbing or cyanosis of digits; no joint swelling   PSYCH: AOx3, anxious    LABS:                        9.9    4.28  )-----------( 214      ( 25 Mar 2025 06:29 )             29.1     03-25    129[L]  |  93[L]  |  8   ----------------------------<  128[H]  3.9   |  26  |  0.36[L]    Ca    8.6      25 Mar 2025 06:29  Phos  2.6     03-25  Mg     1.70     03-25    TPro  6.1  /  Alb  3.2[L]  /  TBili  0.4  /  DBili  x   /  AST  23  /  ALT  10  /  AlkPhos  86  03-25    PTT - ( 25 Mar 2025 06:29 )  PTT:72.9 sec      Urinalysis Basic - ( 25 Mar 2025 06:29 )    Color: x / Appearance: x / SG: x / pH: x  Gluc: 128 mg/dL / Ketone: x  / Bili: x / Urobili: x   Blood: x / Protein: x / Nitrite: x   Leuk Esterase: x / RBC: x / WBC x   Sq Epi: x / Non Sq Epi: x / Bacteria: x          RADIOLOGY & ADDITIONAL TESTS:  Results Reviewed:   Imaging Personally Reviewed:  Electrocardiogram Personally Reviewed:    COORDINATION OF CARE:  Care Discussed with Consultants/Other Providers [Y/N]: Case discussed during interdisciplinary rounds with social work and case management  Prior or Outpatient Records Reviewed [Y/N]:

## 2025-03-26 LAB
ALBUMIN SERPL ELPH-MCNC: 3.3 G/DL — SIGNIFICANT CHANGE UP (ref 3.3–5)
ALP SERPL-CCNC: 77 U/L — SIGNIFICANT CHANGE UP (ref 40–120)
ALT FLD-CCNC: 18 U/L — SIGNIFICANT CHANGE UP (ref 4–33)
ANION GAP SERPL CALC-SCNC: 12 MMOL/L — SIGNIFICANT CHANGE UP (ref 7–14)
AST SERPL-CCNC: 30 U/L — SIGNIFICANT CHANGE UP (ref 4–32)
BASOPHILS # BLD AUTO: 0 K/UL — SIGNIFICANT CHANGE UP (ref 0–0.2)
BASOPHILS NFR BLD AUTO: 0 % — SIGNIFICANT CHANGE UP (ref 0–2)
BILIRUB SERPL-MCNC: 0.5 MG/DL — SIGNIFICANT CHANGE UP (ref 0.2–1.2)
BUN SERPL-MCNC: 11 MG/DL — SIGNIFICANT CHANGE UP (ref 7–23)
CALCIUM SERPL-MCNC: 8.7 MG/DL — SIGNIFICANT CHANGE UP (ref 8.4–10.5)
CHLORIDE SERPL-SCNC: 90 MMOL/L — LOW (ref 98–107)
CO2 SERPL-SCNC: 23 MMOL/L — SIGNIFICANT CHANGE UP (ref 22–31)
CREAT SERPL-MCNC: 0.37 MG/DL — LOW (ref 0.5–1.3)
EGFR: 111 ML/MIN/1.73M2 — SIGNIFICANT CHANGE UP
EGFR: 111 ML/MIN/1.73M2 — SIGNIFICANT CHANGE UP
EOSINOPHIL # BLD AUTO: 0 K/UL — SIGNIFICANT CHANGE UP (ref 0–0.5)
EOSINOPHIL NFR BLD AUTO: 0 % — SIGNIFICANT CHANGE UP (ref 0–6)
GLUCOSE SERPL-MCNC: 119 MG/DL — HIGH (ref 70–99)
HCT VFR BLD CALC: 29.2 % — LOW (ref 34.5–45)
HGB BLD-MCNC: 9.9 G/DL — LOW (ref 11.5–15.5)
IANC: 2.73 K/UL — SIGNIFICANT CHANGE UP (ref 1.8–7.4)
IMM GRANULOCYTES NFR BLD AUTO: 0.2 % — SIGNIFICANT CHANGE UP (ref 0–0.9)
LYMPHOCYTES # BLD AUTO: 1.11 K/UL — SIGNIFICANT CHANGE UP (ref 1–3.3)
LYMPHOCYTES # BLD AUTO: 27.7 % — SIGNIFICANT CHANGE UP (ref 13–44)
MAGNESIUM SERPL-MCNC: 1.6 MG/DL — SIGNIFICANT CHANGE UP (ref 1.6–2.6)
MCHC RBC-ENTMCNC: 29.8 PG — SIGNIFICANT CHANGE UP (ref 27–34)
MCHC RBC-ENTMCNC: 33.9 G/DL — SIGNIFICANT CHANGE UP (ref 32–36)
MCV RBC AUTO: 88 FL — SIGNIFICANT CHANGE UP (ref 80–100)
MONOCYTES # BLD AUTO: 0.16 K/UL — SIGNIFICANT CHANGE UP (ref 0–0.9)
MONOCYTES NFR BLD AUTO: 4 % — SIGNIFICANT CHANGE UP (ref 2–14)
NEUTROPHILS # BLD AUTO: 2.73 K/UL — SIGNIFICANT CHANGE UP (ref 1.8–7.4)
NEUTROPHILS NFR BLD AUTO: 68.1 % — SIGNIFICANT CHANGE UP (ref 43–77)
NRBC # BLD AUTO: 0 K/UL — SIGNIFICANT CHANGE UP (ref 0–0)
NRBC # FLD: 0 K/UL — SIGNIFICANT CHANGE UP (ref 0–0)
NRBC BLD AUTO-RTO: 0 /100 WBCS — SIGNIFICANT CHANGE UP (ref 0–0)
OSMOLALITY SERPL: 271 MOSM/KG — LOW (ref 275–295)
PHOSPHATE SERPL-MCNC: 2.6 MG/DL — SIGNIFICANT CHANGE UP (ref 2.5–4.5)
PLATELET # BLD AUTO: 221 K/UL — SIGNIFICANT CHANGE UP (ref 150–400)
POTASSIUM SERPL-MCNC: 3.6 MMOL/L — SIGNIFICANT CHANGE UP (ref 3.5–5.3)
POTASSIUM SERPL-SCNC: 3.6 MMOL/L — SIGNIFICANT CHANGE UP (ref 3.5–5.3)
PROT SERPL-MCNC: 5.8 G/DL — LOW (ref 6–8.3)
RBC # BLD: 3.32 M/UL — LOW (ref 3.8–5.2)
RBC # FLD: 12.1 % — SIGNIFICANT CHANGE UP (ref 10.3–14.5)
SODIUM SERPL-SCNC: 125 MMOL/L — LOW (ref 135–145)
WBC # BLD: 4.01 K/UL — SIGNIFICANT CHANGE UP (ref 3.8–10.5)
WBC # FLD AUTO: 4.01 K/UL — SIGNIFICANT CHANGE UP (ref 3.8–10.5)

## 2025-03-26 PROCEDURE — 99232 SBSQ HOSP IP/OBS MODERATE 35: CPT

## 2025-03-26 RX ORDER — NICOTINE POLACRILEX 4 MG/1
1 GUM, CHEWING ORAL DAILY
Refills: 0 | Status: DISCONTINUED | OUTPATIENT
Start: 2025-03-26 | End: 2025-03-31

## 2025-03-26 RX ORDER — BISACODYL 5 MG
5 TABLET, DELAYED RELEASE (ENTERIC COATED) ORAL AT BEDTIME
Refills: 0 | Status: DISCONTINUED | OUTPATIENT
Start: 2025-03-26 | End: 2025-03-31

## 2025-03-26 RX ORDER — POLYETHYLENE GLYCOL 3350 17 G/17G
17 POWDER, FOR SOLUTION ORAL
Refills: 0 | Status: DISCONTINUED | OUTPATIENT
Start: 2025-03-26 | End: 2025-03-31

## 2025-03-26 RX ORDER — IPRATROPIUM BROMIDE AND ALBUTEROL SULFATE .5; 2.5 MG/3ML; MG/3ML
3 SOLUTION RESPIRATORY (INHALATION) EVERY 6 HOURS
Refills: 0 | Status: DISCONTINUED | OUTPATIENT
Start: 2025-03-26 | End: 2025-03-31

## 2025-03-26 RX ADMIN — Medication 30 MILLIGRAM(S): at 13:48

## 2025-03-26 RX ADMIN — APIXABAN 5 MILLIGRAM(S): 2.5 TABLET, FILM COATED ORAL at 17:56

## 2025-03-26 RX ADMIN — OXYCODONE HYDROCHLORIDE 30 MILLIGRAM(S): 30 TABLET ORAL at 09:20

## 2025-03-26 RX ADMIN — IPRATROPIUM BROMIDE AND ALBUTEROL SULFATE 3 MILLILITER(S): .5; 2.5 SOLUTION RESPIRATORY (INHALATION) at 08:29

## 2025-03-26 RX ADMIN — Medication 30 MILLIGRAM(S): at 07:48

## 2025-03-26 RX ADMIN — OXYCODONE HYDROCHLORIDE 30 MILLIGRAM(S): 30 TABLET ORAL at 03:44

## 2025-03-26 RX ADMIN — OXYCODONE HYDROCHLORIDE 30 MILLIGRAM(S): 30 TABLET ORAL at 15:23

## 2025-03-26 RX ADMIN — GABAPENTIN 100 MILLIGRAM(S): 400 CAPSULE ORAL at 13:48

## 2025-03-26 RX ADMIN — Medication 1 SPRAY(S): at 21:12

## 2025-03-26 RX ADMIN — Medication 3 MILLIGRAM(S): at 22:24

## 2025-03-26 RX ADMIN — OXYCODONE HYDROCHLORIDE 30 MILLIGRAM(S): 30 TABLET ORAL at 19:56

## 2025-03-26 RX ADMIN — Medication 2 GRAM(S): at 21:10

## 2025-03-26 RX ADMIN — Medication 3 MILLIGRAM(S): at 18:57

## 2025-03-26 RX ADMIN — Medication 30 MILLIGRAM(S): at 21:09

## 2025-03-26 RX ADMIN — CLONAZEPAM 1 MILLIGRAM(S): 0.5 TABLET ORAL at 22:10

## 2025-03-26 RX ADMIN — Medication 40 MILLIGRAM(S): at 06:55

## 2025-03-26 RX ADMIN — Medication 30 MILLIGRAM(S): at 06:48

## 2025-03-26 RX ADMIN — Medication 40 MILLIGRAM(S): at 17:56

## 2025-03-26 RX ADMIN — NICOTINE POLACRILEX 1 PATCH: 4 GUM, CHEWING ORAL at 15:02

## 2025-03-26 RX ADMIN — OXYCODONE HYDROCHLORIDE 30 MILLIGRAM(S): 30 TABLET ORAL at 16:23

## 2025-03-26 RX ADMIN — OXYCODONE HYDROCHLORIDE 30 MILLIGRAM(S): 30 TABLET ORAL at 10:20

## 2025-03-26 RX ADMIN — GABAPENTIN 100 MILLIGRAM(S): 400 CAPSULE ORAL at 06:48

## 2025-03-26 RX ADMIN — Medication 1 GRAM(S): at 06:54

## 2025-03-26 RX ADMIN — Medication 2 GRAM(S): at 15:06

## 2025-03-26 RX ADMIN — Medication 1 SPRAY(S): at 06:54

## 2025-03-26 RX ADMIN — GABAPENTIN 100 MILLIGRAM(S): 400 CAPSULE ORAL at 21:09

## 2025-03-26 RX ADMIN — OLANZAPINE 5 MILLIGRAM(S): 10 TABLET ORAL at 21:17

## 2025-03-26 RX ADMIN — OXYCODONE HYDROCHLORIDE 30 MILLIGRAM(S): 30 TABLET ORAL at 04:44

## 2025-03-26 RX ADMIN — COLCHICINE 0.6 MILLIGRAM(S): 0.6 TABLET, FILM COATED ORAL at 13:49

## 2025-03-26 RX ADMIN — PEGFILGRASTIM-CBQV 6 MILLIGRAM(S): 6 INJECTION, SOLUTION SUBCUTANEOUS at 14:15

## 2025-03-26 RX ADMIN — Medication 30 MILLIGRAM(S): at 14:48

## 2025-03-26 RX ADMIN — Medication 3 MILLIGRAM(S): at 22:09

## 2025-03-26 RX ADMIN — CLONAZEPAM 0.5 MILLIGRAM(S): 0.5 TABLET ORAL at 06:48

## 2025-03-26 RX ADMIN — Medication 112 MICROGRAM(S): at 06:54

## 2025-03-26 RX ADMIN — BACLOFEN 5 MILLIGRAM(S): 10 INJECTION INTRATHECAL at 21:10

## 2025-03-26 RX ADMIN — NICOTINE POLACRILEX 1 PATCH: 4 GUM, CHEWING ORAL at 15:00

## 2025-03-26 RX ADMIN — Medication 5 MILLIGRAM(S): at 21:10

## 2025-03-26 RX ADMIN — NICOTINE POLACRILEX 1 PATCH: 4 GUM, CHEWING ORAL at 07:53

## 2025-03-26 RX ADMIN — POLYETHYLENE GLYCOL 3350 17 GRAM(S): 17 POWDER, FOR SOLUTION ORAL at 17:57

## 2025-03-26 RX ADMIN — Medication 3 MILLIGRAM(S): at 10:38

## 2025-03-26 RX ADMIN — Medication 1 APPLICATION(S): at 13:51

## 2025-03-26 RX ADMIN — CLONAZEPAM 0.5 MILLIGRAM(S): 0.5 TABLET ORAL at 15:02

## 2025-03-26 RX ADMIN — APIXABAN 5 MILLIGRAM(S): 2.5 TABLET, FILM COATED ORAL at 06:57

## 2025-03-26 RX ADMIN — OXYCODONE HYDROCHLORIDE 30 MILLIGRAM(S): 30 TABLET ORAL at 20:56

## 2025-03-26 RX ADMIN — Medication 1 SPRAY(S): at 15:07

## 2025-03-26 RX ADMIN — BACLOFEN 5 MILLIGRAM(S): 10 INJECTION INTRATHECAL at 06:53

## 2025-03-26 RX ADMIN — Medication 30 MILLIGRAM(S): at 22:09

## 2025-03-26 RX ADMIN — Medication 3 MILLIGRAM(S): at 17:57

## 2025-03-26 RX ADMIN — Medication 3 MILLIGRAM(S): at 11:38

## 2025-03-26 RX ADMIN — NICOTINE POLACRILEX 1 PATCH: 4 GUM, CHEWING ORAL at 19:33

## 2025-03-26 NOTE — PROGRESS NOTE ADULT - ASSESSMENT
65 yo woman, former smoker (47py; quit 12/2024) with h/o pAfib (on Eliquis), Hypothyroidism, ? h/o Sleroderma, h/o pericardial effusion s/p pericardiocentesis (dx in 12/2024; ? viral vs inflammatory, cytology negative for malignant cells, on Colchicine), Asthma/suspected COPD, and recently-dx SCLCa > dx in 2/2025, staging javier not yet completed and pt is tx naive and referred to [facility] from Med Onc clinic for expedited onc workup and urgent initiation of inpatient chemotherapy.    ACTIVE PROBLEMS  Recently dx SCLCa  GOC discussion, counseling  Encounter for antineoplastic chemotherapy  Pericardial effusion   Anxiety  Acute hypoxic resp failure  h/o Asthma/COPD (former smoker)  Hyponatremia 2/2 SIADH  Cancer related pain, anxiety  Substance use disorder  Constipation  Hypothyroidism  pAfib  Hypercoag state  Severe prot yahir malnutrtion  Immunocompromised 2/2 cancer, autoimmune disease    Recently dx SCLCa  GOC discussion, counseling  Encounter for antineoplastic chemotherapy  Staging w/u in progress   * Pt unable to tolerate MRI Brain, A/P (even with anesthesia- Versed), due to anxiety/claustrophobia   * NCHCT and NC CT A/P without overt evidence of mets   * TTE showing RV thickening with pericardial effusion, c/f mets; w/u in progress with Thoracic Surgery as below  In GOC conversation on 3/13, pt expressed uncertainty about pursuing systemic cancer treatment, especially if her cancer is determined to be advanced  However, in f/u conversation on 3/19 pt indicated that she would like to pursue systemic    inpt induction Carbo/Etop today     - q3w week cycles; concurrent immunotherapy is relatively contraindicated     - 3d infusion 3/23-25 with Dex/Zofran premeds     - Carbo AUC 4 d1, Etoposide 80mg/m2 d1-3 (both 20% dose reduction)     - Fluphila growth factor on 3/26  Rad Onc also made aware of pt- she is being considered for a 3-week course of RT to the lung mass (pt to f/u outpt)  Long-term prognosis: gaurded; pt is full code    Pericardial effusion   Dx in 12/2024 (previously thought to be ? autoimmune vs viral with positive Coxsackie B titers in 12/2024)  12/23 pericardial fluid cytology negative for malignant cells  3/12 TTE with small to mod pericardial effusion, without tamponade physiology  3/20 TTE with RV thickening and mod multifocal pericardial effusion, c/f mets by appearance  3/20 NC CT chest stable effusion (compared to 3/18 but enlarging compared to 2/15) with enlarging L suprahilar mass  Appreciate Thoracic Surgery consult: no role for pericardiocentesis vs pericardial window at this time (continuing heparin gtt in lieu of Eliquis in case emergent procedure becomes necessary)- Dr. Sherwood to review images on 3/24  Continuing Colchicine 0.6mg daily    Anxiety/depression  Not currently controlled, pt having regular panic attacks and nightmares (grieving the recent loss of her son)  T continues to follow closely  Continuing Klonopin 1mg BID  Continuing Ativan PO/IV/IM 1mg q8  Continuing Zyprexa 5mg nightly  1:1 Observation not required at this time  Worsening anxiety      Acute hypoxic resp failure  h/o Asthma/COPD (former smoker)  Pt desats to mid 80s% on RA and requires 4-6L NC supplemental O2 to maintain O2 sats > 92%  Likely due to burden of disease in lungs + pericardial effusion  Continuing supplemental O2 with weaning as tolerated and supportive resp care prn  Continuing Advair 100-50mcg MDI BID  Continuing duonebs q6/prn   Continuing Nicotine patch 21mg daily (6 weeks)  Mgmt of pericardial effusion as above    Hyponatremia 2/2 SIADH  Na normalized/stable at 135  Pt is asymptomatic  SIADH confirmed by 3/12 urine lytes  1L fluid restriction dced  on 3/20  Continuing Salt tabs 1gm BID, slight downtrend. Continue to monitor    Cancer related pain  Substance use disorder  Continuing Morphine ER 30mg q12 and dilaudid IV for PRN  Patient with underlying psychiatric disorder, has been medicated with opioids for extensive period of time, likely developed degree of dependency   Continuing Oxy IR 20mg q8/prn  Continuing Gabapentin 100mg TID  Continuing Baclofen 5mg q8/prn    Constipation  Improved  Possibly due to opioids +/- AID  3/12 AXR negative for ileus/obstruction  Continuing bowel regimen (Miralax, Senna) for OIC prevention    Hypothyroidism: 3/12 TFTs wnl; continuing LT4 112mcg daily    pAfib  Hypercoag state  Eliquis transitioned to heparin gtt in anticipation of possible pericardial procedure as noted above    Severe prot yahir malnutrtion: appreciate RD eval; continuing regular diet

## 2025-03-26 NOTE — PROGRESS NOTE ADULT - SUBJECTIVE AND OBJECTIVE BOX
Deven Archuleta MD  [employer]  Pager [phone]  Email: [email or web address]  Available on [employer] Teams        PROGRESS NOTE:     Patient is a 64y old  Female who presents with a chief complaint of Shortness of breath and chest pain (25 Mar 2025 15:08)      SUBJECTIVE / OVERNIGHT EVENTS:  Patient seen and examined this morning. Patient more talkative this morning compared to prior days. Awaiting PT consult.   ADDITIONAL REVIEW OF SYSTEMS:  No f/c    MEDICATIONS  (STANDING):  apixaban 5 milliGRAM(s) Oral every 12 hours  baclofen 5 milliGRAM(s) Oral every 8 hours  bisacodyl 5 milliGRAM(s) Oral at bedtime  chlorhexidine 2% Cloths 1 Application(s) Topical daily  clonazePAM  Tablet 1 milliGRAM(s) Oral at bedtime  clonazePAM  Tablet 0.5 milliGRAM(s) Oral <User Schedule>  colchicine 0.6 milliGRAM(s) Oral daily  fluticasone propionate/ salmeterol 100-50 MICROgram(s) Diskus 1 Dose(s) Inhalation two times a day  gabapentin 100 milliGRAM(s) Oral three times a day  influenza  Vaccine (HIGH DOSE) 0.5 milliLiter(s) IntraMuscular once  levothyroxine 112 MICROGram(s) Oral daily  lidocaine   4% Patch 1 Patch Transdermal every 24 hours  methylnaltrexone Injectable 12 milliGRAM(s) SubCutaneous once  morphine ER Tablet 30 milliGRAM(s) Oral every 8 hours  naloxegol 25 milliGRAM(s) Oral daily  OLANZapine 5 milliGRAM(s) Oral at bedtime  pantoprazole    Tablet 40 milliGRAM(s) Oral every 12 hours  pegfilgrastim-jmdb (FULPHILA) Injectable 6 milliGRAM(s) SubCutaneous once  polyethylene glycol 3350 17 Gram(s) Oral two times a day  senna 2 Tablet(s) Oral at bedtime  sodium chloride 2 Gram(s) Oral three times a day  sodium chloride 0.65% Nasal 1 Spray(s) Both Nostrils three times a day    MEDICATIONS  (PRN):  acetaminophen     Tablet .. 650 milliGRAM(s) Oral once PRN chemotherapy reaction  albuterol/ipratropium for Nebulization 3 milliLiter(s) Nebulizer every 6 hours PRN Shortness of Breath  aluminum hydroxide/magnesium hydroxide/simethicone Suspension 30 milliLiter(s) Oral every 4 hours PRN Dyspepsia  benzocaine/menthol Lozenge 1 Lozenge Oral three times a day PRN Sore Throat  diphenhydrAMINE Injectable 25 milliGRAM(s) IV Push once PRN chemotherapy reaction  famotidine Injectable 20 milliGRAM(s) IV Push once PRN chemotherapy reaction  HYDROmorphone  Injectable 3 milliGRAM(s) IV Push every 4 hours PRN severe breakthrough pain  LORazepam     Tablet 1 milliGRAM(s) Oral every 8 hours PRN Anxiety  melatonin 3 milliGRAM(s) Oral at bedtime PRN Insomnia  methylPREDNISolone sodium succinate Injectable 125 milliGRAM(s) IV Push once PRN chemotherapy reaction  ondansetron Injectable 4 milliGRAM(s) IV Push every 6 hours PRN Nausea and/or Vomiting  oxyCODONE    IR 20 milliGRAM(s) Oral every 4 hours PRN Moderate Pain (4 - 6)  oxyCODONE    IR 30 milliGRAM(s) Oral every 4 hours PRN Severe Pain (7 - 10)      CAPILLARY BLOOD GLUCOSE        I&O's Summary    25 Mar 2025 07:01  -  26 Mar 2025 07:00  --------------------------------------------------------  IN: 400 mL / OUT: 400 mL / NET: 0 mL        PHYSICAL EXAM:  Vital Signs Last 24 Hrs  T(C): 36.6 (26 Mar 2025 09:20), Max: 36.9 (25 Mar 2025 21:51)  T(F): 97.8 (26 Mar 2025 09:20), Max: 98.4 (25 Mar 2025 21:51)  HR: 69 (26 Mar 2025 09:20) (50 - 79)  BP: 121/66 (26 Mar 2025 10:38) (110/62 - 129/71)  BP(mean): --  RR: 18 (26 Mar 2025 10:38) (18 - 18)  SpO2: 95% (26 Mar 2025 10:38) (95% - 98%)    Parameters below as of 26 Mar 2025 10:38  Patient On (Oxygen Delivery Method): nasal cannula  O2 Flow (L/min): 3      CONSTITUTIONAL: NAD, more talkative this morning.   RESPIRATORY: Normal respiratory effort; no respiratory distress, CTAB  CARDIOVASCULAR: No visible JVD, No lower extremity edema; S1S2, no m,r,g  ABDOMEN: Not guarding, does not appear distended, BS+  MUSCLOSKELETAL: no clubbing or cyanosis of digits; no joint swelling   PSYCH: AOx3,    LABS:                        9.9    4.01  )-----------( 221      ( 26 Mar 2025 06:20 )             29.2     03-26    125[L]  |  90[L]  |  11  ----------------------------<  119[H]  3.6   |  23  |  0.37[L]    Ca    8.7      26 Mar 2025 06:20  Phos  2.6     03-26  Mg     1.60     03-26    TPro  5.8[L]  /  Alb  3.3  /  TBili  0.5  /  DBili  x   /  AST  30  /  ALT  18  /  AlkPhos  77  03-26    PTT - ( 25 Mar 2025 06:29 )  PTT:72.9 sec      Urinalysis Basic - ( 26 Mar 2025 06:20 )    Color: x / Appearance: x / SG: x / pH: x  Gluc: 119 mg/dL / Ketone: x  / Bili: x / Urobili: x   Blood: x / Protein: x / Nitrite: x   Leuk Esterase: x / RBC: x / WBC x   Sq Epi: x / Non Sq Epi: x / Bacteria: x          RADIOLOGY & ADDITIONAL TESTS:  Results Reviewed:   Imaging Personally Reviewed:  Electrocardiogram Personally Reviewed:    COORDINATION OF CARE:  Care Discussed with Consultants/Other Providers [Y/N]: Case discussed during interdisciplinary rounds with social work and case management  Prior or Outpatient Records Reviewed [Y/N]:

## 2025-03-26 NOTE — BH CONSULTATION LIAISON PROGRESS NOTE - NSBHFUPINTERVALHXFT_PSY_A_CORE
Chart reviewed. No lorazepem or haloperidol PRNs overnight/intervally. Reports having terrible pain due to pain from her body, though is overall doing as well as she could be in her current situation.    Chart reviewed. No lorazepam or haloperidol PRNs overnight/in this interval. Reports having terrible pain due to pain from her body, though is overall doing as well as she could be in her current situation.

## 2025-03-26 NOTE — BH CONSULTATION LIAISON PROGRESS NOTE - NSBHASSESSMENTFT_PSY_ALL_CORE
Assessment: 64 /yo F w/ longstanding PPHx of anxiety previously on xanax, remote hx of inpatient psych hospitalization for suicidality, PMHx scleroderma with chronic pain manifestations, recent admission for cardiac tamponade, admitted for workup of SOB/chest pain leading to SCLC diagnosis, psychiatry consulted for worsening of anxiety I/s/o multiple medical complications and her son's recent death. Pt likely with multiple comorbid psychiatric disorders including SOURAV, MDD, claustrophobia, most prominent disorder at this time is worsening of anxiety, with panic-like symptoms. Given guarded medical prognosis, recent social stressors, appropriate to treat severe anxiety with standing benzodiazepine regimen at this time. Pt not amenable to starting SSRI treatment, but if becomes amenable may benefit from this as well.     3/15: Reports improved anxiety. No reported changes in breathing. Appears to be tolerating Klonopin. Will continue.  3/17: Endorses decreased panic attacks and no use of PRN ativan over this interval. C/w klonopin, recommend ongoing GOC discussions with pt and her HCP  3/18: Unable to tolerate MRI over this interval, unclear reason. DEC klonopin to 0.5 mg qAM and 1 mg qHS due to pt complaint of sleepiness. Can give extra klonopin 0.5 mg wafer PRN prior to MRI, or ativan 1 mg IV x1 prior to MRI. If pt continues to refuse MRI, would reevaluate pt's capacity to refuse which may be impacted by her severe anxiety, and would involve her HCP.   3/19: Pt calmer over this interval, can continue with klonopin 0.5 mg qD and klonopin 1 mg qHS. Denies SI/HI.   3/20: Pt more dysregulated over this interval. STOP remeron at night and START zyprexa 5 mg qHS for control of mood and to augment pain regimen. INC klonopin to 0.5 mg at 9 AM, 0.5 mg at 1PM, and 1 mg qHS.  3/21: Calmer over this interval. Continue meds, would hold if pt appears sedated.   3/24: intermittent panic episodes but agreeing with care largely.   3/25: no psychiatric PRNS required. Sleepy on examination, mentioned having pain overnight. Continue current regimen.   3/26: no psychiatric PRNS required. Overall appears stably well. Continue current regimen.     PLAN  - defer obs status to primary team   - C/W Zyprexa 5mg HS  - c/w klonopin 0.5mg BID plus 1mg qhs standing for treatment of anxiety  - ativan 1 mg PO/IV/IM q8h for breakthrough severe anxiety  - please monitor respiratory status closely given pt is also on baclofen and standing oxycodone, with limited baseline pulmonary reserve. HOLD benzodiazepines if concerned for oversedation.   - Dispo: has no psych barriers to discharge when med cleared; we support/advocate for a transition to an outpt oncological level of care.     Assessment: 64 /yo F w/ longstanding PPHx of anxiety previously on xanax, remote hx of inpatient psych hospitalization for suicidality, PMHx scleroderma with chronic pain manifestations, recent admission for cardiac tamponade, admitted for workup of SOB/chest pain leading to SCLC diagnosis, psychiatry consulted for worsening of anxiety I/s/o multiple medical complications and her son's recent death. Pt likely with multiple comorbid psychiatric disorders including SOURAV, MDD, claustrophobia, most prominent disorder at this time is worsening of anxiety, with panic-like symptoms. Given guarded medical prognosis, recent social stressors, appropriate to treat severe anxiety with standing benzodiazepine regimen at this time. Pt not amenable to starting SSRI treatment, but if becomes amenable may benefit from this as well.     3/15: Reports improved anxiety. No reported changes in breathing. Appears to be tolerating Klonopin. Will continue.  3/17: Endorses decreased panic attacks and no use of PRN ativan over this interval. C/w klonopin, recommend ongoing GOC discussions with pt and her HCP  3/18: Unable to tolerate MRI over this interval, unclear reason. DEC klonopin to 0.5 mg qAM and 1 mg qHS due to pt complaint of sleepiness. Can give extra klonopin 0.5 mg wafer PRN prior to MRI, or ativan 1 mg IV x1 prior to MRI. If pt continues to refuse MRI, would reevaluate pt's capacity to refuse which may be impacted by her severe anxiety, and would involve her HCP.   3/19: Pt calmer over this interval, can continue with klonopin 0.5 mg qD and klonopin 1 mg qHS. Denies SI/HI.   3/20: Pt more dysregulated over this interval. STOP remeron at night and START zyprexa 5 mg qHS for control of mood and to augment pain regimen. INC klonopin to 0.5 mg at 9 AM, 0.5 mg at 1PM, and 1 mg qHS.  3/21: Calmer over this interval. Continue meds, would hold if pt appears sedated.   3/24: intermittent panic episodes but agreeing with care largely.   3/25: no psychiatric PRNS required. Sleepy on examination, mentioned having pain overnight. Continue current regimen.   3/26: no psychiatric PRNS required. Overall appears stable. Continue current regimen.       PLAN  - defer obs status to primary team   - C/W Zyprexa 5mg HS  - c/w klonopin 0.5mg BID plus 1mg qhs standing for treatment of anxiety  - ativan 1 mg PO/IV/IM q8h for breakthrough severe anxiety  - please monitor respiratory status closely given pt is also on baclofen and standing oxycodone, with limited baseline pulmonary reserve. HOLD benzodiazepines if concerned for oversedation.   - Dispo: has no psych barriers to discharge when med cleared; we support/advocate for a transition to an outpt oncological level of care. Please refer pt to outpatient psychoonc clinic w/ Dr. Mercado

## 2025-03-27 LAB
ALBUMIN SERPL ELPH-MCNC: 3 G/DL — LOW (ref 3.3–5)
ALP SERPL-CCNC: 80 U/L — SIGNIFICANT CHANGE UP (ref 40–120)
ALT FLD-CCNC: 29 U/L — SIGNIFICANT CHANGE UP (ref 4–33)
ANION GAP SERPL CALC-SCNC: 10 MMOL/L — SIGNIFICANT CHANGE UP (ref 7–14)
AST SERPL-CCNC: 42 U/L — HIGH (ref 4–32)
BASOPHILS # BLD AUTO: 0.04 K/UL — SIGNIFICANT CHANGE UP (ref 0–0.2)
BASOPHILS NFR BLD AUTO: 0.2 % — SIGNIFICANT CHANGE UP (ref 0–2)
BILIRUB SERPL-MCNC: 0.3 MG/DL — SIGNIFICANT CHANGE UP (ref 0.2–1.2)
BUN SERPL-MCNC: 11 MG/DL — SIGNIFICANT CHANGE UP (ref 7–23)
CALCIUM SERPL-MCNC: 8.5 MG/DL — SIGNIFICANT CHANGE UP (ref 8.4–10.5)
CHLORIDE SERPL-SCNC: 94 MMOL/L — LOW (ref 98–107)
CO2 SERPL-SCNC: 27 MMOL/L — SIGNIFICANT CHANGE UP (ref 22–31)
CREAT SERPL-MCNC: 0.44 MG/DL — LOW (ref 0.5–1.3)
EGFR: 107 ML/MIN/1.73M2 — SIGNIFICANT CHANGE UP
EGFR: 107 ML/MIN/1.73M2 — SIGNIFICANT CHANGE UP
EOSINOPHIL # BLD AUTO: 0.15 K/UL — SIGNIFICANT CHANGE UP (ref 0–0.5)
EOSINOPHIL NFR BLD AUTO: 0.8 % — SIGNIFICANT CHANGE UP (ref 0–6)
GLUCOSE SERPL-MCNC: 88 MG/DL — SIGNIFICANT CHANGE UP (ref 70–99)
HCT VFR BLD CALC: 30.2 % — LOW (ref 34.5–45)
HGB BLD-MCNC: 10.3 G/DL — LOW (ref 11.5–15.5)
IANC: 16.06 K/UL — HIGH (ref 1.8–7.4)
IMM GRANULOCYTES NFR BLD AUTO: 2.8 % — HIGH (ref 0–0.9)
LYMPHOCYTES # BLD AUTO: 1.99 K/UL — SIGNIFICANT CHANGE UP (ref 1–3.3)
LYMPHOCYTES # BLD AUTO: 10.5 % — LOW (ref 13–44)
MAGNESIUM SERPL-MCNC: 1.5 MG/DL — LOW (ref 1.6–2.6)
MCHC RBC-ENTMCNC: 30.1 PG — SIGNIFICANT CHANGE UP (ref 27–34)
MCHC RBC-ENTMCNC: 34.1 G/DL — SIGNIFICANT CHANGE UP (ref 32–36)
MCV RBC AUTO: 88.3 FL — SIGNIFICANT CHANGE UP (ref 80–100)
MONOCYTES # BLD AUTO: 0.13 K/UL — SIGNIFICANT CHANGE UP (ref 0–0.9)
MONOCYTES NFR BLD AUTO: 0.7 % — LOW (ref 2–14)
NEUTROPHILS # BLD AUTO: 16.06 K/UL — HIGH (ref 1.8–7.4)
NEUTROPHILS NFR BLD AUTO: 85 % — HIGH (ref 43–77)
NRBC # BLD AUTO: 0 K/UL — SIGNIFICANT CHANGE UP (ref 0–0)
NRBC # FLD: 0 K/UL — SIGNIFICANT CHANGE UP (ref 0–0)
NRBC BLD AUTO-RTO: 0 /100 WBCS — SIGNIFICANT CHANGE UP (ref 0–0)
PHOSPHATE SERPL-MCNC: 3.6 MG/DL — SIGNIFICANT CHANGE UP (ref 2.5–4.5)
PLATELET # BLD AUTO: 244 K/UL — SIGNIFICANT CHANGE UP (ref 150–400)
POTASSIUM SERPL-MCNC: 3.3 MMOL/L — LOW (ref 3.5–5.3)
POTASSIUM SERPL-SCNC: 3.3 MMOL/L — LOW (ref 3.5–5.3)
PROT SERPL-MCNC: 5.4 G/DL — LOW (ref 6–8.3)
RBC # BLD: 3.42 M/UL — LOW (ref 3.8–5.2)
RBC # FLD: 12.7 % — SIGNIFICANT CHANGE UP (ref 10.3–14.5)
SODIUM SERPL-SCNC: 131 MMOL/L — LOW (ref 135–145)
WBC # BLD: 18.9 K/UL — HIGH (ref 3.8–10.5)
WBC # FLD AUTO: 18.9 K/UL — HIGH (ref 3.8–10.5)

## 2025-03-27 PROCEDURE — 99232 SBSQ HOSP IP/OBS MODERATE 35: CPT

## 2025-03-27 RX ORDER — CLONAZEPAM 0.5 MG/1
1 TABLET ORAL AT BEDTIME
Refills: 0 | Status: DISCONTINUED | OUTPATIENT
Start: 2025-03-27 | End: 2025-03-31

## 2025-03-27 RX ORDER — CLONAZEPAM 0.5 MG/1
0.5 TABLET ORAL
Refills: 0 | Status: DISCONTINUED | OUTPATIENT
Start: 2025-03-27 | End: 2025-03-31

## 2025-03-27 RX ORDER — OXYCODONE HYDROCHLORIDE 30 MG/1
30 TABLET ORAL EVERY 4 HOURS
Refills: 0 | Status: DISCONTINUED | OUTPATIENT
Start: 2025-03-27 | End: 2025-03-31

## 2025-03-27 RX ORDER — MAGNESIUM SULFATE 500 MG/ML
2 SYRINGE (ML) INJECTION ONCE
Refills: 0 | Status: COMPLETED | OUTPATIENT
Start: 2025-03-27 | End: 2025-03-27

## 2025-03-27 RX ORDER — LORAZEPAM 4 MG/ML
1 VIAL (ML) INJECTION EVERY 8 HOURS
Refills: 0 | Status: DISCONTINUED | OUTPATIENT
Start: 2025-03-27 | End: 2025-03-31

## 2025-03-27 RX ORDER — OXYCODONE HYDROCHLORIDE 30 MG/1
20 TABLET ORAL EVERY 4 HOURS
Refills: 0 | Status: DISCONTINUED | OUTPATIENT
Start: 2025-03-27 | End: 2025-03-31

## 2025-03-27 RX ORDER — HYDROMORPHONE/SOD CHLOR,ISO/PF 2 MG/10 ML
3 SYRINGE (ML) INJECTION EVERY 4 HOURS
Refills: 0 | Status: DISCONTINUED | OUTPATIENT
Start: 2025-03-27 | End: 2025-03-31

## 2025-03-27 RX ORDER — DIPHENHYDRAMINE HYDROCHLORIDE AND LIDOCAINE HYDROCHLORIDE AND ALUMINUM HYDROXIDE AND MAGNESIUM HYDRO
5 KIT
Refills: 0 | Status: DISCONTINUED | OUTPATIENT
Start: 2025-03-27 | End: 2025-03-31

## 2025-03-27 RX ADMIN — Medication 40 MILLIEQUIVALENT(S): at 09:31

## 2025-03-27 RX ADMIN — Medication 100 MILLIEQUIVALENT(S): at 09:30

## 2025-03-27 RX ADMIN — Medication 3 MILLIGRAM(S): at 20:47

## 2025-03-27 RX ADMIN — OXYCODONE HYDROCHLORIDE 30 MILLIGRAM(S): 30 TABLET ORAL at 18:03

## 2025-03-27 RX ADMIN — Medication 100 MILLIEQUIVALENT(S): at 11:25

## 2025-03-27 RX ADMIN — Medication 1 SPRAY(S): at 14:26

## 2025-03-27 RX ADMIN — Medication 30 MILLIGRAM(S): at 07:32

## 2025-03-27 RX ADMIN — OXYCODONE HYDROCHLORIDE 30 MILLIGRAM(S): 30 TABLET ORAL at 22:56

## 2025-03-27 RX ADMIN — GABAPENTIN 100 MILLIGRAM(S): 400 CAPSULE ORAL at 06:32

## 2025-03-27 RX ADMIN — OXYCODONE HYDROCHLORIDE 30 MILLIGRAM(S): 30 TABLET ORAL at 12:24

## 2025-03-27 RX ADMIN — Medication 3 MILLIGRAM(S): at 19:47

## 2025-03-27 RX ADMIN — Medication 3 MILLIGRAM(S): at 22:56

## 2025-03-27 RX ADMIN — OXYCODONE HYDROCHLORIDE 30 MILLIGRAM(S): 30 TABLET ORAL at 06:32

## 2025-03-27 RX ADMIN — Medication 30 MILLIGRAM(S): at 15:25

## 2025-03-27 RX ADMIN — Medication 40 MILLIGRAM(S): at 18:04

## 2025-03-27 RX ADMIN — GABAPENTIN 100 MILLIGRAM(S): 400 CAPSULE ORAL at 14:26

## 2025-03-27 RX ADMIN — COLCHICINE 0.6 MILLIGRAM(S): 0.6 TABLET, FILM COATED ORAL at 11:28

## 2025-03-27 RX ADMIN — OXYCODONE HYDROCHLORIDE 30 MILLIGRAM(S): 30 TABLET ORAL at 07:32

## 2025-03-27 RX ADMIN — Medication 112 MICROGRAM(S): at 06:33

## 2025-03-27 RX ADMIN — Medication 25 GRAM(S): at 16:25

## 2025-03-27 RX ADMIN — OXYCODONE HYDROCHLORIDE 30 MILLIGRAM(S): 30 TABLET ORAL at 02:31

## 2025-03-27 RX ADMIN — Medication 30 MILLIGRAM(S): at 06:32

## 2025-03-27 RX ADMIN — NICOTINE POLACRILEX 1 PATCH: 4 GUM, CHEWING ORAL at 13:16

## 2025-03-27 RX ADMIN — APIXABAN 5 MILLIGRAM(S): 2.5 TABLET, FILM COATED ORAL at 18:04

## 2025-03-27 RX ADMIN — APIXABAN 5 MILLIGRAM(S): 2.5 TABLET, FILM COATED ORAL at 06:33

## 2025-03-27 RX ADMIN — OLANZAPINE 5 MILLIGRAM(S): 10 TABLET ORAL at 22:57

## 2025-03-27 RX ADMIN — Medication 3 MILLIGRAM(S): at 13:17

## 2025-03-27 RX ADMIN — Medication 30 MILLIGRAM(S): at 23:10

## 2025-03-27 RX ADMIN — DIPHENHYDRAMINE HYDROCHLORIDE AND LIDOCAINE HYDROCHLORIDE AND ALUMINUM HYDROXIDE AND MAGNESIUM HYDRO 5 MILLILITER(S): KIT at 22:57

## 2025-03-27 RX ADMIN — CLONAZEPAM 1 MILLIGRAM(S): 0.5 TABLET ORAL at 22:09

## 2025-03-27 RX ADMIN — OXYCODONE HYDROCHLORIDE 30 MILLIGRAM(S): 30 TABLET ORAL at 23:55

## 2025-03-27 RX ADMIN — Medication 2 GRAM(S): at 06:33

## 2025-03-27 RX ADMIN — CLONAZEPAM 0.5 MILLIGRAM(S): 0.5 TABLET ORAL at 06:32

## 2025-03-27 RX ADMIN — DIPHENHYDRAMINE HYDROCHLORIDE AND LIDOCAINE HYDROCHLORIDE AND ALUMINUM HYDROXIDE AND MAGNESIUM HYDRO 5 MILLILITER(S): KIT at 13:18

## 2025-03-27 RX ADMIN — BACLOFEN 5 MILLIGRAM(S): 10 INJECTION INTRATHECAL at 14:27

## 2025-03-27 RX ADMIN — NICOTINE POLACRILEX 1 PATCH: 4 GUM, CHEWING ORAL at 07:02

## 2025-03-27 RX ADMIN — GABAPENTIN 100 MILLIGRAM(S): 400 CAPSULE ORAL at 22:09

## 2025-03-27 RX ADMIN — OXYCODONE HYDROCHLORIDE 30 MILLIGRAM(S): 30 TABLET ORAL at 19:03

## 2025-03-27 RX ADMIN — Medication 3 MILLIGRAM(S): at 14:17

## 2025-03-27 RX ADMIN — Medication 1 APPLICATION(S): at 13:24

## 2025-03-27 RX ADMIN — Medication 100 MILLIEQUIVALENT(S): at 13:22

## 2025-03-27 RX ADMIN — BACLOFEN 5 MILLIGRAM(S): 10 INJECTION INTRATHECAL at 06:33

## 2025-03-27 RX ADMIN — DIPHENHYDRAMINE HYDROCHLORIDE AND LIDOCAINE HYDROCHLORIDE AND ALUMINUM HYDROXIDE AND MAGNESIUM HYDRO 5 MILLILITER(S): KIT at 18:05

## 2025-03-27 RX ADMIN — Medication 30 MILLIGRAM(S): at 14:25

## 2025-03-27 RX ADMIN — Medication 5 MILLIGRAM(S): at 22:57

## 2025-03-27 RX ADMIN — Medication 30 MILLIGRAM(S): at 22:10

## 2025-03-27 RX ADMIN — Medication 2 GRAM(S): at 14:27

## 2025-03-27 RX ADMIN — OXYCODONE HYDROCHLORIDE 30 MILLIGRAM(S): 30 TABLET ORAL at 03:31

## 2025-03-27 RX ADMIN — OXYCODONE HYDROCHLORIDE 30 MILLIGRAM(S): 30 TABLET ORAL at 11:24

## 2025-03-27 RX ADMIN — NICOTINE POLACRILEX 1 PATCH: 4 GUM, CHEWING ORAL at 19:05

## 2025-03-27 RX ADMIN — CLONAZEPAM 0.5 MILLIGRAM(S): 0.5 TABLET ORAL at 16:24

## 2025-03-27 RX ADMIN — BACLOFEN 5 MILLIGRAM(S): 10 INJECTION INTRATHECAL at 22:57

## 2025-03-27 RX ADMIN — Medication 2 GRAM(S): at 22:57

## 2025-03-27 RX ADMIN — Medication 40 MILLIGRAM(S): at 06:33

## 2025-03-27 NOTE — BH CONSULTATION LIAISON PROGRESS NOTE - NSBHCONSFOLLOWNEEDS_PSY_ALL_CORE
Needs further psych safety assessment prior to discharge
No psychiatric contraindications to discharge

## 2025-03-27 NOTE — BH CONSULTATION LIAISON PROGRESS NOTE - CURRENT MEDICATION
MEDICATIONS  (STANDING):  albuterol/ipratropium for Nebulization 3 milliLiter(s) Nebulizer every 6 hours  apixaban 5 milliGRAM(s) Oral every 12 hours  chlorhexidine 2% Cloths 1 Application(s) Topical daily  chlorhexidine 2% Cloths 1 Application(s) Topical <User Schedule>  clonazePAM  Tablet 1 milliGRAM(s) Oral at bedtime  clonazePAM  Tablet 0.5 milliGRAM(s) Oral daily  colchicine 0.6 milliGRAM(s) Oral daily  fluticasone propionate/ salmeterol 100-50 MICROgram(s) Diskus 1 Dose(s) Inhalation two times a day  influenza  Vaccine (HIGH DOSE) 0.5 milliLiter(s) IntraMuscular once  levothyroxine 112 MICROGram(s) Oral daily  lidocaine   4% Patch 1 Patch Transdermal daily  lidocaine   4% Patch 1 Patch Transdermal every 24 hours  methylnaltrexone Injectable 12 milliGRAM(s) SubCutaneous once  mirtazapine 7.5 milliGRAM(s) Oral at bedtime  morphine ER Tablet 30 milliGRAM(s) Oral every 8 hours  naloxegol 25 milliGRAM(s) Oral daily  nicotine - 21 mG/24Hr(s) Patch 1 Patch Transdermal daily  pantoprazole    Tablet 40 milliGRAM(s) Oral every 12 hours  polyethylene glycol 3350 17 Gram(s) Oral daily  potassium chloride    Tablet ER 40 milliEquivalent(s) Oral once  senna 2 Tablet(s) Oral at bedtime  sodium chloride 1 Gram(s) Oral two times a day  sodium chloride 0.65% Nasal 1 Spray(s) Both Nostrils three times a day    MEDICATIONS  (PRN):  aluminum hydroxide/magnesium hydroxide/simethicone Suspension 30 milliLiter(s) Oral every 4 hours PRN Dyspepsia  baclofen 5 milliGRAM(s) Oral every 8 hours PRN Musculoskeletal Pain  benzocaine/menthol Lozenge 1 Lozenge Oral three times a day PRN Sore Throat  HYDROmorphone  Injectable 3 milliGRAM(s) IV Push every 4 hours PRN severe breakthrough pain  LORazepam     Tablet 1 milliGRAM(s) Oral every 8 hours PRN Anxiety  melatonin 3 milliGRAM(s) Oral at bedtime PRN Insomnia  ondansetron Injectable 4 milliGRAM(s) IV Push every 6 hours PRN Nausea and/or Vomiting  oxyCODONE    IR 20 milliGRAM(s) Oral every 4 hours PRN Moderate Pain (4 - 6)  oxyCODONE    IR 30 milliGRAM(s) Oral every 4 hours PRN Severe Pain (7 - 10)  
MEDICATIONS  (STANDING):  albuterol/ipratropium for Nebulization 3 milliLiter(s) Nebulizer every 6 hours  baclofen 5 milliGRAM(s) Oral every 8 hours  chlorhexidine 2% Cloths 1 Application(s) Topical daily  clonazePAM  Tablet 1 milliGRAM(s) Oral at bedtime  clonazePAM  Tablet 0.5 milliGRAM(s) Oral <User Schedule>  colchicine 0.6 milliGRAM(s) Oral daily  fluticasone propionate/ salmeterol 100-50 MICROgram(s) Diskus 1 Dose(s) Inhalation two times a day  gabapentin 100 milliGRAM(s) Oral three times a day  heparin  Infusion.  Unit(s)/Hr (11 mL/Hr) IV Continuous <Continuous>  influenza  Vaccine (HIGH DOSE) 0.5 milliLiter(s) IntraMuscular once  levothyroxine 112 MICROGram(s) Oral daily  lidocaine   4% Patch 1 Patch Transdermal daily  lidocaine   4% Patch 1 Patch Transdermal every 24 hours  lidocaine   4% Patch 1 Patch Transdermal every 24 hours  methylnaltrexone Injectable 12 milliGRAM(s) SubCutaneous once  morphine ER Tablet 30 milliGRAM(s) Oral every 8 hours  naloxegol 25 milliGRAM(s) Oral daily  nicotine - 21 mG/24Hr(s) Patch 1 Patch Transdermal daily  OLANZapine 5 milliGRAM(s) Oral at bedtime  pantoprazole    Tablet 40 milliGRAM(s) Oral every 12 hours  polyethylene glycol 3350 17 Gram(s) Oral daily  senna 2 Tablet(s) Oral at bedtime  sodium chloride 1 Gram(s) Oral two times a day  sodium chloride 0.65% Nasal 1 Spray(s) Both Nostrils three times a day    MEDICATIONS  (PRN):  aluminum hydroxide/magnesium hydroxide/simethicone Suspension 30 milliLiter(s) Oral every 4 hours PRN Dyspepsia  benzocaine/menthol Lozenge 1 Lozenge Oral three times a day PRN Sore Throat  heparin   Injectable 5000 Unit(s) IV Push every 6 hours PRN For aPTT less than 40  heparin   Injectable 2500 Unit(s) IV Push every 6 hours PRN For aPTT between 40 - 57  HYDROmorphone  Injectable 3 milliGRAM(s) IV Push every 4 hours PRN severe breakthrough pain  LORazepam     Tablet 1 milliGRAM(s) Oral every 8 hours PRN Anxiety  melatonin 3 milliGRAM(s) Oral at bedtime PRN Insomnia  ondansetron Injectable 4 milliGRAM(s) IV Push every 6 hours PRN Nausea and/or Vomiting  oxyCODONE    IR 20 milliGRAM(s) Oral every 4 hours PRN Moderate Pain (4 - 6)  oxyCODONE    IR 30 milliGRAM(s) Oral every 4 hours PRN Severe Pain (7 - 10)  
MEDICATIONS  (STANDING):  albuterol/ipratropium for Nebulization 3 milliLiter(s) Nebulizer every 6 hours  apixaban 5 milliGRAM(s) Oral every 12 hours  baclofen 5 milliGRAM(s) Oral every 8 hours  chlorhexidine 2% Cloths 1 Application(s) Topical daily  clonazePAM  Tablet 0.5 milliGRAM(s) Oral daily  clonazePAM  Tablet 1 milliGRAM(s) Oral at bedtime  colchicine 0.6 milliGRAM(s) Oral daily  fluticasone propionate/ salmeterol 100-50 MICROgram(s) Diskus 1 Dose(s) Inhalation two times a day  gabapentin 100 milliGRAM(s) Oral three times a day  HYDROmorphone  Injectable 3 milliGRAM(s) IV Push once  influenza  Vaccine (HIGH DOSE) 0.5 milliLiter(s) IntraMuscular once  levothyroxine 112 MICROGram(s) Oral daily  lidocaine   4% Patch 1 Patch Transdermal daily  lidocaine   4% Patch 1 Patch Transdermal every 24 hours  lidocaine   4% Patch 1 Patch Transdermal every 24 hours  methylnaltrexone Injectable 12 milliGRAM(s) SubCutaneous once  mirtazapine 7.5 milliGRAM(s) Oral at bedtime  morphine ER Tablet 30 milliGRAM(s) Oral every 8 hours  naloxegol 25 milliGRAM(s) Oral daily  nicotine - 21 mG/24Hr(s) Patch 1 Patch Transdermal daily  pantoprazole    Tablet 40 milliGRAM(s) Oral every 12 hours  polyethylene glycol 3350 17 Gram(s) Oral daily  potassium chloride   Powder 40 milliEquivalent(s) Oral once  senna 2 Tablet(s) Oral at bedtime  sodium chloride 1 Gram(s) Oral two times a day  sodium chloride 0.65% Nasal 1 Spray(s) Both Nostrils three times a day    MEDICATIONS  (PRN):  aluminum hydroxide/magnesium hydroxide/simethicone Suspension 30 milliLiter(s) Oral every 4 hours PRN Dyspepsia  benzocaine/menthol Lozenge 1 Lozenge Oral three times a day PRN Sore Throat  HYDROmorphone  Injectable 3 milliGRAM(s) IV Push every 4 hours PRN severe breakthrough pain  LORazepam     Tablet 1 milliGRAM(s) Oral every 8 hours PRN Anxiety  melatonin 3 milliGRAM(s) Oral at bedtime PRN Insomnia  ondansetron Injectable 4 milliGRAM(s) IV Push every 6 hours PRN Nausea and/or Vomiting  oxyCODONE    IR 20 milliGRAM(s) Oral every 4 hours PRN Moderate Pain (4 - 6)  oxyCODONE    IR 30 milliGRAM(s) Oral every 4 hours PRN Severe Pain (7 - 10)  
MEDICATIONS  (STANDING):  albuterol/ipratropium for Nebulization 3 milliLiter(s) Nebulizer every 6 hours  baclofen 5 milliGRAM(s) Oral every 8 hours  chlorhexidine 2% Cloths 1 Application(s) Topical daily  clonazePAM  Tablet 0.5 milliGRAM(s) Oral <User Schedule>  clonazePAM  Tablet 1 milliGRAM(s) Oral at bedtime  colchicine 0.6 milliGRAM(s) Oral daily  dexAMETHasone  IVPB 8 milliGRAM(s) IV Intermittent every 24 hours  etoposide (TOPOSAR) IVPB (eMAR) 142.4 milliGRAM(s) IV Intermittent every 24 hours  fluticasone propionate/ salmeterol 100-50 MICROgram(s) Diskus 1 Dose(s) Inhalation two times a day  gabapentin 100 milliGRAM(s) Oral three times a day  heparin  Infusion.  Unit(s)/Hr (11 mL/Hr) IV Continuous <Continuous>  influenza  Vaccine (HIGH DOSE) 0.5 milliLiter(s) IntraMuscular once  levothyroxine 112 MICROGram(s) Oral daily  lidocaine   4% Patch 1 Patch Transdermal every 24 hours  methylnaltrexone Injectable 12 milliGRAM(s) SubCutaneous once  morphine ER Tablet 30 milliGRAM(s) Oral every 8 hours  naloxegol 25 milliGRAM(s) Oral daily  nicotine - 21 mG/24Hr(s) Patch 1 Patch Transdermal daily  OLANZapine 5 milliGRAM(s) Oral at bedtime  ondansetron Injectable 8 milliGRAM(s) IV Push every 24 hours  pantoprazole    Tablet 40 milliGRAM(s) Oral every 12 hours  polyethylene glycol 3350 17 Gram(s) Oral daily  senna 2 Tablet(s) Oral at bedtime  sodium chloride 1 Gram(s) Oral three times a day  sodium chloride 0.65% Nasal 1 Spray(s) Both Nostrils three times a day    MEDICATIONS  (PRN):  acetaminophen     Tablet .. 650 milliGRAM(s) Oral once PRN chemotherapy reaction  aluminum hydroxide/magnesium hydroxide/simethicone Suspension 30 milliLiter(s) Oral every 4 hours PRN Dyspepsia  benzocaine/menthol Lozenge 1 Lozenge Oral three times a day PRN Sore Throat  diphenhydrAMINE Injectable 25 milliGRAM(s) IV Push once PRN chemotherapy reaction  famotidine Injectable 20 milliGRAM(s) IV Push once PRN chemotherapy reaction  heparin   Injectable 5000 Unit(s) IV Push every 6 hours PRN For aPTT less than 40  heparin   Injectable 2500 Unit(s) IV Push every 6 hours PRN For aPTT between 40 - 57  HYDROmorphone  Injectable 3 milliGRAM(s) IV Push every 4 hours PRN severe breakthrough pain  LORazepam     Tablet 1 milliGRAM(s) Oral every 8 hours PRN Anxiety  melatonin 3 milliGRAM(s) Oral at bedtime PRN Insomnia  methylPREDNISolone sodium succinate Injectable 125 milliGRAM(s) IV Push once PRN chemotherapy reaction  ondansetron Injectable 4 milliGRAM(s) IV Push every 6 hours PRN Nausea and/or Vomiting  oxyCODONE    IR 20 milliGRAM(s) Oral every 4 hours PRN Moderate Pain (4 - 6)  oxyCODONE    IR 30 milliGRAM(s) Oral every 4 hours PRN Severe Pain (7 - 10)  
MEDICATIONS  (STANDING):  albuterol/ipratropium for Nebulization 3 milliLiter(s) Nebulizer every 6 hours  baclofen 5 milliGRAM(s) Oral every 8 hours  chlorhexidine 2% Cloths 1 Application(s) Topical daily  clonazePAM  Tablet 1 milliGRAM(s) Oral at bedtime  clonazePAM  Tablet 0.5 milliGRAM(s) Oral <User Schedule>  colchicine 0.6 milliGRAM(s) Oral daily  etoposide (TOPOSAR) IVPB (eMAR) 142.4 milliGRAM(s) IV Intermittent every 24 hours  fluticasone propionate/ salmeterol 100-50 MICROgram(s) Diskus 1 Dose(s) Inhalation two times a day  gabapentin 100 milliGRAM(s) Oral three times a day  heparin  Infusion.  Unit(s)/Hr (11 mL/Hr) IV Continuous <Continuous>  influenza  Vaccine (HIGH DOSE) 0.5 milliLiter(s) IntraMuscular once  levothyroxine 112 MICROGram(s) Oral daily  lidocaine   4% Patch 1 Patch Transdermal every 24 hours  methylnaltrexone Injectable 12 milliGRAM(s) SubCutaneous once  morphine ER Tablet 30 milliGRAM(s) Oral every 8 hours  naloxegol 25 milliGRAM(s) Oral daily  OLANZapine 5 milliGRAM(s) Oral at bedtime  pantoprazole    Tablet 40 milliGRAM(s) Oral every 12 hours  polyethylene glycol 3350 17 Gram(s) Oral daily  senna 2 Tablet(s) Oral at bedtime  sodium chloride 1 Gram(s) Oral three times a day  sodium chloride 0.65% Nasal 1 Spray(s) Both Nostrils three times a day    MEDICATIONS  (PRN):  acetaminophen     Tablet .. 650 milliGRAM(s) Oral once PRN chemotherapy reaction  aluminum hydroxide/magnesium hydroxide/simethicone Suspension 30 milliLiter(s) Oral every 4 hours PRN Dyspepsia  benzocaine/menthol Lozenge 1 Lozenge Oral three times a day PRN Sore Throat  diphenhydrAMINE Injectable 25 milliGRAM(s) IV Push once PRN chemotherapy reaction  famotidine Injectable 20 milliGRAM(s) IV Push once PRN chemotherapy reaction  heparin   Injectable 5000 Unit(s) IV Push every 6 hours PRN For aPTT less than 40  heparin   Injectable 2500 Unit(s) IV Push every 6 hours PRN For aPTT between 40 - 57  HYDROmorphone  Injectable 3 milliGRAM(s) IV Push every 4 hours PRN severe breakthrough pain  LORazepam     Tablet 1 milliGRAM(s) Oral every 8 hours PRN Anxiety  melatonin 3 milliGRAM(s) Oral at bedtime PRN Insomnia  methylPREDNISolone sodium succinate Injectable 125 milliGRAM(s) IV Push once PRN chemotherapy reaction  ondansetron Injectable 4 milliGRAM(s) IV Push every 6 hours PRN Nausea and/or Vomiting  oxyCODONE    IR 20 milliGRAM(s) Oral every 4 hours PRN Moderate Pain (4 - 6)  oxyCODONE    IR 30 milliGRAM(s) Oral every 4 hours PRN Severe Pain (7 - 10)  
MEDICATIONS  (STANDING):  apixaban 5 milliGRAM(s) Oral every 12 hours  baclofen 5 milliGRAM(s) Oral every 8 hours  bisacodyl 5 milliGRAM(s) Oral at bedtime  chlorhexidine 2% Cloths 1 Application(s) Topical daily  clonazePAM  Tablet 1 milliGRAM(s) Oral at bedtime  clonazePAM  Tablet 0.5 milliGRAM(s) Oral <User Schedule>  colchicine 0.6 milliGRAM(s) Oral daily  fluticasone propionate/ salmeterol 100-50 MICROgram(s) Diskus 1 Dose(s) Inhalation two times a day  gabapentin 100 milliGRAM(s) Oral three times a day  influenza  Vaccine (HIGH DOSE) 0.5 milliLiter(s) IntraMuscular once  levothyroxine 112 MICROGram(s) Oral daily  lidocaine   4% Patch 1 Patch Transdermal every 24 hours  methylnaltrexone Injectable 12 milliGRAM(s) SubCutaneous once  morphine ER Tablet 30 milliGRAM(s) Oral every 8 hours  naloxegol 25 milliGRAM(s) Oral daily  OLANZapine 5 milliGRAM(s) Oral at bedtime  pantoprazole    Tablet 40 milliGRAM(s) Oral every 12 hours  pegfilgrastim-jmdb (FULPHILA) Injectable 6 milliGRAM(s) SubCutaneous once  polyethylene glycol 3350 17 Gram(s) Oral two times a day  senna 2 Tablet(s) Oral at bedtime  sodium chloride 2 Gram(s) Oral three times a day  sodium chloride 0.65% Nasal 1 Spray(s) Both Nostrils three times a day    MEDICATIONS  (PRN):  acetaminophen     Tablet .. 650 milliGRAM(s) Oral once PRN chemotherapy reaction  albuterol/ipratropium for Nebulization 3 milliLiter(s) Nebulizer every 6 hours PRN Shortness of Breath  aluminum hydroxide/magnesium hydroxide/simethicone Suspension 30 milliLiter(s) Oral every 4 hours PRN Dyspepsia  benzocaine/menthol Lozenge 1 Lozenge Oral three times a day PRN Sore Throat  diphenhydrAMINE Injectable 25 milliGRAM(s) IV Push once PRN chemotherapy reaction  famotidine Injectable 20 milliGRAM(s) IV Push once PRN chemotherapy reaction  HYDROmorphone  Injectable 3 milliGRAM(s) IV Push every 4 hours PRN severe breakthrough pain  LORazepam     Tablet 1 milliGRAM(s) Oral every 8 hours PRN Anxiety  melatonin 3 milliGRAM(s) Oral at bedtime PRN Insomnia  methylPREDNISolone sodium succinate Injectable 125 milliGRAM(s) IV Push once PRN chemotherapy reaction  ondansetron Injectable 4 milliGRAM(s) IV Push every 6 hours PRN Nausea and/or Vomiting  oxyCODONE    IR 20 milliGRAM(s) Oral every 4 hours PRN Moderate Pain (4 - 6)  oxyCODONE    IR 30 milliGRAM(s) Oral every 4 hours PRN Severe Pain (7 - 10)  
MEDICATIONS  (STANDING):  apixaban 5 milliGRAM(s) Oral every 12 hours  baclofen 5 milliGRAM(s) Oral every 8 hours  bisacodyl 5 milliGRAM(s) Oral at bedtime  chlorhexidine 2% Cloths 1 Application(s) Topical daily  clonazePAM  Tablet 0.5 milliGRAM(s) Oral <User Schedule>  clonazePAM  Tablet 1 milliGRAM(s) Oral at bedtime  colchicine 0.6 milliGRAM(s) Oral daily  FIRST- Mouthwash  BLM 5 milliLiter(s) Swish and Spit four times a day  fluticasone propionate/ salmeterol 100-50 MICROgram(s) Diskus 1 Dose(s) Inhalation two times a day  gabapentin 100 milliGRAM(s) Oral three times a day  influenza  Vaccine (HIGH DOSE) 0.5 milliLiter(s) IntraMuscular once  levothyroxine 112 MICROGram(s) Oral daily  lidocaine   4% Patch 1 Patch Transdermal every 24 hours  magnesium sulfate  IVPB 2 Gram(s) IV Intermittent once  methylnaltrexone Injectable 12 milliGRAM(s) SubCutaneous once  morphine ER Tablet 30 milliGRAM(s) Oral every 8 hours  naloxegol 25 milliGRAM(s) Oral daily  nicotine - 21 mG/24Hr(s) Patch 1 Patch Transdermal daily  OLANZapine 5 milliGRAM(s) Oral at bedtime  pantoprazole    Tablet 40 milliGRAM(s) Oral every 12 hours  polyethylene glycol 3350 17 Gram(s) Oral two times a day  potassium chloride   Powder 40 milliEquivalent(s) Oral every 4 hours  potassium chloride  10 mEq/100 mL IVPB 10 milliEquivalent(s) IV Intermittent every 1 hour  senna 2 Tablet(s) Oral at bedtime  sodium chloride 2 Gram(s) Oral three times a day  sodium chloride 0.65% Nasal 1 Spray(s) Both Nostrils three times a day    MEDICATIONS  (PRN):  acetaminophen     Tablet .. 650 milliGRAM(s) Oral once PRN chemotherapy reaction  albuterol/ipratropium for Nebulization 3 milliLiter(s) Nebulizer every 6 hours PRN Shortness of Breath  aluminum hydroxide/magnesium hydroxide/simethicone Suspension 30 milliLiter(s) Oral every 4 hours PRN Dyspepsia  benzocaine/menthol Lozenge 1 Lozenge Oral three times a day PRN Sore Throat  diphenhydrAMINE Injectable 25 milliGRAM(s) IV Push once PRN chemotherapy reaction  famotidine Injectable 20 milliGRAM(s) IV Push once PRN chemotherapy reaction  HYDROmorphone  Injectable 3 milliGRAM(s) IV Push every 4 hours PRN severe breakthrough pain  LORazepam     Tablet 1 milliGRAM(s) Oral every 8 hours PRN Anxiety  melatonin 3 milliGRAM(s) Oral at bedtime PRN Insomnia  methylPREDNISolone sodium succinate Injectable 125 milliGRAM(s) IV Push once PRN chemotherapy reaction  ondansetron Injectable 4 milliGRAM(s) IV Push every 6 hours PRN Nausea and/or Vomiting  oxyCODONE    IR 20 milliGRAM(s) Oral every 4 hours PRN Moderate Pain (4 - 6)  oxyCODONE    IR 30 milliGRAM(s) Oral every 4 hours PRN Severe Pain (7 - 10)  
MEDICATIONS  (STANDING):  albuterol/ipratropium for Nebulization 3 milliLiter(s) Nebulizer every 6 hours  apixaban 5 milliGRAM(s) Oral every 12 hours  chlorhexidine 2% Cloths 1 Application(s) Topical <User Schedule>  clonazePAM  Tablet 1 milliGRAM(s) Oral two times a day  colchicine 0.6 milliGRAM(s) Oral daily  fluticasone propionate/ salmeterol 100-50 MICROgram(s) Diskus 1 Dose(s) Inhalation two times a day  influenza  Vaccine (HIGH DOSE) 0.5 milliLiter(s) IntraMuscular once  levothyroxine 112 MICROGram(s) Oral daily  lidocaine   4% Patch 1 Patch Transdermal daily  lidocaine   4% Patch 1 Patch Transdermal every 24 hours  methylnaltrexone Injectable 12 milliGRAM(s) SubCutaneous once  morphine ER Tablet 30 milliGRAM(s) Oral every 12 hours  naloxegol 25 milliGRAM(s) Oral daily  nicotine - 21 mG/24Hr(s) Patch 1 Patch Transdermal daily  pantoprazole    Tablet 40 milliGRAM(s) Oral every 12 hours  polyethylene glycol 3350 17 Gram(s) Oral daily  senna 2 Tablet(s) Oral at bedtime  sodium chloride 1 Gram(s) Oral two times a day  sodium chloride 0.65% Nasal 1 Spray(s) Both Nostrils three times a day    MEDICATIONS  (PRN):  aluminum hydroxide/magnesium hydroxide/simethicone Suspension 30 milliLiter(s) Oral every 4 hours PRN Dyspepsia  baclofen 5 milliGRAM(s) Oral every 8 hours PRN Musculoskeletal Pain  HYDROmorphone  Injectable 2 milliGRAM(s) IV Push every 4 hours PRN severe breakthrough pain  LORazepam     Tablet 1 milliGRAM(s) Oral every 8 hours PRN Anxiety  melatonin 3 milliGRAM(s) Oral at bedtime PRN Insomnia  ondansetron Injectable 4 milliGRAM(s) IV Push every 6 hours PRN Nausea and/or Vomiting  oxyCODONE    IR 30 milliGRAM(s) Oral every 4 hours PRN Severe Pain (7 - 10)  oxyCODONE    IR 20 milliGRAM(s) Oral every 4 hours PRN Moderate Pain (4 - 6)  
MEDICATIONS  (STANDING):  albuterol/ipratropium for Nebulization 3 milliLiter(s) Nebulizer every 6 hours  apixaban 5 milliGRAM(s) Oral every 12 hours  chlorhexidine 2% Cloths 1 Application(s) Topical <User Schedule>  colchicine 0.6 milliGRAM(s) Oral daily  fluticasone propionate/ salmeterol 100-50 MICROgram(s) Diskus 1 Dose(s) Inhalation two times a day  influenza  Vaccine (HIGH DOSE) 0.5 milliLiter(s) IntraMuscular once  levothyroxine 112 MICROGram(s) Oral daily  lidocaine   4% Patch 1 Patch Transdermal daily  lidocaine   4% Patch 1 Patch Transdermal every 24 hours  methylnaltrexone Injectable 12 milliGRAM(s) SubCutaneous once  mirtazapine 7.5 milliGRAM(s) Oral at bedtime  morphine ER Tablet 30 milliGRAM(s) Oral every 8 hours  naloxegol 25 milliGRAM(s) Oral daily  nicotine - 21 mG/24Hr(s) Patch 1 Patch Transdermal daily  pantoprazole    Tablet 40 milliGRAM(s) Oral every 12 hours  polyethylene glycol 3350 17 Gram(s) Oral daily  senna 2 Tablet(s) Oral at bedtime  sodium chloride 1 Gram(s) Oral two times a day  sodium chloride 0.65% Nasal 1 Spray(s) Both Nostrils three times a day    MEDICATIONS  (PRN):  aluminum hydroxide/magnesium hydroxide/simethicone Suspension 30 milliLiter(s) Oral every 4 hours PRN Dyspepsia  baclofen 5 milliGRAM(s) Oral every 8 hours PRN Musculoskeletal Pain  benzocaine/menthol Lozenge 1 Lozenge Oral three times a day PRN Sore Throat  HYDROmorphone  Injectable 3 milliGRAM(s) IV Push every 4 hours PRN severe breakthrough pain  LORazepam     Tablet 1 milliGRAM(s) Oral every 8 hours PRN Anxiety  melatonin 3 milliGRAM(s) Oral at bedtime PRN Insomnia  ondansetron Injectable 4 milliGRAM(s) IV Push every 6 hours PRN Nausea and/or Vomiting  oxyCODONE    IR 20 milliGRAM(s) Oral every 4 hours PRN Moderate Pain (4 - 6)  oxyCODONE    IR 30 milliGRAM(s) Oral every 4 hours PRN Severe Pain (7 - 10)  
MEDICATIONS  (STANDING):  albuterol/ipratropium for Nebulization 3 milliLiter(s) Nebulizer every 6 hours  apixaban 5 milliGRAM(s) Oral every 12 hours  chlorhexidine 2% Cloths 1 Application(s) Topical <User Schedule>  clonazePAM  Tablet 1 milliGRAM(s) Oral two times a day  colchicine 0.6 milliGRAM(s) Oral daily  fluticasone propionate/ salmeterol 100-50 MICROgram(s) Diskus 1 Dose(s) Inhalation two times a day  influenza  Vaccine (HIGH DOSE) 0.5 milliLiter(s) IntraMuscular once  levothyroxine 112 MICROGram(s) Oral daily  lidocaine   4% Patch 1 Patch Transdermal daily  lidocaine   4% Patch 1 Patch Transdermal every 24 hours  methylnaltrexone Injectable 12 milliGRAM(s) SubCutaneous once  mirtazapine 7.5 milliGRAM(s) Oral at bedtime  morphine ER Tablet 30 milliGRAM(s) Oral every 8 hours  naloxegol 25 milliGRAM(s) Oral daily  nicotine - 21 mG/24Hr(s) Patch 1 Patch Transdermal daily  pantoprazole    Tablet 40 milliGRAM(s) Oral every 12 hours  polyethylene glycol 3350 17 Gram(s) Oral daily  senna 2 Tablet(s) Oral at bedtime  sodium chloride 1 Gram(s) Oral two times a day  sodium chloride 0.65% Nasal 1 Spray(s) Both Nostrils three times a day    MEDICATIONS  (PRN):  aluminum hydroxide/magnesium hydroxide/simethicone Suspension 30 milliLiter(s) Oral every 4 hours PRN Dyspepsia  baclofen 5 milliGRAM(s) Oral every 8 hours PRN Musculoskeletal Pain  benzocaine/menthol Lozenge 1 Lozenge Oral three times a day PRN Sore Throat  HYDROmorphone  Injectable 3 milliGRAM(s) IV Push every 4 hours PRN severe breakthrough pain  LORazepam     Tablet 1 milliGRAM(s) Oral every 8 hours PRN Anxiety  melatonin 3 milliGRAM(s) Oral at bedtime PRN Insomnia  ondansetron Injectable 4 milliGRAM(s) IV Push every 6 hours PRN Nausea and/or Vomiting  oxyCODONE    IR 30 milliGRAM(s) Oral every 4 hours PRN Severe Pain (7 - 10)  oxyCODONE    IR 20 milliGRAM(s) Oral every 4 hours PRN Moderate Pain (4 - 6)

## 2025-03-27 NOTE — BH CONSULTATION LIAISON PROGRESS NOTE - NSBHATTESTCOMMENTATTENDFT_PSY_A_CORE
Chart reviewed. Seen with student. Sedated, minimally interactive. Exam similar for primary team per discussion. Agree with above assessment/recs. Will continue to follow.
Chart reviewed. Seen w/ resident. Anxious but in control; denies safety concerns but remains indecisive, anxious, with high pain behaviors. Desires an outpt level of care. Agree with above assessment/recs. Will continue to follow while here though there are no psych barriers to discharge when med cleared..
Chart reviewed. Seen w/ resident. Presents as sedated/somnolent but then awakened; calmer, able to withstand otherwise anxiety-inducing conversations. Denied safety concerns or recent panic. Open to chemo/ongoing care. Agree w/ above assessment/recs. Will continue to follow.
Chart reviewed. Seen with resident. Presents as anxious, overwhelmed, a bit dramatic/regressed, with some pain behaviors. Inconsistent in her reporting as to what prevented her second attempt at an MRI. Insight fair at best. Likely interpersonally complex, with disorganized attachment features. Agree with above assessment/recs. Will continue to follow
Chart reviewed. Seen with resident. Presents as improved, much less anxious/distressed, appreciative of Klonopin. Denies safety concerns. Remains stressed by unclear prognosis. Agree with above assessment/recs. Will continue to follow.
Chart reviewed. Seen with student. Presents as cooperative, pleasant, in NAD. Not as sedated as yesterday. Discussed with primary team. Agree with above assessment/recs. Will continue to follow as needed though there are no psych barriers to discharge when med cleared. 
Chart reviewed. seen w/ trainee. Presents as oriented, calm, mostly cooperative, though playful/provocative. Ambivalent about home vs rehab dispo options. No acute safety concerns, PRNs, or behavioral issues. Agree with above assessment/recs. Will sign off as no further indication for psychiatric care.

## 2025-03-27 NOTE — BH CONSULTATION LIAISON PROGRESS NOTE - NSBHFUPREASONCONS_PSY_A_CORE
anxiety/depression
anxiety
anxiety/depression
anxiety

## 2025-03-27 NOTE — BH CONSULTATION LIAISON PROGRESS NOTE - NSBHATTESTBILLING_PSY_A_CORE
48846-Tcxybdcyxs OBS or IP - moderate complexity OR 35-49 mins
08535-Opbnmqclkf OBS or IP - moderate complexity OR 35-49 mins
Non-billable
42047-Udrdfbotqr OBS or IP - moderate complexity OR 35-49 mins
93613-Emgxeowaii OBS or IP - moderate complexity OR 35-49 mins
Non-billable
19592-Qvkwvcvrby OBS or IP - moderate complexity OR 35-49 mins
76177-Vwdgndkrqc OBS or IP - moderate complexity OR 35-49 mins
00667-Aynluwenwo OBS or IP - moderate complexity OR 35-49 mins
83938-Sotlrsoamr OBS or IP - moderate complexity OR 35-49 mins

## 2025-03-27 NOTE — BH CONSULTATION LIAISON PROGRESS NOTE - NSICDXBHPRIMARYDX_PSY_ALL_CORE
Generalized anxiety disorder   F41.1  

## 2025-03-27 NOTE — BH CONSULTATION LIAISON PROGRESS NOTE - NSBHATTESTTYPEVISIT_PSY_A_CORE
Attending Only
Attending with Resident/Fellow/Student
Resident/Fellow with telephonic supervision
Attending with Resident/Fellow/Student
Attending with Resident/Fellow/Student
Resident/Fellow with telephonic supervision
Attending with Resident/Fellow/Student

## 2025-03-27 NOTE — BH CONSULTATION LIAISON PROGRESS NOTE - NSBHASSESSMENTFT_PSY_ALL_CORE
Assessment: 64 /yo F w/ longstanding PPHx of anxiety previously on xanax, remote hx of inpatient psych hospitalization for suicidality, PMHx scleroderma with chronic pain manifestations, recent admission for cardiac tamponade, admitted for workup of SOB/chest pain leading to SCLC diagnosis, psychiatry consulted for worsening of anxiety I/s/o multiple medical complications and her son's recent death. Pt likely with multiple comorbid psychiatric disorders including SOURAV, MDD, claustrophobia, most prominent disorder at this time is worsening of anxiety, with panic-like symptoms. Given guarded medical prognosis, recent social stressors, appropriate to treat severe anxiety with standing benzodiazepine regimen at this time. Pt not amenable to starting SSRI treatment, but if becomes amenable may benefit from this as well.     3/15: Reports improved anxiety. No reported changes in breathing. Appears to be tolerating Klonopin. Will continue.  3/17: Endorses decreased panic attacks and no use of PRN ativan over this interval. C/w klonopin, recommend ongoing GOC discussions with pt and her HCP  3/18: Unable to tolerate MRI over this interval, unclear reason. DEC klonopin to 0.5 mg qAM and 1 mg qHS due to pt complaint of sleepiness. Can give extra klonopin 0.5 mg wafer PRN prior to MRI, or ativan 1 mg IV x1 prior to MRI. If pt continues to refuse MRI, would reevaluate pt's capacity to refuse which may be impacted by her severe anxiety, and would involve her HCP.   3/19: Pt calmer over this interval, can continue with klonopin 0.5 mg qD and klonopin 1 mg qHS. Denies SI/HI.   3/20: Pt more dysregulated over this interval. STOP remeron at night and START zyprexa 5 mg qHS for control of mood and to augment pain regimen. INC klonopin to 0.5 mg at 9 AM, 0.5 mg at 1PM, and 1 mg qHS.  3/21: Calmer over this interval. Continue meds, would hold if pt appears sedated.   3/24: intermittent panic episodes but agreeing with care largely.   3/25: no psychiatric PRNS required. Sleepy on examination, mentioned having pain overnight. Continue current regimen.   3/26: no psychiatric PRNS required. Overall appears stable. Continue current regimen.       PLAN  - defer obs status to primary team   - C/W Zyprexa 5mg HS  - c/w klonopin 0.5mg BID plus 1mg qhs standing for treatment of anxiety  - ativan 1 mg PO/IV/IM q8h for breakthrough severe anxiety  - please monitor respiratory status closely given pt is also on baclofen and standing oxycodone, with limited baseline pulmonary reserve. HOLD benzodiazepines if concerned for oversedation.   - Dispo: has no psych barriers to discharge when med cleared; we support/advocate for a transition to an outpt oncological level of care. Please refer pt to outpatient psychoonc clinic w/ Dr. Mercado   Assessment: 64 /yo F w/ longstanding PPHx of anxiety previously on xanax, remote hx of inpatient psych hospitalization for suicidality, PMHx scleroderma with chronic pain manifestations, recent admission for cardiac tamponade, admitted for workup of SOB/chest pain leading to SCLC diagnosis, psychiatry consulted for worsening of anxiety I/s/o multiple medical complications and her son's recent death. Pt likely with multiple comorbid psychiatric disorders including SOURAV, MDD, claustrophobia, most prominent disorder at this time is worsening of anxiety, with panic-like symptoms. Given guarded medical prognosis, recent social stressors, appropriate to treat severe anxiety with standing benzodiazepine regimen at this time. Pt not amenable to starting SSRI treatment, but if becomes amenable may benefit from this as well.     3/15: Reports improved anxiety. No reported changes in breathing. Appears to be tolerating Klonopin. Will continue.  3/17: Endorses decreased panic attacks and no use of PRN ativan over this interval. C/w klonopin, recommend ongoing GOC discussions with pt and her HCP  3/18: Unable to tolerate MRI over this interval, unclear reason. DEC klonopin to 0.5 mg qAM and 1 mg qHS due to pt complaint of sleepiness. Can give extra klonopin 0.5 mg wafer PRN prior to MRI, or ativan 1 mg IV x1 prior to MRI. If pt continues to refuse MRI, would reevaluate pt's capacity to refuse which may be impacted by her severe anxiety, and would involve her HCP.   3/19: Pt calmer over this interval, can continue with klonopin 0.5 mg qD and klonopin 1 mg qHS. Denies SI/HI.   3/20: Pt more dysregulated over this interval. STOP remeron at night and START zyprexa 5 mg qHS for control of mood and to augment pain regimen. INC klonopin to 0.5 mg at 9 AM, 0.5 mg at 1PM, and 1 mg qHS.  3/21: Calmer over this interval. Continue meds, would hold if pt appears sedated.   3/24: intermittent panic episodes but agreeing with care largely.   3/25: no psychiatric PRNS required. Sleepy on examination, mentioned having pain overnight. Continue current regimen.   3/26: no psychiatric PRNS required. Overall appears stable. Continue current regimen.   03/27: no psychiatric PRNS required. Overall appears stable. Continue current regimen.         PLAN  - pt is psychiatrically optimized, CL psych service to s/o   - defer obs status to primary team   - C/W Zyprexa 5mg HS  - c/w klonopin 0.5mg BID plus 1mg qhs standing for treatment of anxiety  - ativan 1 mg PO/IV/IM q8h for breakthrough severe anxiety  - please monitor respiratory status closely given pt is also on baclofen and standing oxycodone, with limited baseline pulmonary reserve. HOLD benzodiazepines if concerned for oversedation.   - Dispo: has no psych barriers to discharge when med cleared; we support/advocate for a transition to an outpt oncological level of care. Please refer pt to outpatient psychoonc clinic w/ Dr. Mercado

## 2025-03-27 NOTE — BH CONSULTATION LIAISON PROGRESS NOTE - NSBHPTASSESSDT_PSY_A_CORE
21-Mar-2025 15:06
15-Mar-2025 12:18
20-Mar-2025 12:17
18-Mar-2025 15:49
24-Mar-2025 16:12
25-Mar-2025 12:53
19-Mar-2025 13:16
26-Mar-2025 13:49
17-Mar-2025 16:02
27-Mar-2025 12:32

## 2025-03-27 NOTE — BH CONSULTATION LIAISON PROGRESS NOTE - NSBHFUPINTERVALHXFT_PSY_A_CORE
Chart reviewed. No lorazepam or haloperidol PRNs overnight/in this interval. Reports having terrible pain due to pain from her body, though is overall "doing as well as she could be in her current situation." Inquiring about  Chart reviewed. No lorazepam or haloperidol PRNs overnight/in this interval. Reports having terrible pain due to pain from her body, though is overall "doing as well as she could be in her current situation." Inquiring about dc

## 2025-03-27 NOTE — BH CONSULTATION LIAISON PROGRESS NOTE - MSE OPTIONS
Structured MSE
Unstructured MSE
Structured MSE
Unstructured MSE
Structured MSE
Unstructured MSE
Structured MSE
Structured MSE

## 2025-03-27 NOTE — BH CONSULTATION LIAISON PROGRESS NOTE - NSBHCHARTREVIEWVS_PSY_A_CORE FT
Vital Signs Last 24 Hrs  T(C): 36.5 (19 Mar 2025 13:03), Max: 36.6 (18 Mar 2025 13:21)  T(F): 97.7 (19 Mar 2025 13:03), Max: 97.9 (18 Mar 2025 13:21)  HR: 83 (19 Mar 2025 13:03) (61 - 84)  BP: 129/72 (19 Mar 2025 13:03) (101/54 - 129/72)  BP(mean): --  RR: 18 (19 Mar 2025 13:03) (17 - 18)  SpO2: 97% (19 Mar 2025 13:03) (96% - 100%)    Parameters below as of 19 Mar 2025 13:03  Patient On (Oxygen Delivery Method): nasal cannula    
Vital Signs Last 24 Hrs  T(C): 36.4 (24 Mar 2025 13:04), Max: 36.8 (23 Mar 2025 20:23)  T(F): 97.5 (24 Mar 2025 13:04), Max: 98.3 (23 Mar 2025 20:23)  HR: 90 (24 Mar 2025 13:04) (80 - 99)  BP: 104/61 (24 Mar 2025 13:04) (104/61 - 110/61)  BP(mean): --  RR: 18 (24 Mar 2025 13:04) (16 - 18)  SpO2: 94% (24 Mar 2025 13:04) (93% - 98%)    Parameters below as of 24 Mar 2025 13:04  Patient On (Oxygen Delivery Method): nasal cannula    
Vital Signs Last 24 Hrs  T(C): 36.5 (25 Mar 2025 12:40), Max: 37.1 (24 Mar 2025 20:34)  T(F): 97.7 (25 Mar 2025 12:40), Max: 98.7 (24 Mar 2025 20:34)  HR: 63 (25 Mar 2025 12:40) (54 - 90)  BP: 109/60 (25 Mar 2025 12:40) (104/61 - 126/60)  BP(mean): --  RR: 17 (25 Mar 2025 12:40) (17 - 18)  SpO2: 95% (25 Mar 2025 12:40) (94% - 98%)    Parameters below as of 25 Mar 2025 12:40  Patient On (Oxygen Delivery Method): nasal cannula    
Vital Signs Last 24 Hrs  T(C): 36.6 (20 Mar 2025 05:55), Max: 36.6 (20 Mar 2025 05:55)  T(F): 97.8 (20 Mar 2025 05:55), Max: 97.8 (20 Mar 2025 05:55)  HR: 81 (20 Mar 2025 05:55) (81 - 99)  BP: 103/62 (20 Mar 2025 05:55) (103/62 - 157/94)  BP(mean): --  RR: 16 (20 Mar 2025 05:55) (16 - 18)  SpO2: 92% (20 Mar 2025 05:55) (92% - 98%)    Parameters below as of 20 Mar 2025 05:55  Patient On (Oxygen Delivery Method): room air    
Vital Signs Last 24 Hrs  T(C): 36.7 (21 Mar 2025 11:55), Max: 36.8 (21 Mar 2025 00:53)  T(F): 98.1 (21 Mar 2025 11:55), Max: 98.2 (21 Mar 2025 00:53)  HR: 81 (21 Mar 2025 11:55) (71 - 90)  BP: 103/64 (21 Mar 2025 11:55) (103/64 - 127/72)  BP(mean): --  RR: 17 (21 Mar 2025 11:55) (17 - 18)  SpO2: 96% (21 Mar 2025 11:55) (95% - 98%)    Parameters below as of 21 Mar 2025 11:55  Patient On (Oxygen Delivery Method): nasal cannula  O2 Flow (L/min): 2  
Vital Signs Last 24 Hrs  T(C): 36.6 (26 Mar 2025 09:20), Max: 36.9 (25 Mar 2025 21:51)  T(F): 97.8 (26 Mar 2025 09:20), Max: 98.4 (25 Mar 2025 21:51)  HR: 69 (26 Mar 2025 09:20) (50 - 79)  BP: 121/66 (26 Mar 2025 10:38) (110/62 - 129/71)  BP(mean): --  RR: 18 (26 Mar 2025 10:38) (18 - 18)  SpO2: 95% (26 Mar 2025 10:38) (95% - 98%)    Parameters below as of 26 Mar 2025 10:38  Patient On (Oxygen Delivery Method): nasal cannula  O2 Flow (L/min): 3  
Vital Signs Last 24 Hrs  T(C): 36.6 (18 Mar 2025 13:21), Max: 37.1 (18 Mar 2025 06:02)  T(F): 97.9 (18 Mar 2025 13:21), Max: 98.8 (18 Mar 2025 06:02)  HR: 61 (18 Mar 2025 13:21) (61 - 80)  BP: 101/54 (18 Mar 2025 13:21) (99/64 - 112/59)  BP(mean): --  RR: 18 (18 Mar 2025 13:21) (18 - 18)  SpO2: 100% (18 Mar 2025 13:21) (97% - 100%)    Parameters below as of 18 Mar 2025 13:21  Patient On (Oxygen Delivery Method): nasal cannula    
Vital Signs Last 24 Hrs  T(C): 36.7 (17 Mar 2025 05:16), Max: 36.7 (17 Mar 2025 05:16)  T(F): 98 (17 Mar 2025 05:16), Max: 98 (17 Mar 2025 05:16)  HR: 93 (17 Mar 2025 05:16) (79 - 94)  BP: 109/69 (17 Mar 2025 05:16) (108/62 - 132/76)  BP(mean): --  RR: 18 (17 Mar 2025 05:16) (18 - 18)  SpO2: 95% (17 Mar 2025 05:16) (95% - 97%)    Parameters below as of 17 Mar 2025 09:26  Patient On (Oxygen Delivery Method): nasal cannula    
Vital Signs Last 24 Hrs  T(C): 36.7 (27 Mar 2025 05:26), Max: 36.7 (27 Mar 2025 05:26)  T(F): 98 (27 Mar 2025 05:26), Max: 98 (27 Mar 2025 05:26)  HR: 67 (27 Mar 2025 05:26) (67 - 82)  BP: 111/63 (27 Mar 2025 05:26) (111/63 - 125/67)  BP(mean): --  RR: 18 (27 Mar 2025 05:26) (18 - 18)  SpO2: 95% (27 Mar 2025 05:26) (94% - 100%)    Parameters below as of 27 Mar 2025 08:50  Patient On (Oxygen Delivery Method): nasal cannula    
Vital Signs Last 24 Hrs  T(C): 36.7 (15 Mar 2025 05:25), Max: 36.7 (15 Mar 2025 05:25)  T(F): 98 (15 Mar 2025 05:25), Max: 98 (15 Mar 2025 05:25)  HR: 69 (15 Mar 2025 05:25) (69 - 85)  BP: 114/67 (15 Mar 2025 05:25) (114/67 - 133/63)  BP(mean): --  RR: 18 (15 Mar 2025 05:25) (17 - 18)  SpO2: 100% (15 Mar 2025 05:25) (95% - 100%)    Parameters below as of 15 Mar 2025 05:25  Patient On (Oxygen Delivery Method): nasal cannula  O2 Flow (L/min): 6

## 2025-03-27 NOTE — PROGRESS NOTE ADULT - SUBJECTIVE AND OBJECTIVE BOX
Deven Archuleta MD  [employer]ist  Pager [phone]  Email: [email or web address]  Available on [employer] Teams        PROGRESS NOTE:     Patient is a 64y old  Female who presents with a chief complaint of Shortness of breath and chest pain (26 Mar 2025 13:21)      SUBJECTIVE / OVERNIGHT EVENTS:  Patient seen and examined this morning. Patient reports new mouth sores (chancre sores). Swallowing remains unchanged (reports always having issues because of Sceloderma). Patient also asking for stage of cancer.   ADDITIONAL REVIEW OF SYSTEMS:  No f/c    MEDICATIONS  (STANDING):  apixaban 5 milliGRAM(s) Oral every 12 hours  baclofen 5 milliGRAM(s) Oral every 8 hours  bisacodyl 5 milliGRAM(s) Oral at bedtime  chlorhexidine 2% Cloths 1 Application(s) Topical daily  clonazePAM  Tablet 0.5 milliGRAM(s) Oral <User Schedule>  clonazePAM  Tablet 1 milliGRAM(s) Oral at bedtime  colchicine 0.6 milliGRAM(s) Oral daily  FIRST- Mouthwash  BLM 5 milliLiter(s) Swish and Spit four times a day  fluticasone propionate/ salmeterol 100-50 MICROgram(s) Diskus 1 Dose(s) Inhalation two times a day  gabapentin 100 milliGRAM(s) Oral three times a day  influenza  Vaccine (HIGH DOSE) 0.5 milliLiter(s) IntraMuscular once  levothyroxine 112 MICROGram(s) Oral daily  lidocaine   4% Patch 1 Patch Transdermal every 24 hours  magnesium sulfate  IVPB 2 Gram(s) IV Intermittent once  methylnaltrexone Injectable 12 milliGRAM(s) SubCutaneous once  morphine ER Tablet 30 milliGRAM(s) Oral every 8 hours  naloxegol 25 milliGRAM(s) Oral daily  nicotine - 21 mG/24Hr(s) Patch 1 Patch Transdermal daily  OLANZapine 5 milliGRAM(s) Oral at bedtime  pantoprazole    Tablet 40 milliGRAM(s) Oral every 12 hours  polyethylene glycol 3350 17 Gram(s) Oral two times a day  potassium chloride   Powder 40 milliEquivalent(s) Oral every 4 hours  senna 2 Tablet(s) Oral at bedtime  sodium chloride 2 Gram(s) Oral three times a day  sodium chloride 0.65% Nasal 1 Spray(s) Both Nostrils three times a day    MEDICATIONS  (PRN):  albuterol/ipratropium for Nebulization 3 milliLiter(s) Nebulizer every 6 hours PRN Shortness of Breath  aluminum hydroxide/magnesium hydroxide/simethicone Suspension 30 milliLiter(s) Oral every 4 hours PRN Dyspepsia  benzocaine/menthol Lozenge 1 Lozenge Oral three times a day PRN Sore Throat  diphenhydrAMINE Injectable 25 milliGRAM(s) IV Push once PRN chemotherapy reaction  famotidine Injectable 20 milliGRAM(s) IV Push once PRN chemotherapy reaction  HYDROmorphone  Injectable 3 milliGRAM(s) IV Push every 4 hours PRN severe breakthrough pain  LORazepam     Tablet 1 milliGRAM(s) Oral every 8 hours PRN Anxiety  melatonin 3 milliGRAM(s) Oral at bedtime PRN Insomnia  methylPREDNISolone sodium succinate Injectable 125 milliGRAM(s) IV Push once PRN chemotherapy reaction  ondansetron Injectable 4 milliGRAM(s) IV Push every 6 hours PRN Nausea and/or Vomiting  oxyCODONE    IR 20 milliGRAM(s) Oral every 4 hours PRN Moderate Pain (4 - 6)  oxyCODONE    IR 30 milliGRAM(s) Oral every 4 hours PRN Severe Pain (7 - 10)      CAPILLARY BLOOD GLUCOSE        I&O's Summary    26 Mar 2025 07:01  -  27 Mar 2025 07:00  --------------------------------------------------------  IN: 550 mL / OUT: 600 mL / NET: -50 mL        PHYSICAL EXAM:  Vital Signs Last 24 Hrs  T(C): 36.7 (27 Mar 2025 13:15), Max: 36.7 (27 Mar 2025 05:26)  T(F): 98.1 (27 Mar 2025 13:15), Max: 98.1 (27 Mar 2025 13:15)  HR: 79 (27 Mar 2025 13:15) (67 - 82)  BP: 118/70 (27 Mar 2025 13:15) (111/63 - 125/67)  BP(mean): --  RR: 18 (27 Mar 2025 13:15) (18 - 18)  SpO2: 97% (27 Mar 2025 13:15) (94% - 100%)    Parameters below as of 27 Mar 2025 13:15  Patient On (Oxygen Delivery Method): nasal cannula        CONSTITUTIONAL: NAD, more talkative this morning.   RESPIRATORY: Normal respiratory effort; no respiratory distress, CTAB  CARDIOVASCULAR: No visible JVD, No lower extremity edema; S1S2, no m,r,g  ABDOMEN: Not guarding, does not appear distended, BS+  MUSCLOSKELETAL: no clubbing or cyanosis of digits; no joint swelling   PSYCH: AOx3,    LABS:                        10.3   18.90 )-----------( 244      ( 27 Mar 2025 06:22 )             30.2     03-27    131[L]  |  94[L]  |  11  ----------------------------<  88  3.3[L]   |  27  |  0.44[L]    Ca    8.5      27 Mar 2025 06:22  Phos  3.6     03-27  Mg     1.50     03-27    TPro  5.4[L]  /  Alb  3.0[L]  /  TBili  0.3  /  DBili  x   /  AST  42[H]  /  ALT  29  /  AlkPhos  80  03-27          Urinalysis Basic - ( 27 Mar 2025 06:22 )    Color: x / Appearance: x / SG: x / pH: x  Gluc: 88 mg/dL / Ketone: x  / Bili: x / Urobili: x   Blood: x / Protein: x / Nitrite: x   Leuk Esterase: x / RBC: x / WBC x   Sq Epi: x / Non Sq Epi: x / Bacteria: x          RADIOLOGY & ADDITIONAL TESTS:  Results Reviewed:   Imaging Personally Reviewed:  Electrocardiogram Personally Reviewed:    COORDINATION OF CARE:  Care Discussed with Consultants/Other Providers [Y/N]: Case discussed during interdisciplinary rounds with social work and case management  Prior or Outpatient Records Reviewed [Y/N]:

## 2025-03-28 LAB
ALBUMIN SERPL ELPH-MCNC: 2.9 G/DL — LOW (ref 3.3–5)
ALP SERPL-CCNC: 90 U/L — SIGNIFICANT CHANGE UP (ref 40–120)
ALT FLD-CCNC: 23 U/L — SIGNIFICANT CHANGE UP (ref 4–33)
ANION GAP SERPL CALC-SCNC: 10 MMOL/L — SIGNIFICANT CHANGE UP (ref 7–14)
AST SERPL-CCNC: 31 U/L — SIGNIFICANT CHANGE UP (ref 4–32)
BASOPHILS # BLD AUTO: 0 K/UL — SIGNIFICANT CHANGE UP (ref 0–0.2)
BASOPHILS NFR BLD AUTO: 0 % — SIGNIFICANT CHANGE UP (ref 0–2)
BILIRUB SERPL-MCNC: 0.3 MG/DL — SIGNIFICANT CHANGE UP (ref 0.2–1.2)
BUN SERPL-MCNC: 8 MG/DL — SIGNIFICANT CHANGE UP (ref 7–23)
CALCIUM SERPL-MCNC: 8.4 MG/DL — SIGNIFICANT CHANGE UP (ref 8.4–10.5)
CHLORIDE SERPL-SCNC: 94 MMOL/L — LOW (ref 98–107)
CO2 SERPL-SCNC: 25 MMOL/L — SIGNIFICANT CHANGE UP (ref 22–31)
CREAT SERPL-MCNC: 0.4 MG/DL — LOW (ref 0.5–1.3)
EGFR: 109 ML/MIN/1.73M2 — SIGNIFICANT CHANGE UP
EGFR: 109 ML/MIN/1.73M2 — SIGNIFICANT CHANGE UP
EOSINOPHIL # BLD AUTO: 0.51 K/UL — HIGH (ref 0–0.5)
EOSINOPHIL NFR BLD AUTO: 2.6 % — SIGNIFICANT CHANGE UP (ref 0–6)
GLUCOSE SERPL-MCNC: 88 MG/DL — SIGNIFICANT CHANGE UP (ref 70–99)
HCT VFR BLD CALC: 30.7 % — LOW (ref 34.5–45)
HGB BLD-MCNC: 10.4 G/DL — LOW (ref 11.5–15.5)
IANC: 14.05 K/UL — HIGH (ref 1.8–7.4)
LYMPHOCYTES # BLD AUTO: 1.55 K/UL — SIGNIFICANT CHANGE UP (ref 1–3.3)
LYMPHOCYTES # BLD AUTO: 7.9 % — LOW (ref 13–44)
MAGNESIUM SERPL-MCNC: 1.7 MG/DL — SIGNIFICANT CHANGE UP (ref 1.6–2.6)
MCHC RBC-ENTMCNC: 30 PG — SIGNIFICANT CHANGE UP (ref 27–34)
MCHC RBC-ENTMCNC: 33.9 G/DL — SIGNIFICANT CHANGE UP (ref 32–36)
MCV RBC AUTO: 88.5 FL — SIGNIFICANT CHANGE UP (ref 80–100)
MONOCYTES # BLD AUTO: 0 K/UL — SIGNIFICANT CHANGE UP (ref 0–0.9)
MONOCYTES NFR BLD AUTO: 0 % — LOW (ref 2–14)
NEUTROPHILS # BLD AUTO: 17.25 K/UL — HIGH (ref 1.8–7.4)
NEUTROPHILS NFR BLD AUTO: 81.6 % — HIGH (ref 43–77)
PHOSPHATE SERPL-MCNC: 4.2 MG/DL — SIGNIFICANT CHANGE UP (ref 2.5–4.5)
PLATELET # BLD AUTO: 228 K/UL — SIGNIFICANT CHANGE UP (ref 150–400)
POTASSIUM SERPL-MCNC: 4.3 MMOL/L — SIGNIFICANT CHANGE UP (ref 3.5–5.3)
POTASSIUM SERPL-SCNC: 4.3 MMOL/L — SIGNIFICANT CHANGE UP (ref 3.5–5.3)
PROT SERPL-MCNC: 5.3 G/DL — LOW (ref 6–8.3)
RBC # BLD: 3.47 M/UL — LOW (ref 3.8–5.2)
RBC # FLD: 12.8 % — SIGNIFICANT CHANGE UP (ref 10.3–14.5)
SODIUM SERPL-SCNC: 129 MMOL/L — LOW (ref 135–145)
WBC # BLD: 19.67 K/UL — HIGH (ref 3.8–10.5)
WBC # FLD AUTO: 19.67 K/UL — HIGH (ref 3.8–10.5)

## 2025-03-28 PROCEDURE — 99232 SBSQ HOSP IP/OBS MODERATE 35: CPT

## 2025-03-28 PROCEDURE — 99233 SBSQ HOSP IP/OBS HIGH 50: CPT

## 2025-03-28 RX ORDER — ACETAMINOPHEN 500 MG/5ML
1000 LIQUID (ML) ORAL ONCE
Refills: 0 | Status: COMPLETED | OUTPATIENT
Start: 2025-03-28 | End: 2025-03-28

## 2025-03-28 RX ADMIN — NICOTINE POLACRILEX 1 PATCH: 4 GUM, CHEWING ORAL at 18:56

## 2025-03-28 RX ADMIN — Medication 1 SPRAY(S): at 06:50

## 2025-03-28 RX ADMIN — OXYCODONE HYDROCHLORIDE 30 MILLIGRAM(S): 30 TABLET ORAL at 07:03

## 2025-03-28 RX ADMIN — CLONAZEPAM 0.5 MILLIGRAM(S): 0.5 TABLET ORAL at 06:48

## 2025-03-28 RX ADMIN — Medication 1000 MILLIGRAM(S): at 21:29

## 2025-03-28 RX ADMIN — NICOTINE POLACRILEX 1 PATCH: 4 GUM, CHEWING ORAL at 12:42

## 2025-03-28 RX ADMIN — DIPHENHYDRAMINE HYDROCHLORIDE AND LIDOCAINE HYDROCHLORIDE AND ALUMINUM HYDROXIDE AND MAGNESIUM HYDRO 5 MILLILITER(S): KIT at 12:41

## 2025-03-28 RX ADMIN — BACLOFEN 5 MILLIGRAM(S): 10 INJECTION INTRATHECAL at 06:47

## 2025-03-28 RX ADMIN — Medication 112 MICROGRAM(S): at 06:49

## 2025-03-28 RX ADMIN — Medication 40 MILLIGRAM(S): at 18:11

## 2025-03-28 RX ADMIN — Medication 3 MILLIGRAM(S): at 15:48

## 2025-03-28 RX ADMIN — BACLOFEN 5 MILLIGRAM(S): 10 INJECTION INTRATHECAL at 21:32

## 2025-03-28 RX ADMIN — Medication 1 SPRAY(S): at 14:16

## 2025-03-28 RX ADMIN — APIXABAN 5 MILLIGRAM(S): 2.5 TABLET, FILM COATED ORAL at 06:49

## 2025-03-28 RX ADMIN — Medication 1 SPRAY(S): at 21:31

## 2025-03-28 RX ADMIN — GABAPENTIN 100 MILLIGRAM(S): 400 CAPSULE ORAL at 14:15

## 2025-03-28 RX ADMIN — CLONAZEPAM 0.5 MILLIGRAM(S): 0.5 TABLET ORAL at 15:49

## 2025-03-28 RX ADMIN — Medication 1 APPLICATION(S): at 12:42

## 2025-03-28 RX ADMIN — OXYCODONE HYDROCHLORIDE 30 MILLIGRAM(S): 30 TABLET ORAL at 13:41

## 2025-03-28 RX ADMIN — Medication 3 MILLIGRAM(S): at 14:48

## 2025-03-28 RX ADMIN — Medication 30 MILLIGRAM(S): at 14:15

## 2025-03-28 RX ADMIN — OXYCODONE HYDROCHLORIDE 30 MILLIGRAM(S): 30 TABLET ORAL at 19:11

## 2025-03-28 RX ADMIN — Medication 3 MILLIGRAM(S): at 19:54

## 2025-03-28 RX ADMIN — Medication 2 GRAM(S): at 21:32

## 2025-03-28 RX ADMIN — BACLOFEN 5 MILLIGRAM(S): 10 INJECTION INTRATHECAL at 14:16

## 2025-03-28 RX ADMIN — Medication 30 MILLIGRAM(S): at 06:49

## 2025-03-28 RX ADMIN — Medication 40 MILLIGRAM(S): at 06:50

## 2025-03-28 RX ADMIN — OXYCODONE HYDROCHLORIDE 30 MILLIGRAM(S): 30 TABLET ORAL at 12:41

## 2025-03-28 RX ADMIN — Medication 3 MILLIGRAM(S): at 09:46

## 2025-03-28 RX ADMIN — GABAPENTIN 100 MILLIGRAM(S): 400 CAPSULE ORAL at 21:29

## 2025-03-28 RX ADMIN — Medication 400 MILLIGRAM(S): at 20:59

## 2025-03-28 RX ADMIN — GABAPENTIN 100 MILLIGRAM(S): 400 CAPSULE ORAL at 06:48

## 2025-03-28 RX ADMIN — Medication 2 GRAM(S): at 14:16

## 2025-03-28 RX ADMIN — OXYCODONE HYDROCHLORIDE 30 MILLIGRAM(S): 30 TABLET ORAL at 23:41

## 2025-03-28 RX ADMIN — COLCHICINE 0.6 MILLIGRAM(S): 0.6 TABLET, FILM COATED ORAL at 12:42

## 2025-03-28 RX ADMIN — CLONAZEPAM 1 MILLIGRAM(S): 0.5 TABLET ORAL at 21:29

## 2025-03-28 RX ADMIN — Medication 30 MILLIGRAM(S): at 15:15

## 2025-03-28 RX ADMIN — APIXABAN 5 MILLIGRAM(S): 2.5 TABLET, FILM COATED ORAL at 18:11

## 2025-03-28 RX ADMIN — Medication 2 GRAM(S): at 06:48

## 2025-03-28 RX ADMIN — OLANZAPINE 5 MILLIGRAM(S): 10 TABLET ORAL at 23:41

## 2025-03-28 RX ADMIN — OXYCODONE HYDROCHLORIDE 30 MILLIGRAM(S): 30 TABLET ORAL at 18:11

## 2025-03-28 RX ADMIN — Medication 1 DOSE(S): at 21:32

## 2025-03-28 RX ADMIN — Medication 30 MILLIGRAM(S): at 21:58

## 2025-03-28 RX ADMIN — Medication 5 MILLIGRAM(S): at 21:29

## 2025-03-28 RX ADMIN — Medication 1 MILLIGRAM(S): at 21:00

## 2025-03-28 RX ADMIN — DIPHENHYDRAMINE HYDROCHLORIDE AND LIDOCAINE HYDROCHLORIDE AND ALUMINUM HYDROXIDE AND MAGNESIUM HYDRO 5 MILLILITER(S): KIT at 18:12

## 2025-03-28 RX ADMIN — Medication 3 MILLIGRAM(S): at 10:46

## 2025-03-28 RX ADMIN — Medication 30 MILLIGRAM(S): at 21:28

## 2025-03-28 NOTE — PROGRESS NOTE ADULT - PROBLEM SELECTOR PLAN 1
Chest pain from mediastinal mass   Patient with hx of diffuse pain related to her underlying scleroderma for which she shared at one point she was fentanyl 200 mcg patch and was weaned down  - Home regimen: MS Contin 30 mg BID and oxycodone 20 mg q4h PRN (long term dose) given by outpatient pain specialist originally for scleroderma pain  - PRN use in past 24 hours from 8 AM to 8 AM: oxycodone 30 mg x 3 doses, IV dilaudid 3 mg x 1 dose   - Patient sometimes complains of non cancer pain, concern more severe anxiety rather than physical pain. Improves with haldol. Unclear if true scleroderma or psychosomatic origin.     Plan and Recommendations:   > c/w gabapentin 100 mg TID   - opioids:   > c/w oxycodone IR 20 mg q4h PRN for moderate pain and oxycodone IR 30 mg q4h prn for severe pain  > c/w IV dilaudid 3 mg q4h PRN for severe breakthrough pain  > c/w MS Contin 30 mg TID   - Bowel regimen    On discharge-  > c/w oxycodone IR 30 mg q4h prn for severe pain  > c/w MS Contin 30 mg TID   - Bowel regimen

## 2025-03-28 NOTE — CHART NOTE - NSCHARTNOTEFT_GEN_A_CORE
Patient will not receive chemotherapy while at [facility] Plan for next chemo session after discharge from [facility] Discussed with Attending Dr. Archuleta.     --Nataly Rogers PA-C

## 2025-03-28 NOTE — PROGRESS NOTE ADULT - PROBLEM SELECTOR PLAN 7
For pain management   Plan for rehab, f/u SW   F/u with rheum     On discharge, patient to f/u with outpatient palliative care/supportive oncology with Dr. Mahsa Scales on 4/11/2025 at 3 PM

## 2025-03-28 NOTE — CHART NOTE - NSCHARTNOTESELECT_GEN_ALL_CORE
Event Note
Event Note
ISTOP/Event Note
Palliative Care 
Palliative Care 
Palliative Care/Event Note
Thoracic Surgery/Event Note
Thoracic surgery
Event Note
Follow-up/Nutrition Services
Nutrition Services
Palliative Care Social Work
Palliative Care Social Work
thoracic PA/Event Note

## 2025-03-28 NOTE — PROGRESS NOTE ADULT - PROBLEM SELECTOR PLAN 4
- recently diagnosed  - Unable to tolerate MRI A/P and brain d/t claustrophobia    - CT chest on 2/15- MEDIASTINUM AND JEREMIAS: Anterior subcarinal lymph node measures 1.8 cm in short axis, stable. There is a soft tissue density, measuring up to 4.2 cm in the prevascular space extending into the subcutaneous left main pulmonary artery region, this is more pronounced on today's study.   - Per onc, plan is to start inpatient chemo treatment and RT- patient thinking about it   - CT imaging showing no mets   - Follows with Dr. Hall at [facility]  - pericardial effusion, f/u CT surg recs  - Inpatient chemo started

## 2025-03-28 NOTE — PROGRESS NOTE ADULT - SUBJECTIVE AND OBJECTIVE BOX
Indication of Geriatrics and Palliative Medicine Services:  [X  ] Complex Medical Decision Making   [X  ] Symptom/Pain management     DNR on chart: No    INTERVAL EVENTS: Patient seen this AM with primary team. Patient initially somnolent, but arousable. Patient says she feels sluggish. Patient is ready for discharge but resistant to going to rehab.     -------------------------------------------------------------------------------------------------------    PRESENT SYMPTOMS:     [ ]Unable to self-report - see [ ] CPOT [ ] PAINADS [ ] RDOS  Source if other than patient:  [ ]Family   [ ]Team     PAIN   1. Location- Left chest   2. Radiation-  Left arm, back   3. Quality- Sharp   4. Timing- Constant   5. Minimal acceptable level/pain goal- 4/10   6. Aggravating factors-   7. QOL impact- Severe     Dyspnea:                           [ ]Mild [ ]Moderate [ ]Severe  Anxiety:                             [ ]Mild [ ]Moderate [ ]Severe  Fatigue:                             [ ]Mild [ ]Moderate [ ]Severe  Nausea:                             [ x]Mild [ ]Moderate [ ]Severe  Loss of appetite:                [ ]Mild [x ]Moderate [ ]Severe  Constipation:                     [ ]Mild [ ]Moderate [ ]Severe  Other Symptoms:  [X ]All other review of systems negative     -------------------------------------------------------------------------------------------------------    I STOP: [id number]    Prescription Information      PDI Filter:    PDI	Current Rx	Drug Type	Rx Written	Rx Dispensed	Drug	Quantity	Days Supply	Prescriber Name	Prescriber KHOA #	Payment Method	Dispenser  A	Y	O	02/27/2025	03/08/2025	morphine sulf er 30 mg tablet	60	30	SebastienGuru MD	[id number]	Medicare	[id number]  A	Y	O	02/27/2025	03/08/2025	oxycodone hcl (ir) 20 mg tab	90	30	Guru Crowe MD	[id number]	Medicare	[id number]  A	N	O	01/30/2025	02/06/2025	morphine sulf er 30 mg tablet	60	30	Guru Crowe MD	[id number]	Medicare	[id number]  A	N	O	01/30/2025	02/06/2025	oxycodone hcl (ir) 20 mg tab	90	30	Guru Crowe MD	[id number]	Medicare	[id number]  A	N	O	01/07/2025	01/08/2025	morphine sulf er 30 mg tablet	60	30	Guru Crowe MD	[id number]	Medicare	[id number]  A	N	O	01/07/2025	01/08/2025	oxycodone hcl (ir) 20 mg tab	90	30	Guru Crowe MD	[id number]	Medicare	[id number]  A	N	O	11/27/2024	12/06/2024	morphine sulf er 30 mg tablet	60	30	Guru Crowe MD	[id number]	Medicare	[id number]  A	N	O	11/27/2024	12/06/2024	oxycodone hcl (ir) 20 mg tab	90	30	Guru Crowe MD	[id number]	Medicare	[id number]  A	N	O	10/31/2024	11/08/2024	morphine sulf er 30 mg tablet	60	30	Guru Crowe MD	[id number]	Medicare	[id number]  A	N	O	10/31/2024	11/08/2024	oxycodone hcl (ir) 20 mg tab	90	30	Guru Crowe MD	[id number]	Medicare	[id number]  A	N	O	10/03/2024	10/09/2024	oxycodone hcl (ir) 20 mg tab	90	30	Guru Crowe MD	[id number]	Medicare	[id number]    -------------------------------------------------------------------------------------------------------    ITEMS UNCHECKED ARE NOT PRESENT    PHYSICAL:  Vital Signs Last 24 Hrs  T(C): 36.5 (28 Mar 2025 12:22), Max: 36.5 (28 Mar 2025 12:22)  T(F): 97.7 (28 Mar 2025 12:22), Max: 97.7 (28 Mar 2025 12:22)  HR: 84 (28 Mar 2025 12:22) (74 - 84)  BP: 100/60 (28 Mar 2025 12:22) (100/60 - 102/61)  BP(mean): --  RR: 17 (28 Mar 2025 12:22) (17 - 18)  SpO2: 94% (28 Mar 2025 12:22) (94% - 97%)    Parameters below as of 28 Mar 2025 12:22  Patient On (Oxygen Delivery Method): nasal cannula      GENERAL:  [ ]Cachexia  [ ] Frail  [X ]Awake  [X ]Oriented   [ ]Lethargic  [ ]Unarousable  [ ]Verbal  [ ]Non-Verbal    BEHAVIORAL:   [ ] Anxiety  [ ] Delirium [ ] Agitation [ ] Other    HEENT:   [X ]Normal   [ ]Dry mouth   [ ]ET Tube/Trach  [ ]Oral lesions    PULMONARY:   [X ]Clear              [ ]Tachypnea  [ ]Audible excessive secretions   [ ]Rhonchi        [ ]Right [ ]Left [ ]Bilateral  [ ]Crackles        [ ]Right [ ]Left [ ]Bilateral  [ ]Wheezing     [ ]Right [ ]Left [ ]Bilateral  [ ]Diminished breath sounds [ ]right [ ]left [ ]bilateral    CARDIOVASCULAR:    [X ]Regular [ ]Irregular [ ]Tachy  [ ]Ridge [ ]Murmur [ ]Other    GASTROINTESTINAL:  [X ]Soft  [ ]Distended   [X ]+BS  [X ]Non tender [ ]Tender  [ ]Other [ ]PEG [ ]OGT/ NGT      GENITOURINARY:  [X ]Normal [ ] Incontinent   [ ]Oliguria/Anuria   [ ]Camargo    MUSCULOSKELETAL:   [X ]Normal   [ ]Weakness  [ ]Bed/Wheelchair bound [ ]Edema    NEUROLOGIC:   [X ]No focal deficits  [ ]Cognitive impairment  [ ]Dysphagia [ ]Dysarthria [ ]Paresis [ ]Other     SKIN:   [X ]Normal  [ ]Rash  [ ]Other  [ ]Pressure ulcer(s)       Present on admission [ ]y [ ]n    -------------------------------------------------------------------------------------------------------    LABS:                          10.4   19.67 )-----------( 228      ( 28 Mar 2025 06:38 )             30.7       03-28    129[L]  |  94[L]  |  8   ----------------------------<  88  4.3   |  25  |  0.40[L]    Ca    8.4      28 Mar 2025 06:38  Phos  4.2     03-28  Mg     1.70     03-28    TPro  5.3[L]  /  Alb  2.9[L]  /  TBili  0.3  /  DBili  x   /  AST  31  /  ALT  23  /  AlkPhos  90  03-28    -------------------------------------------------------------------------------------------------------    CRITICAL CARE:  [ ]Shock Present  [ ]Septic [ ]Cardiogenic [ ]Neurologic [ ]Hypovolemic [ ]Undifferentiated    [ ]Vasopressors [ ]Inotropes    [ ]Respiratory failure present [ ]Acute  [ ]Chronic [ ]Hypoxic  [ ]Hypercarbic [ ]Mixed   [ ]Mechanical Ventilation  [ ]Trach collar   [ ]Non-invasive ventilatory support   [ ]High-Flow   [ ]Oxygen mask/venti     [ ]Other organ failure     -------------------------------------------------------------------------------------------------------    RADIOLOGY & ADDITIONAL STUDIES:     < from: Xray Chest 1 View-PORTABLE IMMEDIATE (Xray Chest 1 View-PORTABLE IMMEDIATE .) (03.12.25 @ 13:48) >    IMPRESSION:  Bilateral upper lobe opacities with 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  No acute pulmonary or cardiovascular disease.    < end of copied text >    -------------------------------------------------------------------------------------------------------  MEDICATIONS:     MEDICATIONS  (STANDING):  albuterol/ipratropium for Nebulization 3 milliLiter(s) Nebulizer every 6 hours  baclofen 5 milliGRAM(s) Oral every 8 hours  baclofen 5 milliGRAM(s) Oral once  chlorhexidine 2% Cloths 1 Application(s) Topical daily  clonazePAM  Tablet 1 milliGRAM(s) Oral at bedtime  clonazePAM  Tablet 0.5 milliGRAM(s) Oral <User Schedule>  colchicine 0.6 milliGRAM(s) Oral daily  fluticasone propionate/ salmeterol 100-50 MICROgram(s) Diskus 1 Dose(s) Inhalation two times a day  gabapentin 100 milliGRAM(s) Oral three times a day  gabapentin 100 milliGRAM(s) Oral once  heparin  Infusion.  Unit(s)/Hr (11 mL/Hr) IV Continuous <Continuous>  influenza  Vaccine (HIGH DOSE) 0.5 milliLiter(s) IntraMuscular once  levothyroxine 112 MICROGram(s) Oral daily  lidocaine   4% Patch 1 Patch Transdermal daily  lidocaine   4% Patch 1 Patch Transdermal every 24 hours  lidocaine   4% Patch 1 Patch Transdermal every 24 hours  methylnaltrexone Injectable 12 milliGRAM(s) SubCutaneous once  morphine ER Tablet 30 milliGRAM(s) Oral every 8 hours  naloxegol 25 milliGRAM(s) Oral daily  nicotine - 21 mG/24Hr(s) Patch 1 Patch Transdermal daily  OLANZapine 5 milliGRAM(s) Oral at bedtime  pantoprazole    Tablet 40 milliGRAM(s) Oral every 12 hours  polyethylene glycol 3350 17 Gram(s) Oral daily  senna 2 Tablet(s) Oral at bedtime  sodium chloride 1 Gram(s) Oral two times a day  sodium chloride 0.65% Nasal 1 Spray(s) Both Nostrils three times a day    MEDICATIONS  (PRN):  aluminum hydroxide/magnesium hydroxide/simethicone Suspension 30 milliLiter(s) Oral every 4 hours PRN Dyspepsia  benzocaine/menthol Lozenge 1 Lozenge Oral three times a day PRN Sore Throat  heparin   Injectable 5000 Unit(s) IV Push every 6 hours PRN For aPTT less than 40  heparin   Injectable 2500 Unit(s) IV Push every 6 hours PRN For aPTT between 40 - 57  HYDROmorphone  Injectable 3 milliGRAM(s) IV Push every 4 hours PRN severe breakthrough pain  LORazepam     Tablet 1 milliGRAM(s) Oral every 8 hours PRN Anxiety  melatonin 3 milliGRAM(s) Oral at bedtime PRN Insomnia  ondansetron Injectable 4 milliGRAM(s) IV Push every 6 hours PRN Nausea and/or Vomiting  oxyCODONE    IR 20 milliGRAM(s) Oral every 4 hours PRN Moderate Pain (4 - 6)  oxyCODONE    IR 30 milliGRAM(s) Oral every 4 hours PRN Severe Pain (7 - 10)

## 2025-03-28 NOTE — PROGRESS NOTE ADULT - ASSESSMENT
63 y/o F with PMHx of AF, scleroderma, asthma with prior intubations, hypothyroidism, pericardial effusion s/p pericardiocentesis (12/2024) and drain on colchicine presented to the ED due to SOB, chest pain, and back pain. Oncology evaluated her and recommended MRI brain and MRI chest/abdomen/pelvis with contrast to rule out metastasis. Was previously admitted in 12/2024 due to chest pain and SOB and found to have a pericardial effusion with evidence of tamponade physiology. She underwent urgent pericardiocentesis with placement of drain which was removed on 12/26. She was started on colchicine and NSAIDs. Hospital course was complicated by AF with RVR which was new onset and was suspected to be due to drain placement. After drain was removed patient continued to have pAF and was started on Eliquis. Presented to the ED on 02/13/25 for left sided chest pain radiating to the back. Repeat TTE demonstrated interval increase in pericardial effusion (moderate-large adjacent to RA with no evidence of tamponade) and CTS was consulted for possible pericardial window and recommended no acute intervention. 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 Pulmonary consulted and recommended transfer to [facility] for bronchoscopy, EBUS and hilar mass biopsy. Patient transferred to [facility] on 02/20/25 and underwent EBUS on 02/21/25. 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

## 2025-03-28 NOTE — PROGRESS NOTE ADULT - SUBJECTIVE AND OBJECTIVE BOX
Deven Archuleta MD  [employer]  Pager [phone]  Email: [email or web address]  Available on [employer] Teams        PROGRESS NOTE:     Patient is a 64y old  Female who presents with a chief complaint of Shortness of breath and chest pain (27 Mar 2025 14:03)      SUBJECTIVE / OVERNIGHT EVENTS:  Patient seen and examined this morning. No agrees to go home to rehab. Earlier in the day was requesting to go home for two days before going to rehab. Patient is also reporting stuttering, however has moments when she completes sentences with ease.   ADDITIONAL REVIEW OF SYSTEMS:  No f/c    MEDICATIONS  (STANDING):  apixaban 5 milliGRAM(s) Oral every 12 hours  baclofen 5 milliGRAM(s) Oral every 8 hours  bisacodyl 5 milliGRAM(s) Oral at bedtime  chlorhexidine 2% Cloths 1 Application(s) Topical daily  clonazePAM  Tablet 0.5 milliGRAM(s) Oral <User Schedule>  clonazePAM  Tablet 1 milliGRAM(s) Oral at bedtime  colchicine 0.6 milliGRAM(s) Oral daily  FIRST- Mouthwash  BLM 5 milliLiter(s) Swish and Spit four times a day  fluticasone propionate/ salmeterol 100-50 MICROgram(s) Diskus 1 Dose(s) Inhalation two times a day  gabapentin 100 milliGRAM(s) Oral three times a day  influenza  Vaccine (HIGH DOSE) 0.5 milliLiter(s) IntraMuscular once  levothyroxine 112 MICROGram(s) Oral daily  lidocaine   4% Patch 1 Patch Transdermal every 24 hours  methylnaltrexone Injectable 12 milliGRAM(s) SubCutaneous once  morphine ER Tablet 30 milliGRAM(s) Oral every 8 hours  naloxegol 25 milliGRAM(s) Oral daily  nicotine - 21 mG/24Hr(s) Patch 1 Patch Transdermal daily  OLANZapine 5 milliGRAM(s) Oral at bedtime  pantoprazole    Tablet 40 milliGRAM(s) Oral every 12 hours  polyethylene glycol 3350 17 Gram(s) Oral two times a day  senna 2 Tablet(s) Oral at bedtime  sodium chloride 2 Gram(s) Oral three times a day  sodium chloride 0.65% Nasal 1 Spray(s) Both Nostrils three times a day    MEDICATIONS  (PRN):  albuterol/ipratropium for Nebulization 3 milliLiter(s) Nebulizer every 6 hours PRN Shortness of Breath  aluminum hydroxide/magnesium hydroxide/simethicone Suspension 30 milliLiter(s) Oral every 4 hours PRN Dyspepsia  benzocaine/menthol Lozenge 1 Lozenge Oral three times a day PRN Sore Throat  diphenhydrAMINE Injectable 25 milliGRAM(s) IV Push once PRN chemotherapy reaction  famotidine Injectable 20 milliGRAM(s) IV Push once PRN chemotherapy reaction  HYDROmorphone  Injectable 3 milliGRAM(s) IV Push every 4 hours PRN severe breakthrough pain  LORazepam     Tablet 1 milliGRAM(s) Oral every 8 hours PRN Anxiety  melatonin 3 milliGRAM(s) Oral at bedtime PRN Insomnia  methylPREDNISolone sodium succinate Injectable 125 milliGRAM(s) IV Push once PRN chemotherapy reaction  ondansetron Injectable 4 milliGRAM(s) IV Push every 6 hours PRN Nausea and/or Vomiting  oxyCODONE    IR 20 milliGRAM(s) Oral every 4 hours PRN Moderate Pain (4 - 6)  oxyCODONE    IR 30 milliGRAM(s) Oral every 4 hours PRN Severe Pain (7 - 10)      CAPILLARY BLOOD GLUCOSE        I&O's Summary    27 Mar 2025 07:01  -  28 Mar 2025 07:00  --------------------------------------------------------  IN: 0 mL / OUT: 2800 mL / NET: -2800 mL        PHYSICAL EXAM:  Vital Signs Last 24 Hrs  T(C): 36.5 (28 Mar 2025 12:22), Max: 36.6 (27 Mar 2025 20:26)  T(F): 97.7 (28 Mar 2025 12:22), Max: 97.9 (27 Mar 2025 20:26)  HR: 84 (28 Mar 2025 12:22) (74 - 97)  BP: 100/60 (28 Mar 2025 12:22) (100/60 - 134/74)  BP(mean): --  RR: 17 (28 Mar 2025 12:22) (17 - 18)  SpO2: 94% (28 Mar 2025 12:22) (94% - 97%)    Parameters below as of 28 Mar 2025 12:22  Patient On (Oxygen Delivery Method): nasal cannula      CONSTITUTIONAL: NAD, more talkative this morning, had to be woken up  RESPIRATORY: Normal respiratory effort; no respiratory distress, CTAB  CARDIOVASCULAR: No visible JVD, No lower extremity edema; S1S2, no m,r,g  ABDOMEN: Not guarding, does not appear distended, BS+  MUSCLOSKELETAL: no clubbing or cyanosis of digits; no joint swelling   PSYCH: AOx3,      LABS:                        10.4   19.67 )-----------( 228      ( 28 Mar 2025 06:38 )             30.7     03-28    129[L]  |  94[L]  |  8   ----------------------------<  88  4.3   |  25  |  0.40[L]    Ca    8.4      28 Mar 2025 06:38  Phos  4.2     03-28  Mg     1.70     03-28    TPro  5.3[L]  /  Alb  2.9[L]  /  TBili  0.3  /  DBili  x   /  AST  31  /  ALT  23  /  AlkPhos  90  03-28          Urinalysis Basic - ( 28 Mar 2025 06:38 )    Color: x / Appearance: x / SG: x / pH: x  Gluc: 88 mg/dL / Ketone: x  / Bili: x / Urobili: x   Blood: x / Protein: x / Nitrite: x   Leuk Esterase: x / RBC: x / WBC x   Sq Epi: x / Non Sq Epi: x / Bacteria: x          RADIOLOGY & ADDITIONAL TESTS:  Results Reviewed:   Imaging Personally Reviewed:  Electrocardiogram Personally Reviewed:    COORDINATION OF CARE:  Care Discussed with Consultants/Other Providers [Y/N]:  Prior or Outpatient Records Reviewed [Y/N]:

## 2025-03-29 LAB
ALBUMIN SERPL ELPH-MCNC: 3.1 G/DL — LOW (ref 3.3–5)
ALP SERPL-CCNC: 118 U/L — SIGNIFICANT CHANGE UP (ref 40–120)
ALT FLD-CCNC: 20 U/L — SIGNIFICANT CHANGE UP (ref 4–33)
ANION GAP SERPL CALC-SCNC: 12 MMOL/L — SIGNIFICANT CHANGE UP (ref 7–14)
AST SERPL-CCNC: 24 U/L — SIGNIFICANT CHANGE UP (ref 4–32)
BASOPHILS # BLD AUTO: 0.1 K/UL — SIGNIFICANT CHANGE UP (ref 0–0.2)
BASOPHILS NFR BLD AUTO: 0.7 % — SIGNIFICANT CHANGE UP (ref 0–2)
BILIRUB SERPL-MCNC: 0.4 MG/DL — SIGNIFICANT CHANGE UP (ref 0.2–1.2)
BUN SERPL-MCNC: 11 MG/DL — SIGNIFICANT CHANGE UP (ref 7–23)
CALCIUM SERPL-MCNC: 8.6 MG/DL — SIGNIFICANT CHANGE UP (ref 8.4–10.5)
CHLORIDE SERPL-SCNC: 92 MMOL/L — LOW (ref 98–107)
CO2 SERPL-SCNC: 25 MMOL/L — SIGNIFICANT CHANGE UP (ref 22–31)
CREAT SERPL-MCNC: 0.4 MG/DL — LOW (ref 0.5–1.3)
EGFR: 109 ML/MIN/1.73M2 — SIGNIFICANT CHANGE UP
EGFR: 109 ML/MIN/1.73M2 — SIGNIFICANT CHANGE UP
EOSINOPHIL # BLD AUTO: 0.26 K/UL — SIGNIFICANT CHANGE UP (ref 0–0.5)
EOSINOPHIL NFR BLD AUTO: 1.9 % — SIGNIFICANT CHANGE UP (ref 0–6)
GLUCOSE SERPL-MCNC: 84 MG/DL — SIGNIFICANT CHANGE UP (ref 70–99)
HCT VFR BLD CALC: 30.7 % — LOW (ref 34.5–45)
HGB BLD-MCNC: 10.2 G/DL — LOW (ref 11.5–15.5)
IANC: 9.78 K/UL — HIGH (ref 1.8–7.4)
IMM GRANULOCYTES NFR BLD AUTO: 14.7 % — HIGH (ref 0–0.9)
LYMPHOCYTES # BLD AUTO: 1.48 K/UL — SIGNIFICANT CHANGE UP (ref 1–3.3)
LYMPHOCYTES # BLD AUTO: 10.7 % — LOW (ref 13–44)
MAGNESIUM SERPL-MCNC: 1.6 MG/DL — SIGNIFICANT CHANGE UP (ref 1.6–2.6)
MCHC RBC-ENTMCNC: 30.3 PG — SIGNIFICANT CHANGE UP (ref 27–34)
MCHC RBC-ENTMCNC: 33.2 G/DL — SIGNIFICANT CHANGE UP (ref 32–36)
MCV RBC AUTO: 91.1 FL — SIGNIFICANT CHANGE UP (ref 80–100)
MONOCYTES # BLD AUTO: 0.14 K/UL — SIGNIFICANT CHANGE UP (ref 0–0.9)
MONOCYTES NFR BLD AUTO: 1 % — LOW (ref 2–14)
NEUTROPHILS # BLD AUTO: 9.78 K/UL — HIGH (ref 1.8–7.4)
NEUTROPHILS NFR BLD AUTO: 71 % — SIGNIFICANT CHANGE UP (ref 43–77)
NRBC # BLD AUTO: 0 K/UL — SIGNIFICANT CHANGE UP (ref 0–0)
NRBC # FLD: 0 K/UL — SIGNIFICANT CHANGE UP (ref 0–0)
NRBC BLD AUTO-RTO: 0 /100 WBCS — SIGNIFICANT CHANGE UP (ref 0–0)
PHOSPHATE SERPL-MCNC: 4.5 MG/DL — SIGNIFICANT CHANGE UP (ref 2.5–4.5)
PLATELET # BLD AUTO: 244 K/UL — SIGNIFICANT CHANGE UP (ref 150–400)
POTASSIUM SERPL-MCNC: 4 MMOL/L — SIGNIFICANT CHANGE UP (ref 3.5–5.3)
POTASSIUM SERPL-SCNC: 4 MMOL/L — SIGNIFICANT CHANGE UP (ref 3.5–5.3)
PROT SERPL-MCNC: 5.6 G/DL — LOW (ref 6–8.3)
RBC # BLD: 3.37 M/UL — LOW (ref 3.8–5.2)
RBC # FLD: 13.1 % — SIGNIFICANT CHANGE UP (ref 10.3–14.5)
SODIUM SERPL-SCNC: 129 MMOL/L — LOW (ref 135–145)
WBC # BLD: 13.79 K/UL — HIGH (ref 3.8–10.5)
WBC # FLD AUTO: 13.79 K/UL — HIGH (ref 3.8–10.5)

## 2025-03-29 PROCEDURE — 99232 SBSQ HOSP IP/OBS MODERATE 35: CPT

## 2025-03-29 RX ORDER — MAGNESIUM SULFATE 500 MG/ML
2 SYRINGE (ML) INJECTION ONCE
Refills: 0 | Status: COMPLETED | OUTPATIENT
Start: 2025-03-29 | End: 2025-03-29

## 2025-03-29 RX ADMIN — NICOTINE POLACRILEX 1 PATCH: 4 GUM, CHEWING ORAL at 12:52

## 2025-03-29 RX ADMIN — APIXABAN 5 MILLIGRAM(S): 2.5 TABLET, FILM COATED ORAL at 06:50

## 2025-03-29 RX ADMIN — Medication 3 MILLIGRAM(S): at 01:26

## 2025-03-29 RX ADMIN — COLCHICINE 0.6 MILLIGRAM(S): 0.6 TABLET, FILM COATED ORAL at 10:59

## 2025-03-29 RX ADMIN — OXYCODONE HYDROCHLORIDE 30 MILLIGRAM(S): 30 TABLET ORAL at 00:11

## 2025-03-29 RX ADMIN — Medication 2 GRAM(S): at 12:53

## 2025-03-29 RX ADMIN — Medication 3 MILLIGRAM(S): at 22:01

## 2025-03-29 RX ADMIN — Medication 3 MILLIGRAM(S): at 14:09

## 2025-03-29 RX ADMIN — NICOTINE POLACRILEX 1 PATCH: 4 GUM, CHEWING ORAL at 19:36

## 2025-03-29 RX ADMIN — Medication 3 MILLIGRAM(S): at 15:00

## 2025-03-29 RX ADMIN — BACLOFEN 5 MILLIGRAM(S): 10 INJECTION INTRATHECAL at 06:53

## 2025-03-29 RX ADMIN — Medication 40 MILLIGRAM(S): at 18:29

## 2025-03-29 RX ADMIN — OXYCODONE HYDROCHLORIDE 30 MILLIGRAM(S): 30 TABLET ORAL at 11:40

## 2025-03-29 RX ADMIN — Medication 112 MICROGRAM(S): at 06:49

## 2025-03-29 RX ADMIN — CLONAZEPAM 0.5 MILLIGRAM(S): 0.5 TABLET ORAL at 06:57

## 2025-03-29 RX ADMIN — Medication 2 GRAM(S): at 06:52

## 2025-03-29 RX ADMIN — Medication 1 SPRAY(S): at 06:52

## 2025-03-29 RX ADMIN — DIPHENHYDRAMINE HYDROCHLORIDE AND LIDOCAINE HYDROCHLORIDE AND ALUMINUM HYDROXIDE AND MAGNESIUM HYDRO 5 MILLILITER(S): KIT at 10:59

## 2025-03-29 RX ADMIN — POLYETHYLENE GLYCOL 3350 17 GRAM(S): 17 POWDER, FOR SOLUTION ORAL at 18:29

## 2025-03-29 RX ADMIN — Medication 30 MILLIGRAM(S): at 06:49

## 2025-03-29 RX ADMIN — OXYCODONE HYDROCHLORIDE 30 MILLIGRAM(S): 30 TABLET ORAL at 18:29

## 2025-03-29 RX ADMIN — Medication 30 MILLIGRAM(S): at 07:19

## 2025-03-29 RX ADMIN — OXYCODONE HYDROCHLORIDE 30 MILLIGRAM(S): 30 TABLET ORAL at 07:19

## 2025-03-29 RX ADMIN — APIXABAN 5 MILLIGRAM(S): 2.5 TABLET, FILM COATED ORAL at 18:29

## 2025-03-29 RX ADMIN — Medication 3 MILLIGRAM(S): at 01:56

## 2025-03-29 RX ADMIN — NICOTINE POLACRILEX 1 PATCH: 4 GUM, CHEWING ORAL at 13:15

## 2025-03-29 RX ADMIN — GABAPENTIN 100 MILLIGRAM(S): 400 CAPSULE ORAL at 14:08

## 2025-03-29 RX ADMIN — Medication 3 MILLIGRAM(S): at 22:31

## 2025-03-29 RX ADMIN — Medication 1 SPRAY(S): at 11:00

## 2025-03-29 RX ADMIN — Medication 40 MILLIGRAM(S): at 06:50

## 2025-03-29 RX ADMIN — OXYCODONE HYDROCHLORIDE 30 MILLIGRAM(S): 30 TABLET ORAL at 10:59

## 2025-03-29 RX ADMIN — Medication 1 APPLICATION(S): at 11:01

## 2025-03-29 RX ADMIN — CLONAZEPAM 0.5 MILLIGRAM(S): 0.5 TABLET ORAL at 15:29

## 2025-03-29 RX ADMIN — BACLOFEN 5 MILLIGRAM(S): 10 INJECTION INTRATHECAL at 12:54

## 2025-03-29 RX ADMIN — OXYCODONE HYDROCHLORIDE 30 MILLIGRAM(S): 30 TABLET ORAL at 06:50

## 2025-03-29 RX ADMIN — Medication 30 MILLIGRAM(S): at 14:08

## 2025-03-29 RX ADMIN — NALOXEGOL OXALATE 25 MILLIGRAM(S): 12.5 TABLET, FILM COATED ORAL at 11:02

## 2025-03-29 RX ADMIN — GABAPENTIN 100 MILLIGRAM(S): 400 CAPSULE ORAL at 06:50

## 2025-03-29 RX ADMIN — Medication 30 MILLIGRAM(S): at 15:00

## 2025-03-29 RX ADMIN — Medication 1 DOSE(S): at 10:59

## 2025-03-29 RX ADMIN — OXYCODONE HYDROCHLORIDE 30 MILLIGRAM(S): 30 TABLET ORAL at 19:29

## 2025-03-29 NOTE — PROGRESS NOTE ADULT - SUBJECTIVE AND OBJECTIVE BOX
Deven rAchuleta MD  Academic Hospitalist  Pager [phone]  Email: [email or web address]  Available on [employer] Teams        PROGRESS NOTE:     Patient is a 64y old  Female who presents with a chief complaint of Shortness of breath and chest pain (28 Mar 2025 15:57)      SUBJECTIVE / OVERNIGHT EVENTS:  Patient seen and examined this morning. Patient yesterday decided on going to rehab, but today is having second thoughts. Overnight with a panic attack and slept poorly.    MEDICATIONS  (STANDING):  apixaban 5 milliGRAM(s) Oral every 12 hours  baclofen 5 milliGRAM(s) Oral every 8 hours  bisacodyl 5 milliGRAM(s) Oral at bedtime  chlorhexidine 2% Cloths 1 Application(s) Topical daily  clonazePAM  Tablet 0.5 milliGRAM(s) Oral <User Schedule>  clonazePAM  Tablet 1 milliGRAM(s) Oral at bedtime  colchicine 0.6 milliGRAM(s) Oral daily  FIRST- Mouthwash  BLM 5 milliLiter(s) Swish and Spit four times a day  fluticasone propionate/ salmeterol 100-50 MICROgram(s) Diskus 1 Dose(s) Inhalation two times a day  gabapentin 100 milliGRAM(s) Oral three times a day  influenza  Vaccine (HIGH DOSE) 0.5 milliLiter(s) IntraMuscular once  levothyroxine 112 MICROGram(s) Oral daily  lidocaine   4% Patch 1 Patch Transdermal every 24 hours  methylnaltrexone Injectable 12 milliGRAM(s) SubCutaneous once  morphine ER Tablet 30 milliGRAM(s) Oral every 8 hours  naloxegol 25 milliGRAM(s) Oral daily  nicotine - 21 mG/24Hr(s) Patch 1 Patch Transdermal daily  OLANZapine 5 milliGRAM(s) Oral at bedtime  pantoprazole    Tablet 40 milliGRAM(s) Oral every 12 hours  polyethylene glycol 3350 17 Gram(s) Oral two times a day  senna 2 Tablet(s) Oral at bedtime  sodium chloride 2 Gram(s) Oral three times a day  sodium chloride 0.65% Nasal 1 Spray(s) Both Nostrils three times a day    MEDICATIONS  (PRN):  albuterol/ipratropium for Nebulization 3 milliLiter(s) Nebulizer every 6 hours PRN Shortness of Breath  aluminum hydroxide/magnesium hydroxide/simethicone Suspension 30 milliLiter(s) Oral every 4 hours PRN Dyspepsia  benzocaine/menthol Lozenge 1 Lozenge Oral three times a day PRN Sore Throat  diphenhydrAMINE Injectable 25 milliGRAM(s) IV Push once PRN chemotherapy reaction  famotidine Injectable 20 milliGRAM(s) IV Push once PRN chemotherapy reaction  HYDROmorphone  Injectable 3 milliGRAM(s) IV Push every 4 hours PRN severe breakthrough pain  LORazepam     Tablet 1 milliGRAM(s) Oral every 8 hours PRN Anxiety  melatonin 3 milliGRAM(s) Oral at bedtime PRN Insomnia  methylPREDNISolone sodium succinate Injectable 125 milliGRAM(s) IV Push once PRN chemotherapy reaction  ondansetron Injectable 4 milliGRAM(s) IV Push every 6 hours PRN Nausea and/or Vomiting  oxyCODONE    IR 20 milliGRAM(s) Oral every 4 hours PRN Moderate Pain (4 - 6)  oxyCODONE    IR 30 milliGRAM(s) Oral every 4 hours PRN Severe Pain (7 - 10)      CAPILLARY BLOOD GLUCOSE        I&O's Summary    28 Mar 2025 07:01  -  29 Mar 2025 07:00  --------------------------------------------------------  IN: 175 mL / OUT: 800 mL / NET: -625 mL        PHYSICAL EXAM:  Vital Signs Last 24 Hrs  T(C): 36.3 (29 Mar 2025 10:59), Max: 37.4 (28 Mar 2025 21:30)  T(F): 97.3 (29 Mar 2025 10:59), Max: 99.3 (28 Mar 2025 21:30)  HR: 88 (29 Mar 2025 10:59) (75 - 90)  BP: 118/57 (29 Mar 2025 10:59) (100/60 - 125/57)  BP(mean): --  RR: 18 (29 Mar 2025 10:59) (17 - 18)  SpO2: 94% (29 Mar 2025 10:59) (93% - 97%)    Parameters below as of 29 Mar 2025 10:59  Patient On (Oxygen Delivery Method): nasal cannula  O2 Flow (L/min): 3      CONSTITUTIONAL: NAD,  RESPIRATORY: Normal respiratory effort; lungs are clear to auscultation bilaterally  CARDIOVASCULAR: Regular rate and rhythm, normal S1 and S2, no murmur/rub/gallop; No lower extremity edema; Peripheral pulses are 2+ bilaterally  ABDOMEN: Nontender to palpation, normoactive bowel sounds, no rebound/guarding;   MUSCLOSKELETAL: no clubbing or cyanosis of digits; no joint swelling or tenderness to palpation  PSYCH: A+O to person, place, and time; affect appropriate    LABS:                        10.2   13.79 )-----------( 244      ( 29 Mar 2025 06:32 )             30.7     03-29    129[L]  |  92[L]  |  11  ----------------------------<  84  4.0   |  25  |  0.40[L]    Ca    8.6      29 Mar 2025 06:32  Phos  4.5     03-29  Mg     1.60     03-29    TPro  5.6[L]  /  Alb  3.1[L]  /  TBili  0.4  /  DBili  x   /  AST  24  /  ALT  20  /  AlkPhos  118  03-29          Urinalysis Basic - ( 29 Mar 2025 06:32 )    Color: x / Appearance: x / SG: x / pH: x  Gluc: 84 mg/dL / Ketone: x  / Bili: x / Urobili: x   Blood: x / Protein: x / Nitrite: x   Leuk Esterase: x / RBC: x / WBC x   Sq Epi: x / Non Sq Epi: x / Bacteria: x          RADIOLOGY & ADDITIONAL TESTS:  Results Reviewed:   Imaging Personally Reviewed:  Electrocardiogram Personally Reviewed:    COORDINATION OF CARE:  Care Discussed with Consultants/Other Providers [Y/N]:  Prior or Outpatient Records Reviewed [Y/N]:   Deven Archuleta MD  Academic Hospitalist  Pager [phone]  Email: [email or web address]  Available on [employer] Teams        PROGRESS NOTE:     Patient is a 64y old  Female who presents with a chief complaint of Shortness of breath and chest pain (28 Mar 2025 15:57)      SUBJECTIVE / OVERNIGHT EVENTS:  Patient seen and examined this morning. Patient yesterday decided on going to rehab, but today is having second thoughts. Overnight with a panic attack and slept poorly.    MEDICATIONS  (STANDING):  apixaban 5 milliGRAM(s) Oral every 12 hours  baclofen 5 milliGRAM(s) Oral every 8 hours  bisacodyl 5 milliGRAM(s) Oral at bedtime  chlorhexidine 2% Cloths 1 Application(s) Topical daily  clonazePAM  Tablet 0.5 milliGRAM(s) Oral <User Schedule>  clonazePAM  Tablet 1 milliGRAM(s) Oral at bedtime  colchicine 0.6 milliGRAM(s) Oral daily  FIRST- Mouthwash  BLM 5 milliLiter(s) Swish and Spit four times a day  fluticasone propionate/ salmeterol 100-50 MICROgram(s) Diskus 1 Dose(s) Inhalation two times a day  gabapentin 100 milliGRAM(s) Oral three times a day  influenza  Vaccine (HIGH DOSE) 0.5 milliLiter(s) IntraMuscular once  levothyroxine 112 MICROGram(s) Oral daily  lidocaine   4% Patch 1 Patch Transdermal every 24 hours  methylnaltrexone Injectable 12 milliGRAM(s) SubCutaneous once  morphine ER Tablet 30 milliGRAM(s) Oral every 8 hours  naloxegol 25 milliGRAM(s) Oral daily  nicotine - 21 mG/24Hr(s) Patch 1 Patch Transdermal daily  OLANZapine 5 milliGRAM(s) Oral at bedtime  pantoprazole    Tablet 40 milliGRAM(s) Oral every 12 hours  polyethylene glycol 3350 17 Gram(s) Oral two times a day  senna 2 Tablet(s) Oral at bedtime  sodium chloride 2 Gram(s) Oral three times a day  sodium chloride 0.65% Nasal 1 Spray(s) Both Nostrils three times a day    MEDICATIONS  (PRN):  albuterol/ipratropium for Nebulization 3 milliLiter(s) Nebulizer every 6 hours PRN Shortness of Breath  aluminum hydroxide/magnesium hydroxide/simethicone Suspension 30 milliLiter(s) Oral every 4 hours PRN Dyspepsia  benzocaine/menthol Lozenge 1 Lozenge Oral three times a day PRN Sore Throat  diphenhydrAMINE Injectable 25 milliGRAM(s) IV Push once PRN chemotherapy reaction  famotidine Injectable 20 milliGRAM(s) IV Push once PRN chemotherapy reaction  HYDROmorphone  Injectable 3 milliGRAM(s) IV Push every 4 hours PRN severe breakthrough pain  LORazepam     Tablet 1 milliGRAM(s) Oral every 8 hours PRN Anxiety  melatonin 3 milliGRAM(s) Oral at bedtime PRN Insomnia  methylPREDNISolone sodium succinate Injectable 125 milliGRAM(s) IV Push once PRN chemotherapy reaction  ondansetron Injectable 4 milliGRAM(s) IV Push every 6 hours PRN Nausea and/or Vomiting  oxyCODONE    IR 20 milliGRAM(s) Oral every 4 hours PRN Moderate Pain (4 - 6)  oxyCODONE    IR 30 milliGRAM(s) Oral every 4 hours PRN Severe Pain (7 - 10)      CAPILLARY BLOOD GLUCOSE        I&O's Summary    28 Mar 2025 07:01  -  29 Mar 2025 07:00  --------------------------------------------------------  IN: 175 mL / OUT: 800 mL / NET: -625 mL        PHYSICAL EXAM:  Vital Signs Last 24 Hrs  T(C): 36.3 (29 Mar 2025 10:59), Max: 37.4 (28 Mar 2025 21:30)  T(F): 97.3 (29 Mar 2025 10:59), Max: 99.3 (28 Mar 2025 21:30)  HR: 88 (29 Mar 2025 10:59) (75 - 90)  BP: 118/57 (29 Mar 2025 10:59) (100/60 - 125/57)  BP(mean): --  RR: 18 (29 Mar 2025 10:59) (17 - 18)  SpO2: 94% (29 Mar 2025 10:59) (93% - 97%)    Parameters below as of 29 Mar 2025 10:59  Patient On (Oxygen Delivery Method): nasal cannula  O2 Flow (L/min): 3      CONSTITUTIONAL: NAD, initially difficult to wake up  RESPIRATORY: Normal respiratory effort; no respiratory distress, CTAB  CARDIOVASCULAR: No visible JVD, No lower extremity edema; S1S2, no m,r,g  ABDOMEN: Not guarding, does not appear distended, BS+  MUSCLOSKELETAL: no clubbing or cyanosis of digits; no joint swelling   PSYCH: AOx3,  LABS:                        10.2   13.79 )-----------( 244      ( 29 Mar 2025 06:32 )             30.7     03-29    129[L]  |  92[L]  |  11  ----------------------------<  84  4.0   |  25  |  0.40[L]    Ca    8.6      29 Mar 2025 06:32  Phos  4.5     03-29  Mg     1.60     03-29    TPro  5.6[L]  /  Alb  3.1[L]  /  TBili  0.4  /  DBili  x   /  AST  24  /  ALT  20  /  AlkPhos  118  03-29          Urinalysis Basic - ( 29 Mar 2025 06:32 )    Color: x / Appearance: x / SG: x / pH: x  Gluc: 84 mg/dL / Ketone: x  / Bili: x / Urobili: x   Blood: x / Protein: x / Nitrite: x   Leuk Esterase: x / RBC: x / WBC x   Sq Epi: x / Non Sq Epi: x / Bacteria: x          RADIOLOGY & ADDITIONAL TESTS:  Results Reviewed:   Imaging Personally Reviewed:  Electrocardiogram Personally Reviewed:    COORDINATION OF CARE:  Care Discussed with Consultants/Other Providers [Y/N]:  Prior or Outpatient Records Reviewed [Y/N]:

## 2025-03-29 NOTE — PROVIDER CONTACT NOTE (OTHER) - SITUATION
Patient HR bradycardic 48-55
Lethargic, different from baseline
Pt desats to 80s and low 73 while on 5L NC. Pt medicated with haldol for anxiety and ativan, and pain meds given, but desaturation continues.
Pt has inaccurate reads on  due to gel nail polish- refusing to wrap probe around earlobe for an accurate reading as well as plastic probe on finger, tele tech is viewing inaccurate reads as well
pt states she had blood in her bowel movement. Not seen by RN
Patient, Bettye Wolf endorses a panic attack, refuses oxygen via nasal cannula, and is tachycardic to 130s on telemetry monitoring.
Pt 65 y/o F. Hx newly diagnosed SCLC. Admitted for chest pain

## 2025-03-29 NOTE — PROVIDER CONTACT NOTE (OTHER) - ACTION/TREATMENT ORDERED:
ACP Pan Escudero made aware, ACP instructed RN to give PRN ativan now with IV tylenol, ACP Pan Escudero aware of patient's tachycardic episode, meds given as ordered, safety maintained, care ongoing.

## 2025-03-29 NOTE — PROVIDER CONTACT NOTE (OTHER) - DATE AND TIME:
22-Mar-2025 12:40
21-Mar-2025 12:35
24-Mar-2025 02:50
16-Mar-2025 01:00
12-Mar-2025 18:00
25-Mar-2025 09:34
28-Mar-2025 20:50

## 2025-03-29 NOTE — PROVIDER CONTACT NOTE (OTHER) - REASON
Lethargic, different from baseline
Pt c/o L shoulder pain / OK to go to Vascular off /tele
Pt desat to 80, HR elevated to 120s and anxiety
pt states she had blood in her bowel movement. Not seen by RN
Inaccurate  Reading
Patient endorses panic attack, refusing nasal cannula, and tachycardic to 130s on telemetry monitoring.
Patient heart rate bradycardia per tele monitoring

## 2025-03-29 NOTE — PROVIDER CONTACT NOTE (OTHER) - BACKGROUND
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
Admitted for chest pain, history of small cell lung cancer, pericardial effusion.
PMHx scleroderma and asthma, newly diagnosed SCLC
Patient Maryann Wen is a 64 year old female with PMHx of anxiety, scleroderma, and atrial fibrillation. Patient was admitted for newly diagnosed scleroderma and related pain.
Pt c/o L shoulder pain / OK to go to Vascular off /tele
New lung cancer with pericardial effusion

## 2025-03-29 NOTE — PROVIDER CONTACT NOTE (OTHER) - ASSESSMENT
Patient Maryann Wen is AxOx4, sitting up in bed rocking back and forth, rubbing her chest vigorously as she states "this helps with the scleroderma pain," patient appears slightly agitated, patient refuses to wear nasal cannula despite being aware that her oxygen saturation decreases without it. Patient is complaining of 10/10 generalized pain with a majority of her pain in her shoulder and neck. Prior to start of shift, patient received breakthrough IVP dilaudid by day shift RN Gregory. No s/s of sternal chest pain noted, no s/s of acute respiratory distress noted. Patient's heart rate returns to NSR when she stops rubbing her chest vigorously.
Pt is refusing all other methods of accurate reads - acp notified and tele tech notified - acp ok to discontinue from  during night. no s/s distress- satting 90s through vital machine with probe on finger but readings are 80s with probe on toe via . pt on 6L NC
Lethargic, different from baseline. Arousable with normal VS
Pt c/o L shoulder pain. Previously had but had been resolved. /68. HR 86. O2 93% on 2L NC. Temp 97.8
pt states she had blood in her bowel movement. Not seen by RN. Pt states she has hemorrhoids but this blood was not from hemorrhoids. Pt instructed to alert nurse at next BM so she can examine BM.
Patient VS, temp 97.5, HR 58, /64, RR 18,  O2 98 %( 3L nasal cannula),  Drowsy, arousable oriented and denies chest pain,
Pt has anxiety

## 2025-03-30 LAB
ALBUMIN SERPL ELPH-MCNC: 3.2 G/DL — LOW (ref 3.3–5)
ALP SERPL-CCNC: 105 U/L — SIGNIFICANT CHANGE UP (ref 40–120)
ALT FLD-CCNC: 18 U/L — SIGNIFICANT CHANGE UP (ref 4–33)
ANION GAP SERPL CALC-SCNC: 11 MMOL/L — SIGNIFICANT CHANGE UP (ref 7–14)
AST SERPL-CCNC: 23 U/L — SIGNIFICANT CHANGE UP (ref 4–32)
BASOPHILS # BLD AUTO: 0.07 K/UL — SIGNIFICANT CHANGE UP (ref 0–0.2)
BASOPHILS NFR BLD AUTO: 1 % — SIGNIFICANT CHANGE UP (ref 0–2)
BILIRUB SERPL-MCNC: 0.2 MG/DL — SIGNIFICANT CHANGE UP (ref 0.2–1.2)
BUN SERPL-MCNC: 8 MG/DL — SIGNIFICANT CHANGE UP (ref 7–23)
CALCIUM SERPL-MCNC: 8.6 MG/DL — SIGNIFICANT CHANGE UP (ref 8.4–10.5)
CHLORIDE SERPL-SCNC: 93 MMOL/L — LOW (ref 98–107)
CO2 SERPL-SCNC: 26 MMOL/L — SIGNIFICANT CHANGE UP (ref 22–31)
CREAT SERPL-MCNC: 0.38 MG/DL — LOW (ref 0.5–1.3)
EGFR: 110 ML/MIN/1.73M2 — SIGNIFICANT CHANGE UP
EGFR: 110 ML/MIN/1.73M2 — SIGNIFICANT CHANGE UP
EOSINOPHIL # BLD AUTO: 0.27 K/UL — SIGNIFICANT CHANGE UP (ref 0–0.5)
EOSINOPHIL NFR BLD AUTO: 3.7 % — SIGNIFICANT CHANGE UP (ref 0–6)
GLUCOSE SERPL-MCNC: 95 MG/DL — SIGNIFICANT CHANGE UP (ref 70–99)
HCT VFR BLD CALC: 29.3 % — LOW (ref 34.5–45)
HGB BLD-MCNC: 9.6 G/DL — LOW (ref 11.5–15.5)
IANC: 5.04 K/UL — SIGNIFICANT CHANGE UP (ref 1.8–7.4)
IMM GRANULOCYTES NFR BLD AUTO: 1.8 % — HIGH (ref 0–0.9)
LYMPHOCYTES # BLD AUTO: 1.43 K/UL — SIGNIFICANT CHANGE UP (ref 1–3.3)
LYMPHOCYTES # BLD AUTO: 19.6 % — SIGNIFICANT CHANGE UP (ref 13–44)
MAGNESIUM SERPL-MCNC: 1.6 MG/DL — SIGNIFICANT CHANGE UP (ref 1.6–2.6)
MCHC RBC-ENTMCNC: 30 PG — SIGNIFICANT CHANGE UP (ref 27–34)
MCHC RBC-ENTMCNC: 32.8 G/DL — SIGNIFICANT CHANGE UP (ref 32–36)
MCV RBC AUTO: 91.6 FL — SIGNIFICANT CHANGE UP (ref 80–100)
MONOCYTES # BLD AUTO: 0.37 K/UL — SIGNIFICANT CHANGE UP (ref 0–0.9)
MONOCYTES NFR BLD AUTO: 5.1 % — SIGNIFICANT CHANGE UP (ref 2–14)
NEUTROPHILS # BLD AUTO: 5.04 K/UL — SIGNIFICANT CHANGE UP (ref 1.8–7.4)
NEUTROPHILS NFR BLD AUTO: 68.8 % — SIGNIFICANT CHANGE UP (ref 43–77)
NRBC # BLD AUTO: 0 K/UL — SIGNIFICANT CHANGE UP (ref 0–0)
NRBC # FLD: 0 K/UL — SIGNIFICANT CHANGE UP (ref 0–0)
NRBC BLD AUTO-RTO: 0 /100 WBCS — SIGNIFICANT CHANGE UP (ref 0–0)
PHOSPHATE SERPL-MCNC: 4.1 MG/DL — SIGNIFICANT CHANGE UP (ref 2.5–4.5)
PLATELET # BLD AUTO: 232 K/UL — SIGNIFICANT CHANGE UP (ref 150–400)
POTASSIUM SERPL-MCNC: 3.7 MMOL/L — SIGNIFICANT CHANGE UP (ref 3.5–5.3)
POTASSIUM SERPL-SCNC: 3.7 MMOL/L — SIGNIFICANT CHANGE UP (ref 3.5–5.3)
PROT SERPL-MCNC: 5.5 G/DL — LOW (ref 6–8.3)
RBC # BLD: 3.2 M/UL — LOW (ref 3.8–5.2)
RBC # FLD: 13.2 % — SIGNIFICANT CHANGE UP (ref 10.3–14.5)
SODIUM SERPL-SCNC: 130 MMOL/L — LOW (ref 135–145)
WBC # BLD: 7.31 K/UL — SIGNIFICANT CHANGE UP (ref 3.8–10.5)
WBC # FLD AUTO: 7.31 K/UL — SIGNIFICANT CHANGE UP (ref 3.8–10.5)

## 2025-03-30 PROCEDURE — 99232 SBSQ HOSP IP/OBS MODERATE 35: CPT

## 2025-03-30 RX ADMIN — BACLOFEN 5 MILLIGRAM(S): 10 INJECTION INTRATHECAL at 22:26

## 2025-03-30 RX ADMIN — COLCHICINE 0.6 MILLIGRAM(S): 0.6 TABLET, FILM COATED ORAL at 11:52

## 2025-03-30 RX ADMIN — APIXABAN 5 MILLIGRAM(S): 2.5 TABLET, FILM COATED ORAL at 07:29

## 2025-03-30 RX ADMIN — Medication 30 MILLIGRAM(S): at 14:45

## 2025-03-30 RX ADMIN — Medication 30 MILLIGRAM(S): at 22:26

## 2025-03-30 RX ADMIN — Medication 2 GRAM(S): at 22:24

## 2025-03-30 RX ADMIN — GABAPENTIN 100 MILLIGRAM(S): 400 CAPSULE ORAL at 00:04

## 2025-03-30 RX ADMIN — Medication 30 MILLIGRAM(S): at 13:45

## 2025-03-30 RX ADMIN — Medication 1 SPRAY(S): at 00:09

## 2025-03-30 RX ADMIN — Medication 3 MILLIGRAM(S): at 12:50

## 2025-03-30 RX ADMIN — Medication 2 GRAM(S): at 00:06

## 2025-03-30 RX ADMIN — Medication 2 GRAM(S): at 13:48

## 2025-03-30 RX ADMIN — NICOTINE POLACRILEX 1 PATCH: 4 GUM, CHEWING ORAL at 11:50

## 2025-03-30 RX ADMIN — APIXABAN 5 MILLIGRAM(S): 2.5 TABLET, FILM COATED ORAL at 19:21

## 2025-03-30 RX ADMIN — Medication 40 MILLIGRAM(S): at 19:17

## 2025-03-30 RX ADMIN — CLONAZEPAM 1 MILLIGRAM(S): 0.5 TABLET ORAL at 22:37

## 2025-03-30 RX ADMIN — Medication 3 MILLIGRAM(S): at 11:50

## 2025-03-30 RX ADMIN — GABAPENTIN 100 MILLIGRAM(S): 400 CAPSULE ORAL at 13:45

## 2025-03-30 RX ADMIN — BACLOFEN 5 MILLIGRAM(S): 10 INJECTION INTRATHECAL at 08:44

## 2025-03-30 RX ADMIN — CLONAZEPAM 0.5 MILLIGRAM(S): 0.5 TABLET ORAL at 15:17

## 2025-03-30 RX ADMIN — Medication 1 SPRAY(S): at 22:26

## 2025-03-30 RX ADMIN — CLONAZEPAM 0.5 MILLIGRAM(S): 0.5 TABLET ORAL at 07:54

## 2025-03-30 RX ADMIN — Medication 40 MILLIGRAM(S): at 07:30

## 2025-03-30 RX ADMIN — OXYCODONE HYDROCHLORIDE 30 MILLIGRAM(S): 30 TABLET ORAL at 17:55

## 2025-03-30 RX ADMIN — Medication 1 SPRAY(S): at 13:46

## 2025-03-30 RX ADMIN — OLANZAPINE 5 MILLIGRAM(S): 10 TABLET ORAL at 22:27

## 2025-03-30 RX ADMIN — NICOTINE POLACRILEX 1 PATCH: 4 GUM, CHEWING ORAL at 18:05

## 2025-03-30 RX ADMIN — Medication 30 MILLIGRAM(S): at 00:40

## 2025-03-30 RX ADMIN — Medication 5 MILLIGRAM(S): at 22:28

## 2025-03-30 RX ADMIN — GABAPENTIN 100 MILLIGRAM(S): 400 CAPSULE ORAL at 22:25

## 2025-03-30 RX ADMIN — Medication 30 MILLIGRAM(S): at 23:26

## 2025-03-30 RX ADMIN — OXYCODONE HYDROCHLORIDE 30 MILLIGRAM(S): 30 TABLET ORAL at 16:55

## 2025-03-30 RX ADMIN — CLONAZEPAM 1 MILLIGRAM(S): 0.5 TABLET ORAL at 00:04

## 2025-03-30 RX ADMIN — Medication 30 MILLIGRAM(S): at 00:10

## 2025-03-30 RX ADMIN — Medication 1 APPLICATION(S): at 11:52

## 2025-03-30 RX ADMIN — Medication 5 MILLIGRAM(S): at 00:04

## 2025-03-30 RX ADMIN — Medication 3 MILLIGRAM(S): at 02:39

## 2025-03-30 RX ADMIN — Medication 3 MILLIGRAM(S): at 19:16

## 2025-03-30 RX ADMIN — NICOTINE POLACRILEX 1 PATCH: 4 GUM, CHEWING ORAL at 11:53

## 2025-03-30 RX ADMIN — Medication 3 MILLIGRAM(S): at 20:16

## 2025-03-30 RX ADMIN — OLANZAPINE 5 MILLIGRAM(S): 10 TABLET ORAL at 00:03

## 2025-03-30 RX ADMIN — Medication 112 MICROGRAM(S): at 07:30

## 2025-03-30 RX ADMIN — OXYCODONE HYDROCHLORIDE 30 MILLIGRAM(S): 30 TABLET ORAL at 07:53

## 2025-03-30 RX ADMIN — Medication 3 MILLIGRAM(S): at 03:09

## 2025-03-30 RX ADMIN — Medication 30 MILLIGRAM(S): at 07:37

## 2025-03-30 RX ADMIN — GABAPENTIN 100 MILLIGRAM(S): 400 CAPSULE ORAL at 07:36

## 2025-03-30 RX ADMIN — BACLOFEN 5 MILLIGRAM(S): 10 INJECTION INTRATHECAL at 00:05

## 2025-03-30 NOTE — PROGRESS NOTE ADULT - NUTRITIONAL ASSESSMENT
This patient has been assessed with a concern for Malnutrition and has been determined to have a diagnosis/diagnoses of Severe protein-calorie malnutrition.    This patient is being managed with:   Diet NPO after Midnight-     NPO Start Date: 16-Mar-2025   NPO Start Time: 23:59  Except Medications  Entered: Mar 16 2025  7:23PM    Diet Regular-  1000mL Fluid Restriction ([id number])  Entered: Mar 12 2025 12:00PM  
This patient has been assessed with a concern for Malnutrition and has been determined to have a diagnosis/diagnoses of Severe protein-calorie malnutrition.    This patient is being managed with:   Diet Regular-  1000mL Fluid Restriction ([id number])  Entered: Mar 12 2025 12:00PM  
This patient has been assessed with a concern for Malnutrition and has been determined to have a diagnosis/diagnoses of Severe protein-calorie malnutrition.    This patient is being managed with:   Diet Regular-  Entered: Mar 19 2025  3:55PM  
This patient has been assessed with a concern for Malnutrition and has been determined to have a diagnosis/diagnoses of Severe protein-calorie malnutrition.    This patient is being managed with:   Diet Regular-  1000mL Fluid Restriction ([id number])  Entered: Mar 23 2025  6:25PM  
This patient has been assessed with a concern for Malnutrition and has been determined to have a diagnosis/diagnoses of Severe protein-calorie malnutrition.    This patient is being managed with:   Diet Regular-  1000mL Fluid Restriction ([id number])  Entered: Mar 12 2025 12:00PM  
This patient has been assessed with a concern for Malnutrition and has been determined to have a diagnosis/diagnoses of Severe protein-calorie malnutrition.    This patient is being managed with:   Diet Regular-  1000mL Fluid Restriction ([id number])  Entered: Mar 23 2025  6:25PM  
This patient has been assessed with a concern for Malnutrition and has been determined to have a diagnosis/diagnoses of Severe protein-calorie malnutrition.    This patient is being managed with:   Diet Regular-  Entered: Mar 19 2025  3:55PM  
This patient has been assessed with a concern for Malnutrition and has been determined to have a diagnosis/diagnoses of Severe protein-calorie malnutrition.    This patient is being managed with:   Diet Regular-  Entered: Mar 26 2025  7:33PM  
This patient has been assessed with a concern for Malnutrition and has been determined to have a diagnosis/diagnoses of Severe protein-calorie malnutrition.    This patient is being managed with:   Diet Regular-  1000mL Fluid Restriction ([id number])  Entered: Mar 23 2025  6:25PM  
This patient has been assessed with a concern for Malnutrition and has been determined to have a diagnosis/diagnoses of Severe protein-calorie malnutrition.    This patient is being managed with:   Diet Regular-  Entered: Mar 19 2025  3:55PM  
This patient has been assessed with a concern for Malnutrition and has been determined to have a diagnosis/diagnoses of Severe protein-calorie malnutrition.    This patient is being managed with:   Diet Regular-  Entered: Mar 26 2025  7:33PM  
This patient has been assessed with a concern for Malnutrition and has been determined to have a diagnosis/diagnoses of Severe protein-calorie malnutrition.    This patient is being managed with:   Diet Regular-  1000mL Fluid Restriction ([id number])  Entered: Mar 12 2025 12:00PM  
This patient has been assessed with a concern for Malnutrition and has been determined to have a diagnosis/diagnoses of Severe protein-calorie malnutrition.    This patient is being managed with:   Diet Regular-  Entered: Mar 19 2025  3:55PM  
This patient has been assessed with a concern for Malnutrition and has been determined to have a diagnosis/diagnoses of Severe protein-calorie malnutrition.    This patient is being managed with:   Diet Regular-  1000mL Fluid Restriction ([id number])  Entered: Mar 23 2025  6:25PM    This patient has been assessed with a concern for Malnutrition and has been determined to have a diagnosis/diagnoses of Severe protein-calorie malnutrition.    This patient is being managed with:   Diet Regular-  1000mL Fluid Restriction ([id number])  Entered: Mar 23 2025  6:25PM  
This patient has been assessed with a concern for Malnutrition and has been determined to have a diagnosis/diagnoses of Severe protein-calorie malnutrition.    This patient is being managed with:   Diet Regular-  1000mL Fluid Restriction ([id number])  Entered: Mar 12 2025 12:00PM

## 2025-03-30 NOTE — PROGRESS NOTE ADULT - ASSESSMENT
63 yo woman, former smoker (47py; quit 12/2024) with h/o pAfib (on Eliquis), Hypothyroidism, ? h/o Sleroderma, h/o pericardial effusion s/p pericardiocentesis (dx in 12/2024; ? viral vs inflammatory, cytology negative for malignant cells, on Colchicine), Asthma/suspected COPD, and recently-dx SCLCa > dx in 2/2025, staging javier not yet completed and pt is tx naive and referred to [facility] from Med Onc clinic for expedited onc workup and urgent initiation of inpatient chemotherapy.    ACTIVE PROBLEMS  Recently dx SCLCa  GOC discussion, counseling  Encounter for antineoplastic chemotherapy  Pericardial effusion   Anxiety  Acute hypoxic resp failure  h/o Asthma/COPD (former smoker)  Hyponatremia 2/2 SIADH  Cancer related pain, anxiety  Substance use disorder  Constipation  Hypothyroidism  pAfib  Hypercoag state  Severe prot yahir malnutrtion  Immunocompromised 2/2 cancer, autoimmune disease    Recently dx SCLCa  GOC discussion, counseling  Encounter for antineoplastic chemotherapy  Staging w/u in progress   * Pt unable to tolerate MRI Brain, A/P (even with anesthesia- Versed), due to anxiety/claustrophobia   * NCHCT and NC CT A/P without overt evidence of mets   * TTE showing RV thickening with pericardial effusion, c/f mets; w/u in progress with Thoracic Surgery as below  In GOC conversation on 3/13, pt expressed uncertainty about pursuing systemic cancer treatment, especially if her cancer is determined to be advanced  However, in f/u conversation on 3/19 pt indicated that she would like to pursue systemic    inpt induction Carbo/Etop today     - q3w week cycles; concurrent immunotherapy is relatively contraindicated     - 3d infusion 3/23-25 with Dex/Zofran premeds     - Carbo AUC 4 d1, Etoposide 80mg/m2 d1-3 (both 20% dose reduction)     - Fluphila growth factor on 3/26  Rad Onc also made aware of pt- she is being considered for a 3-week course of RT to the lung mass (pt to f/u outpt)  Long-term prognosis: gaurded; pt is full code    Pericardial effusion   Dx in 12/2024 (previously thought to be ? autoimmune vs viral with positive Coxsackie B titers in 12/2024)  12/23 pericardial fluid cytology negative for malignant cells  3/12 TTE with small to mod pericardial effusion, without tamponade physiology  3/20 TTE with RV thickening and mod multifocal pericardial effusion, c/f mets by appearance  3/20 NC CT chest stable effusion (compared to 3/18 but enlarging compared to 2/15) with enlarging L suprahilar mass  Appreciate Thoracic Surgery consult: no role for pericardiocentesis vs pericardial window at this time (continuing heparin gtt in lieu of Eliquis in case emergent procedure becomes necessary)- Dr. Sherwood to review images on 3/24  Continuing Colchicine 0.6mg daily    Anxiety/depression  Not currently controlled, pt having regular panic attacks and nightmares (grieving the recent loss of her son)  T continues to follow closely  Continuing Klonopin 1mg BID  Continuing Ativan PO/IV/IM 1mg q8  Continuing Zyprexa 5mg nightly  1:1 Observation not required at this time  Worsening anxiety      Acute hypoxic resp failure  h/o Asthma/COPD (former smoker)  Pt desats to mid 80s% on RA and requires 4-6L NC supplemental O2 to maintain O2 sats > 92%  Likely due to burden of disease in lungs + pericardial effusion  Continuing supplemental O2 with weaning as tolerated and supportive resp care prn  Continuing Advair 100-50mcg MDI BID  Continuing duonebs q6/prn   Continuing Nicotine patch 21mg daily (6 weeks)  Mgmt of pericardial effusion as above    Hyponatremia 2/2 SIADH  Na normalized/stable at 135  Pt is asymptomatic  SIADH confirmed by 3/12 urine lytes  1L fluid restriction dced  on 3/20  Continuing Salt tabs 1gm BID, slight downtrend. Continue to monitor    Cancer related pain  Substance use disorder  Continuing Morphine ER 30mg q12 and dilaudid IV for PRN  Patient with underlying psychiatric disorder, has been medicated with opioids for extensive period of time, likely developed degree of dependency   Continuing Oxy IR 20mg q8/prn  Continuing Gabapentin 100mg TID  Continuing Baclofen 5mg q8/prn    Constipation  Improved  Possibly due to opioids +/- AID  3/12 AXR negative for ileus/obstruction  Continuing bowel regimen (Miralax, Senna) for OIC prevention    Hypothyroidism: 3/12 TFTs wnl; continuing LT4 112mcg daily    pAfib  Hypercoag state  Eliquis transitioned to heparin gtt in anticipation of possible pericardial procedure as noted above    Severe prot yahir malnutrtion: appreciate RD eval; continuing regular diet

## 2025-03-30 NOTE — PROGRESS NOTE ADULT - SUBJECTIVE AND OBJECTIVE BOX
Deven Archuleta MD  Academic Hospitalist  Pager [phone]  Email: [email or web address]  Available on [employer] Teams        PROGRESS NOTE:     Patient is a 64y old  Female who presents with a chief complaint of Shortness of breath and chest pain (29 Mar 2025 12:20)      SUBJECTIVE / OVERNIGHT EVENTS:  Patient seen and examined this morning. Continues to be unsure whether or not she wants go to rehab, for now awaiting [facility].       MEDICATIONS  (STANDING):  apixaban 5 milliGRAM(s) Oral every 12 hours  baclofen 5 milliGRAM(s) Oral every 8 hours  bisacodyl 5 milliGRAM(s) Oral at bedtime  chlorhexidine 2% Cloths 1 Application(s) Topical daily  clonazePAM  Tablet 0.5 milliGRAM(s) Oral <User Schedule>  clonazePAM  Tablet 1 milliGRAM(s) Oral at bedtime  colchicine 0.6 milliGRAM(s) Oral daily  FIRST- Mouthwash  BLM 5 milliLiter(s) Swish and Spit four times a day  fluticasone propionate/ salmeterol 100-50 MICROgram(s) Diskus 1 Dose(s) Inhalation two times a day  gabapentin 100 milliGRAM(s) Oral three times a day  influenza  Vaccine (HIGH DOSE) 0.5 milliLiter(s) IntraMuscular once  levothyroxine 112 MICROGram(s) Oral daily  lidocaine   4% Patch 1 Patch Transdermal every 24 hours  methylnaltrexone Injectable 12 milliGRAM(s) SubCutaneous once  morphine ER Tablet 30 milliGRAM(s) Oral every 8 hours  naloxegol 25 milliGRAM(s) Oral daily  nicotine - 21 mG/24Hr(s) Patch 1 Patch Transdermal daily  OLANZapine 5 milliGRAM(s) Oral at bedtime  pantoprazole    Tablet 40 milliGRAM(s) Oral every 12 hours  polyethylene glycol 3350 17 Gram(s) Oral two times a day  senna 2 Tablet(s) Oral at bedtime  sodium chloride 2 Gram(s) Oral three times a day  sodium chloride 0.65% Nasal 1 Spray(s) Both Nostrils three times a day    MEDICATIONS  (PRN):  albuterol/ipratropium for Nebulization 3 milliLiter(s) Nebulizer every 6 hours PRN Shortness of Breath  aluminum hydroxide/magnesium hydroxide/simethicone Suspension 30 milliLiter(s) Oral every 4 hours PRN Dyspepsia  benzocaine/menthol Lozenge 1 Lozenge Oral three times a day PRN Sore Throat  diphenhydrAMINE Injectable 25 milliGRAM(s) IV Push once PRN chemotherapy reaction  famotidine Injectable 20 milliGRAM(s) IV Push once PRN chemotherapy reaction  HYDROmorphone  Injectable 3 milliGRAM(s) IV Push every 4 hours PRN severe breakthrough pain  LORazepam     Tablet 1 milliGRAM(s) Oral every 8 hours PRN Anxiety  melatonin 3 milliGRAM(s) Oral at bedtime PRN Insomnia  methylPREDNISolone sodium succinate Injectable 125 milliGRAM(s) IV Push once PRN chemotherapy reaction  ondansetron Injectable 4 milliGRAM(s) IV Push every 6 hours PRN Nausea and/or Vomiting  oxyCODONE    IR 20 milliGRAM(s) Oral every 4 hours PRN Moderate Pain (4 - 6)  oxyCODONE    IR 30 milliGRAM(s) Oral every 4 hours PRN Severe Pain (7 - 10)      CAPILLARY BLOOD GLUCOSE        I&O's Summary    29 Mar 2025 07:01  -  30 Mar 2025 07:00  --------------------------------------------------------  IN: 0 mL / OUT: 800 mL / NET: -800 mL        PHYSICAL EXAM:  Vital Signs Last 24 Hrs  T(C): 36.3 (30 Mar 2025 05:24), Max: 36.7 (29 Mar 2025 12:50)  T(F): 97.3 (30 Mar 2025 05:24), Max: 98.1 (29 Mar 2025 12:50)  HR: 80 (30 Mar 2025 05:24) (80 - 96)  BP: 96/57 (30 Mar 2025 05:24) (96/57 - 119/68)  BP(mean): --  RR: 18 (30 Mar 2025 05:24) (18 - 18)  SpO2: 100% (30 Mar 2025 05:24) (92% - 100%)    Parameters below as of 30 Mar 2025 05:24  Patient On (Oxygen Delivery Method): nasal cannula    CONSTITUTIONAL: NAD, initially difficult to wake up  RESPIRATORY: Normal respiratory effort; no respiratory distress, CTAB  CARDIOVASCULAR: No visible JVD, No lower extremity edema; S1S2, no m,r,g  ABDOMEN: Not guarding, does not appear distended, BS+  MUSCLOSKELETAL: no clubbing or cyanosis of digits; no joint swelling   PSYCH: AOx3,        LABS:                        9.6    7.31  )-----------( 232      ( 30 Mar 2025 06:12 )             29.3     03-30    130[L]  |  93[L]  |  8   ----------------------------<  95  3.7   |  26  |  0.38[L]    Ca    8.6      30 Mar 2025 06:12  Phos  4.1     03-30  Mg     1.60     03-30    TPro  5.5[L]  /  Alb  3.2[L]  /  TBili  0.2  /  DBili  x   /  AST  23  /  ALT  18  /  AlkPhos  105  03-30          Urinalysis Basic - ( 30 Mar 2025 06:12 )    Color: x / Appearance: x / SG: x / pH: x  Gluc: 95 mg/dL / Ketone: x  / Bili: x / Urobili: x   Blood: x / Protein: x / Nitrite: x   Leuk Esterase: x / RBC: x / WBC x   Sq Epi: x / Non Sq Epi: x / Bacteria: x          RADIOLOGY & ADDITIONAL TESTS:  Results Reviewed:   Imaging Personally Reviewed:  Electrocardiogram Personally Reviewed:    COORDINATION OF CARE:  Care Discussed with Consultants/Other Providers [Y/N]:  Prior or Outpatient Records Reviewed [Y/N]:   Deven Archuleta MD  Academic Hospitalist  Pager [phone]  Email: [email or web address]  Available on [employer] Teams        PROGRESS NOTE:     Patient is a 64y old  Female who presents with a chief complaint of Shortness of breath and chest pain (29 Mar 2025 12:20)      SUBJECTIVE / OVERNIGHT EVENTS:  Patient seen and examined this morning. Awaiting dc to [facility].       MEDICATIONS  (STANDING):  apixaban 5 milliGRAM(s) Oral every 12 hours  baclofen 5 milliGRAM(s) Oral every 8 hours  bisacodyl 5 milliGRAM(s) Oral at bedtime  chlorhexidine 2% Cloths 1 Application(s) Topical daily  clonazePAM  Tablet 0.5 milliGRAM(s) Oral <User Schedule>  clonazePAM  Tablet 1 milliGRAM(s) Oral at bedtime  colchicine 0.6 milliGRAM(s) Oral daily  FIRST- Mouthwash  BLM 5 milliLiter(s) Swish and Spit four times a day  fluticasone propionate/ salmeterol 100-50 MICROgram(s) Diskus 1 Dose(s) Inhalation two times a day  gabapentin 100 milliGRAM(s) Oral three times a day  influenza  Vaccine (HIGH DOSE) 0.5 milliLiter(s) IntraMuscular once  levothyroxine 112 MICROGram(s) Oral daily  lidocaine   4% Patch 1 Patch Transdermal every 24 hours  methylnaltrexone Injectable 12 milliGRAM(s) SubCutaneous once  morphine ER Tablet 30 milliGRAM(s) Oral every 8 hours  naloxegol 25 milliGRAM(s) Oral daily  nicotine - 21 mG/24Hr(s) Patch 1 Patch Transdermal daily  OLANZapine 5 milliGRAM(s) Oral at bedtime  pantoprazole    Tablet 40 milliGRAM(s) Oral every 12 hours  polyethylene glycol 3350 17 Gram(s) Oral two times a day  senna 2 Tablet(s) Oral at bedtime  sodium chloride 2 Gram(s) Oral three times a day  sodium chloride 0.65% Nasal 1 Spray(s) Both Nostrils three times a day    MEDICATIONS  (PRN):  albuterol/ipratropium for Nebulization 3 milliLiter(s) Nebulizer every 6 hours PRN Shortness of Breath  aluminum hydroxide/magnesium hydroxide/simethicone Suspension 30 milliLiter(s) Oral every 4 hours PRN Dyspepsia  benzocaine/menthol Lozenge 1 Lozenge Oral three times a day PRN Sore Throat  diphenhydrAMINE Injectable 25 milliGRAM(s) IV Push once PRN chemotherapy reaction  famotidine Injectable 20 milliGRAM(s) IV Push once PRN chemotherapy reaction  HYDROmorphone  Injectable 3 milliGRAM(s) IV Push every 4 hours PRN severe breakthrough pain  LORazepam     Tablet 1 milliGRAM(s) Oral every 8 hours PRN Anxiety  melatonin 3 milliGRAM(s) Oral at bedtime PRN Insomnia  methylPREDNISolone sodium succinate Injectable 125 milliGRAM(s) IV Push once PRN chemotherapy reaction  ondansetron Injectable 4 milliGRAM(s) IV Push every 6 hours PRN Nausea and/or Vomiting  oxyCODONE    IR 20 milliGRAM(s) Oral every 4 hours PRN Moderate Pain (4 - 6)  oxyCODONE    IR 30 milliGRAM(s) Oral every 4 hours PRN Severe Pain (7 - 10)      CAPILLARY BLOOD GLUCOSE        I&O's Summary    29 Mar 2025 07:01  -  30 Mar 2025 07:00  --------------------------------------------------------  IN: 0 mL / OUT: 800 mL / NET: -800 mL        PHYSICAL EXAM:  Vital Signs Last 24 Hrs  T(C): 36.3 (30 Mar 2025 05:24), Max: 36.7 (29 Mar 2025 12:50)  T(F): 97.3 (30 Mar 2025 05:24), Max: 98.1 (29 Mar 2025 12:50)  HR: 80 (30 Mar 2025 05:24) (80 - 96)  BP: 96/57 (30 Mar 2025 05:24) (96/57 - 119/68)  BP(mean): --  RR: 18 (30 Mar 2025 05:24) (18 - 18)  SpO2: 100% (30 Mar 2025 05:24) (92% - 100%)    Parameters below as of 30 Mar 2025 05:24  Patient On (Oxygen Delivery Method): nasal cannula    CONSTITUTIONAL: NAD, awake  RESPIRATORY: Normal respiratory effort; no respiratory distress, CTAB  CARDIOVASCULAR: No visible JVD, No lower extremity edema; S1S2, no m,r,g  ABDOMEN: Not guarding, does not appear distended, BS+  MUSCLOSKELETAL: no clubbing or cyanosis of digits; no joint swelling   PSYCH: AOx3,        LABS:                        9.6    7.31  )-----------( 232      ( 30 Mar 2025 06:12 )             29.3     03-30    130[L]  |  93[L]  |  8   ----------------------------<  95  3.7   |  26  |  0.38[L]    Ca    8.6      30 Mar 2025 06:12  Phos  4.1     03-30  Mg     1.60     03-30    TPro  5.5[L]  /  Alb  3.2[L]  /  TBili  0.2  /  DBili  x   /  AST  23  /  ALT  18  /  AlkPhos  105  03-30          Urinalysis Basic - ( 30 Mar 2025 06:12 )    Color: x / Appearance: x / SG: x / pH: x  Gluc: 95 mg/dL / Ketone: x  / Bili: x / Urobili: x   Blood: x / Protein: x / Nitrite: x   Leuk Esterase: x / RBC: x / WBC x   Sq Epi: x / Non Sq Epi: x / Bacteria: x          RADIOLOGY & ADDITIONAL TESTS:  Results Reviewed:   Imaging Personally Reviewed:  Electrocardiogram Personally Reviewed:    COORDINATION OF CARE:  Care Discussed with Consultants/Other Providers [Y/N]:  Prior or Outpatient Records Reviewed [Y/N]:

## 2025-03-31 ENCOUNTER — NON-APPOINTMENT (OUTPATIENT)
Age: 65
End: 2025-03-31

## 2025-03-31 VITALS
SYSTOLIC BLOOD PRESSURE: 108 MMHG | TEMPERATURE: 98 F | DIASTOLIC BLOOD PRESSURE: 70 MMHG | HEART RATE: 90 BPM | OXYGEN SATURATION: 97 % | RESPIRATION RATE: 18 BRPM

## 2025-03-31 LAB
ALBUMIN SERPL ELPH-MCNC: 3.3 G/DL — SIGNIFICANT CHANGE UP (ref 3.3–5)
ALP SERPL-CCNC: 110 U/L — SIGNIFICANT CHANGE UP (ref 40–120)
ALT FLD-CCNC: 17 U/L — SIGNIFICANT CHANGE UP (ref 4–33)
ANION GAP SERPL CALC-SCNC: 14 MMOL/L — SIGNIFICANT CHANGE UP (ref 7–14)
ANISOCYTOSIS BLD QL: SLIGHT — SIGNIFICANT CHANGE UP
AST SERPL-CCNC: 23 U/L — SIGNIFICANT CHANGE UP (ref 4–32)
BASOPHILS # BLD AUTO: 0 K/UL — SIGNIFICANT CHANGE UP (ref 0–0.2)
BASOPHILS NFR BLD AUTO: 0 % — SIGNIFICANT CHANGE UP (ref 0–2)
BILIRUB SERPL-MCNC: 0.3 MG/DL — SIGNIFICANT CHANGE UP (ref 0.2–1.2)
BUN SERPL-MCNC: 7 MG/DL — SIGNIFICANT CHANGE UP (ref 7–23)
CALCIUM SERPL-MCNC: 8.8 MG/DL — SIGNIFICANT CHANGE UP (ref 8.4–10.5)
CHLORIDE SERPL-SCNC: 94 MMOL/L — LOW (ref 98–107)
CO2 SERPL-SCNC: 26 MMOL/L — SIGNIFICANT CHANGE UP (ref 22–31)
CREAT SERPL-MCNC: 0.45 MG/DL — LOW (ref 0.5–1.3)
EGFR: 106 ML/MIN/1.73M2 — SIGNIFICANT CHANGE UP
EGFR: 106 ML/MIN/1.73M2 — SIGNIFICANT CHANGE UP
EOSINOPHIL # BLD AUTO: 0.35 K/UL — SIGNIFICANT CHANGE UP (ref 0–0.5)
EOSINOPHIL NFR BLD AUTO: 5.3 % — SIGNIFICANT CHANGE UP (ref 0–6)
GIANT PLATELETS BLD QL SMEAR: PRESENT — SIGNIFICANT CHANGE UP
GLUCOSE SERPL-MCNC: 129 MG/DL — HIGH (ref 70–99)
HCT VFR BLD CALC: 29.2 % — LOW (ref 34.5–45)
HGB BLD-MCNC: 9.7 G/DL — LOW (ref 11.5–15.5)
IANC: 3.95 K/UL — SIGNIFICANT CHANGE UP (ref 1.8–7.4)
LYMPHOCYTES # BLD AUTO: 1.66 K/UL — SIGNIFICANT CHANGE UP (ref 1–3.3)
LYMPHOCYTES # BLD AUTO: 25.4 % — SIGNIFICANT CHANGE UP (ref 13–44)
MAGNESIUM SERPL-MCNC: 1.6 MG/DL — SIGNIFICANT CHANGE UP (ref 1.6–2.6)
MANUAL SMEAR VERIFICATION: SIGNIFICANT CHANGE UP
MCHC RBC-ENTMCNC: 29.8 PG — SIGNIFICANT CHANGE UP (ref 27–34)
MCHC RBC-ENTMCNC: 33.2 G/DL — SIGNIFICANT CHANGE UP (ref 32–36)
MCV RBC AUTO: 89.8 FL — SIGNIFICANT CHANGE UP (ref 80–100)
METAMYELOCYTES # FLD: 0.9 % — SIGNIFICANT CHANGE UP (ref 0–1)
METAMYELOCYTES NFR BLD: 0.9 % — SIGNIFICANT CHANGE UP (ref 0–1)
MONOCYTES # BLD AUTO: 0.69 K/UL — SIGNIFICANT CHANGE UP (ref 0–0.9)
MONOCYTES NFR BLD AUTO: 10.5 % — SIGNIFICANT CHANGE UP (ref 2–14)
NEUTROPHILS # BLD AUTO: 3.72 K/UL — SIGNIFICANT CHANGE UP (ref 1.8–7.4)
NEUTROPHILS NFR BLD AUTO: 51.7 % — SIGNIFICANT CHANGE UP (ref 43–77)
NEUTS BAND # BLD: 5.3 % — SIGNIFICANT CHANGE UP (ref 0–6)
NEUTS BAND NFR BLD: 5.3 % — SIGNIFICANT CHANGE UP (ref 0–6)
PHOSPHATE SERPL-MCNC: 3.9 MG/DL — SIGNIFICANT CHANGE UP (ref 2.5–4.5)
PLAT MORPH BLD: NORMAL — SIGNIFICANT CHANGE UP
PLATELET # BLD AUTO: 255 K/UL — SIGNIFICANT CHANGE UP (ref 150–400)
PLATELET COUNT - ESTIMATE: NORMAL — SIGNIFICANT CHANGE UP
POTASSIUM SERPL-MCNC: 3.7 MMOL/L — SIGNIFICANT CHANGE UP (ref 3.5–5.3)
POTASSIUM SERPL-SCNC: 3.7 MMOL/L — SIGNIFICANT CHANGE UP (ref 3.5–5.3)
PROT SERPL-MCNC: 5.9 G/DL — LOW (ref 6–8.3)
RBC # BLD: 3.25 M/UL — LOW (ref 3.8–5.2)
RBC # FLD: 12.9 % — SIGNIFICANT CHANGE UP (ref 10.3–14.5)
RBC BLD AUTO: ABNORMAL
SMUDGE CELLS # BLD: PRESENT — SIGNIFICANT CHANGE UP
SODIUM SERPL-SCNC: 134 MMOL/L — LOW (ref 135–145)
VARIANT LYMPHS # BLD: 0.9 % — SIGNIFICANT CHANGE UP (ref 0–6)
VARIANT LYMPHS NFR BLD MANUAL: 0.9 % — SIGNIFICANT CHANGE UP (ref 0–6)
WBC # BLD: 6.53 K/UL — SIGNIFICANT CHANGE UP (ref 3.8–10.5)
WBC # FLD AUTO: 6.53 K/UL — SIGNIFICANT CHANGE UP (ref 3.8–10.5)

## 2025-03-31 PROCEDURE — 99239 HOSP IP/OBS DSCHRG MGMT >30: CPT

## 2025-03-31 PROCEDURE — 99233 SBSQ HOSP IP/OBS HIGH 50: CPT

## 2025-03-31 RX ORDER — MAGNESIUM, ALUMINUM HYDROXIDE 200-200 MG
30 TABLET,CHEWABLE ORAL
Qty: 0 | Refills: 0 | DISCHARGE
Start: 2025-03-31

## 2025-03-31 RX ORDER — CLONAZEPAM 0.5 MG/1
1 TABLET ORAL
Qty: 14 | Refills: 0
Start: 2025-03-31 | End: 2025-04-13

## 2025-03-31 RX ORDER — MELATONIN 5 MG
1 TABLET ORAL
Qty: 0 | Refills: 0 | DISCHARGE
Start: 2025-03-31

## 2025-03-31 RX ORDER — OXYCODONE HYDROCHLORIDE 30 MG/1
1 TABLET ORAL
Qty: 0 | Refills: 0 | DISCHARGE
Start: 2025-03-31

## 2025-03-31 RX ORDER — OLANZAPINE 10 MG/1
1 TABLET ORAL
Qty: 0 | Refills: 0 | DISCHARGE
Start: 2025-03-31

## 2025-03-31 RX ORDER — DIPHENHYDRAMINE HYDROCHLORIDE AND LIDOCAINE HYDROCHLORIDE AND ALUMINUM HYDROXIDE AND MAGNESIUM HYDRO
5 KIT
Qty: 0 | Refills: 0 | DISCHARGE
Start: 2025-03-31

## 2025-03-31 RX ORDER — BISACODYL 5 MG
1 TABLET, DELAYED RELEASE (ENTERIC COATED) ORAL
Qty: 0 | Refills: 0 | DISCHARGE
Start: 2025-03-31

## 2025-03-31 RX ORDER — GABAPENTIN 400 MG/1
1 CAPSULE ORAL
Qty: 0 | Refills: 0 | DISCHARGE
Start: 2025-03-31

## 2025-03-31 RX ORDER — OXYCODONE HYDROCHLORIDE 30 MG/1
1 TABLET ORAL
Qty: 30 | Refills: 0
Start: 2025-03-31 | End: 2025-04-04

## 2025-03-31 RX ORDER — SENNA 187 MG
2 TABLET ORAL
Qty: 0 | Refills: 0 | DISCHARGE
Start: 2025-03-31

## 2025-03-31 RX ORDER — CLONAZEPAM 0.5 MG/1
1 TABLET ORAL
Qty: 0 | Refills: 0 | DISCHARGE
Start: 2025-03-31

## 2025-03-31 RX ORDER — LORAZEPAM 4 MG/ML
1 VIAL (ML) INJECTION
Qty: 0 | Refills: 0 | DISCHARGE
Start: 2025-03-31

## 2025-03-31 RX ORDER — SODIUM CHLORIDE 0.65 %
1 AEROSOL, SPRAY (ML) NASAL
Qty: 0 | Refills: 0 | DISCHARGE
Start: 2025-03-31

## 2025-03-31 RX ORDER — CLONAZEPAM 0.5 MG/1
1 TABLET ORAL
Qty: 28 | Refills: 0
Start: 2025-03-31 | End: 2025-04-13

## 2025-03-31 RX ORDER — POLYETHYLENE GLYCOL 3350 17 G/17G
17 POWDER, FOR SOLUTION ORAL
Qty: 0 | Refills: 0 | DISCHARGE
Start: 2025-03-31

## 2025-03-31 RX ORDER — MAGNESIUM SULFATE 500 MG/ML
2 SYRINGE (ML) INJECTION ONCE
Refills: 0 | Status: COMPLETED | OUTPATIENT
Start: 2025-03-31 | End: 2025-03-31

## 2025-03-31 RX ORDER — BACLOFEN 10 MG/20ML
1 INJECTION INTRATHECAL
Qty: 0 | Refills: 0 | DISCHARGE
Start: 2025-03-31

## 2025-03-31 RX ADMIN — OXYCODONE HYDROCHLORIDE 30 MILLIGRAM(S): 30 TABLET ORAL at 02:22

## 2025-03-31 RX ADMIN — CLONAZEPAM 0.5 MILLIGRAM(S): 0.5 TABLET ORAL at 15:21

## 2025-03-31 RX ADMIN — NICOTINE POLACRILEX 1 PATCH: 4 GUM, CHEWING ORAL at 11:05

## 2025-03-31 RX ADMIN — Medication 25 GRAM(S): at 09:55

## 2025-03-31 RX ADMIN — Medication 1 APPLICATION(S): at 11:12

## 2025-03-31 RX ADMIN — Medication 30 MILLIGRAM(S): at 05:39

## 2025-03-31 RX ADMIN — Medication 3 MILLIGRAM(S): at 10:48

## 2025-03-31 RX ADMIN — Medication 40 MILLIEQUIVALENT(S): at 09:54

## 2025-03-31 RX ADMIN — Medication 40 MILLIGRAM(S): at 05:40

## 2025-03-31 RX ADMIN — Medication 30 MILLIGRAM(S): at 15:01

## 2025-03-31 RX ADMIN — APIXABAN 5 MILLIGRAM(S): 2.5 TABLET, FILM COATED ORAL at 05:40

## 2025-03-31 RX ADMIN — Medication 1 SPRAY(S): at 05:39

## 2025-03-31 RX ADMIN — NALOXEGOL OXALATE 25 MILLIGRAM(S): 12.5 TABLET, FILM COATED ORAL at 11:04

## 2025-03-31 RX ADMIN — Medication 3 MILLIGRAM(S): at 03:50

## 2025-03-31 RX ADMIN — CLONAZEPAM 0.5 MILLIGRAM(S): 0.5 TABLET ORAL at 06:15

## 2025-03-31 RX ADMIN — OXYCODONE HYDROCHLORIDE 30 MILLIGRAM(S): 30 TABLET ORAL at 09:04

## 2025-03-31 RX ADMIN — BACLOFEN 5 MILLIGRAM(S): 10 INJECTION INTRATHECAL at 15:01

## 2025-03-31 RX ADMIN — Medication 1 SPRAY(S): at 15:01

## 2025-03-31 RX ADMIN — OXYCODONE HYDROCHLORIDE 30 MILLIGRAM(S): 30 TABLET ORAL at 03:22

## 2025-03-31 RX ADMIN — Medication 3 MILLIGRAM(S): at 04:50

## 2025-03-31 RX ADMIN — NICOTINE POLACRILEX 1 PATCH: 4 GUM, CHEWING ORAL at 11:04

## 2025-03-31 RX ADMIN — Medication 2 GRAM(S): at 05:39

## 2025-03-31 RX ADMIN — Medication 30 MILLIGRAM(S): at 06:39

## 2025-03-31 RX ADMIN — COLCHICINE 0.6 MILLIGRAM(S): 0.6 TABLET, FILM COATED ORAL at 11:03

## 2025-03-31 RX ADMIN — Medication 2 GRAM(S): at 15:02

## 2025-03-31 RX ADMIN — NICOTINE POLACRILEX 1 PATCH: 4 GUM, CHEWING ORAL at 07:55

## 2025-03-31 RX ADMIN — BACLOFEN 5 MILLIGRAM(S): 10 INJECTION INTRATHECAL at 05:39

## 2025-03-31 RX ADMIN — POLYETHYLENE GLYCOL 3350 17 GRAM(S): 17 POWDER, FOR SOLUTION ORAL at 05:40

## 2025-03-31 RX ADMIN — GABAPENTIN 100 MILLIGRAM(S): 400 CAPSULE ORAL at 15:01

## 2025-03-31 RX ADMIN — GABAPENTIN 100 MILLIGRAM(S): 400 CAPSULE ORAL at 05:39

## 2025-03-31 RX ADMIN — Medication 112 MICROGRAM(S): at 05:40

## 2025-03-31 NOTE — PROGRESS NOTE ADULT - PROBLEM SELECTOR PLAN 1
Chest pain from mediastinal mass   Patient with hx of diffuse pain related to her underlying scleroderma for which she shared at one point she was fentanyl 200 mcg patch and was weaned down  - Home regimen: MS Contin 30 mg BID and oxycodone 20 mg q4h PRN (long term dose) given by outpatient pain specialist originally for scleroderma pain  - PRN use in past 24 hours from 8 AM to 8 AM: oxycodone 30 mg x 3 doses, IV dilaudid 3 mg x 3 dose   - Patient sometimes complains of non cancer pain, concern more severe anxiety rather than physical pain. Improves with haldol. Unclear if true scleroderma or psychosomatic origin.     Plan and Recommendations:   > c/w gabapentin 100 mg TID   - opioids:   > c/w oxycodone IR 20 mg q4h PRN for moderate pain and oxycodone IR 30 mg q4h prn for severe pain  > Discontinue IV dilaudid 3 mg q4h PRN for severe breakthrough pain  > c/w MS Contin 30 mg TID   - Bowel regimen    On discharge-  > c/w oxycodone IR 30 mg q4h prn for severe pain  > c/w MS Contin 30 mg TID   - Bowel regimen

## 2025-03-31 NOTE — PROGRESS NOTE ADULT - TIME BILLING
Reviewing medical chart and results, symptom assessment and management, counseling patient/decision makers/caregivers, coordination of care, documentation.
Reviewing medical chart and results, symptom assessment and management, counseling patient/decision makers/caregivers, coordination of care, documentation.
20 mins-Vitals, meds, new results/radiology, interdisciplinary charting reviewed   20 mins-Patient seen and examined and plan discussed, all questions were answered to the best of my ability  20 mins-Charting/documentation and orders.
Reviewing medical chart and results, symptom assessment and management, counseling patient/decision makers/caregivers, coordination of care, documentation.
20 mins-Vitals, meds, new results/radiology, interdisciplinary charting reviewed   20 mins-Patient seen and examined and plan discussed, all questions were answered to the best of my ability  20 mins-Charting/documentation and orders.
Reviewing medical chart and results, symptom assessment and management, counseling patient/decision makers/caregivers, coordination of care, documentation.
Reviewing medical chart and results, symptom assessment and management, counseling patient/decision makers/caregivers, coordination of care, documentation.
20 mins-Vitals, meds, new results/radiology, interdisciplinary charting reviewed   20 mins-Patient seen and examined and plan discussed, all questions were answered to the best of my ability  20 mins-Charting/documentation and orders.
Reviewing medical chart and results, symptom assessment and management, counseling patient/decision makers/caregivers, coordination of care, documentation. (separate from Advance Care Planning services)
Time spent for extensive review of the physical chart, electronic medical record, and documentation to obtain collateral information including but not limited to:  [x ] Inpatient records (ED, H&P, primary team, and consultants if applicable, care coordination)  [x] Inpatient values/results (biomarkers, immunoassays, imaging, and microbiology results)  [x] Current or proposed treatment plans  [x] Discussion with the primary team  [x] Discussion with the patient, surrogate decision maker, or family    Time spent: >50 min (16 min separately on acp)
Reviewing medical chart and results, symptom assessment and management, counseling patient/decision makers/caregivers, coordination of care, documentation.
Reviewing medical chart and results, symptom assessment and management, counseling patient/decision makers/caregivers, coordination of care, documentation. (separate from Advance Care Planning services)
Reviewing medical chart and results, symptom assessment and management, counseling patient/decision makers/caregivers, coordination of care, documentation.
Reviewing medical chart and results, symptom assessment and management, counseling patient/decision makers/caregivers, coordination of care, documentation.

## 2025-03-31 NOTE — PROGRESS NOTE ADULT - SUBJECTIVE AND OBJECTIVE BOX
Indication of Geriatrics and Palliative Medicine Services:  [X  ] Complex Medical Decision Making   [X  ] Symptom/Pain management     DNR on chart: No    INTERVAL EVENTS: Patient seen this AM. Asked patient for name of her current rheumatologist however patient became anxious at this question, said she cannot remember but can check her phone if writer can check what pain meds she can receive.     -------------------------------------------------------------------------------------------------------    PRESENT SYMPTOMS:     [ ]Unable to self-report - see [ ] CPOT [ ] PAINADS [ ] RDOS  Source if other than patient:  [ ]Family   [ ]Team     PAIN   1. Location- Left chest   2. Radiation-  Left arm, back   3. Quality- Sharp   4. Timing- Constant   5. Minimal acceptable level/pain goal- 4/10   6. Aggravating factors-   7. QOL impact- Severe     Dyspnea:                           [ ]Mild [ ]Moderate [ ]Severe  Anxiety:                             [ ]Mild [ ]Moderate [ ]Severe  Fatigue:                             [ ]Mild [ ]Moderate [ ]Severe  Nausea:                             [ x]Mild [ ]Moderate [ ]Severe  Loss of appetite:                [ ]Mild [x ]Moderate [ ]Severe  Constipation:                     [ ]Mild [ ]Moderate [ ]Severe  Other Symptoms:  [X ]All other review of systems negative     -------------------------------------------------------------------------------------------------------    I STOP: [id number]    Prescription Information      PDI Filter:    PDI	Current Rx	Drug Type	Rx Written	Rx Dispensed	Drug	Quantity	Days Supply	Prescriber Name	Prescriber KHOA #	Payment Method	Dispenser  A	Y	O	02/27/2025	03/08/2025	morphine sulf er 30 mg tablet	60	30	Sebastien, Madankumar MD	[id number]	Medicare	[id number]  A	Y	O	02/27/2025	03/08/2025	oxycodone hcl (ir) 20 mg tab	90	30	Sebastien Guru ROSS	[id number]	Medicare	[id number]  A	N	O	01/30/2025	02/06/2025	morphine sulf er 30 mg tablet	60	30	SebastienGuru MD	[id number]	Medicare	[id number]  A	N	O	01/30/2025	02/06/2025	oxycodone hcl (ir) 20 mg tab	90	30	SebastienGuru MD	[id number]	Medicare	[id number]  A	N	O	01/07/2025	01/08/2025	morphine sulf er 30 mg tablet	60	30	SebastienGuru MD	[id number]	Medicare	[id number]  A	N	O	01/07/2025	01/08/2025	oxycodone hcl (ir) 20 mg tab	90	30	Guru Crowe MD	[id number]	Medicare	[id number]  A	N	O	11/27/2024	12/06/2024	morphine sulf er 30 mg tablet	60	30	SebastienGuru MD	[id number]	Medicare	[id number]  A	N	O	11/27/2024	12/06/2024	oxycodone hcl (ir) 20 mg tab	90	30	SebastienGuru MD	[id number]	Medicare	[id number]  A	N	O	10/31/2024	11/08/2024	morphine sulf er 30 mg tablet	60	30	Guru Crowe MD	[id number]	Medicare	[id number]  A	N	O	10/31/2024	11/08/2024	oxycodone hcl (ir) 20 mg tab	90	30	Guru Crowe MD	[id number]	Medicare	[id number]  A	N	O	10/03/2024	10/09/2024	oxycodone hcl (ir) 20 mg tab	90	30	Guru Crowe MD	[id number]	Medicare	[id number]    -------------------------------------------------------------------------------------------------------    ITEMS UNCHECKED ARE NOT PRESENT    PHYSICAL:  Vital Signs Last 24 Hrs  T(C): 36.8 (31 Mar 2025 10:56), Max: 36.8 (31 Mar 2025 10:56)  T(F): 98.3 (31 Mar 2025 10:56), Max: 98.3 (31 Mar 2025 10:56)  HR: 60 (31 Mar 2025 10:56) (60 - 91)  BP: 114/89 (31 Mar 2025 10:56) (98/58 - 114/89)  BP(mean): --  RR: 18 (31 Mar 2025 10:56) (18 - 18)  SpO2: 96% (31 Mar 2025 10:56) (95% - 98%)    Parameters below as of 31 Mar 2025 10:56  Patient On (Oxygen Delivery Method): nasal cannula  O2 Flow (L/min): 3    GENERAL:  [ ]Cachexia  [ ] Frail  [X ]Awake  [X ]Oriented   [ ]Lethargic  [ ]Unarousable  [ ]Verbal  [ ]Non-Verbal    BEHAVIORAL:   [X ] Anxiety  [ ] Delirium [ ] Agitation [ ] Other    HEENT:   [X ]Normal   [ ]Dry mouth   [ ]ET Tube/Trach  [ ]Oral lesions    PULMONARY:   [X ]Clear              [ ]Tachypnea  [ ]Audible excessive secretions   [ ]Rhonchi        [ ]Right [ ]Left [ ]Bilateral  [ ]Crackles        [ ]Right [ ]Left [ ]Bilateral  [ ]Wheezing     [ ]Right [ ]Left [ ]Bilateral  [ ]Diminished breath sounds [ ]right [ ]left [ ]bilateral    CARDIOVASCULAR:    [X ]Regular [ ]Irregular [ ]Tachy  [ ]Ridge [ ]Murmur [ ]Other    GASTROINTESTINAL:  [X ]Soft  [ ]Distended   [X ]+BS  [X ]Non tender [ ]Tender  [ ]Other [ ]PEG [ ]OGT/ NGT      GENITOURINARY:  [X ]Normal [ ] Incontinent   [ ]Oliguria/Anuria   [ ]Camargo    MUSCULOSKELETAL:   [X ]Normal   [ ]Weakness  [ ]Bed/Wheelchair bound [ ]Edema    NEUROLOGIC:   [X ]No focal deficits  [ ]Cognitive impairment  [ ]Dysphagia [ ]Dysarthria [ ]Paresis [ ]Other     SKIN:   [X ]Normal  [ ]Rash  [ ]Other  [ ]Pressure ulcer(s)       Present on admission [ ]y [ ]n    -------------------------------------------------------------------------------------------------------    LABS:                          9.7    6.53  )-----------( 255      ( 31 Mar 2025 07:44 )             29.2     03-31    134[L]  |  94[L]  |  7   ----------------------------<  129[H]  3.7   |  26  |  0.45[L]    Ca    8.8      31 Mar 2025 07:44  Phos  3.9     03-31  Mg     1.60     03-31    TPro  5.9[L]  /  Alb  3.3  /  TBili  0.3  /  DBili  x   /  AST  23  /  ALT  17  /  AlkPhos  110  03-31      -------------------------------------------------------------------------------------------------------    CRITICAL CARE:  [ ]Shock Present  [ ]Septic [ ]Cardiogenic [ ]Neurologic [ ]Hypovolemic [ ]Undifferentiated    [ ]Vasopressors [ ]Inotropes    [ ]Respiratory failure present [ ]Acute  [ ]Chronic [ ]Hypoxic  [ ]Hypercarbic [ ]Mixed   [ ]Mechanical Ventilation  [ ]Trach collar   [ ]Non-invasive ventilatory support   [ ]High-Flow   [ ]Oxygen mask/venti     [ ]Other organ failure     -------------------------------------------------------------------------------------------------------    RADIOLOGY & ADDITIONAL STUDIES:     < from: Xray Chest 1 View-PORTABLE IMMEDIATE (Xray Chest 1 View-PORTABLE IMMEDIATE .) (03.12.25 @ 13:48) >    IMPRESSION:  Bilateral upper lobe opacities with 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  No acute pulmonary or cardiovascular disease.    < end of copied text >    -------------------------------------------------------------------------------------------------------  MEDICATIONS:     MEDICATIONS  (STANDING):  albuterol/ipratropium for Nebulization 3 milliLiter(s) Nebulizer every 6 hours  baclofen 5 milliGRAM(s) Oral every 8 hours  baclofen 5 milliGRAM(s) Oral once  chlorhexidine 2% Cloths 1 Application(s) Topical daily  clonazePAM  Tablet 1 milliGRAM(s) Oral at bedtime  clonazePAM  Tablet 0.5 milliGRAM(s) Oral <User Schedule>  colchicine 0.6 milliGRAM(s) Oral daily  fluticasone propionate/ salmeterol 100-50 MICROgram(s) Diskus 1 Dose(s) Inhalation two times a day  gabapentin 100 milliGRAM(s) Oral three times a day  gabapentin 100 milliGRAM(s) Oral once  heparin  Infusion.  Unit(s)/Hr (11 mL/Hr) IV Continuous <Continuous>  influenza  Vaccine (HIGH DOSE) 0.5 milliLiter(s) IntraMuscular once  levothyroxine 112 MICROGram(s) Oral daily  lidocaine   4% Patch 1 Patch Transdermal daily  lidocaine   4% Patch 1 Patch Transdermal every 24 hours  lidocaine   4% Patch 1 Patch Transdermal every 24 hours  methylnaltrexone Injectable 12 milliGRAM(s) SubCutaneous once  morphine ER Tablet 30 milliGRAM(s) Oral every 8 hours  naloxegol 25 milliGRAM(s) Oral daily  nicotine - 21 mG/24Hr(s) Patch 1 Patch Transdermal daily  OLANZapine 5 milliGRAM(s) Oral at bedtime  pantoprazole    Tablet 40 milliGRAM(s) Oral every 12 hours  polyethylene glycol 3350 17 Gram(s) Oral daily  senna 2 Tablet(s) Oral at bedtime  sodium chloride 1 Gram(s) Oral two times a day  sodium chloride 0.65% Nasal 1 Spray(s) Both Nostrils three times a day    MEDICATIONS  (PRN):  aluminum hydroxide/magnesium hydroxide/simethicone Suspension 30 milliLiter(s) Oral every 4 hours PRN Dyspepsia  benzocaine/menthol Lozenge 1 Lozenge Oral three times a day PRN Sore Throat  heparin   Injectable 5000 Unit(s) IV Push every 6 hours PRN For aPTT less than 40  heparin   Injectable 2500 Unit(s) IV Push every 6 hours PRN For aPTT between 40 - 57  HYDROmorphone  Injectable 3 milliGRAM(s) IV Push every 4 hours PRN severe breakthrough pain  LORazepam     Tablet 1 milliGRAM(s) Oral every 8 hours PRN Anxiety  melatonin 3 milliGRAM(s) Oral at bedtime PRN Insomnia  ondansetron Injectable 4 milliGRAM(s) IV Push every 6 hours PRN Nausea and/or Vomiting  oxyCODONE    IR 20 milliGRAM(s) Oral every 4 hours PRN Moderate Pain (4 - 6)  oxyCODONE    IR 30 milliGRAM(s) Oral every 4 hours PRN Severe Pain (7 - 10)

## 2025-03-31 NOTE — PROGRESS NOTE ADULT - PROBLEM SELECTOR PROBLEM 2
Nausea & vomiting

## 2025-03-31 NOTE — PROGRESS NOTE ADULT - PROBLEM SELECTOR PLAN 5
Hx of scleroderma  Scleroderma pain usually body aches, such as in b/l LE, neck   f/u rheum recs
Hx of scleroderma  Scleroderma pain usually body aches, such as in b/l LE, neck   Patient refusing xray imaging  Please f/u with the rheum regarding outpatient records - "Pt used to see Dr Briceno, but has new rheumatologist at [facility], does not remember name"
Hx of scleroderma  Scleroderma pain usually body aches, such as in b/l LE, neck   f/u rheum recs
Hx of scleroderma  Scleroderma pain usually body aches, such as in b/l LE, neck   f/u rheum recs  Patient refusing xray imaging
Hx of scleroderma  Scleroderma pain usually body aches, such as in b/l LE, neck   f/u rheum recs
Hx of scleroderma  Scleroderma pain usually body aches, such as in b/l LE, neck   Patient refusing xray imaging  Please f/u with the rheum regarding outpatient records - "Pt used to see Dr Briceno, but has new rheumatologist at [place], does not remember name"  3/31 Patient unable to recall name of rheumatology
Hx of scleroderma  Scleroderma pain usually body aches, such as in b/l LE, neck   f/u rheum recs
on NC 4L; comfortable on exam today  - CXR Bilateral upper lobe opacities with 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 No acute pulmonary or cardiovascular disease.

## 2025-03-31 NOTE — PROGRESS NOTE ADULT - PROBLEM SELECTOR PLAN 4
- recently diagnosed  - Unable to tolerate MRI A/P and brain d/t claustrophobia    - CT chest on 2/15- MEDIASTINUM AND JEREMIAS: Anterior subcarinal lymph node measures 1.8 cm in short axis, stable. There is a soft tissue density, measuring up to 4.2 cm in the prevascular space extending into the subcutaneous left main pulmonary artery region, this is more pronounced on today's study.   - Per onc, plan is to start inpatient chemo treatment and RT- patient thinking about it   - CT imaging showing no mets   - Follows with Dr. Hall at [facility]  - pericardial effusion, f/u CT surg recs  - Inpatient chemo started

## 2025-03-31 NOTE — PROGRESS NOTE ADULT - ASSESSMENT
65 y/o F with PMHx of AF, scleroderma, asthma with prior intubations, hypothyroidism, pericardial effusion s/p pericardiocentesis (12/2024) and drain on colchicine presented to the ED due to SOB, chest pain, and back pain. Oncology evaluated her and recommended MRI brain and MRI chest/abdomen/pelvis with contrast to rule out metastasis. Was previously admitted in 12/2024 due to chest pain and SOB and found to have a pericardial effusion with evidence of tamponade physiology. She underwent urgent pericardiocentesis with placement of drain which was removed on 12/26. She was started on colchicine and NSAIDs. Hospital course was complicated by AF with RVR which was new onset and was suspected to be due to drain placement. After drain was removed patient continued to have pAF and was started on Eliquis. Presented to the ED on 02/13/25 for left sided chest pain radiating to the back. Repeat TTE demonstrated interval increase in pericardial effusion (moderate-large adjacent to RA with no evidence of tamponade) and CTS was consulted for possible pericardial window and recommended no acute intervention. 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 Pulmonary consulted and recommended transfer to [facility] for bronchoscopy, EBUS and hilar mass biopsy. Patient transferred to [facility] on 02/20/25 and underwent EBUS on 02/21/25. 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

## 2025-03-31 NOTE — PROGRESS NOTE ADULT - PROBLEM SELECTOR PROBLEM 5
Scleroderma
Acute hypoxemic respiratory failure
Scleroderma

## 2025-03-31 NOTE — PROGRESS NOTE ADULT - PROBLEM SELECTOR PROBLEM 1
Cancer related pain

## 2025-03-31 NOTE — PROGRESS NOTE ADULT - PROBLEM SELECTOR PROBLEM 6
Counseling regarding advance directives and goals of care
Scleroderma
Counseling regarding advance directives and goals of care

## 2025-03-31 NOTE — PROGRESS NOTE ADULT - PROBLEM SELECTOR PROBLEM 3
Severe anxiety

## 2025-03-31 NOTE — PROGRESS NOTE ADULT - PROBLEM SELECTOR PROBLEM 7
Palliative care encounter
Counseling regarding advance directives and goals of care
Palliative care encounter

## 2025-03-31 NOTE — PROGRESS NOTE ADULT - REASON FOR ADMISSION
Shortness of breath and chest pain

## 2025-03-31 NOTE — PROGRESS NOTE ADULT - PROBLEM SELECTOR PROBLEM 4
Small cell lung cancer

## 2025-03-31 NOTE — PROGRESS NOTE ADULT - PROBLEM SELECTOR PLAN 7
For pain management   Plan for DC to rehab today     On discharge, patient to f/u with outpatient palliative care/supportive oncology with Dr. Mahsa Scales on 4/11/2025 at 3 PM

## 2025-03-31 NOTE — PROGRESS NOTE ADULT - PROBLEM SELECTOR PLAN 3
Having severe panic attacks   On klonopin and ativan   Haldol PRN   f/u   Needs outpatient psych f/u

## 2025-03-31 NOTE — PROGRESS NOTE ADULT - PROVIDER SPECIALTY LIST ADULT
Heme/Onc
Hospitalist
Heme/Onc
Hospitalist
Heme/Onc
Palliative Care
Heme/Onc
Palliative Care
Statement Selected

## 2025-04-03 NOTE — PHYSICAL THERAPY INITIAL EVALUATION ADULT - ADDITIONAL COMMENTS
Infusion Nursing Note:  Evelyn SHANIQUA Cisneros presents today for Aranesp.    Patient seen by provider today: No   present during visit today: Not Applicable.    Note: N/A.      Intravenous Access:  No Intravenous access/labs at this visit.    Treatment Conditions:  Lab Results   Component Value Date    HGB 11.7 04/03/2025    WBC 7.3 03/06/2025    ANEU 2.9 01/14/2016     03/06/2025        Results reviewed, labs did NOT meet treatment parameters: Hgb 11.7      Post Infusion Assessment:  Not applicable.       Discharge Plan:   Patient discharged in stable condition accompanied by: self.  Departure Mode: Ambulatory.      Melina Cunningham LPN   Document As Units Or Cc?: units Pt lives in a two level townhouse with 4 entry steps (+ rail) & flight to 2nd level (+ rail). Pt states that she lives alone however her son & friend (aSm) do come to visit. Pt has home health aide services 8 hours/day, 7 days/week & family assists as needed. PTA pt was receiving home PT & home OT services both 2x/week. Pt states she needs assist for all functional mobility including short household ambulation with RW (bedroom to bathroom). Pt has hospital bed, RW, straight cane, commode, shower chair, & wheelchair. Pt lives in a two level townhouse with 4 entry steps (+ rail) & flight to 2nd level (+ rail); pt resides on main level. Pt states that she lives alone however her friend (Sam) does come to visit. Pt has home health aide services 8 hours/day, 7 days/week & family assists as needed. PTA pt was receiving home PT & home OT services both 2x/week. Pt states she needs assist for all functional mobility including short household ambulation with RW (bedroom to bathroom). Pt has hospital bed, RW, straight cane, commode, shower chair, & wheelchair.

## 2025-04-11 ENCOUNTER — NON-APPOINTMENT (OUTPATIENT)
Age: 65
End: 2025-04-11

## 2025-04-11 ENCOUNTER — APPOINTMENT (OUTPATIENT)
Dept: GERIATRICS | Facility: CLINIC | Age: 65
End: 2025-04-11
Payer: MEDICAID

## 2025-04-11 DIAGNOSIS — F41.1 GENERALIZED ANXIETY DISORDER: ICD-10-CM

## 2025-04-11 DIAGNOSIS — Z51.5 ENCOUNTER FOR PALLIATIVE CARE: ICD-10-CM

## 2025-04-11 DIAGNOSIS — G89.4 CHRONIC PAIN SYNDROME: ICD-10-CM

## 2025-04-11 DIAGNOSIS — K59.00 CONSTIPATION, UNSPECIFIED: ICD-10-CM

## 2025-04-11 DIAGNOSIS — Z87.891 PERSONAL HISTORY OF NICOTINE DEPENDENCE: ICD-10-CM

## 2025-04-11 DIAGNOSIS — G89.3 NEOPLASM RELATED PAIN (ACUTE) (CHRONIC): ICD-10-CM

## 2025-04-11 PROCEDURE — 99205 OFFICE O/P NEW HI 60 MIN: CPT

## 2025-04-11 RX ORDER — METOCLOPRAMIDE 10 MG/1
10 TABLET ORAL EVERY 6 HOURS
Qty: 120 | Refills: 1 | Status: ACTIVE | COMMUNITY
Start: 2025-04-11 | End: 1900-01-01

## 2025-04-14 ENCOUNTER — LABORATORY RESULT (OUTPATIENT)
Age: 65
End: 2025-04-14

## 2025-04-14 ENCOUNTER — RESULT REVIEW (OUTPATIENT)
Age: 65
End: 2025-04-14

## 2025-04-14 ENCOUNTER — APPOINTMENT (OUTPATIENT)
Dept: HEMATOLOGY ONCOLOGY | Facility: CLINIC | Age: 65
End: 2025-04-14
Payer: MEDICAID

## 2025-04-14 ENCOUNTER — EMERGENCY (EMERGENCY)
Facility: HOSPITAL | Age: 65
LOS: 1 days | End: 2025-04-14
Attending: STUDENT IN AN ORGANIZED HEALTH CARE EDUCATION/TRAINING PROGRAM | Admitting: STUDENT IN AN ORGANIZED HEALTH CARE EDUCATION/TRAINING PROGRAM
Payer: MEDICARE

## 2025-04-14 ENCOUNTER — NON-APPOINTMENT (OUTPATIENT)
Age: 65
End: 2025-04-14

## 2025-04-14 ENCOUNTER — APPOINTMENT (OUTPATIENT)
Dept: INFUSION THERAPY | Facility: HOSPITAL | Age: 65
End: 2025-04-14

## 2025-04-14 ENCOUNTER — APPOINTMENT (OUTPATIENT)
Dept: HEMATOLOGY ONCOLOGY | Facility: CLINIC | Age: 65
End: 2025-04-14

## 2025-04-14 VITALS
OXYGEN SATURATION: 95 % | TEMPERATURE: 97 F | HEART RATE: 84 BPM | RESPIRATION RATE: 18 BRPM | HEIGHT: 69 IN | WEIGHT: 145.06 LBS | SYSTOLIC BLOOD PRESSURE: 121 MMHG | DIASTOLIC BLOOD PRESSURE: 82 MMHG

## 2025-04-14 DIAGNOSIS — Z98.890 OTHER SPECIFIED POSTPROCEDURAL STATES: Chronic | ICD-10-CM

## 2025-04-14 DIAGNOSIS — Z98.89 OTHER SPECIFIED POSTPROCEDURAL STATES: Chronic | ICD-10-CM

## 2025-04-14 DIAGNOSIS — K56.60 UNSPECIFIED INTESTINAL OBSTRUCTION: Chronic | ICD-10-CM

## 2025-04-14 DIAGNOSIS — D25.9 LEIOMYOMA OF UTERUS, UNSPECIFIED: Chronic | ICD-10-CM

## 2025-04-14 DIAGNOSIS — C80.1 MALIGNANT (PRIMARY) NEOPLASM, UNSPECIFIED: ICD-10-CM

## 2025-04-14 PROBLEM — Z51.5 ENCOUNTER FOR PALLIATIVE CARE: Status: ACTIVE | Noted: 2025-04-14

## 2025-04-14 PROBLEM — Z87.891 HISTORY OF TOBACCO USE: Status: ACTIVE | Noted: 2025-04-14

## 2025-04-14 PROBLEM — K59.00 CONSTIPATION: Status: ACTIVE | Noted: 2025-04-14

## 2025-04-14 PROBLEM — G89.3 CANCER-RELATED PAIN: Status: ACTIVE | Noted: 2025-04-14

## 2025-04-14 LAB
ALBUMIN SERPL ELPH-MCNC: 4.3 G/DL — SIGNIFICANT CHANGE UP (ref 3.3–5)
ALP SERPL-CCNC: 107 U/L — SIGNIFICANT CHANGE UP (ref 40–120)
ALT FLD-CCNC: 11 U/L — SIGNIFICANT CHANGE UP (ref 10–45)
ANION GAP SERPL CALC-SCNC: 12 MMOL/L — SIGNIFICANT CHANGE UP (ref 5–17)
AST SERPL-CCNC: 29 U/L — SIGNIFICANT CHANGE UP (ref 10–40)
BASOPHILS # BLD AUTO: 0.06 K/UL — SIGNIFICANT CHANGE UP (ref 0–0.2)
BASOPHILS NFR BLD AUTO: 0.7 % — SIGNIFICANT CHANGE UP (ref 0–2)
BILIRUB SERPL-MCNC: 0.3 MG/DL — SIGNIFICANT CHANGE UP (ref 0.2–1.2)
BUN SERPL-MCNC: 9 MG/DL — SIGNIFICANT CHANGE UP (ref 7–23)
CALCIUM SERPL-MCNC: 9.4 MG/DL — SIGNIFICANT CHANGE UP (ref 8.4–10.5)
CHLORIDE SERPL-SCNC: 101 MMOL/L — SIGNIFICANT CHANGE UP (ref 96–108)
CO2 SERPL-SCNC: 26 MMOL/L — SIGNIFICANT CHANGE UP (ref 22–31)
CREAT SERPL-MCNC: 0.47 MG/DL — LOW (ref 0.5–1.3)
EGFR: 105 ML/MIN/1.73M2 — SIGNIFICANT CHANGE UP
EGFR: 105 ML/MIN/1.73M2 — SIGNIFICANT CHANGE UP
EOSINOPHIL # BLD AUTO: 0.02 K/UL — SIGNIFICANT CHANGE UP (ref 0–0.5)
EOSINOPHIL NFR BLD AUTO: 0.2 % — SIGNIFICANT CHANGE UP (ref 0–6)
GLUCOSE SERPL-MCNC: 99 MG/DL — SIGNIFICANT CHANGE UP (ref 70–99)
HCT VFR BLD CALC: 36.5 % — SIGNIFICANT CHANGE UP (ref 34.5–45)
HGB BLD-MCNC: 12.1 G/DL — SIGNIFICANT CHANGE UP (ref 11.5–15.5)
IMM GRANULOCYTES NFR BLD AUTO: 0.8 % — SIGNIFICANT CHANGE UP (ref 0–0.9)
LDH SERPL L TO P-CCNC: 247 U/L — HIGH (ref 50–242)
LYMPHOCYTES # BLD AUTO: 1.82 K/UL — SIGNIFICANT CHANGE UP (ref 1–3.3)
LYMPHOCYTES # BLD AUTO: 21.7 % — SIGNIFICANT CHANGE UP (ref 13–44)
MCHC RBC-ENTMCNC: 29.9 PG — SIGNIFICANT CHANGE UP (ref 27–34)
MCHC RBC-ENTMCNC: 33.2 G/DL — SIGNIFICANT CHANGE UP (ref 32–36)
MCV RBC AUTO: 90.1 FL — SIGNIFICANT CHANGE UP (ref 80–100)
MONOCYTES # BLD AUTO: 0.58 K/UL — SIGNIFICANT CHANGE UP (ref 0–0.9)
MONOCYTES NFR BLD AUTO: 6.9 % — SIGNIFICANT CHANGE UP (ref 2–14)
NEUTROPHILS # BLD AUTO: 5.83 K/UL — SIGNIFICANT CHANGE UP (ref 1.8–7.4)
NEUTROPHILS NFR BLD AUTO: 69.7 % — SIGNIFICANT CHANGE UP (ref 43–77)
NRBC BLD AUTO-RTO: 0 /100 WBCS — SIGNIFICANT CHANGE UP (ref 0–0)
PLATELET # BLD AUTO: 306 K/UL — SIGNIFICANT CHANGE UP (ref 150–400)
POTASSIUM SERPL-MCNC: 3.6 MMOL/L — SIGNIFICANT CHANGE UP (ref 3.5–5.3)
POTASSIUM SERPL-SCNC: 3.6 MMOL/L — SIGNIFICANT CHANGE UP (ref 3.5–5.3)
PROT SERPL-MCNC: 7.2 G/DL — SIGNIFICANT CHANGE UP (ref 6–8.3)
RBC # BLD: 4.05 M/UL — SIGNIFICANT CHANGE UP (ref 3.8–5.2)
RBC # FLD: 14.6 % — HIGH (ref 10.3–14.5)
SODIUM SERPL-SCNC: 139 MMOL/L — SIGNIFICANT CHANGE UP (ref 135–145)
URATE SERPL-MCNC: 3.1 MG/DL — SIGNIFICANT CHANGE UP (ref 2.5–7)
WBC # BLD: 8.38 K/UL — SIGNIFICANT CHANGE UP (ref 3.8–10.5)
WBC # FLD AUTO: 8.38 K/UL — SIGNIFICANT CHANGE UP (ref 3.8–10.5)

## 2025-04-14 PROCEDURE — 99283 EMERGENCY DEPT VISIT LOW MDM: CPT

## 2025-04-14 PROCEDURE — 99215 OFFICE O/P EST HI 40 MIN: CPT

## 2025-04-14 RX ORDER — GABAPENTIN 100 MG/1
100 CAPSULE ORAL 3 TIMES DAILY
Qty: 90 | Refills: 2 | Status: ACTIVE | COMMUNITY
Start: 2025-04-14 | End: 1900-01-01

## 2025-04-14 RX ORDER — MORPHINE SULFATE 30 MG/1
30 TABLET, FILM COATED, EXTENDED RELEASE ORAL EVERY 8 HOURS
Qty: 90 | Refills: 0 | Status: ACTIVE | COMMUNITY
Start: 2025-04-14

## 2025-04-14 RX ORDER — NALOXEGOL OXALATE 25 MG/1
25 TABLET, FILM COATED ORAL
Qty: 30 | Refills: 0 | Status: ACTIVE | COMMUNITY
Start: 2025-04-14 | End: 1900-01-01

## 2025-04-14 NOTE — ED PROVIDER NOTE - NSICDXPASTMEDICALHX_GEN_ALL_CORE_FT
stated PAST MEDICAL HISTORY:  Asthma     GERD (gastroesophageal reflux disease)     High cholesterol Unable to take STATINS for high cholesterol due to genetic disposition    Hypothyroidism     Multiple drug allergies     Scleroderma     Scleroderma     Shingles     Smoker

## 2025-04-14 NOTE — ED PROVIDER NOTE - PATIENT PORTAL LINK FT
You can access the FollowMyHealth Patient Portal offered by [facility] by registering at the following website: [email or web address]. By joining [facility]’s FollowMyHealth portal, you will also be able to view your health information using other applications (apps) compatible with our system.

## 2025-04-14 NOTE — ED PROVIDER NOTE - ATTENDING CONTRIBUTION TO CARE
I, Yuan Montanez DO,  performed the initial face to face bedside interview with this patient regarding history of present illness, review of symptoms and relevant past medical, social and family history.  I completed an independent physical examination.  I was the initial provider who evaluated this patient. I have signed out the follow up of any pending tests (i.e. labs, radiological studies) to the resident.  I have communicated the patient’s plan of care and disposition with the resident.  The history, relevant review of systems, past medical and surgical history, medical decision making, and physical examination was documented by the scribe in my presence and I attest to the accuracy of the documentation.\    Shared PE  Constitutional: NAD AAOx3  Eyes: PERRLA EOMI  Head: Normocephalic atraumatic  Mouth: MMM  Cardiac: regular rate   Resp: No respiratory distress.   GI: Abd s/nt/nd  Neuro: CN2-12 intact, strength is 5/5 in all extremities.   Skin: No visible rashes   moving extremities following commands.  Cranial nerves II to XII intact.  No focal neurodeficits.  5 out of 5 strength upper and lower extremities.  Sensation intact.  Normal finger-to-nose.  No pronator drift.

## 2025-04-14 NOTE — ED PROVIDER NOTE - CLINICAL SUMMARY MEDICAL DECISION MAKING FREE TEXT BOX
Zaire Beck, PGY3 - This is a 64-year-old female with past medical history of A-fib on Eliquis, small cell carcinoma, scleroderma, hypothyroidism, asthma, coming today to the emergency room for headache.  Patient states that she was sent over by ambulance from [facility] because she mentioned that she had a little bit of headache and she did not have that before coming into the [facility] therefore was sent in from that center by RN.  Patient states that she does not need to be here as patient just had a mild headache and received Tylenol for it.  Patient states that the headache is completely gone at this time.  Patient states that this was not a severe headache at all.  No nausea or vomiting.  No fever no chills.  No chest pain or shortness of breath.  No numbness tingling.  Patient at baseline walks with a walker and did not have changes in terms of the ambulation status.  Vital signs here are within normal limits.  Physical exam shows well-appearing female not in acute distress alert and oriented x 3 moving extremities following commands.  Cranial nerves II to XII intact.  No focal neurodeficits.  5 out of 5 strength upper and lower extremities.  Sensation intact.  Normal finger-to-nose.  No pronator drift.  Concern at this time for just mild headache.  Do not think this is subarachnoid hemorrhage.  Do not think this is meningitis.  Do not think this is emergent condition.  Will discharge with follow-up with primary care doctor.

## 2025-04-14 NOTE — ED PROVIDER NOTE - BIRTH SEX
815 pt to PACU place on vital machine and introduced to unit  818 Dr Kern to bedside with Jaleel BOUCHER CRNA  819 time out performed  822 suprapubic red area with a quarter size open area  825 spoke with Dr Hightower   828 picture taken by Jaida OR charge and seen by   adam Helton to precede  833 block started  838 block complete , pt tolerated with no problems   Female

## 2025-04-14 NOTE — ED ADULT TRIAGE NOTE - CHIEF COMPLAINT QUOTE
Sent from MD for sudden onset of headache and nausea starting around 4pm. Pt. had just received immunotherapy, which she has had before. On Coumadin for afib. hx small cell carcinoma, Sent from [facility] for sudden onset of headache and nausea starting around 4pm. Pt. had just received immunotherapy, which she has had before. Denies dizziness, blurry vision or vomiting. On Coumadin for afib. hx small cell carcinoma,

## 2025-04-14 NOTE — ED PROVIDER NOTE - NSFOLLOWUPINSTRUCTIONS_ED_ALL_ED_FT
You were seen for headache.  You had evaluation by emergency doctors done here.  Please return to emergency room for severe headache, vomiting, vision changes, numbness tingling weakness, or any new/concerning symptoms.  Please follow-up with primary care doctor as needed.

## 2025-04-15 ENCOUNTER — APPOINTMENT (OUTPATIENT)
Dept: INFUSION THERAPY | Facility: HOSPITAL | Age: 65
End: 2025-04-15

## 2025-04-15 ENCOUNTER — APPOINTMENT (OUTPATIENT)
Dept: HEMATOLOGY ONCOLOGY | Facility: CLINIC | Age: 65
End: 2025-04-15

## 2025-04-15 ENCOUNTER — APPOINTMENT (OUTPATIENT)
Dept: HEMATOLOGY ONCOLOGY | Facility: CLINIC | Age: 65
End: 2025-04-15
Payer: MEDICARE

## 2025-04-15 ENCOUNTER — NON-APPOINTMENT (OUTPATIENT)
Age: 65
End: 2025-04-15

## 2025-04-15 DIAGNOSIS — R11.2 NAUSEA WITH VOMITING, UNSPECIFIED: ICD-10-CM

## 2025-04-15 PROCEDURE — 99214 OFFICE O/P EST MOD 30 MIN: CPT

## 2025-04-15 PROCEDURE — 99215 OFFICE O/P EST HI 40 MIN: CPT

## 2025-04-16 ENCOUNTER — APPOINTMENT (OUTPATIENT)
Dept: HEMATOLOGY ONCOLOGY | Facility: CLINIC | Age: 65
End: 2025-04-16
Payer: MEDICARE

## 2025-04-16 ENCOUNTER — APPOINTMENT (OUTPATIENT)
Dept: INFUSION THERAPY | Facility: HOSPITAL | Age: 65
End: 2025-04-16

## 2025-04-16 VITALS
HEART RATE: 71 BPM | HEIGHT: 69 IN | RESPIRATION RATE: 15 BRPM | WEIGHT: 145 LBS | DIASTOLIC BLOOD PRESSURE: 66 MMHG | BODY MASS INDEX: 21.48 KG/M2 | SYSTOLIC BLOOD PRESSURE: 109 MMHG | OXYGEN SATURATION: 93 % | TEMPERATURE: 97.3 F

## 2025-04-16 DIAGNOSIS — I48.91 UNSPECIFIED ATRIAL FIBRILLATION: ICD-10-CM

## 2025-04-16 DIAGNOSIS — I31.39 OTHER PERICARDIAL EFFUSION (NONINFLAMMATORY): ICD-10-CM

## 2025-04-16 DIAGNOSIS — F41.9 ANXIETY DISORDER, UNSPECIFIED: ICD-10-CM

## 2025-04-16 DIAGNOSIS — Z51.11 ENCOUNTER FOR ANTINEOPLASTIC CHEMOTHERAPY: ICD-10-CM

## 2025-04-16 PROCEDURE — 99214 OFFICE O/P EST MOD 30 MIN: CPT

## 2025-04-16 RX ORDER — COLCHICINE 0.6 MG/1
0.6 TABLET ORAL DAILY
Qty: 30 | Refills: 0 | Status: ACTIVE | COMMUNITY
Start: 2025-04-16 | End: 1900-01-01

## 2025-04-16 RX ORDER — CLONAZEPAM 0.5 MG/1
0.5 TABLET ORAL
Qty: 120 | Refills: 0 | Status: ACTIVE | COMMUNITY
Start: 2025-04-16 | End: 1900-01-01

## 2025-04-16 RX ORDER — OLANZAPINE 5 MG/1
5 TABLET, FILM COATED ORAL
Qty: 30 | Refills: 0 | Status: ACTIVE | COMMUNITY
Start: 2025-04-16 | End: 1900-01-01

## 2025-04-16 RX ORDER — APIXABAN 5 MG/1
5 TABLET, FILM COATED ORAL
Qty: 60 | Refills: 0 | Status: ACTIVE | COMMUNITY
Start: 2025-04-16 | End: 1900-01-01

## 2025-04-17 ENCOUNTER — NON-APPOINTMENT (OUTPATIENT)
Age: 65
End: 2025-04-17

## 2025-04-28 ENCOUNTER — INPATIENT (INPATIENT)
Facility: HOSPITAL | Age: 65
LOS: 2 days | Discharge: HOME HEALTH | End: 2025-05-01
Attending: INTERNAL MEDICINE | Admitting: INTERNAL MEDICINE
Payer: MEDICARE

## 2025-04-28 VITALS
WEIGHT: 145.06 LBS | TEMPERATURE: 98 F | SYSTOLIC BLOOD PRESSURE: 116 MMHG | DIASTOLIC BLOOD PRESSURE: 62 MMHG | HEART RATE: 91 BPM | OXYGEN SATURATION: 92 % | RESPIRATION RATE: 18 BRPM | HEIGHT: 69 IN

## 2025-04-28 DIAGNOSIS — D25.9 LEIOMYOMA OF UTERUS, UNSPECIFIED: Chronic | ICD-10-CM

## 2025-04-28 DIAGNOSIS — C80.1 MALIGNANT (PRIMARY) NEOPLASM, UNSPECIFIED: ICD-10-CM

## 2025-04-28 DIAGNOSIS — K56.60 UNSPECIFIED INTESTINAL OBSTRUCTION: Chronic | ICD-10-CM

## 2025-04-28 DIAGNOSIS — Z98.89 OTHER SPECIFIED POSTPROCEDURAL STATES: Chronic | ICD-10-CM

## 2025-04-28 DIAGNOSIS — Z98.890 OTHER SPECIFIED POSTPROCEDURAL STATES: Chronic | ICD-10-CM

## 2025-04-28 LAB
ALBUMIN SERPL ELPH-MCNC: 4 G/DL — SIGNIFICANT CHANGE UP (ref 3.3–5)
ALP SERPL-CCNC: 106 U/L — SIGNIFICANT CHANGE UP (ref 40–120)
ALT FLD-CCNC: 10 U/L — SIGNIFICANT CHANGE UP (ref 4–33)
ANION GAP SERPL CALC-SCNC: 9 MMOL/L — SIGNIFICANT CHANGE UP (ref 7–14)
AST SERPL-CCNC: 19 U/L — SIGNIFICANT CHANGE UP (ref 4–32)
BASOPHILS # BLD AUTO: 0.07 K/UL — SIGNIFICANT CHANGE UP (ref 0–0.2)
BASOPHILS NFR BLD AUTO: 1.1 % — SIGNIFICANT CHANGE UP (ref 0–2)
BILIRUB SERPL-MCNC: 0.4 MG/DL — SIGNIFICANT CHANGE UP (ref 0.2–1.2)
BUN SERPL-MCNC: 7 MG/DL — SIGNIFICANT CHANGE UP (ref 7–23)
CALCIUM SERPL-MCNC: 9.1 MG/DL — SIGNIFICANT CHANGE UP (ref 8.4–10.5)
CHLORIDE SERPL-SCNC: 99 MMOL/L — SIGNIFICANT CHANGE UP (ref 98–107)
CO2 SERPL-SCNC: 28 MMOL/L — SIGNIFICANT CHANGE UP (ref 22–31)
CREAT SERPL-MCNC: 0.48 MG/DL — LOW (ref 0.5–1.3)
EGFR: 104 ML/MIN/1.73M2 — SIGNIFICANT CHANGE UP
EGFR: 104 ML/MIN/1.73M2 — SIGNIFICANT CHANGE UP
EOSINOPHIL # BLD AUTO: 0.07 K/UL — SIGNIFICANT CHANGE UP (ref 0–0.5)
EOSINOPHIL NFR BLD AUTO: 1.1 % — SIGNIFICANT CHANGE UP (ref 0–6)
GLUCOSE SERPL-MCNC: 121 MG/DL — HIGH (ref 70–99)
HCT VFR BLD CALC: 37.1 % — SIGNIFICANT CHANGE UP (ref 34.5–45)
HGB BLD-MCNC: 12.2 G/DL — SIGNIFICANT CHANGE UP (ref 11.5–15.5)
IANC: 4.36 K/UL — SIGNIFICANT CHANGE UP (ref 1.8–7.4)
IMM GRANULOCYTES NFR BLD AUTO: 0.2 % — SIGNIFICANT CHANGE UP (ref 0–0.9)
LYMPHOCYTES # BLD AUTO: 1.26 K/UL — SIGNIFICANT CHANGE UP (ref 1–3.3)
LYMPHOCYTES # BLD AUTO: 20.3 % — SIGNIFICANT CHANGE UP (ref 13–44)
MAGNESIUM SERPL-MCNC: 1.9 MG/DL — SIGNIFICANT CHANGE UP (ref 1.6–2.6)
MCHC RBC-ENTMCNC: 30.7 PG — SIGNIFICANT CHANGE UP (ref 27–34)
MCHC RBC-ENTMCNC: 32.9 G/DL — SIGNIFICANT CHANGE UP (ref 32–36)
MCV RBC AUTO: 93.5 FL — SIGNIFICANT CHANGE UP (ref 80–100)
MONOCYTES # BLD AUTO: 0.43 K/UL — SIGNIFICANT CHANGE UP (ref 0–0.9)
MONOCYTES NFR BLD AUTO: 6.9 % — SIGNIFICANT CHANGE UP (ref 2–14)
NEUTROPHILS # BLD AUTO: 4.36 K/UL — SIGNIFICANT CHANGE UP (ref 1.8–7.4)
NEUTROPHILS NFR BLD AUTO: 70.4 % — SIGNIFICANT CHANGE UP (ref 43–77)
NRBC # BLD AUTO: 0 K/UL — SIGNIFICANT CHANGE UP (ref 0–0)
NRBC # FLD: 0 K/UL — SIGNIFICANT CHANGE UP (ref 0–0)
NRBC BLD AUTO-RTO: 0 /100 WBCS — SIGNIFICANT CHANGE UP (ref 0–0)
PHOSPHATE SERPL-MCNC: 3.5 MG/DL — SIGNIFICANT CHANGE UP (ref 2.5–4.5)
PLATELET # BLD AUTO: 240 K/UL — SIGNIFICANT CHANGE UP (ref 150–400)
POTASSIUM SERPL-MCNC: 3.8 MMOL/L — SIGNIFICANT CHANGE UP (ref 3.5–5.3)
POTASSIUM SERPL-SCNC: 3.8 MMOL/L — SIGNIFICANT CHANGE UP (ref 3.5–5.3)
PROT SERPL-MCNC: 6.7 G/DL — SIGNIFICANT CHANGE UP (ref 6–8.3)
RBC # BLD: 3.97 M/UL — SIGNIFICANT CHANGE UP (ref 3.8–5.2)
RBC # FLD: 15.5 % — HIGH (ref 10.3–14.5)
SODIUM SERPL-SCNC: 136 MMOL/L — SIGNIFICANT CHANGE UP (ref 135–145)
WBC # BLD: 6.2 K/UL — SIGNIFICANT CHANGE UP (ref 3.8–10.5)
WBC # FLD AUTO: 6.2 K/UL — SIGNIFICANT CHANGE UP (ref 3.8–10.5)

## 2025-04-28 PROCEDURE — 74018 RADEX ABDOMEN 1 VIEW: CPT | Mod: 26

## 2025-04-28 PROCEDURE — 99223 1ST HOSP IP/OBS HIGH 75: CPT

## 2025-04-28 RX ORDER — DEXAMETHASONE 0.5 MG/1
12 TABLET ORAL ONCE
Refills: 0 | Status: DISCONTINUED | OUTPATIENT
Start: 2025-04-28 | End: 2025-04-28

## 2025-04-28 RX ORDER — ACETAMINOPHEN 500 MG/5ML
650 LIQUID (ML) ORAL ONCE
Refills: 0 | Status: DISCONTINUED | OUTPATIENT
Start: 2025-04-28 | End: 2025-05-01

## 2025-04-28 RX ORDER — INFLUENZA A VIRUS A/IDAHO/07/2018 (H1N1) ANTIGEN (MDCK CELL DERIVED, PROPIOLACTONE INACTIVATED, INFLUENZA A VIRUS A/INDIANA/08/2018 (H3N2) ANTIGEN (MDCK CELL DERIVED, PROPIOLACTONE INACTIVATED), INFLUENZA B VIRUS B/SINGAPORE/INFTT-16-0610/2016 ANTIGEN (MDCK CELL DERIVED, PROPIOLACTONE INACTIVATED), INFLUENZA B VIRUS B/IOWA/06/2017 ANTIGEN (MDCK CELL DERIVED, PROPIOLACTONE INACTIVATED) 15; 15; 15; 15 UG/.5ML; UG/.5ML; UG/.5ML; UG/.5ML
0.5 INJECTION, SUSPENSION INTRAMUSCULAR ONCE
Refills: 0 | Status: DISCONTINUED | OUTPATIENT
Start: 2025-04-28 | End: 2025-05-01

## 2025-04-28 RX ORDER — ALBUTEROL SULFATE 2.5 MG/3ML
2 VIAL, NEBULIZER (ML) INHALATION EVERY 6 HOURS
Refills: 0 | Status: DISCONTINUED | OUTPATIENT
Start: 2025-04-28 | End: 2025-05-01

## 2025-04-28 RX ORDER — CARBOPLATIN 10 MG/ML
483 INJECTION, SOLUTION INTRAVENOUS ONCE
Refills: 0 | Status: COMPLETED | OUTPATIENT
Start: 2025-04-28 | End: 2025-04-28

## 2025-04-28 RX ORDER — BISACODYL 5 MG
5 TABLET, DELAYED RELEASE (ENTERIC COATED) ORAL AT BEDTIME
Refills: 0 | Status: DISCONTINUED | OUTPATIENT
Start: 2025-04-28 | End: 2025-05-01

## 2025-04-28 RX ORDER — ONDANSETRON HCL/PF 4 MG/2 ML
8 VIAL (ML) INJECTION EVERY 24 HOURS
Refills: 0 | Status: COMPLETED | OUTPATIENT
Start: 2025-04-28 | End: 2025-04-30

## 2025-04-28 RX ORDER — CLONAZEPAM 0.5 MG/1
0.5 TABLET ORAL
Refills: 0 | Status: DISCONTINUED | OUTPATIENT
Start: 2025-04-28 | End: 2025-05-01

## 2025-04-28 RX ORDER — DIPHENHYDRAMINE HCL 12.5MG/5ML
25 ELIXIR ORAL ONCE
Refills: 0 | Status: COMPLETED | OUTPATIENT
Start: 2025-04-28 | End: 2025-04-28

## 2025-04-28 RX ORDER — BACLOFEN 10 MG/20ML
5 INJECTION INTRATHECAL EVERY 8 HOURS
Refills: 0 | Status: DISCONTINUED | OUTPATIENT
Start: 2025-04-28 | End: 2025-05-01

## 2025-04-28 RX ORDER — MAGNESIUM, ALUMINUM HYDROXIDE 200-200 MG
30 TABLET,CHEWABLE ORAL EVERY 4 HOURS
Refills: 0 | Status: DISCONTINUED | OUTPATIENT
Start: 2025-04-28 | End: 2025-05-01

## 2025-04-28 RX ORDER — DEXAMETHASONE 0.5 MG/1
8 TABLET ORAL EVERY 24 HOURS
Refills: 0 | Status: COMPLETED | OUTPATIENT
Start: 2025-04-28 | End: 2025-04-30

## 2025-04-28 RX ORDER — PALONOSETRON HYDROCHLORIDE 0.05 MG/ML
0.25 INJECTION, SOLUTION INTRAVENOUS ONCE
Refills: 0 | Status: DISCONTINUED | OUTPATIENT
Start: 2025-04-28 | End: 2025-04-28

## 2025-04-28 RX ORDER — CLONAZEPAM 0.5 MG/1
1 TABLET ORAL AT BEDTIME
Refills: 0 | Status: DISCONTINUED | OUTPATIENT
Start: 2025-04-28 | End: 2025-05-01

## 2025-04-28 RX ORDER — COLCHICINE 0.6 MG/1
0.6 TABLET, FILM COATED ORAL DAILY
Refills: 0 | Status: DISCONTINUED | OUTPATIENT
Start: 2025-04-28 | End: 2025-05-01

## 2025-04-28 RX ORDER — SENNA 187 MG
2 TABLET ORAL AT BEDTIME
Refills: 0 | Status: DISCONTINUED | OUTPATIENT
Start: 2025-04-28 | End: 2025-05-01

## 2025-04-28 RX ORDER — OXYCODONE HYDROCHLORIDE 30 MG/1
20 TABLET ORAL EVERY 4 HOURS
Refills: 0 | Status: DISCONTINUED | OUTPATIENT
Start: 2025-04-28 | End: 2025-05-01

## 2025-04-28 RX ORDER — ETOPOSIDE 20 MG/ML
144 VIAL (ML) INTRAVENOUS EVERY 24 HOURS
Refills: 0 | Status: COMPLETED | OUTPATIENT
Start: 2025-04-28 | End: 2025-04-30

## 2025-04-28 RX ORDER — OLANZAPINE 10 MG/1
5 TABLET ORAL AT BEDTIME
Refills: 0 | Status: DISCONTINUED | OUTPATIENT
Start: 2025-04-28 | End: 2025-05-01

## 2025-04-28 RX ORDER — NICOTINE POLACRILEX 4 MG/1
1 GUM, CHEWING ORAL DAILY
Refills: 0 | Status: DISCONTINUED | OUTPATIENT
Start: 2025-04-28 | End: 2025-05-01

## 2025-04-28 RX ORDER — POLYETHYLENE GLYCOL 3350 17 G/17G
17 POWDER, FOR SOLUTION ORAL
Refills: 0 | Status: DISCONTINUED | OUTPATIENT
Start: 2025-04-28 | End: 2025-05-01

## 2025-04-28 RX ORDER — GABAPENTIN 400 MG/1
100 CAPSULE ORAL THREE TIMES A DAY
Refills: 0 | Status: DISCONTINUED | OUTPATIENT
Start: 2025-04-28 | End: 2025-05-01

## 2025-04-28 RX ORDER — OXYCODONE HYDROCHLORIDE 30 MG/1
30 TABLET ORAL EVERY 4 HOURS
Refills: 0 | Status: DISCONTINUED | OUTPATIENT
Start: 2025-04-28 | End: 2025-05-01

## 2025-04-28 RX ORDER — LEVOTHYROXINE SODIUM 300 MCG
112 TABLET ORAL DAILY
Refills: 0 | Status: DISCONTINUED | OUTPATIENT
Start: 2025-04-28 | End: 2025-05-01

## 2025-04-28 RX ORDER — APIXABAN 2.5 MG/1
5 TABLET, FILM COATED ORAL
Refills: 0 | Status: DISCONTINUED | OUTPATIENT
Start: 2025-04-28 | End: 2025-05-01

## 2025-04-28 RX ORDER — NALOXEGOL OXALATE 12.5 MG/1
25 TABLET, FILM COATED ORAL DAILY
Refills: 0 | Status: DISCONTINUED | OUTPATIENT
Start: 2025-04-28 | End: 2025-05-01

## 2025-04-28 RX ORDER — METHYLPREDNISOLONE ACETATE 80 MG/ML
125 INJECTION, SUSPENSION INTRA-ARTICULAR; INTRALESIONAL; INTRAMUSCULAR; SOFT TISSUE ONCE
Refills: 0 | Status: COMPLETED | OUTPATIENT
Start: 2025-04-28 | End: 2025-04-28

## 2025-04-28 RX ADMIN — BACLOFEN 5 MILLIGRAM(S): 10 INJECTION INTRATHECAL at 21:02

## 2025-04-28 RX ADMIN — Medication 30 MILLIGRAM(S): at 22:02

## 2025-04-28 RX ADMIN — NICOTINE POLACRILEX 1 PATCH: 4 GUM, CHEWING ORAL at 20:30

## 2025-04-28 RX ADMIN — Medication 20 MILLIGRAM(S): at 15:02

## 2025-04-28 RX ADMIN — OXYCODONE HYDROCHLORIDE 30 MILLIGRAM(S): 30 TABLET ORAL at 23:49

## 2025-04-28 RX ADMIN — NICOTINE POLACRILEX 1 PATCH: 4 GUM, CHEWING ORAL at 10:08

## 2025-04-28 RX ADMIN — CARBOPLATIN 483 MILLIGRAM(S): 10 INJECTION, SOLUTION INTRAVENOUS at 14:12

## 2025-04-28 RX ADMIN — GABAPENTIN 100 MILLIGRAM(S): 400 CAPSULE ORAL at 21:02

## 2025-04-28 RX ADMIN — OLANZAPINE 5 MILLIGRAM(S): 10 TABLET ORAL at 21:03

## 2025-04-28 RX ADMIN — DEXAMETHASONE 101.6 MILLIGRAM(S): 0.5 TABLET ORAL at 13:02

## 2025-04-28 RX ADMIN — Medication 144 MILLIGRAM(S): at 14:52

## 2025-04-28 RX ADMIN — OXYCODONE HYDROCHLORIDE 30 MILLIGRAM(S): 30 TABLET ORAL at 18:01

## 2025-04-28 RX ADMIN — BACLOFEN 5 MILLIGRAM(S): 10 INJECTION INTRATHECAL at 13:38

## 2025-04-28 RX ADMIN — Medication 8 MILLIGRAM(S): at 13:02

## 2025-04-28 RX ADMIN — CLONAZEPAM 0.5 MILLIGRAM(S): 0.5 TABLET ORAL at 15:04

## 2025-04-28 RX ADMIN — OXYCODONE HYDROCHLORIDE 30 MILLIGRAM(S): 30 TABLET ORAL at 13:16

## 2025-04-28 RX ADMIN — OXYCODONE HYDROCHLORIDE 30 MILLIGRAM(S): 30 TABLET ORAL at 12:16

## 2025-04-28 RX ADMIN — Medication 30 MILLIGRAM(S): at 14:37

## 2025-04-28 RX ADMIN — Medication 40 MILLIGRAM(S): at 18:01

## 2025-04-28 RX ADMIN — METHYLPREDNISOLONE ACETATE 125 MILLIGRAM(S): 80 INJECTION, SUSPENSION INTRA-ARTICULAR; INTRALESIONAL; INTRAMUSCULAR; SOFT TISSUE at 15:01

## 2025-04-28 RX ADMIN — CLONAZEPAM 1 MILLIGRAM(S): 0.5 TABLET ORAL at 23:49

## 2025-04-28 RX ADMIN — APIXABAN 5 MILLIGRAM(S): 2.5 TABLET, FILM COATED ORAL at 18:01

## 2025-04-28 RX ADMIN — Medication 25 MILLIGRAM(S): at 15:01

## 2025-04-28 RX ADMIN — Medication 30 MILLIGRAM(S): at 21:02

## 2025-04-28 RX ADMIN — Medication 1 APPLICATION(S): at 12:17

## 2025-04-28 RX ADMIN — GABAPENTIN 100 MILLIGRAM(S): 400 CAPSULE ORAL at 13:37

## 2025-04-28 RX ADMIN — Medication 30 MILLIGRAM(S): at 13:37

## 2025-04-28 NOTE — PATIENT PROFILE ADULT - FUNCTIONAL ASSESSMENT - DAILY ACTIVITY 4.
3 = A little assistance

## 2025-04-28 NOTE — PROVIDER CONTACT NOTE (MEDICATION) - ASSESSMENT
palms of hands were red, no itching c/o.
Pt noted guarding chest area sitting at edge of bed with feet hanging to to floor,  Oxygen applied at 3-4 Liters (humidified)

## 2025-04-28 NOTE — PROVIDER CONTACT NOTE (MEDICATION) - ACTION/TREATMENT ORDERED:
the above done as ordered.
Infusion immediately stopped, solumedrol 125mg IVP, benadryl 25mg IV and pepcid 20mg IVP given with good results.

## 2025-04-28 NOTE — PROVIDER CONTACT NOTE (OTHER) - RECOMMENDATIONS
ACP Johanna givens Ambulatory reporting LLQ abdominal pain x2-3 days, denies N/V/D or urinary symptoms. Has not medicated for pain.

## 2025-04-28 NOTE — PATIENT PROFILE ADULT - FALL HARM RISK - HARM RISK INTERVENTIONS

## 2025-04-28 NOTE — PROVIDER CONTACT NOTE (OTHER) - ASSESSMENT
expriencing lethargy, SOB and lightheadedness. Etoposide running at 125ml/hr. Infusion paused. Vitals stable. Pt received Klnopin and benadryl around 3pm

## 2025-04-28 NOTE — H&P ADULT - NSHPPHYSICALEXAM_GEN_ALL_CORE
Vital Signs Last 24 Hrs  T(C): 36.6 (28 Apr 2025 10:01), Max: 36.6 (28 Apr 2025 10:01)  T(F): 97.9 (28 Apr 2025 10:01), Max: 97.9 (28 Apr 2025 10:01)  HR: 91 (28 Apr 2025 10:01) (91 - 91)  BP: 116/62 (28 Apr 2025 10:01) (116/62 - 116/62)  RR: 18 (28 Apr 2025 10:01) (18 - 18)  SpO2: 92% (28 Apr 2025 10:01) (92% - 92%)  Parameters below as of 28 Apr 2025 10:01  Patient On (Oxygen Delivery Method): room air  Non-toxic-appearing woman, crying hysterically in bed  Answering all questions appropriately (conversational dyspnea resolved), following commands  Anicteric sclera, no oral lesions/thrush  RRR, no m/r/g, heart sounds faint  Breaths somewhat labored, but not tachypnea; trace RLB crackles, no w/r  Abd soft/nt/nd, hypoactive bowel sounds  No peripheral edema; DIP/PIP, ankle joint deformities unchanged  CN 2-12 grossly intact; no gross focal neuro deficits; pt moves all extremities spontaneously

## 2025-04-28 NOTE — H&P ADULT - NSHPLABSRESULTS_GEN_ALL_CORE
12.2                 136  | 28   | 7            6.20  >-----------< 240     ------------------------< 121                   37.1                 3.8  | 99   | 0.48                                         Ca 9.1   Mg 1.90  Ph 3.5        MICROBIOLOGY  Abx: none on this admission.    Cx Data: none new on this admission.      HISTORICAL RADIOLOGY  3/20 NC CT chest  1.  Moderate-sized pericardial effusion appears unchanged compared to   partially imaged heart on 3/18/2025 but increase insize since 2/15/2025.  2.  Interval increase in size of previously described left suprahilar   mediastinal mass which extends into the AP window and paramediastinal   left upper lobe.  3.  Some of the other left upper lobe nodules are decrease and some   increase.  4.  Unchanged sclerotic lesions within the sternum and T12 vertebral body.    3/20 TTE   1. Limited study to re-evaluate pericardial effusion.   2. There is a moderate pericardial effusion noted adjacent to the posterior left ventricle, a moderate pericardial effusion noted adjacent to the right atrium, a small pericardial effusion noted adjacent to the apex, a moderate pericardial effusion noted adjacent to the lateral left ventricle and a moderate pericardial effusion noted adjacent to the right ventricle with no echocardiographic evidence of tamponade physiology. Moderate pericardial effusion, measuring ~ 1.1 cm posterior to the left ventricle, ~ 1.0 cm lateral to the left ventricle, ~ 1.4 cm adjacent to the RV free wall, and ~ 1.4 cm superior to the right atrium. Small pericardial effusion anterior to the right ventricle and adjacent to the apex. The pericardium adjacent to the RV free wall appears markedly thickened and may reflect the presence of thrombus, inflammatory material, and/or metastatic disease. No RA or RV diastolic collapse seen. However, the inferior vena cava (IVC) displays reduced respirophasic variability in its caliber, consistent with elevated right atrial pressure.   3. The inferior vena cava is normal in size measuring 1.30 cm in diameter, (normal <2.1cm) with abnormal inspiratory collapse (abnormal <50%) consistent with mildly elevated right atrial pressure (~8, range 5-10mmHg).   4. Compared to the transthoracic echocardiogram performed on 3/12/2025, the pericardial effusion has increased slightly in size.    3/18 CT abd/pelv: No acute pathology within the abdomen and pelvis. Partially imaged pericardial effusion. Bilateral trace pleural effusions with adjacent atelectasis. Again demonstrated a T12 vertebral body hemangioma. Degenerative changes of the spine.    3/18 CT head: No hydrocephalus, acute intracranial hemorrhage, or mass effect. No compelling evidence of intracranial metastasis on this non-contrast exam.

## 2025-04-28 NOTE — H&P ADULT - HISTORY OF PRESENT ILLNESS
63 yo woman, former smoker (47py; quit 12/2024) and well known to the OncHos service, with h/o pAfib (on Eliquis), Hypothyroidism, ? h/o Sleroderma, h/o pericardial effusion s/p pericardiocentesis (dx in 12/2024; ? 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 while hospitalized at [facility]- she tolerated tx without significant adverse effect. She presented as an outpt for C2 on 4/14- however, pt received Cosela and developed "severe headache" for which she required ED eval, but was ultimately sent home (she did not receive Etop or carbo on d1); on day 2 she received Etoposide for 5 minutes and started to have chest tightness and shortness of breath which was treated as a hypersensitivity reaction (she was not re-challenged due to patient preference at the time; she did not receive carbo or cosela on D2).   Pt is now electively admitted for C3 Carbo/Etop. She reported that she hasn't had a bm for 30 days- noting that she takes Movantik which doesn't help, but she declined oral/PA laxatives when I offered; she complained of mild nausea, but denied abd pain, vomiting and indicated that she passes gas sometimes. She also expressed that she is very nervous about getting chemo today because of the reaction she had last time.

## 2025-04-28 NOTE — PROVIDER CONTACT NOTE (MEDICATION) - RECOMMENDATIONS
when symptoms subside, restart Etoposide at 125ml/hr over 4hours and continue to monitor.
as per orders RXN meds in place.

## 2025-04-28 NOTE — H&P ADULT - ASSESSMENT
63 yo woman, former smoker (47py; quit 12/2024) and well known to the OncHos service, with h/o pAfib (on Eliquis), Hypothyroidism, ? h/o Sleroderma, h/o pericardial effusion s/p pericardiocentesis (dx in 12/2024; ? 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 while hospitalized at [facility]- she tolerated tx without significant adverse effect. She presented as an outpt for C2 on 4/14- however, pt received Cosela and developed "severe headache" for which she required ED eval, but was ultimately sent home (she did not receive Etop or carbo on d1); on day 2 she received Etoposide for 5 minutes and started to have chest tightness and shortness of breath which was treated as a hypersensitivity reaction (she was not re-challenged due to patient preference at the time; she did not receive carbo or cosela on D2).   Pt is now electively admitted for C3 Carbo/Etop.    ACTIVE PROBLEMS  SCLCa (presumed limited stage)  Admission for chemotherapy  Anxiety/depression  pAfib  Hypercoag state  Immunocompromised due to chemotherapy    SCLCa (presumed limited stage)  Will proceed with C3 Carbo/Etop today (20% dose reduced)  Monitoring and with plan to treat hypersensitivity reaction as per protocol  Fluphila x1 dose to be given on day 4 (5/1)  Pt to continue outpt fu with Dr. Hall after discharge  Long-term prognosis: guarded; pt is full code    Anxiety/depression  Continuing Klonopin 0.5mg BID and 1mg nightly  Continuing Zyprexa 5mg nightly    Chronic hypoxic resp failure  h/o Asthma/COPD (former smoker)  Continuing supplemental O2 with weaning as tolerated and supportive resp care prn  Continuing Advair 100-50mcg MDI BID  Continuing duonebs q6/prn   Continuing Nicotine patch 21mg daily (6 weeks)  Mgmt of pericardial effusion as above    SIADH  Na 136 today  Pt is asymptomatic  SIADH confirmed by 3/12 urine lytes but Na has been normal off salt tabs and fluid restriction since pt has started cancer treatment  Will continue to monitor    Cancer related pain  Continuing Morphine ER 30mg q12  Continuing Oxy IR 20/30mg q8/prn  Continuing Gabapentin 100mg TID  Continuing Baclofen 5mg q8/prn    Constipation  Possibly due to opioids +/- AID  Pt denied abd pain, diarrhea, but reported mild nausea  AXR ordered for further eval  Pt declined suppository, enema  Continuing bowel regimen (Miralax, Senna) for OIC prevention    Hypothyroidism: continuing LT4 112mcg daily    h/o pericardial effusion: most likely inflammatory; continuing Colchicine 0.6mg daily    pAfib  Hypercoag state  Continuing Eliquis 5mg BID

## 2025-04-28 NOTE — PATIENT PROFILE ADULT - BILL PAYMENT
Physical [facility]   [id number]    NAME: Angelica Jose    : 1937 (78 y.o.)  MRN: [id number]    Account: [de-identified]  Gender: male    PT evaluation and treatment orders received. Chart reviewed. PT eval attempted. Patient Unavailable: MRI    Will attempt PT evaluation again at earliest convenience.       Electronically signed by Antonella Pinzon PT on 19 at 1:50 PM no

## 2025-04-29 LAB
ALBUMIN SERPL ELPH-MCNC: 3.6 G/DL — SIGNIFICANT CHANGE UP (ref 3.3–5)
ALP SERPL-CCNC: 89 U/L — SIGNIFICANT CHANGE UP (ref 40–120)
ALT FLD-CCNC: 8 U/L — SIGNIFICANT CHANGE UP (ref 4–33)
ANION GAP SERPL CALC-SCNC: 9 MMOL/L — SIGNIFICANT CHANGE UP (ref 7–14)
AST SERPL-CCNC: 17 U/L — SIGNIFICANT CHANGE UP (ref 4–32)
BASOPHILS # BLD AUTO: 0.01 K/UL — SIGNIFICANT CHANGE UP (ref 0–0.2)
BASOPHILS NFR BLD AUTO: 0.3 % — SIGNIFICANT CHANGE UP (ref 0–2)
BILIRUB SERPL-MCNC: 0.3 MG/DL — SIGNIFICANT CHANGE UP (ref 0.2–1.2)
BUN SERPL-MCNC: 11 MG/DL — SIGNIFICANT CHANGE UP (ref 7–23)
CALCIUM SERPL-MCNC: 8.7 MG/DL — SIGNIFICANT CHANGE UP (ref 8.4–10.5)
CHLORIDE SERPL-SCNC: 102 MMOL/L — SIGNIFICANT CHANGE UP (ref 98–107)
CO2 SERPL-SCNC: 27 MMOL/L — SIGNIFICANT CHANGE UP (ref 22–31)
CREAT SERPL-MCNC: 0.46 MG/DL — LOW (ref 0.5–1.3)
EGFR: 105 ML/MIN/1.73M2 — SIGNIFICANT CHANGE UP
EGFR: 105 ML/MIN/1.73M2 — SIGNIFICANT CHANGE UP
EOSINOPHIL # BLD AUTO: 0 K/UL — SIGNIFICANT CHANGE UP (ref 0–0.5)
EOSINOPHIL NFR BLD AUTO: 0 % — SIGNIFICANT CHANGE UP (ref 0–6)
GLUCOSE SERPL-MCNC: 126 MG/DL — HIGH (ref 70–99)
HCT VFR BLD CALC: 32.6 % — LOW (ref 34.5–45)
HGB BLD-MCNC: 10.8 G/DL — LOW (ref 11.5–15.5)
IANC: 2.86 K/UL — SIGNIFICANT CHANGE UP (ref 1.8–7.4)
IMM GRANULOCYTES NFR BLD AUTO: 0.3 % — SIGNIFICANT CHANGE UP (ref 0–0.9)
LYMPHOCYTES # BLD AUTO: 0.49 K/UL — LOW (ref 1–3.3)
LYMPHOCYTES # BLD AUTO: 13 % — SIGNIFICANT CHANGE UP (ref 13–44)
MAGNESIUM SERPL-MCNC: 1.9 MG/DL — SIGNIFICANT CHANGE UP (ref 1.6–2.6)
MCHC RBC-ENTMCNC: 30.7 PG — SIGNIFICANT CHANGE UP (ref 27–34)
MCHC RBC-ENTMCNC: 33.1 G/DL — SIGNIFICANT CHANGE UP (ref 32–36)
MCV RBC AUTO: 92.6 FL — SIGNIFICANT CHANGE UP (ref 80–100)
MONOCYTES # BLD AUTO: 0.41 K/UL — SIGNIFICANT CHANGE UP (ref 0–0.9)
MONOCYTES NFR BLD AUTO: 10.8 % — SIGNIFICANT CHANGE UP (ref 2–14)
NEUTROPHILS # BLD AUTO: 2.86 K/UL — SIGNIFICANT CHANGE UP (ref 1.8–7.4)
NEUTROPHILS NFR BLD AUTO: 75.6 % — SIGNIFICANT CHANGE UP (ref 43–77)
NRBC # BLD AUTO: 0 K/UL — SIGNIFICANT CHANGE UP (ref 0–0)
NRBC # FLD: 0 K/UL — SIGNIFICANT CHANGE UP (ref 0–0)
NRBC BLD AUTO-RTO: 0 /100 WBCS — SIGNIFICANT CHANGE UP (ref 0–0)
PHOSPHATE SERPL-MCNC: 3.7 MG/DL — SIGNIFICANT CHANGE UP (ref 2.5–4.5)
PLATELET # BLD AUTO: 192 K/UL — SIGNIFICANT CHANGE UP (ref 150–400)
POTASSIUM SERPL-MCNC: 4.4 MMOL/L — SIGNIFICANT CHANGE UP (ref 3.5–5.3)
POTASSIUM SERPL-SCNC: 4.4 MMOL/L — SIGNIFICANT CHANGE UP (ref 3.5–5.3)
PROT SERPL-MCNC: 5.9 G/DL — LOW (ref 6–8.3)
RBC # BLD: 3.52 M/UL — LOW (ref 3.8–5.2)
RBC # FLD: 15.5 % — HIGH (ref 10.3–14.5)
SODIUM SERPL-SCNC: 138 MMOL/L — SIGNIFICANT CHANGE UP (ref 135–145)
WBC # BLD: 3.78 K/UL — LOW (ref 3.8–10.5)
WBC # FLD AUTO: 3.78 K/UL — LOW (ref 3.8–10.5)

## 2025-04-29 PROCEDURE — 99233 SBSQ HOSP IP/OBS HIGH 50: CPT

## 2025-04-29 RX ORDER — METHYLNALTREXONE BROMIDE 12 MG/.6ML
12 INJECTION, SOLUTION SUBCUTANEOUS ONCE
Refills: 0 | Status: COMPLETED | OUTPATIENT
Start: 2025-04-29 | End: 2025-04-29

## 2025-04-29 RX ADMIN — OXYCODONE HYDROCHLORIDE 30 MILLIGRAM(S): 30 TABLET ORAL at 04:40

## 2025-04-29 RX ADMIN — OXYCODONE HYDROCHLORIDE 30 MILLIGRAM(S): 30 TABLET ORAL at 11:11

## 2025-04-29 RX ADMIN — Medication 30 MILLIGRAM(S): at 05:40

## 2025-04-29 RX ADMIN — Medication 30 MILLIGRAM(S): at 15:30

## 2025-04-29 RX ADMIN — NICOTINE POLACRILEX 1 PATCH: 4 GUM, CHEWING ORAL at 09:11

## 2025-04-29 RX ADMIN — APIXABAN 5 MILLIGRAM(S): 2.5 TABLET, FILM COATED ORAL at 06:14

## 2025-04-29 RX ADMIN — Medication 30 MILLIGRAM(S): at 21:27

## 2025-04-29 RX ADMIN — OXYCODONE HYDROCHLORIDE 30 MILLIGRAM(S): 30 TABLET ORAL at 05:40

## 2025-04-29 RX ADMIN — NICOTINE POLACRILEX 1 PATCH: 4 GUM, CHEWING ORAL at 14:31

## 2025-04-29 RX ADMIN — Medication 40 MILLIGRAM(S): at 06:13

## 2025-04-29 RX ADMIN — OLANZAPINE 5 MILLIGRAM(S): 10 TABLET ORAL at 21:29

## 2025-04-29 RX ADMIN — GABAPENTIN 100 MILLIGRAM(S): 400 CAPSULE ORAL at 06:14

## 2025-04-29 RX ADMIN — CLONAZEPAM 0.5 MILLIGRAM(S): 0.5 TABLET ORAL at 06:13

## 2025-04-29 RX ADMIN — BACLOFEN 5 MILLIGRAM(S): 10 INJECTION INTRATHECAL at 21:29

## 2025-04-29 RX ADMIN — OXYCODONE HYDROCHLORIDE 30 MILLIGRAM(S): 30 TABLET ORAL at 18:52

## 2025-04-29 RX ADMIN — CLONAZEPAM 0.5 MILLIGRAM(S): 0.5 TABLET ORAL at 16:01

## 2025-04-29 RX ADMIN — NALOXEGOL OXALATE 25 MILLIGRAM(S): 12.5 TABLET, FILM COATED ORAL at 14:30

## 2025-04-29 RX ADMIN — OXYCODONE HYDROCHLORIDE 30 MILLIGRAM(S): 30 TABLET ORAL at 00:49

## 2025-04-29 RX ADMIN — OXYCODONE HYDROCHLORIDE 30 MILLIGRAM(S): 30 TABLET ORAL at 17:52

## 2025-04-29 RX ADMIN — OXYCODONE HYDROCHLORIDE 30 MILLIGRAM(S): 30 TABLET ORAL at 10:11

## 2025-04-29 RX ADMIN — Medication 30 MILLIGRAM(S): at 04:40

## 2025-04-29 RX ADMIN — GABAPENTIN 100 MILLIGRAM(S): 400 CAPSULE ORAL at 21:27

## 2025-04-29 RX ADMIN — NICOTINE POLACRILEX 1 PATCH: 4 GUM, CHEWING ORAL at 07:12

## 2025-04-29 RX ADMIN — Medication 30 MILLIGRAM(S): at 14:30

## 2025-04-29 RX ADMIN — DEXAMETHASONE 101.6 MILLIGRAM(S): 0.5 TABLET ORAL at 13:07

## 2025-04-29 RX ADMIN — Medication 8 MILLIGRAM(S): at 13:07

## 2025-04-29 RX ADMIN — CLONAZEPAM 1 MILLIGRAM(S): 0.5 TABLET ORAL at 21:26

## 2025-04-29 RX ADMIN — BACLOFEN 5 MILLIGRAM(S): 10 INJECTION INTRATHECAL at 14:31

## 2025-04-29 RX ADMIN — METHYLNALTREXONE BROMIDE 12 MILLIGRAM(S): 12 INJECTION, SOLUTION SUBCUTANEOUS at 18:59

## 2025-04-29 RX ADMIN — APIXABAN 5 MILLIGRAM(S): 2.5 TABLET, FILM COATED ORAL at 17:52

## 2025-04-29 RX ADMIN — GABAPENTIN 100 MILLIGRAM(S): 400 CAPSULE ORAL at 14:30

## 2025-04-29 RX ADMIN — COLCHICINE 0.6 MILLIGRAM(S): 0.6 TABLET, FILM COATED ORAL at 14:30

## 2025-04-29 RX ADMIN — Medication 1 APPLICATION(S): at 06:17

## 2025-04-29 RX ADMIN — Medication 144 MILLIGRAM(S): at 14:08

## 2025-04-29 RX ADMIN — Medication 40 MILLIGRAM(S): at 17:51

## 2025-04-29 RX ADMIN — OXYCODONE HYDROCHLORIDE 30 MILLIGRAM(S): 30 TABLET ORAL at 23:38

## 2025-04-29 RX ADMIN — BACLOFEN 5 MILLIGRAM(S): 10 INJECTION INTRATHECAL at 06:14

## 2025-04-29 RX ADMIN — Medication 112 MICROGRAM(S): at 06:14

## 2025-04-29 RX ADMIN — NICOTINE POLACRILEX 1 PATCH: 4 GUM, CHEWING ORAL at 18:02

## 2025-04-29 NOTE — PHYSICAL THERAPY INITIAL EVALUATION ADULT - RANGE OF MOTION EXAMINATION, REHAB EVAL
at least 3/5 throughout , + arthritic changes to b/l hands 2/2 h/o scleroderma, ankle dorsiflexion to at least neutral/bilateral upper extremity ROM was WFL (within functional limits)/bilateral lower extremity ROM was WFL (within functional limits)
vital signs

## 2025-04-29 NOTE — PHYSICAL THERAPY INITIAL EVALUATION ADULT - LIVES WITH, PROFILE
pt lives in a [facility]; has a home health aide and friend who assists daily/alone

## 2025-04-29 NOTE — PROGRESS NOTE ADULT - TIME BILLING
20 mins-Vitals, meds, new results/radiology, interdisciplinary charting reviewed   20 mins-Patient seen and examined and plan discussed, all questions were answered to the best of my ability  20 mins-Charting/documentation and orders.

## 2025-04-29 NOTE — PHYSICAL THERAPY INITIAL EVALUATION ADULT - PERTINENT HX OF CURRENT PROBLEM, REHAB EVAL
63 y/o female, former smoker (quit 12/2024) with h/o pAfib (on Eliquis), Hypothyroidism, ? 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

## 2025-04-30 ENCOUNTER — TRANSCRIPTION ENCOUNTER (OUTPATIENT)
Age: 65
End: 2025-04-30

## 2025-04-30 LAB
ALBUMIN SERPL ELPH-MCNC: 3.4 G/DL — SIGNIFICANT CHANGE UP (ref 3.3–5)
ALP SERPL-CCNC: 83 U/L — SIGNIFICANT CHANGE UP (ref 40–120)
ALT FLD-CCNC: 8 U/L — SIGNIFICANT CHANGE UP (ref 4–33)
ANION GAP SERPL CALC-SCNC: 11 MMOL/L — SIGNIFICANT CHANGE UP (ref 7–14)
AST SERPL-CCNC: 17 U/L — SIGNIFICANT CHANGE UP (ref 4–32)
BASOPHILS # BLD AUTO: 0 K/UL — SIGNIFICANT CHANGE UP (ref 0–0.2)
BASOPHILS NFR BLD AUTO: 0 % — SIGNIFICANT CHANGE UP (ref 0–2)
BILIRUB SERPL-MCNC: 0.3 MG/DL — SIGNIFICANT CHANGE UP (ref 0.2–1.2)
BUN SERPL-MCNC: 13 MG/DL — SIGNIFICANT CHANGE UP (ref 7–23)
CALCIUM SERPL-MCNC: 8.5 MG/DL — SIGNIFICANT CHANGE UP (ref 8.4–10.5)
CHLORIDE SERPL-SCNC: 103 MMOL/L — SIGNIFICANT CHANGE UP (ref 98–107)
CO2 SERPL-SCNC: 24 MMOL/L — SIGNIFICANT CHANGE UP (ref 22–31)
CREAT SERPL-MCNC: 0.44 MG/DL — LOW (ref 0.5–1.3)
EGFR: 107 ML/MIN/1.73M2 — SIGNIFICANT CHANGE UP
EGFR: 107 ML/MIN/1.73M2 — SIGNIFICANT CHANGE UP
EOSINOPHIL # BLD AUTO: 0.06 K/UL — SIGNIFICANT CHANGE UP (ref 0–0.5)
EOSINOPHIL NFR BLD AUTO: 1.4 % — SIGNIFICANT CHANGE UP (ref 0–6)
GLUCOSE SERPL-MCNC: 99 MG/DL — SIGNIFICANT CHANGE UP (ref 70–99)
HCT VFR BLD CALC: 32.4 % — LOW (ref 34.5–45)
HGB BLD-MCNC: 10.6 G/DL — LOW (ref 11.5–15.5)
IANC: 3.04 K/UL — SIGNIFICANT CHANGE UP (ref 1.8–7.4)
IMM GRANULOCYTES NFR BLD AUTO: 0.2 % — SIGNIFICANT CHANGE UP (ref 0–0.9)
LYMPHOCYTES # BLD AUTO: 0.87 K/UL — LOW (ref 1–3.3)
LYMPHOCYTES # BLD AUTO: 19.8 % — SIGNIFICANT CHANGE UP (ref 13–44)
MAGNESIUM SERPL-MCNC: 2.1 MG/DL — SIGNIFICANT CHANGE UP (ref 1.6–2.6)
MCHC RBC-ENTMCNC: 31.3 PG — SIGNIFICANT CHANGE UP (ref 27–34)
MCHC RBC-ENTMCNC: 32.7 G/DL — SIGNIFICANT CHANGE UP (ref 32–36)
MCV RBC AUTO: 95.6 FL — SIGNIFICANT CHANGE UP (ref 80–100)
MONOCYTES # BLD AUTO: 0.42 K/UL — SIGNIFICANT CHANGE UP (ref 0–0.9)
MONOCYTES NFR BLD AUTO: 9.5 % — SIGNIFICANT CHANGE UP (ref 2–14)
NEUTROPHILS # BLD AUTO: 3.04 K/UL — SIGNIFICANT CHANGE UP (ref 1.8–7.4)
NEUTROPHILS NFR BLD AUTO: 69.1 % — SIGNIFICANT CHANGE UP (ref 43–77)
NRBC # BLD AUTO: 0 K/UL — SIGNIFICANT CHANGE UP (ref 0–0)
NRBC # FLD: 0 K/UL — SIGNIFICANT CHANGE UP (ref 0–0)
NRBC BLD AUTO-RTO: 0 /100 WBCS — SIGNIFICANT CHANGE UP (ref 0–0)
PHOSPHATE SERPL-MCNC: 3 MG/DL — SIGNIFICANT CHANGE UP (ref 2.5–4.5)
PLATELET # BLD AUTO: 165 K/UL — SIGNIFICANT CHANGE UP (ref 150–400)
POTASSIUM SERPL-MCNC: 3.9 MMOL/L — SIGNIFICANT CHANGE UP (ref 3.5–5.3)
POTASSIUM SERPL-SCNC: 3.9 MMOL/L — SIGNIFICANT CHANGE UP (ref 3.5–5.3)
PROT SERPL-MCNC: 5.6 G/DL — LOW (ref 6–8.3)
RBC # BLD: 3.39 M/UL — LOW (ref 3.8–5.2)
RBC # FLD: 15.9 % — HIGH (ref 10.3–14.5)
SODIUM SERPL-SCNC: 138 MMOL/L — SIGNIFICANT CHANGE UP (ref 135–145)
WBC # BLD: 4.4 K/UL — SIGNIFICANT CHANGE UP (ref 3.8–10.5)
WBC # FLD AUTO: 4.4 K/UL — SIGNIFICANT CHANGE UP (ref 3.8–10.5)

## 2025-04-30 PROCEDURE — 99233 SBSQ HOSP IP/OBS HIGH 50: CPT

## 2025-04-30 RX ORDER — PEGFILGRASTIM-CBQV 6 MG/.6ML
6 INJECTION, SOLUTION SUBCUTANEOUS ONCE
Refills: 0 | Status: COMPLETED | OUTPATIENT
Start: 2025-05-01 | End: 2025-05-01

## 2025-04-30 RX ADMIN — Medication 40 MILLIGRAM(S): at 17:55

## 2025-04-30 RX ADMIN — OXYCODONE HYDROCHLORIDE 30 MILLIGRAM(S): 30 TABLET ORAL at 11:57

## 2025-04-30 RX ADMIN — CLONAZEPAM 0.5 MILLIGRAM(S): 0.5 TABLET ORAL at 14:11

## 2025-04-30 RX ADMIN — NICOTINE POLACRILEX 1 PATCH: 4 GUM, CHEWING ORAL at 13:00

## 2025-04-30 RX ADMIN — GABAPENTIN 100 MILLIGRAM(S): 400 CAPSULE ORAL at 13:12

## 2025-04-30 RX ADMIN — Medication 112 MICROGRAM(S): at 05:49

## 2025-04-30 RX ADMIN — BACLOFEN 5 MILLIGRAM(S): 10 INJECTION INTRATHECAL at 05:49

## 2025-04-30 RX ADMIN — NICOTINE POLACRILEX 1 PATCH: 4 GUM, CHEWING ORAL at 07:18

## 2025-04-30 RX ADMIN — BACLOFEN 5 MILLIGRAM(S): 10 INJECTION INTRATHECAL at 13:11

## 2025-04-30 RX ADMIN — Medication 30 MILLIGRAM(S): at 06:47

## 2025-04-30 RX ADMIN — Medication 144 MILLIGRAM(S): at 14:46

## 2025-04-30 RX ADMIN — Medication 30 MILLIGRAM(S): at 22:05

## 2025-04-30 RX ADMIN — Medication 1 DOSE(S): at 08:41

## 2025-04-30 RX ADMIN — Medication 1 APPLICATION(S): at 05:55

## 2025-04-30 RX ADMIN — DEXAMETHASONE 101.6 MILLIGRAM(S): 0.5 TABLET ORAL at 13:02

## 2025-04-30 RX ADMIN — OLANZAPINE 5 MILLIGRAM(S): 10 TABLET ORAL at 22:05

## 2025-04-30 RX ADMIN — CLONAZEPAM 1 MILLIGRAM(S): 0.5 TABLET ORAL at 22:05

## 2025-04-30 RX ADMIN — APIXABAN 5 MILLIGRAM(S): 2.5 TABLET, FILM COATED ORAL at 05:49

## 2025-04-30 RX ADMIN — OXYCODONE HYDROCHLORIDE 30 MILLIGRAM(S): 30 TABLET ORAL at 17:56

## 2025-04-30 RX ADMIN — Medication 30 MILLIGRAM(S): at 13:44

## 2025-04-30 RX ADMIN — OXYCODONE HYDROCHLORIDE 30 MILLIGRAM(S): 30 TABLET ORAL at 05:47

## 2025-04-30 RX ADMIN — BACLOFEN 5 MILLIGRAM(S): 10 INJECTION INTRATHECAL at 22:09

## 2025-04-30 RX ADMIN — NICOTINE POLACRILEX 1 PATCH: 4 GUM, CHEWING ORAL at 18:21

## 2025-04-30 RX ADMIN — NICOTINE POLACRILEX 1 PATCH: 4 GUM, CHEWING ORAL at 13:13

## 2025-04-30 RX ADMIN — OXYCODONE HYDROCHLORIDE 30 MILLIGRAM(S): 30 TABLET ORAL at 10:57

## 2025-04-30 RX ADMIN — OXYCODONE HYDROCHLORIDE 30 MILLIGRAM(S): 30 TABLET ORAL at 06:47

## 2025-04-30 RX ADMIN — CLONAZEPAM 0.5 MILLIGRAM(S): 0.5 TABLET ORAL at 06:38

## 2025-04-30 RX ADMIN — OXYCODONE HYDROCHLORIDE 30 MILLIGRAM(S): 30 TABLET ORAL at 00:38

## 2025-04-30 RX ADMIN — Medication 30 MILLIGRAM(S): at 13:10

## 2025-04-30 RX ADMIN — GABAPENTIN 100 MILLIGRAM(S): 400 CAPSULE ORAL at 05:47

## 2025-04-30 RX ADMIN — Medication 40 MILLIGRAM(S): at 05:50

## 2025-04-30 RX ADMIN — OXYCODONE HYDROCHLORIDE 30 MILLIGRAM(S): 30 TABLET ORAL at 18:41

## 2025-04-30 RX ADMIN — Medication 8 MILLIGRAM(S): at 13:01

## 2025-04-30 RX ADMIN — Medication 30 MILLIGRAM(S): at 21:06

## 2025-04-30 RX ADMIN — Medication 30 MILLIGRAM(S): at 05:47

## 2025-04-30 RX ADMIN — APIXABAN 5 MILLIGRAM(S): 2.5 TABLET, FILM COATED ORAL at 17:55

## 2025-04-30 RX ADMIN — GABAPENTIN 100 MILLIGRAM(S): 400 CAPSULE ORAL at 22:05

## 2025-04-30 RX ADMIN — COLCHICINE 0.6 MILLIGRAM(S): 0.6 TABLET, FILM COATED ORAL at 13:11

## 2025-04-30 NOTE — DIETITIAN INITIAL EVALUATION ADULT - REASON FOR ADMISSION
Per chart review, 64-year-old woman, former smoker (47py; quit 12/2024) and well known to the [facility] service, with h/o pAfib (on Eliquis), Hypothyroidism, ? h/o Sleroderma, h/o pericardial effusion s/p pericardiocentesis (dx in 12/2024; ? 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 while hospitalized at [facility]- she tolerated tx without significant adverse effect. She presented as an outpt for C2 on 4/14- however, pt received Cosela and developed "severe headache" for which she required ED eval, but was ultimately sent home (she did not receive Etop or carbo on d1); on day 2 she received Etoposide for 5 minutes and started to have chest tightness and shortness of breath which was treated as a hypersensitivity reaction (she was not re-challenged due to patient preference at the time; she did not receive carbo or cosela on D2). Pt is now electively admitted for C3 Carbo/Etop.

## 2025-04-30 NOTE — DISCHARGE NOTE PROVIDER - DETAILS OF MALNUTRITION DIAGNOSIS/DIAGNOSES
This patient has been assessed with a concern for Malnutrition and was treated during this hospitalization for the following Nutrition diagnosis/diagnoses:     -  04/30/2025: Severe protein-calorie malnutrition

## 2025-04-30 NOTE — DIETITIAN INITIAL EVALUATION ADULT - ORAL INTAKE PTA/DIET HISTORY
Patient reports fair appetite / PO intake secondary to constipation PTA. States she doesn't want to eat anything secondary to constipation. Prior use of protein powder and multivitamin PTA. No other reported issues.

## 2025-04-30 NOTE — DISCHARGE NOTE PROVIDER - CARE PROVIDER_API CALL
[facility]  [address] [place] [id number]  Phone: [phone]  Fax: [phone]  Follow Up Time:

## 2025-04-30 NOTE — PROGRESS NOTE ADULT - SUBJECTIVE AND OBJECTIVE BOX
SOLID TUMOR ONCOLOGY HOSPITALIST PROGRESS NOTE    S: No acute events overnight.  Pt had no new complaints this am.  She noted that he "breath was labored" for a short period of time during her Etoposide infusion yesterday, but she denied dyspnea currently.  She also confirmed that she is still constipated, but declined oral stool softeners/laxatives given concern that it would cause her to have more abd pain/cramping. However, she was in agreement with trial of Relistor because this worked for her in the past.    CURRENT MEDICATIONS  MEDICATIONS  (STANDING):  apixaban 5 milliGRAM(s) Oral two times a day  baclofen 5 milliGRAM(s) Oral every 8 hours  bisacodyl 5 milliGRAM(s) Oral at bedtime  chlorhexidine 2% Cloths 1 Application(s) Topical <User Schedule>  clonazePAM  Tablet 0.5 milliGRAM(s) Oral <User Schedule>  clonazePAM  Tablet 1 milliGRAM(s) Oral at bedtime  colchicine 0.6 milliGRAM(s) Oral daily  dexAMETHasone  IVPB 8 milliGRAM(s) IV Intermittent every 24 hours  etoposide (TOPOSAR) IVPB (eMAR) 144 milliGRAM(s) IV Intermittent every 24 hours  fluticasone propionate/ salmeterol 100-50 MICROgram(s) Diskus 1 Dose(s) Inhalation two times a day  gabapentin 100 milliGRAM(s) Oral three times a day  influenza  Vaccine (HIGH DOSE) 0.5 milliLiter(s) IntraMuscular once  levothyroxine 112 MICROGram(s) Oral daily  morphine ER Tablet 30 milliGRAM(s) Oral every 8 hours  naloxegol 25 milliGRAM(s) Oral daily  nicotine - 21 mG/24Hr(s) Patch 1 Patch Transdermal daily  OLANZapine 5 milliGRAM(s) Oral at bedtime  ondansetron Injectable 8 milliGRAM(s) IV Push every 24 hours  pantoprazole    Tablet 40 milliGRAM(s) Oral every 12 hours  polyethylene glycol 3350 17 Gram(s) Oral two times a day  senna 2 Tablet(s) Oral at bedtime  MEDICATIONS  (PRN):  acetaminophen     Tablet .. 650 milliGRAM(s) Oral once PRN chemotherapy reaction  albuterol    90 MICROgram(s) HFA Inhaler 2 Puff(s) Inhalation every 6 hours PRN Shortness of Breath and/or Wheezing  aluminum hydroxide/magnesium hydroxide/simethicone Suspension 30 milliLiter(s) Oral every 4 hours PRN Dyspepsia  oxyCODONE    IR 20 milliGRAM(s) Oral every 4 hours PRN Moderate Pain (4 - 6)  oxyCODONE    IR 30 milliGRAM(s) Oral every 4 hours PRN Severe Pain (7 - 10)      PHYSICAL EXAM  T(C): 36.4 (04-29-25 @ 12:50), Max: 36.9 (04-28-25 @ 17:00)  HR: 80 (04-29-25 @ 12:50) (72 - 88)  BP: 124/64 (04-29-25 @ 12:50) (91/56 - 124/64)  RR: 18 (04-29-25 @ 12:50) (18 - 18)  SpO2: 98% (04-29-25 @ 12:50) (92% - 98%)    04-28-25 @ 07:01  -  04-29-25 @ 07:00  --------------------------------------------------------  IN: 150 mL / OUT: 0 mL / NET: 150 mL  Non-toxic-appearing woman, crying hysterically in bed  Answering all questions appropriately (conversational dyspnea resolved), following commands  Anicteric sclera, no oral lesions/thrush  RRR, no m/r/g, heart sounds faint  Breaths somewhat labored, but not tachypnea; trace RLB crackles, no w/r  Abd soft/nt/nd, hypoactive bowel sounds  No peripheral edema; DIP/PIP, ankle joint deformities unchanged  CN 2-12 grossly intact; no gross focal neuro deficits; pt moves all extremities spontaneously      LABS                        10.8   3.78  )-----------( 192      ( 29 Apr 2025 07:55 )             32.6     04-29    138  |  102  |  11  ----------------------------<  126[H]  4.4   |  27  |  0.46[L]    Ca    8.7      29 Apr 2025 07:55  Phos  3.7     04-29  Mg     1.90     04-29    TPro  5.9[L]  /  Alb  3.6  /  TBili  0.3  /  DBili  x   /  AST  17  /  ALT  8   /  AlkPhos  89  04-29      MICROBIOLOGY  Abx: none on this admission.    Cx Data: none new on this admission.      HISTORICAL RADIOLOGY  4/28 AXR: nonobstructive bowel gas pattern. Large stool burden.    3/20 NC CT chest  1.  Moderate-sized pericardial effusion appears unchanged compared to   partially imaged heart on 3/18/2025 but increase insize since 2/15/2025.  2.  Interval increase in size of previously described left suprahilar   mediastinal mass which extends into the AP window and paramediastinal   left upper lobe.  3.  Some of the other left upper lobe nodules are decrease and some   increase.  4.  Unchanged sclerotic lesions within the sternum and T12 vertebral body.    3/20 TTE   1. Limited study to re-evaluate pericardial effusion.   2. There is a moderate pericardial effusion noted adjacent to the posterior left ventricle, a moderate pericardial effusion noted adjacent to the right atrium, a small pericardial effusion noted adjacent to the apex, a moderate pericardial effusion noted adjacent to the lateral left ventricle and a moderate pericardial effusion noted adjacent to the right ventricle with no echocardiographic evidence of tamponade physiology. Moderate pericardial effusion, measuring ~ 1.1 cm posterior to the left ventricle, ~ 1.0 cm lateral to the left ventricle, ~ 1.4 cm adjacent to the RV free wall, and ~ 1.4 cm superior to the right atrium. Small pericardial effusion anterior to the right ventricle and adjacent to the apex. The pericardium adjacent to the RV free wall appears markedly thickened and may reflect the presence of thrombus, inflammatory material, and/or metastatic disease. No RA or RV diastolic collapse seen. However, the inferior vena cava (IVC) displays reduced respirophasic variability in its caliber, consistent with elevated right atrial pressure.   3. The inferior vena cava is normal in size measuring 1.30 cm in diameter, (normal <2.1cm) with abnormal inspiratory collapse (abnormal <50%) consistent with mildly elevated right atrial pressure (~8, range 5-10mmHg).   4. Compared to the transthoracic echocardiogram performed on 3/12/2025, the pericardial effusion has increased slightly in size.    3/18 CT abd/pelv: No acute pathology within the abdomen and pelvis. Partially imaged pericardial effusion. Bilateral trace pleural effusions with adjacent atelectasis. Again demonstrated a T12 vertebral body hemangioma. Degenerative changes of the spine.    3/18 CT head: No hydrocephalus, acute intracranial hemorrhage, or mass effect. No compelling evidence of intracranial metastasis on this non-contrast exam.  
SOLID TUMOR ONCOLOGY HOSPITALIST PROGRESS NOTE    S: No acute events overnight.  Pt had no new complaints today.  She denied having bm after Relistor, but denied abd pain, n/v.    CURRENT MEDICATIONS  MEDICATIONS  (STANDING):  apixaban 5 milliGRAM(s) Oral two times a day  baclofen 5 milliGRAM(s) Oral every 8 hours  bisacodyl 5 milliGRAM(s) Oral at bedtime  chlorhexidine 2% Cloths 1 Application(s) Topical <User Schedule>  clonazePAM  Tablet 0.5 milliGRAM(s) Oral <User Schedule>  clonazePAM  Tablet 1 milliGRAM(s) Oral at bedtime  colchicine 0.6 milliGRAM(s) Oral daily  dexAMETHasone  IVPB 8 milliGRAM(s) IV Intermittent every 24 hours  etoposide (TOPOSAR) IVPB (eMAR) 144 milliGRAM(s) IV Intermittent every 24 hours  fluticasone propionate/ salmeterol 100-50 MICROgram(s) Diskus 1 Dose(s) Inhalation two times a day  gabapentin 100 milliGRAM(s) Oral three times a day  influenza  Vaccine (HIGH DOSE) 0.5 milliLiter(s) IntraMuscular once  levothyroxine 112 MICROGram(s) Oral daily  morphine ER Tablet 30 milliGRAM(s) Oral every 8 hours  naloxegol 25 milliGRAM(s) Oral daily  nicotine - 21 mG/24Hr(s) Patch 1 Patch Transdermal daily  OLANZapine 5 milliGRAM(s) Oral at bedtime  ondansetron Injectable 8 milliGRAM(s) IV Push every 24 hours  pantoprazole    Tablet 40 milliGRAM(s) Oral every 12 hours  polyethylene glycol 3350 17 Gram(s) Oral two times a day  senna 2 Tablet(s) Oral at bedtime  sodium chloride 0.9%. 1000 milliLiter(s) (10 mL/Hr) IV Continuous <Continuous>  MEDICATIONS  (PRN):  acetaminophen     Tablet .. 650 milliGRAM(s) Oral once PRN chemotherapy reaction  albuterol    90 MICROgram(s) HFA Inhaler 2 Puff(s) Inhalation every 6 hours PRN Shortness of Breath and/or Wheezing  aluminum hydroxide/magnesium hydroxide/simethicone Suspension 30 milliLiter(s) Oral every 4 hours PRN Dyspepsia  oxyCODONE    IR 20 milliGRAM(s) Oral every 4 hours PRN Moderate Pain (4 - 6)  oxyCODONE    IR 30 milliGRAM(s) Oral every 4 hours PRN Severe Pain (7 - 10)      PHYSICAL EXAM  T(C): 36.7 (04-30-25 @ 12:20), Max: 36.7 (04-30-25 @ 12:20)  HR: 73 (04-30-25 @ 12:20) (51 - 78)  BP: 111/61 (04-30-25 @ 12:20) (111/61 - 121/97)  RR: 20 (04-30-25 @ 12:20) (18 - 20)  SpO2: 98% (04-30-25 @ 12:20) (98% - 100%)  Non-toxic-appearing woman, crying hysterically in bed  Answering all questions appropriately (conversational dyspnea resolved), following commands  Anicteric sclera, no oral lesions/thrush  RRR, no m/r/g, heart sounds faint  Breaths somewhat labored, but not tachypnea; trace RLB crackles, no w/r  Abd soft/nt/nd, hypoactive bowel sounds  No peripheral edema; DIP/PIP, ankle joint deformities unchanged  CN 2-12 grossly intact; no gross focal neuro deficits; pt moves all extremities spontaneously    LABS                        10.6   4.40  )-----------( 165      ( 30 Apr 2025 07:10 )             32.4     04-30    138  |  103  |  13  ----------------------------<  99  3.9   |  24  |  0.44[L]    Ca    8.5      30 Apr 2025 07:10  Phos  3.0     04-30  Mg     2.10     04-30    TPro  5.6[L]  /  Alb  3.4  /  TBili  0.3  /  DBili  x   /  AST  17  /  ALT  8   /  AlkPhos  83  04-30      MICROBIOLOGY  Abx: none on this admission.    Cx Data: none new on this admission.      HISTORICAL RADIOLOGY  4/28 AXR: nonobstructive bowel gas pattern. Large stool burden.    3/20 NC CT chest  1.  Moderate-sized pericardial effusion appears unchanged compared to   partially imaged heart on 3/18/2025 but increase insize since 2/15/2025.  2.  Interval increase in size of previously described left suprahilar   mediastinal mass which extends into the AP window and paramediastinal   left upper lobe.  3.  Some of the other left upper lobe nodules are decrease and some   increase.  4.  Unchanged sclerotic lesions within the sternum and T12 vertebral body.    3/20 TTE   1. Limited study to re-evaluate pericardial effusion.   2. There is a moderate pericardial effusion noted adjacent to the posterior left ventricle, a moderate pericardial effusion noted adjacent to the right atrium, a small pericardial effusion noted adjacent to the apex, a moderate pericardial effusion noted adjacent to the lateral left ventricle and a moderate pericardial effusion noted adjacent to the right ventricle with no echocardiographic evidence of tamponade physiology. Moderate pericardial effusion, measuring ~ 1.1 cm posterior to the left ventricle, ~ 1.0 cm lateral to the left ventricle, ~ 1.4 cm adjacent to the RV free wall, and ~ 1.4 cm superior to the right atrium. Small pericardial effusion anterior to the right ventricle and adjacent to the apex. The pericardium adjacent to the RV free wall appears markedly thickened and may reflect the presence of thrombus, inflammatory material, and/or metastatic disease. No RA or RV diastolic collapse seen. However, the inferior vena cava (IVC) displays reduced respirophasic variability in its caliber, consistent with elevated right atrial pressure.   3. The inferior vena cava is normal in size measuring 1.30 cm in diameter, (normal <2.1cm) with abnormal inspiratory collapse (abnormal <50%) consistent with mildly elevated right atrial pressure (~8, range 5-10mmHg).   4. Compared to the transthoracic echocardiogram performed on 3/12/2025, the pericardial effusion has increased slightly in size.    3/18 CT abd/pelv: No acute pathology within the abdomen and pelvis. Partially imaged pericardial effusion. Bilateral trace pleural effusions with adjacent atelectasis. Again demonstrated a T12 vertebral body hemangioma. Degenerative changes of the spine.    3/18 CT head: No hydrocephalus, acute intracranial hemorrhage, or mass effect. No compelling evidence of intracranial metastasis on this non-contrast exam.

## 2025-04-30 NOTE — DISCHARGE NOTE PROVIDER - ATTENDING DISCHARGE PHYSICAL EXAMINATION:
Vital Signs Last 24 Hrs  T(C): 36.4 (01 May 2025 12:20), Max: 36.6 (30 Apr 2025 19:15)  T(F): 97.6 (01 May 2025 12:20), Max: 97.9 (30 Apr 2025 20:59)  HR: 82 (01 May 2025 12:20) (50 - 83)  BP: 118/64 (01 May 2025 12:20) (100/65 - 131/70)  RR: 18 (01 May 2025 12:20) (17 - 20)  SpO2: 100% (01 May 2025 12:20) (96% - 100%)  Parameters below as of 01 May 2025 12:20  Patient On (Oxygen Delivery Method): nasal cannula  O2 Flow (L/min): 3  Non-toxic-appearing woman, crying hysterically in bed  Answering all questions appropriately (conversational dyspnea resolved), following commands  Anicteric sclera, no oral lesions/thrush  RRR, no m/r/g, heart sounds faint  Breaths somewhat labored, but not tachypnea; trace RLB crackles, no w/r  Abd soft/nt/nd, hypoactive bowel sounds  No peripheral edema; DIP/PIP, ankle joint deformities unchanged  CN 2-12 grossly intact; no gross focal neuro deficits; pt moves all extremities spontaneously

## 2025-04-30 NOTE — DIETITIAN INITIAL EVALUATION ADULT - PHYSCIAL ASSESSMENT
Dosing weight 65.8kg/144.8lbs (4/28). Weight trend is fluctuated per [facility] weight history: 65.8kg (4/28); 63.5kg(3/11); 65.9kg(2/20); 67.1kg(12/20/24).

## 2025-04-30 NOTE — DISCHARGE NOTE PROVIDER - HOSPITAL COURSE
65 y/o F, former smoker (47py, quit 12/2024), pA. 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 C/b constipation, AXR no obstruction, s/p relistor.     SCLCa (presumed limited stage)  -S/p C3d3 Carbo/Etop(20% dose reduced)  -C/b SOB and roche erythema after initiation of Etop, given Methylpred/pepcid/benadryl with improvement, restarted at slower rate; tolerated thereafter   -S/p Fluphila to be dosed on 5/1  -Pt to continue outpt fu with Dr. Hall after discharge  -Long-term prognosis: guarded; pt is full code    Anxiety/depression  -Continued Klonopin 0.5mg BID and 1mg nightly  -Continued Zyprexa 5mg nightly    Chronic hypoxic resp failure  h/o Asthma/COPD (former smoker)  -Continued supplemental O2 with weaning as tolerated and supportive resp care prn  -Continued Advair 100-50mcg MDI BID  -Continued duonebs q6/prn   -Continued Nicotine patch 21mg daily (6 weeks)    SIADH  -Pt is asymptomatic  -SIADH was previously dx by 3/12 urine lytes but Na has been normal off salt tabs and fluid restriction since pt has started cancer treatment  -Monitored     Cancer related pain  -Continued Morphine ER 30mg q12, Oxy IR 20/30mg q8/prn, Gabapentin 100mg TID, Baclofen 5mg q8/prn    Constipation  -Possibly due to opioids +/- AI disease/IBS  -Bowel regimen (Miralax, Senna) for OIC prevention  -4/28 AXR: nonobstructive bowel gas pattern. Large stool burden  -Pt declined suppository, enema but agreed to Relistor; awaiting BM ____     Hypothyroidism  -Continued LT4 112mcg daily    h/o pericardial effusion  -Most likely inflammatory; continuedColchicine 0.6mg daily    pAfib  Hypercoag state  -Continued Eliquis 5mg BID   65 y/o F, former smoker (47py, quit 12/2024), pA. 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 C/b constipation, AXR no obstruction, s/p relistor.     SCLCa (presumed limited stage)  -S/p C3d3 Carbo/Etop(20% dose reduced)  -C/b SOB and roche erythema after initiation of Etop, given Methylpred/pepcid/benadryl with improvement, restarted at slower rate; tolerated thereafter   -S/p Fluphila to be dosed on 5/1  -Pt to continue outpt fu with Dr. Hall after discharge  -Long-term prognosis: guarded; pt is full code    Anxiety/depression  -Continued Klonopin 0.5mg BID and 1mg nightly  -Continued Zyprexa 5mg nightly    Chronic hypoxic resp failure  h/o Asthma/COPD (former smoker)  -Continued supplemental O2 with weaning as tolerated and supportive resp care prn  -Continued Advair 100-50mcg MDI BID  -Continued duonebs q6/prn   -Continued Nicotine patch 21mg daily (6 weeks)    SIADH  -Pt is asymptomatic  -SIADH was previously dx by 3/12 urine lytes but Na has been normal off salt tabs and fluid restriction since pt has started cancer treatment  -Monitored     Cancer related pain  -Continued Morphine ER 30mg q12, Oxy IR 20/30mg q8/prn, Gabapentin 100mg TID, Baclofen 5mg q8/prn    Constipation  -Possibly due to opioids +/- AI disease/IBS  -Bowel regimen (Miralax, Senna) for OIC prevention  -4/28 AXR: nonobstructive bowel gas pattern. Large stool burden  -Pt declined suppository, enema but agreed to Relistor x 2, 4/29, 5/1    Hypothyroidism  -Continued LT4 112mcg daily    h/o pericardial effusion  -Most likely inflammatory; continued Colchicine 0.6mg daily    pAfib  Hypercoag state  -Continued Eliquis 5mg BID   63 y/o F, former smoker (47py, quit 12/2024), pA. 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 C/b constipation, AXR no obstruction, s/p relistor.     SCLCa (presumed limited stage)  -S/p C3d3 Carbo/Etop(20% dose reduced)  -C/b SOB and roche erythema on day 1 of Etop, given Methylpred/pepcid/benadryl with improvement, restarted at slower rate; tolerated thereafter   -S/p Fluphila dosed on 5/1  -Pt to continue outpt fu with Dr. Hall after discharge  -Long-term prognosis: guarded; pt is full code    Anxiety/depression  -Continued Klonopin 0.5mg BID and 1mg nightly  -Continued Zyprexa 5mg nightly    Chronic hypoxic resp failure  h/o Asthma/COPD (former smoker)  -Continued supplemental O2 with weaning as tolerated and supportive resp care prn  -Continued Advair 100-50mcg MDI BID  -Continued duonebs q6/prn   -Continued Nicotine patch 21mg daily (6 weeks)    SIADH  -Pt is asymptomatic  -SIADH was previously dx by 3/12 urine lytes but Na has been normal off salt tabs and fluid restriction since pt has started cancer treatment  -Monitored     Cancer related pain  -Continued Morphine ER 30mg q12, Oxy IR 20/30mg q8/prn, Gabapentin 100mg TID, Baclofen 5mg q8/prn    Constipation  -Possibly due to opioids +/- AI disease/IBS  -Bowel regimen (Miralax, Senna) for OIC prevention  -4/28 AXR: nonobstructive bowel gas pattern. Large stool burden  -Pt declined suppository, enema but agreed to Relistor x 2, 4/29, 5/1    Hypothyroidism  -Continued LT4 112mcg daily    h/o pericardial effusion  -Most likely inflammatory; continued Colchicine 0.6mg daily    pAfib  Hypercoag state  -Continued Eliquis 5mg BID   65 yo woman, former smoker (47py; quit 12/2024) and well known to the OncHos service, with h/o pAfib (on Eliquis), Hypothyroidism, ? h/o Sleroderma, h/o pericardial effusion s/p pericardiocentesis (dx in 12/2024; ? 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 while hospitalized at [facility]- she tolerated tx without significant adverse effect. She presented as an outpt for C2 on 4/14- however, pt received Cosela and developed "severe headache" for which she required ED eval, but was ultimately sent home (she did not receive Etop or carbo on d1); on day 2 she received Etoposide for 5 minutes and started to have chest tightness and shortness of breath which was treated as a hypersensitivity reaction (she was not re-challenged due to patient preference at the time; she did not receive carbo or cosela on D2).   Pt is was electively admitted on 4/28 for C3 Carbo/Etop. Her treatment course was c/b SOB and roche erythema after initiation of Etop, given Methylpred/pepcid/benadryl with improvement, restarted at slower rate. C/b constipation, AXR no obstruction, s/p relistor.     SCLCa (presumed limited stage)  -S/p C3d3 Carbo/Etop(20% dose reduced)  -C/b SOB and roche erythema on day 1 of Etop, given Methylpred/pepcid/benadryl with improvement, restarted at slower rate; tolerated thereafter   -S/p Fluphila dosed on 5/1  -Pt to continue outpt fu with Dr. Hall after discharge  -Long-term prognosis: guarded; pt is full code    Anxiety/depression  -Continued Klonopin 0.5mg BID and 1mg nightly  -Continued Zyprexa 5mg nightly    Chronic hypoxic resp failure  h/o Asthma/COPD (former smoker)  -Continued supplemental O2 with weaning as tolerated and supportive resp care prn  -Continued Advair 100-50mcg MDI BID  -Continued duonebs q6/prn   -Continued Nicotine patch 21mg daily (6 weeks)    SIADH  -Pt is asymptomatic  -SIADH was previously dx by 3/12 urine lytes but Na has been normal off salt tabs and fluid restriction since pt has started cancer treatment  -Monitored     Cancer related pain  -Continued Morphine ER 30mg q12, Oxy IR 20/30mg q8/prn, Gabapentin 100mg TID, Baclofen 5mg q8/prn    Constipation  -Possibly due to opioids +/- AI disease/IBS  -Bowel regimen (Miralax, Senna) for OIC prevention  -4/28 AXR: nonobstructive bowel gas pattern. Large stool burden  -Pt declined suppository, enema but agreed to Relistor x 2, 4/29, 5/1    Hypothyroidism  -Continued LT4 112mcg daily    h/o pericardial effusion  -Most likely inflammatory; continued Colchicine 0.6mg daily    pAfib  Hypercoag state  -Continued Eliquis 5mg BID

## 2025-04-30 NOTE — DISCHARGE NOTE PROVIDER - NSDCFUSCHEDAPPT_GEN_ALL_CORE_FT
César Mercado  [facility] Physician [facility]  PSYCHIATRY [address] [place] [address]   Scheduled Appointment: 05/22/2025

## 2025-04-30 NOTE — DIETITIAN INITIAL EVALUATION ADULT - OTHER INFO
Met patient at bedside. Patient alert, A&O x4. Patient reports poor appetite in house. Per RN flowsheets intake is 0-75%. Patient denies any in house nausea, vomiting, diarrhea, but reports constipation. Last BM noted on 4/24 per RN flowsheets. Bowel regimen is in placed. Patient confirms shellfish allergy. IV fluids noted for hydration. Patient declined protein shake and deferred writer's offer to document any food preferences at this time. Education declined. RD to remain available for further nutritional interventions as indicated

## 2025-04-30 NOTE — DIETITIAN INITIAL EVALUATION ADULT - ADD RECOMMEND
1. Encourage PO intake and honor food preferences as able.   2. Please consistently document %PO intake in nursing flowsheets   3. Monitor PO intake, Labs, weights, BMs, and skin integrity.

## 2025-04-30 NOTE — DIETITIAN INITIAL EVALUATION ADULT - PERTINENT MEDS FT
MEDICATIONS  (STANDING):  apixaban 5 milliGRAM(s) Oral two times a day  baclofen 5 milliGRAM(s) Oral every 8 hours  bisacodyl 5 milliGRAM(s) Oral at bedtime  chlorhexidine 2% Cloths 1 Application(s) Topical <User Schedule>  clonazePAM  Tablet 0.5 milliGRAM(s) Oral <User Schedule>  clonazePAM  Tablet 1 milliGRAM(s) Oral at bedtime  colchicine 0.6 milliGRAM(s) Oral daily  fluticasone propionate/ salmeterol 100-50 MICROgram(s) Diskus 1 Dose(s) Inhalation two times a day  gabapentin 100 milliGRAM(s) Oral three times a day  influenza  Vaccine (HIGH DOSE) 0.5 milliLiter(s) IntraMuscular once  levothyroxine 112 MICROGram(s) Oral daily  morphine ER Tablet 30 milliGRAM(s) Oral every 8 hours  naloxegol 25 milliGRAM(s) Oral daily  nicotine - 21 mG/24Hr(s) Patch 1 Patch Transdermal daily  OLANZapine 5 milliGRAM(s) Oral at bedtime  pantoprazole    Tablet 40 milliGRAM(s) Oral every 12 hours  polyethylene glycol 3350 17 Gram(s) Oral two times a day  senna 2 Tablet(s) Oral at bedtime  sodium chloride 0.9%. 1000 milliLiter(s) (10 mL/Hr) IV Continuous <Continuous>    MEDICATIONS  (PRN):  acetaminophen     Tablet .. 650 milliGRAM(s) Oral once PRN chemotherapy reaction  albuterol    90 MICROgram(s) HFA Inhaler 2 Puff(s) Inhalation every 6 hours PRN Shortness of Breath and/or Wheezing  aluminum hydroxide/magnesium hydroxide/simethicone Suspension 30 milliLiter(s) Oral every 4 hours PRN Dyspepsia  oxyCODONE    IR 20 milliGRAM(s) Oral every 4 hours PRN Moderate Pain (4 - 6)  oxyCODONE    IR 30 milliGRAM(s) Oral every 4 hours PRN Severe Pain (7 - 10)

## 2025-04-30 NOTE — DIETITIAN INITIAL EVALUATION ADULT - PERTINENT LABORATORY DATA
04-30    138  |  103  |  13  ----------------------------<  99  3.9   |  24  |  0.44[L]    Ca    8.5      30 Apr 2025 07:10  Phos  3.0     04-30  Mg     2.10     04-30    TPro  5.6[L]  /  Alb  3.4  /  TBili  0.3  /  DBili  x   /  AST  17  /  ALT  8   /  AlkPhos  83  04-30

## 2025-04-30 NOTE — PROGRESS NOTE ADULT - ASSESSMENT
65 yo woman, former smoker (47py; quit 12/2024) and well known to the OncHos service, with h/o pAfib (on Eliquis), Hypothyroidism, ? h/o Sleroderma, h/o pericardial effusion s/p pericardiocentesis (dx in 12/2024; ? 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 while hospitalized at [facility]- she tolerated tx without significant adverse effect. She presented as an outpt for C2 on 4/14- however, pt received Cosela and developed "severe headache" for which she required ED eval, but was ultimately sent home (she did not receive Etop or carbo on d1); on day 2 she received Etoposide for 5 minutes and started to have chest tightness and shortness of breath which was treated as a hypersensitivity reaction (she was not re-challenged due to patient preference at the time; she did not receive carbo or cosela on D2).   Pt is was electively admitted on 4/28 for C3 Carbo/Etop.    ACTIVE PROBLEMS  SCLCa (presumed limited stage)  Admission for chemotherapy  Anxiety/depression  pAfib  Hypercoag state  Immunocompromised due to chemotherapy    SCLCa (presumed limited stage)  Completing C3d3 Carbo/Etop today (20% dose reduced)- pt tolerated without significant adverse effect   * During d1 of chemo- pt developed palmar erythema and shortness of breath after completion of Carbo; VSS, resolved after Benadryl, Methylpred; pt was able to continue with Etoposide infusion but ultimately required rate slowing given heightened anxiety/dyspnea during the infusion  Fluphila to be dosed on 5/1  Pt to continue outpt fu with Dr. Hall after discharge  Long-term prognosis: guarded; pt is full code    Anxiety/depression  Continuing Klonopin 0.5mg BID and 1mg nightly  Continuing Zyprexa 5mg nightly    Chronic hypoxic resp failure  h/o Asthma/COPD (former smoker)  Continuing supplemental O2 with weaning as tolerated and supportive resp care prn  Continuing Advair 100-50mcg MDI BID  Continuing duonebs q6/prn   Continuing Nicotine patch 21mg daily (6 weeks)    SIADH  Na 138 today  Pt is asymptomatic  SIADH was previously dx by 3/12 urine lytes but Na has been normal off salt tabs and fluid restriction since pt has started cancer treatment  Will continue to monitor    Cancer related pain  Continuing Morphine ER 30mg q12  Continuing Oxy IR 20/30mg q8/prn  Continuing Gabapentin 100mg TID  Continuing Baclofen 5mg q8/prn    Constipation  Possibly due to opioids +/- AI disease/IBS  Pt denied abd pain, diarrhea, but reported mild nausea  4/28 AXR: nonobstructive bowel gas pattern. Large stool burden.  Pt declined suppository, enema but agreed to Relistor- will be dosed later today after pt finishes Etoposide  Continuing bowel regimen (Miralax, Senna) for OIC prevention    Hypothyroidism: continuing LT4 112mcg daily    h/o pericardial effusion: most likely inflammatory; continuing Colchicine 0.6mg daily    pAfib  Hypercoag state  Continuing Eliquis 5mg BID
65 yo woman, former smoker (47py; quit 12/2024) and well known to the OncHos service, with h/o pAfib (on Eliquis), Hypothyroidism, ? h/o Sleroderma, h/o pericardial effusion s/p pericardiocentesis (dx in 12/2024; ? 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 while hospitalized at [facility]- she tolerated tx without significant adverse effect. She presented as an outpt for C2 on 4/14- however, pt received Cosela and developed "severe headache" for which she required ED eval, but was ultimately sent home (she did not receive Etop or carbo on d1); on day 2 she received Etoposide for 5 minutes and started to have chest tightness and shortness of breath which was treated as a hypersensitivity reaction (she was not re-challenged due to patient preference at the time; she did not receive carbo or cosela on D2).   Pt is was electively admitted on 4/28 for C3 Carbo/Etop.    ACTIVE PROBLEMS  SCLCa (presumed limited stage)  Admission for chemotherapy  Anxiety/depression  pAfib  Hypercoag state  Immunocompromised due to chemotherapy    SCLCa (presumed limited stage)  Proceeding with C3 Carbo/Etop today (20% dose reduced)- day 2 of 3 today   * During d1 of chemo- pt developed palmar erythema and shortness of breath after completion of Carbo; VSS, resolved after Benadryl, Methylpred; pt was able to continue with Etoposide infusion but ultimately required rate slowing given heightened anxiety/dyspnea during the infusion  Monitoring and with plan to treat hypersensitivity reaction as per protocol  Fluphila x1 dose to be given on d4 (5/1)  Pt to continue outpt fu with Dr. Hall after discharge  Long-term prognosis: guarded; pt is full code    Anxiety/depression  Continuing Klonopin 0.5mg BID and 1mg nightly  Continuing Zyprexa 5mg nightly    Chronic hypoxic resp failure  h/o Asthma/COPD (former smoker)  Continuing supplemental O2 with weaning as tolerated and supportive resp care prn  Continuing Advair 100-50mcg MDI BID  Continuing duonebs q6/prn   Continuing Nicotine patch 21mg daily (6 weeks)    SIADH  Na 138 today  Pt is asymptomatic  SIADH was previously dx by 3/12 urine lytes but Na has been normal off salt tabs and fluid restriction since pt has started cancer treatment  Will continue to monitor    Cancer related pain  Continuing Morphine ER 30mg q12  Continuing Oxy IR 20/30mg q8/prn  Continuing Gabapentin 100mg TID  Continuing Baclofen 5mg q8/prn    Constipation  Possibly due to opioids +/- AI disease/IBS  Pt denied abd pain, diarrhea, but reported mild nausea  4/28 AXR: nonobstructive bowel gas pattern. Large stool burden.  Pt declined suppository, enema but agreed to Relistor- will be dosed later today after pt finishes Etoposide  Continuing bowel regimen (Miralax, Senna) for OIC prevention    Hypothyroidism: continuing LT4 112mcg daily    h/o pericardial effusion: most likely inflammatory; continuing Colchicine 0.6mg daily    pAfib  Hypercoag state  Continuing Eliquis 5mg BID

## 2025-04-30 NOTE — DISCHARGE NOTE PROVIDER - NSDCMRMEDTOKEN_GEN_ALL_CORE_FT
albuterol 90 mcg/inh inhalation aerosol: 2 puff(s) inhaled every 6 hours As needed Shortness of Breath and/or Wheezing  aluminum hydroxide-magnesium hydroxide 200 mg-200 mg/5 mL oral suspension: 30 milliliter(s) orally every 4 hours As needed Dyspepsia  apixaban 5 mg oral tablet: 1 tab(s) orally every 12 hours  baclofen 5 mg oral tablet: 1 tab(s) orally every 8 hours  bisacodyl 5 mg oral delayed release tablet: 1 tab(s) orally once a day (at bedtime)  clonazePAM 0.5 mg oral tablet: 1 tab(s) orally 2 times a day 7am, 3pm MDD: 2  clonazePAM 1 mg oral tablet: 1 tab(s) orally once a day (at bedtime) MDD: 1  colchicine 0.6 mg oral tablet: 1 tab(s) orally once a day  fluticasone-salmeterol: 1 dose(s) inhaled 2 times a day 100-50 mcg diskus  gabapentin 100 mg oral capsule: 1 cap(s) orally 3 times a day  levothyroxine 112 mcg (0.112 mg) oral tablet: 1 tab(s) orally once a day  menthol-benzocaine: 1 lozenge orally 3 times a day as needed for SORE THROAT  morphine 30 mg/8 to 12 hr oral tablet, extended release: 1 tab(s) orally every 8 hours MDD: 3  naloxegol 25 mg oral tablet: 1 tab(s) orally once a day  nicotine 21 mg/24 hr transdermal film, extended release: 1 patch transdermal once a day  OLANZapine 5 mg oral tablet: 1 tab(s) orally once a day (at bedtime)  oxyCODONE 20 mg oral tablet: 1 tab(s) orally every 4 hours as needed for Moderate Pain (4 - 6) MDD: 6  oxyCODONE 30 mg oral tablet: 1 tab(s) orally every 4 hours as needed for Severe Pain (7 - 10) MDD: 6  pantoprazole 40 mg oral delayed release tablet: 1 tab(s) orally every 12 hours  polyethylene glycol 3350 oral powder for reconstitution: 17 gram(s) orally 2 times a day  senna leaf extract oral tablet: 2 tab(s) orally once a day (at bedtime)

## 2025-04-30 NOTE — DISCHARGE NOTE PROVIDER - NSDCCPCAREPLAN_GEN_ALL_CORE_FT
PRINCIPAL DISCHARGE DIAGNOSIS  Diagnosis: Small cell lung cancer  Assessment and Plan of Treatment: You were directly admitted for Cycle 3 of chemo (dose reduced Carbo/Etop). You had some shortness and breath and hand redness and were given steroids/pepcid and benadryl with improvement of symptoms. You tolerated the chemotherapy thereafter at a slower rate. You were given Fluphila prior to discharge to maintain your blood counts. Out patient follow up with Dr. Hall after discharge.      SECONDARY DISCHARGE DIAGNOSES  Diagnosis: Constipation  Assessment and Plan of Treatment: You had constipation. Abdominal xray was without obstruction but did show large stool burden. You recieved bowel regimen as well as relistor. Continue with bowel regimen upon discharge.    Diagnosis: Anxiety and depression  Assessment and Plan of Treatment: Continue with medications as previously prescribed. Outpatient follow up with Dr. Mercado.

## 2025-05-01 ENCOUNTER — TRANSCRIPTION ENCOUNTER (OUTPATIENT)
Age: 65
End: 2025-05-01

## 2025-05-01 VITALS
DIASTOLIC BLOOD PRESSURE: 64 MMHG | OXYGEN SATURATION: 100 % | HEART RATE: 82 BPM | TEMPERATURE: 98 F | SYSTOLIC BLOOD PRESSURE: 118 MMHG | RESPIRATION RATE: 18 BRPM

## 2025-05-01 LAB
ALBUMIN SERPL ELPH-MCNC: 3.3 G/DL — SIGNIFICANT CHANGE UP (ref 3.3–5)
ALP SERPL-CCNC: 85 U/L — SIGNIFICANT CHANGE UP (ref 40–120)
ALT FLD-CCNC: 8 U/L — SIGNIFICANT CHANGE UP (ref 4–33)
ANION GAP SERPL CALC-SCNC: 9 MMOL/L — SIGNIFICANT CHANGE UP (ref 7–14)
AST SERPL-CCNC: 19 U/L — SIGNIFICANT CHANGE UP (ref 4–32)
BASOPHILS # BLD AUTO: 0.01 K/UL — SIGNIFICANT CHANGE UP (ref 0–0.2)
BASOPHILS NFR BLD AUTO: 0.2 % — SIGNIFICANT CHANGE UP (ref 0–2)
BILIRUB SERPL-MCNC: 0.3 MG/DL — SIGNIFICANT CHANGE UP (ref 0.2–1.2)
BUN SERPL-MCNC: 12 MG/DL — SIGNIFICANT CHANGE UP (ref 7–23)
CALCIUM SERPL-MCNC: 8.3 MG/DL — LOW (ref 8.4–10.5)
CHLORIDE SERPL-SCNC: 103 MMOL/L — SIGNIFICANT CHANGE UP (ref 98–107)
CO2 SERPL-SCNC: 25 MMOL/L — SIGNIFICANT CHANGE UP (ref 22–31)
CREAT SERPL-MCNC: 0.42 MG/DL — LOW (ref 0.5–1.3)
EGFR: 108 ML/MIN/1.73M2 — SIGNIFICANT CHANGE UP
EGFR: 108 ML/MIN/1.73M2 — SIGNIFICANT CHANGE UP
EOSINOPHIL # BLD AUTO: 0 K/UL — SIGNIFICANT CHANGE UP (ref 0–0.5)
EOSINOPHIL NFR BLD AUTO: 0 % — SIGNIFICANT CHANGE UP (ref 0–6)
GLUCOSE SERPL-MCNC: 103 MG/DL — HIGH (ref 70–99)
HCT VFR BLD CALC: 30.8 % — LOW (ref 34.5–45)
HGB BLD-MCNC: 10.3 G/DL — LOW (ref 11.5–15.5)
IANC: 2.98 K/UL — SIGNIFICANT CHANGE UP (ref 1.8–7.4)
IMM GRANULOCYTES NFR BLD AUTO: 0.5 % — SIGNIFICANT CHANGE UP (ref 0–0.9)
LYMPHOCYTES # BLD AUTO: 0.92 K/UL — LOW (ref 1–3.3)
LYMPHOCYTES # BLD AUTO: 21.9 % — SIGNIFICANT CHANGE UP (ref 13–44)
MAGNESIUM SERPL-MCNC: 2 MG/DL — SIGNIFICANT CHANGE UP (ref 1.6–2.6)
MCHC RBC-ENTMCNC: 30.9 PG — SIGNIFICANT CHANGE UP (ref 27–34)
MCHC RBC-ENTMCNC: 33.4 G/DL — SIGNIFICANT CHANGE UP (ref 32–36)
MCV RBC AUTO: 92.5 FL — SIGNIFICANT CHANGE UP (ref 80–100)
MONOCYTES # BLD AUTO: 0.27 K/UL — SIGNIFICANT CHANGE UP (ref 0–0.9)
MONOCYTES NFR BLD AUTO: 6.4 % — SIGNIFICANT CHANGE UP (ref 2–14)
NEUTROPHILS # BLD AUTO: 2.98 K/UL — SIGNIFICANT CHANGE UP (ref 1.8–7.4)
NEUTROPHILS NFR BLD AUTO: 71 % — SIGNIFICANT CHANGE UP (ref 43–77)
NRBC # BLD AUTO: 0 K/UL — SIGNIFICANT CHANGE UP (ref 0–0)
NRBC # FLD: 0 K/UL — SIGNIFICANT CHANGE UP (ref 0–0)
NRBC BLD AUTO-RTO: 0 /100 WBCS — SIGNIFICANT CHANGE UP (ref 0–0)
PHOSPHATE SERPL-MCNC: 3 MG/DL — SIGNIFICANT CHANGE UP (ref 2.5–4.5)
PLATELET # BLD AUTO: 172 K/UL — SIGNIFICANT CHANGE UP (ref 150–400)
POTASSIUM SERPL-MCNC: 4 MMOL/L — SIGNIFICANT CHANGE UP (ref 3.5–5.3)
POTASSIUM SERPL-SCNC: 4 MMOL/L — SIGNIFICANT CHANGE UP (ref 3.5–5.3)
PROT SERPL-MCNC: 5.5 G/DL — LOW (ref 6–8.3)
RBC # BLD: 3.33 M/UL — LOW (ref 3.8–5.2)
RBC # FLD: 15.5 % — HIGH (ref 10.3–14.5)
SODIUM SERPL-SCNC: 137 MMOL/L — SIGNIFICANT CHANGE UP (ref 135–145)
WBC # BLD: 4.2 K/UL — SIGNIFICANT CHANGE UP (ref 3.8–10.5)
WBC # FLD AUTO: 4.2 K/UL — SIGNIFICANT CHANGE UP (ref 3.8–10.5)

## 2025-05-01 PROCEDURE — 99239 HOSP IP/OBS DSCHRG MGMT >30: CPT

## 2025-05-01 RX ORDER — METHYLNALTREXONE BROMIDE 12 MG/.6ML
12 INJECTION, SOLUTION SUBCUTANEOUS ONCE
Refills: 0 | Status: COMPLETED | OUTPATIENT
Start: 2025-05-01 | End: 2025-05-01

## 2025-05-01 RX ADMIN — OXYCODONE HYDROCHLORIDE 30 MILLIGRAM(S): 30 TABLET ORAL at 01:23

## 2025-05-01 RX ADMIN — Medication 40 MILLIGRAM(S): at 07:36

## 2025-05-01 RX ADMIN — NALOXEGOL OXALATE 25 MILLIGRAM(S): 12.5 TABLET, FILM COATED ORAL at 11:45

## 2025-05-01 RX ADMIN — Medication 1 DOSE(S): at 11:45

## 2025-05-01 RX ADMIN — NICOTINE POLACRILEX 1 PATCH: 4 GUM, CHEWING ORAL at 12:30

## 2025-05-01 RX ADMIN — NICOTINE POLACRILEX 1 PATCH: 4 GUM, CHEWING ORAL at 07:46

## 2025-05-01 RX ADMIN — APIXABAN 5 MILLIGRAM(S): 2.5 TABLET, FILM COATED ORAL at 07:36

## 2025-05-01 RX ADMIN — CLONAZEPAM 0.5 MILLIGRAM(S): 0.5 TABLET ORAL at 07:35

## 2025-05-01 RX ADMIN — COLCHICINE 0.6 MILLIGRAM(S): 0.6 TABLET, FILM COATED ORAL at 11:45

## 2025-05-01 RX ADMIN — Medication 30 MILLIGRAM(S): at 12:21

## 2025-05-01 RX ADMIN — BACLOFEN 5 MILLIGRAM(S): 10 INJECTION INTRATHECAL at 13:54

## 2025-05-01 RX ADMIN — NICOTINE POLACRILEX 1 PATCH: 4 GUM, CHEWING ORAL at 12:22

## 2025-05-01 RX ADMIN — BACLOFEN 5 MILLIGRAM(S): 10 INJECTION INTRATHECAL at 07:38

## 2025-05-01 RX ADMIN — Medication 30 MILLIGRAM(S): at 06:35

## 2025-05-01 RX ADMIN — PEGFILGRASTIM-CBQV 6 MILLIGRAM(S): 6 INJECTION, SOLUTION SUBCUTANEOUS at 13:54

## 2025-05-01 RX ADMIN — METHYLNALTREXONE BROMIDE 12 MILLIGRAM(S): 12 INJECTION, SOLUTION SUBCUTANEOUS at 13:54

## 2025-05-01 RX ADMIN — Medication 1 APPLICATION(S): at 11:44

## 2025-05-01 RX ADMIN — GABAPENTIN 100 MILLIGRAM(S): 400 CAPSULE ORAL at 08:52

## 2025-05-01 RX ADMIN — OXYCODONE HYDROCHLORIDE 30 MILLIGRAM(S): 30 TABLET ORAL at 02:20

## 2025-05-01 RX ADMIN — OXYCODONE HYDROCHLORIDE 30 MILLIGRAM(S): 30 TABLET ORAL at 13:10

## 2025-05-01 RX ADMIN — Medication 30 MILLIGRAM(S): at 05:37

## 2025-05-01 RX ADMIN — OXYCODONE HYDROCHLORIDE 30 MILLIGRAM(S): 30 TABLET ORAL at 12:20

## 2025-05-01 RX ADMIN — Medication 112 MICROGRAM(S): at 07:35

## 2025-05-01 NOTE — DISCHARGE NOTE NURSING/CASE MANAGEMENT/SOCIAL WORK - PATIENT PORTAL LINK FT
You can access the FollowMyHealth Patient Portal offered by [facility] by registering at the following website: [email or web address]. By joining [employer]’s FollowMyHealth portal, you will also be able to view your health information using other applications (apps) compatible with our system.

## 2025-05-01 NOTE — DISCHARGE NOTE NURSING/CASE MANAGEMENT/SOCIAL WORK - FINANCIAL ASSISTANCE
[facility] provides services at a reduced cost to those who are determined to be eligible through [facility]’s financial assistance program. Information regarding [facility]’s financial assistance program can be found by going to [email or web address] or by calling [phone].

## 2025-05-01 NOTE — DISCHARGE NOTE NURSING/CASE MANAGEMENT/SOCIAL WORK - NSDCPEFALRISK_GEN_ALL_CORE
For information on Fall & Injury Prevention, visit: [email or web address] OR  [email or web address] OR  https://www.cdc.gov/steadi/patient.html

## 2025-05-05 ENCOUNTER — APPOINTMENT (OUTPATIENT)
Dept: INFUSION THERAPY | Facility: HOSPITAL | Age: 65
End: 2025-05-05

## 2025-05-05 ENCOUNTER — APPOINTMENT (OUTPATIENT)
Dept: HEMATOLOGY ONCOLOGY | Facility: CLINIC | Age: 65
End: 2025-05-05

## 2025-05-06 ENCOUNTER — APPOINTMENT (OUTPATIENT)
Dept: INFUSION THERAPY | Facility: HOSPITAL | Age: 65
End: 2025-05-06

## 2025-05-06 ENCOUNTER — APPOINTMENT (OUTPATIENT)
Dept: HEMATOLOGY ONCOLOGY | Facility: CLINIC | Age: 65
End: 2025-05-06

## 2025-05-07 ENCOUNTER — APPOINTMENT (OUTPATIENT)
Dept: INFUSION THERAPY | Facility: HOSPITAL | Age: 65
End: 2025-05-07

## 2025-05-14 ENCOUNTER — NON-APPOINTMENT (OUTPATIENT)
Age: 65
End: 2025-05-14

## 2025-05-19 ENCOUNTER — INPATIENT (INPATIENT)
Facility: HOSPITAL | Age: 65
LOS: 8 days | Discharge: HOME | End: 2025-05-28
Attending: STUDENT IN AN ORGANIZED HEALTH CARE EDUCATION/TRAINING PROGRAM | Admitting: STUDENT IN AN ORGANIZED HEALTH CARE EDUCATION/TRAINING PROGRAM
Payer: MEDICARE

## 2025-05-19 VITALS
OXYGEN SATURATION: 95 % | HEART RATE: 77 BPM | RESPIRATION RATE: 18 BRPM | SYSTOLIC BLOOD PRESSURE: 131 MMHG | TEMPERATURE: 97 F | DIASTOLIC BLOOD PRESSURE: 69 MMHG

## 2025-05-19 DIAGNOSIS — Z98.89 OTHER SPECIFIED POSTPROCEDURAL STATES: Chronic | ICD-10-CM

## 2025-05-19 DIAGNOSIS — Z98.890 OTHER SPECIFIED POSTPROCEDURAL STATES: Chronic | ICD-10-CM

## 2025-05-19 DIAGNOSIS — D25.9 LEIOMYOMA OF UTERUS, UNSPECIFIED: Chronic | ICD-10-CM

## 2025-05-19 DIAGNOSIS — K56.60 UNSPECIFIED INTESTINAL OBSTRUCTION: Chronic | ICD-10-CM

## 2025-05-19 DIAGNOSIS — C80.1 MALIGNANT (PRIMARY) NEOPLASM, UNSPECIFIED: ICD-10-CM

## 2025-05-19 LAB
ALBUMIN SERPL ELPH-MCNC: 4.3 G/DL — SIGNIFICANT CHANGE UP (ref 3.3–5)
ALP SERPL-CCNC: 127 U/L — HIGH (ref 40–120)
ALT FLD-CCNC: 8 U/L — SIGNIFICANT CHANGE UP (ref 4–33)
ANION GAP SERPL CALC-SCNC: 12 MMOL/L — SIGNIFICANT CHANGE UP (ref 7–14)
AST SERPL-CCNC: 21 U/L — SIGNIFICANT CHANGE UP (ref 4–32)
BASOPHILS # BLD AUTO: 0.07 K/UL — SIGNIFICANT CHANGE UP (ref 0–0.2)
BASOPHILS NFR BLD AUTO: 0.5 % — SIGNIFICANT CHANGE UP (ref 0–2)
BILIRUB SERPL-MCNC: 0.2 MG/DL — SIGNIFICANT CHANGE UP (ref 0.2–1.2)
BUN SERPL-MCNC: 12 MG/DL — SIGNIFICANT CHANGE UP (ref 7–23)
CALCIUM SERPL-MCNC: 9.5 MG/DL — SIGNIFICANT CHANGE UP (ref 8.4–10.5)
CHLORIDE SERPL-SCNC: 101 MMOL/L — SIGNIFICANT CHANGE UP (ref 98–107)
CO2 SERPL-SCNC: 26 MMOL/L — SIGNIFICANT CHANGE UP (ref 22–31)
CREAT SERPL-MCNC: 0.54 MG/DL — SIGNIFICANT CHANGE UP (ref 0.5–1.3)
EGFR: 101 ML/MIN/1.73M2 — SIGNIFICANT CHANGE UP
EGFR: 101 ML/MIN/1.73M2 — SIGNIFICANT CHANGE UP
EOSINOPHIL # BLD AUTO: 0.12 K/UL — SIGNIFICANT CHANGE UP (ref 0–0.5)
EOSINOPHIL NFR BLD AUTO: 0.9 % — SIGNIFICANT CHANGE UP (ref 0–6)
GLUCOSE SERPL-MCNC: 91 MG/DL — SIGNIFICANT CHANGE UP (ref 70–99)
HCT VFR BLD CALC: 38.5 % — SIGNIFICANT CHANGE UP (ref 34.5–45)
HGB BLD-MCNC: 12.9 G/DL — SIGNIFICANT CHANGE UP (ref 11.5–15.5)
IANC: 9.94 K/UL — HIGH (ref 1.8–7.4)
IMM GRANULOCYTES NFR BLD AUTO: 1.2 % — HIGH (ref 0–0.9)
LYMPHOCYTES # BLD AUTO: 16.9 % — SIGNIFICANT CHANGE UP (ref 13–44)
LYMPHOCYTES # BLD AUTO: 2.26 K/UL — SIGNIFICANT CHANGE UP (ref 1–3.3)
MAGNESIUM SERPL-MCNC: 2 MG/DL — SIGNIFICANT CHANGE UP (ref 1.6–2.6)
MCHC RBC-ENTMCNC: 30.7 PG — SIGNIFICANT CHANGE UP (ref 27–34)
MCHC RBC-ENTMCNC: 33.5 G/DL — SIGNIFICANT CHANGE UP (ref 32–36)
MCV RBC AUTO: 91.7 FL — SIGNIFICANT CHANGE UP (ref 80–100)
MONOCYTES # BLD AUTO: 0.81 K/UL — SIGNIFICANT CHANGE UP (ref 0–0.9)
MONOCYTES NFR BLD AUTO: 6.1 % — SIGNIFICANT CHANGE UP (ref 2–14)
NEUTROPHILS # BLD AUTO: 9.94 K/UL — HIGH (ref 1.8–7.4)
NEUTROPHILS NFR BLD AUTO: 74.4 % — SIGNIFICANT CHANGE UP (ref 43–77)
NRBC # BLD AUTO: 0 K/UL — SIGNIFICANT CHANGE UP (ref 0–0)
NRBC # FLD: 0 K/UL — SIGNIFICANT CHANGE UP (ref 0–0)
NRBC BLD AUTO-RTO: 0 /100 WBCS — SIGNIFICANT CHANGE UP (ref 0–0)
PHOSPHATE SERPL-MCNC: 3.9 MG/DL — SIGNIFICANT CHANGE UP (ref 2.5–4.5)
PLATELET # BLD AUTO: 272 K/UL — SIGNIFICANT CHANGE UP (ref 150–400)
POTASSIUM SERPL-MCNC: 3.7 MMOL/L — SIGNIFICANT CHANGE UP (ref 3.5–5.3)
POTASSIUM SERPL-SCNC: 3.7 MMOL/L — SIGNIFICANT CHANGE UP (ref 3.5–5.3)
PROT SERPL-MCNC: 7.2 G/DL — SIGNIFICANT CHANGE UP (ref 6–8.3)
RBC # BLD: 4.2 M/UL — SIGNIFICANT CHANGE UP (ref 3.8–5.2)
RBC # FLD: 15.7 % — HIGH (ref 10.3–14.5)
SODIUM SERPL-SCNC: 139 MMOL/L — SIGNIFICANT CHANGE UP (ref 135–145)
WBC # BLD: 13.36 K/UL — HIGH (ref 3.8–10.5)
WBC # FLD AUTO: 13.36 K/UL — HIGH (ref 3.8–10.5)

## 2025-05-19 PROCEDURE — 99223 1ST HOSP IP/OBS HIGH 75: CPT

## 2025-05-19 PROCEDURE — 71045 X-RAY EXAM CHEST 1 VIEW: CPT | Mod: 26

## 2025-05-19 RX ORDER — BACLOFEN 10 MG/20ML
5 INJECTION INTRATHECAL EVERY 8 HOURS
Refills: 0 | Status: DISCONTINUED | OUTPATIENT
Start: 2025-05-19 | End: 2025-05-28

## 2025-05-19 RX ORDER — GABAPENTIN 400 MG/1
100 CAPSULE ORAL THREE TIMES A DAY
Refills: 0 | Status: DISCONTINUED | OUTPATIENT
Start: 2025-05-19 | End: 2025-05-25

## 2025-05-19 RX ORDER — ETOPOSIDE 20 MG/ML
135 VIAL (ML) INTRAVENOUS EVERY 24 HOURS
Refills: 0 | Status: DISCONTINUED | OUTPATIENT
Start: 2025-05-19 | End: 2025-05-19

## 2025-05-19 RX ORDER — ETOPOSIDE 20 MG/ML
135 VIAL (ML) INTRAVENOUS EVERY 24 HOURS
Refills: 0 | Status: COMPLETED | OUTPATIENT
Start: 2025-05-19 | End: 2025-05-21

## 2025-05-19 RX ORDER — CLONAZEPAM 0.5 MG/1
0.5 TABLET ORAL
Refills: 0 | Status: DISCONTINUED | OUTPATIENT
Start: 2025-05-19 | End: 2025-05-23

## 2025-05-19 RX ORDER — HYDROCORTISONE 10 MG/G
1 CREAM TOPICAL EVERY 12 HOURS
Refills: 0 | Status: DISCONTINUED | OUTPATIENT
Start: 2025-05-19 | End: 2025-05-28

## 2025-05-19 RX ORDER — NICOTINE POLACRILEX 4 MG/1
1 GUM, CHEWING ORAL DAILY
Refills: 0 | Status: DISCONTINUED | OUTPATIENT
Start: 2025-05-19 | End: 2025-05-28

## 2025-05-19 RX ORDER — APIXABAN 2.5 MG/1
5 TABLET, FILM COATED ORAL EVERY 12 HOURS
Refills: 0 | Status: DISCONTINUED | OUTPATIENT
Start: 2025-05-19 | End: 2025-05-28

## 2025-05-19 RX ORDER — MAGNESIUM, ALUMINUM HYDROXIDE 200-200 MG
30 TABLET,CHEWABLE ORAL EVERY 4 HOURS
Refills: 0 | Status: DISCONTINUED | OUTPATIENT
Start: 2025-05-19 | End: 2025-05-28

## 2025-05-19 RX ORDER — CLONAZEPAM 0.5 MG/1
1 TABLET ORAL AT BEDTIME
Refills: 0 | Status: DISCONTINUED | OUTPATIENT
Start: 2025-05-19 | End: 2025-05-23

## 2025-05-19 RX ORDER — COLCHICINE 0.6 MG/1
0.6 TABLET, FILM COATED ORAL DAILY
Refills: 0 | Status: DISCONTINUED | OUTPATIENT
Start: 2025-05-19 | End: 2025-05-28

## 2025-05-19 RX ORDER — POLYETHYLENE GLYCOL 3350 17 G/17G
17 POWDER, FOR SOLUTION ORAL
Refills: 0 | Status: DISCONTINUED | OUTPATIENT
Start: 2025-05-19 | End: 2025-05-28

## 2025-05-19 RX ORDER — OXYCODONE HYDROCHLORIDE 30 MG/1
20 TABLET ORAL EVERY 4 HOURS
Refills: 0 | Status: DISCONTINUED | OUTPATIENT
Start: 2025-05-19 | End: 2025-05-23

## 2025-05-19 RX ORDER — NALOXEGOL OXALATE 12.5 MG/1
25 TABLET, FILM COATED ORAL DAILY
Refills: 0 | Status: DISCONTINUED | OUTPATIENT
Start: 2025-05-19 | End: 2025-05-28

## 2025-05-19 RX ORDER — CALAMINE 8% AND ZINC OXIDE 8% 160 MG/ML
1 LOTION TOPICAL ONCE
Refills: 0 | Status: COMPLETED | OUTPATIENT
Start: 2025-05-19 | End: 2025-05-19

## 2025-05-19 RX ORDER — NALOXEGOL OXALATE 12.5 MG/1
25 TABLET, FILM COATED ORAL DAILY
Refills: 0 | Status: DISCONTINUED | OUTPATIENT
Start: 2025-05-19 | End: 2025-05-19

## 2025-05-19 RX ORDER — ALBUTEROL SULFATE 2.5 MG/3ML
2 VIAL, NEBULIZER (ML) INHALATION EVERY 6 HOURS
Refills: 0 | Status: DISCONTINUED | OUTPATIENT
Start: 2025-05-19 | End: 2025-05-28

## 2025-05-19 RX ORDER — DEXAMETHASONE 0.5 MG/1
8 TABLET ORAL EVERY 24 HOURS
Refills: 0 | Status: COMPLETED | OUTPATIENT
Start: 2025-05-20 | End: 2025-05-21

## 2025-05-19 RX ORDER — BISACODYL 5 MG
5 TABLET, DELAYED RELEASE (ENTERIC COATED) ORAL AT BEDTIME
Refills: 0 | Status: DISCONTINUED | OUTPATIENT
Start: 2025-05-19 | End: 2025-05-28

## 2025-05-19 RX ORDER — LEVOTHYROXINE SODIUM 300 MCG
112 TABLET ORAL DAILY
Refills: 0 | Status: DISCONTINUED | OUTPATIENT
Start: 2025-05-19 | End: 2025-05-28

## 2025-05-19 RX ORDER — PALONOSETRON HYDROCHLORIDE 0.05 MG/ML
0.25 INJECTION, SOLUTION INTRAVENOUS ONCE
Refills: 0 | Status: COMPLETED | OUTPATIENT
Start: 2025-05-19 | End: 2025-05-19

## 2025-05-19 RX ORDER — SENNA 187 MG
2 TABLET ORAL AT BEDTIME
Refills: 0 | Status: DISCONTINUED | OUTPATIENT
Start: 2025-05-19 | End: 2025-05-28

## 2025-05-19 RX ORDER — OLANZAPINE 10 MG/1
5 TABLET ORAL AT BEDTIME
Refills: 0 | Status: DISCONTINUED | OUTPATIENT
Start: 2025-05-19 | End: 2025-05-28

## 2025-05-19 RX ORDER — CARBOPLATIN 10 MG/ML
430 INJECTION, SOLUTION INTRAVENOUS ONCE
Refills: 0 | Status: COMPLETED | OUTPATIENT
Start: 2025-05-19 | End: 2025-05-19

## 2025-05-19 RX ORDER — OXYCODONE HYDROCHLORIDE 30 MG/1
30 TABLET ORAL EVERY 4 HOURS
Refills: 0 | Status: DISCONTINUED | OUTPATIENT
Start: 2025-05-19 | End: 2025-05-23

## 2025-05-19 RX ORDER — DIPHENHYDRAMINE HCL 12.5MG/5ML
25 ELIXIR ORAL EVERY 6 HOURS
Refills: 0 | Status: DISCONTINUED | OUTPATIENT
Start: 2025-05-19 | End: 2025-05-28

## 2025-05-19 RX ORDER — DEXAMETHASONE 0.5 MG/1
12 TABLET ORAL ONCE
Refills: 0 | Status: COMPLETED | OUTPATIENT
Start: 2025-05-19 | End: 2025-05-19

## 2025-05-19 RX ADMIN — NICOTINE POLACRILEX 1 PATCH: 4 GUM, CHEWING ORAL at 21:24

## 2025-05-19 RX ADMIN — Medication 135 MILLIGRAM(S): at 17:21

## 2025-05-19 RX ADMIN — Medication 25 MILLIGRAM(S): at 15:38

## 2025-05-19 RX ADMIN — PALONOSETRON HYDROCHLORIDE 0.25 MILLIGRAM(S): 0.05 INJECTION, SOLUTION INTRAVENOUS at 15:21

## 2025-05-19 RX ADMIN — Medication 40 MILLIGRAM(S): at 18:48

## 2025-05-19 RX ADMIN — OXYCODONE HYDROCHLORIDE 30 MILLIGRAM(S): 30 TABLET ORAL at 17:52

## 2025-05-19 RX ADMIN — CARBOPLATIN 430 MILLIGRAM(S): 10 INJECTION, SOLUTION INTRAVENOUS at 16:36

## 2025-05-19 RX ADMIN — CLONAZEPAM 0.5 MILLIGRAM(S): 0.5 TABLET ORAL at 15:24

## 2025-05-19 RX ADMIN — NICOTINE POLACRILEX 1 PATCH: 4 GUM, CHEWING ORAL at 13:00

## 2025-05-19 RX ADMIN — Medication 30 MILLIGRAM(S): at 21:42

## 2025-05-19 RX ADMIN — OXYCODONE HYDROCHLORIDE 30 MILLIGRAM(S): 30 TABLET ORAL at 22:49

## 2025-05-19 RX ADMIN — BACLOFEN 5 MILLIGRAM(S): 10 INJECTION INTRATHECAL at 21:43

## 2025-05-19 RX ADMIN — CLONAZEPAM 1 MILLIGRAM(S): 0.5 TABLET ORAL at 23:09

## 2025-05-19 RX ADMIN — OXYCODONE HYDROCHLORIDE 30 MILLIGRAM(S): 30 TABLET ORAL at 16:52

## 2025-05-19 RX ADMIN — OLANZAPINE 5 MILLIGRAM(S): 10 TABLET ORAL at 23:08

## 2025-05-19 RX ADMIN — DEXAMETHASONE 106 MILLIGRAM(S): 0.5 TABLET ORAL at 15:21

## 2025-05-19 RX ADMIN — GABAPENTIN 100 MILLIGRAM(S): 400 CAPSULE ORAL at 21:48

## 2025-05-19 RX ADMIN — BACLOFEN 5 MILLIGRAM(S): 10 INJECTION INTRATHECAL at 14:21

## 2025-05-19 RX ADMIN — APIXABAN 5 MILLIGRAM(S): 2.5 TABLET, FILM COATED ORAL at 18:47

## 2025-05-19 RX ADMIN — COLCHICINE 0.6 MILLIGRAM(S): 0.6 TABLET, FILM COATED ORAL at 12:53

## 2025-05-19 RX ADMIN — NALOXEGOL OXALATE 25 MILLIGRAM(S): 12.5 TABLET, FILM COATED ORAL at 23:09

## 2025-05-19 RX ADMIN — OXYCODONE HYDROCHLORIDE 30 MILLIGRAM(S): 30 TABLET ORAL at 21:49

## 2025-05-19 RX ADMIN — Medication 10 MILLILITER(S): at 16:38

## 2025-05-19 RX ADMIN — Medication 30 MILLIGRAM(S): at 14:21

## 2025-05-19 RX ADMIN — GABAPENTIN 100 MILLIGRAM(S): 400 CAPSULE ORAL at 14:21

## 2025-05-19 RX ADMIN — Medication 30 MILLIGRAM(S): at 22:42

## 2025-05-19 NOTE — PATIENT PROFILE ADULT - FALL HARM RISK - HARM RISK INTERVENTIONS

## 2025-05-19 NOTE — H&P ADULT - ASSESSMENT
65 yo woman, former smoker (47py; quit 12/2024) and well known to the [facility] service, with h/o pAfib (on Eliquis), Hypothyroidism, ? h/o Sleroderma, h/o pericardial effusion s/p pericardiocentesis (dx in 12/2024; ? 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 while hospitalized at [facility]- she tolerated tx without significant adverse effect. She presented as an outpt for C2 on 4/14- however, pt received Cosela and developed "severe headache" for which she required ED eval, but was ultimately sent home (she did not receive Etop or carbo on d1); on day 2 she received Etoposide for 5 minutes and started to have chest tightness and shortness of breath which was treated as a hypersensitivity reaction (she was not re-challenged due to patient preference at the time; she did not receive carbo or cosela on D2). Pt developed shortness of breath and palmar erythema with C3 (VSS); her infusion rate was slowed and she subsequently tolerated chemo without significant adverse effect.  Pt is now electively admitted for C4 Carbo/Etop.    ACTIVE PROBLEMS  SCLCa (presumed limited stage)  Admission for chemotherapy  Anxiety/depression  pAfib  Hypercoag state  Immunocompromised due to chemotherapy    SCLCa (presumed limited stage)  Will proceed with C4 Carbo/Etop today (20% dose reduced)  Monitoring and with plan to treat hypersensitivity reaction as per protocol  Fluphila x1 dose to be given on day 4 (5/22)  Pt to continue outpt fu with Dr. Hall after discharge  Long-term prognosis: guarded; pt is full code    Anxiety/depression  Continuing Klonopin 0.5mg BID and 1mg nightly  Continuing Zyprexa 5mg nightly    Chronic hypoxic resp failure  h/o Asthma/COPD (former smoker)  Continuing supplemental O2 via NC and supportive resp care prn  Continuing Advair 100-50mcg MDI BID  Continuing duonebs q6/prn   Continuing Nicotine patch 21mg daily (6 weeks)  Mgmt of pericardial effusion as above    SIADH  Na 139 today  Pt is asymptomatic  SIADH confirmed by 3/12 urine lytes but Na has been normal off salt tabs and fluid restriction since pt has started cancer treatment  Will continue to monitor    Cancer related pain  Continuing Morphine ER 30mg q12  Continuing Oxy IR 20/30mg q8/prn  Continuing Gabapentin 100mg TID  Continuing Baclofen 5mg q8/prn    Constipation  Possibly due to opioids +/- AID  Pt denied abd pain, diarrhea, but reported mild nausea  Continuing Movantik 25mg daily  Continuing bowel regimen (Miralax, Senna) for OIC prevention  Of note, pt has experienced constipation relief after Relistor in the past  Will check AXR if pt becomes more symptomatic    Hypothyroidism: continuing LT4 112mcg daily    h/o pericardial effusion: most likely inflammatory; continuing Colchicine 0.6mg daily    pAfib  Hypercoag state  Continuing Eliquis 5mg BID

## 2025-05-19 NOTE — H&P ADULT - NSHPLABSRESULTS_GEN_ALL_CORE
12.9                 139  | 26   | 12           13.36 >-----------< 272     ------------------------< 91                         38.5                 3.7  | 101  | 0.54            Ca 9.5   Mg 2.00  Ph 3.9          MICROBIOLOGY  Abx: none on this admission.    Cx Data: none new on this admission.      HISTORICAL RADIOLOGY  4/28 AXR: Nonobstructive bowel gas pattern. Large stool burden.    3/20 NC CT chest  1.  Moderate-sized pericardial effusion appears unchanged compared to   partially imaged heart on 3/18/2025 but increase insize since 2/15/2025.  2.  Interval increase in size of previously described left suprahilar   mediastinal mass which extends into the AP window and paramediastinal   left upper lobe.  3.  Some of the other left upper lobe nodules are decrease and some   increase.  4.  Unchanged sclerotic lesions within the sternum and T12 vertebral body.    3/20 TTE   1. Limited study to re-evaluate pericardial effusion.   2. There is a moderate pericardial effusion noted adjacent to the posterior left ventricle, a moderate pericardial effusion noted adjacent to the right atrium, a small pericardial effusion noted adjacent to the apex, a moderate pericardial effusion noted adjacent to the lateral left ventricle and a moderate pericardial effusion noted adjacent to the right ventricle with no echocardiographic evidence of tamponade physiology. Moderate pericardial effusion, measuring ~ 1.1 cm posterior to the left ventricle, ~ 1.0 cm lateral to the left ventricle, ~ 1.4 cm adjacent to the RV free wall, and ~ 1.4 cm superior to the right atrium. Small pericardial effusion anterior to the right ventricle and adjacent to the apex. The pericardium adjacent to the RV free wall appears markedly thickened and may reflect the presence of thrombus, inflammatory material, and/or metastatic disease. No RA or RV diastolic collapse seen. However, the inferior vena cava (IVC) displays reduced respirophasic variability in its caliber, consistent with elevated right atrial pressure.   3. The inferior vena cava is normal in size measuring 1.30 cm in diameter, (normal <2.1cm) with abnormal inspiratory collapse (abnormal <50%) consistent with mildly elevated right atrial pressure (~8, range 5-10mmHg).   4. Compared to the transthoracic echocardiogram performed on 3/12/2025, the pericardial effusion has increased slightly in size.    3/18 CT abd/pelv: No acute pathology within the abdomen and pelvis. Partially imaged pericardial effusion. Bilateral trace pleural effusions with adjacent atelectasis. Again demonstrated a T12 vertebral body hemangioma. Degenerative changes of the spine.    3/18 CT head: No hydrocephalus, acute intracranial hemorrhage, or mass effect. No compelling evidence of intracranial metastasis on this non-contrast exam.

## 2025-05-19 NOTE — H&P ADULT - NSHPPHYSICALEXAM_GEN_ALL_CORE
Vital Signs Last 24 Hrs  T(C): 36.3 (19 May 2025 13:14), Max: 36.3 (19 May 2025 13:14)  T(F): 97.4 (19 May 2025 13:14), Max: 97.4 (19 May 2025 13:14)  HR: 77 (19 May 2025 13:14) (77 - 77)  BP: 131/69 (19 May 2025 13:14) (131/69 - 131/69)  RR: 18 (19 May 2025 13:14) (18 - 18)  SpO2: 95% (19 May 2025 13:14) (95% - 95%)  Parameters below as of 19 May 2025 13:14  Patient On (Oxygen Delivery Method): room air  Non-toxic-appearing woman, crying hysterically in bed  Answering all questions appropriately (conversational dyspnea resolved), following commands  Anicteric sclera, no oral lesions/thrush  RRR, no m/r/g, heart sounds faint  Breaths somewhat labored, but not tachypnea; trace RLB crackles, no w/r  Abd soft/nt/nd, hypoactive bowel sounds  No peripheral edema; DIP/PIP, ankle joint deformities unchanged  CN 2-12 grossly intact; no gross focal neuro deficits; pt moves all extremities spontaneously

## 2025-05-19 NOTE — H&P ADULT - PATIENT'S GENDER IDENTITY
Female [Alert] : alert [No Acute Distress] : no acute distress [Consolable] : consolable [Playful] : playful [Normocephalic] : normocephalic [Conjunctivae with no discharge] : conjunctivae with no discharge [PERRL] : PERRL [EOMI Bilateral] : EOMI bilateral [Auricles Well Formed] : auricles well formed [Clear Tympanic membranes with present light reflex and bony landmarks] : clear tympanic membranes with present light reflex and bony landmarks [No Discharge] : no discharge [Nares Patent] : nares patent [Pink Nasal Mucosa] : pink nasal mucosa [Palate Intact] : palate intact [Uvula Midline] : uvula midline [Nonerythematous Oropharynx] : nonerythematous oropharynx [No Caries] : no caries [Trachea Midline] : trachea midline [Supple, full passive range of motion] : supple, full passive range of motion [No Palpable Masses] : no palpable masses [Symmetric Chest Rise] : symmetric chest rise [Clear to Auscultation Bilaterally] : clear to auscultation bilaterally [Normoactive Precordium] : normoactive precordium [Regular Rate and Rhythm] : regular rate and rhythm [Normal S1, S2 present] : normal S1, S2 present [+2 Femoral Pulses] : +2 femoral pulses [Soft] : soft [NonTender] : non tender [Non Distended] : non distended [Normoactive Bowel Sounds] : normoactive bowel sounds [No Hepatomegaly] : no hepatomegaly [No Splenomegaly] : no splenomegaly [Ta 1] : Ta 1 [Central Urethral Opening] : central urethral opening [Testicles Descended Bilaterally] : testicles descended bilaterally [Patent] : patent [Normally Placed] : normally placed [No Abnormal Lymph Nodes Palpated] : no abnormal lymph nodes palpated [Symmetric Buttocks Creases] : symmetric buttocks creases [Symmetric Hip Rotation] : symmetric hip rotation [No Gait Asymmetry] : no gait asymmetry [No pain or deformities with palpation of bone, muscles, joints] : no pain or deformities with palpation of bone, muscles, joints [Normal Muscle Tone] : normal muscle tone [No Spinal Dimple] : no spinal dimple [NoTuft of Hair] : no tuft of hair [Straight] : straight [+2 Patella DTR] : +2 patella DTR [Cranial Nerves Grossly Intact] : cranial nerves grossly intact [No Rash or Lesions] : no rash or lesions

## 2025-05-19 NOTE — H&P ADULT - HISTORY OF PRESENT ILLNESS
63 yo woman, former smoker (47py; quit 12/2024) and well known to the [facility] service, with h/o pAfib (on Eliquis), Hypothyroidism, ? h/o Sleroderma, h/o pericardial effusion s/p pericardiocentesis (dx in 12/2024; ? 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 while hospitalized at [facility]- she tolerated tx without significant adverse effect. She presented as an outpt for C2 on 4/14- however, pt received Cosela and developed "severe headache" for which she required ED eval, but was ultimately sent home (she did not receive Etop or carbo on d1); on day 2 she received Etoposide for 5 minutes and started to have chest tightness and shortness of breath which was treated as a hypersensitivity reaction (she was not re-challenged due to patient preference at the time; she did not receive carbo or cosela on D2). Pt developed shortness of breath and palmar erythema with C3 (VSS); her infusion rate was slowed and she subsequently tolerated chemo without significant adverse effect.  Pt is now electively admitted for C4 Carbo/Etop. She complained of worsening R collar bone pain and constipation x2 weeks (which she confirmed is chronic); she denied n/v, abd pain.

## 2025-05-20 LAB
ALBUMIN SERPL ELPH-MCNC: 3.8 G/DL — SIGNIFICANT CHANGE UP (ref 3.3–5)
ALP SERPL-CCNC: 109 U/L — SIGNIFICANT CHANGE UP (ref 40–120)
ALT FLD-CCNC: 7 U/L — SIGNIFICANT CHANGE UP (ref 4–33)
ANION GAP SERPL CALC-SCNC: 11 MMOL/L — SIGNIFICANT CHANGE UP (ref 7–14)
AST SERPL-CCNC: 18 U/L — SIGNIFICANT CHANGE UP (ref 4–32)
BASOPHILS # BLD AUTO: 0.01 K/UL — SIGNIFICANT CHANGE UP (ref 0–0.2)
BASOPHILS NFR BLD AUTO: 0.1 % — SIGNIFICANT CHANGE UP (ref 0–2)
BILIRUB SERPL-MCNC: 0.2 MG/DL — SIGNIFICANT CHANGE UP (ref 0.2–1.2)
BUN SERPL-MCNC: 11 MG/DL — SIGNIFICANT CHANGE UP (ref 7–23)
CALCIUM SERPL-MCNC: 8.8 MG/DL — SIGNIFICANT CHANGE UP (ref 8.4–10.5)
CHLORIDE SERPL-SCNC: 104 MMOL/L — SIGNIFICANT CHANGE UP (ref 98–107)
CO2 SERPL-SCNC: 25 MMOL/L — SIGNIFICANT CHANGE UP (ref 22–31)
CREAT SERPL-MCNC: 0.38 MG/DL — LOW (ref 0.5–1.3)
EGFR: 110 ML/MIN/1.73M2 — SIGNIFICANT CHANGE UP
EGFR: 110 ML/MIN/1.73M2 — SIGNIFICANT CHANGE UP
EOSINOPHIL # BLD AUTO: 0 K/UL — SIGNIFICANT CHANGE UP (ref 0–0.5)
EOSINOPHIL NFR BLD AUTO: 0 % — SIGNIFICANT CHANGE UP (ref 0–6)
GLUCOSE SERPL-MCNC: 130 MG/DL — HIGH (ref 70–99)
HCT VFR BLD CALC: 33.6 % — LOW (ref 34.5–45)
HGB BLD-MCNC: 11.2 G/DL — LOW (ref 11.5–15.5)
IANC: 6.63 K/UL — SIGNIFICANT CHANGE UP (ref 1.8–7.4)
IMM GRANULOCYTES NFR BLD AUTO: 0.5 % — SIGNIFICANT CHANGE UP (ref 0–0.9)
LYMPHOCYTES # BLD AUTO: 0.89 K/UL — LOW (ref 1–3.3)
LYMPHOCYTES # BLD AUTO: 10.8 % — LOW (ref 13–44)
MAGNESIUM SERPL-MCNC: 2 MG/DL — SIGNIFICANT CHANGE UP (ref 1.6–2.6)
MCHC RBC-ENTMCNC: 30.9 PG — SIGNIFICANT CHANGE UP (ref 27–34)
MCHC RBC-ENTMCNC: 33.3 G/DL — SIGNIFICANT CHANGE UP (ref 32–36)
MCV RBC AUTO: 92.6 FL — SIGNIFICANT CHANGE UP (ref 80–100)
MONOCYTES # BLD AUTO: 0.69 K/UL — SIGNIFICANT CHANGE UP (ref 0–0.9)
MONOCYTES NFR BLD AUTO: 8.4 % — SIGNIFICANT CHANGE UP (ref 2–14)
NEUTROPHILS # BLD AUTO: 6.63 K/UL — SIGNIFICANT CHANGE UP (ref 1.8–7.4)
NEUTROPHILS NFR BLD AUTO: 80.2 % — HIGH (ref 43–77)
NRBC # BLD AUTO: 0 K/UL — SIGNIFICANT CHANGE UP (ref 0–0)
NRBC # FLD: 0 K/UL — SIGNIFICANT CHANGE UP (ref 0–0)
NRBC BLD AUTO-RTO: 0 /100 WBCS — SIGNIFICANT CHANGE UP (ref 0–0)
PHOSPHATE SERPL-MCNC: 3.6 MG/DL — SIGNIFICANT CHANGE UP (ref 2.5–4.5)
PLATELET # BLD AUTO: 253 K/UL — SIGNIFICANT CHANGE UP (ref 150–400)
POTASSIUM SERPL-MCNC: 4 MMOL/L — SIGNIFICANT CHANGE UP (ref 3.5–5.3)
POTASSIUM SERPL-SCNC: 4 MMOL/L — SIGNIFICANT CHANGE UP (ref 3.5–5.3)
PROT SERPL-MCNC: 6.3 G/DL — SIGNIFICANT CHANGE UP (ref 6–8.3)
RBC # BLD: 3.63 M/UL — LOW (ref 3.8–5.2)
RBC # FLD: 15.9 % — HIGH (ref 10.3–14.5)
SODIUM SERPL-SCNC: 140 MMOL/L — SIGNIFICANT CHANGE UP (ref 135–145)
WBC # BLD: 8.26 K/UL — SIGNIFICANT CHANGE UP (ref 3.8–10.5)
WBC # FLD AUTO: 8.26 K/UL — SIGNIFICANT CHANGE UP (ref 3.8–10.5)

## 2025-05-20 PROCEDURE — 99233 SBSQ HOSP IP/OBS HIGH 50: CPT

## 2025-05-20 RX ORDER — CYCLOBENZAPRINE HYDROCHLORIDE 15 MG/1
5 CAPSULE, EXTENDED RELEASE ORAL ONCE
Refills: 0 | Status: COMPLETED | OUTPATIENT
Start: 2025-05-20 | End: 2025-05-20

## 2025-05-20 RX ADMIN — OXYCODONE HYDROCHLORIDE 30 MILLIGRAM(S): 30 TABLET ORAL at 22:47

## 2025-05-20 RX ADMIN — Medication 30 MILLIGRAM(S): at 07:16

## 2025-05-20 RX ADMIN — Medication 30 MILLIGRAM(S): at 06:16

## 2025-05-20 RX ADMIN — NICOTINE POLACRILEX 1 PATCH: 4 GUM, CHEWING ORAL at 13:29

## 2025-05-20 RX ADMIN — GABAPENTIN 100 MILLIGRAM(S): 400 CAPSULE ORAL at 13:28

## 2025-05-20 RX ADMIN — Medication 135 MILLIGRAM(S): at 17:50

## 2025-05-20 RX ADMIN — COLCHICINE 0.6 MILLIGRAM(S): 0.6 TABLET, FILM COATED ORAL at 13:28

## 2025-05-20 RX ADMIN — GABAPENTIN 100 MILLIGRAM(S): 400 CAPSULE ORAL at 21:46

## 2025-05-20 RX ADMIN — GABAPENTIN 100 MILLIGRAM(S): 400 CAPSULE ORAL at 06:16

## 2025-05-20 RX ADMIN — CLONAZEPAM 0.5 MILLIGRAM(S): 0.5 TABLET ORAL at 15:46

## 2025-05-20 RX ADMIN — Medication 1 DOSE(S): at 09:23

## 2025-05-20 RX ADMIN — CLONAZEPAM 0.5 MILLIGRAM(S): 0.5 TABLET ORAL at 09:23

## 2025-05-20 RX ADMIN — NICOTINE POLACRILEX 1 PATCH: 4 GUM, CHEWING ORAL at 19:00

## 2025-05-20 RX ADMIN — Medication 30 MILLIGRAM(S): at 13:28

## 2025-05-20 RX ADMIN — OXYCODONE HYDROCHLORIDE 30 MILLIGRAM(S): 30 TABLET ORAL at 09:46

## 2025-05-20 RX ADMIN — OLANZAPINE 5 MILLIGRAM(S): 10 TABLET ORAL at 23:12

## 2025-05-20 RX ADMIN — OXYCODONE HYDROCHLORIDE 30 MILLIGRAM(S): 30 TABLET ORAL at 21:47

## 2025-05-20 RX ADMIN — APIXABAN 5 MILLIGRAM(S): 2.5 TABLET, FILM COATED ORAL at 19:39

## 2025-05-20 RX ADMIN — APIXABAN 5 MILLIGRAM(S): 2.5 TABLET, FILM COATED ORAL at 09:22

## 2025-05-20 RX ADMIN — OXYCODONE HYDROCHLORIDE 30 MILLIGRAM(S): 30 TABLET ORAL at 17:42

## 2025-05-20 RX ADMIN — Medication 40 MILLIGRAM(S): at 19:40

## 2025-05-20 RX ADMIN — OXYCODONE HYDROCHLORIDE 30 MILLIGRAM(S): 30 TABLET ORAL at 10:46

## 2025-05-20 RX ADMIN — Medication 10 MILLILITER(S): at 17:53

## 2025-05-20 RX ADMIN — BACLOFEN 5 MILLIGRAM(S): 10 INJECTION INTRATHECAL at 13:28

## 2025-05-20 RX ADMIN — Medication 30 MILLIGRAM(S): at 21:47

## 2025-05-20 RX ADMIN — BACLOFEN 5 MILLIGRAM(S): 10 INJECTION INTRATHECAL at 06:17

## 2025-05-20 RX ADMIN — OXYCODONE HYDROCHLORIDE 30 MILLIGRAM(S): 30 TABLET ORAL at 18:42

## 2025-05-20 RX ADMIN — Medication 112 MICROGRAM(S): at 06:17

## 2025-05-20 RX ADMIN — NICOTINE POLACRILEX 1 PATCH: 4 GUM, CHEWING ORAL at 07:03

## 2025-05-20 RX ADMIN — Medication 30 MILLIGRAM(S): at 22:47

## 2025-05-20 RX ADMIN — DEXAMETHASONE 101.6 MILLIGRAM(S): 0.5 TABLET ORAL at 16:25

## 2025-05-20 RX ADMIN — CLONAZEPAM 1 MILLIGRAM(S): 0.5 TABLET ORAL at 23:12

## 2025-05-20 RX ADMIN — Medication 25 MILLIGRAM(S): at 06:17

## 2025-05-20 NOTE — DIETITIAN INITIAL EVALUATION ADULT - OTHER INFO
Patient reports poor appetite in house. Patient denies any in house nausea, vomiting, diarrhea, but constipation. Bowel regimen is in placed. Patient confirms shellfish allergy. IV fluids noted for hydration. Patient deferred protein shake. Encourage PO intake and honor food preferences as able. Patient deferred education. RD to remain available for further nutritional interventions as indicated

## 2025-05-20 NOTE — DIETITIAN INITIAL EVALUATION ADULT - PERTINENT LABORATORY DATA
05-20    140  |  104  |  11  ----------------------------<  130[H]  4.0   |  25  |  0.38[L]    Ca    8.8      20 May 2025 10:28  Phos  3.6     05-20  Mg     2.00     05-20    TPro  6.3  /  Alb  3.8  /  TBili  0.2  /  DBili  x   /  AST  18  /  ALT  7   /  AlkPhos  109  05-20

## 2025-05-20 NOTE — DIETITIAN INITIAL EVALUATION ADULT - PHYSCIAL ASSESSMENT
Dosing weight: 65.6kg (5/19). Weight trend is relatively stable per [facility] weight history: 65.6kg(5/19); 65.8kg(4/16); 63.5kg(3/11); 65.9kg(2/22); 67.1kg(12/20)

## 2025-05-20 NOTE — DIETITIAN INITIAL EVALUATION ADULT - REASON FOR ADMISSION
Per chart review, 64-year-old  woman, former smoker (47py; quit 12/2024) and well known to the Onc[facility] service, with h/o pAfib (on Eliquis), Hypothyroidism, ? h/o Sleroderma, h/o pericardial effusion s/p pericardiocentesis (dx in 12/2024; ? 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 Pt is now electively admitted for C4 Carbo/Etop. She complained of worsening R collar bone pain and constipation x2 weeks (which she confirmed is chronic); she denied n/v, abd pain.

## 2025-05-20 NOTE — PROGRESS NOTE ADULT - SUBJECTIVE AND OBJECTIVE BOX
SOLID TUMOR ONCOLOGY HOSPITALIST PROGRESS NOTE    S: No acute events overnight    CURRENT MEDICATIONS  MEDICATIONS  (STANDING):  apixaban 5 milliGRAM(s) Oral every 12 hours  baclofen 5 milliGRAM(s) Oral every 8 hours  bisacodyl 5 milliGRAM(s) Oral at bedtime  clonazePAM  Tablet 0.5 milliGRAM(s) Oral <User Schedule>  clonazePAM  Tablet 1 milliGRAM(s) Oral at bedtime  colchicine 0.6 milliGRAM(s) Oral daily  cyclobenzaprine 5 milliGRAM(s) Oral once  dexAMETHasone  IVPB 8 milliGRAM(s) IV Intermittent every 24 hours  etoposide (TOPOSAR) IVPB (eMAR) 135 milliGRAM(s) IV Intermittent every 24 hours  fluticasone propionate/ salmeterol 100-50 MICROgram(s) Diskus 1 Dose(s) Inhalation two times a day  gabapentin 100 milliGRAM(s) Oral three times a day  levothyroxine 112 MICROGram(s) Oral daily  morphine ER Tablet 30 milliGRAM(s) Oral every 8 hours  naloxegol 25 milliGRAM(s) Oral daily  nicotine - 21 mG/24Hr(s) Patch 1 Patch Transdermal daily  OLANZapine 5 milliGRAM(s) Oral at bedtime  pantoprazole    Tablet 40 milliGRAM(s) Oral every 12 hours  polyethylene glycol 3350 17 Gram(s) Oral two times a day  senna 2 Tablet(s) Oral at bedtime  sodium chloride 0.9%. 1000 milliLiter(s) (10 mL/Hr) IV Continuous <Continuous>    MEDICATIONS  (PRN):  albuterol    90 MICROgram(s) HFA Inhaler 2 Puff(s) Inhalation every 6 hours PRN Shortness of Breath and/or Wheezing  aluminum hydroxide/magnesium hydroxide/simethicone Suspension 30 milliLiter(s) Oral every 4 hours PRN Dyspepsia  diphenhydrAMINE 25 milliGRAM(s) Oral every 6 hours PRN Rash and/or Itching  hydrocortisone 1% Ointment 1 Application(s) Topical every 12 hours PRN Rash and/or Itching  oxyCODONE    IR 20 milliGRAM(s) Oral every 4 hours PRN Moderate Pain (4 - 6)  oxyCODONE    IR 30 milliGRAM(s) Oral every 4 hours PRN Severe Pain (7 - 10)      PHYSICAL EXAM  T(C): 36.3 (05-20-25 @ 12:25), Max: 36.8 (05-19-25 @ 18:22)  HR: 62 (05-20-25 @ 12:25) (62 - 80)  BP: 100/60 (05-20-25 @ 12:25) (100/54 - 140/70)  RR: 18 (05-20-25 @ 12:25) (17 - 18)  SpO2: 97% (05-20-25 @ 12:25) (92% - 97%)      LABS                        11.2   8.26  )-----------( 253      ( 20 May 2025 10:28 )             33.6     05-20    140  |  104  |  11  ----------------------------<  130[H]  4.0   |  25  |  0.38[L]    Ca    8.8      20 May 2025 10:28  Phos  3.6     05-20  Mg     2.00     05-20    TPro  6.3  /  Alb  3.8  /  TBili  0.2  /  DBili  x   /  AST  18  /  ALT  7   /  AlkPhos  109  05-20      CAPILLARY BLOOD GLUCOSE            MICROBIOLOGY  Urinalysis Basic - ( 20 May 2025 10:28 )    Color: x / Appearance: x / SG: x / pH: x  Gluc: 130 mg/dL / Ketone: x  / Bili: x / Urobili: x   Blood: x / Protein: x / Nitrite: x   Leuk Esterase: x / RBC: x / WBC x   Sq Epi: x / Non Sq Epi: x / Bacteria: x            PERTINENT RADIOLOGY         SOLID TUMOR ONCOLOGY HOSPITALIST PROGRESS NOTE    S: No acute events overnight.  Pt complained of pain in her R clavicle now extending to her R shoulder that started last night.  She denied trauma to the area or any change in activity that may have prompted this pain.    CURRENT MEDICATIONS  MEDICATIONS  (STANDING):  apixaban 5 milliGRAM(s) Oral every 12 hours  baclofen 5 milliGRAM(s) Oral every 8 hours  bisacodyl 5 milliGRAM(s) Oral at bedtime  clonazePAM  Tablet 0.5 milliGRAM(s) Oral <User Schedule>  clonazePAM  Tablet 1 milliGRAM(s) Oral at bedtime  colchicine 0.6 milliGRAM(s) Oral daily  cyclobenzaprine 5 milliGRAM(s) Oral once  dexAMETHasone  IVPB 8 milliGRAM(s) IV Intermittent every 24 hours  etoposide (TOPOSAR) IVPB (eMAR) 135 milliGRAM(s) IV Intermittent every 24 hours  fluticasone propionate/ salmeterol 100-50 MICROgram(s) Diskus 1 Dose(s) Inhalation two times a day  gabapentin 100 milliGRAM(s) Oral three times a day  levothyroxine 112 MICROGram(s) Oral daily  morphine ER Tablet 30 milliGRAM(s) Oral every 8 hours  naloxegol 25 milliGRAM(s) Oral daily  nicotine - 21 mG/24Hr(s) Patch 1 Patch Transdermal daily  OLANZapine 5 milliGRAM(s) Oral at bedtime  pantoprazole    Tablet 40 milliGRAM(s) Oral every 12 hours  polyethylene glycol 3350 17 Gram(s) Oral two times a day  senna 2 Tablet(s) Oral at bedtime  sodium chloride 0.9%. 1000 milliLiter(s) (10 mL/Hr) IV Continuous <Continuous>  MEDICATIONS  (PRN):  albuterol    90 MICROgram(s) HFA Inhaler 2 Puff(s) Inhalation every 6 hours PRN Shortness of Breath and/or Wheezing  aluminum hydroxide/magnesium hydroxide/simethicone Suspension 30 milliLiter(s) Oral every 4 hours PRN Dyspepsia  diphenhydrAMINE 25 milliGRAM(s) Oral every 6 hours PRN Rash and/or Itching  hydrocortisone 1% Ointment 1 Application(s) Topical every 12 hours PRN Rash and/or Itching  oxyCODONE    IR 20 milliGRAM(s) Oral every 4 hours PRN Moderate Pain (4 - 6)  oxyCODONE    IR 30 milliGRAM(s) Oral every 4 hours PRN Severe Pain (7 - 10)      PHYSICAL EXAM  T(C): 36.3 (05-20-25 @ 12:25), Max: 36.8 (05-19-25 @ 18:22)  HR: 62 (05-20-25 @ 12:25) (62 - 80)  BP: 100/60 (05-20-25 @ 12:25) (100/54 - 140/70)  RR: 18 (05-20-25 @ 12:25) (17 - 18)  SpO2: 97% (05-20-25 @ 12:25) (92% - 97%)  Non-toxic-appearing woman, crying hysterically in bed  Answering all questions appropriately (conversational dyspnea resolved), following commands  Anicteric sclera, no oral lesions/thrush  RRR, no m/r/g, heart sounds faint  Breaths somewhat labored, but not tachypnea; trace RLB crackles, no w/r  Abd soft/nt/nd, hypoactive bowel sounds  No peripheral edema; DIP/PIP, ankle joint deformities unchanged  CN 2-12 grossly intact; no gross focal neuro deficits; pt moves all extremities spontaneously    LABS                        11.2   8.26  )-----------( 253      ( 20 May 2025 10:28 )             33.6     05-20    140  |  104  |  11  ----------------------------<  130[H]  4.0   |  25  |  0.38[L]    Ca    8.8      20 May 2025 10:28  Phos  3.6     05-20  Mg     2.00     05-20    TPro  6.3  /  Alb  3.8  /  TBili  0.2  /  DBili  x   /  AST  18  /  ALT  7   /  AlkPhos  109  05-20      Labs, Radiology, Cardiology, and Other Results: 12.9                 139  | 26   | 12           13.36 >-----------< 272     ------------------------< 91                         38.5                 3.7  | 101  | 0.54            Ca 9.5   Mg 2.00  Ph 3.9          MICROBIOLOGY  Abx: none on this admission.    Cx Data: none new on this admission.      CURRENT RADIOLOGY  5/19 CXR: Redemonstrated opacification of the AP window, compatible with patient's known mediastinal mass.  Hazy opacities of the bilateral upper lung fields, unchanged as compared to prior chest radiograph 3/12/2025.  No acute osseous abnormality. No discrete aggressive osseous lesion within the clavicles as visualized.    HISTORICAL RADIOLOGY  4/28 AXR: Nonobstructive bowel gas pattern. Large stool burden.    3/20 NC CT chest  1.  Moderate-sized pericardial effusion appears unchanged compared to   partially imaged heart on 3/18/2025 but increase insize since 2/15/2025.  2.  Interval increase in size of previously described left suprahilar   mediastinal mass which extends into the AP window and paramediastinal   left upper lobe.  3.  Some of the other left upper lobe nodules are decrease and some   increase.  4.  Unchanged sclerotic lesions within the sternum and T12 vertebral body.    3/20 TTE   1. Limited study to re-evaluate pericardial effusion.   2. There is a moderate pericardial effusion noted adjacent to the posterior left ventricle, a moderate pericardial effusion noted adjacent to the right atrium, a small pericardial effusion noted adjacent to the apex, a moderate pericardial effusion noted adjacent to the lateral left ventricle and a moderate pericardial effusion noted adjacent to the right ventricle with no echocardiographic evidence of tamponade physiology. Moderate pericardial effusion, measuring ~ 1.1 cm posterior to the left ventricle, ~ 1.0 cm lateral to the left ventricle, ~ 1.4 cm adjacent to the RV free wall, and ~ 1.4 cm superior to the right atrium. Small pericardial effusion anterior to the right ventricle and adjacent to the apex. The pericardium adjacent to the RV free wall appears markedly thickened and may reflect the presence of thrombus, inflammatory material, and/or metastatic disease. No RA or RV diastolic collapse seen. However, the inferior vena cava (IVC) displays reduced respirophasic variability in its caliber, consistent with elevated right atrial pressure.   3. The inferior vena cava is normal in size measuring 1.30 cm in diameter, (normal <2.1cm) with abnormal inspiratory collapse (abnormal <50%) consistent with mildly elevated right atrial pressure (~8, range 5-10mmHg).   4. Compared to the transthoracic echocardiogram performed on 3/12/2025, the pericardial effusion has increased slightly in size.    3/18 CT abd/pelv: No acute pathology within the abdomen and pelvis. Partially imaged pericardial effusion. Bilateral trace pleural effusions with adjacent atelectasis. Again demonstrated a T12 vertebral body hemangioma. Degenerative changes of the spine.    3/18 CT head: No hydrocephalus, acute intracranial hemorrhage, or mass effect. No compelling evidence of intracranial metastasis on this non-contrast exam.

## 2025-05-20 NOTE — DIETITIAN INITIAL EVALUATION ADULT - PERTINENT MEDS FT
MEDICATIONS  (STANDING):  apixaban 5 milliGRAM(s) Oral every 12 hours  baclofen 5 milliGRAM(s) Oral every 8 hours  bisacodyl 5 milliGRAM(s) Oral at bedtime  clonazePAM  Tablet 0.5 milliGRAM(s) Oral <User Schedule>  clonazePAM  Tablet 1 milliGRAM(s) Oral at bedtime  colchicine 0.6 milliGRAM(s) Oral daily  cyclobenzaprine 5 milliGRAM(s) Oral once  dexAMETHasone  IVPB 8 milliGRAM(s) IV Intermittent every 24 hours  etoposide (TOPOSAR) IVPB (eMAR) 135 milliGRAM(s) IV Intermittent every 24 hours  fluticasone propionate/ salmeterol 100-50 MICROgram(s) Diskus 1 Dose(s) Inhalation two times a day  gabapentin 100 milliGRAM(s) Oral three times a day  levothyroxine 112 MICROGram(s) Oral daily  morphine ER Tablet 30 milliGRAM(s) Oral every 8 hours  naloxegol 25 milliGRAM(s) Oral daily  nicotine - 21 mG/24Hr(s) Patch 1 Patch Transdermal daily  OLANZapine 5 milliGRAM(s) Oral at bedtime  pantoprazole    Tablet 40 milliGRAM(s) Oral every 12 hours  polyethylene glycol 3350 17 Gram(s) Oral two times a day  senna 2 Tablet(s) Oral at bedtime  sodium chloride 0.9%. 1000 milliLiter(s) (10 mL/Hr) IV Continuous <Continuous>    MEDICATIONS  (PRN):  albuterol    90 MICROgram(s) HFA Inhaler 2 Puff(s) Inhalation every 6 hours PRN Shortness of Breath and/or Wheezing  aluminum hydroxide/magnesium hydroxide/simethicone Suspension 30 milliLiter(s) Oral every 4 hours PRN Dyspepsia  diphenhydrAMINE 25 milliGRAM(s) Oral every 6 hours PRN Rash and/or Itching  hydrocortisone 1% Ointment 1 Application(s) Topical every 12 hours PRN Rash and/or Itching  oxyCODONE    IR 20 milliGRAM(s) Oral every 4 hours PRN Moderate Pain (4 - 6)  oxyCODONE    IR 30 milliGRAM(s) Oral every 4 hours PRN Severe Pain (7 - 10)

## 2025-05-20 NOTE — PROGRESS NOTE ADULT - ASSESSMENT
65 yo woman, former smoker (47py; quit 12/2024) and well known to the Onc[facility]os service, with h/o pAfib (on Eliquis), Hypothyroidism, ? h/o Sleroderma, h/o pericardial effusion s/p pericardiocentesis (dx in 12/2024; ? 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 while hospitalized at [facility]- she tolerated tx without significant adverse effect. She presented as an outpt for C2 on 4/14- however, pt received Cosela and developed "severe headache" for which she required ED eval, but was ultimately sent home (she did not receive Etop or carbo on d1); on day 2 she received Etoposide for 5 minutes and started to have chest tightness and shortness of breath which was treated as a hypersensitivity reaction (she was not re-challenged due to patient preference at the time; she did not receive carbo or cosela on D2). Pt developed shortness of breath and palmar erythema with C3 (VSS); her infusion rate was slowed and she subsequently tolerated chemo without significant adverse effect.  Pt is now electively admitted for C4 Carbo/Etop with Fulphila support.    ACTIVE PROBLEMS  SCLCa (presumed limited stage)  Admission for chemotherapy  Anxiety/depression  pAfib  Hypercoag state  R clavicle/shoulder pain  Immunocompromised due to chemotherapy    SCLCa (presumed limited stage)  Will proceed with C4d2 Carbo/Etop today (20% dose reduced)- pt currently tolerating without significant adverse effect  Monitoring and with plan to treat hypersensitivity reaction as per protocol  Fluphila x1 dose to be given on day 4 (5/22)  Pt to continue outpt fu with Dr. Hall after discharge  Long-term prognosis: guarded; pt is full code    Anxiety/depression  Continuing Klonopin 0.5mg BID and 1mg nightly  Continuing Zyprexa 5mg nightly    Chronic hypoxic resp failure  h/o Asthma/COPD (former smoker)  Continuing supplemental O2 via NC and supportive resp care prn  Continuing Advair 100-50mcg MDI BID  Continuing duonebs q6/prn   Continuing Nicotine patch 21mg daily (6 weeks)  Mgmt of pericardial effusion as above    SIADH  Na 140 today  Pt is asymptomatic  SIADH confirmed by 3/12 urine lytes but Na has been normal off salt tabs and fluid restriction since pt has started cancer treatment  Will continue to monitor    Cancer related pain  Continuing Morphine ER 30mg q12  Continuing Oxy IR 20/30mg q8/prn  Continuing Gabapentin 100mg TID  Continuing Baclofen 5mg q8/prn    Constipation  Possibly due to opioids +/- AID  Pt denied abd pain, diarrhea, but reported mild nausea  Continuing Movantik 25mg daily  Continuing bowel regimen (Miralax, Senna) for OIC prevention  Of note, pt has experienced constipation relief after Relistor in the past  Will check AXR if pt becomes more symptomatic    Hypothyroidism: continuing LT4 112mcg daily    h/o pericardial effusion: most likely inflammatory; continuing Colchicine 0.6mg daily    pAfib  Hypercoag state  Continuing Eliquis 5mg BID    R clavicle/shoulder pain  Likely musculoskeletal in orgin  5/19 CXR without evidence of osseous abnormalities  Flexiril PO 5mg x1 ordered for today  Monitoring for improvement

## 2025-05-20 NOTE — DIETITIAN INITIAL EVALUATION ADULT - ORAL INTAKE PTA/DIET HISTORY
Met patient at bedside. Patient alert, but appears fatigue. Patient reports fair appetite / PO intake secondary to constipation PTA. Denies prior use of nutrition shakes/ vitamins PTA. No other reported issues.

## 2025-05-21 LAB
ALBUMIN SERPL ELPH-MCNC: 4 G/DL — SIGNIFICANT CHANGE UP (ref 3.3–5)
ALP SERPL-CCNC: 116 U/L — SIGNIFICANT CHANGE UP (ref 40–120)
ALT FLD-CCNC: 9 U/L — SIGNIFICANT CHANGE UP (ref 4–33)
ANION GAP SERPL CALC-SCNC: 12 MMOL/L — SIGNIFICANT CHANGE UP (ref 7–14)
AST SERPL-CCNC: 18 U/L — SIGNIFICANT CHANGE UP (ref 4–32)
BASOPHILS # BLD AUTO: 0.01 K/UL — SIGNIFICANT CHANGE UP (ref 0–0.2)
BASOPHILS NFR BLD AUTO: 0.1 % — SIGNIFICANT CHANGE UP (ref 0–2)
BILIRUB SERPL-MCNC: 0.2 MG/DL — SIGNIFICANT CHANGE UP (ref 0.2–1.2)
BUN SERPL-MCNC: 10 MG/DL — SIGNIFICANT CHANGE UP (ref 7–23)
CALCIUM SERPL-MCNC: 8.9 MG/DL — SIGNIFICANT CHANGE UP (ref 8.4–10.5)
CHLORIDE SERPL-SCNC: 103 MMOL/L — SIGNIFICANT CHANGE UP (ref 98–107)
CO2 SERPL-SCNC: 24 MMOL/L — SIGNIFICANT CHANGE UP (ref 22–31)
CREAT SERPL-MCNC: 0.44 MG/DL — LOW (ref 0.5–1.3)
EGFR: 107 ML/MIN/1.73M2 — SIGNIFICANT CHANGE UP
EGFR: 107 ML/MIN/1.73M2 — SIGNIFICANT CHANGE UP
EOSINOPHIL # BLD AUTO: 0 K/UL — SIGNIFICANT CHANGE UP (ref 0–0.5)
EOSINOPHIL NFR BLD AUTO: 0 % — SIGNIFICANT CHANGE UP (ref 0–6)
GLUCOSE SERPL-MCNC: 99 MG/DL — SIGNIFICANT CHANGE UP (ref 70–99)
HCT VFR BLD CALC: 37.5 % — SIGNIFICANT CHANGE UP (ref 34.5–45)
HGB BLD-MCNC: 12.1 G/DL — SIGNIFICANT CHANGE UP (ref 11.5–15.5)
IANC: 5.79 K/UL — SIGNIFICANT CHANGE UP (ref 1.8–7.4)
IMM GRANULOCYTES NFR BLD AUTO: 0.4 % — SIGNIFICANT CHANGE UP (ref 0–0.9)
LYMPHOCYTES # BLD AUTO: 1.11 K/UL — SIGNIFICANT CHANGE UP (ref 1–3.3)
LYMPHOCYTES # BLD AUTO: 15 % — SIGNIFICANT CHANGE UP (ref 13–44)
MAGNESIUM SERPL-MCNC: 2 MG/DL — SIGNIFICANT CHANGE UP (ref 1.6–2.6)
MCHC RBC-ENTMCNC: 30.8 PG — SIGNIFICANT CHANGE UP (ref 27–34)
MCHC RBC-ENTMCNC: 32.3 G/DL — SIGNIFICANT CHANGE UP (ref 32–36)
MCV RBC AUTO: 95.4 FL — SIGNIFICANT CHANGE UP (ref 80–100)
MONOCYTES # BLD AUTO: 0.46 K/UL — SIGNIFICANT CHANGE UP (ref 0–0.9)
MONOCYTES NFR BLD AUTO: 6.2 % — SIGNIFICANT CHANGE UP (ref 2–14)
NEUTROPHILS # BLD AUTO: 5.79 K/UL — SIGNIFICANT CHANGE UP (ref 1.8–7.4)
NEUTROPHILS NFR BLD AUTO: 78.3 % — HIGH (ref 43–77)
NRBC # BLD AUTO: 0 K/UL — SIGNIFICANT CHANGE UP (ref 0–0)
NRBC # FLD: 0 K/UL — SIGNIFICANT CHANGE UP (ref 0–0)
NRBC BLD AUTO-RTO: 0 /100 WBCS — SIGNIFICANT CHANGE UP (ref 0–0)
PHOSPHATE SERPL-MCNC: 2.8 MG/DL — SIGNIFICANT CHANGE UP (ref 2.5–4.5)
PLATELET # BLD AUTO: 244 K/UL — SIGNIFICANT CHANGE UP (ref 150–400)
POTASSIUM SERPL-MCNC: 3.4 MMOL/L — LOW (ref 3.5–5.3)
POTASSIUM SERPL-SCNC: 3.4 MMOL/L — LOW (ref 3.5–5.3)
PROT SERPL-MCNC: 6.5 G/DL — SIGNIFICANT CHANGE UP (ref 6–8.3)
RBC # BLD: 3.93 M/UL — SIGNIFICANT CHANGE UP (ref 3.8–5.2)
RBC # FLD: 16 % — HIGH (ref 10.3–14.5)
SODIUM SERPL-SCNC: 139 MMOL/L — SIGNIFICANT CHANGE UP (ref 135–145)
WBC # BLD: 7.4 K/UL — SIGNIFICANT CHANGE UP (ref 3.8–10.5)
WBC # FLD AUTO: 7.4 K/UL — SIGNIFICANT CHANGE UP (ref 3.8–10.5)

## 2025-05-21 PROCEDURE — 99233 SBSQ HOSP IP/OBS HIGH 50: CPT

## 2025-05-21 RX ORDER — CYCLOBENZAPRINE HYDROCHLORIDE 15 MG/1
10 CAPSULE, EXTENDED RELEASE ORAL THREE TIMES A DAY
Refills: 0 | Status: DISCONTINUED | OUTPATIENT
Start: 2025-05-21 | End: 2025-05-28

## 2025-05-21 RX ORDER — PEGFILGRASTIM-CBQV 6 MG/.6ML
6 INJECTION, SOLUTION SUBCUTANEOUS ONCE
Refills: 0 | Status: COMPLETED | OUTPATIENT
Start: 2025-05-22 | End: 2025-05-22

## 2025-05-21 RX ADMIN — GABAPENTIN 100 MILLIGRAM(S): 400 CAPSULE ORAL at 22:02

## 2025-05-21 RX ADMIN — CLONAZEPAM 0.5 MILLIGRAM(S): 0.5 TABLET ORAL at 07:05

## 2025-05-21 RX ADMIN — CLONAZEPAM 0.5 MILLIGRAM(S): 0.5 TABLET ORAL at 16:10

## 2025-05-21 RX ADMIN — NICOTINE POLACRILEX 1 PATCH: 4 GUM, CHEWING ORAL at 16:10

## 2025-05-21 RX ADMIN — Medication 30 MILLIGRAM(S): at 22:02

## 2025-05-21 RX ADMIN — GABAPENTIN 100 MILLIGRAM(S): 400 CAPSULE ORAL at 13:35

## 2025-05-21 RX ADMIN — NALOXEGOL OXALATE 25 MILLIGRAM(S): 12.5 TABLET, FILM COATED ORAL at 12:15

## 2025-05-21 RX ADMIN — Medication 135 MILLIGRAM(S): at 15:57

## 2025-05-21 RX ADMIN — GABAPENTIN 100 MILLIGRAM(S): 400 CAPSULE ORAL at 07:05

## 2025-05-21 RX ADMIN — Medication 30 MILLIGRAM(S): at 16:09

## 2025-05-21 RX ADMIN — OXYCODONE HYDROCHLORIDE 30 MILLIGRAM(S): 30 TABLET ORAL at 05:34

## 2025-05-21 RX ADMIN — BACLOFEN 5 MILLIGRAM(S): 10 INJECTION INTRATHECAL at 07:06

## 2025-05-21 RX ADMIN — NICOTINE POLACRILEX 1 PATCH: 4 GUM, CHEWING ORAL at 21:56

## 2025-05-21 RX ADMIN — BACLOFEN 5 MILLIGRAM(S): 10 INJECTION INTRATHECAL at 13:36

## 2025-05-21 RX ADMIN — OXYCODONE HYDROCHLORIDE 30 MILLIGRAM(S): 30 TABLET ORAL at 23:34

## 2025-05-21 RX ADMIN — OLANZAPINE 5 MILLIGRAM(S): 10 TABLET ORAL at 22:02

## 2025-05-21 RX ADMIN — DEXAMETHASONE 101.6 MILLIGRAM(S): 0.5 TABLET ORAL at 14:29

## 2025-05-21 RX ADMIN — OXYCODONE HYDROCHLORIDE 30 MILLIGRAM(S): 30 TABLET ORAL at 19:41

## 2025-05-21 RX ADMIN — COLCHICINE 0.6 MILLIGRAM(S): 0.6 TABLET, FILM COATED ORAL at 12:16

## 2025-05-21 RX ADMIN — BACLOFEN 5 MILLIGRAM(S): 10 INJECTION INTRATHECAL at 22:03

## 2025-05-21 RX ADMIN — OXYCODONE HYDROCHLORIDE 30 MILLIGRAM(S): 30 TABLET ORAL at 18:41

## 2025-05-21 RX ADMIN — OXYCODONE HYDROCHLORIDE 30 MILLIGRAM(S): 30 TABLET ORAL at 13:36

## 2025-05-21 RX ADMIN — Medication 30 MILLIGRAM(S): at 17:09

## 2025-05-21 RX ADMIN — APIXABAN 5 MILLIGRAM(S): 2.5 TABLET, FILM COATED ORAL at 09:11

## 2025-05-21 RX ADMIN — NICOTINE POLACRILEX 1 PATCH: 4 GUM, CHEWING ORAL at 07:11

## 2025-05-21 RX ADMIN — CLONAZEPAM 1 MILLIGRAM(S): 0.5 TABLET ORAL at 22:02

## 2025-05-21 RX ADMIN — Medication 112 MICROGRAM(S): at 07:05

## 2025-05-21 RX ADMIN — OXYCODONE HYDROCHLORIDE 30 MILLIGRAM(S): 30 TABLET ORAL at 04:34

## 2025-05-21 RX ADMIN — Medication 10 MILLILITER(S): at 14:31

## 2025-05-21 RX ADMIN — Medication 30 MILLIGRAM(S): at 23:02

## 2025-05-21 RX ADMIN — NICOTINE POLACRILEX 1 PATCH: 4 GUM, CHEWING ORAL at 12:30

## 2025-05-21 RX ADMIN — Medication 30 MILLIGRAM(S): at 07:05

## 2025-05-21 RX ADMIN — Medication 30 MILLIGRAM(S): at 08:05

## 2025-05-21 RX ADMIN — APIXABAN 5 MILLIGRAM(S): 2.5 TABLET, FILM COATED ORAL at 22:02

## 2025-05-21 RX ADMIN — OXYCODONE HYDROCHLORIDE 30 MILLIGRAM(S): 30 TABLET ORAL at 14:36

## 2025-05-21 RX ADMIN — Medication 40 MILLIGRAM(S): at 07:05

## 2025-05-21 NOTE — PHYSICAL THERAPY INITIAL EVALUATION ADULT - NSPTDISCHREC_GEN_A_CORE
Pt states she does not want PT while in the hospital. Pt will be taken off PT program, if pt is agreeable to PT re consult when needed.

## 2025-05-21 NOTE — PHYSICAL THERAPY INITIAL EVALUATION ADULT - GENERAL OBSERVATIONS, REHAB EVAL
Chart reviewed and cleared for PT by RN Ruth. Pt received semi supine in bed in NAD, all lines intact +IV, HR 70s, pts friend at bedside.

## 2025-05-21 NOTE — PROGRESS NOTE ADULT - SUBJECTIVE AND OBJECTIVE BOX
SOLID TUMOR ONCOLOGY HOSPITALIST PROGRESS NOTE    S: No acute events overnight.  Pt reported that she continues to have neck and shoulder pain, but noted there pains are chronic and was amenable to trial of higher dose of Flexiril.    CURRENT MEDICATIONS  MEDICATIONS  (STANDING):  apixaban 5 milliGRAM(s) Oral every 12 hours  baclofen 5 milliGRAM(s) Oral every 8 hours  bisacodyl 5 milliGRAM(s) Oral at bedtime  clonazePAM  Tablet 0.5 milliGRAM(s) Oral <User Schedule>  clonazePAM  Tablet 1 milliGRAM(s) Oral at bedtime  colchicine 0.6 milliGRAM(s) Oral daily  fluticasone propionate/ salmeterol 100-50 MICROgram(s) Diskus 1 Dose(s) Inhalation two times a day  gabapentin 100 milliGRAM(s) Oral three times a day  levothyroxine 112 MICROGram(s) Oral daily  morphine ER Tablet 30 milliGRAM(s) Oral every 8 hours  naloxegol 25 milliGRAM(s) Oral daily  nicotine - 21 mG/24Hr(s) Patch 1 Patch Transdermal daily  OLANZapine 5 milliGRAM(s) Oral at bedtime  pantoprazole    Tablet 40 milliGRAM(s) Oral every 12 hours  polyethylene glycol 3350 17 Gram(s) Oral two times a day  senna 2 Tablet(s) Oral at bedtime  sodium chloride 0.9%. 1000 milliLiter(s) (10 mL/Hr) IV Continuous <Continuous>  MEDICATIONS  (PRN):  albuterol    90 MICROgram(s) HFA Inhaler 2 Puff(s) Inhalation every 6 hours PRN Shortness of Breath and/or Wheezing  aluminum hydroxide/magnesium hydroxide/simethicone Suspension 30 milliLiter(s) Oral every 4 hours PRN Dyspepsia  cyclobenzaprine 10 milliGRAM(s) Oral three times a day PRN Muscle Spasm  diphenhydrAMINE 25 milliGRAM(s) Oral every 6 hours PRN Rash and/or Itching  hydrocortisone 1% Ointment 1 Application(s) Topical every 12 hours PRN Rash and/or Itching  oxyCODONE    IR 20 milliGRAM(s) Oral every 4 hours PRN Moderate Pain (4 - 6)  oxyCODONE    IR 30 milliGRAM(s) Oral every 4 hours PRN Severe Pain (7 - 10)      PHYSICAL EXAM  T(C): 36.3 (05-21-25 @ 07:00), Max: 36.7 (05-20-25 @ 20:19)  HR: 65 (05-21-25 @ 13:00) (60 - 83)  BP: 124/61 (05-21-25 @ 13:00) (101/65 - 124/61)  RR: 17 (05-21-25 @ 13:00) (16 - 18)  SpO2: 98% (05-21-25 @ 13:00) (94% - 98%)  Non-toxic-appearing woman, crying hysterically in bed  Answering all questions appropriately (conversational dyspnea resolved), following commands  Anicteric sclera, no oral lesions/thrush  RRR, no m/r/g, heart sounds faint  Breaths somewhat labored, but not tachypnea; trace RLB crackles, no w/r  Abd soft/nt/nd, hypoactive bowel sounds  No peripheral edema; DIP/PIP, ankle joint deformities unchanged  CN 2-12 grossly intact; no gross focal neuro deficits; pt moves all extremities spontaneously    LABS                        12.1   7.40  )-----------( 244      ( 21 May 2025 10:06 )             37.5     05-21    139  |  103  |  10  ----------------------------<  99  3.4[L]   |  24  |  0.44[L]    Ca    8.9      21 May 2025 10:06  Phos  2.8     05-21  Mg     2.00     05-21    TPro  6.5  /  Alb  4.0  /  TBili  0.2  /  DBili  x   /  AST  18  /  ALT  9   /  AlkPhos  116  05-21      MICROBIOLOGY  Abx: none on this admission.    Cx Data: none new on this admission.      CURRENT RADIOLOGY  5/19 CXR: Redemonstrated opacification of the AP window, compatible with patient's known mediastinal mass.  Hazy opacities of the bilateral upper lung fields, unchanged as compared to prior chest radiograph 3/12/2025.  No acute osseous abnormality. No discrete aggressive osseous lesion within the clavicles as visualized.    HISTORICAL RADIOLOGY  4/28 AXR: Nonobstructive bowel gas pattern. Large stool burden.    3/20 NC CT chest  1.  Moderate-sized pericardial effusion appears unchanged compared to   partially imaged heart on 3/18/2025 but increase insize since 2/15/2025.  2.  Interval increase in size of previously described left suprahilar   mediastinal mass which extends into the AP window and paramediastinal   left upper lobe.  3.  Some of the other left upper lobe nodules are decrease and some   increase.  4.  Unchanged sclerotic lesions within the sternum and T12 vertebral body.    3/20 TTE   1. Limited study to re-evaluate pericardial effusion.   2. There is a moderate pericardial effusion noted adjacent to the posterior left ventricle, a moderate pericardial effusion noted adjacent to the right atrium, a small pericardial effusion noted adjacent to the apex, a moderate pericardial effusion noted adjacent to the lateral left ventricle and a moderate pericardial effusion noted adjacent to the right ventricle with no echocardiographic evidence of tamponade physiology. Moderate pericardial effusion, measuring ~ 1.1 cm posterior to the left ventricle, ~ 1.0 cm lateral to the left ventricle, ~ 1.4 cm adjacent to the RV free wall, and ~ 1.4 cm superior to the right atrium. Small pericardial effusion anterior to the right ventricle and adjacent to the apex. The pericardium adjacent to the RV free wall appears markedly thickened and may reflect the presence of thrombus, inflammatory material, and/or metastatic disease. No RA or RV diastolic collapse seen. However, the inferior vena cava (IVC) displays reduced respirophasic variability in its caliber, consistent with elevated right atrial pressure.   3. The inferior vena cava is normal in size measuring 1.30 cm in diameter, (normal <2.1cm) with abnormal inspiratory collapse (abnormal <50%) consistent with mildly elevated right atrial pressure (~8, range 5-10mmHg).   4. Compared to the transthoracic echocardiogram performed on 3/12/2025, the pericardial effusion has increased slightly in size.    3/18 CT abd/pelv: No acute pathology within the abdomen and pelvis. Partially imaged pericardial effusion. Bilateral trace pleural effusions with adjacent atelectasis. Again demonstrated a T12 vertebral body hemangioma. Degenerative changes of the spine.    3/18 CT head: No hydrocephalus, acute intracranial hemorrhage, or mass effect. No compelling evidence of intracranial metastasis on this non-contrast exam.

## 2025-05-21 NOTE — PHYSICAL THERAPY INITIAL EVALUATION ADULT - ADDITIONAL COMMENTS
History obtained by friend at bedside. Pt lives alone in a townhouse with 3 steps to enter. Pt uses a rolling walker and wheelchair and requires assistance with ADLs. Pt owns a commode. Pt reports no falls in the past 6 months.  pt left semi supine in bed in NAD, all lines intact,  call strauss in reach and RN Ruth made aware.

## 2025-05-21 NOTE — PHYSICAL THERAPY INITIAL EVALUATION ADULT - PERTINENT HX OF CURRENT PROBLEM, REHAB EVAL
Pt is a 64 year old woman, former smoker (47py; quit 12/2024) and well known to the OncHos service, with h/o pAfib (on Eliquis), Hypothyroidism, ? h/o Sleroderma, h/o pericardial effusion s/p pericardiocentesis (dx in 12/2024; ? 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 while hospitalized at [facility]- she tolerated tx without significant adverse effect. She presented as an outpt for C2 on 4/14- however, pt received Cosela and developed "severe headache" for which she required ED eval, but was ultimately sent home (she did not receive Etop or carbo on d1); on day 2 she received Etoposide for 5 minutes and started to have chest tightness and shortness of breath which was treated as a hypersensitivity reaction (she was not re-challenged due to patient preference at the time; she did not receive carbo or cosela on D2). Pt developed shortness of breath and palmar erythema with C3 (VSS); her infusion rate was slowed and she subsequently tolerated chemo without significant adverse effect.  Pt is now electively admitted for C4 Carbo/Etop with Fulphila support.

## 2025-05-21 NOTE — PROGRESS NOTE ADULT - ASSESSMENT
65 yo woman, former smoker (47py; quit 12/2024) and well known to the OncHos service, with h/o pAfib (on Eliquis), Hypothyroidism, ? h/o Sleroderma, h/o pericardial effusion s/p pericardiocentesis (dx in 12/2024; ? 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 while hospitalized at [facility]- she tolerated tx without significant adverse effect. She presented as an outpt for C2 on 4/14- however, pt received Cosela and developed "severe headache" for which she required ED eval, but was ultimately sent home (she did not receive Etop or carbo on d1); on day 2 she received Etoposide for 5 minutes and started to have chest tightness and shortness of breath which was treated as a hypersensitivity reaction (she was not re-challenged due to patient preference at the time; she did not receive carbo or cosela on D2). Pt developed shortness of breath and palmar erythema with C3 (VSS); her infusion rate was slowed and she subsequently tolerated chemo without significant adverse effect.  Pt is now electively admitted for C4 Carbo/Etop with Fulphila support.    ACTIVE PROBLEMS  SCLCa (presumed limited stage)  Admission for chemotherapy  Anxiety/depression  pAfib  Hypercoag state  R clavicle/shoulder pain  Immunocompromised due to chemotherapy    SCLCa (presumed limited stage)  Will proceed with C4d3 Carbo/Etop today (20% dose reduced)- pt currently tolerating without significant adverse effect  Monitoring and with plan to treat hypersensitivity reaction as per protocol  Fluphila x1 dose to be given on day 4 (5/22)  Pt to continue outpt fu with Dr. Hall after discharge  Long-term prognosis: guarded; pt is full code    Anxiety/depression  Continuing Klonopin 0.5mg BID and 1mg nightly  Continuing Zyprexa 5mg nightly    Chronic hypoxic resp failure  h/o Asthma/COPD (former smoker)  Continuing supplemental O2 via NC and supportive resp care prn  Continuing Advair 100-50mcg MDI BID  Continuing duonebs q6/prn   Continuing Nicotine patch 21mg daily (6 weeks)  Mgmt of pericardial effusion as above    SIADH  Na 139 today  Pt is asymptomatic  SIADH confirmed by 3/12 urine lytes but Na has been normal off salt tabs and fluid restriction since pt has started cancer treatment  Will continue to monitor    Cancer related pain  Continuing Morphine ER 30mg q12  Continuing Oxy IR 20/30mg q8/prn  Continuing Gabapentin 100mg TID  Continuing Baclofen 5mg q8/prn    Constipation  Possibly due to opioids +/- AID  Pt denied abd pain, diarrhea, but reported mild nausea  Continuing Movantik 25mg daily  Continuing bowel regimen (Miralax, Senna) for OIC prevention  Of note, pt has experienced constipation relief after Relistor in the past  Will check AXR if pt becomes more symptomatic    Hypothyroidism: continuing LT4 112mcg daily    h/o pericardial effusion: most likely inflammatory; continuing Colchicine 0.6mg daily    pAfib  Hypercoag state  Continuing Eliquis 5mg BID    R clavicle/shoulder pain  Likely musculoskeletal in orgin  5/19 CXR without evidence of osseous abnormalities  Flexiril PO 5mg x1 ordered for today  Monitoring for improvement

## 2025-05-22 ENCOUNTER — APPOINTMENT (OUTPATIENT)
Dept: PSYCHIATRY | Facility: CLINIC | Age: 65
End: 2025-05-22

## 2025-05-22 ENCOUNTER — TRANSCRIPTION ENCOUNTER (OUTPATIENT)
Age: 65
End: 2025-05-22

## 2025-05-22 LAB
ALBUMIN SERPL ELPH-MCNC: 3.6 G/DL — SIGNIFICANT CHANGE UP (ref 3.3–5)
ALP SERPL-CCNC: 101 U/L — SIGNIFICANT CHANGE UP (ref 40–120)
ALT FLD-CCNC: 5 U/L — SIGNIFICANT CHANGE UP (ref 4–33)
ANION GAP SERPL CALC-SCNC: 11 MMOL/L — SIGNIFICANT CHANGE UP (ref 7–14)
AST SERPL-CCNC: 15 U/L — SIGNIFICANT CHANGE UP (ref 4–32)
BASOPHILS # BLD AUTO: 0 K/UL — SIGNIFICANT CHANGE UP (ref 0–0.2)
BASOPHILS NFR BLD AUTO: 0 % — SIGNIFICANT CHANGE UP (ref 0–2)
BILIRUB SERPL-MCNC: 0.3 MG/DL — SIGNIFICANT CHANGE UP (ref 0.2–1.2)
BUN SERPL-MCNC: 10 MG/DL — SIGNIFICANT CHANGE UP (ref 7–23)
CALCIUM SERPL-MCNC: 9.1 MG/DL — SIGNIFICANT CHANGE UP (ref 8.4–10.5)
CHLORIDE SERPL-SCNC: 104 MMOL/L — SIGNIFICANT CHANGE UP (ref 98–107)
CO2 SERPL-SCNC: 24 MMOL/L — SIGNIFICANT CHANGE UP (ref 22–31)
CREAT SERPL-MCNC: 0.38 MG/DL — LOW (ref 0.5–1.3)
EGFR: 110 ML/MIN/1.73M2 — SIGNIFICANT CHANGE UP
EGFR: 110 ML/MIN/1.73M2 — SIGNIFICANT CHANGE UP
EOSINOPHIL # BLD AUTO: 0.01 K/UL — SIGNIFICANT CHANGE UP (ref 0–0.5)
EOSINOPHIL NFR BLD AUTO: 0.2 % — SIGNIFICANT CHANGE UP (ref 0–6)
GLUCOSE SERPL-MCNC: 95 MG/DL — SIGNIFICANT CHANGE UP (ref 70–99)
HCT VFR BLD CALC: 34.4 % — LOW (ref 34.5–45)
HGB BLD-MCNC: 11.7 G/DL — SIGNIFICANT CHANGE UP (ref 11.5–15.5)
IANC: 4.44 K/UL — SIGNIFICANT CHANGE UP (ref 1.8–7.4)
IMM GRANULOCYTES NFR BLD AUTO: 0.5 % — SIGNIFICANT CHANGE UP (ref 0–0.9)
LYMPHOCYTES # BLD AUTO: 0.99 K/UL — LOW (ref 1–3.3)
LYMPHOCYTES # BLD AUTO: 16.9 % — SIGNIFICANT CHANGE UP (ref 13–44)
MAGNESIUM SERPL-MCNC: 1.9 MG/DL — SIGNIFICANT CHANGE UP (ref 1.6–2.6)
MCHC RBC-ENTMCNC: 31 PG — SIGNIFICANT CHANGE UP (ref 27–34)
MCHC RBC-ENTMCNC: 34 G/DL — SIGNIFICANT CHANGE UP (ref 32–36)
MCV RBC AUTO: 91 FL — SIGNIFICANT CHANGE UP (ref 80–100)
MONOCYTES # BLD AUTO: 0.38 K/UL — SIGNIFICANT CHANGE UP (ref 0–0.9)
MONOCYTES NFR BLD AUTO: 6.5 % — SIGNIFICANT CHANGE UP (ref 2–14)
NEUTROPHILS # BLD AUTO: 4.44 K/UL — SIGNIFICANT CHANGE UP (ref 1.8–7.4)
NEUTROPHILS NFR BLD AUTO: 75.9 % — SIGNIFICANT CHANGE UP (ref 43–77)
NRBC # BLD AUTO: 0 K/UL — SIGNIFICANT CHANGE UP (ref 0–0)
NRBC # FLD: 0 K/UL — SIGNIFICANT CHANGE UP (ref 0–0)
NRBC BLD AUTO-RTO: 0 /100 WBCS — SIGNIFICANT CHANGE UP (ref 0–0)
PHOSPHATE SERPL-MCNC: 3.9 MG/DL — SIGNIFICANT CHANGE UP (ref 2.5–4.5)
PLATELET # BLD AUTO: 266 K/UL — SIGNIFICANT CHANGE UP (ref 150–400)
POTASSIUM SERPL-MCNC: 3.8 MMOL/L — SIGNIFICANT CHANGE UP (ref 3.5–5.3)
POTASSIUM SERPL-SCNC: 3.8 MMOL/L — SIGNIFICANT CHANGE UP (ref 3.5–5.3)
PROT SERPL-MCNC: 6.1 G/DL — SIGNIFICANT CHANGE UP (ref 6–8.3)
RBC # BLD: 3.78 M/UL — LOW (ref 3.8–5.2)
RBC # FLD: 15.5 % — HIGH (ref 10.3–14.5)
SODIUM SERPL-SCNC: 139 MMOL/L — SIGNIFICANT CHANGE UP (ref 135–145)
WBC # BLD: 5.85 K/UL — SIGNIFICANT CHANGE UP (ref 3.8–10.5)
WBC # FLD AUTO: 5.85 K/UL — SIGNIFICANT CHANGE UP (ref 3.8–10.5)

## 2025-05-22 PROCEDURE — 99233 SBSQ HOSP IP/OBS HIGH 50: CPT

## 2025-05-22 RX ORDER — HYDROCORTISONE 10 MG/G
1 CREAM TOPICAL
Qty: 0 | Refills: 0 | DISCHARGE
Start: 2025-05-22

## 2025-05-22 RX ADMIN — Medication 30 MILLIGRAM(S): at 22:53

## 2025-05-22 RX ADMIN — NICOTINE POLACRILEX 1 PATCH: 4 GUM, CHEWING ORAL at 12:16

## 2025-05-22 RX ADMIN — Medication 250 MILLILITER(S): at 17:45

## 2025-05-22 RX ADMIN — GABAPENTIN 100 MILLIGRAM(S): 400 CAPSULE ORAL at 13:00

## 2025-05-22 RX ADMIN — BACLOFEN 5 MILLIGRAM(S): 10 INJECTION INTRATHECAL at 21:54

## 2025-05-22 RX ADMIN — OXYCODONE HYDROCHLORIDE 30 MILLIGRAM(S): 30 TABLET ORAL at 00:34

## 2025-05-22 RX ADMIN — Medication 40 MILLIGRAM(S): at 17:47

## 2025-05-22 RX ADMIN — Medication 30 MILLIGRAM(S): at 06:25

## 2025-05-22 RX ADMIN — Medication 30 MILLIGRAM(S): at 13:00

## 2025-05-22 RX ADMIN — NICOTINE POLACRILEX 1 PATCH: 4 GUM, CHEWING ORAL at 18:04

## 2025-05-22 RX ADMIN — APIXABAN 5 MILLIGRAM(S): 2.5 TABLET, FILM COATED ORAL at 17:41

## 2025-05-22 RX ADMIN — BACLOFEN 5 MILLIGRAM(S): 10 INJECTION INTRATHECAL at 13:00

## 2025-05-22 RX ADMIN — GABAPENTIN 100 MILLIGRAM(S): 400 CAPSULE ORAL at 06:25

## 2025-05-22 RX ADMIN — OXYCODONE HYDROCHLORIDE 30 MILLIGRAM(S): 30 TABLET ORAL at 05:20

## 2025-05-22 RX ADMIN — OXYCODONE HYDROCHLORIDE 30 MILLIGRAM(S): 30 TABLET ORAL at 10:25

## 2025-05-22 RX ADMIN — Medication 1 APPLICATION(S): at 06:25

## 2025-05-22 RX ADMIN — Medication 30 MILLIGRAM(S): at 21:53

## 2025-05-22 RX ADMIN — Medication 10 MILLILITER(S): at 17:40

## 2025-05-22 RX ADMIN — NALOXEGOL OXALATE 25 MILLIGRAM(S): 12.5 TABLET, FILM COATED ORAL at 10:21

## 2025-05-22 RX ADMIN — OLANZAPINE 5 MILLIGRAM(S): 10 TABLET ORAL at 21:53

## 2025-05-22 RX ADMIN — Medication 1 DOSE(S): at 10:20

## 2025-05-22 RX ADMIN — GABAPENTIN 100 MILLIGRAM(S): 400 CAPSULE ORAL at 21:53

## 2025-05-22 RX ADMIN — CYCLOBENZAPRINE HYDROCHLORIDE 10 MILLIGRAM(S): 15 CAPSULE, EXTENDED RELEASE ORAL at 17:46

## 2025-05-22 RX ADMIN — NICOTINE POLACRILEX 1 PATCH: 4 GUM, CHEWING ORAL at 11:56

## 2025-05-22 RX ADMIN — CLONAZEPAM 0.5 MILLIGRAM(S): 0.5 TABLET ORAL at 06:25

## 2025-05-22 RX ADMIN — BACLOFEN 5 MILLIGRAM(S): 10 INJECTION INTRATHECAL at 06:26

## 2025-05-22 RX ADMIN — PEGFILGRASTIM-CBQV 6 MILLIGRAM(S): 6 INJECTION, SOLUTION SUBCUTANEOUS at 17:40

## 2025-05-22 RX ADMIN — Medication 10 MILLILITER(S): at 17:48

## 2025-05-22 RX ADMIN — COLCHICINE 0.6 MILLIGRAM(S): 0.6 TABLET, FILM COATED ORAL at 21:54

## 2025-05-22 RX ADMIN — Medication 112 MICROGRAM(S): at 06:25

## 2025-05-22 RX ADMIN — APIXABAN 5 MILLIGRAM(S): 2.5 TABLET, FILM COATED ORAL at 10:21

## 2025-05-22 RX ADMIN — Medication 40 MILLIGRAM(S): at 06:25

## 2025-05-22 RX ADMIN — OXYCODONE HYDROCHLORIDE 30 MILLIGRAM(S): 30 TABLET ORAL at 11:25

## 2025-05-22 RX ADMIN — CLONAZEPAM 1 MILLIGRAM(S): 0.5 TABLET ORAL at 22:01

## 2025-05-22 RX ADMIN — OXYCODONE HYDROCHLORIDE 30 MILLIGRAM(S): 30 TABLET ORAL at 20:00

## 2025-05-22 RX ADMIN — OXYCODONE HYDROCHLORIDE 30 MILLIGRAM(S): 30 TABLET ORAL at 04:20

## 2025-05-22 NOTE — DISCHARGE NOTE NURSING/CASE MANAGEMENT/SOCIAL WORK - NSDCPEFALRISK_GEN_ALL_CORE
For information on Fall & Injury Prevention, visit: [email or web address] OR  [email or web address] OR  https://www.cdc.gov/steadi/patient.html

## 2025-05-22 NOTE — DISCHARGE NOTE NURSING/CASE MANAGEMENT/SOCIAL WORK - NSPROMEDSBROUGHTTOHOSP_GEN_A_NUR
51 y.o. female presents to ER ED 01/ED 01A   Chief Complaint   Patient presents with    Hypertension   Pt sent from urology clinic with HTN. Reports she was out of her BP medication since Monday. Was going to go get it today. No acute distress noted.    
no

## 2025-05-22 NOTE — DISCHARGE NOTE PROVIDER - NSDCMRMEDTOKEN_GEN_ALL_CORE_FT
No albuterol 90 mcg/inh inhalation aerosol: 2 puff(s) inhaled every 6 hours As needed Shortness of Breath and/or Wheezing  aluminum hydroxide-magnesium hydroxide 200 mg-200 mg/5 mL oral suspension: 30 milliliter(s) orally every 4 hours As needed Dyspepsia  apixaban 5 mg oral tablet: 1 tab(s) orally every 12 hours  baclofen 5 mg oral tablet: 1 tab(s) orally every 8 hours  bisacodyl 5 mg oral delayed release tablet: 1 tab(s) orally once a day (at bedtime)  clonazePAM 0.5 mg oral tablet: 1 tab(s) orally 2 times a day 7am, 3pm MDD: 2  clonazePAM 1 mg oral tablet: 1 tab(s) orally once a day (at bedtime) MDD: 1  colchicine 0.6 mg oral tablet: 1 tab(s) orally once a day  fluticasone-salmeterol: 1 dose(s) inhaled 2 times a day 100-50 mcg diskus  gabapentin 100 mg oral capsule: 1 cap(s) orally 3 times a day  hydrocortisone 1% topical ointment: 1 Apply topically to affected area every 12 hours As needed Rash and/or Itching  levothyroxine 112 mcg (0.112 mg) oral tablet: 1 tab(s) orally once a day  menthol-benzocaine: 1 lozenge orally 3 times a day as needed for SORE THROAT  morphine 30 mg/8 to 12 hr oral tablet, extended release: 1 tab(s) orally every 8 hours MDD: 3  naloxegol 25 mg oral tablet: 1 tab(s) orally once a day  nicotine 21 mg/24 hr transdermal film, extended release: 1 patch transdermal once a day  OLANZapine 5 mg oral tablet: 1 tab(s) orally once a day (at bedtime)  Outpatient PT: 1-2 times a week  oxyCODONE 20 mg oral tablet: 1 tab(s) orally every 4 hours as needed for Moderate Pain (4 - 6) MDD: 6  oxyCODONE 30 mg oral tablet: 1 tab(s) orally every 4 hours as needed for Severe Pain (7 - 10) MDD: 6  pantoprazole 40 mg oral delayed release tablet: 1 tab(s) orally every 12 hours  polyethylene glycol 3350 oral powder for reconstitution: 17 gram(s) orally 2 times a day  senna leaf extract oral tablet: 2 tab(s) orally once a day (at bedtime)   albuterol 90 mcg/inh inhalation aerosol: 2 puff(s) inhaled every 6 hours As needed Shortness of Breath and/or Wheezing  aluminum hydroxide-magnesium hydroxide 200 mg-200 mg/5 mL oral suspension: 30 milliliter(s) orally every 4 hours As needed Dyspepsia  apixaban 5 mg oral tablet: 1 tab(s) orally every 12 hours  baclofen 5 mg oral tablet: 1 tab(s) orally every 8 hours  bisacodyl 5 mg oral delayed release tablet: 1 tab(s) orally once a day (at bedtime)  clonazePAM 0.5 mg oral tablet: 1 tab(s) orally 2 times a day 7am, 3pm MDD: 2  clonazePAM 1 mg oral tablet: 1 tab(s) orally once a day (at bedtime) MDD: 1  colchicine 0.6 mg oral tablet: 1 tab(s) orally once a day  fluticasone-salmeterol: 1 dose(s) inhaled 2 times a day 100-50 mcg diskus  gabapentin 100 mg oral capsule: 1 cap(s) orally 3 times a day  hydrocortisone 1% topical ointment: 1 Apply topically to affected area every 12 hours As needed Rash and/or Itching  levothyroxine 112 mcg (0.112 mg) oral tablet: 1 tab(s) orally once a day  menthol-benzocaine: 1 lozenge orally 3 times a day as needed for SORE THROAT  morphine 30 mg/8 to 12 hr oral tablet, extended release: 1 tab(s) orally every 8 hours MDD: 3  naloxegol 25 mg oral tablet: 1 tab(s) orally once a day  nicotine 21 mg/24 hr transdermal film, extended release: 1 patch transdermal once a day  OLANZapine 5 mg oral tablet: 1 tab(s) orally once a day (at bedtime)  oxyCODONE 20 mg oral tablet: 1 tab(s) orally every 4 hours as needed for Moderate Pain (4 - 6) MDD: 6  oxyCODONE 30 mg oral tablet: 1 tab(s) orally every 4 hours as needed for Severe Pain (7 - 10) MDD: 6  pantoprazole 40 mg oral delayed release tablet: 1 tab(s) orally every 12 hours  polyethylene glycol 3350 oral powder for reconstitution: 17 gram(s) orally 2 times a day  senna leaf extract oral tablet: 2 tab(s) orally once a day (at bedtime)   albuterol 90 mcg/inh inhalation aerosol: 2 puff(s) inhaled every 6 hours As needed Shortness of Breath and/or Wheezing  aluminum hydroxide-magnesium hydroxide 200 mg-200 mg/5 mL oral suspension: 30 milliliter(s) orally every 4 hours As needed Dyspepsia  apixaban 5 mg oral tablet: 1 tab(s) orally every 12 hours  baclofen 5 mg oral tablet: 1 tab(s) orally every 8 hours  bisacodyl 5 mg oral delayed release tablet: 1 tab(s) orally once a day (at bedtime)  clonazePAM 0.5 mg oral tablet: 1 tab(s) orally 2 times a day 7am, 3pm MDD: 2  clonazePAM 1 mg oral tablet: 1 tab(s) orally once a day (at bedtime) MDD: 1  colchicine 0.6 mg oral tablet: 1 tab(s) orally once a day  fluticasone-salmeterol: 1 dose(s) inhaled 2 times a day 100-50 mcg diskus  gabapentin 100 mg oral capsule: 1 cap(s) orally 3 times a day  levothyroxine 112 mcg (0.112 mg) oral tablet: 1 tab(s) orally once a day  menthol-benzocaine: 1 lozenge orally 3 times a day as needed for SORE THROAT  midodrine 10 mg oral tablet: 1 tab(s) orally every 8 hours for orthostatic hypotension  morphine 30 mg/8 to 12 hr oral tablet, extended release: 1 tab(s) orally every 8 hours MDD: 3  naloxegol 25 mg oral tablet: 1 tab(s) orally once a day  nicotine 21 mg/24 hr transdermal film, extended release: 1 patch transdermal once a day  OLANZapine 5 mg oral tablet: 1 tab(s) orally once a day (at bedtime)  oxyCODONE 20 mg oral tablet: 1 tab(s) orally every 4 hours as needed for Moderate Pain (4 - 6) MDD: 6  oxyCODONE 30 mg oral tablet: 1 tab(s) orally every 4 hours as needed for Severe Pain (7 - 10) MDD: 6  pantoprazole 40 mg oral delayed release tablet: 1 tab(s) orally every 12 hours  polyethylene glycol 3350 oral powder for reconstitution: 17 gram(s) orally 2 times a day  senna leaf extract oral tablet: 2 tab(s) orally once a day (at bedtime)

## 2025-05-22 NOTE — DISCHARGE NOTE PROVIDER - PROVIDER TOKENS
PROVIDER:[TOKEN:[[id number]]] PROVIDER:[TOKEN:[[id number]],ESTABLISHEDPATIENT:[T]],PROVIDER:[TOKEN:[[id number]],ESTABLISHEDPATIENT:[T]],PROVIDER:[TOKEN:[[id number]],ESTABLISHEDPATIENT:[T]]

## 2025-05-22 NOTE — DISCHARGE NOTE PROVIDER - NSDCHC_MEDRECSTATUS_GEN_ALL_CORE
Problem: Patient Care Overview  Goal: Plan of Care Review  Outcome: Ongoing (interventions implemented as appropriate)   10/11/19 0521   Coping/Psychosocial   Plan of Care Reviewed With patient   Plan of Care Review   Progress improving   OTHER   Outcome Summary VSS/ A/O x3 . Disorietned to time.  Pt is  Up at [place]. On room air. Having aorta bifemoral bypass this morning. No c/o pain or distress noted. NIHSS=3. Pt still having some receptive aphasia and slurred speech. Pt. rested and slept comfortably this shift. Will continue to monitor.      Goal: Individualization and Mutuality  Outcome: Ongoing (interventions implemented as appropriate)    Goal: Discharge Needs Assessment  Outcome: Ongoing (interventions implemented as appropriate)      Problem: Stroke (Ischemic) (Adult)  Goal: Signs and Symptoms of Listed Potential Problems Will be Absent, Minimized or Managed (Stroke)  Outcome: Outcome(s) achieved Date Met: 10/11/19      Problem: Diabetes, Type 2 (Adult)  Goal: Signs and Symptoms of Listed Potential Problems Will be Absent, Minimized or Managed (Diabetes, Type 2)  Outcome: Ongoing (interventions implemented as appropriate)      Problem: Seizure Disorder/Epilepsy (Adult)  Goal: Signs and Symptoms of Listed Potential Problems Will be Absent, Minimized or Managed (Seizure Disorder/Epilepsy)  Outcome: Ongoing (interventions implemented as appropriate)         Admission Reconciliation is Completed  Discharge Reconciliation is Not Complete Admission Reconciliation is Completed  Discharge Reconciliation is Completed

## 2025-05-22 NOTE — CHART NOTE - NSCHARTNOTEFT_GEN_A_CORE
Search Terms: Maryann Wen, 1960Search Date: 05/22/2025 07:58:06 AM  The Drug Utilization Report below displays all of the controlled substance prescriptions, if any, that your patient has filled in the last twelve months. The information displayed on this report is compiled from pharmacy submissions to the Department, and accurately reflects the information as submitted by the pharmacies.    This report was requested by: Winsome Latif | Reference #: [id number]    You have not added a KHOA number. Keeping your KHOA number(s) up to date on the My KHOA # tab will enable the separation of your prescriptions from others in the search results.    Practitioner Count: 4  Pharmacy Count: 3  Current Opioid Prescriptions: 2  Current Benzodiazepine Prescriptions: 0  Current Stimulant Prescriptions: 0      Patient Demographic Information (PDI)       PDI	First Name	Last Name	Birth Date	Gender	Street Address	[facility]  A	Maryann Wen	1960	Female	[facility]	[id number]  B	Maryann Wen	1960	Female	[address] [place], [place] [address] [place] [id number]  C	Maryann Wen	1960	Female	[employer] [facility] [address]	[facility]	[id number]    Prescription Information      PDI Filter:    PDI	Current Rx	Drug Type	Rx Written	Rx Dispensed	Drug	Quantity	Days Supply	Prescriber Name	Prescriber KHOA #	Payment Method  C	Y	O	05/12/2025	05/14/2025	oxycodone hcl (ir) 30 mg tab	90	15	Mahsa Scales	[id number]	Insurance  Dispenser [facility]  C	Y	O	05/12/2025	05/14/2025	morphine sulf er 30 mg tablet	90	30	Mahsa Scales	[id number]	Insurance  Dispenser [facility]  C	N	O	04/28/2025	04/29/2025	oxycodone hcl (ir) 30 mg tab	90	15	Nat Jacob	[id number]	Insurance  Dispenser [facility]	N	B	04/16/2025	04/18/2025	clonazepam 0.5 mg tablet	120	30	Mahsa Scales	[id number]	Insurance  Dispenser [facility]  C	N	O	04/14/2025	04/14/2025	morphine sulf er 30 mg tablet	90	30	Mahsa Scales	[id number]	Insurance  Dispenser [facility]  C	N	O	04/14/2025	04/14/2025	oxycodone hcl (ir) 30 mg tab	90	15	Mahsa Scales	[id number]	Insurance  Dispenser [facility]  A	N	B	03/31/2025	03/31/2025	clonazepam 0.5 mg tablet	28	14	Paul Bro MD	[id number]	Medicaid  Dispenser [id number]  A	N	O	03/31/2025	03/31/2025	oxycodone hcl (ir) 20 mg tab	30	5	Paul Bro MD	[id number]	Medicaid  Dispenser [id number]  A	N	O	03/31/2025	03/31/2025	morphine sulf er 30 mg tablet	30	10	Paul Bro MD	[id number]	Medicaid  Dispenser [id number]  A	N	O	03/31/2025	03/31/2025	oxycodone hcl (ir) 30 mg tab	30	5	Paul Bro MD	[id number]	Medicaid  Dispenser [id number]  A	N	B	03/31/2025	03/31/2025	clonazepam 1 mg tablet	14	14	Paul Bro MD	[id number]	Medicaid  Dispenser [id number]  B	N	O	02/27/2025	03/08/2025	morphine sulf er 30 mg tablet	60	30	Guru Crowe MD	[id number]	Medicare  Dispenser [id number]  B	N	O	02/27/2025	03/08/2025	oxycodone hcl (ir) 20 mg tab	90	30	Guru Crowe MD	[id number]	Medicare  Dispenser [id number]  B	N	O	01/30/2025	02/06/2025	morphine sulf er 30 mg tablet	60	30	Guru Crowe MD	[id number]	Medicare  Dispenser [id number]  B	N	O	01/30/2025	02/06/2025	oxycodone hcl (ir) 20 mg tab	90	30	Guru Crowe MD	[id number]	Medicare  Dispenser [id number]  B	N	O	01/07/2025	01/08/2025	morphine sulf er 30 mg tablet	60	30	Guru Crowe MD	[id number]	Medicare  Dispenser [id number]  B	N	O	01/07/2025	01/08/2025	oxycodone hcl (ir) 20 mg tab	90	30	Guru Crowe MD	[id number]	Medicare  Dispenser [id number]  B	N	O	11/27/2024	12/06/2024	morphine sulf er 30 mg tablet	60	30	Guru Crowe MD	[id number]	Medicare  Dispenser [id number]  B	N	O	11/27/2024	12/06/2024	oxycodone hcl (ir) 20 mg tab	90	30	Guru Crowe MD	[id number]	Medicare  Dispenser [id number]  B	N	O	10/31/2024	11/08/2024	morphine sulf er 30 mg tablet	60	30	Guru Crowe MD	[id number]	Medicare  Dispenser [id number]  B	N	O	10/31/2024	11/08/2024	oxycodone hcl (ir) 20 mg tab	90	30	Guru Crowe MD	[id number]	Medicare  Dispenser [id number]  B	N	O	10/03/2024	10/09/2024	oxycodone hcl (ir) 20 mg tab	90	30	Guru Crowe MD	[id number]	Medicare  Dispenser [id number]  B	N	O	10/03/2024	10/09/2024	morphine sulf er 30 mg tablet	60	30	Guru Crowe MD	[id number]	Medicare  Dispenser [id number]  B	N	O	09/04/2024	09/10/2024	morphine sulf er 30 mg tablet	60	30	Guru Crowe MD	[id number]	Medicare  Dispenser [id number]  B	N	O	09/04/2024	09/10/2024	oxycodone hcl (ir) 20 mg tab	90	30	Guru Crowe MD	[id number]	Medicare  Dispenser [id number]  B	N	O	08/01/2024	08/12/2024	morphine sulf er 30 mg tablet	60	30	Guru Crowe MD	[id number]	Medicare  Dispenser [id number]  B	N	O	08/01/2024	08/12/2024	oxycodone hcl (ir) 20 mg tab	90	30	Guru Crowe MD	[id number]	Medicare  Dispenser [id number]  B	N	O	07/02/2024	07/14/2024	morphine sulf er 30 mg tablet	60	30	Guru Crowe MD	[id number]	Medicare Dispenser [id number]  B	N	O	07/02/2024	07/14/2024	oxycodone hcl (ir) 20 mg tab	90	30	Guru Crowe MD	[id number]	Medicare Dispenser [id number]  B	N	O	06/05/2024	06/15/2024	morphine sulf er 30 mg tablet	60	30	Guru Crowe MD	[id number]	Medicare  Dispenser [id number]  B	N	O	06/05/2024	06/15/2024	oxycodone hcl (ir) 20 mg tab	120	30	Guru Crowe MD	[id number]	Medicare Dispenser [id number]

## 2025-05-22 NOTE — DISCHARGE NOTE NURSING/CASE MANAGEMENT/SOCIAL WORK - FINANCIAL ASSISTANCE
[facility] provides services at a reduced cost to those who are determined to be eligible through [facility]’s financial assistance program. Information regarding [facility]’s financial assistance program can be found by going to [email or web address] or by calling [phone].

## 2025-05-22 NOTE — DISCHARGE NOTE PROVIDER - NSFOLLOWUPCLINICS_GEN_ALL_ED_FT
[facility]  Hematology/Oncology  [address] [id number]  Phone: [phone]  Fax:

## 2025-05-22 NOTE — DISCHARGE NOTE PROVIDER - DETAILS OF MALNUTRITION DIAGNOSIS/DIAGNOSES
This patient has been assessed with a concern for Malnutrition and was treated during this hospitalization for the following Nutrition diagnosis/diagnoses:     -  05/20/2025: Severe protein-calorie malnutrition

## 2025-05-22 NOTE — DISCHARGE NOTE PROVIDER - NSCORESITESY/N_GEN_A_CORE_RD
Date: 2025       Patient Name: Jenny Lilly  : 1970      MRN: [id number]    No show/no call for PT evaluation scheduled at 0930 today.    William Tuttle, PT   
week(s)
No

## 2025-05-22 NOTE — DISCHARGE NOTE PROVIDER - NSDCFUADDAPPT_GEN_ALL_CORE_FT
Please follow up with Dr. Mercado at your earliest convenience as you missed your appointment on 5/22.    Please follow upwith Eulogio Daniel at [facility] on 5/28 at 2pm and follow up with Dr. Hall at your earliest convenience.     Please follow up with Dr. Xavier on 5/29 at 11:15 am. Please follow up with Dr. Mercado at your earliest convenience as you missed your appointment on 5/22.    Please go to outpatient PET CT on 5/28.    Please follow up with Dr. Xavier on 5/29 at 11:15 am.

## 2025-05-22 NOTE — DISCHARGE NOTE PROVIDER - NSDCCPCAREPLAN_GEN_ALL_CORE_FT
PRINCIPAL DISCHARGE DIAGNOSIS  Diagnosis: SCLC (small cell lung carcinoma)  Assessment and Plan of Treatment: You came in to recieve C4 Carbo/Etop, you recieved 3 cycles and tolerated them well. You also recieved Fluphila today. Please follow-up with Dr. Hall for further treatment     PRINCIPAL DISCHARGE DIAGNOSIS  Diagnosis: SCLC (small cell lung carcinoma)  Assessment and Plan of Treatment: You came in to recieve C4 Carbo/Etop, you recieved 3 cycles 5/19-5/21 and tolerated them well. You also recieved Fluphila on 5/22. Please follow-up with Dr. Hall for further treatment      SECONDARY DISCHARGE DIAGNOSES  Diagnosis: Acute UTI  Assessment and Plan of Treatment: You were found to have a urinary tract infection and treated with ciprofloxacin------------    Diagnosis: Orthostatic hypotension  Assessment and Plan of Treatment: You were found to have low blood pressure when going from laying to sitting to standing. You were started on midodrine to help increase your blood pressure. Please continue to take this as directed and follow up with your primary doctor or cardiologist.     PRINCIPAL DISCHARGE DIAGNOSIS  Diagnosis: SCLC (small cell lung carcinoma)  Assessment and Plan of Treatment: You came in to recieve C4 Carbo/Etop, you recieved 3 cycles 5/19-5/21 and tolerated them well. You also recieved Fluphila on 5/22. Please follow-up with Dr. Hall for further treatment. Plan for outpatient PET-CT on 5/29.      SECONDARY DISCHARGE DIAGNOSES  Diagnosis: Acute UTI  Assessment and Plan of Treatment: You were found to have a urinary tract infection and treated with ciprofloxacin.    Diagnosis: Orthostatic hypotension  Assessment and Plan of Treatment: You were found to have low blood pressure when going from laying to sitting to standing. You were started on midodrine to help increase your blood pressure. Please continue to take this as directed and follow up with your primary doctor. Continue midodrine 10mg three times a day and drink fluids, eat well.

## 2025-05-22 NOTE — DISCHARGE NOTE PROVIDER - CARE PROVIDERS DIRECT ADDRESSES
[email or web address]

## 2025-05-22 NOTE — DISCHARGE NOTE PROVIDER - NSDCFUSCHEDAPPT_GEN_ALL_CORE_FT
Yaritza Xavier  [facility] Physician [facility]  RHEUM [address]  Scheduled Appointment: 05/29/2025    [facility]  NUCMED  [address]  Scheduled Appointment: 05/29/2025    [facility]  [address]  Scheduled Appointment: 05/29/2025    [facility] Physician [facility]  PULMMED [address]  Scheduled Appointment: 05/29/2025

## 2025-05-22 NOTE — DISCHARGE NOTE NURSING/CASE MANAGEMENT/SOCIAL WORK - PATIENT PORTAL LINK FT
You can access the FollowMyHealth Patient Portal offered by [facility] by registering at the following website: [email or web address]. By joining [employer]’s FollowMyHealth portal, you will also be able to view your health information using other applications (apps) compatible with our system.

## 2025-05-22 NOTE — PROGRESS NOTE ADULT - SUBJECTIVE AND OBJECTIVE BOX
SOLID TUMOR ONCOLOGY HOSPITALIST PROGRESS NOTE    S: No acute events overnight    CURRENT MEDICATIONS  MEDICATIONS  (STANDING):  apixaban 5 milliGRAM(s) Oral every 12 hours  baclofen 5 milliGRAM(s) Oral every 8 hours  bisacodyl 5 milliGRAM(s) Oral at bedtime  chlorhexidine 2% Cloths 1 Application(s) Topical <User Schedule>  clonazePAM  Tablet 0.5 milliGRAM(s) Oral <User Schedule>  clonazePAM  Tablet 1 milliGRAM(s) Oral at bedtime  colchicine 0.6 milliGRAM(s) Oral daily  fluticasone propionate/ salmeterol 100-50 MICROgram(s) Diskus 1 Dose(s) Inhalation two times a day  gabapentin 100 milliGRAM(s) Oral three times a day  levothyroxine 112 MICROGram(s) Oral daily  morphine ER Tablet 30 milliGRAM(s) Oral every 8 hours  naloxegol 25 milliGRAM(s) Oral daily  nicotine - 21 mG/24Hr(s) Patch 1 Patch Transdermal daily  OLANZapine 5 milliGRAM(s) Oral at bedtime  pantoprazole    Tablet 40 milliGRAM(s) Oral every 12 hours  polyethylene glycol 3350 17 Gram(s) Oral two times a day  senna 2 Tablet(s) Oral at bedtime  sodium chloride 0.9%. 1000 milliLiter(s) (10 mL/Hr) IV Continuous <Continuous>    MEDICATIONS  (PRN):  albuterol    90 MICROgram(s) HFA Inhaler 2 Puff(s) Inhalation every 6 hours PRN Shortness of Breath and/or Wheezing  aluminum hydroxide/magnesium hydroxide/simethicone Suspension 30 milliLiter(s) Oral every 4 hours PRN Dyspepsia  cyclobenzaprine 10 milliGRAM(s) Oral three times a day PRN Muscle Spasm  diphenhydrAMINE 25 milliGRAM(s) Oral every 6 hours PRN Rash and/or Itching  hydrocortisone 1% Ointment 1 Application(s) Topical every 12 hours PRN Rash and/or Itching  oxyCODONE    IR 20 milliGRAM(s) Oral every 4 hours PRN Moderate Pain (4 - 6)  oxyCODONE    IR 30 milliGRAM(s) Oral every 4 hours PRN Severe Pain (7 - 10)      PHYSICAL EXAM  T(C): 36.5 (05-22-25 @ 20:22), Max: 36.6 (05-21-25 @ 21:51)  HR: 99 (05-22-25 @ 20:22) (55 - 99)  BP: 102/59 (05-22-25 @ 20:22) (102/59 - 138/68)  RR: 18 (05-22-25 @ 20:22) (16 - 18)  SpO2: 97% (05-22-25 @ 20:22) (94% - 100%)    05-21-25 @ 07:01  -  05-22-25 @ 07:00  --------------------------------------------------------  IN: 20 mL / OUT: 1000 mL / NET: -980 mL    05-22-25 @ 07:01  -  05-22-25 @ 21:44  --------------------------------------------------------  IN: 200 mL / OUT: 800 mL / NET: -600 mL        LABS                        11.7   5.85  )-----------( 266      ( 22 May 2025 06:48 )             34.4     05-22    139  |  104  |  10  ----------------------------<  95  3.8   |  24  |  0.38[L]    Ca    9.1      22 May 2025 06:48  Phos  3.9     05-22  Mg     1.90     05-22    TPro  6.1  /  Alb  3.6  /  TBili  0.3  /  DBili  x   /  AST  15  /  ALT  5   /  AlkPhos  101  05-22      CAPILLARY BLOOD GLUCOSE            MICROBIOLOGY  Urinalysis Basic - ( 22 May 2025 06:48 )    Color: x / Appearance: x / SG: x / pH: x  Gluc: 95 mg/dL / Ketone: x  / Bili: x / Urobili: x   Blood: x / Protein: x / Nitrite: x   Leuk Esterase: x / RBC: x / WBC x   Sq Epi: x / Non Sq Epi: x / Bacteria: x            PERTINENT RADIOLOGY         SOLID TUMOR ONCOLOGY HOSPITALIST PROGRESS NOTE    S: No acute events overnight.  Pt complained of feeling tired and of general malaise since her chemo finished.  She also admitted that she felt anxious about going home tonight because her caregiver Sam would not be able to stay with her tonight.    CURRENT MEDICATIONS  MEDICATIONS  (STANDING):  apixaban 5 milliGRAM(s) Oral every 12 hours  baclofen 5 milliGRAM(s) Oral every 8 hours  bisacodyl 5 milliGRAM(s) Oral at bedtime  chlorhexidine 2% Cloths 1 Application(s) Topical <User Schedule>  clonazePAM  Tablet 0.5 milliGRAM(s) Oral <User Schedule>  clonazePAM  Tablet 1 milliGRAM(s) Oral at bedtime  colchicine 0.6 milliGRAM(s) Oral daily  fluticasone propionate/ salmeterol 100-50 MICROgram(s) Diskus 1 Dose(s) Inhalation two times a day  gabapentin 100 milliGRAM(s) Oral three times a day  levothyroxine 112 MICROGram(s) Oral daily  morphine ER Tablet 30 milliGRAM(s) Oral every 8 hours  naloxegol 25 milliGRAM(s) Oral daily  nicotine - 21 mG/24Hr(s) Patch 1 Patch Transdermal daily  OLANZapine 5 milliGRAM(s) Oral at bedtime  pantoprazole    Tablet 40 milliGRAM(s) Oral every 12 hours  polyethylene glycol 3350 17 Gram(s) Oral two times a day  senna 2 Tablet(s) Oral at bedtime  sodium chloride 0.9%. 1000 milliLiter(s) (10 mL/Hr) IV Continuous <Continuous>  MEDICATIONS  (PRN):  albuterol    90 MICROgram(s) HFA Inhaler 2 Puff(s) Inhalation every 6 hours PRN Shortness of Breath and/or Wheezing  aluminum hydroxide/magnesium hydroxide/simethicone Suspension 30 milliLiter(s) Oral every 4 hours PRN Dyspepsia  cyclobenzaprine 10 milliGRAM(s) Oral three times a day PRN Muscle Spasm  diphenhydrAMINE 25 milliGRAM(s) Oral every 6 hours PRN Rash and/or Itching  hydrocortisone 1% Ointment 1 Application(s) Topical every 12 hours PRN Rash and/or Itching  oxyCODONE    IR 20 milliGRAM(s) Oral every 4 hours PRN Moderate Pain (4 - 6)  oxyCODONE    IR 30 milliGRAM(s) Oral every 4 hours PRN Severe Pain (7 - 10)      PHYSICAL EXAM  T(C): 36.5 (05-22-25 @ 20:22), Max: 36.6 (05-21-25 @ 21:51)  HR: 99 (05-22-25 @ 20:22) (55 - 99)  BP: 102/59 (05-22-25 @ 20:22) (102/59 - 138/68)  RR: 18 (05-22-25 @ 20:22) (16 - 18)  SpO2: 97% (05-22-25 @ 20:22) (94% - 100%)    05-21-25 @ 07:01  -  05-22-25 @ 07:00  --------------------------------------------------------  IN: 20 mL / OUT: 1000 mL / NET: -980 mL    05-22-25 @ 07:01  -  05-22-25 @ 21:44  --------------------------------------------------------  IN: 200 mL / OUT: 800 mL / NET: -600 mL  Non-toxic-appearing woman, crying hysterically in bed  Answering all questions appropriately (conversational dyspnea resolved), following commands  Anicteric sclera, no oral lesions/thrush  RRR, no m/r/g, heart sounds faint  Breaths somewhat labored, but not tachypnea; trace RLB crackles, no w/r  Abd soft/nt/nd, hypoactive bowel sounds  No peripheral edema; DIP/PIP, ankle joint deformities unchanged  CN 2-12 grossly intact; no gross focal neuro deficits; pt moves all extremities spontaneously      LABS                        11.7   5.85  )-----------( 266      ( 22 May 2025 06:48 )             34.4     05-22    139  |  104  |  10  ----------------------------<  95  3.8   |  24  |  0.38[L]    Ca    9.1      22 May 2025 06:48  Phos  3.9     05-22  Mg     1.90     05-22    TPro  6.1  /  Alb  3.6  /  TBili  0.3  /  DBili  x   /  AST  15  /  ALT  5   /  AlkPhos  101  05-22      MICROBIOLOGY  Abx: none on this admission.    Cx Data: none new on this admission.      CURRENT RADIOLOGY  5/19 CXR: Redemonstrated opacification of the AP window, compatible with patient's known mediastinal mass.  Hazy opacities of the bilateral upper lung fields, unchanged as compared to prior chest radiograph 3/12/2025.  No acute osseous abnormality. No discrete aggressive osseous lesion within the clavicles as visualized.    HISTORICAL RADIOLOGY  4/28 AXR: Nonobstructive bowel gas pattern. Large stool burden.    3/20 NC CT chest  1.  Moderate-sized pericardial effusion appears unchanged compared to   partially imaged heart on 3/18/2025 but increase insize since 2/15/2025.  2.  Interval increase in size of previously described left suprahilar   mediastinal mass which extends into the AP window and paramediastinal   left upper lobe.  3.  Some of the other left upper lobe nodules are decrease and some   increase.  4.  Unchanged sclerotic lesions within the sternum and T12 vertebral body.    3/20 TTE   1. Limited study to re-evaluate pericardial effusion.   2. There is a moderate pericardial effusion noted adjacent to the posterior left ventricle, a moderate pericardial effusion noted adjacent to the right atrium, a small pericardial effusion noted adjacent to the apex, a moderate pericardial effusion noted adjacent to the lateral left ventricle and a moderate pericardial effusion noted adjacent to the right ventricle with no echocardiographic evidence of tamponade physiology. Moderate pericardial effusion, measuring ~ 1.1 cm posterior to the left ventricle, ~ 1.0 cm lateral to the left ventricle, ~ 1.4 cm adjacent to the RV free wall, and ~ 1.4 cm superior to the right atrium. Small pericardial effusion anterior to the right ventricle and adjacent to the apex. The pericardium adjacent to the RV free wall appears markedly thickened and may reflect the presence of thrombus, inflammatory material, and/or metastatic disease. No RA or RV diastolic collapse seen. However, the inferior vena cava (IVC) displays reduced respirophasic variability in its caliber, consistent with elevated right atrial pressure.   3. The inferior vena cava is normal in size measuring 1.30 cm in diameter, (normal <2.1cm) with abnormal inspiratory collapse (abnormal <50%) consistent with mildly elevated right atrial pressure (~8, range 5-10mmHg).   4. Compared to the transthoracic echocardiogram performed on 3/12/2025, the pericardial effusion has increased slightly in size.    3/18 CT abd/pelv: No acute pathology within the abdomen and pelvis. Partially imaged pericardial effusion. Bilateral trace pleural effusions with adjacent atelectasis. Again demonstrated a T12 vertebral body hemangioma. Degenerative changes of the spine.    3/18 CT head: No hydrocephalus, acute intracranial hemorrhage, or mass effect. No compelling evidence of intracranial metastasis on this non-contrast exam.

## 2025-05-22 NOTE — DISCHARGE NOTE PROVIDER - HOSPITAL COURSE
65 yo woman, former smoker (47py; quit 12/2024) and well known to the OncHos service, with h/o pAfib (on Eliquis), Hypothyroidism, ? h/o Sleroderma, h/o pericardial effusion s/p pericardiocentesis (dx in 12/2024; ? 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 while hospitalized at [facility]- she tolerated tx without significant adverse effect. She presented as an outpt for C2 on 4/14- however, pt received Cosela and developed "severe headache" for which she required ED eval, but was ultimately sent home (she did not receive Etop or carbo on d1); on day 2 she received Etoposide for 5 minutes and started to have chest tightness and shortness of breath which was treated as a hypersensitivity reaction (she was not re-challenged due to patient preference at the time; she did not receive carbo or cosela on D2). Pt developed shortness of breath and palmar erythema with C3 (VSS); her infusion rate was slowed and she subsequently tolerated chemo without significant adverse effect.  Pt is now electively admitted for C4 Carbo/Etop with Fulphila support.    SCLCa (presumed limited stage)  -- Will proceed with C4d3 Carbo/Etop today (20% dose reduced)- pt currently tolerating without significant adverse effect  -- Monitoring and with plan to treat hypersensitivity reaction as per protocol  -- Fluphila x1 dose to be given on day 4 (5/22)  -- Pt to continue outpt fu with Dr. Hall after discharge  -- Long-term prognosis: guarded; pt is full code    Anxiety/depression  -- Continuing Klonopin 0.5mg BID and 1mg nightly  -- Continuing Zyprexa 5mg nightly    Chronic hypoxic resp failure  -- h/o Asthma/COPD (former smoker)  -- Continuing supplemental O2 via NC and supportive resp care prn  -- Continuing Advair 100-50mcg MDI BID  -- Continuing duonebs q6/prn   -- Continuing Nicotine patch 21mg daily (6 weeks)  -- Mgmt of pericardial effusion as above    SIADH  -- Pt is asymptomatic  -- SIADH confirmed by 3/12 urine lytes but Na has been normal off salt tabs and fluid restriction since pt has started cancer treatment  -- Will continue to monitor    Cancer related pain  -- Continuing Morphine ER 30mg q12  -- Continuing Oxy IR 20/30mg q8/prn  -- Continuing Gabapentin 100mg TID  -- Continuing Baclofen 5mg q8/prn    Constipation  -- Possibly due to opioids +/- AID  -- Pt denied abd pain, diarrhea, but reported mild nausea  -- Continuing Movantik 25mg daily  -- Continuing bowel regimen (Miralax, Senna) for OIC prevention  -- Of note, pt has experienced constipation relief after Relistor in the past    Hypothyroidism: continuing LT4 112mcg daily    h/o pericardial effusion: most likely inflammatory; continuing Colchicine 0.6mg daily    pAfib  Hypercoag state  Continuing Eliquis 5mg BID    R clavicle/shoulder pain  -- Likely musculoskeletal in orgin  -- 5/19 CXR without evidence of osseous abnormalities  -- Triaed Flexiril PO    65 yo woman, former smoker (47py; quit 12/2024) and well known to the OncHos service, with h/o pAfib (on Eliquis), Hypothyroidism, ? h/o Sleroderma, h/o pericardial effusion s/p pericardiocentesis (dx in 12/2024; ? 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 while hospitalized at [facility]- she tolerated tx without significant adverse effect. She presented as an outpt for C2 on 4/14- however, pt received Cosela and developed "severe headache" for which she required ED eval, but was ultimately sent home (she did not receive Etop or carbo on d1); on day 2 she received Etoposide for 5 minutes and started to have chest tightness and shortness of breath which was treated as a hypersensitivity reaction (she was not re-challenged due to patient preference at the time; she did not receive carbo or cosela on D2). Pt developed shortness of breath and palmar erythema with C3 (VSS); her infusion rate was slowed and she subsequently tolerated chemo without significant adverse effect.  Pt is now electively admitted for C4 Carbo/Etop with Fulphila support.    SCLCa (presumed limited stage)  -- Will proceed with C4d3 Carbo/Etop today (20% dose reduced)- pt currently tolerating without significant adverse effect  -- Monitoring and with plan to treat hypersensitivity reaction as per protocol  -- Fluphila x1 dose to be given on day 4 (5/22)  -- Pt to continue outpt fu with Dr. Hall after discharge  -- Long-term prognosis: guarded; pt is full code    Anxiety/depression  -- Continuing Klonopin 0.5mg BID and 1mg nightly  -- Continuing Zyprexa 5mg nightly    Chronic hypoxic resp failure  -- h/o Asthma/COPD (former smoker)  -- Continuing supplemental O2 via NC and supportive resp care prn  -- Continuing Advair 100-50mcg MDI BID  -- Continuing duonebs q6/prn   -- Continuing Nicotine patch 21mg daily (6 weeks)  -- Mgmt of pericardial effusion as above    SIADH  -- Pt is asymptomatic  -- SIADH confirmed by 3/12 urine lytes but Na has been normal off salt tabs and fluid restriction since pt has started cancer treatment  -- Will continue to monitor    Cancer related pain  -- Continuing Morphine ER 30mg q12  -- Continuing Oxy IR 20/30mg q8/prn  -- Continuing Gabapentin 100mg TID  -- Continuing Baclofen 5mg q8/prn    Constipation  -- Possibly due to opioids +/- AID  -- Pt denied abd pain, diarrhea, but reported mild nausea  -- Continuing Movantik 25mg daily  -- Continuing bowel regimen (Miralax, Senna) for OIC prevention  -- Of note, pt has experienced constipation relief after Relistor in the past    Hypothyroidism: continuing LT4 112mcg daily    h/o pericardial effusion: most likely inflammatory; continuing Colchicine 0.6mg daily    pAfib  Hypercoag state  Continuing Eliquis 5mg BID    R clavicle/shoulder pain  -- Likely musculoskeletal in orgin  -- 5/19 CXR without evidence of osseous abnormalities  -- Trialed Flexiril PO    65 yo woman, former smoker (47py; quit 12/2024) and well known to the OncHos service, with h/o pAfib (on Eliquis), Hypothyroidism, ? h/o Sleroderma, h/o pericardial effusion s/p pericardiocentesis (dx in 12/2024; ? viral vs inflammatory, cytology negative for malignant cells, on Colchicine), Asthma/suspected COPD, h/o severe anxiety, and presumed LS- SCLCa > dx in 2/2025, pt unable to tolerate MRIs for staging- prior NCHCT and CT a/p without overt evidence of distant mets; her disease course has been c/b chronic hypoxic resp failure (pt intermittently wears O2); s/p C1 Carbo/Etop 3/23-35. while hospitalized at [facility]- she tolerated tx without significant adverse effect. She presented as an outpt for C2 on 4/14- however, pt received Cosela and developed "severe headache" for which she required ED eval, but was ultimately sent home (she did not receive Etop or carbo on d1); on day 2 she received Etoposide for 5 minutes and started to have chest tightness and shortness of breath which was treated as a hypersensitivity reaction (she was not re-challenged due to patient preference at the time; she did not receive carbo or cosela on D2). Pt developed shortness of breath and palmar erythema with C3 (VSS); her infusion rate was slowed and she subsequently tolerated chemo without significant adverse effect. Pt is now s/p C4 Carbo/Etop 5/19-5/21 and fulphila 5/22. C/c/b orthostatic hypotension, started midodrine. C/c/b UTI E.coli and klebsiella, on cipro.    ---HEME/ONC---  #Presumed LS-SCLCa  - Directly admitted for C4 Carbo/Etop (20% dose reduced)  - S/p Fulphila 5/22  - Overall plan per Dr. Hall    #Cancer-related pain  - Cont MS Contin 30mg TID  - Cont Oxy IR 20/30mg PRN   - Cont Gabapentin 100mg TO  - Cont Baclofen PRN    ---PSYCH---  #Anxiety/Depression  - Cont Klonopin 0.5mg BID and 1mg nightly  - Cont Zyprexa at bedtime    ---PULM---  #Chronic hypoxic resp failure  - Cont supplemental O2 as tolerated  - Cont Advair 100/50mch BID  - Cont Duonebs q6rHrs  - Cont nicotine patch    ---RENAL---  #SIADH  - Admission Na 136  - Cont monitor off Salt tabs and Fluid restrcition    ---ENDO---  #hypothryodism  Cont Synthroid 112mcg Daily    ---GI---  #Constipation  - Cont Miralax, Senna, and Morventik  - S/p relistor with improvement    ---CV---  #Hx Pericardial effusion  - Cont Colchicine 0.6mg daily    #Hx Afib  - Cont eliquis    #Orthostatic hypotention  - Started midodrine 5mg TID --> 10 mg TID    --ID--  #UTI  -- Pos for E coli and klebsiella  -- Started cipro___      --ORTHO--  R clavicle/shoulder pain  -- Likely musculoskeletal in orgin  -- 5/19 CXR without evidence of osseous abnormalities  -- Trialed Flexiril PO    65 yo F w/ PMHx, former smoker (47py; quit 12/2024), pAfib (on Eliquis), Hypothyroidism, ?h/o Sleroderma, h/o pericardial effusion s/p pericardiocentesis (dx in 12/2024; ? viral vs inflammatory, cytology negative for malignant cells, on Colchicine), Asthma/suspected COPD, h/o severe anxiety, and LS- SCLCa > dx in 2/2025, pt unable to tolerate MRIs for staging- prior NCHCT and CT a/p without overt evidence of distant mets; her disease course has been c/b chronic hypoxic resp failure (pt intermittently wears O2); s/p C1 Carbo/Etop 3/23-35. while hospitalized at [facility]- she tolerated tx without significant adverse effect. She presented as an outpt for C2 on 4/14- however, pt received Cosela and developed "severe headache" for which she required ED eval, but was ultimately sent home (she did not receive Etop or carbo on d1); on day 2 she received Etoposide for 5 minutes and started to have chest tightness and shortness of breath which was treated as a hypersensitivity reaction (she was not re-challenged due to patient preference at the time; she did not receive carbo or cosela on D2). Pt developed shortness of breath and palmar erythema with C3 (VSS); her infusion rate was slowed and she subsequently tolerated chemo without significant adverse effect. Pt is now s/p C4 Carbo/Etop 5/19-5/21 and fulphila 5/22. C/c/b orthostatic hypotension, started midodrine. C/c/b UTI E.coli and klebsiella, S/P cipro.      ---HEME/ONC---  #Presumed LS-SCLCa  - Directly admitted for C4 Carbo/Etop (20% dose reduced)  - S/p Fulphila 5/22  - Overall plan per Dr. Hall    #Cancer-related pain  - Cont MS Contin 30mg TID  - Cont Oxy IR 20/30mg PRN   - Cont Gabapentin 100mg TO  - Cont Baclofen PRN    ---PSYCH---  #Anxiety/Depression  - Cont Klonopin 0.5mg BID and 1mg nightly  - Cont Zyprexa at bedtime    ---PULM---  #Chronic hypoxic resp failure  - Cont supplemental O2 as tolerated  - Cont Advair 100/50mch BID  - Cont Duonebs q6rHrs  - Cont nicotine patch    ---RENAL---  #SIADH  - Admission Na 136  - Cont monitor off Salt tabs and Fluid restriction    ---ENDO---  #hypothyroidism  Cont Synthroid 112mcg Daily 64 F w/ hx of tobacco use, pafib, Hypothyroidism, ?scleroderma, pericardial effusion s/p pericardiocentesis (2024, viral v inflammatory, cytology neg), asthma/COPD intermittently on O2, anxiety, small cell lung cancer (unable to complete staging scans) s/p 3 Cycles of Carbo/Etop c/b SOB, CP. Patient presented for C4 of Carbo/Etop, completed 5/19-5/21 and fulphila 5/22. C/c/b orthostatic hypotension, started midodrine. C/c/b UTI E.coli and klebsiella, S/P cipro course.       ---HEME/ONC---  #Presumed LS-SCLCa  - Directly admitted for C4 Carbo/Etop (20% dose reduced) now completed   - S/p Fulphila 5/22  - Overall plan per Dr. Hall    #Cancer-related pain  - Cont MS Contin 30mg TID  - Cont Oxy IR 20/30mg PRN   - Cont Gabapentin 100mg TO  - Cont Baclofen PRN    ---PSYCH---  #Anxiety/Depression  - Cont Klonopin 0.5mg BID and 1mg nightly  - Cont Zyprexa at bedtime    ---PULM---  #Chronic hypoxic resp failure  - Cont supplemental O2 as tolerated  - Cont Advair 100/50mch BID  - Cont Duonebs q6rHrs  - Cont nicotine patch    ---RENAL---  #SIADH  - Admission Na 136  - Cont monitor off Salt tabs and Fluid restriction    ---ENDO---  #hypothyroidism  Cont Synthroid 112mcg Daily    # Orthostatic hypotension   - Symptomatic  - Cont Midodrine 10mg TID, can wean as an outpatient

## 2025-05-22 NOTE — PROGRESS NOTE ADULT - ASSESSMENT
65 yo woman, former smoker (47py; quit 12/2024) and well known to the OncHos service, with h/o pAfib (on Eliquis), Hypothyroidism, ? h/o Sleroderma, h/o pericardial effusion s/p pericardiocentesis (dx in 12/2024; ? 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 while hospitalized at [facility]- she tolerated tx without significant adverse effect. She presented as an outpt for C2 on 4/14- however, pt received Cosela and developed "severe headache" for which she required ED eval, but was ultimately sent home (she did not receive Etop or carbo on d1); on day 2 she received Etoposide for 5 minutes and started to have chest tightness and shortness of breath which was treated as a hypersensitivity reaction (she was not re-challenged due to patient preference at the time; she did not receive carbo or cosela on D2). Pt developed shortness of breath and palmar erythema with C3 (VSS); her infusion rate was slowed and she subsequently tolerated chemo without significant adverse effect.  Pt is now electively admitted for C4 Carbo/Etop with Fulphila support.    ACTIVE PROBLEMS  SCLCa (presumed limited stage)  Admission for chemotherapy  Anxiety/depression  pAfib  Hypercoag state  R clavicle/shoulder pain  Immunocompromised due to chemotherapy    SCLCa (presumed limited stage)  Will proceed with C4d3 Carbo/Etop today (20% dose reduced)- pt currently tolerating without significant adverse effect  Monitoring and with plan to treat hypersensitivity reaction as per protocol  Fluphila x1 dose to be given on day 4 (5/22)  Pt to continue outpt fu with Dr. Hall after discharge  Long-term prognosis: guarded; pt is full code    Anxiety/depression  Continuing Klonopin 0.5mg BID and 1mg nightly  Continuing Zyprexa 5mg nightly    Chronic hypoxic resp failure  h/o Asthma/COPD (former smoker)  Continuing supplemental O2 via NC and supportive resp care prn  Continuing Advair 100-50mcg MDI BID  Continuing duonebs q6/prn   Continuing Nicotine patch 21mg daily (6 weeks)  Mgmt of pericardial effusion as above    SIADH  Na 139 today  Pt is asymptomatic  SIADH confirmed by 3/12 urine lytes but Na has been normal off salt tabs and fluid restriction since pt has started cancer treatment  Will continue to monitor    Cancer related pain  Continuing Morphine ER 30mg q12  Continuing Oxy IR 20/30mg q8/prn  Continuing Gabapentin 100mg TID  Continuing Baclofen 5mg q8/prn    Constipation  Possibly due to opioids +/- AID  Pt denied abd pain, diarrhea, but reported mild nausea  Continuing Movantik 25mg daily  Continuing bowel regimen (Miralax, Senna) for OIC prevention  Of note, pt has experienced constipation relief after Relistor in the past  Will check AXR if pt becomes more symptomatic    Hypothyroidism: continuing LT4 112mcg daily    h/o pericardial effusion: most likely inflammatory; continuing Colchicine 0.6mg daily    pAfib  Hypercoag state  Continuing Eliquis 5mg BID    R clavicle/shoulder pain  Likely musculoskeletal in orgin  5/19 CXR without evidence of osseous abnormalities  Flexiril PO 5mg x1 ordered for today  Monitoring for improvement   65 yo woman, former smoker (47py; quit 12/2024) and well known to the OncHos service, with h/o pAfib (on Eliquis), Hypothyroidism, ? h/o Sleroderma, h/o pericardial effusion s/p pericardiocentesis (dx in 12/2024; ? 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 while hospitalized at [facility]- she tolerated tx without significant adverse effect. She presented as an outpt for C2 on 4/14- however, pt received Cosela and developed "severe headache" for which she required ED eval, but was ultimately sent home (she did not receive Etop or carbo on d1); on day 2 she received Etoposide for 5 minutes and started to have chest tightness and shortness of breath which was treated as a hypersensitivity reaction (she was not re-challenged due to patient preference at the time; she did not receive carbo or cosela on D2). Pt developed shortness of breath and palmar erythema with C3 (VSS); her infusion rate was slowed and she subsequently tolerated chemo without significant adverse effect.  Pt is now electively admitted for C4 Carbo/Etop with Fulphila support.    ACTIVE PROBLEMS  SCLCa (presumed limited stage)  Admission for chemotherapy  Anxiety/depression  pAfib  Hypercoag state  R clavicle/shoulder pain  Severe prot yahir malnutrition  Immunocompromised due to chemotherapy    SCLCa (presumed limited stage)  Completed C4 Carbo/Etop 5/19-21 (20% dose reduced)- pt currently tolerating without significant adverse effect aside from fatigue/sleepiness  S/p Fluphila x1 on 5/22  Pt to continue outpt fu with Dr. Hall after discharge  Long-term prognosis: guarded; pt is full code    Anxiety/depression  Continuing Klonopin 0.5mg BID and 1mg nightly  Continuing Zyprexa 5mg nightly    Chronic hypoxic resp failure  h/o Asthma/COPD (former smoker)  Continuing supplemental O2 via NC and supportive resp care prn  Continuing Advair 100-50mcg MDI BID  Continuing duonebs q6/prn   Continuing Nicotine patch 21mg daily (6 weeks)  Mgmt of pericardial effusion as above    SIADH  Na normalized  Pt is asymptomatic  SIADH confirmed by 3/12 urine lytes but Na has been normal off salt tabs and fluid restriction since pt has started cancer treatment  Will continue to monitor    Cancer related pain  Continuing Morphine ER 30mg q12  Continuing Oxy IR 20/30mg q8/prn  Continuing Gabapentin 100mg TID  Continuing Baclofen 5mg q8/prn    Constipation  Possibly due to opioids +/- AID  Pt denied abd pain, diarrhea, but reported mild nausea  Continuing Movantik 25mg daily  Continuing bowel regimen (Miralax, Senna) for OIC prevention  Of note, pt has experienced constipation relief after Relistor in the past  Will check AXR if pt becomes more symptomatic    Hypothyroidism: continuing LT4 112mcg daily    h/o pericardial effusion: most likely inflammatory; continuing Colchicine 0.6mg daily    pAfib  Hypercoag state  Continuing Eliquis 5mg BID    R clavicle/shoulder pain  Likely musculoskeletal in orgin  5/19 CXR without evidence of osseous abnormalities  Continuing Flexiril prn and pan regimen as above  Monitoring for improvement    Severe prot yahir malnutrition: appreciate RD tila- continuing regular diet

## 2025-05-22 NOTE — DISCHARGE NOTE PROVIDER - CARE PROVIDER_API CALL
[facility]  [address] [place] [id number]  Phone: [phone]  Fax: [phone]  Follow Up Time:    [facility]  [facility]  [address] [place] [address]  Phone: [phone]  Fax: [phone]  Established Patient  Follow Up Time:     César Mercado  Psychiatry  [address] [place] [id number]  Phone: [phone]  Fax: [phone]  Established Patient  Follow Up Time:     Yaritza Xavier  Rheumatology  [address] [place] [address]  Phone: [phone]  Fax: [phone]  Established Patient  Follow Up Time:

## 2025-05-22 NOTE — DISCHARGE NOTE PROVIDER - ATTENDING DISCHARGE PHYSICAL EXAMINATION:
Gen: Anxious appearing   CV: RRR no m/r/g   Resp: CTABL, no w/r/r   Abd: Soft, nontender   Ext: No LE edema     Patient reports lightheadedness with postural changes, discussed likely 2/2 orthostatic hypotension, is already on midodrine. Recommended compression stockings and lifestyle changes to minimize symptoms. Expressed that patient can be monitored overnight to ensure safe for home however patient expressed she is primarily wheelchair bound and is scheduled for a PET scan tomorrow that she does not want to miss. She would rather go home today and follow up with Onc OP.

## 2025-05-23 LAB
ALBUMIN SERPL ELPH-MCNC: 3.7 G/DL — SIGNIFICANT CHANGE UP (ref 3.3–5)
ALP SERPL-CCNC: 116 U/L — SIGNIFICANT CHANGE UP (ref 40–120)
ALT FLD-CCNC: 5 U/L — SIGNIFICANT CHANGE UP (ref 4–33)
ANION GAP SERPL CALC-SCNC: 11 MMOL/L — SIGNIFICANT CHANGE UP (ref 7–14)
ANISOCYTOSIS BLD QL: SIGNIFICANT CHANGE UP
AST SERPL-CCNC: 16 U/L — SIGNIFICANT CHANGE UP (ref 4–32)
BASOPHILS # BLD AUTO: 0 K/UL — SIGNIFICANT CHANGE UP (ref 0–0.2)
BASOPHILS NFR BLD AUTO: 0 % — SIGNIFICANT CHANGE UP (ref 0–2)
BILIRUB SERPL-MCNC: 0.2 MG/DL — SIGNIFICANT CHANGE UP (ref 0.2–1.2)
BUN SERPL-MCNC: 13 MG/DL — SIGNIFICANT CHANGE UP (ref 7–23)
CALCIUM SERPL-MCNC: 8.7 MG/DL — SIGNIFICANT CHANGE UP (ref 8.4–10.5)
CHLORIDE SERPL-SCNC: 106 MMOL/L — SIGNIFICANT CHANGE UP (ref 98–107)
CO2 SERPL-SCNC: 26 MMOL/L — SIGNIFICANT CHANGE UP (ref 22–31)
CREAT SERPL-MCNC: 0.51 MG/DL — SIGNIFICANT CHANGE UP (ref 0.5–1.3)
EGFR: 103 ML/MIN/1.73M2 — SIGNIFICANT CHANGE UP
EGFR: 103 ML/MIN/1.73M2 — SIGNIFICANT CHANGE UP
EOSINOPHIL # BLD AUTO: 0 K/UL — SIGNIFICANT CHANGE UP (ref 0–0.5)
EOSINOPHIL NFR BLD AUTO: 0 % — SIGNIFICANT CHANGE UP (ref 0–6)
FLUAV AG NPH QL: SIGNIFICANT CHANGE UP
FLUBV AG NPH QL: SIGNIFICANT CHANGE UP
GLUCOSE SERPL-MCNC: 90 MG/DL — SIGNIFICANT CHANGE UP (ref 70–99)
HCT VFR BLD CALC: 36 % — SIGNIFICANT CHANGE UP (ref 34.5–45)
HGB BLD-MCNC: 11.8 G/DL — SIGNIFICANT CHANGE UP (ref 11.5–15.5)
IANC: 55.32 K/UL — HIGH (ref 1.8–7.4)
LYMPHOCYTES # BLD AUTO: 3.77 K/UL — HIGH (ref 1–3.3)
LYMPHOCYTES # BLD AUTO: 6.1 % — LOW (ref 13–44)
MACROCYTES BLD QL: SIGNIFICANT CHANGE UP
MAGNESIUM SERPL-MCNC: 1.7 MG/DL — SIGNIFICANT CHANGE UP (ref 1.6–2.6)
MCHC RBC-ENTMCNC: 30.9 PG — SIGNIFICANT CHANGE UP (ref 27–34)
MCHC RBC-ENTMCNC: 32.8 G/DL — SIGNIFICANT CHANGE UP (ref 32–36)
MCV RBC AUTO: 94.2 FL — SIGNIFICANT CHANGE UP (ref 80–100)
MONOCYTES # BLD AUTO: 0 K/UL — SIGNIFICANT CHANGE UP (ref 0–0.9)
MONOCYTES NFR BLD AUTO: 0 % — LOW (ref 2–14)
NEUTROPHILS # BLD AUTO: 58.02 K/UL — HIGH (ref 1.8–7.4)
NEUTROPHILS NFR BLD AUTO: 89.6 % — HIGH (ref 43–77)
NEUTS BAND # BLD: 4.3 % — SIGNIFICANT CHANGE UP (ref 0–6)
NEUTS BAND NFR BLD: 4.3 % — SIGNIFICANT CHANGE UP (ref 0–6)
OVALOCYTES BLD QL SMEAR: SIGNIFICANT CHANGE UP
PHOSPHATE SERPL-MCNC: 4.1 MG/DL — SIGNIFICANT CHANGE UP (ref 2.5–4.5)
PLAT MORPH BLD: NORMAL — SIGNIFICANT CHANGE UP
PLATELET # BLD AUTO: 267 K/UL — SIGNIFICANT CHANGE UP (ref 150–400)
PLATELET COUNT - ESTIMATE: NORMAL — SIGNIFICANT CHANGE UP
POIKILOCYTOSIS BLD QL AUTO: SLIGHT — SIGNIFICANT CHANGE UP
POLYCHROMASIA BLD QL SMEAR: SLIGHT — SIGNIFICANT CHANGE UP
POTASSIUM SERPL-MCNC: 3.3 MMOL/L — LOW (ref 3.5–5.3)
POTASSIUM SERPL-SCNC: 3.3 MMOL/L — LOW (ref 3.5–5.3)
PROT SERPL-MCNC: 6 G/DL — SIGNIFICANT CHANGE UP (ref 6–8.3)
RBC # BLD: 3.82 M/UL — SIGNIFICANT CHANGE UP (ref 3.8–5.2)
RBC # FLD: 16.4 % — HIGH (ref 10.3–14.5)
RBC BLD AUTO: ABNORMAL
RSV RNA NPH QL NAA+NON-PROBE: SIGNIFICANT CHANGE UP
SARS-COV-2 RNA SPEC QL NAA+PROBE: SIGNIFICANT CHANGE UP
SODIUM SERPL-SCNC: 143 MMOL/L — SIGNIFICANT CHANGE UP (ref 135–145)
SOURCE RESPIRATORY: SIGNIFICANT CHANGE UP
WBC # BLD: 61.79 K/UL — CRITICAL HIGH (ref 3.8–10.5)
WBC # FLD AUTO: 61.79 K/UL — CRITICAL HIGH (ref 3.8–10.5)

## 2025-05-23 PROCEDURE — 99233 SBSQ HOSP IP/OBS HIGH 50: CPT

## 2025-05-23 RX ORDER — CLONAZEPAM 0.5 MG/1
1 TABLET ORAL AT BEDTIME
Refills: 0 | Status: DISCONTINUED | OUTPATIENT
Start: 2025-05-23 | End: 2025-05-28

## 2025-05-23 RX ORDER — OXYCODONE HYDROCHLORIDE 30 MG/1
20 TABLET ORAL EVERY 4 HOURS
Refills: 0 | Status: DISCONTINUED | OUTPATIENT
Start: 2025-05-23 | End: 2025-05-28

## 2025-05-23 RX ORDER — HYDROCORTISONE 10 MG/G
1 CREAM TOPICAL
Refills: 0 | Status: DISCONTINUED | OUTPATIENT
Start: 2025-05-23 | End: 2025-05-28

## 2025-05-23 RX ORDER — OXYCODONE HYDROCHLORIDE 30 MG/1
30 TABLET ORAL EVERY 4 HOURS
Refills: 0 | Status: DISCONTINUED | OUTPATIENT
Start: 2025-05-23 | End: 2025-05-28

## 2025-05-23 RX ORDER — MIDODRINE HYDROCHLORIDE 5 MG/1
5 TABLET ORAL EVERY 8 HOURS
Refills: 0 | Status: DISCONTINUED | OUTPATIENT
Start: 2025-05-23 | End: 2025-05-26

## 2025-05-23 RX ORDER — MIDODRINE HYDROCHLORIDE 5 MG/1
5 TABLET ORAL ONCE
Refills: 0 | Status: COMPLETED | OUTPATIENT
Start: 2025-05-23 | End: 2025-05-23

## 2025-05-23 RX ORDER — CLONAZEPAM 0.5 MG/1
0.5 TABLET ORAL
Refills: 0 | Status: DISCONTINUED | OUTPATIENT
Start: 2025-05-23 | End: 2025-05-28

## 2025-05-23 RX ADMIN — Medication 30 MILLIGRAM(S): at 15:15

## 2025-05-23 RX ADMIN — Medication 2 TABLET(S): at 21:36

## 2025-05-23 RX ADMIN — Medication 1 APPLICATION(S): at 05:11

## 2025-05-23 RX ADMIN — NICOTINE POLACRILEX 1 PATCH: 4 GUM, CHEWING ORAL at 12:28

## 2025-05-23 RX ADMIN — COLCHICINE 0.6 MILLIGRAM(S): 0.6 TABLET, FILM COATED ORAL at 14:01

## 2025-05-23 RX ADMIN — BACLOFEN 5 MILLIGRAM(S): 10 INJECTION INTRATHECAL at 05:12

## 2025-05-23 RX ADMIN — Medication 250 MILLILITER(S): at 06:04

## 2025-05-23 RX ADMIN — GABAPENTIN 100 MILLIGRAM(S): 400 CAPSULE ORAL at 05:11

## 2025-05-23 RX ADMIN — APIXABAN 5 MILLIGRAM(S): 2.5 TABLET, FILM COATED ORAL at 18:12

## 2025-05-23 RX ADMIN — NALOXEGOL OXALATE 25 MILLIGRAM(S): 12.5 TABLET, FILM COATED ORAL at 21:36

## 2025-05-23 RX ADMIN — Medication 5 MILLIGRAM(S): at 21:36

## 2025-05-23 RX ADMIN — MIDODRINE HYDROCHLORIDE 5 MILLIGRAM(S): 5 TABLET ORAL at 21:37

## 2025-05-23 RX ADMIN — NICOTINE POLACRILEX 1 PATCH: 4 GUM, CHEWING ORAL at 18:16

## 2025-05-23 RX ADMIN — CLONAZEPAM 0.5 MILLIGRAM(S): 0.5 TABLET ORAL at 10:37

## 2025-05-23 RX ADMIN — GABAPENTIN 100 MILLIGRAM(S): 400 CAPSULE ORAL at 14:01

## 2025-05-23 RX ADMIN — OLANZAPINE 5 MILLIGRAM(S): 10 TABLET ORAL at 21:36

## 2025-05-23 RX ADMIN — CLONAZEPAM 1 MILLIGRAM(S): 0.5 TABLET ORAL at 23:26

## 2025-05-23 RX ADMIN — MIDODRINE HYDROCHLORIDE 5 MILLIGRAM(S): 5 TABLET ORAL at 14:02

## 2025-05-23 RX ADMIN — Medication 112 MICROGRAM(S): at 07:12

## 2025-05-23 RX ADMIN — Medication 30 MILLIGRAM(S): at 05:11

## 2025-05-23 RX ADMIN — NICOTINE POLACRILEX 1 PATCH: 4 GUM, CHEWING ORAL at 12:40

## 2025-05-23 RX ADMIN — HYDROCORTISONE 1 APPLICATION(S): 10 CREAM TOPICAL at 19:00

## 2025-05-23 RX ADMIN — Medication 30 MILLIGRAM(S): at 14:15

## 2025-05-23 RX ADMIN — MIDODRINE HYDROCHLORIDE 5 MILLIGRAM(S): 5 TABLET ORAL at 08:00

## 2025-05-23 RX ADMIN — Medication 30 MILLIGRAM(S): at 22:00

## 2025-05-23 RX ADMIN — BACLOFEN 5 MILLIGRAM(S): 10 INJECTION INTRATHECAL at 21:37

## 2025-05-23 RX ADMIN — BACLOFEN 5 MILLIGRAM(S): 10 INJECTION INTRATHECAL at 14:01

## 2025-05-23 RX ADMIN — Medication 10 MILLILITER(S): at 23:49

## 2025-05-23 RX ADMIN — Medication 40 MILLIGRAM(S): at 05:12

## 2025-05-23 RX ADMIN — Medication 40 MILLIGRAM(S): at 18:09

## 2025-05-23 RX ADMIN — GABAPENTIN 100 MILLIGRAM(S): 400 CAPSULE ORAL at 21:36

## 2025-05-23 RX ADMIN — NICOTINE POLACRILEX 1 PATCH: 4 GUM, CHEWING ORAL at 07:40

## 2025-05-23 RX ADMIN — OXYCODONE HYDROCHLORIDE 30 MILLIGRAM(S): 30 TABLET ORAL at 04:50

## 2025-05-23 RX ADMIN — Medication 1 DOSE(S): at 21:35

## 2025-05-23 RX ADMIN — APIXABAN 5 MILLIGRAM(S): 2.5 TABLET, FILM COATED ORAL at 05:12

## 2025-05-23 RX ADMIN — OXYCODONE HYDROCHLORIDE 30 MILLIGRAM(S): 30 TABLET ORAL at 03:50

## 2025-05-23 RX ADMIN — Medication 30 MILLIGRAM(S): at 06:11

## 2025-05-23 RX ADMIN — Medication 30 MILLIGRAM(S): at 21:37

## 2025-05-23 NOTE — PROGRESS NOTE ADULT - ASSESSMENT
65 yo woman, former smoker (47py; quit 12/2024) and well known to the On[facility] service, with h/o pAfib (on Eliquis), Hypothyroidism, ? h/o Sleroderma, h/o pericardial effusion s/p pericardiocentesis (dx in 12/2024; ? 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 while hospitalized at [facility]- she tolerated tx without significant adverse effect. She presented as an outpt for C2 on 4/14- however, pt received Cosela and developed "severe headache" for which she required ED eval, but was ultimately sent home (she did not receive Etop or carbo on d1); on day 2 she received Etoposide for 5 minutes and started to have chest tightness and shortness of breath which was treated as a hypersensitivity reaction (she was not re-challenged due to patient preference at the time; she did not receive carbo or cosela on D2). Pt developed shortness of breath and palmar erythema with C3 (VSS); her infusion rate was slowed and she subsequently tolerated chemo without significant adverse effect.  Pt is now electively admitted for C4 Carbo/Etop with Fulphila support.    ACTIVE PROBLEMS  SCLCa (presumed limited stage)  Admission for chemotherapy  Hypotension  Generalized weakness  Anxiety/depression  pAfib  Hypercoag state  R clavicle/shoulder pain  Severe prot yahir malnutrition  Immunocompromised due to chemotherapy    SCLCa (presumed limited stage)  Completed C4 Carbo/Etop 5/19-21 (20% dose reduced)- pt currently tolerating without significant adverse effect aside from fatigue/sleepiness  S/p Fluphila x1 on 5/22  Pt to continue outpt fu with Dr. Hall after discharge  Long-term prognosis: guarded; pt is full code    Leukocytosis  2/2 growth factor (Fulphila)  Clinical suspicion for acute infection is low, but infectious javier in progress  Monitoring off abx unless pt becomes febrile    Hypotension  Generalized weakness  Likely due to chemo  Hypotension is fluid responsive  5/23 orthostatics negative  Infectious javier negative to date (RVP negative; Ucx, Bcx in progress)  Starting Midodrine 5mg TID  Continuing IVF resuscitation prn    Anxiety/depression  Continuing Klonopin 0.5mg BID and 1mg nightly  Continuing Zyprexa 5mg nightly    Chronic hypoxic resp failure  h/o Asthma/COPD (former smoker)  Continuing supplemental O2 via NC and supportive resp care prn  Continuing Advair 100-50mcg MDI BID  Continuing duonebs q6/prn   Continuing Nicotine patch 21mg daily (6 weeks)  Mgmt of pericardial effusion as above    SIADH  Na normalized  Pt is asymptomatic  SIADH confirmed by 3/12 urine lytes but Na has been normal off salt tabs and fluid restriction since pt has started cancer treatment  Will continue to monitor    Cancer related pain  Continuing Morphine ER 30mg q12  Continuing Oxy IR 20/30mg q8/prn  Continuing Gabapentin 100mg TID  Continuing Baclofen 5mg q8/prn    Constipation  Possibly due to opioids +/- AID  Pt denied abd pain, diarrhea, but reported mild nausea  Continuing Movantik 25mg daily  Continuing bowel regimen (Miralax, Senna) for OIC prevention  Of note, pt has experienced constipation relief after Relistor in the past  Will check AXR if pt becomes more symptomatic    Hypothyroidism: continuing LT4 112mcg daily    h/o pericardial effusion: most likely inflammatory; continuing Colchicine 0.6mg daily    pAfib  Hypercoag state  Continuing Eliquis 5mg BID    R clavicle/shoulder pain  Likely musculoskeletal in orgin  5/19 CXR without evidence of osseous abnormalities  Continuing Flexiril prn and pan regimen as above  Monitoring for improvement    Severe prot yahir malnutrition: appreciate RD tila- continuing regular diet

## 2025-05-23 NOTE — PROGRESS NOTE ADULT - SUBJECTIVE AND OBJECTIVE BOX
SOLID TUMOR ONCOLOGY HOSPITALIST PROGRESS NOTE    S: No acute events overnight.  Pt complained of ongoing fatigue/weakness and intermittent dizziness.  She denied n/v.  She complained of itching scalp rash and waxing/waning neck/shoulder pain.    CURRENT MEDICATIONS  MEDICATIONS  (STANDING):  apixaban 5 milliGRAM(s) Oral every 12 hours  baclofen 5 milliGRAM(s) Oral every 8 hours  bisacodyl 5 milliGRAM(s) Oral at bedtime  chlorhexidine 2% Cloths 1 Application(s) Topical <User Schedule>  clonazePAM  Tablet 0.5 milliGRAM(s) Oral <User Schedule>  clonazePAM  Tablet 1 milliGRAM(s) Oral at bedtime  colchicine 0.6 milliGRAM(s) Oral daily  fluticasone propionate/ salmeterol 100-50 MICROgram(s) Diskus 1 Dose(s) Inhalation two times a day  gabapentin 100 milliGRAM(s) Oral three times a day  hydrocortisone 1% Cream 1 Application(s) Topical two times a day  levothyroxine 112 MICROGram(s) Oral daily  midodrine. 5 milliGRAM(s) Oral every 8 hours  morphine ER Tablet 30 milliGRAM(s) Oral every 8 hours  naloxegol 25 milliGRAM(s) Oral daily  nicotine - 21 mG/24Hr(s) Patch 1 Patch Transdermal daily  OLANZapine 5 milliGRAM(s) Oral at bedtime  pantoprazole    Tablet 40 milliGRAM(s) Oral every 12 hours  polyethylene glycol 3350 17 Gram(s) Oral two times a day  senna 2 Tablet(s) Oral at bedtime  sodium chloride 0.9%. 1000 milliLiter(s) (10 mL/Hr) IV Continuous <Continuous>  MEDICATIONS  (PRN):  albuterol    90 MICROgram(s) HFA Inhaler 2 Puff(s) Inhalation every 6 hours PRN Shortness of Breath and/or Wheezing  aluminum hydroxide/magnesium hydroxide/simethicone Suspension 30 milliLiter(s) Oral every 4 hours PRN Dyspepsia  cyclobenzaprine 10 milliGRAM(s) Oral three times a day PRN Muscle Spasm  diphenhydrAMINE 25 milliGRAM(s) Oral every 6 hours PRN Rash and/or Itching  hydrocortisone 1% Ointment 1 Application(s) Topical every 12 hours PRN Rash and/or Itching  oxyCODONE    IR 20 milliGRAM(s) Oral every 4 hours PRN Moderate Pain (4 - 6)  oxyCODONE    IR 30 milliGRAM(s) Oral every 4 hours PRN Severe Pain (7 - 10)      PHYSICAL EXAM  T(C): 36.4 (05-23-25 @ 13:01), Max: 36.4 (05-23-25 @ 05:39)  HR: 72 (05-23-25 @ 09:35) (71 - 85)  BP: 107/62 (05-23-25 @ 09:35) (92/60 - 107/62)  RR: 16 (05-23-25 @ 09:35) (16 - 18)  SpO2: 100% (05-23-25 @ 09:35) (98% - 100%)    05-22-25 @ 07:01  -  05-23-25 @ 07:00  --------------------------------------------------------  IN: 200 mL / OUT: 800 mL / NET: -600 mL    05-23-25 @ 07:01  -  05-23-25 @ 21:39  --------------------------------------------------------  IN: 940 mL / OUT: 380 mL / NET: 560 mL  Non-toxic-appearing woman, crying hysterically in bed  Answering all questions appropriately (conversational dyspnea resolved), following commands  Anicteric sclera, no oral lesions/thrush  RRR, no m/r/g, heart sounds faint  Breaths somewhat labored, but not tachypnea; trace RLB crackles, no w/r  Abd soft/nt/nd, hypoactive bowel sounds  No peripheral edema; DIP/PIP, ankle joint deformities unchanged  Skin: fair; erythematous maculopapular rash present at the nape of pt's neck, non-blanching  CN 2-12 grossly intact; no gross focal neuro deficits; pt moves all extremities spontaneously      LABS                        11.8   61.79 )-----------( 267      ( 23 May 2025 15:18 )             36.0     05-23    143  |  106  |  13  ----------------------------<  90  3.3[L]   |  26  |  0.51    Ca    8.7      23 May 2025 15:18  Phos  4.1     05-23  Mg     1.70     05-23    TPro  6.0  /  Alb  3.7  /  TBili  0.2  /  DBili  x   /  AST  16  /  ALT  5   /  AlkPhos  116  05-23      MICROBIOLOGY  Abx: none on this admission.    Cx Data  5/23 Covid/flu/rsv negative  5/23 Ucx: pending  5/23 Bcx periph x1: pending      CURRENT RADIOLOGY  5/19 CXR: Redemonstrated opacification of the AP window, compatible with patient's known mediastinal mass.  Hazy opacities of the bilateral upper lung fields, unchanged as compared to prior chest radiograph 3/12/2025.  No acute osseous abnormality. No discrete aggressive osseous lesion within the clavicles as visualized.    HISTORICAL RADIOLOGY  4/28 AXR: Nonobstructive bowel gas pattern. Large stool burden.    3/20 NC CT chest  1.  Moderate-sized pericardial effusion appears unchanged compared to   partially imaged heart on 3/18/2025 but increase insize since 2/15/2025.  2.  Interval increase in size of previously described left suprahilar   mediastinal mass which extends into the AP window and paramediastinal   left upper lobe.  3.  Some of the other left upper lobe nodules are decrease and some   increase.  4.  Unchanged sclerotic lesions within the sternum and T12 vertebral body.    3/20 TTE   1. Limited study to re-evaluate pericardial effusion.   2. There is a moderate pericardial effusion noted adjacent to the posterior left ventricle, a moderate pericardial effusion noted adjacent to the right atrium, a small pericardial effusion noted adjacent to the apex, a moderate pericardial effusion noted adjacent to the lateral left ventricle and a moderate pericardial effusion noted adjacent to the right ventricle with no echocardiographic evidence of tamponade physiology. Moderate pericardial effusion, measuring ~ 1.1 cm posterior to the left ventricle, ~ 1.0 cm lateral to the left ventricle, ~ 1.4 cm adjacent to the RV free wall, and ~ 1.4 cm superior to the right atrium. Small pericardial effusion anterior to the right ventricle and adjacent to the apex. The pericardium adjacent to the RV free wall appears markedly thickened and may reflect the presence of thrombus, inflammatory material, and/or metastatic disease. No RA or RV diastolic collapse seen. However, the inferior vena cava (IVC) displays reduced respirophasic variability in its caliber, consistent with elevated right atrial pressure.   3. The inferior vena cava is normal in size measuring 1.30 cm in diameter, (normal <2.1cm) with abnormal inspiratory collapse (abnormal <50%) consistent with mildly elevated right atrial pressure (~8, range 5-10mmHg).   4. Compared to the transthoracic echocardiogram performed on 3/12/2025, the pericardial effusion has increased slightly in size.    3/18 CT abd/pelv: No acute pathology within the abdomen and pelvis. Partially imaged pericardial effusion. Bilateral trace pleural effusions with adjacent atelectasis. Again demonstrated a T12 vertebral body hemangioma. Degenerative changes of the spine.    3/18 CT head: No hydrocephalus, acute intracranial hemorrhage, or mass effect. No compelling evidence of intracranial metastasis on this non-contrast exam.

## 2025-05-24 LAB
ALBUMIN SERPL ELPH-MCNC: 3.5 G/DL — SIGNIFICANT CHANGE UP (ref 3.3–5)
ALP SERPL-CCNC: 124 U/L — HIGH (ref 40–120)
ALT FLD-CCNC: 7 U/L — SIGNIFICANT CHANGE UP (ref 4–33)
ANION GAP SERPL CALC-SCNC: 12 MMOL/L — SIGNIFICANT CHANGE UP (ref 7–14)
AST SERPL-CCNC: 16 U/L — SIGNIFICANT CHANGE UP (ref 4–32)
BASOPHILS # BLD AUTO: 0.16 K/UL — SIGNIFICANT CHANGE UP (ref 0–0.2)
BASOPHILS NFR BLD AUTO: 0.4 % — SIGNIFICANT CHANGE UP (ref 0–2)
BILIRUB SERPL-MCNC: <0.2 MG/DL — SIGNIFICANT CHANGE UP (ref 0.2–1.2)
BUN SERPL-MCNC: 13 MG/DL — SIGNIFICANT CHANGE UP (ref 7–23)
CALCIUM SERPL-MCNC: 8.5 MG/DL — SIGNIFICANT CHANGE UP (ref 8.4–10.5)
CHLORIDE SERPL-SCNC: 103 MMOL/L — SIGNIFICANT CHANGE UP (ref 98–107)
CO2 SERPL-SCNC: 27 MMOL/L — SIGNIFICANT CHANGE UP (ref 22–31)
CREAT SERPL-MCNC: 0.45 MG/DL — LOW (ref 0.5–1.3)
EGFR: 106 ML/MIN/1.73M2 — SIGNIFICANT CHANGE UP
EGFR: 106 ML/MIN/1.73M2 — SIGNIFICANT CHANGE UP
EOSINOPHIL # BLD AUTO: 0.04 K/UL — SIGNIFICANT CHANGE UP (ref 0–0.5)
EOSINOPHIL NFR BLD AUTO: 0.1 % — SIGNIFICANT CHANGE UP (ref 0–6)
GLUCOSE SERPL-MCNC: 86 MG/DL — SIGNIFICANT CHANGE UP (ref 70–99)
HCT VFR BLD CALC: 34.2 % — LOW (ref 34.5–45)
HGB BLD-MCNC: 11.3 G/DL — LOW (ref 11.5–15.5)
IANC: 37.38 K/UL — HIGH (ref 1.8–7.4)
IMM GRANULOCYTES NFR BLD AUTO: 3.7 % — HIGH (ref 0–0.9)
LYMPHOCYTES # BLD AUTO: 2.48 K/UL — SIGNIFICANT CHANGE UP (ref 1–3.3)
LYMPHOCYTES # BLD AUTO: 5.9 % — LOW (ref 13–44)
MAGNESIUM SERPL-MCNC: 1.8 MG/DL — SIGNIFICANT CHANGE UP (ref 1.6–2.6)
MCHC RBC-ENTMCNC: 31 PG — SIGNIFICANT CHANGE UP (ref 27–34)
MCHC RBC-ENTMCNC: 33 G/DL — SIGNIFICANT CHANGE UP (ref 32–36)
MCV RBC AUTO: 93.7 FL — SIGNIFICANT CHANGE UP (ref 80–100)
MONOCYTES # BLD AUTO: 0.23 K/UL — SIGNIFICANT CHANGE UP (ref 0–0.9)
MONOCYTES NFR BLD AUTO: 0.5 % — LOW (ref 2–14)
NEUTROPHILS # BLD AUTO: 37.38 K/UL — HIGH (ref 1.8–7.4)
NEUTROPHILS NFR BLD AUTO: 89.4 % — HIGH (ref 43–77)
NRBC # BLD AUTO: 0 K/UL — SIGNIFICANT CHANGE UP (ref 0–0)
NRBC # FLD: 0 K/UL — SIGNIFICANT CHANGE UP (ref 0–0)
NRBC BLD AUTO-RTO: 0 /100 WBCS — SIGNIFICANT CHANGE UP (ref 0–0)
PHOSPHATE SERPL-MCNC: 3.4 MG/DL — SIGNIFICANT CHANGE UP (ref 2.5–4.5)
PLATELET # BLD AUTO: 253 K/UL — SIGNIFICANT CHANGE UP (ref 150–400)
POTASSIUM SERPL-MCNC: 3.4 MMOL/L — LOW (ref 3.5–5.3)
POTASSIUM SERPL-SCNC: 3.4 MMOL/L — LOW (ref 3.5–5.3)
PROT SERPL-MCNC: 5.5 G/DL — LOW (ref 6–8.3)
RBC # BLD: 3.65 M/UL — LOW (ref 3.8–5.2)
RBC # FLD: 16.5 % — HIGH (ref 10.3–14.5)
SODIUM SERPL-SCNC: 142 MMOL/L — SIGNIFICANT CHANGE UP (ref 135–145)
WBC # BLD: 41.85 K/UL — CRITICAL HIGH (ref 3.8–10.5)
WBC # FLD AUTO: 41.85 K/UL — CRITICAL HIGH (ref 3.8–10.5)

## 2025-05-24 PROCEDURE — 99233 SBSQ HOSP IP/OBS HIGH 50: CPT

## 2025-05-24 RX ORDER — ONDANSETRON HCL/PF 4 MG/2 ML
4 VIAL (ML) INJECTION EVERY 8 HOURS
Refills: 0 | Status: DISCONTINUED | OUTPATIENT
Start: 2025-05-24 | End: 2025-05-28

## 2025-05-24 RX ADMIN — APIXABAN 5 MILLIGRAM(S): 2.5 TABLET, FILM COATED ORAL at 19:06

## 2025-05-24 RX ADMIN — BACLOFEN 5 MILLIGRAM(S): 10 INJECTION INTRATHECAL at 05:13

## 2025-05-24 RX ADMIN — NICOTINE POLACRILEX 1 PATCH: 4 GUM, CHEWING ORAL at 12:39

## 2025-05-24 RX ADMIN — Medication 30 MILLIGRAM(S): at 17:12

## 2025-05-24 RX ADMIN — APIXABAN 5 MILLIGRAM(S): 2.5 TABLET, FILM COATED ORAL at 05:13

## 2025-05-24 RX ADMIN — BACLOFEN 5 MILLIGRAM(S): 10 INJECTION INTRATHECAL at 22:16

## 2025-05-24 RX ADMIN — GABAPENTIN 100 MILLIGRAM(S): 400 CAPSULE ORAL at 22:15

## 2025-05-24 RX ADMIN — Medication 40 MILLIGRAM(S): at 19:05

## 2025-05-24 RX ADMIN — Medication 30 MILLIGRAM(S): at 22:47

## 2025-05-24 RX ADMIN — Medication 30 MILLIGRAM(S): at 05:13

## 2025-05-24 RX ADMIN — CLONAZEPAM 0.5 MILLIGRAM(S): 0.5 TABLET ORAL at 19:05

## 2025-05-24 RX ADMIN — Medication 30 MILLIGRAM(S): at 15:52

## 2025-05-24 RX ADMIN — Medication 30 MILLIGRAM(S): at 05:56

## 2025-05-24 RX ADMIN — Medication 5 MILLIGRAM(S): at 22:16

## 2025-05-24 RX ADMIN — MIDODRINE HYDROCHLORIDE 5 MILLIGRAM(S): 5 TABLET ORAL at 23:42

## 2025-05-24 RX ADMIN — MIDODRINE HYDROCHLORIDE 5 MILLIGRAM(S): 5 TABLET ORAL at 05:13

## 2025-05-24 RX ADMIN — OLANZAPINE 5 MILLIGRAM(S): 10 TABLET ORAL at 22:15

## 2025-05-24 RX ADMIN — Medication 2 TABLET(S): at 22:16

## 2025-05-24 RX ADMIN — HYDROCORTISONE 1 APPLICATION(S): 10 CREAM TOPICAL at 19:08

## 2025-05-24 RX ADMIN — Medication 4 MILLIGRAM(S): at 22:27

## 2025-05-24 RX ADMIN — CLONAZEPAM 0.5 MILLIGRAM(S): 0.5 TABLET ORAL at 06:54

## 2025-05-24 RX ADMIN — Medication 1 APPLICATION(S): at 05:19

## 2025-05-24 RX ADMIN — HYDROCORTISONE 1 APPLICATION(S): 10 CREAM TOPICAL at 05:12

## 2025-05-24 RX ADMIN — Medication 30 MILLIGRAM(S): at 22:19

## 2025-05-24 RX ADMIN — POLYETHYLENE GLYCOL 3350 17 GRAM(S): 17 POWDER, FOR SOLUTION ORAL at 05:12

## 2025-05-24 RX ADMIN — OXYCODONE HYDROCHLORIDE 20 MILLIGRAM(S): 30 TABLET ORAL at 13:35

## 2025-05-24 RX ADMIN — Medication 30 MILLIGRAM(S): at 14:52

## 2025-05-24 RX ADMIN — GABAPENTIN 100 MILLIGRAM(S): 400 CAPSULE ORAL at 14:51

## 2025-05-24 RX ADMIN — NALOXEGOL OXALATE 25 MILLIGRAM(S): 12.5 TABLET, FILM COATED ORAL at 22:16

## 2025-05-24 RX ADMIN — BACLOFEN 5 MILLIGRAM(S): 10 INJECTION INTRATHECAL at 14:50

## 2025-05-24 RX ADMIN — CLONAZEPAM 1 MILLIGRAM(S): 0.5 TABLET ORAL at 23:43

## 2025-05-24 RX ADMIN — Medication 112 MICROGRAM(S): at 06:05

## 2025-05-24 RX ADMIN — Medication 40 MILLIGRAM(S): at 05:13

## 2025-05-24 RX ADMIN — NICOTINE POLACRILEX 1 PATCH: 4 GUM, CHEWING ORAL at 20:12

## 2025-05-24 RX ADMIN — NICOTINE POLACRILEX 1 PATCH: 4 GUM, CHEWING ORAL at 06:20

## 2025-05-24 RX ADMIN — GABAPENTIN 100 MILLIGRAM(S): 400 CAPSULE ORAL at 05:13

## 2025-05-24 RX ADMIN — MIDODRINE HYDROCHLORIDE 5 MILLIGRAM(S): 5 TABLET ORAL at 14:52

## 2025-05-24 RX ADMIN — OXYCODONE HYDROCHLORIDE 20 MILLIGRAM(S): 30 TABLET ORAL at 12:35

## 2025-05-24 RX ADMIN — COLCHICINE 0.6 MILLIGRAM(S): 0.6 TABLET, FILM COATED ORAL at 14:53

## 2025-05-24 NOTE — PROGRESS NOTE ADULT - ASSESSMENT
65 yo woman, former smoker (47py; quit 12/2024) and well known to the Onc[facility] service, with h/o pAfib (on Eliquis), Hypothyroidism, ? h/o Sleroderma, h/o pericardial effusion s/p pericardiocentesis (dx in 12/2024; ? 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 while hospitalized at [facility]- she tolerated tx without significant adverse effect. She presented as an outpt for C2 on 4/14- however, pt received Cosela and developed "severe headache" for which she required ED eval, but was ultimately sent home (she did not receive Etop or carbo on d1); on day 2 she received Etoposide for 5 minutes and started to have chest tightness and shortness of breath which was treated as a hypersensitivity reaction (she was not re-challenged due to patient preference at the time; she did not receive carbo or cosela on D2). Pt developed shortness of breath and palmar erythema with C3 (VSS); her infusion rate was slowed and she subsequently tolerated chemo without significant adverse effect.  Pt is now electively admitted for C4 Carbo/Etop with Fulphila support.    ACTIVE PROBLEMS  SCLCa (presumed limited stage)  Admission for chemotherapy  Hypotension  Generalized weakness  Anxiety/depression  pAfib  Hypercoag state  R clavicle/shoulder pain  Severe prot yahir malnutrition  Immunocompromised due to chemotherapy    SCLCa (presumed limited stage)  Completed C4 Carbo/Etop 5/19-21 (20% dose reduced)- pt currently tolerating without significant adverse effect aside from fatigue/sleepiness  S/p Fluphila x1 on 5/22  Pt to continue outpt fu with Dr. Hall after discharge  Long-term prognosis: guarded; pt is full code    Leukocytosis  2/2 growth factor (Fulphila)  Clinical suspicion for acute infection is low, but infectious javier in progress  Monitoring off abx unless pt becomes febrile    Hypotension  Generalized weakness  Likely due to chemo  Hypotension is fluid responsive  5/23 orthostatics negative  Infectious javier negative to date (RVP negative; Ucx, Bcx in progress)  Starting Midodrine 5mg TID  Continuing IVF resuscitation prn    Anxiety/depression  Continuing Klonopin 0.5mg BID and 1mg nightly  Continuing Zyprexa 5mg nightly    Chronic hypoxic resp failure  h/o Asthma/COPD (former smoker)  Continuing supplemental O2 via NC and supportive resp care prn  Continuing Advair 100-50mcg MDI BID  Continuing duonebs q6/prn   Continuing Nicotine patch 21mg daily (6 weeks)  Mgmt of pericardial effusion as above    SIADH  Na normalized  Pt is asymptomatic  SIADH confirmed by 3/12 urine lytes but Na has been normal off salt tabs and fluid restriction since pt has started cancer treatment  Will continue to monitor    Cancer related pain  Continuing Morphine ER 30mg q12  Continuing Oxy IR 20/30mg q8/prn  Continuing Gabapentin 100mg TID  Continuing Baclofen 5mg q8/prn    Constipation  Possibly due to opioids +/- AID  Pt denied abd pain, diarrhea, but reported mild nausea  Continuing Movantik 25mg daily  Continuing bowel regimen (Miralax, Senna) for OIC prevention  Of note, pt has experienced constipation relief after Relistor in the past  Will check AXR if pt becomes more symptomatic    Hypothyroidism: continuing LT4 112mcg daily    h/o pericardial effusion: most likely inflammatory; continuing Colchicine 0.6mg daily    pAfib  Hypercoag state  Continuing Eliquis 5mg BID    R clavicle/shoulder pain  Likely musculoskeletal in orgin  5/19 CXR without evidence of osseous abnormalities  Continuing Flexiril prn and pan regimen as above  Monitoring for improvement    Severe prot yahir malnutrition: appreciate RD tila- continuing regular diet

## 2025-05-24 NOTE — PROGRESS NOTE ADULT - SUBJECTIVE AND OBJECTIVE BOX
Patient is a 64y old  Female who presents with a chief complaint of Elective admission- C4 Carbo/Etoposide (22 May 2025 15:24)      SUBJECTIVE / OVERNIGHT EVENTS: Pt generally feeling tired and achy, with headache. Asking if she can have oxycodone for breakthrough.    MEDICATIONS  (STANDING):  apixaban 5 milliGRAM(s) Oral every 12 hours  baclofen 5 milliGRAM(s) Oral every 8 hours  bisacodyl 5 milliGRAM(s) Oral at bedtime  chlorhexidine 2% Cloths 1 Application(s) Topical <User Schedule>  clonazePAM  Tablet 0.5 milliGRAM(s) Oral <User Schedule>  clonazePAM  Tablet 1 milliGRAM(s) Oral at bedtime  colchicine 0.6 milliGRAM(s) Oral daily  fluticasone propionate/ salmeterol 100-50 MICROgram(s) Diskus 1 Dose(s) Inhalation two times a day  gabapentin 100 milliGRAM(s) Oral three times a day  hydrocortisone 1% Cream 1 Application(s) Topical two times a day  levothyroxine 112 MICROGram(s) Oral daily  midodrine. 5 milliGRAM(s) Oral every 8 hours  morphine ER Tablet 30 milliGRAM(s) Oral every 8 hours  naloxegol 25 milliGRAM(s) Oral daily  nicotine - 21 mG/24Hr(s) Patch 1 Patch Transdermal daily  OLANZapine 5 milliGRAM(s) Oral at bedtime  pantoprazole    Tablet 40 milliGRAM(s) Oral every 12 hours  polyethylene glycol 3350 17 Gram(s) Oral two times a day  senna 2 Tablet(s) Oral at bedtime  sodium chloride 0.9%. 1000 milliLiter(s) (10 mL/Hr) IV Continuous <Continuous>    MEDICATIONS  (PRN):  albuterol    90 MICROgram(s) HFA Inhaler 2 Puff(s) Inhalation every 6 hours PRN Shortness of Breath and/or Wheezing  aluminum hydroxide/magnesium hydroxide/simethicone Suspension 30 milliLiter(s) Oral every 4 hours PRN Dyspepsia  cyclobenzaprine 10 milliGRAM(s) Oral three times a day PRN Muscle Spasm  diphenhydrAMINE 25 milliGRAM(s) Oral every 6 hours PRN Rash and/or Itching  hydrocortisone 1% Ointment 1 Application(s) Topical every 12 hours PRN Rash and/or Itching  oxyCODONE    IR 20 milliGRAM(s) Oral every 4 hours PRN Moderate Pain (4 - 6)  oxyCODONE    IR 30 milliGRAM(s) Oral every 4 hours PRN Severe Pain (7 - 10)      CAPILLARY BLOOD GLUCOSE        I&O's Summary    23 May 2025 07:01  -  24 May 2025 07:00  --------------------------------------------------------  IN: 940 mL / OUT: 380 mL / NET: 560 mL        PHYSICAL EXAM:  Vital Signs Last 24 Hrs  T(C): 36.4 (24 May 2025 12:05), Max: 36.7 (24 May 2025 05:00)  T(F): 97.6 (24 May 2025 12:05), Max: 98 (24 May 2025 05:00)  HR: 84 (24 May 2025 12:05) (76 - 84)  BP: 103/60 (24 May 2025 05:00) (103/60 - 104/65)  BP(mean): --  RR: 17 (24 May 2025 05:00) (17 - 17)  SpO2: 100% (24 May 2025 05:00) (97% - 100%)    Parameters below as of 24 May 2025 05:00  Patient On (Oxygen Delivery Method): nasal cannula  O2 Flow (L/min): 2    CONSTITUTIONAL: NAD, sitting up in bed  EYES: PERRL; conjunctiva and sclera clear  ENMT: Moist oral mucosa, no pharyngeal injection or exudates  RESPIRATORY: Normal respiratory effort; lungs are clear to auscultation bilaterally  CARDIOVASCULAR: Regular rate and rhythm, normal S1 and S2, no murmur/rub/gallop; No lower extremity edema; Peripheral pulses are 2+ bilaterally  ABDOMEN: Nontender to palpation, normoactive bowel sounds, no rebound/guarding; No hepatosplenomegaly  PSYCH: A+O to person, place, and time; affect appropriate  NEUROLOGY: CN 2-12 are intact and symmetric; no gross sensory deficits     LABS:                        11.3   41.85 )-----------( 253      ( 24 May 2025 06:34 )             34.2     05-24    142  |  103  |  13  ----------------------------<  86  3.4[L]   |  27  |  0.45[L]    Ca    8.5      24 May 2025 06:34  Phos  3.4     05-24  Mg     1.80     05-24    TPro  5.5[L]  /  Alb  3.5  /  TBili  <0.2  /  DBili  x   /  AST  16  /  ALT  7   /  AlkPhos  124[H]  05-24          Urinalysis Basic - ( 24 May 2025 06:34 )    Color: x / Appearance: x / SG: x / pH: x  Gluc: 86 mg/dL / Ketone: x  / Bili: x / Urobili: x   Blood: x / Protein: x / Nitrite: x   Leuk Esterase: x / RBC: x / WBC x   Sq Epi: x / Non Sq Epi: x / Bacteria: x          RADIOLOGY & ADDITIONAL TESTS:  Results Reviewed:   Imaging Personally Reviewed:  Electrocardiogram Personally Reviewed:    COORDINATION OF CARE:  Care Discussed with Consultants/Other Providers [Y/N]:  Prior or Outpatient Records Reviewed [Y/N]:

## 2025-05-25 LAB
ALBUMIN SERPL ELPH-MCNC: 3.5 G/DL — SIGNIFICANT CHANGE UP (ref 3.3–5)
ALP SERPL-CCNC: 153 U/L — HIGH (ref 40–120)
ALT FLD-CCNC: 7 U/L — SIGNIFICANT CHANGE UP (ref 4–33)
ANION GAP SERPL CALC-SCNC: 8 MMOL/L — SIGNIFICANT CHANGE UP (ref 7–14)
ANISOCYTOSIS BLD QL: SIGNIFICANT CHANGE UP
AST SERPL-CCNC: 18 U/L — SIGNIFICANT CHANGE UP (ref 4–32)
BASOPHILS # BLD AUTO: 0.36 K/UL — HIGH (ref 0–0.2)
BASOPHILS NFR BLD AUTO: 0.9 % — SIGNIFICANT CHANGE UP (ref 0–2)
BILIRUB SERPL-MCNC: <0.2 MG/DL — SIGNIFICANT CHANGE UP (ref 0.2–1.2)
BUN SERPL-MCNC: 12 MG/DL — SIGNIFICANT CHANGE UP (ref 7–23)
CALCIUM SERPL-MCNC: 8.4 MG/DL — SIGNIFICANT CHANGE UP (ref 8.4–10.5)
CHLORIDE SERPL-SCNC: 104 MMOL/L — SIGNIFICANT CHANGE UP (ref 98–107)
CO2 SERPL-SCNC: 27 MMOL/L — SIGNIFICANT CHANGE UP (ref 22–31)
CREAT SERPL-MCNC: 0.54 MG/DL — SIGNIFICANT CHANGE UP (ref 0.5–1.3)
EGFR: 101 ML/MIN/1.73M2 — SIGNIFICANT CHANGE UP
EGFR: 101 ML/MIN/1.73M2 — SIGNIFICANT CHANGE UP
EOSINOPHIL # BLD AUTO: 0 K/UL — SIGNIFICANT CHANGE UP (ref 0–0.5)
EOSINOPHIL NFR BLD AUTO: 0 % — SIGNIFICANT CHANGE UP (ref 0–6)
GLUCOSE SERPL-MCNC: 102 MG/DL — HIGH (ref 70–99)
HCT VFR BLD CALC: 32.1 % — LOW (ref 34.5–45)
HGB BLD-MCNC: 10.6 G/DL — LOW (ref 11.5–15.5)
IANC: 33.34 K/UL — HIGH (ref 1.8–7.4)
LYMPHOCYTES # BLD AUTO: 3.1 K/UL — SIGNIFICANT CHANGE UP (ref 1–3.3)
LYMPHOCYTES # BLD AUTO: 7.8 % — LOW (ref 13–44)
MACROCYTES BLD QL: SIGNIFICANT CHANGE UP
MAGNESIUM SERPL-MCNC: 1.7 MG/DL — SIGNIFICANT CHANGE UP (ref 1.6–2.6)
MANUAL SMEAR VERIFICATION: SIGNIFICANT CHANGE UP
MCHC RBC-ENTMCNC: 31 PG — SIGNIFICANT CHANGE UP (ref 27–34)
MCHC RBC-ENTMCNC: 33 G/DL — SIGNIFICANT CHANGE UP (ref 32–36)
MCV RBC AUTO: 93.9 FL — SIGNIFICANT CHANGE UP (ref 80–100)
METAMYELOCYTES # FLD: 1.7 % — HIGH (ref 0–1)
METAMYELOCYTES NFR BLD: 1.7 % — HIGH (ref 0–1)
MONOCYTES # BLD AUTO: 0.36 K/UL — SIGNIFICANT CHANGE UP (ref 0–0.9)
MONOCYTES NFR BLD AUTO: 0.9 % — LOW (ref 2–14)
NEUTROPHILS # BLD AUTO: 35.26 K/UL — HIGH (ref 1.8–7.4)
NEUTROPHILS NFR BLD AUTO: 87 % — HIGH (ref 43–77)
NEUTS BAND # BLD: 1.7 % — SIGNIFICANT CHANGE UP (ref 0–6)
NEUTS BAND NFR BLD: 1.7 % — SIGNIFICANT CHANGE UP (ref 0–6)
PHOSPHATE SERPL-MCNC: 3.5 MG/DL — SIGNIFICANT CHANGE UP (ref 2.5–4.5)
PLAT MORPH BLD: NORMAL — SIGNIFICANT CHANGE UP
PLATELET # BLD AUTO: 236 K/UL — SIGNIFICANT CHANGE UP (ref 150–400)
PLATELET COUNT - ESTIMATE: NORMAL — SIGNIFICANT CHANGE UP
POTASSIUM SERPL-MCNC: 3.3 MMOL/L — LOW (ref 3.5–5.3)
POTASSIUM SERPL-SCNC: 3.3 MMOL/L — LOW (ref 3.5–5.3)
PROT SERPL-MCNC: 5.5 G/DL — LOW (ref 6–8.3)
RBC # BLD: 3.42 M/UL — LOW (ref 3.8–5.2)
RBC # FLD: 16.4 % — HIGH (ref 10.3–14.5)
RBC BLD AUTO: ABNORMAL
SODIUM SERPL-SCNC: 139 MMOL/L — SIGNIFICANT CHANGE UP (ref 135–145)
WBC # BLD: 39.75 K/UL — HIGH (ref 3.8–10.5)
WBC # FLD AUTO: 39.75 K/UL — HIGH (ref 3.8–10.5)

## 2025-05-25 PROCEDURE — 99233 SBSQ HOSP IP/OBS HIGH 50: CPT

## 2025-05-25 RX ORDER — GABAPENTIN 400 MG/1
300 CAPSULE ORAL THREE TIMES A DAY
Refills: 0 | Status: DISCONTINUED | OUTPATIENT
Start: 2025-05-25 | End: 2025-05-28

## 2025-05-25 RX ORDER — MAGNESIUM SULFATE 500 MG/ML
1 SYRINGE (ML) INJECTION ONCE
Refills: 0 | Status: COMPLETED | OUTPATIENT
Start: 2025-05-25 | End: 2025-05-25

## 2025-05-25 RX ORDER — CIPROFLOXACIN HCL 250 MG
400 TABLET ORAL EVERY 12 HOURS
Refills: 0 | Status: COMPLETED | OUTPATIENT
Start: 2025-05-25 | End: 2025-05-28

## 2025-05-25 RX ORDER — METHYLNALTREXONE BROMIDE 12 MG/.6ML
12 INJECTION, SOLUTION SUBCUTANEOUS ONCE
Refills: 0 | Status: COMPLETED | OUTPATIENT
Start: 2025-05-25 | End: 2025-05-25

## 2025-05-25 RX ADMIN — Medication 1 APPLICATION(S): at 06:01

## 2025-05-25 RX ADMIN — Medication 30 MILLIGRAM(S): at 05:58

## 2025-05-25 RX ADMIN — MIDODRINE HYDROCHLORIDE 5 MILLIGRAM(S): 5 TABLET ORAL at 22:55

## 2025-05-25 RX ADMIN — HYDROCORTISONE 1 APPLICATION(S): 10 CREAM TOPICAL at 17:20

## 2025-05-25 RX ADMIN — Medication 200 MILLIGRAM(S): at 17:28

## 2025-05-25 RX ADMIN — OXYCODONE HYDROCHLORIDE 30 MILLIGRAM(S): 30 TABLET ORAL at 10:27

## 2025-05-25 RX ADMIN — CLONAZEPAM 0.5 MILLIGRAM(S): 0.5 TABLET ORAL at 07:08

## 2025-05-25 RX ADMIN — Medication 40 MILLIEQUIVALENT(S): at 09:06

## 2025-05-25 RX ADMIN — CLONAZEPAM 0.5 MILLIGRAM(S): 0.5 TABLET ORAL at 15:50

## 2025-05-25 RX ADMIN — MIDODRINE HYDROCHLORIDE 5 MILLIGRAM(S): 5 TABLET ORAL at 13:15

## 2025-05-25 RX ADMIN — APIXABAN 5 MILLIGRAM(S): 2.5 TABLET, FILM COATED ORAL at 05:59

## 2025-05-25 RX ADMIN — HYDROCORTISONE 1 APPLICATION(S): 10 CREAM TOPICAL at 06:01

## 2025-05-25 RX ADMIN — Medication 100 GRAM(S): at 09:05

## 2025-05-25 RX ADMIN — OXYCODONE HYDROCHLORIDE 30 MILLIGRAM(S): 30 TABLET ORAL at 09:27

## 2025-05-25 RX ADMIN — Medication 30 MILLIGRAM(S): at 06:28

## 2025-05-25 RX ADMIN — NICOTINE POLACRILEX 1 PATCH: 4 GUM, CHEWING ORAL at 18:26

## 2025-05-25 RX ADMIN — GABAPENTIN 300 MILLIGRAM(S): 400 CAPSULE ORAL at 13:15

## 2025-05-25 RX ADMIN — Medication 40 MILLIGRAM(S): at 17:21

## 2025-05-25 RX ADMIN — CLONAZEPAM 1 MILLIGRAM(S): 0.5 TABLET ORAL at 22:52

## 2025-05-25 RX ADMIN — BACLOFEN 5 MILLIGRAM(S): 10 INJECTION INTRATHECAL at 22:53

## 2025-05-25 RX ADMIN — COLCHICINE 0.6 MILLIGRAM(S): 0.6 TABLET, FILM COATED ORAL at 11:22

## 2025-05-25 RX ADMIN — MIDODRINE HYDROCHLORIDE 5 MILLIGRAM(S): 5 TABLET ORAL at 06:00

## 2025-05-25 RX ADMIN — Medication 40 MILLIGRAM(S): at 05:59

## 2025-05-25 RX ADMIN — NALOXEGOL OXALATE 25 MILLIGRAM(S): 12.5 TABLET, FILM COATED ORAL at 11:22

## 2025-05-25 RX ADMIN — GABAPENTIN 100 MILLIGRAM(S): 400 CAPSULE ORAL at 05:59

## 2025-05-25 RX ADMIN — BACLOFEN 5 MILLIGRAM(S): 10 INJECTION INTRATHECAL at 06:01

## 2025-05-25 RX ADMIN — Medication 112 MICROGRAM(S): at 05:59

## 2025-05-25 RX ADMIN — GABAPENTIN 300 MILLIGRAM(S): 400 CAPSULE ORAL at 22:52

## 2025-05-25 RX ADMIN — OXYCODONE HYDROCHLORIDE 20 MILLIGRAM(S): 30 TABLET ORAL at 02:46

## 2025-05-25 RX ADMIN — BACLOFEN 5 MILLIGRAM(S): 10 INJECTION INTRATHECAL at 13:20

## 2025-05-25 RX ADMIN — METHYLNALTREXONE BROMIDE 12 MILLIGRAM(S): 12 INJECTION, SOLUTION SUBCUTANEOUS at 15:54

## 2025-05-25 RX ADMIN — APIXABAN 5 MILLIGRAM(S): 2.5 TABLET, FILM COATED ORAL at 17:21

## 2025-05-25 RX ADMIN — OXYCODONE HYDROCHLORIDE 20 MILLIGRAM(S): 30 TABLET ORAL at 03:30

## 2025-05-25 RX ADMIN — Medication 30 MILLIGRAM(S): at 14:15

## 2025-05-25 RX ADMIN — NICOTINE POLACRILEX 1 PATCH: 4 GUM, CHEWING ORAL at 11:20

## 2025-05-25 RX ADMIN — NICOTINE POLACRILEX 1 PATCH: 4 GUM, CHEWING ORAL at 07:28

## 2025-05-25 RX ADMIN — Medication 30 MILLIGRAM(S): at 22:52

## 2025-05-25 RX ADMIN — Medication 30 MILLIGRAM(S): at 13:15

## 2025-05-25 RX ADMIN — OLANZAPINE 5 MILLIGRAM(S): 10 TABLET ORAL at 22:53

## 2025-05-25 RX ADMIN — NICOTINE POLACRILEX 1 PATCH: 4 GUM, CHEWING ORAL at 11:22

## 2025-05-25 NOTE — PROGRESS NOTE ADULT - SUBJECTIVE AND OBJECTIVE BOX
Patient is a 64y old  Female who presents with a chief complaint of Elective admission- C4 Carbo/Etoposide (24 May 2025 13:36)      SUBJECTIVE / OVERNIGHT EVENTS: Pt continues to feel drained of energy and have diffuse pain. Denies respiratory distress, sob, or chest pain.    MEDICATIONS  (STANDING):  apixaban 5 milliGRAM(s) Oral every 12 hours  baclofen 5 milliGRAM(s) Oral every 8 hours  bisacodyl 5 milliGRAM(s) Oral at bedtime  chlorhexidine 2% Cloths 1 Application(s) Topical <User Schedule>  ciprofloxacin   IVPB 400 milliGRAM(s) IV Intermittent every 12 hours  clonazePAM  Tablet 0.5 milliGRAM(s) Oral <User Schedule>  clonazePAM  Tablet 1 milliGRAM(s) Oral at bedtime  colchicine 0.6 milliGRAM(s) Oral daily  fluticasone propionate/ salmeterol 100-50 MICROgram(s) Diskus 1 Dose(s) Inhalation two times a day  gabapentin 300 milliGRAM(s) Oral three times a day  hydrocortisone 1% Cream 1 Application(s) Topical two times a day  levothyroxine 112 MICROGram(s) Oral daily  methylnaltrexone Injectable 12 milliGRAM(s) SubCutaneous once  midodrine. 5 milliGRAM(s) Oral every 8 hours  morphine ER Tablet 30 milliGRAM(s) Oral every 8 hours  naloxegol 25 milliGRAM(s) Oral daily  nicotine - 21 mG/24Hr(s) Patch 1 Patch Transdermal daily  OLANZapine 5 milliGRAM(s) Oral at bedtime  pantoprazole    Tablet 40 milliGRAM(s) Oral every 12 hours  polyethylene glycol 3350 17 Gram(s) Oral two times a day  senna 2 Tablet(s) Oral at bedtime  sodium chloride 0.9%. 1000 milliLiter(s) (10 mL/Hr) IV Continuous <Continuous>    MEDICATIONS  (PRN):  albuterol    90 MICROgram(s) HFA Inhaler 2 Puff(s) Inhalation every 6 hours PRN Shortness of Breath and/or Wheezing  aluminum hydroxide/magnesium hydroxide/simethicone Suspension 30 milliLiter(s) Oral every 4 hours PRN Dyspepsia  cyclobenzaprine 10 milliGRAM(s) Oral three times a day PRN Muscle Spasm  diphenhydrAMINE 25 milliGRAM(s) Oral every 6 hours PRN Rash and/or Itching  hydrocortisone 1% Ointment 1 Application(s) Topical every 12 hours PRN Rash and/or Itching  ondansetron Injectable 4 milliGRAM(s) IV Push every 8 hours PRN Nausea and/or Vomiting  oxyCODONE    IR 20 milliGRAM(s) Oral every 4 hours PRN Moderate Pain (4 - 6)  oxyCODONE    IR 30 milliGRAM(s) Oral every 4 hours PRN Severe Pain (7 - 10)      CAPILLARY BLOOD GLUCOSE        I&O's Summary    24 May 2025 07:01  -  25 May 2025 07:00  --------------------------------------------------------  IN: 637 mL / OUT: 980 mL / NET: -343 mL        PHYSICAL EXAM:  Vital Signs Last 24 Hrs  T(C): 36.8 (25 May 2025 12:11), Max: 36.8 (25 May 2025 12:11)  T(F): 98.2 (25 May 2025 12:11), Max: 98.2 (25 May 2025 12:11)  HR: 89 (25 May 2025 12:11) (82 - 89)  BP: 112/63 (25 May 2025 12:11) (102/65 - 120/70)  BP(mean): --  RR: 18 (25 May 2025 12:11) (16 - 18)  SpO2: 96% (25 May 2025 12:11) (92% - 96%)    Parameters below as of 25 May 2025 12:11  Patient On (Oxygen Delivery Method): nasal cannula      CONSTITUTIONAL: NAD, sitting up in bed  EYES: PERRL; conjunctiva and sclera clear  ENMT: Moist oral mucosa, no pharyngeal injection or exudates  RESPIRATORY: Normal respiratory effort; lungs are clear to auscultation bilaterally  CARDIOVASCULAR: Regular rate and rhythm, normal S1 and S2, no murmur/rub/gallop; No lower extremity edema; Peripheral pulses are 2+ bilaterally  ABDOMEN: Nontender to palpation, normoactive bowel sounds, no rebound/guarding; No hepatosplenomegaly  PSYCH: A+O to person, place, and time; affect appropriate  NEUROLOGY: CN 2-12 are intact and symmetric; no gross sensory deficits     LABS:                        10.6   39.75 )-----------( 236      ( 25 May 2025 07:06 )             32.1     05-25    139  |  104  |  12  ----------------------------<  102[H]  3.3[L]   |  27  |  0.54    Ca    8.4      25 May 2025 07:06  Phos  3.5     05-25  Mg     1.70     05-25    TPro  5.5[L]  /  Alb  3.5  /  TBili  <0.2  /  DBili  x   /  AST  18  /  ALT  7   /  AlkPhos  153[H]  05-25          Urinalysis Basic - ( 25 May 2025 07:06 )    Color: x / Appearance: x / SG: x / pH: x  Gluc: 102 mg/dL / Ketone: x  / Bili: x / Urobili: x   Blood: x / Protein: x / Nitrite: x   Leuk Esterase: x / RBC: x / WBC x   Sq Epi: x / Non Sq Epi: x / Bacteria: x        Culture - Urine (collected 23 May 2025 18:59)  Source: Clean Catch Clean Catch (Midstream)  Preliminary Report (25 May 2025 00:14):    50,000 - 99,000 CFU/mL Escherichia coli    Culture - Blood (collected 23 May 2025 12:30)  Source: Blood Blood-Peripheral  Preliminary Report (24 May 2025 17:02):    No growth at 24 hours        RADIOLOGY & ADDITIONAL TESTS:  Results Reviewed:   Imaging Personally Reviewed:  Electrocardiogram Personally Reviewed:    COORDINATION OF CARE:  Care Discussed with Consultants/Other Providers [Y/N]:  Prior or Outpatient Records Reviewed [Y/N]:

## 2025-05-25 NOTE — PROGRESS NOTE ADULT - ASSESSMENT
65 yo woman, former smoker (47py; quit 12/2024) and well known to the Onc[facility] service, with h/o pAfib (on Eliquis), Hypothyroidism, ? h/o Sleroderma, h/o pericardial effusion s/p pericardiocentesis (dx in 12/2024; ? 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 while hospitalized at [facility]- she tolerated tx without significant adverse effect. She presented as an outpt for C2 on 4/14- however, pt received Cosela and developed "severe headache" for which she required ED eval, but was ultimately sent home (she did not receive Etop or carbo on d1); on day 2 she received Etoposide for 5 minutes and started to have chest tightness and shortness of breath which was treated as a hypersensitivity reaction (she was not re-challenged due to patient preference at the time; she did not receive carbo or cosela on D2). Pt developed shortness of breath and palmar erythema with C3 (VSS); her infusion rate was slowed and she subsequently tolerated chemo without significant adverse effect.  Pt is now electively admitted for C4 Carbo/Etop with Fulphila support.    ACTIVE PROBLEMS  SCLCa (presumed limited stage)  Admission for chemotherapy  Hypotension  Generalized weakness  Anxiety/depression  pAfib  Hypercoag state  R clavicle/shoulder pain  Severe prot yahir malnutrition  Immunocompromised due to chemotherapy    SCLCa (presumed limited stage)  Completed C4 Carbo/Etop 5/19-21 (20% dose reduced)- pt currently tolerating without significant adverse effect aside from fatigue/sleepiness  S/p Fluphila x1 on 5/22  Pt to continue outpt fu with Dr. Hall after discharge  Long-term prognosis: guarded; pt is full code    Leukocytosis  2/2 growth factor (Fulphila)  Clinical suspicion for acute infection is low, but infectious javier in progress  Monitoring off abx unless pt becomes febrile    Hypotension  Generalized weakness  Likely due to chemo  Hypotension is fluid responsive  5/23 orthostatics negative  Infectious javier negative to date (RVP negative; Ucx, Bcx in progress)  Continuing Midodrine 5mg TID  Continuing IVF resuscitation prn    Anxiety/depression  Continuing Klonopin 0.5mg BID and 1mg nightly  Continuing Zyprexa 5mg nightly    Chronic hypoxic resp failure  h/o Asthma/COPD (former smoker)  Continuing supplemental O2 via NC and supportive resp care prn  Continuing Advair 100-50mcg MDI BID  Continuing duonebs q6/prn   Continuing Nicotine patch 21mg daily (6 weeks)  Mgmt of pericardial effusion as above    SIADH  Na normalized  Pt is asymptomatic  SIADH confirmed by 3/12 urine lytes but Na has been normal off salt tabs and fluid restriction since pt has started cancer treatment  Will continue to monitor    Cancer related pain  Continuing Morphine ER 30mg q12  Continuing Oxy IR 20/30mg q8/prn  Titrating Gabapentin to 300mg TID  Continuing Baclofen 5mg q8/prn    Constipation  Possibly due to opioids +/- AID  Pt denied abd pain, diarrhea, but reported mild nausea  Continuing Movantik 25mg daily  Continuing bowel regimen (Miralax, Senna) for OIC prevention  Of note, pt has experienced constipation relief after Relistor in the past; will re-dose x 1  Will check AXR if pt becomes more symptomatic    Hypothyroidism: continuing LT4 112mcg daily    h/o pericardial effusion: most likely inflammatory; continuing Colchicine 0.6mg daily    pAfib  Hypercoag state  Continuing Eliquis 5mg BID    R clavicle/shoulder pain  Likely musculoskeletal in orgin  5/19 CXR without evidence of osseous abnormalities  Continuing Flexiril prn and pan regimen as above  Monitoring for improvement    Severe prot yahir malnutrition: appreciate RD eval- continuing regular diet

## 2025-05-26 LAB
-  AMOXICILLIN/CLAVULANIC ACID: SIGNIFICANT CHANGE UP
-  AMOXICILLIN/CLAVULANIC ACID: SIGNIFICANT CHANGE UP
-  AMPICILLIN/SULBACTAM: SIGNIFICANT CHANGE UP
-  AMPICILLIN/SULBACTAM: SIGNIFICANT CHANGE UP
-  AMPICILLIN: SIGNIFICANT CHANGE UP
-  AMPICILLIN: SIGNIFICANT CHANGE UP
-  AZTREONAM: SIGNIFICANT CHANGE UP
-  AZTREONAM: SIGNIFICANT CHANGE UP
-  CEFAZOLIN: SIGNIFICANT CHANGE UP
-  CEFAZOLIN: SIGNIFICANT CHANGE UP
-  CEFEPIME: SIGNIFICANT CHANGE UP
-  CEFEPIME: SIGNIFICANT CHANGE UP
-  CEFOXITIN: SIGNIFICANT CHANGE UP
-  CEFOXITIN: SIGNIFICANT CHANGE UP
-  CEFTRIAXONE: SIGNIFICANT CHANGE UP
-  CEFTRIAXONE: SIGNIFICANT CHANGE UP
-  CEFUROXIME: SIGNIFICANT CHANGE UP
-  CEFUROXIME: SIGNIFICANT CHANGE UP
-  CIPROFLOXACIN: SIGNIFICANT CHANGE UP
-  CIPROFLOXACIN: SIGNIFICANT CHANGE UP
-  ERTAPENEM: SIGNIFICANT CHANGE UP
-  ERTAPENEM: SIGNIFICANT CHANGE UP
-  GENTAMICIN: SIGNIFICANT CHANGE UP
-  GENTAMICIN: SIGNIFICANT CHANGE UP
-  IMIPENEM: SIGNIFICANT CHANGE UP
-  IMIPENEM: SIGNIFICANT CHANGE UP
-  LEVOFLOXACIN: SIGNIFICANT CHANGE UP
-  LEVOFLOXACIN: SIGNIFICANT CHANGE UP
-  MEROPENEM: SIGNIFICANT CHANGE UP
-  MEROPENEM: SIGNIFICANT CHANGE UP
-  NITROFURANTOIN: SIGNIFICANT CHANGE UP
-  NITROFURANTOIN: SIGNIFICANT CHANGE UP
-  PIPERACILLIN/TAZOBACTAM: SIGNIFICANT CHANGE UP
-  PIPERACILLIN/TAZOBACTAM: SIGNIFICANT CHANGE UP
-  TIGECYCLINE: SIGNIFICANT CHANGE UP
-  TIGECYCLINE: SIGNIFICANT CHANGE UP
-  TOBRAMYCIN: SIGNIFICANT CHANGE UP
-  TOBRAMYCIN: SIGNIFICANT CHANGE UP
-  TRIMETHOPRIM/SULFAMETHOXAZOLE: SIGNIFICANT CHANGE UP
-  TRIMETHOPRIM/SULFAMETHOXAZOLE: SIGNIFICANT CHANGE UP
ALBUMIN SERPL ELPH-MCNC: 3.5 G/DL — SIGNIFICANT CHANGE UP (ref 3.3–5)
ALP SERPL-CCNC: 167 U/L — HIGH (ref 40–120)
ALT FLD-CCNC: 8 U/L — SIGNIFICANT CHANGE UP (ref 4–33)
ANION GAP SERPL CALC-SCNC: 11 MMOL/L — SIGNIFICANT CHANGE UP (ref 7–14)
AST SERPL-CCNC: 17 U/L — SIGNIFICANT CHANGE UP (ref 4–32)
BASOPHILS # BLD AUTO: 0.12 K/UL — SIGNIFICANT CHANGE UP (ref 0–0.2)
BASOPHILS NFR BLD AUTO: 0.6 % — SIGNIFICANT CHANGE UP (ref 0–2)
BILIRUB SERPL-MCNC: 0.2 MG/DL — SIGNIFICANT CHANGE UP (ref 0.2–1.2)
BUN SERPL-MCNC: 10 MG/DL — SIGNIFICANT CHANGE UP (ref 7–23)
CALCIUM SERPL-MCNC: 8.7 MG/DL — SIGNIFICANT CHANGE UP (ref 8.4–10.5)
CHLORIDE SERPL-SCNC: 104 MMOL/L — SIGNIFICANT CHANGE UP (ref 98–107)
CO2 SERPL-SCNC: 25 MMOL/L — SIGNIFICANT CHANGE UP (ref 22–31)
CREAT SERPL-MCNC: 0.44 MG/DL — LOW (ref 0.5–1.3)
CULTURE RESULTS: ABNORMAL
EGFR: 107 ML/MIN/1.73M2 — SIGNIFICANT CHANGE UP
EGFR: 107 ML/MIN/1.73M2 — SIGNIFICANT CHANGE UP
EOSINOPHIL # BLD AUTO: 0.03 K/UL — SIGNIFICANT CHANGE UP (ref 0–0.5)
EOSINOPHIL NFR BLD AUTO: 0.1 % — SIGNIFICANT CHANGE UP (ref 0–6)
GLUCOSE SERPL-MCNC: 107 MG/DL — HIGH (ref 70–99)
HCT VFR BLD CALC: 33.6 % — LOW (ref 34.5–45)
HGB BLD-MCNC: 10.8 G/DL — LOW (ref 11.5–15.5)
IANC: 18.41 K/UL — HIGH (ref 1.8–7.4)
IMM GRANULOCYTES NFR BLD AUTO: 3.2 % — HIGH (ref 0–0.9)
LYMPHOCYTES # BLD AUTO: 2.01 K/UL — SIGNIFICANT CHANGE UP (ref 1–3.3)
LYMPHOCYTES # BLD AUTO: 9.3 % — LOW (ref 13–44)
MAGNESIUM SERPL-MCNC: 1.9 MG/DL — SIGNIFICANT CHANGE UP (ref 1.6–2.6)
MCHC RBC-ENTMCNC: 30.9 PG — SIGNIFICANT CHANGE UP (ref 27–34)
MCHC RBC-ENTMCNC: 32.1 G/DL — SIGNIFICANT CHANGE UP (ref 32–36)
MCV RBC AUTO: 96.3 FL — SIGNIFICANT CHANGE UP (ref 80–100)
METHOD TYPE: SIGNIFICANT CHANGE UP
METHOD TYPE: SIGNIFICANT CHANGE UP
MONOCYTES # BLD AUTO: 0.31 K/UL — SIGNIFICANT CHANGE UP (ref 0–0.9)
MONOCYTES NFR BLD AUTO: 1.4 % — LOW (ref 2–14)
NEUTROPHILS # BLD AUTO: 18.41 K/UL — HIGH (ref 1.8–7.4)
NEUTROPHILS NFR BLD AUTO: 85.4 % — HIGH (ref 43–77)
NRBC # BLD AUTO: 0 K/UL — SIGNIFICANT CHANGE UP (ref 0–0)
NRBC # FLD: 0 K/UL — SIGNIFICANT CHANGE UP (ref 0–0)
NRBC BLD AUTO-RTO: 0 /100 WBCS — SIGNIFICANT CHANGE UP (ref 0–0)
ORGANISM # SPEC MICROSCOPIC CNT: ABNORMAL
PHOSPHATE SERPL-MCNC: 3 MG/DL — SIGNIFICANT CHANGE UP (ref 2.5–4.5)
PLATELET # BLD AUTO: 229 K/UL — SIGNIFICANT CHANGE UP (ref 150–400)
POTASSIUM SERPL-MCNC: 4.2 MMOL/L — SIGNIFICANT CHANGE UP (ref 3.5–5.3)
POTASSIUM SERPL-SCNC: 4.2 MMOL/L — SIGNIFICANT CHANGE UP (ref 3.5–5.3)
PROT SERPL-MCNC: 5.8 G/DL — LOW (ref 6–8.3)
RBC # BLD: 3.49 M/UL — LOW (ref 3.8–5.2)
RBC # FLD: 16.3 % — HIGH (ref 10.3–14.5)
SODIUM SERPL-SCNC: 140 MMOL/L — SIGNIFICANT CHANGE UP (ref 135–145)
SPECIMEN SOURCE: SIGNIFICANT CHANGE UP
WBC # BLD: 21.58 K/UL — HIGH (ref 3.8–10.5)
WBC # FLD AUTO: 21.58 K/UL — HIGH (ref 3.8–10.5)

## 2025-05-26 PROCEDURE — 99233 SBSQ HOSP IP/OBS HIGH 50: CPT

## 2025-05-26 RX ORDER — MIDODRINE HYDROCHLORIDE 5 MG/1
10 TABLET ORAL EVERY 8 HOURS
Refills: 0 | Status: DISCONTINUED | OUTPATIENT
Start: 2025-05-26 | End: 2025-05-28

## 2025-05-26 RX ADMIN — Medication 30 MILLIGRAM(S): at 23:06

## 2025-05-26 RX ADMIN — Medication 200 MILLIGRAM(S): at 18:10

## 2025-05-26 RX ADMIN — OXYCODONE HYDROCHLORIDE 30 MILLIGRAM(S): 30 TABLET ORAL at 12:33

## 2025-05-26 RX ADMIN — NICOTINE POLACRILEX 1 PATCH: 4 GUM, CHEWING ORAL at 20:07

## 2025-05-26 RX ADMIN — GABAPENTIN 300 MILLIGRAM(S): 400 CAPSULE ORAL at 06:38

## 2025-05-26 RX ADMIN — BACLOFEN 5 MILLIGRAM(S): 10 INJECTION INTRATHECAL at 14:02

## 2025-05-26 RX ADMIN — OXYCODONE HYDROCHLORIDE 30 MILLIGRAM(S): 30 TABLET ORAL at 13:33

## 2025-05-26 RX ADMIN — Medication 40 MILLIGRAM(S): at 18:13

## 2025-05-26 RX ADMIN — OXYCODONE HYDROCHLORIDE 30 MILLIGRAM(S): 30 TABLET ORAL at 05:00

## 2025-05-26 RX ADMIN — CLONAZEPAM 1 MILLIGRAM(S): 0.5 TABLET ORAL at 22:05

## 2025-05-26 RX ADMIN — Medication 112 MICROGRAM(S): at 04:59

## 2025-05-26 RX ADMIN — GABAPENTIN 300 MILLIGRAM(S): 400 CAPSULE ORAL at 14:01

## 2025-05-26 RX ADMIN — BACLOFEN 5 MILLIGRAM(S): 10 INJECTION INTRATHECAL at 22:07

## 2025-05-26 RX ADMIN — NALOXEGOL OXALATE 25 MILLIGRAM(S): 12.5 TABLET, FILM COATED ORAL at 12:33

## 2025-05-26 RX ADMIN — MIDODRINE HYDROCHLORIDE 10 MILLIGRAM(S): 5 TABLET ORAL at 14:01

## 2025-05-26 RX ADMIN — Medication 200 MILLIGRAM(S): at 05:03

## 2025-05-26 RX ADMIN — MIDODRINE HYDROCHLORIDE 5 MILLIGRAM(S): 5 TABLET ORAL at 06:37

## 2025-05-26 RX ADMIN — APIXABAN 5 MILLIGRAM(S): 2.5 TABLET, FILM COATED ORAL at 06:37

## 2025-05-26 RX ADMIN — Medication 40 MILLIGRAM(S): at 06:37

## 2025-05-26 RX ADMIN — Medication 5 MILLIGRAM(S): at 22:06

## 2025-05-26 RX ADMIN — Medication 30 MILLIGRAM(S): at 14:01

## 2025-05-26 RX ADMIN — Medication 1 APPLICATION(S): at 06:40

## 2025-05-26 RX ADMIN — GABAPENTIN 300 MILLIGRAM(S): 400 CAPSULE ORAL at 22:06

## 2025-05-26 RX ADMIN — OXYCODONE HYDROCHLORIDE 30 MILLIGRAM(S): 30 TABLET ORAL at 20:59

## 2025-05-26 RX ADMIN — NICOTINE POLACRILEX 1 PATCH: 4 GUM, CHEWING ORAL at 12:34

## 2025-05-26 RX ADMIN — Medication 30 MILLIGRAM(S): at 06:37

## 2025-05-26 RX ADMIN — CLONAZEPAM 0.5 MILLIGRAM(S): 0.5 TABLET ORAL at 06:38

## 2025-05-26 RX ADMIN — OLANZAPINE 5 MILLIGRAM(S): 10 TABLET ORAL at 22:06

## 2025-05-26 RX ADMIN — OXYCODONE HYDROCHLORIDE 30 MILLIGRAM(S): 30 TABLET ORAL at 19:59

## 2025-05-26 RX ADMIN — MIDODRINE HYDROCHLORIDE 10 MILLIGRAM(S): 5 TABLET ORAL at 22:05

## 2025-05-26 RX ADMIN — COLCHICINE 0.6 MILLIGRAM(S): 0.6 TABLET, FILM COATED ORAL at 12:34

## 2025-05-26 RX ADMIN — BACLOFEN 5 MILLIGRAM(S): 10 INJECTION INTRATHECAL at 06:37

## 2025-05-26 RX ADMIN — NICOTINE POLACRILEX 1 PATCH: 4 GUM, CHEWING ORAL at 07:00

## 2025-05-26 RX ADMIN — Medication 30 MILLIGRAM(S): at 22:06

## 2025-05-26 RX ADMIN — NICOTINE POLACRILEX 1 PATCH: 4 GUM, CHEWING ORAL at 12:33

## 2025-05-26 RX ADMIN — APIXABAN 5 MILLIGRAM(S): 2.5 TABLET, FILM COATED ORAL at 18:13

## 2025-05-26 RX ADMIN — HYDROCORTISONE 1 APPLICATION(S): 10 CREAM TOPICAL at 18:11

## 2025-05-26 RX ADMIN — CLONAZEPAM 0.5 MILLIGRAM(S): 0.5 TABLET ORAL at 14:46

## 2025-05-26 NOTE — PROVIDER CONTACT NOTE (OTHER) - ACTION/TREATMENT ORDERED:
Per provider, give 10mg midodrine as ordered and repeat BP. Repeat BP at 1430 is 105/54.
acp made aware
Midodrine 5mg oral ordered.
250 ml sodium chloride ordered infuse over 60 minutes.

## 2025-05-26 NOTE — PROVIDER CONTACT NOTE (OTHER) - REASON
Orthostatic BP ( REFUSEL)
Hypotension
Pt's blood pressure is 97/54 and reports dizziness
Pt's blood pressure is 99/53

## 2025-05-26 NOTE — PROVIDER CONTACT NOTE (OTHER) - RECOMMENDATIONS
acp made aware
Give 10mg midodrine as ordered. Repeat BP. Provider notified.
Notify ACP, midodrine and recheck later
Notify ACP, fluids

## 2025-05-26 NOTE — PROVIDER CONTACT NOTE (OTHER) - BACKGROUND
Primary malignant neoplasm
Pt admitted with primary malignant neoplasm
Patient direct admit for chemotherapy, patient c/o weakness.
Pt admitted with primary malignant neoplasm

## 2025-05-26 NOTE — PROVIDER CONTACT NOTE (OTHER) - SITUATION
Pt's blood pressure is 97/54 and reports dizziness
Orthostatic BP ( REFUSAL) standing BP
Pt's blood pressure is 99/53
Patient's BP is 84/47 and repeat 90/48. Patient already on midodrine for hypotension.

## 2025-05-26 NOTE — PROGRESS NOTE ADULT - ASSESSMENT
65 yo woman, former smoker (47py; quit 12/2024) and well known to the Onc[facility]os service, with h/o pAfib (on Eliquis), Hypothyroidism, ? h/o Sleroderma, h/o pericardial effusion s/p pericardiocentesis (dx in 12/2024; ? 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 while hospitalized at [facility]- she tolerated tx without significant adverse effect. She presented as an outpt for C2 on 4/14- however, pt received Cosela and developed "severe headache" for which she required ED eval, but was ultimately sent home (she did not receive Etop or carbo on d1); on day 2 she received Etoposide for 5 minutes and started to have chest tightness and shortness of breath which was treated as a hypersensitivity reaction (she was not re-challenged due to patient preference at the time; she did not receive carbo or cosela on D2). Pt developed shortness of breath and palmar erythema with C3 (VSS); her infusion rate was slowed and she subsequently tolerated chemo without significant adverse effect.  Pt is now electively admitted for C4 Carbo/Etop with Fulphila support.    ACTIVE PROBLEMS  SCLCa (presumed limited stage)  Admission for chemotherapy  Hypotension  Generalized weakness  Anxiety/depression  pAfib  Hypercoag state  R clavicle/shoulder pain  Severe prot yahir malnutrition  Immunocompromised due to chemotherapy    SCLCa (presumed limited stage)  Completed C4 Carbo/Etop 5/19-21 (20% dose reduced)- pt currently tolerating without significant adverse effect aside from fatigue/sleepiness  S/p Fluphila x1 on 5/22  Pt to continue outpt fu with Dr. Hall after discharge  Long-term prognosis: guarded; pt is full code    Leukocytosis  2/2 growth factor (Fulphila)  Clinical suspicion for acute infection is low, but infectious javier in progress  Monitoring off abx unless pt becomes febrile    Hypotension  Generalized weakness  Likely due to chemo  Hypotension has been fluid responsive though now somewhat more pronounced  5/23 orthostatics negative  Infectious javier negative to date (RVP negative; Ucx, Bcx in progress)  Titrate Midodrine to 10mg TID and monitor response  Continuing IVF resuscitation prn    Anxiety/depression  Continuing Klonopin 0.5mg BID and 1mg nightly  Continuing Zyprexa 5mg nightly    Chronic hypoxic resp failure  h/o Asthma/COPD (former smoker)  Continuing supplemental O2 via NC and supportive resp care prn  Continuing Advair 100-50mcg MDI BID  Continuing duonebs q6/prn   Continuing Nicotine patch 21mg daily (6 weeks)  Mgmt of pericardial effusion as above    SIADH  Na normalized  Pt is asymptomatic  SIADH confirmed by 3/12 urine lytes but Na has been normal off salt tabs and fluid restriction since pt has started cancer treatment  Will continue to monitor    Cancer related pain  Continuing Morphine ER 30mg q12  Continuing Oxy IR 20/30mg q8/prn  Titrated Gabapentin to 300mg TID  Continuing Baclofen 5mg q8/prn    Constipation  Possibly due to opioids +/- AID  Pt denied abd pain, diarrhea, but reported mild nausea  Continuing Movantik 25mg daily  Continuing bowel regimen (Miralax, Senna) for OIC prevention  Of note, pt has experienced constipation relief after Relistor in the past; will re-dose x 1  Will check AXR if pt becomes more symptomatic    Hypothyroidism: continuing LT4 112mcg daily    h/o pericardial effusion: most likely inflammatory; continuing Colchicine 0.6mg daily    pAfib  Hypercoag state  Continuing Eliquis 5mg BID    R clavicle/shoulder pain  Likely musculoskeletal in orgin  5/19 CXR without evidence of osseous abnormalities  Continuing Flexiril prn and pan regimen as above  Monitoring for improvement    Severe prot yahir malnutrition: appreciate RD eval- continuing regular diet

## 2025-05-26 NOTE — PROVIDER CONTACT NOTE (OTHER) - ASSESSMENT
Pt's blood pressure is 97/54, other vitals stable,  reports dizziness. Pt is in no acute distress, pt is stable.
Pt's blood pressure is 99/53, no reports of dizziness at this time. Manual blood pressure was 92/60. Other vitals stable, no acute distress noted, pt stable.
Provider increased midodrine to 10mg PO, dose due at this time.
sitting  position: /62, P 62. T 97.5, R 18, O2 99%  supine position: 99/58, P 72, T 97.5, R; 19, 02 98%

## 2025-05-26 NOTE — PROGRESS NOTE ADULT - SUBJECTIVE AND OBJECTIVE BOX
Patient is a 64y old  Female who presents with a chief complaint of Elective admission- C4 Carbo/Etoposide (25 May 2025 13:53)      SUBJECTIVE / OVERNIGHT EVENTS: Pt with continued anxiety and diffuse pain complaints since yesterday, although somewhat more comfortable this morning.    MEDICATIONS  (STANDING):  apixaban 5 milliGRAM(s) Oral every 12 hours  baclofen 5 milliGRAM(s) Oral every 8 hours  bisacodyl 5 milliGRAM(s) Oral at bedtime  chlorhexidine 2% Cloths 1 Application(s) Topical <User Schedule>  ciprofloxacin   IVPB 400 milliGRAM(s) IV Intermittent every 12 hours  clonazePAM  Tablet 0.5 milliGRAM(s) Oral <User Schedule>  clonazePAM  Tablet 1 milliGRAM(s) Oral at bedtime  colchicine 0.6 milliGRAM(s) Oral daily  fluticasone propionate/ salmeterol 100-50 MICROgram(s) Diskus 1 Dose(s) Inhalation two times a day  gabapentin 300 milliGRAM(s) Oral three times a day  hydrocortisone 1% Cream 1 Application(s) Topical two times a day  levothyroxine 112 MICROGram(s) Oral daily  midodrine. 10 milliGRAM(s) Oral every 8 hours  morphine ER Tablet 30 milliGRAM(s) Oral every 8 hours  naloxegol 25 milliGRAM(s) Oral daily  nicotine - 21 mG/24Hr(s) Patch 1 Patch Transdermal daily  OLANZapine 5 milliGRAM(s) Oral at bedtime  pantoprazole    Tablet 40 milliGRAM(s) Oral every 12 hours  polyethylene glycol 3350 17 Gram(s) Oral two times a day  senna 2 Tablet(s) Oral at bedtime    MEDICATIONS  (PRN):  albuterol    90 MICROgram(s) HFA Inhaler 2 Puff(s) Inhalation every 6 hours PRN Shortness of Breath and/or Wheezing  aluminum hydroxide/magnesium hydroxide/simethicone Suspension 30 milliLiter(s) Oral every 4 hours PRN Dyspepsia  cyclobenzaprine 10 milliGRAM(s) Oral three times a day PRN Muscle Spasm  diphenhydrAMINE 25 milliGRAM(s) Oral every 6 hours PRN Rash and/or Itching  hydrocortisone 1% Ointment 1 Application(s) Topical every 12 hours PRN Rash and/or Itching  ondansetron Injectable 4 milliGRAM(s) IV Push every 8 hours PRN Nausea and/or Vomiting  oxyCODONE    IR 20 milliGRAM(s) Oral every 4 hours PRN Moderate Pain (4 - 6)  oxyCODONE    IR 30 milliGRAM(s) Oral every 4 hours PRN Severe Pain (7 - 10)      CAPILLARY BLOOD GLUCOSE        I&O's Summary    25 May 2025 07:01  -  26 May 2025 07:00  --------------------------------------------------------  IN: 200 mL / OUT: 0 mL / NET: 200 mL        PHYSICAL EXAM:  Vital Signs Last 24 Hrs  T(C): 36.4 (26 May 2025 11:36), Max: 36.8 (26 May 2025 05:42)  T(F): 97.5 (26 May 2025 11:36), Max: 98.3 (26 May 2025 05:42)  HR: 69 (26 May 2025 11:36) (69 - 77)  BP: 102/50 (26 May 2025 11:36) (102/50 - 108/62)  BP(mean): --  RR: 18 (26 May 2025 11:36) (18 - 18)  SpO2: 95% (26 May 2025 11:36) (93% - 95%)    Parameters below as of 26 May 2025 11:36  Patient On (Oxygen Delivery Method): room air      CONSTITUTIONAL: NAD, resting in bed  EYES: PERRL; conjunctiva and sclera clear  ENMT: Moist oral mucosa, no pharyngeal injection or exudates  RESPIRATORY: Normal respiratory effort; lungs are clear to auscultation bilaterally  CARDIOVASCULAR: Regular rate and rhythm, normal S1 and S2, no murmur/rub/gallop; No lower extremity edema; Peripheral pulses are 2+ bilaterally  ABDOMEN: Nontender to palpation, normoactive bowel sounds, no rebound/guarding; No hepatosplenomegaly  PSYCH: A+O to person, place, and time; affect appropriate  NEUROLOGY: CN 2-12 are intact and symmetric; no gross sensory deficits   SKIN: No rashes; no palpable lesions    LABS:                        10.8   21.58 )-----------( 229      ( 26 May 2025 06:18 )             33.6     05-26    140  |  104  |  10  ----------------------------<  107[H]  4.2   |  25  |  0.44[L]    Ca    8.7      26 May 2025 06:18  Phos  3.0     05-26  Mg     1.90     05-26    TPro  5.8[L]  /  Alb  3.5  /  TBili  0.2  /  DBili  x   /  AST  17  /  ALT  8   /  AlkPhos  167[H]  05-26          Urinalysis Basic - ( 26 May 2025 06:18 )    Color: x / Appearance: x / SG: x / pH: x  Gluc: 107 mg/dL / Ketone: x  / Bili: x / Urobili: x   Blood: x / Protein: x / Nitrite: x   Leuk Esterase: x / RBC: x / WBC x   Sq Epi: x / Non Sq Epi: x / Bacteria: x        Culture - Urine (collected 23 May 2025 18:59)  Source: Clean Catch Clean Catch (Midstream)  Preliminary Report (25 May 2025 23:15):    50,000 - 99,000 CFU/mL Escherichia coli    10,000 - 49,000 CFU/mL Klebsiella pneumoniae    Culture - Blood (collected 23 May 2025 12:30)  Source: Blood Blood-Peripheral  Preliminary Report (25 May 2025 17:01):    No growth at 48 Hours        RADIOLOGY & ADDITIONAL TESTS:  Results Reviewed:   Imaging Personally Reviewed:  Electrocardiogram Personally Reviewed:    COORDINATION OF CARE:  Care Discussed with Consultants/Other Providers [Y/N]:  Prior or Outpatient Records Reviewed [Y/N]:

## 2025-05-27 ENCOUNTER — APPOINTMENT (OUTPATIENT)
Dept: INFUSION THERAPY | Facility: HOSPITAL | Age: 65
End: 2025-05-27

## 2025-05-27 ENCOUNTER — APPOINTMENT (OUTPATIENT)
Dept: HEMATOLOGY ONCOLOGY | Facility: CLINIC | Age: 65
End: 2025-05-27

## 2025-05-27 LAB
ADD ON TEST-SPECIMEN IN LAB: SIGNIFICANT CHANGE UP
ALBUMIN SERPL ELPH-MCNC: 3.6 G/DL — SIGNIFICANT CHANGE UP (ref 3.3–5)
ALP SERPL-CCNC: 194 U/L — HIGH (ref 40–120)
ALT FLD-CCNC: 12 U/L — SIGNIFICANT CHANGE UP (ref 4–33)
ANION GAP SERPL CALC-SCNC: 9 MMOL/L — SIGNIFICANT CHANGE UP (ref 7–14)
AST SERPL-CCNC: 21 U/L — SIGNIFICANT CHANGE UP (ref 4–32)
B PERT DNA SPEC QL NAA+PROBE: SIGNIFICANT CHANGE UP
B PERT+PARAPERT DNA PNL SPEC NAA+PROBE: SIGNIFICANT CHANGE UP
BASOPHILS # BLD AUTO: 0.1 K/UL — SIGNIFICANT CHANGE UP (ref 0–0.2)
BASOPHILS NFR BLD AUTO: 0.8 % — SIGNIFICANT CHANGE UP (ref 0–2)
BILIRUB SERPL-MCNC: <0.2 MG/DL — SIGNIFICANT CHANGE UP (ref 0.2–1.2)
BUN SERPL-MCNC: 12 MG/DL — SIGNIFICANT CHANGE UP (ref 7–23)
C PNEUM DNA SPEC QL NAA+PROBE: SIGNIFICANT CHANGE UP
CALCIUM SERPL-MCNC: 8.5 MG/DL — SIGNIFICANT CHANGE UP (ref 8.4–10.5)
CHLORIDE SERPL-SCNC: 103 MMOL/L — SIGNIFICANT CHANGE UP (ref 98–107)
CO2 SERPL-SCNC: 28 MMOL/L — SIGNIFICANT CHANGE UP (ref 22–31)
CREAT SERPL-MCNC: 0.46 MG/DL — LOW (ref 0.5–1.3)
EGFR: 105 ML/MIN/1.73M2 — SIGNIFICANT CHANGE UP
EGFR: 105 ML/MIN/1.73M2 — SIGNIFICANT CHANGE UP
EOSINOPHIL # BLD AUTO: 0.05 K/UL — SIGNIFICANT CHANGE UP (ref 0–0.5)
EOSINOPHIL NFR BLD AUTO: 0.4 % — SIGNIFICANT CHANGE UP (ref 0–6)
FLUAV AG NPH QL: SIGNIFICANT CHANGE UP
FLUAV SUBTYP SPEC NAA+PROBE: SIGNIFICANT CHANGE UP
FLUBV AG NPH QL: SIGNIFICANT CHANGE UP
FLUBV RNA SPEC QL NAA+PROBE: SIGNIFICANT CHANGE UP
GLUCOSE SERPL-MCNC: 95 MG/DL — SIGNIFICANT CHANGE UP (ref 70–99)
HADV DNA SPEC QL NAA+PROBE: SIGNIFICANT CHANGE UP
HCOV 229E RNA SPEC QL NAA+PROBE: SIGNIFICANT CHANGE UP
HCOV HKU1 RNA SPEC QL NAA+PROBE: SIGNIFICANT CHANGE UP
HCOV NL63 RNA SPEC QL NAA+PROBE: SIGNIFICANT CHANGE UP
HCOV OC43 RNA SPEC QL NAA+PROBE: SIGNIFICANT CHANGE UP
HCT VFR BLD CALC: 32.6 % — LOW (ref 34.5–45)
HGB BLD-MCNC: 10.3 G/DL — LOW (ref 11.5–15.5)
HMPV RNA SPEC QL NAA+PROBE: SIGNIFICANT CHANGE UP
HPIV1 RNA SPEC QL NAA+PROBE: SIGNIFICANT CHANGE UP
HPIV2 RNA SPEC QL NAA+PROBE: SIGNIFICANT CHANGE UP
HPIV3 RNA SPEC QL NAA+PROBE: SIGNIFICANT CHANGE UP
HPIV4 RNA SPEC QL NAA+PROBE: SIGNIFICANT CHANGE UP
IANC: 9.65 K/UL — HIGH (ref 1.8–7.4)
IMM GRANULOCYTES NFR BLD AUTO: 2.3 % — HIGH (ref 0–0.9)
LYMPHOCYTES # BLD AUTO: 16 % — SIGNIFICANT CHANGE UP (ref 13–44)
LYMPHOCYTES # BLD AUTO: 2.05 K/UL — SIGNIFICANT CHANGE UP (ref 1–3.3)
M PNEUMO DNA SPEC QL NAA+PROBE: SIGNIFICANT CHANGE UP
MAGNESIUM SERPL-MCNC: 1.8 MG/DL — SIGNIFICANT CHANGE UP (ref 1.6–2.6)
MCHC RBC-ENTMCNC: 31.2 PG — SIGNIFICANT CHANGE UP (ref 27–34)
MCHC RBC-ENTMCNC: 31.6 G/DL — LOW (ref 32–36)
MCV RBC AUTO: 98.8 FL — SIGNIFICANT CHANGE UP (ref 80–100)
MONOCYTES # BLD AUTO: 0.68 K/UL — SIGNIFICANT CHANGE UP (ref 0–0.9)
MONOCYTES NFR BLD AUTO: 5.3 % — SIGNIFICANT CHANGE UP (ref 2–14)
NEUTROPHILS # BLD AUTO: 9.65 K/UL — HIGH (ref 1.8–7.4)
NEUTROPHILS NFR BLD AUTO: 75.2 % — SIGNIFICANT CHANGE UP (ref 43–77)
NRBC # BLD AUTO: 0 K/UL — SIGNIFICANT CHANGE UP (ref 0–0)
NRBC # FLD: 0 K/UL — SIGNIFICANT CHANGE UP (ref 0–0)
NRBC BLD AUTO-RTO: 0 /100 WBCS — SIGNIFICANT CHANGE UP (ref 0–0)
PHOSPHATE SERPL-MCNC: 3.2 MG/DL — SIGNIFICANT CHANGE UP (ref 2.5–4.5)
PLATELET # BLD AUTO: 219 K/UL — SIGNIFICANT CHANGE UP (ref 150–400)
POTASSIUM SERPL-MCNC: 4.3 MMOL/L — SIGNIFICANT CHANGE UP (ref 3.5–5.3)
POTASSIUM SERPL-SCNC: 4.3 MMOL/L — SIGNIFICANT CHANGE UP (ref 3.5–5.3)
PROT SERPL-MCNC: 5.9 G/DL — LOW (ref 6–8.3)
RAPID RVP RESULT: SIGNIFICANT CHANGE UP
RBC # BLD: 3.3 M/UL — LOW (ref 3.8–5.2)
RBC # FLD: 16.4 % — HIGH (ref 10.3–14.5)
RSV RNA NPH QL NAA+NON-PROBE: SIGNIFICANT CHANGE UP
RSV RNA SPEC QL NAA+PROBE: SIGNIFICANT CHANGE UP
RV+EV RNA SPEC QL NAA+PROBE: SIGNIFICANT CHANGE UP
SARS-COV-2 RNA SPEC QL NAA+PROBE: SIGNIFICANT CHANGE UP
SARS-COV-2 RNA SPEC QL NAA+PROBE: SIGNIFICANT CHANGE UP
SODIUM SERPL-SCNC: 140 MMOL/L — SIGNIFICANT CHANGE UP (ref 135–145)
SOURCE RESPIRATORY: SIGNIFICANT CHANGE UP
WBC # BLD: 12.82 K/UL — HIGH (ref 3.8–10.5)
WBC # FLD AUTO: 12.82 K/UL — HIGH (ref 3.8–10.5)

## 2025-05-27 PROCEDURE — 99232 SBSQ HOSP IP/OBS MODERATE 35: CPT

## 2025-05-27 RX ORDER — METHYLNALTREXONE BROMIDE 12 MG/.6ML
12 INJECTION, SOLUTION SUBCUTANEOUS ONCE
Refills: 0 | Status: DISCONTINUED | OUTPATIENT
Start: 2025-05-27 | End: 2025-05-28

## 2025-05-27 RX ADMIN — Medication 5 MILLIGRAM(S): at 23:36

## 2025-05-27 RX ADMIN — BACLOFEN 5 MILLIGRAM(S): 10 INJECTION INTRATHECAL at 05:18

## 2025-05-27 RX ADMIN — Medication 40 MILLIGRAM(S): at 05:19

## 2025-05-27 RX ADMIN — NICOTINE POLACRILEX 1 PATCH: 4 GUM, CHEWING ORAL at 13:42

## 2025-05-27 RX ADMIN — Medication 30 MILLIGRAM(S): at 14:49

## 2025-05-27 RX ADMIN — Medication 30 MILLIGRAM(S): at 13:49

## 2025-05-27 RX ADMIN — GABAPENTIN 300 MILLIGRAM(S): 400 CAPSULE ORAL at 05:18

## 2025-05-27 RX ADMIN — NICOTINE POLACRILEX 1 PATCH: 4 GUM, CHEWING ORAL at 07:46

## 2025-05-27 RX ADMIN — BACLOFEN 5 MILLIGRAM(S): 10 INJECTION INTRATHECAL at 23:38

## 2025-05-27 RX ADMIN — HYDROCORTISONE 1 APPLICATION(S): 10 CREAM TOPICAL at 18:53

## 2025-05-27 RX ADMIN — Medication 4 MILLIGRAM(S): at 14:23

## 2025-05-27 RX ADMIN — NICOTINE POLACRILEX 1 PATCH: 4 GUM, CHEWING ORAL at 13:58

## 2025-05-27 RX ADMIN — Medication 1 APPLICATION(S): at 05:15

## 2025-05-27 RX ADMIN — MIDODRINE HYDROCHLORIDE 10 MILLIGRAM(S): 5 TABLET ORAL at 05:18

## 2025-05-27 RX ADMIN — OXYCODONE HYDROCHLORIDE 30 MILLIGRAM(S): 30 TABLET ORAL at 02:27

## 2025-05-27 RX ADMIN — CLONAZEPAM 0.5 MILLIGRAM(S): 0.5 TABLET ORAL at 15:11

## 2025-05-27 RX ADMIN — Medication 112 MICROGRAM(S): at 04:03

## 2025-05-27 RX ADMIN — OXYCODONE HYDROCHLORIDE 30 MILLIGRAM(S): 30 TABLET ORAL at 03:27

## 2025-05-27 RX ADMIN — NALOXEGOL OXALATE 25 MILLIGRAM(S): 12.5 TABLET, FILM COATED ORAL at 23:38

## 2025-05-27 RX ADMIN — Medication 30 MILLIGRAM(S): at 23:37

## 2025-05-27 RX ADMIN — Medication 40 MILLIGRAM(S): at 18:52

## 2025-05-27 RX ADMIN — GABAPENTIN 300 MILLIGRAM(S): 400 CAPSULE ORAL at 23:36

## 2025-05-27 RX ADMIN — GABAPENTIN 300 MILLIGRAM(S): 400 CAPSULE ORAL at 13:50

## 2025-05-27 RX ADMIN — OLANZAPINE 5 MILLIGRAM(S): 10 TABLET ORAL at 23:37

## 2025-05-27 RX ADMIN — MIDODRINE HYDROCHLORIDE 10 MILLIGRAM(S): 5 TABLET ORAL at 23:36

## 2025-05-27 RX ADMIN — Medication 30 MILLIGRAM(S): at 06:18

## 2025-05-27 RX ADMIN — Medication 200 MILLIGRAM(S): at 05:16

## 2025-05-27 RX ADMIN — Medication 75 MILLILITER(S): at 13:42

## 2025-05-27 RX ADMIN — APIXABAN 5 MILLIGRAM(S): 2.5 TABLET, FILM COATED ORAL at 18:53

## 2025-05-27 RX ADMIN — APIXABAN 5 MILLIGRAM(S): 2.5 TABLET, FILM COATED ORAL at 05:18

## 2025-05-27 RX ADMIN — NICOTINE POLACRILEX 1 PATCH: 4 GUM, CHEWING ORAL at 19:01

## 2025-05-27 RX ADMIN — CLONAZEPAM 0.5 MILLIGRAM(S): 0.5 TABLET ORAL at 06:24

## 2025-05-27 RX ADMIN — Medication 200 MILLIGRAM(S): at 18:52

## 2025-05-27 RX ADMIN — CLONAZEPAM 1 MILLIGRAM(S): 0.5 TABLET ORAL at 23:37

## 2025-05-27 RX ADMIN — BACLOFEN 5 MILLIGRAM(S): 10 INJECTION INTRATHECAL at 13:43

## 2025-05-27 RX ADMIN — COLCHICINE 0.6 MILLIGRAM(S): 0.6 TABLET, FILM COATED ORAL at 14:00

## 2025-05-27 RX ADMIN — MIDODRINE HYDROCHLORIDE 10 MILLIGRAM(S): 5 TABLET ORAL at 13:50

## 2025-05-27 RX ADMIN — HYDROCORTISONE 1 APPLICATION(S): 10 CREAM TOPICAL at 05:19

## 2025-05-27 RX ADMIN — Medication 30 MILLIGRAM(S): at 05:18

## 2025-05-27 NOTE — PROGRESS NOTE ADULT - ASSESSMENT
63 yo woman, former smoker (47py; quit 12/2024) and well known to the Onc[facility]os service, with h/o pAfib (on Eliquis), Hypothyroidism, ? h/o Sleroderma, h/o pericardial effusion s/p pericardiocentesis (dx in 12/2024; ? 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 while hospitalized at [facility]- she tolerated tx without significant adverse effect. She presented as an outpt for C2 on 4/14- however, pt received Cosela and developed "severe headache" for which she required ED eval, but was ultimately sent home (she did not receive Etop or carbo on d1); on day 2 she received Etoposide for 5 minutes and started to have chest tightness and shortness of breath which was treated as a hypersensitivity reaction (she was not re-challenged due to patient preference at the time; she did not receive carbo or cosela on D2). Pt developed shortness of breath and palmar erythema with C3 (VSS); her infusion rate was slowed and she subsequently tolerated chemo without significant adverse effect.  Pt is now electively admitted for C4 Carbo/Etop with Fulphila support.    ACTIVE PROBLEMS  SCLCa (presumed limited stage)  Admission for chemotherapy  Hypotension  Generalized weakness  Anxiety/depression  pAfib  Hypercoag state  R clavicle/shoulder pain  Severe prot yahir malnutrition  Immunocompromised due to chemotherapy    SCLCa (presumed limited stage)  Completed C4 Carbo/Etop 5/19-21 (20% dose reduced)- pt currently tolerating without significant adverse effect aside from fatigue/sleepiness  S/p Fluphila x1 on 5/22  Pt to continue outpt fu with Dr. Hall after discharge  Long-term prognosis: guarded; pt is full code    Leukocytosis  2/2 growth factor (Fulphila)  Clinical suspicion for acute infection is low, but infectious javier in progress  Monitoring off abx unless pt becomes febrile    Hypotension  Generalized weakness  Likely due to chemo  Hypotension has been fluid responsive though now somewhat more pronounced  5/23 orthostatics negative  Infectious javier negative to date (RVP negative; Ucx, Bcx in progress)  Titrate Midodrine to 10mg TID and monitor response  Continuing IVF resuscitation prn    Anxiety/depression  Continuing Klonopin 0.5mg BID and 1mg nightly  Continuing Zyprexa 5mg nightly    Chronic hypoxic resp failure  h/o Asthma/COPD (former smoker)  Continuing supplemental O2 via NC and supportive resp care prn  Continuing Advair 100-50mcg MDI BID  Continuing duonebs q6/prn   Continuing Nicotine patch 21mg daily (6 weeks)  Mgmt of pericardial effusion as above    SIADH  Na normalized  Pt is asymptomatic  SIADH confirmed by 3/12 urine lytes but Na has been normal off salt tabs and fluid restriction since pt has started cancer treatment  Will continue to monitor    Cancer related pain  Continuing Morphine ER 30mg q12  Continuing Oxy IR 20/30mg q8/prn  Titrated Gabapentin to 300mg TID  Continuing Baclofen 5mg q8/prn    Constipation  Possibly due to opioids +/- AID  Pt denied abd pain, diarrhea, but reported mild nausea  Continuing Movantik 25mg daily  Continuing bowel regimen (Miralax, Senna) for OIC prevention  Of note, pt has experienced constipation relief after Relistor in the past; will re-dose x 1  Will check AXR if pt becomes more symptomatic    Hypothyroidism: continuing LT4 112mcg daily    h/o pericardial effusion: most likely inflammatory; continuing Colchicine 0.6mg daily    pAfib  Hypercoag state  Continuing Eliquis 5mg BID    R clavicle/shoulder pain  Likely musculoskeletal in orgin  5/19 CXR without evidence of osseous abnormalities  Continuing Flexiril prn and pan regimen as above  Monitoring for improvement    Severe prot yahir malnutrition: appreciate RD eval- continuing regular diet 65 yo woman, former smoker (47py; quit 12/2024) and well known to the OncHos service, with h/o pAfib (on Eliquis), Hypothyroidism, ? h/o Sleroderma, h/o pericardial effusion s/p pericardiocentesis (dx in 12/2024; ? 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 while hospitalized at [facility]- she tolerated tx without significant adverse effect. She presented as an outpt for C2 on 4/14- however, pt received Cosela and developed "severe headache" for which she required ED eval, but was ultimately sent home (she did not receive Etop or carbo on d1); on day 2 she received Etoposide for 5 minutes and started to have chest tightness and shortness of breath which was treated as a hypersensitivity reaction (she was not re-challenged due to patient preference at the time; she did not receive carbo or cosela on D2). Pt developed shortness of breath and palmar erythema with C3 (VSS); her infusion rate was slowed and she subsequently tolerated chemo without significant adverse effect.  Pt is now electively admitted for C4 Carbo/Etop with Fulphila support.    ACTIVE PROBLEMS  SCLCa (presumed limited stage)  Admission for chemotherapy  Hypotension  Generalized weakness  Anxiety/depression  pAfib  Hypercoag state  R clavicle/shoulder pain  Severe prot yahir malnutrition  Immunocompromised due to chemotherapy    SCLCa (presumed limited stage)  Completed C4 Carbo/Etop 5/19-21 (20% dose reduced)- pt currently tolerating without significant adverse effect aside from fatigue/sleepiness  S/p Fluphila x1 on 5/22  Pt to continue outpt fu with Dr. Hall after discharge  Long-term prognosis: guarded; pt is full code    Leukocytosis  2/2 growth factor (Fulphila)  Clinical suspicion for acute infection is low, but infectious javier in progress  Monitoring off abx unless pt becomes febrile    Hypotension  Generalized weakness  Likely due to chemo  Hypotension has been fluid responsive though now somewhat more pronounced  5/23 orthostatics negative  Infectious javier negative to date (RVP negative; Ucx, Bcx in progress)  Titrate Midodrine to 10mg TID and monitor response  Continuing IVF resuscitation prn    Anxiety/depression  Continuing Klonopin 0.5mg BID and 1mg nightly  Continuing Zyprexa 5mg nightly    Chronic hypoxic resp failure  h/o Asthma/COPD (former smoker)  Continuing supplemental O2 via NC and supportive resp care prn  Continuing Advair 100-50mcg MDI BID  Continuing duonebs q6/prn   Continuing Nicotine patch 21mg daily (6 weeks)  Mgmt of pericardial effusion as above    SIADH  Na normalized  Pt is asymptomatic  SIADH confirmed by 3/12 urine lytes but Na has been normal off salt tabs and fluid restriction since pt has started cancer treatment  Will continue to monitor    Cancer related pain  Continuing Morphine ER 30mg q12  Continuing Oxy IR 20/30mg q8/prn  Titrated Gabapentin to 300mg TID  Continuing Baclofen 5mg q8/prn    Constipation  Possibly due to opioids +/- AID  Pt denied abd pain, diarrhea, but reported mild nausea  Continuing Movantik 25mg daily  Continuing bowel regimen (Miralax, Senna) for OIC prevention  Of note, pt has experienced constipation relief after Relistor in the past; will re-dose x 1  Will check AXR if pt becomes more symptomatic    Hypothyroidism: continuing LT4 112mcg daily    h/o pericardial effusion: most likely inflammatory; continuing Colchicine 0.6mg daily    pAfib  Hypercoag state  Continuing Eliquis 5mg BID    R clavicle/shoulder pain  Likely musculoskeletal in orgin  5/19 CXR without evidence of osseous abnormalities  Continuing Flexiril prn and pan regimen as above  Monitoring for improvement    Severe prot yahir malnutrition: appreciate RD eval- continuing regular diet    patient noted to have low BP, midodrine inc from 5 mg tid to 10 mg tid. Will do IVF hydration and recheck BP and monitor AM cortisol

## 2025-05-27 NOTE — PROGRESS NOTE ADULT - REASON FOR ADMISSION
Elective admission- C4 Carbo/Etoposide

## 2025-05-27 NOTE — PROGRESS NOTE ADULT - NUTRITIONAL ASSESSMENT
This patient has been assessed with a concern for Malnutrition and has been determined to have a diagnosis/diagnoses of Severe protein-calorie malnutrition.    This patient is being managed with:   Diet Regular-  Entered: May 19 2025  9:20AM  

## 2025-05-27 NOTE — PROGRESS NOTE ADULT - SUBJECTIVE AND OBJECTIVE BOX
[facility] accept note.  Patient is a 64y old  Female who presents with a chief complaint of Elective admission- C4 Carbo/Etoposide (27 May 2025 08:51)      SUBJECTIVE / OVERNIGHT EVENTS:  Patient seen with Pa.  She is concerned that she is always tired.  Also wants to talk to Onc Dr Hall about her treatment plan  She is asking to get more relastor for constipation.  Palliative is helping with chronic pain.     MEDICATIONS  (STANDING):  apixaban 5 milliGRAM(s) Oral every 12 hours  baclofen 5 milliGRAM(s) Oral every 8 hours  bisacodyl 5 milliGRAM(s) Oral at bedtime  chlorhexidine 2% Cloths 1 Application(s) Topical <User Schedule>  ciprofloxacin   IVPB 400 milliGRAM(s) IV Intermittent every 12 hours  clonazePAM  Tablet 0.5 milliGRAM(s) Oral <User Schedule>  clonazePAM  Tablet 1 milliGRAM(s) Oral at bedtime  colchicine 0.6 milliGRAM(s) Oral daily  fluticasone propionate/ salmeterol 100-50 MICROgram(s) Diskus 1 Dose(s) Inhalation two times a day  gabapentin 300 milliGRAM(s) Oral three times a day  hydrocortisone 1% Cream 1 Application(s) Topical two times a day  levothyroxine 112 MICROGram(s) Oral daily  methylnaltrexone Injectable 12 milliGRAM(s) SubCutaneous once  midodrine. 10 milliGRAM(s) Oral every 8 hours  morphine ER Tablet 30 milliGRAM(s) Oral every 8 hours  naloxegol 25 milliGRAM(s) Oral daily  nicotine - 21 mG/24Hr(s) Patch 1 Patch Transdermal daily  OLANZapine 5 milliGRAM(s) Oral at bedtime  pantoprazole    Tablet 40 milliGRAM(s) Oral every 12 hours  polyethylene glycol 3350 17 Gram(s) Oral two times a day  senna 2 Tablet(s) Oral at bedtime  sodium chloride 0.9%. 1000 milliLiter(s) (75 mL/Hr) IV Continuous <Continuous>    MEDICATIONS  (PRN):  albuterol    90 MICROgram(s) HFA Inhaler 2 Puff(s) Inhalation every 6 hours PRN Shortness of Breath and/or Wheezing  aluminum hydroxide/magnesium hydroxide/simethicone Suspension 30 milliLiter(s) Oral every 4 hours PRN Dyspepsia  cyclobenzaprine 10 milliGRAM(s) Oral three times a day PRN Muscle Spasm  diphenhydrAMINE 25 milliGRAM(s) Oral every 6 hours PRN Rash and/or Itching  hydrocortisone 1% Ointment 1 Application(s) Topical every 12 hours PRN Rash and/or Itching  ondansetron Injectable 4 milliGRAM(s) IV Push every 8 hours PRN Nausea and/or Vomiting  oxyCODONE    IR 20 milliGRAM(s) Oral every 4 hours PRN Moderate Pain (4 - 6)  oxyCODONE    IR 30 milliGRAM(s) Oral every 4 hours PRN Severe Pain (7 - 10)        CAPILLARY BLOOD GLUCOSE        I&O's Summary    26 May 2025 07:01  -  27 May 2025 07:00  --------------------------------------------------------  IN: 500 mL / OUT: 950 mL / NET: -450 mL    27 May 2025 07:01  -  27 May 2025 14:48  --------------------------------------------------------  IN: 327 mL / OUT: 0 mL / NET: 327 mL    Vital Signs Last 24 Hrs  T(C): 36.7 (27 May 2025 12:41), Max: 36.8 (26 May 2025 21:24)  T(F): 98 (27 May 2025 12:41), Max: 98.2 (26 May 2025 21:24)  HR: 68 (27 May 2025 12:41) (68 - 84)  BP: 102/65 (27 May 2025 12:41) (95/58 - 123/65)  RR: 18 (27 May 2025 12:41) (18 - 18)  SpO2: 95% (27 May 2025 12:41) (94% - 95%)    Parameters below as of 27 May 2025 12:41  Patient On (Oxygen Delivery Method): room air        PHYSICAL EXAM:  GENERAL: NAD,   HEAD:  Atraumatic, Normocephalic  EYES: EOMI, PERRLA, conjunctiva and sclera clear  NECK: Supple, No JVD  CHEST/LUNG: Clear to auscultation bilaterally; No wheeze  HEART: Regular rate and rhythm; No murmurs, rubs, or gallops  ABDOMEN: Soft, Nontender, Nondistended; Bowel sounds present  EXTREMITIES:  2+ Peripheral Pulses, No clubbing, cyanosis, or edema  PSYCH: Alert  NEUROLOGY: non-focal    LABS:                        10.3   12.82 )-----------( 219      ( 27 May 2025 05:33 )             32.6     05-27    140  |  103  |  12  ----------------------------<  95  4.3   |  28  |  0.46[L]    Ca    8.5      27 May 2025 05:33  Phos  3.2     05-27  Mg     1.80     05-27    TPro  5.9[L]  /  Alb  3.6  /  TBili  <0.2  /  DBili  x   /  AST  21  /  ALT  12  /  AlkPhos  194[H]  05-27             Care Discussed with Consultants/Other Providers:  Pa   [facility] accept note.  Patient is a 64y old  Female who presents with a chief complaint of Elective admission- C4 Carbo/Etoposide (27 May 2025 08:51)      SUBJECTIVE / OVERNIGHT EVENTS:  Patient seen with Pa.  She is concerned that she is always tired.  Also wants to talk to Onc Dr Hall about her treatment plan  She is asking to get more relastor for constipation.  Palliative is helping with chronic pain.   patient noted to have low BP, midodrine inc from 5 mg tid to 10 mg tid. Will do IVF hydration and recheck BP and monitor AM cortisol level    MEDICATIONS  (STANDING):  apixaban 5 milliGRAM(s) Oral every 12 hours  baclofen 5 milliGRAM(s) Oral every 8 hours  bisacodyl 5 milliGRAM(s) Oral at bedtime  chlorhexidine 2% Cloths 1 Application(s) Topical <User Schedule>  ciprofloxacin   IVPB 400 milliGRAM(s) IV Intermittent every 12 hours  clonazePAM  Tablet 0.5 milliGRAM(s) Oral <User Schedule>  clonazePAM  Tablet 1 milliGRAM(s) Oral at bedtime  colchicine 0.6 milliGRAM(s) Oral daily  fluticasone propionate/ salmeterol 100-50 MICROgram(s) Diskus 1 Dose(s) Inhalation two times a day  gabapentin 300 milliGRAM(s) Oral three times a day  hydrocortisone 1% Cream 1 Application(s) Topical two times a day  levothyroxine 112 MICROGram(s) Oral daily  methylnaltrexone Injectable 12 milliGRAM(s) SubCutaneous once  midodrine. 10 milliGRAM(s) Oral every 8 hours  morphine ER Tablet 30 milliGRAM(s) Oral every 8 hours  naloxegol 25 milliGRAM(s) Oral daily  nicotine - 21 mG/24Hr(s) Patch 1 Patch Transdermal daily  OLANZapine 5 milliGRAM(s) Oral at bedtime  pantoprazole    Tablet 40 milliGRAM(s) Oral every 12 hours  polyethylene glycol 3350 17 Gram(s) Oral two times a day  senna 2 Tablet(s) Oral at bedtime  sodium chloride 0.9%. 1000 milliLiter(s) (75 mL/Hr) IV Continuous <Continuous>    MEDICATIONS  (PRN):  albuterol    90 MICROgram(s) HFA Inhaler 2 Puff(s) Inhalation every 6 hours PRN Shortness of Breath and/or Wheezing  aluminum hydroxide/magnesium hydroxide/simethicone Suspension 30 milliLiter(s) Oral every 4 hours PRN Dyspepsia  cyclobenzaprine 10 milliGRAM(s) Oral three times a day PRN Muscle Spasm  diphenhydrAMINE 25 milliGRAM(s) Oral every 6 hours PRN Rash and/or Itching  hydrocortisone 1% Ointment 1 Application(s) Topical every 12 hours PRN Rash and/or Itching  ondansetron Injectable 4 milliGRAM(s) IV Push every 8 hours PRN Nausea and/or Vomiting  oxyCODONE    IR 20 milliGRAM(s) Oral every 4 hours PRN Moderate Pain (4 - 6)  oxyCODONE    IR 30 milliGRAM(s) Oral every 4 hours PRN Severe Pain (7 - 10)        CAPILLARY BLOOD GLUCOSE        I&O's Summary    26 May 2025 07:01  -  27 May 2025 07:00  --------------------------------------------------------  IN: 500 mL / OUT: 950 mL / NET: -450 mL    27 May 2025 07:01  -  27 May 2025 14:48  --------------------------------------------------------  IN: 327 mL / OUT: 0 mL / NET: 327 mL    Vital Signs Last 24 Hrs  T(C): 36.7 (27 May 2025 12:41), Max: 36.8 (26 May 2025 21:24)  T(F): 98 (27 May 2025 12:41), Max: 98.2 (26 May 2025 21:24)  HR: 68 (27 May 2025 12:41) (68 - 84)  BP: 102/65 (27 May 2025 12:41) (95/58 - 123/65)  RR: 18 (27 May 2025 12:41) (18 - 18)  SpO2: 95% (27 May 2025 12:41) (94% - 95%)    Parameters below as of 27 May 2025 12:41  Patient On (Oxygen Delivery Method): room air        PHYSICAL EXAM:  GENERAL: NAD,   HEAD:  Atraumatic, Normocephalic  EYES: EOMI, PERRLA, conjunctiva and sclera clear  NECK: Supple, No JVD  CHEST/LUNG: Clear to auscultation bilaterally; No wheeze  HEART: Regular rate and rhythm; No murmurs, rubs, or gallops  ABDOMEN: Soft, Nontender, Nondistended; Bowel sounds present  EXTREMITIES:  2+ Peripheral Pulses, No clubbing, cyanosis, or edema  PSYCH: Alert  NEUROLOGY: non-focal    LABS:                        10.3   12.82 )-----------( 219      ( 27 May 2025 05:33 )             32.6     05-27    140  |  103  |  12  ----------------------------<  95  4.3   |  28  |  0.46[L]    Ca    8.5      27 May 2025 05:33  Phos  3.2     05-27  Mg     1.80     05-27    TPro  5.9[L]  /  Alb  3.6  /  TBili  <0.2  /  DBili  x   /  AST  21  /  ALT  12  /  AlkPhos  194[H]  05-27             Care Discussed with Consultants/Other Providers:  Pa

## 2025-05-28 ENCOUNTER — APPOINTMENT (OUTPATIENT)
Dept: INFUSION THERAPY | Facility: HOSPITAL | Age: 65
End: 2025-05-28

## 2025-05-28 ENCOUNTER — APPOINTMENT (OUTPATIENT)
Dept: HEMATOLOGY ONCOLOGY | Facility: CLINIC | Age: 65
End: 2025-05-28

## 2025-05-28 VITALS — HEART RATE: 79 BPM | DIASTOLIC BLOOD PRESSURE: 55 MMHG | SYSTOLIC BLOOD PRESSURE: 98 MMHG

## 2025-05-28 LAB
ALBUMIN SERPL ELPH-MCNC: 3.5 G/DL — SIGNIFICANT CHANGE UP (ref 3.3–5)
ALP SERPL-CCNC: 167 U/L — HIGH (ref 40–120)
ALT FLD-CCNC: 17 U/L — SIGNIFICANT CHANGE UP (ref 4–33)
ANION GAP SERPL CALC-SCNC: 10 MMOL/L — SIGNIFICANT CHANGE UP (ref 7–14)
AST SERPL-CCNC: 20 U/L — SIGNIFICANT CHANGE UP (ref 4–32)
BASOPHILS # BLD AUTO: 0.07 K/UL — SIGNIFICANT CHANGE UP (ref 0–0.2)
BASOPHILS NFR BLD AUTO: 0.7 % — SIGNIFICANT CHANGE UP (ref 0–2)
BILIRUB SERPL-MCNC: 0.2 MG/DL — SIGNIFICANT CHANGE UP (ref 0.2–1.2)
BUN SERPL-MCNC: 9 MG/DL — SIGNIFICANT CHANGE UP (ref 7–23)
CALCIUM SERPL-MCNC: 8.5 MG/DL — SIGNIFICANT CHANGE UP (ref 8.4–10.5)
CHLORIDE SERPL-SCNC: 101 MMOL/L — SIGNIFICANT CHANGE UP (ref 98–107)
CO2 SERPL-SCNC: 28 MMOL/L — SIGNIFICANT CHANGE UP (ref 22–31)
CORTIS AM PEAK SERPL-MCNC: 11.8 UG/DL — SIGNIFICANT CHANGE UP (ref 6–18.4)
CREAT SERPL-MCNC: 0.43 MG/DL — LOW (ref 0.5–1.3)
CULTURE RESULTS: SIGNIFICANT CHANGE UP
EGFR: 107 ML/MIN/1.73M2 — SIGNIFICANT CHANGE UP
EGFR: 107 ML/MIN/1.73M2 — SIGNIFICANT CHANGE UP
EOSINOPHIL # BLD AUTO: 0.03 K/UL — SIGNIFICANT CHANGE UP (ref 0–0.5)
EOSINOPHIL NFR BLD AUTO: 0.3 % — SIGNIFICANT CHANGE UP (ref 0–6)
GLUCOSE SERPL-MCNC: 119 MG/DL — HIGH (ref 70–99)
HCT VFR BLD CALC: 32.1 % — LOW (ref 34.5–45)
HGB BLD-MCNC: 10.4 G/DL — LOW (ref 11.5–15.5)
IANC: 7.78 K/UL — HIGH (ref 1.8–7.4)
IMM GRANULOCYTES NFR BLD AUTO: 0.8 % — SIGNIFICANT CHANGE UP (ref 0–0.9)
LYMPHOCYTES # BLD AUTO: 1.28 K/UL — SIGNIFICANT CHANGE UP (ref 1–3.3)
LYMPHOCYTES # BLD AUTO: 12.6 % — LOW (ref 13–44)
MAGNESIUM SERPL-MCNC: 1.7 MG/DL — SIGNIFICANT CHANGE UP (ref 1.6–2.6)
MCHC RBC-ENTMCNC: 30.7 PG — SIGNIFICANT CHANGE UP (ref 27–34)
MCHC RBC-ENTMCNC: 32.4 G/DL — SIGNIFICANT CHANGE UP (ref 32–36)
MCV RBC AUTO: 94.7 FL — SIGNIFICANT CHANGE UP (ref 80–100)
MONOCYTES # BLD AUTO: 0.88 K/UL — SIGNIFICANT CHANGE UP (ref 0–0.9)
MONOCYTES NFR BLD AUTO: 8.7 % — SIGNIFICANT CHANGE UP (ref 2–14)
NEUTROPHILS # BLD AUTO: 7.78 K/UL — HIGH (ref 1.8–7.4)
NEUTROPHILS NFR BLD AUTO: 76.9 % — SIGNIFICANT CHANGE UP (ref 43–77)
NRBC # BLD AUTO: 0 K/UL — SIGNIFICANT CHANGE UP (ref 0–0)
NRBC # FLD: 0 K/UL — SIGNIFICANT CHANGE UP (ref 0–0)
NRBC BLD AUTO-RTO: 0 /100 WBCS — SIGNIFICANT CHANGE UP (ref 0–0)
PHOSPHATE SERPL-MCNC: 3.5 MG/DL — SIGNIFICANT CHANGE UP (ref 2.5–4.5)
PLATELET # BLD AUTO: 204 K/UL — SIGNIFICANT CHANGE UP (ref 150–400)
POTASSIUM SERPL-MCNC: 3.7 MMOL/L — SIGNIFICANT CHANGE UP (ref 3.5–5.3)
POTASSIUM SERPL-SCNC: 3.7 MMOL/L — SIGNIFICANT CHANGE UP (ref 3.5–5.3)
PROT SERPL-MCNC: 5.7 G/DL — LOW (ref 6–8.3)
RBC # BLD: 3.39 M/UL — LOW (ref 3.8–5.2)
RBC # FLD: 16 % — HIGH (ref 10.3–14.5)
SODIUM SERPL-SCNC: 139 MMOL/L — SIGNIFICANT CHANGE UP (ref 135–145)
SPECIMEN SOURCE: SIGNIFICANT CHANGE UP
WBC # BLD: 10.12 K/UL — SIGNIFICANT CHANGE UP (ref 3.8–10.5)
WBC # FLD AUTO: 10.12 K/UL — SIGNIFICANT CHANGE UP (ref 3.8–10.5)

## 2025-05-28 PROCEDURE — 99239 HOSP IP/OBS DSCHRG MGMT >30: CPT

## 2025-05-28 RX ORDER — ACETAMINOPHEN 500 MG/5ML
650 LIQUID (ML) ORAL ONCE
Refills: 0 | Status: COMPLETED | OUTPATIENT
Start: 2025-05-28 | End: 2025-05-28

## 2025-05-28 RX ORDER — MIDODRINE HYDROCHLORIDE 5 MG/1
1 TABLET ORAL
Qty: 90 | Refills: 0
Start: 2025-05-28 | End: 2025-06-26

## 2025-05-28 RX ADMIN — Medication 30 MILLIGRAM(S): at 13:28

## 2025-05-28 RX ADMIN — Medication 200 MILLIGRAM(S): at 07:03

## 2025-05-28 RX ADMIN — Medication 1 APPLICATION(S): at 05:57

## 2025-05-28 RX ADMIN — MIDODRINE HYDROCHLORIDE 10 MILLIGRAM(S): 5 TABLET ORAL at 07:05

## 2025-05-28 RX ADMIN — COLCHICINE 0.6 MILLIGRAM(S): 0.6 TABLET, FILM COATED ORAL at 13:28

## 2025-05-28 RX ADMIN — Medication 40 MILLIGRAM(S): at 07:03

## 2025-05-28 RX ADMIN — Medication 112 MICROGRAM(S): at 05:57

## 2025-05-28 RX ADMIN — APIXABAN 5 MILLIGRAM(S): 2.5 TABLET, FILM COATED ORAL at 07:03

## 2025-05-28 RX ADMIN — GABAPENTIN 300 MILLIGRAM(S): 400 CAPSULE ORAL at 13:28

## 2025-05-28 RX ADMIN — NICOTINE POLACRILEX 1 PATCH: 4 GUM, CHEWING ORAL at 07:40

## 2025-05-28 RX ADMIN — NICOTINE POLACRILEX 1 PATCH: 4 GUM, CHEWING ORAL at 13:21

## 2025-05-28 RX ADMIN — GABAPENTIN 300 MILLIGRAM(S): 400 CAPSULE ORAL at 07:02

## 2025-05-28 RX ADMIN — BACLOFEN 5 MILLIGRAM(S): 10 INJECTION INTRATHECAL at 13:28

## 2025-05-28 RX ADMIN — MIDODRINE HYDROCHLORIDE 10 MILLIGRAM(S): 5 TABLET ORAL at 13:32

## 2025-05-28 RX ADMIN — Medication 650 MILLIGRAM(S): at 17:15

## 2025-05-28 RX ADMIN — NALOXEGOL OXALATE 25 MILLIGRAM(S): 12.5 TABLET, FILM COATED ORAL at 13:28

## 2025-05-28 RX ADMIN — BACLOFEN 5 MILLIGRAM(S): 10 INJECTION INTRATHECAL at 07:07

## 2025-05-28 RX ADMIN — Medication 30 MILLIGRAM(S): at 07:03

## 2025-05-28 RX ADMIN — NICOTINE POLACRILEX 1 PATCH: 4 GUM, CHEWING ORAL at 13:28

## 2025-05-28 RX ADMIN — CLONAZEPAM 0.5 MILLIGRAM(S): 0.5 TABLET ORAL at 07:24
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
## 2025-05-29 ENCOUNTER — APPOINTMENT (OUTPATIENT)
Dept: RHEUMATOLOGY | Facility: CLINIC | Age: 65
End: 2025-05-29

## 2025-05-29 ENCOUNTER — APPOINTMENT (OUTPATIENT)
Dept: NUCLEAR MEDICINE | Facility: IMAGING CENTER | Age: 65
End: 2025-05-29
Payer: MEDICARE

## 2025-05-29 ENCOUNTER — APPOINTMENT (OUTPATIENT)
Dept: INFUSION THERAPY | Facility: HOSPITAL | Age: 65
End: 2025-05-29

## 2025-05-29 ENCOUNTER — APPOINTMENT (OUTPATIENT)
Dept: PULMONOLOGY | Facility: CLINIC | Age: 65
End: 2025-05-29

## 2025-05-29 ENCOUNTER — OUTPATIENT (OUTPATIENT)
Dept: OUTPATIENT SERVICES | Facility: HOSPITAL | Age: 65
LOS: 1 days | End: 2025-05-29
Payer: MEDICARE

## 2025-05-29 DIAGNOSIS — D25.9 LEIOMYOMA OF UTERUS, UNSPECIFIED: Chronic | ICD-10-CM

## 2025-05-29 DIAGNOSIS — K56.60 UNSPECIFIED INTESTINAL OBSTRUCTION: Chronic | ICD-10-CM

## 2025-05-29 DIAGNOSIS — C80.1 MALIGNANT (PRIMARY) NEOPLASM, UNSPECIFIED: ICD-10-CM

## 2025-05-29 DIAGNOSIS — Z98.890 OTHER SPECIFIED POSTPROCEDURAL STATES: Chronic | ICD-10-CM

## 2025-05-29 PROCEDURE — 78815 PET IMAGE W/CT SKULL-THIGH: CPT

## 2025-05-29 PROCEDURE — 78815 PET IMAGE W/CT SKULL-THIGH: CPT | Mod: 26,PI

## 2025-05-29 PROCEDURE — A9552: CPT

## 2025-06-09 ENCOUNTER — APPOINTMENT (OUTPATIENT)
Dept: GERIATRICS | Facility: CLINIC | Age: 65
End: 2025-06-09
Payer: MEDICARE

## 2025-06-09 PROCEDURE — 99214 OFFICE O/P EST MOD 30 MIN: CPT | Mod: 2W

## 2025-06-12 ENCOUNTER — OUTPATIENT (OUTPATIENT)
Dept: OUTPATIENT SERVICES | Facility: HOSPITAL | Age: 65
LOS: 1 days | Discharge: HOME | End: 2025-06-12

## 2025-06-12 DIAGNOSIS — C34.90 MALIGNANT NEOPLASM OF UNSPECIFIED PART OF UNSPECIFIED BRONCHUS OR LUNG: ICD-10-CM

## 2025-06-12 DIAGNOSIS — K56.60 UNSPECIFIED INTESTINAL OBSTRUCTION: Chronic | ICD-10-CM

## 2025-06-12 DIAGNOSIS — Z98.89 OTHER SPECIFIED POSTPROCEDURAL STATES: Chronic | ICD-10-CM

## 2025-06-13 ENCOUNTER — APPOINTMENT (OUTPATIENT)
Dept: HEMATOLOGY ONCOLOGY | Facility: CLINIC | Age: 65
End: 2025-06-13
Payer: MEDICARE

## 2025-06-13 VITALS
RESPIRATION RATE: 16 BRPM | DIASTOLIC BLOOD PRESSURE: 60 MMHG | WEIGHT: 146 LBS | BODY MASS INDEX: 21.62 KG/M2 | SYSTOLIC BLOOD PRESSURE: 96 MMHG | HEART RATE: 94 BPM | OXYGEN SATURATION: 95 % | TEMPERATURE: 97.5 F | HEIGHT: 69 IN

## 2025-06-13 PROCEDURE — G2211 COMPLEX E/M VISIT ADD ON: CPT

## 2025-06-13 PROCEDURE — 99214 OFFICE O/P EST MOD 30 MIN: CPT

## 2025-06-16 ENCOUNTER — INPATIENT (INPATIENT)
Facility: HOSPITAL | Age: 65
LOS: 3 days | Discharge: HOME | End: 2025-06-20
Attending: HOSPITALIST | Admitting: HOSPITALIST
Payer: MEDICARE

## 2025-06-16 VITALS
HEIGHT: 69 IN | RESPIRATION RATE: 18 BRPM | HEART RATE: 94 BPM | SYSTOLIC BLOOD PRESSURE: 102 MMHG | TEMPERATURE: 98 F | OXYGEN SATURATION: 95 % | WEIGHT: 147.05 LBS | DIASTOLIC BLOOD PRESSURE: 58 MMHG

## 2025-06-16 DIAGNOSIS — D25.9 LEIOMYOMA OF UTERUS, UNSPECIFIED: Chronic | ICD-10-CM

## 2025-06-16 DIAGNOSIS — Z98.89 OTHER SPECIFIED POSTPROCEDURAL STATES: Chronic | ICD-10-CM

## 2025-06-16 DIAGNOSIS — K56.60 UNSPECIFIED INTESTINAL OBSTRUCTION: Chronic | ICD-10-CM

## 2025-06-16 DIAGNOSIS — C80.1 MALIGNANT (PRIMARY) NEOPLASM, UNSPECIFIED: ICD-10-CM

## 2025-06-16 DIAGNOSIS — I80.219 PHLEBITIS AND THROMBOPHLEBITIS OF UNSPECIFIED ILIAC VEIN: ICD-10-CM

## 2025-06-16 DIAGNOSIS — Z98.890 OTHER SPECIFIED POSTPROCEDURAL STATES: Chronic | ICD-10-CM

## 2025-06-16 LAB
ALBUMIN SERPL ELPH-MCNC: 4.2 G/DL — SIGNIFICANT CHANGE UP (ref 3.3–5)
ALP SERPL-CCNC: 107 U/L — SIGNIFICANT CHANGE UP (ref 40–120)
ALT FLD-CCNC: 6 U/L — SIGNIFICANT CHANGE UP (ref 4–33)
ANION GAP SERPL CALC-SCNC: 12 MMOL/L — SIGNIFICANT CHANGE UP (ref 7–14)
AST SERPL-CCNC: 20 U/L — SIGNIFICANT CHANGE UP (ref 4–32)
BASOPHILS # BLD AUTO: 0.07 K/UL — SIGNIFICANT CHANGE UP (ref 0–0.2)
BASOPHILS NFR BLD AUTO: 0.9 % — SIGNIFICANT CHANGE UP (ref 0–2)
BILIRUB SERPL-MCNC: 0.5 MG/DL — SIGNIFICANT CHANGE UP (ref 0.2–1.2)
BUN SERPL-MCNC: 12 MG/DL — SIGNIFICANT CHANGE UP (ref 7–23)
CALCIUM SERPL-MCNC: 9.3 MG/DL — SIGNIFICANT CHANGE UP (ref 8.4–10.5)
CHLORIDE SERPL-SCNC: 102 MMOL/L — SIGNIFICANT CHANGE UP (ref 98–107)
CO2 SERPL-SCNC: 24 MMOL/L — SIGNIFICANT CHANGE UP (ref 22–31)
CREAT SERPL-MCNC: 0.52 MG/DL — SIGNIFICANT CHANGE UP (ref 0.5–1.3)
EGFR: 102 ML/MIN/1.73M2 — SIGNIFICANT CHANGE UP
EGFR: 102 ML/MIN/1.73M2 — SIGNIFICANT CHANGE UP
EOSINOPHIL # BLD AUTO: 0.07 K/UL — SIGNIFICANT CHANGE UP (ref 0–0.5)
EOSINOPHIL NFR BLD AUTO: 0.9 % — SIGNIFICANT CHANGE UP (ref 0–6)
GLUCOSE SERPL-MCNC: 95 MG/DL — SIGNIFICANT CHANGE UP (ref 70–99)
HCT VFR BLD CALC: 36.9 % — SIGNIFICANT CHANGE UP (ref 34.5–45)
HGB BLD-MCNC: 12.1 G/DL — SIGNIFICANT CHANGE UP (ref 11.5–15.5)
IANC: 5.44 K/UL — SIGNIFICANT CHANGE UP (ref 1.8–7.4)
IMM GRANULOCYTES NFR BLD AUTO: 0.4 % — SIGNIFICANT CHANGE UP (ref 0–0.9)
LYMPHOCYTES # BLD AUTO: 1.26 K/UL — SIGNIFICANT CHANGE UP (ref 1–3.3)
LYMPHOCYTES # BLD AUTO: 17 % — SIGNIFICANT CHANGE UP (ref 13–44)
MAGNESIUM SERPL-MCNC: 2 MG/DL — SIGNIFICANT CHANGE UP (ref 1.6–2.6)
MCHC RBC-ENTMCNC: 31.6 PG — SIGNIFICANT CHANGE UP (ref 27–34)
MCHC RBC-ENTMCNC: 32.8 G/DL — SIGNIFICANT CHANGE UP (ref 32–36)
MCV RBC AUTO: 96.3 FL — SIGNIFICANT CHANGE UP (ref 80–100)
MONOCYTES # BLD AUTO: 0.53 K/UL — SIGNIFICANT CHANGE UP (ref 0–0.9)
MONOCYTES NFR BLD AUTO: 7.2 % — SIGNIFICANT CHANGE UP (ref 2–14)
NEUTROPHILS # BLD AUTO: 5.44 K/UL — SIGNIFICANT CHANGE UP (ref 1.8–7.4)
NEUTROPHILS NFR BLD AUTO: 73.6 % — SIGNIFICANT CHANGE UP (ref 43–77)
NRBC # BLD AUTO: 0 K/UL — SIGNIFICANT CHANGE UP (ref 0–0)
NRBC # FLD: 0 K/UL — SIGNIFICANT CHANGE UP (ref 0–0)
NRBC BLD AUTO-RTO: 0 /100 WBCS — SIGNIFICANT CHANGE UP (ref 0–0)
PHOSPHATE SERPL-MCNC: 3.5 MG/DL — SIGNIFICANT CHANGE UP (ref 2.5–4.5)
PLATELET # BLD AUTO: 302 K/UL — SIGNIFICANT CHANGE UP (ref 150–400)
POTASSIUM SERPL-MCNC: 4.2 MMOL/L — SIGNIFICANT CHANGE UP (ref 3.5–5.3)
POTASSIUM SERPL-SCNC: 4.2 MMOL/L — SIGNIFICANT CHANGE UP (ref 3.5–5.3)
PROT SERPL-MCNC: 7 G/DL — SIGNIFICANT CHANGE UP (ref 6–8.3)
RBC # BLD: 3.83 M/UL — SIGNIFICANT CHANGE UP (ref 3.8–5.2)
RBC # FLD: 16.6 % — HIGH (ref 10.3–14.5)
SODIUM SERPL-SCNC: 138 MMOL/L — SIGNIFICANT CHANGE UP (ref 135–145)
WBC # BLD: 7.4 K/UL — SIGNIFICANT CHANGE UP (ref 3.8–10.5)
WBC # FLD AUTO: 7.4 K/UL — SIGNIFICANT CHANGE UP (ref 3.8–10.5)

## 2025-06-16 PROCEDURE — 99223 1ST HOSP IP/OBS HIGH 75: CPT | Mod: AI

## 2025-06-16 RX ORDER — METHYLNALTREXONE BROMIDE 12 MG/.6ML
12 INJECTION, SOLUTION SUBCUTANEOUS ONCE
Refills: 0 | Status: COMPLETED | OUTPATIENT
Start: 2025-06-16 | End: 2025-06-17

## 2025-06-16 RX ORDER — COLCHICINE 0.6 MG/1
0.6 TABLET, FILM COATED ORAL DAILY
Refills: 0 | Status: DISCONTINUED | OUTPATIENT
Start: 2025-06-17 | End: 2025-06-20

## 2025-06-16 RX ORDER — ETOPOSIDE 20 MG/ML
135 VIAL (ML) INTRAVENOUS EVERY 24 HOURS
Refills: 0 | Status: COMPLETED | OUTPATIENT
Start: 2025-06-16 | End: 2025-06-18

## 2025-06-16 RX ORDER — CLONAZEPAM 0.5 MG/1
0.5 TABLET ORAL
Refills: 0 | Status: DISCONTINUED | OUTPATIENT
Start: 2025-06-16 | End: 2025-06-20

## 2025-06-16 RX ORDER — SENNA 187 MG
2 TABLET ORAL AT BEDTIME
Refills: 0 | Status: DISCONTINUED | OUTPATIENT
Start: 2025-06-16 | End: 2025-06-20

## 2025-06-16 RX ORDER — MIDODRINE HYDROCHLORIDE 5 MG/1
10 TABLET ORAL EVERY 8 HOURS
Refills: 0 | Status: DISCONTINUED | OUTPATIENT
Start: 2025-06-16 | End: 2025-06-20

## 2025-06-16 RX ORDER — OXYCODONE HYDROCHLORIDE 30 MG/1
30 TABLET ORAL EVERY 4 HOURS
Refills: 0 | Status: DISCONTINUED | OUTPATIENT
Start: 2025-06-16 | End: 2025-06-20

## 2025-06-16 RX ORDER — GABAPENTIN 400 MG/1
100 CAPSULE ORAL THREE TIMES A DAY
Refills: 0 | Status: DISCONTINUED | OUTPATIENT
Start: 2025-06-16 | End: 2025-06-20

## 2025-06-16 RX ORDER — BISACODYL 5 MG
5 TABLET, DELAYED RELEASE (ENTERIC COATED) ORAL AT BEDTIME
Refills: 0 | Status: DISCONTINUED | OUTPATIENT
Start: 2025-06-16 | End: 2025-06-20

## 2025-06-16 RX ORDER — CARBOPLATIN 10 MG/ML
428 INJECTION, SOLUTION INTRAVENOUS ONCE
Refills: 0 | Status: COMPLETED | OUTPATIENT
Start: 2025-06-16 | End: 2025-06-16

## 2025-06-16 RX ORDER — DEXAMETHASONE 0.5 MG/1
12 TABLET ORAL ONCE
Refills: 0 | Status: COMPLETED | OUTPATIENT
Start: 2025-06-16 | End: 2025-06-16

## 2025-06-16 RX ORDER — LEVOTHYROXINE SODIUM 300 MCG
112 TABLET ORAL DAILY
Refills: 0 | Status: DISCONTINUED | OUTPATIENT
Start: 2025-06-17 | End: 2025-06-20

## 2025-06-16 RX ORDER — POLYETHYLENE GLYCOL 3350 17 G/17G
17 POWDER, FOR SOLUTION ORAL
Refills: 0 | Status: DISCONTINUED | OUTPATIENT
Start: 2025-06-16 | End: 2025-06-18

## 2025-06-16 RX ORDER — ALBUTEROL SULFATE 2.5 MG/3ML
2 VIAL, NEBULIZER (ML) INHALATION EVERY 6 HOURS
Refills: 0 | Status: DISCONTINUED | OUTPATIENT
Start: 2025-06-16 | End: 2025-06-20

## 2025-06-16 RX ORDER — NALOXEGOL OXALATE 12.5 MG/1
25 TABLET, FILM COATED ORAL DAILY
Refills: 0 | Status: DISCONTINUED | OUTPATIENT
Start: 2025-06-16 | End: 2025-06-20

## 2025-06-16 RX ORDER — OLANZAPINE 10 MG/1
5 TABLET ORAL AT BEDTIME
Refills: 0 | Status: DISCONTINUED | OUTPATIENT
Start: 2025-06-16 | End: 2025-06-20

## 2025-06-16 RX ORDER — CLONAZEPAM 0.5 MG/1
1 TABLET ORAL AT BEDTIME
Refills: 0 | Status: DISCONTINUED | OUTPATIENT
Start: 2025-06-16 | End: 2025-06-20

## 2025-06-16 RX ORDER — PALONOSETRON HYDROCHLORIDE 0.05 MG/ML
0.25 INJECTION, SOLUTION INTRAVENOUS ONCE
Refills: 0 | Status: COMPLETED | OUTPATIENT
Start: 2025-06-16 | End: 2025-06-16

## 2025-06-16 RX ORDER — APIXABAN 2.5 MG/1
5 TABLET, FILM COATED ORAL EVERY 12 HOURS
Refills: 0 | Status: DISCONTINUED | OUTPATIENT
Start: 2025-06-16 | End: 2025-06-20

## 2025-06-16 RX ADMIN — Medication 30 MILLIGRAM(S): at 22:52

## 2025-06-16 RX ADMIN — GABAPENTIN 100 MILLIGRAM(S): 400 CAPSULE ORAL at 15:33

## 2025-06-16 RX ADMIN — Medication 135 MILLIGRAM(S): at 14:54

## 2025-06-16 RX ADMIN — CLONAZEPAM 1 MILLIGRAM(S): 0.5 TABLET ORAL at 22:52

## 2025-06-16 RX ADMIN — Medication 2 TABLET(S): at 22:52

## 2025-06-16 RX ADMIN — Medication 30 MILLIGRAM(S): at 16:33

## 2025-06-16 RX ADMIN — Medication 5 MILLIGRAM(S): at 22:52

## 2025-06-16 RX ADMIN — DEXAMETHASONE 106 MILLIGRAM(S): 0.5 TABLET ORAL at 13:06

## 2025-06-16 RX ADMIN — Medication 1 APPLICATION(S): at 15:35

## 2025-06-16 RX ADMIN — PALONOSETRON HYDROCHLORIDE 0.25 MILLIGRAM(S): 0.05 INJECTION, SOLUTION INTRAVENOUS at 13:06

## 2025-06-16 RX ADMIN — Medication 30 MILLIGRAM(S): at 23:52

## 2025-06-16 RX ADMIN — CARBOPLATIN 428 MILLIGRAM(S): 10 INJECTION, SOLUTION INTRAVENOUS at 14:01

## 2025-06-16 RX ADMIN — APIXABAN 5 MILLIGRAM(S): 2.5 TABLET, FILM COATED ORAL at 19:09

## 2025-06-16 RX ADMIN — POLYETHYLENE GLYCOL 3350 17 GRAM(S): 17 POWDER, FOR SOLUTION ORAL at 19:10

## 2025-06-16 RX ADMIN — OLANZAPINE 5 MILLIGRAM(S): 10 TABLET ORAL at 22:52

## 2025-06-16 RX ADMIN — MIDODRINE HYDROCHLORIDE 10 MILLIGRAM(S): 5 TABLET ORAL at 22:52

## 2025-06-16 RX ADMIN — GABAPENTIN 100 MILLIGRAM(S): 400 CAPSULE ORAL at 22:52

## 2025-06-16 RX ADMIN — MIDODRINE HYDROCHLORIDE 10 MILLIGRAM(S): 5 TABLET ORAL at 15:33

## 2025-06-16 RX ADMIN — Medication 40 MILLIGRAM(S): at 19:09

## 2025-06-16 RX ADMIN — OXYCODONE HYDROCHLORIDE 30 MILLIGRAM(S): 30 TABLET ORAL at 15:05

## 2025-06-16 RX ADMIN — NALOXEGOL OXALATE 25 MILLIGRAM(S): 12.5 TABLET, FILM COATED ORAL at 22:53

## 2025-06-16 RX ADMIN — Medication 30 MILLIGRAM(S): at 15:33

## 2025-06-16 RX ADMIN — CLONAZEPAM 0.5 MILLIGRAM(S): 0.5 TABLET ORAL at 15:33

## 2025-06-16 RX ADMIN — OXYCODONE HYDROCHLORIDE 30 MILLIGRAM(S): 30 TABLET ORAL at 14:05

## 2025-06-16 NOTE — H&P ADULT - NSHPREVIEWOFSYSTEMS_GEN_ALL_CORE
CONSTITUTIONAL: No fever, weight loss, or fatigue  EYES: No eye pain, visual disturbances, or discharge  ENMT:  No difficulty hearing, tinnitus, vertigo; No sinus or throat pain  NECK: No pain or stiffness  BREASTS: No pain, masses, or nipple discharge  RESPIRATORY: No cough, wheezing, chills or hemoptysis; No shortness of breath  CARDIOVASCULAR: No chest pain, palpitations, dizziness, or leg swelling  GASTROINTESTINAL: No abdominal or epigastric pain. No nausea, vomiting, or hematemesis; No diarrhea or constipation. No melena or hematochezia.  GENITOURINARY: No dysuria, frequency, hematuria, or incontinence  NEUROLOGICAL: No headaches, memory loss, loss of strength, numbness, or tremors  SKIN: No itching, burning, rashes, or lesions   LYMPH NODES: No enlarged glands  ENDOCRINE: No heat or cold intolerance; No hair loss  MUSCULOSKELETAL: Positive msk chest pain--> old  no  back pain  PSYCHIATRIC: No depression, anxiety, mood swings, or difficulty sleeping  HEME/LYMPH: No easy bruising, or bleeding gums  ALLERGY AND IMMUNOLOGIC: No hives or eczema CONSTITUTIONAL: No fever, weight loss, or fatigue  EYES: No eye pain, visual disturbances, or discharge  ENMT:  No difficulty hearing, tinnitus, vertigo; No sinus or throat pain  NECK: No pain or stiffness  BREASTS: No pain, masses, or nipple discharge  RESPIRATORY: No cough, wheezing, chills or hemoptysis; No shortness of breath  CARDIOVASCULAR: No chest pain, palpitations, dizziness, or leg swelling  GASTROINTESTINAL: No abdominal or epigastric pain. No nausea, vomiting, or hematemesis; No diarrhea   POSITIVE  constipation.   No melena or hematochezia.  GENITOURINARY: No dysuria, frequency, hematuria, or incontinence  NEUROLOGICAL: No headaches, memory loss, loss of strength, numbness, or tremors  SKIN: No itching, burning, rashes, or lesions   LYMPH NODES: No enlarged glands  ENDOCRINE: No heat or cold intolerance; No hair loss  MUSCULOSKELETAL: Positive msk chest pain--> old  no  back pain  PSYCHIATRIC: No depression, anxiety, mood swings, or difficulty sleeping  HEME/LYMPH: No easy bruising, or bleeding gums  ALLERGY AND IMMUNOLOGIC: No hives or eczema

## 2025-06-16 NOTE — H&P ADULT - HISTORY OF PRESENT ILLNESS
63 yo woman, former smoker (47py; quit 12/2024) and well known to the OncHos service, with h/o pAfib (on Eliquis), Hypothyroidism, ? h/o Sleroderma, h/o pericardial effusion s/p pericardiocentesis (dx in 12/2024; ? 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 while hospitalized at [facility]- she tolerated tx without significant adverse effect. She presented as an outpt for C2 on 4/14- however, pt received Cosela and developed "severe headache" for which she required ED eval, but was ultimately sent home (she did not receive Etop or carbo on d1); on day 2 she received Etoposide for 5 minutes and started to have chest tightness and shortness of breath which was treated as a hypersensitivity reaction (she was not re-challenged due to patient preference at the time; she did not receive carbo or cosela on D2). Pt developed shortness of breath and palmar erythema with C3 (VSS); her infusion rate was slowed and she subsequently tolerated chemo without significant adverse effect.  Pt is now electively admitted for C5 Carbo/Etop.  Last admission was  5/19 to 5/27/25    for cycle 4 Carbo/ Etop. 65 yo woman, former smoker (47py; quit 12/2024) and well known to the OncHos service, with h/o pAfib (on Eliquis), Hypothyroidism, ? h/o Sleroderma, h/o pericardial effusion s/p pericardiocentesis (dx in 12/2024; ? 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 while hospitalized at [facility]- she tolerated tx without significant adverse effect. She presented as an outpt for C2 on 4/14- however, pt received Cosela and developed "severe headache" for which she required ED eval, but was ultimately sent home (she did not receive Etop or carbo on d1); on day 2 she received Etoposide for 5 minutes and started to have chest tightness and shortness of breath which was treated as a hypersensitivity reaction (she was not re-challenged due to patient preference at the time; she did not receive carbo or cosela on D2). Pt developed shortness of breath and palmar erythema with C3 (VSS); her infusion rate was slowed and she subsequently tolerated chemo without significant adverse effect.  Pt is now electively admitted for C5 Carbo/Etop.  Last admission was  5/19 to 5/27/25    for cycle 4 Carbo/ Etop.    ROS negative except for MSK pain to chest

## 2025-06-16 NOTE — PATIENT PROFILE ADULT - FALL HARM RISK - DEVICES
Ra Hoskins), Psychiatry  [id number] [address] [place] [id number]  Phone: [phone]  Fax: [phone] [place]

## 2025-06-16 NOTE — PATIENT PROFILE ADULT - FALL HARM RISK - HARM RISK INTERVENTIONS

## 2025-06-16 NOTE — H&P ADULT - NSHPLABSRESULTS_GEN_ALL_CORE
LABS:                        12.1   7.40  )-----------( 302      ( 16 Jun 2025 10:50 )             36.9     06-16    138  |  102  |  12  ----------------------------<  95  4.2   |  24  |  0.52    Ca    9.3      16 Jun 2025 10:50  Phos  3.5     06-16  Mg     2.00     06-16    TPro  7.0  /  Alb  4.2  /  TBili  0.5  /  DBili  x   /  AST  20  /  ALT  6   /  AlkPhos  107  06-16    EKG:  ord LABS:                        12.1   7.40  )-----------( 302      ( 16 Jun 2025 10:50 )             36.9     06-16    138  |  102  |  12  ----------------------------<  95  4.2   |  24  |  0.52    Ca    9.3      16 Jun 2025 10:50  Phos  3.5     06-16  Mg     2.00     06-16    TPro  7.0  /  Alb  4.2  /  TBili  0.5  /  DBili  x   /  AST  20  /  ALT  6   /  AlkPhos  107  06-16    EKG:  ord    < from: NM PET/CT Onc FDG Skull to Thigh, Inital (05.29.25 @ 13:34) >    IMPRESSION: Abnormal skull-to-thigh FDG-PET/CT scan.    1. Few FDG-avid lymph nodes in AP window and precarinal region are   decreased in size, compatible with metastatic disease witha partial   response to interval therapy.    2. Resolution of left upper lobe paramediastinal/suprahilar mass.    3. Small pericardial effusion, decreased since 3/20/2025.    4. Photopenic sclerotic density, left side of T12 vertebral body,   demonstrates increased sclerosis since 3/20/2025, suggestive of treated   bone metastasis.    5. Diffuse hypermetabolism in the remainder of the axial skeleton and   proximal appendicular skeleton, without corresponding abnormality on CT,   and diffuse splenic hypermetabolism. These findings likely are related to   recent treatment with colony stimulating factors.    < end of copied text >

## 2025-06-16 NOTE — H&P ADULT - NSHPPHYSICALEXAM_GEN_ALL_CORE
CAPILLARY BLOOD GLUCOSE    Vital Signs Last 24 Hrs  T(C): 36.7 (16 Jun 2025 09:45), Max: 36.7 (16 Jun 2025 09:45)  T(F): 98 (16 Jun 2025 09:45), Max: 98 (16 Jun 2025 09:45)  HR: 94 (16 Jun 2025 09:45) (94 - 94)  BP: 102/58 (16 Jun 2025 09:45) (102/58 - 102/58)  RR: 18 (16 Jun 2025 09:45) (18 - 18)  SpO2: 95%  room air      PHYSICAL EXAM:  GENERAL: NAD,   HEAD:  Atraumatic, Normocephalic  EYES: EOMI, PERRLA, conjunctiva and sclera clear  NECK: Supple, no JVD  CHEST/LUNG: Clear to auscultation bilaterally with dec bs at bases only  HEART: Regular rate and rhythm; no murmurs, rubs, or gallops  ABDOMEN: Soft, Nontender, Nondistended; Bowel sounds present  EXTREMITIES:  warm and well perfused, no clubbing, cyanosis, or edema  PSYCH: AAOx3  NEUROLOGY: non-focal  SKIN: no rashes or lesions

## 2025-06-16 NOTE — H&P ADULT - ASSESSMENT
65 yo woman, former smoker (47py; quit 12/2024) and well known to the OncHos service, with h/o pAfib (on Eliquis), Hypothyroidism, ? h/o Sleroderma, h/o pericardial effusion s/p pericardiocentesis (dx in 12/2024; ? 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    Pt developed shortness of breath and palmar erythema with C3 (VSS); her infusion rate was slowed and she subsequently tolerated chemo without significant adverse effect.  Pt is now electively admitted for C5 Carbo/Etop.   63 yo woman, former smoker (47py; quit 12/2024) and well known to the OncHos service, with h/o pAfib (on Eliquis), Hypothyroidism, ? h/o Sleroderma, h/o pericardial effusion s/p pericardiocentesis (dx in 12/2024; ? 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    Pt developed shortness of breath and palmar erythema with C3 (VSS); her infusion rate was slowed and she subsequently tolerated chemo without significant adverse effect.  Pt is now electively admitted for C5 Carbo/Etop.      ·   ACTIVE PROBLEMS  SCLCa (presumed limited stage)  Admission for chemotherapy  Anxiety/depression  pAfib  Hypercoag state  Immunocompromised due to chemotherapy    SCLCa (presumed limited stage)  Will proceed with C5 Carbo/Etop today (20% dose reduced)  Monitoring and with plan to treat hypersensitivity reaction as per protocol  Fluphila x1 dose to be given on day 4 (5/22)  Pt to continue outpt fu with Dr. Hall after discharge  Long-term prognosis: guarded; pt is full code    Anxiety/depression  Continuing Klonopin 0.5mg BID and 1mg nightly  Continuing Zyprexa 5mg nightly      h/o Asthma/COPD (former smoker)---> No acute exacerbation.   Continuing supplemental O2 via NC and supportive resp care prn  Continuing Advair 100-50mcg MDI BID  Continuing duonebs q6/prn     Cancer related pain  Continuing Morphine ER 30mg q12  Continuing Oxy IR 20/30mg q8/prn  Continuing Gabapentin 100mg TID  Continuing Baclofen 5mg q8/prn    Constipation  Possibly due to opioids +/- AID  Pt denied abd pain, diarrhea  Can get Relistor after chmo    Hypothyroidism: continuing LT4 112mcg daily    h/o pericardial effusion: most likely inflammatory; continuing Colchicine 0.6mg daily    pAfib  Hypercoag state  Continuing Eliquis 5mg BID    DVT proph---> already on Eliquis.     65 yo woman, former smoker (47py; quit 12/2024) and well known to the OncHos service, with h/o pAfib (on Eliquis), Hypothyroidism, ? h/o Sleroderma, h/o pericardial effusion s/p pericardiocentesis (dx in 12/2024; ? 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    Pt developed shortness of breath and palmar erythema with C3 (VSS); her infusion rate was slowed and she subsequently tolerated chemo without significant adverse effect.  Pt is now electively admitted for C5 Carbo/Etop.    Patient seen with friend Arvind at bedside--> she asked for pET scan rests--> Results given to patient       ·   ACTIVE PROBLEMS  SCLCa (presumed limited stage)  L chest chronic pain  constipation   Admission for chemotherapy  Anxiety/depression  pAfib  Hypercoag state  Immunocompromised due to chemotherapy  Hypercoagulable state       SCLCa (presumed limited stage). Lung cancer  Will proceed with C5 Carbo/Etop today (20% dose reduced)  Monitoring and with plan to treat hypersensitivity reaction as per protocol  Fluphila x1 dose to be given on day 4 (5/22)  Pt to continue outpt fu with Dr. Hall after discharge  Long-term prognosis: guarded; pt is full code    Anxiety/depression  Continuing Klonopin 0.5mg BID and 1mg nightly  Continuing Zyprexa 5mg nightly      h/o Asthma/COPD (former smoker)---> No acute exacerbation.   Continuing supplemental O2 via NC and supportive resp care prn  Continuing Advair 100-50mcg MDI BID  Continuing duonebs q6/prn     Cancer related pain  Continuing Morphine ER 30mg q12  Continuing Oxy IR 20/30mg q8/prn  Continuing Gabapentin 100mg TID  Continuing Baclofen 5mg q8/prn    Constipation  Possibly due to opioids +/- AID  Pt denied abd pain, diarrhea  Can get Relistor after chmo    Hypothyroidism: continuing LT4 112mcg daily    h/o pericardial effusion: most likely inflammatory; continuing Colchicine 0.6mg daily    pAfib  Hypercoag state  Continuing Eliquis 5mg BID    DVT proph---> already on Eliquis.     65 yo woman, former smoker (47py; quit 12/2024) and well known to the OncHos service, with h/o pAfib (on Eliquis), Hypothyroidism, ? h/o Sleroderma, h/o pericardial effusion s/p pericardiocentesis (dx in 12/2024; ? 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    Pt developed shortness of breath and palmar erythema with C3 (VSS); her infusion rate was slowed and she subsequently tolerated chemo without significant adverse effect.  Pt is now electively admitted for C5 Carbo/Etop.    Patient seen with friend Arvind at bedside--> she asked for pET scan rests--> Results given to patient       ·   ACTIVE PROBLEMS  SCLCa (presumed limited stage)  L chest chronic pain  constipation   Admission for chemotherapy  Anxiety/depression  pAfib  Hypercoag state  Immunocompromised due to chemotherapy  Hypercoagulable state   Orthostatic hypotension      SCLCa (presumed limited stage). Lung cancer  Will proceed with C5 Carbo/Etop today (20% dose reduced)  Monitoring and with plan to treat hypersensitivity reaction as per protocol  Fluphila x1 dose to be given on day 4 (5/22)  Pt to continue outpt fu with Dr. Hall after discharge  Long-term prognosis: guarded; pt is full code    Anxiety/depression  Continuing Klonopin 0.5mg BID and 1mg nightly  Continuing Zyprexa 5mg nightly      h/o Asthma/COPD (former smoker)---> No acute exacerbation.   Continuing supplemental O2 via NC and supportive resp care prn  Continuing Advair 100-50mcg MDI BID  Continuing duonebs q6/prn     Cancer related pain  Continuing Morphine ER 30mg q12  Continuing Oxy IR 20/30mg q8/prn  Continuing Gabapentin 100mg TID  Continuing Baclofen 5mg q8/prn    Constipation  Possibly due to opioids +/- AID  Pt denied abd pain, diarrhea  Can get Relistor after chmo    Hypothyroidism: continuing LT4 112mcg daily    h/o pericardial effusion: most likely inflammatory; continuing Colchicine 0.6mg daily    pAfib  Hypercoag state  Continuing Eliquis 5mg BID    Orthostatic hypotension---> c/w midodrine 10 mg q8.  DVT proph---> already on Eliquis.

## 2025-06-17 DIAGNOSIS — C34.90 MALIGNANT NEOPLASM OF UNSPECIFIED PART OF UNSPECIFIED BRONCHUS OR LUNG: ICD-10-CM

## 2025-06-17 DIAGNOSIS — G89.3 NEOPLASM RELATED PAIN (ACUTE) (CHRONIC): ICD-10-CM

## 2025-06-17 DIAGNOSIS — K59.00 CONSTIPATION, UNSPECIFIED: ICD-10-CM

## 2025-06-17 LAB
ALBUMIN SERPL ELPH-MCNC: 3.7 G/DL — SIGNIFICANT CHANGE UP (ref 3.3–5)
ALP SERPL-CCNC: 98 U/L — SIGNIFICANT CHANGE UP (ref 40–120)
ALT FLD-CCNC: 6 U/L — SIGNIFICANT CHANGE UP (ref 4–33)
ANION GAP SERPL CALC-SCNC: 11 MMOL/L — SIGNIFICANT CHANGE UP (ref 7–14)
ANISOCYTOSIS BLD QL: SLIGHT — SIGNIFICANT CHANGE UP
AST SERPL-CCNC: 13 U/L — SIGNIFICANT CHANGE UP (ref 4–32)
BASOPHILS # BLD AUTO: 0 K/UL — SIGNIFICANT CHANGE UP (ref 0–0.2)
BASOPHILS NFR BLD AUTO: 0 % — SIGNIFICANT CHANGE UP (ref 0–2)
BILIRUB SERPL-MCNC: 0.4 MG/DL — SIGNIFICANT CHANGE UP (ref 0.2–1.2)
BUN SERPL-MCNC: 14 MG/DL — SIGNIFICANT CHANGE UP (ref 7–23)
CALCIUM SERPL-MCNC: 9 MG/DL — SIGNIFICANT CHANGE UP (ref 8.4–10.5)
CHLORIDE SERPL-SCNC: 105 MMOL/L — SIGNIFICANT CHANGE UP (ref 98–107)
CO2 SERPL-SCNC: 21 MMOL/L — LOW (ref 22–31)
CREAT SERPL-MCNC: 0.41 MG/DL — LOW (ref 0.5–1.3)
EGFR: 108 ML/MIN/1.73M2 — SIGNIFICANT CHANGE UP
EGFR: 108 ML/MIN/1.73M2 — SIGNIFICANT CHANGE UP
ELLIPTOCYTES BLD QL SMEAR: SLIGHT — SIGNIFICANT CHANGE UP
EOSINOPHIL # BLD AUTO: 0 K/UL — SIGNIFICANT CHANGE UP (ref 0–0.5)
EOSINOPHIL NFR BLD AUTO: 0 % — SIGNIFICANT CHANGE UP (ref 0–6)
GLUCOSE SERPL-MCNC: 117 MG/DL — HIGH (ref 70–99)
HCT VFR BLD CALC: 32 % — LOW (ref 34.5–45)
HGB BLD-MCNC: 10.7 G/DL — LOW (ref 11.5–15.5)
IANC: 2.47 K/UL — SIGNIFICANT CHANGE UP (ref 1.8–7.4)
LYMPHOCYTES # BLD AUTO: 0.55 K/UL — LOW (ref 1–3.3)
LYMPHOCYTES # BLD AUTO: 16 % — SIGNIFICANT CHANGE UP (ref 13–44)
MAGNESIUM SERPL-MCNC: 2 MG/DL — SIGNIFICANT CHANGE UP (ref 1.6–2.6)
MANUAL SMEAR VERIFICATION: SIGNIFICANT CHANGE UP
MCHC RBC-ENTMCNC: 31.5 PG — SIGNIFICANT CHANGE UP (ref 27–34)
MCHC RBC-ENTMCNC: 33.4 G/DL — SIGNIFICANT CHANGE UP (ref 32–36)
MCV RBC AUTO: 94.1 FL — SIGNIFICANT CHANGE UP (ref 80–100)
MONOCYTES # BLD AUTO: 0.2 K/UL — SIGNIFICANT CHANGE UP (ref 0–0.9)
MONOCYTES NFR BLD AUTO: 6 % — SIGNIFICANT CHANGE UP (ref 2–14)
NEUTROPHILS # BLD AUTO: 2.66 K/UL — SIGNIFICANT CHANGE UP (ref 1.8–7.4)
NEUTROPHILS NFR BLD AUTO: 78 % — HIGH (ref 43–77)
NRBC # BLD: 0 /100 WBCS — SIGNIFICANT CHANGE UP (ref 0–0)
NRBC BLD-RTO: 0 /100 WBCS — SIGNIFICANT CHANGE UP (ref 0–0)
PHOSPHATE SERPL-MCNC: 3.3 MG/DL — SIGNIFICANT CHANGE UP (ref 2.5–4.5)
PLAT MORPH BLD: NORMAL — SIGNIFICANT CHANGE UP
PLATELET # BLD AUTO: 273 K/UL — SIGNIFICANT CHANGE UP (ref 150–400)
POTASSIUM SERPL-MCNC: 3.9 MMOL/L — SIGNIFICANT CHANGE UP (ref 3.5–5.3)
POTASSIUM SERPL-SCNC: 3.9 MMOL/L — SIGNIFICANT CHANGE UP (ref 3.5–5.3)
PROT SERPL-MCNC: 6.2 G/DL — SIGNIFICANT CHANGE UP (ref 6–8.3)
RBC # BLD: 3.4 M/UL — LOW (ref 3.8–5.2)
RBC # FLD: 16 % — HIGH (ref 10.3–14.5)
RBC BLD AUTO: ABNORMAL
SODIUM SERPL-SCNC: 137 MMOL/L — SIGNIFICANT CHANGE UP (ref 135–145)
WBC # BLD: 3.41 K/UL — LOW (ref 3.8–10.5)
WBC # FLD AUTO: 3.41 K/UL — LOW (ref 3.8–10.5)

## 2025-06-17 PROCEDURE — 99233 SBSQ HOSP IP/OBS HIGH 50: CPT

## 2025-06-17 RX ORDER — DEXAMETHASONE 0.5 MG/1
8 TABLET ORAL EVERY 24 HOURS
Refills: 0 | Status: COMPLETED | OUTPATIENT
Start: 2025-06-17 | End: 2025-06-18

## 2025-06-17 RX ADMIN — MIDODRINE HYDROCHLORIDE 10 MILLIGRAM(S): 5 TABLET ORAL at 22:05

## 2025-06-17 RX ADMIN — Medication 1 APPLICATION(S): at 07:02

## 2025-06-17 RX ADMIN — DEXAMETHASONE 101.6 MILLIGRAM(S): 0.5 TABLET ORAL at 12:45

## 2025-06-17 RX ADMIN — Medication 40 MILLIGRAM(S): at 07:00

## 2025-06-17 RX ADMIN — OLANZAPINE 5 MILLIGRAM(S): 10 TABLET ORAL at 22:03

## 2025-06-17 RX ADMIN — Medication 135 MILLIGRAM(S): at 14:06

## 2025-06-17 RX ADMIN — Medication 40 MILLIGRAM(S): at 19:14

## 2025-06-17 RX ADMIN — CLONAZEPAM 0.5 MILLIGRAM(S): 0.5 TABLET ORAL at 07:05

## 2025-06-17 RX ADMIN — APIXABAN 5 MILLIGRAM(S): 2.5 TABLET, FILM COATED ORAL at 07:01

## 2025-06-17 RX ADMIN — GABAPENTIN 100 MILLIGRAM(S): 400 CAPSULE ORAL at 07:01

## 2025-06-17 RX ADMIN — Medication 750 MILLILITER(S): at 17:40

## 2025-06-17 RX ADMIN — Medication 30 MILLIGRAM(S): at 07:59

## 2025-06-17 RX ADMIN — Medication 112 MICROGRAM(S): at 07:01

## 2025-06-17 RX ADMIN — GABAPENTIN 100 MILLIGRAM(S): 400 CAPSULE ORAL at 13:40

## 2025-06-17 RX ADMIN — MIDODRINE HYDROCHLORIDE 10 MILLIGRAM(S): 5 TABLET ORAL at 15:11

## 2025-06-17 RX ADMIN — Medication 30 MILLIGRAM(S): at 22:48

## 2025-06-17 RX ADMIN — Medication 30 MILLIGRAM(S): at 17:14

## 2025-06-17 RX ADMIN — OXYCODONE HYDROCHLORIDE 30 MILLIGRAM(S): 30 TABLET ORAL at 13:44

## 2025-06-17 RX ADMIN — Medication 30 MILLIGRAM(S): at 06:59

## 2025-06-17 RX ADMIN — GABAPENTIN 100 MILLIGRAM(S): 400 CAPSULE ORAL at 22:48

## 2025-06-17 RX ADMIN — Medication 30 MILLIGRAM(S): at 23:48

## 2025-06-17 RX ADMIN — MIDODRINE HYDROCHLORIDE 10 MILLIGRAM(S): 5 TABLET ORAL at 07:00

## 2025-06-17 RX ADMIN — COLCHICINE 0.6 MILLIGRAM(S): 0.6 TABLET, FILM COATED ORAL at 13:40

## 2025-06-17 RX ADMIN — Medication 30 MILLIGRAM(S): at 18:14

## 2025-06-17 RX ADMIN — OXYCODONE HYDROCHLORIDE 30 MILLIGRAM(S): 30 TABLET ORAL at 12:44

## 2025-06-17 RX ADMIN — APIXABAN 5 MILLIGRAM(S): 2.5 TABLET, FILM COATED ORAL at 19:15

## 2025-06-17 RX ADMIN — CLONAZEPAM 1 MILLIGRAM(S): 0.5 TABLET ORAL at 22:48

## 2025-06-17 NOTE — PROGRESS NOTE ADULT - PROBLEM SELECTOR PLAN 6
- 3/12- See GOC. Patient appointed and completed HCP, named long time friend Sam Perlajayobdulio.  - 3/13: patient had further conversation with primary team and requested a copy of molst to review; expressing concerns about her ability to tolerate cancer tx with underlying scleroderma and overall QOL.  - 3/20- See GOC. Discussed treatment options, code status.    3/21: see goc; patient now opting for chemo and remains Full code after speaking with her HCP Sma; she wishes to be dnr/dni but does not want to go against sam's wishes. 02-Jun-2025 21:38

## 2025-06-17 NOTE — PROGRESS NOTE ADULT - PROBLEM SELECTOR PLAN 1
< from: CT Chest No Cont (03.20.25 @ 21:31) >  1.  Moderate-sized pericardial effusion appears unchanged compared to   partially imaged heart on 3/18/2025 but increase insize since 2/15/2025.  2.  Interval increase in size of previously described left suprahilar   mediastinal mass which extends into the AP window and paramediastinal   left upper lobe.  3.  Some of the other left upper lobe nodules are decrease and some   increase.  4.  Unchanged sclerotic lesions within the sternum and T12 vertebral body.  C/w  C5 Carbo/Etop chemotherapy    Out patient f/u with Dr Hall at [facility] on d/c < from: CT Chest No Cont (03.20.25 @ 21:31) >  1.  Moderate-sized pericardial effusion appears unchanged compared to   partially imaged heart on 3/18/2025 but increase insize since 2/15/2025.  2.  Interval increase in size of previously described left suprahilar   mediastinal mass which extends into the AP window and paramediastinal   left upper lobe.  3.  Some of the other left upper lobe nodules are decrease and some   increase.  4.  Unchanged sclerotic lesions within the sternum and T12 vertebral body.  C/w  C5 Carbo/Etop chemotherapy  6/19 plan to give--> Fulphila has been ordered by the Onc .  Out patient f/u with Dr Hall at [facility] on d/c

## 2025-06-17 NOTE — PROVIDER CONTACT NOTE (OTHER) - ACTION/TREATMENT ORDERED:
Bolus given and chemo restarted. Pt refused an EKG.
Provider Arambula aware. Give Pain meds and 30 to 1 hour after. Rechecked BP 2 hours after all meds given.

## 2025-06-17 NOTE — PROVIDER CONTACT NOTE (OTHER) - ASSESSMENT
Rechecked BP manually 90-/50
asymptomatic, no complaints of chest pain, Pt states "I always get low pressure and fast heart rates". (75HR)

## 2025-06-17 NOTE — PROGRESS NOTE ADULT - SUBJECTIVE AND OBJECTIVE BOX
Onc Hosp Solid Tumor note  Patient is a 64y old  Female who presents with a chief complaint of for C5 Carbo/Etop. (16 Jun 2025 10:15)      SUBJECTIVE / OVERNIGHT EVENTS:    MEDICATIONS  (STANDING):  apixaban 5 milliGRAM(s) Oral every 12 hours  bisacodyl 5 milliGRAM(s) Oral at bedtime  chlorhexidine 2% Cloths 1 Application(s) Topical <User Schedule>  clonazePAM  Tablet 0.5 milliGRAM(s) Oral <User Schedule>  clonazePAM  Tablet 1 milliGRAM(s) Oral at bedtime  colchicine 0.6 milliGRAM(s) Oral daily  dexAMETHasone  IVPB 8 milliGRAM(s) IV Intermittent every 24 hours  etoposide (TOPOSAR) IVPB (eMAR) 135 milliGRAM(s) IV Intermittent every 24 hours  gabapentin 100 milliGRAM(s) Oral three times a day  levothyroxine 112 MICROGram(s) Oral daily  midodrine 10 milliGRAM(s) Oral every 8 hours  morphine ER Tablet 30 milliGRAM(s) Oral every 8 hours  naloxegol 25 milliGRAM(s) Oral daily  OLANZapine 5 milliGRAM(s) Oral at bedtime  pantoprazole    Tablet 40 milliGRAM(s) Oral every 12 hours  polyethylene glycol 3350 17 Gram(s) Oral two times a day  senna 2 Tablet(s) Oral at bedtime    MEDICATIONS  (PRN):  albuterol    90 MICROgram(s) HFA Inhaler 2 Puff(s) Inhalation every 6 hours PRN Shortness of Breath and/or Wheezing  oxyCODONE    IR 30 milliGRAM(s) Oral every 4 hours PRN Moderate Pain (4 - 6)        CAPILLARY BLOOD GLUCOSE        I&O's Summary    16 Jun 2025 07:01  -  17 Jun 2025 07:00  --------------------------------------------------------  IN: 550 mL / OUT: 400 mL / NET: 150 mL      Vital Signs Last 24 Hrs  T(C): 36.8 (16 Jun 2025 14:24), Max: 36.8 (16 Jun 2025 14:24)  T(F): 98.2 (16 Jun 2025 14:24), Max: 98.2 (16 Jun 2025 14:24)  HR: 90 (16 Jun 2025 14:24) (90 - 94)  BP: 110/62 (16 Jun 2025 14:24) (102/58 - 110/62)  RR: 16 (16 Jun 2025 14:24) (16 - 18)  SpO2: 95% room air      PHYSICAL EXAM:  GENERAL: NAD,   HEAD:  Atraumatic, Normocephalic  EYES: EOMI, PERRLA, conjunctiva and sclera clear  NECK: Supple, No JVD  CHEST/LUNG: Clear to auscultation bilaterally; No wheeze  HEART: Regular rate and rhythm; No murmurs, rubs, or gallops  ABDOMEN: Soft, Nontender, Nondistended; Bowel sounds present  EXTREMITIES:  2+ Peripheral Pulses, No clubbing, cyanosis, or edema  PSYCH: AAOx3  NEUROLOGY: non-focal  SKIN: No rashes or lesions    LABS:                        10.7   3.41  )-----------( 273      ( 17 Jun 2025 06:54 )             32.0     06-16    138  |  102  |  12  ----------------------------<  95  4.2   |  24  |  0.52    Ca    9.3      16 Jun 2025 10:50  Phos  3.5     06-16  Mg     2.00     06-16    TPro  7.0  /  Alb  4.2  /  TBili  0.5  /  DBili  x   /  AST  20  /  ALT  6   /  AlkPhos  107  06-16        Care Discussed with Consultants/Other Providers:   Onc Hosp Solid Tumor note  Patient is a 64y old  Female who presents with a chief complaint of for C5 Carbo/Etop. (16 Jun 2025 10:15)      SUBJECTIVE / OVERNIGHT EVENTS:  Patient notes lots of pain with chemo.  Feels her scleroderma pain gets worse with her chemo.  RN will give pain meds    MEDICATIONS  (STANDING):  apixaban 5 milliGRAM(s) Oral every 12 hours  bisacodyl 5 milliGRAM(s) Oral at bedtime  chlorhexidine 2% Cloths 1 Application(s) Topical <User Schedule>  clonazePAM  Tablet 0.5 milliGRAM(s) Oral <User Schedule>  clonazePAM  Tablet 1 milliGRAM(s) Oral at bedtime  colchicine 0.6 milliGRAM(s) Oral daily  dexAMETHasone  IVPB 8 milliGRAM(s) IV Intermittent every 24 hours  etoposide (TOPOSAR) IVPB (eMAR) 135 milliGRAM(s) IV Intermittent every 24 hours  gabapentin 100 milliGRAM(s) Oral three times a day  levothyroxine 112 MICROGram(s) Oral daily  midodrine 10 milliGRAM(s) Oral every 8 hours  morphine ER Tablet 30 milliGRAM(s) Oral every 8 hours  naloxegol 25 milliGRAM(s) Oral daily  OLANZapine 5 milliGRAM(s) Oral at bedtime  pantoprazole    Tablet 40 milliGRAM(s) Oral every 12 hours  polyethylene glycol 3350 17 Gram(s) Oral two times a day  senna 2 Tablet(s) Oral at bedtime    MEDICATIONS  (PRN):  albuterol    90 MICROgram(s) HFA Inhaler 2 Puff(s) Inhalation every 6 hours PRN Shortness of Breath and/or Wheezing  oxyCODONE    IR 30 milliGRAM(s) Oral every 4 hours PRN Moderate Pain (4 - 6)      I&O's Summary    16 Jun 2025 07:01  -  17 Jun 2025 07:00  --------------------------------------------------------  IN: 550 mL / OUT: 400 mL / NET: 150 mL      Vital Signs Last 24 Hrs  T(C): 36.8 (16 Jun 2025 14:24), Max: 36.8 (16 Jun 2025 14:24)  T(F): 98.2 (16 Jun 2025 14:24), Max: 98.2 (16 Jun 2025 14:24)  HR: 90 (16 Jun 2025 14:24) (90 - 94)  BP: 110/62 (16 Jun 2025 14:24) (102/58 - 110/62)  RR: 16 (16 Jun 2025 14:24) (16 - 18)  SpO2: 95% room air      PHYSICAL EXAM:  GENERAL: NAD,   HEAD:  Atraumatic, Normocephalic  EYES: EOMI, PERRLA, conjunctiva and sclera clear  NECK: Supple, No JVD  CHEST/LUNG: Clear to auscultation bilaterally; No wheeze  HEART: Regular rate and rhythm; No murmurs, rubs, or gallops  ABDOMEN: Soft, Nontender, Nondistended; Bowel sounds present  EXTREMITIES:  2+ Peripheral Pulses, No clubbing, cyanosis, or edema  PSYCH: AAOx3  NEUROLOGY: non-focal  SKIN: No rashes or lesions    LABS:                        10.7   3.41  )-----------( 273      ( 17 Jun 2025 06:54 )             32.0     06-16    138  |  102  |  12  ----------------------------<  95  4.2   |  24  |  0.52    Ca    9.3      16 Jun 2025 10:50  Phos  3.5     06-16  Mg     2.00     06-16    TPro  7.0  /  Alb  4.2  /  TBili  0.5  /  DBili  x   /  AST  20  /  ALT  6   /  AlkPhos  107  06-16        Care Discussed with Consultants/Other Providers:

## 2025-06-18 ENCOUNTER — TRANSCRIPTION ENCOUNTER (OUTPATIENT)
Age: 65
End: 2025-06-18

## 2025-06-18 LAB
ALBUMIN SERPL ELPH-MCNC: 3.5 G/DL — SIGNIFICANT CHANGE UP (ref 3.3–5)
ALP SERPL-CCNC: 83 U/L — SIGNIFICANT CHANGE UP (ref 40–120)
ALT FLD-CCNC: 6 U/L — SIGNIFICANT CHANGE UP (ref 4–33)
ANION GAP SERPL CALC-SCNC: 11 MMOL/L — SIGNIFICANT CHANGE UP (ref 7–14)
AST SERPL-CCNC: 14 U/L — SIGNIFICANT CHANGE UP (ref 4–32)
BASOPHILS # BLD AUTO: 0.01 K/UL — SIGNIFICANT CHANGE UP (ref 0–0.2)
BASOPHILS NFR BLD AUTO: 0.3 % — SIGNIFICANT CHANGE UP (ref 0–2)
BILIRUB SERPL-MCNC: 0.4 MG/DL — SIGNIFICANT CHANGE UP (ref 0.2–1.2)
BUN SERPL-MCNC: 13 MG/DL — SIGNIFICANT CHANGE UP (ref 7–23)
CALCIUM SERPL-MCNC: 8.2 MG/DL — LOW (ref 8.4–10.5)
CHLORIDE SERPL-SCNC: 106 MMOL/L — SIGNIFICANT CHANGE UP (ref 98–107)
CO2 SERPL-SCNC: 22 MMOL/L — SIGNIFICANT CHANGE UP (ref 22–31)
CREAT SERPL-MCNC: 0.46 MG/DL — LOW (ref 0.5–1.3)
EGFR: 105 ML/MIN/1.73M2 — SIGNIFICANT CHANGE UP
EGFR: 105 ML/MIN/1.73M2 — SIGNIFICANT CHANGE UP
EOSINOPHIL # BLD AUTO: 0 K/UL — SIGNIFICANT CHANGE UP (ref 0–0.5)
EOSINOPHIL NFR BLD AUTO: 0 % — SIGNIFICANT CHANGE UP (ref 0–6)
GLUCOSE SERPL-MCNC: 116 MG/DL — HIGH (ref 70–99)
HCT VFR BLD CALC: 29.4 % — LOW (ref 34.5–45)
HGB BLD-MCNC: 9.7 G/DL — LOW (ref 11.5–15.5)
IMM GRANULOCYTES # BLD AUTO: 0.02 K/UL — SIGNIFICANT CHANGE UP (ref 0–0.07)
IMM GRANULOCYTES NFR BLD AUTO: 0.5 % — SIGNIFICANT CHANGE UP (ref 0–0.9)
LYMPHOCYTES # BLD AUTO: 0.97 K/UL — LOW (ref 1–3.3)
LYMPHOCYTES NFR BLD AUTO: 25.2 % — SIGNIFICANT CHANGE UP (ref 13–44)
MAGNESIUM SERPL-MCNC: 2.1 MG/DL — SIGNIFICANT CHANGE UP (ref 1.6–2.6)
MCHC RBC-ENTMCNC: 31.5 PG — SIGNIFICANT CHANGE UP (ref 27–34)
MCHC RBC-ENTMCNC: 33 G/DL — SIGNIFICANT CHANGE UP (ref 32–36)
MCV RBC AUTO: 95.5 FL — SIGNIFICANT CHANGE UP (ref 80–100)
MONOCYTES # BLD AUTO: 0.39 K/UL — SIGNIFICANT CHANGE UP (ref 0–0.9)
MONOCYTES NFR BLD AUTO: 10.1 % — SIGNIFICANT CHANGE UP (ref 2–14)
NEUTROPHILS # BLD AUTO: 2.46 K/UL — SIGNIFICANT CHANGE UP (ref 1.8–7.4)
NEUTROPHILS NFR BLD AUTO: 63.9 % — SIGNIFICANT CHANGE UP (ref 43–77)
NRBC # BLD AUTO: 0 K/UL — SIGNIFICANT CHANGE UP (ref 0–0)
NRBC # FLD: 0 K/UL — SIGNIFICANT CHANGE UP (ref 0–0)
NRBC BLD AUTO-RTO: 0 /100 WBCS — SIGNIFICANT CHANGE UP (ref 0–0)
PHOSPHATE SERPL-MCNC: 3.1 MG/DL — SIGNIFICANT CHANGE UP (ref 2.5–4.5)
PLATELET # BLD AUTO: 212 K/UL — SIGNIFICANT CHANGE UP (ref 150–400)
PMV BLD: 11 FL — SIGNIFICANT CHANGE UP (ref 7–13)
POTASSIUM SERPL-MCNC: 3.7 MMOL/L — SIGNIFICANT CHANGE UP (ref 3.5–5.3)
POTASSIUM SERPL-SCNC: 3.7 MMOL/L — SIGNIFICANT CHANGE UP (ref 3.5–5.3)
PROT SERPL-MCNC: 5.7 G/DL — LOW (ref 6–8.3)
RBC # BLD: 3.08 M/UL — LOW (ref 3.8–5.2)
RBC # FLD: 16.4 % — HIGH (ref 10.3–14.5)
SODIUM SERPL-SCNC: 139 MMOL/L — SIGNIFICANT CHANGE UP (ref 135–145)
WBC # BLD: 3.85 K/UL — SIGNIFICANT CHANGE UP (ref 3.8–10.5)
WBC # FLD AUTO: 3.85 K/UL — SIGNIFICANT CHANGE UP (ref 3.8–10.5)

## 2025-06-18 PROCEDURE — 99232 SBSQ HOSP IP/OBS MODERATE 35: CPT

## 2025-06-18 RX ORDER — POLYETHYLENE GLYCOL 3350 17 G/17G
17 POWDER, FOR SOLUTION ORAL
Refills: 0 | Status: DISCONTINUED | OUTPATIENT
Start: 2025-06-18 | End: 2025-06-20

## 2025-06-18 RX ORDER — NICOTINE POLACRILEX 4 MG/1
1 GUM, CHEWING ORAL DAILY
Refills: 0 | Status: DISCONTINUED | OUTPATIENT
Start: 2025-06-18 | End: 2025-06-20

## 2025-06-18 RX ORDER — PEGFILGRASTIM-CBQV 6 MG/.6ML
6 INJECTION, SOLUTION SUBCUTANEOUS ONCE
Refills: 0 | Status: COMPLETED | OUTPATIENT
Start: 2025-06-19 | End: 2025-06-19

## 2025-06-18 RX ORDER — LACTULOSE 10 G/15ML
20 SOLUTION ORAL EVERY 12 HOURS
Refills: 0 | Status: DISCONTINUED | OUTPATIENT
Start: 2025-06-18 | End: 2025-06-20

## 2025-06-18 RX ORDER — MIDODRINE HYDROCHLORIDE 5 MG/1
1 TABLET ORAL
Qty: 90 | Refills: 0
Start: 2025-06-18 | End: 2025-07-17

## 2025-06-18 RX ADMIN — Medication 30 MILLIGRAM(S): at 08:23

## 2025-06-18 RX ADMIN — DEXAMETHASONE 101.6 MILLIGRAM(S): 0.5 TABLET ORAL at 13:36

## 2025-06-18 RX ADMIN — OXYCODONE HYDROCHLORIDE 30 MILLIGRAM(S): 30 TABLET ORAL at 18:08

## 2025-06-18 RX ADMIN — POLYETHYLENE GLYCOL 3350 17 GRAM(S): 17 POWDER, FOR SOLUTION ORAL at 19:58

## 2025-06-18 RX ADMIN — APIXABAN 5 MILLIGRAM(S): 2.5 TABLET, FILM COATED ORAL at 18:09

## 2025-06-18 RX ADMIN — MIDODRINE HYDROCHLORIDE 10 MILLIGRAM(S): 5 TABLET ORAL at 08:27

## 2025-06-18 RX ADMIN — LACTULOSE 20 GRAM(S): 10 SOLUTION ORAL at 19:58

## 2025-06-18 RX ADMIN — OXYCODONE HYDROCHLORIDE 30 MILLIGRAM(S): 30 TABLET ORAL at 08:04

## 2025-06-18 RX ADMIN — Medication 40 MILLIGRAM(S): at 18:09

## 2025-06-18 RX ADMIN — APIXABAN 5 MILLIGRAM(S): 2.5 TABLET, FILM COATED ORAL at 07:23

## 2025-06-18 RX ADMIN — Medication 30 MILLIGRAM(S): at 14:37

## 2025-06-18 RX ADMIN — OXYCODONE HYDROCHLORIDE 30 MILLIGRAM(S): 30 TABLET ORAL at 22:18

## 2025-06-18 RX ADMIN — OXYCODONE HYDROCHLORIDE 30 MILLIGRAM(S): 30 TABLET ORAL at 13:34

## 2025-06-18 RX ADMIN — OLANZAPINE 5 MILLIGRAM(S): 10 TABLET ORAL at 22:18

## 2025-06-18 RX ADMIN — MIDODRINE HYDROCHLORIDE 10 MILLIGRAM(S): 5 TABLET ORAL at 14:37

## 2025-06-18 RX ADMIN — GABAPENTIN 100 MILLIGRAM(S): 400 CAPSULE ORAL at 13:34

## 2025-06-18 RX ADMIN — OXYCODONE HYDROCHLORIDE 30 MILLIGRAM(S): 30 TABLET ORAL at 19:08

## 2025-06-18 RX ADMIN — CLONAZEPAM 1 MILLIGRAM(S): 0.5 TABLET ORAL at 22:18

## 2025-06-18 RX ADMIN — Medication 135 MILLIGRAM(S): at 14:16

## 2025-06-18 RX ADMIN — Medication 40 MILLIGRAM(S): at 07:23

## 2025-06-18 RX ADMIN — Medication 30 MILLIGRAM(S): at 15:07

## 2025-06-18 RX ADMIN — Medication 1 APPLICATION(S): at 07:25

## 2025-06-18 RX ADMIN — OXYCODONE HYDROCHLORIDE 30 MILLIGRAM(S): 30 TABLET ORAL at 23:18

## 2025-06-18 RX ADMIN — GABAPENTIN 100 MILLIGRAM(S): 400 CAPSULE ORAL at 22:18

## 2025-06-18 RX ADMIN — GABAPENTIN 100 MILLIGRAM(S): 400 CAPSULE ORAL at 07:22

## 2025-06-18 RX ADMIN — Medication 30 MILLIGRAM(S): at 07:23

## 2025-06-18 RX ADMIN — OXYCODONE HYDROCHLORIDE 30 MILLIGRAM(S): 30 TABLET ORAL at 13:04

## 2025-06-18 RX ADMIN — NALOXEGOL OXALATE 25 MILLIGRAM(S): 12.5 TABLET, FILM COATED ORAL at 12:54

## 2025-06-18 RX ADMIN — Medication 30 MILLIGRAM(S): at 23:14

## 2025-06-18 RX ADMIN — COLCHICINE 0.6 MILLIGRAM(S): 0.6 TABLET, FILM COATED ORAL at 12:54

## 2025-06-18 RX ADMIN — OXYCODONE HYDROCHLORIDE 30 MILLIGRAM(S): 30 TABLET ORAL at 07:04

## 2025-06-18 RX ADMIN — CLONAZEPAM 0.5 MILLIGRAM(S): 0.5 TABLET ORAL at 14:37

## 2025-06-18 RX ADMIN — Medication 112 MICROGRAM(S): at 07:23

## 2025-06-18 RX ADMIN — MIDODRINE HYDROCHLORIDE 10 MILLIGRAM(S): 5 TABLET ORAL at 22:18

## 2025-06-18 RX ADMIN — CLONAZEPAM 0.5 MILLIGRAM(S): 0.5 TABLET ORAL at 07:23

## 2025-06-18 NOTE — DISCHARGE NOTE PROVIDER - PROVIDER TOKENS
PROVIDER:[[id number]],ESTABLISHEDPATIENT:[T]],PROVIDER:[TOKEN:[[id number]],ESTABLISHEDPATIENT:[T]],PROVIDER:[TOKEN:[[id number]],ESTABLISHEDPATIENT:[T]] PROVIDER:[TOKEN:[[id number]],ESTABLISHEDPATIENT:[T]],PROVIDER:[TOKEN:[[id number]],FOLLOWUP:[1 week],ESTABLISHEDPATIENT:[T]],PROVIDER:[TOKEN:[[id number]],ESTABLISHEDPATIENT:[T]]

## 2025-06-18 NOTE — DISCHARGE NOTE PROVIDER - NSDCFUSCHEDAPPT_GEN_ALL_CORE_FT
Chano Mcgill  [facility] Physician [facility]  RADMED [address]  Scheduled Appointment: 06/23/2025    [facility] Physician [facility] [address]  Scheduled Appointment: 08/07/2025    [facility] Physician [address]  Scheduled Appointment: 08/07/2025     Mahsa Scales  [facility] Physician [facility]  GERIATRICS [address] [place] [address]   Scheduled Appointment: 07/24/2025    [facility] Physician [facility] [address] [place] [address]  Scheduled Appointment: 08/07/2025    [facility] Physician [address]  Scheduled Appointment: 08/07/2025

## 2025-06-18 NOTE — DISCHARGE NOTE PROVIDER - CARE PROVIDER_API CALL
Chano Mcgill  Radiation Oncology  [address] [place] [address]  Phone: [phone]  Fax: [phone]  Established Patient  Follow Up Time:     Hernan Hall  Medical Oncology  [address] [place] [address]  Phone: [phone]  Fax: [phone]  Established Patient  Follow Up Time:     Mahsa Scales  Hospice and Palliative Medicine  [address] [place] [address], [address] [address]  Phone: [phone]  Fax: [phone]  Established Patient  Follow Up Time:    Chano Mcgill  Radiation Oncology  [address] [place] [address]  Phone: [phone]  Fax: [phone]  Established Patient  Follow Up Time:     Hernan Hall  Medical Oncology  [address] [place] [address]  Phone: [phone]  Fax: [phone]  Established Patient  Follow Up Time: 1 week    Mahsa Scales  Hospice and Palliative Medicine  [address] [place] [address], [address] [address]  Phone: [phone]  Fax: [phone]  Established Patient  Follow Up Time:

## 2025-06-18 NOTE — DIETITIAN NUTRITION RISK NOTIFICATION - ADDITIONAL COMMENTS/DIETITIAN RECOMMENDATIONS
Please see Dietitian Initial Assessment for complete recommendations.  Mei Hawthorne MS, RD, CDN, CNSC (pg #[phone])

## 2025-06-18 NOTE — DISCHARGE NOTE PROVIDER - NSDCFUADDAPPT_GEN_ALL_CORE_FT
Please follow up with Dr. Mcgill on 6/23 at 3pm    Please follow up with Dr. Hall at your earliest convenience. She will also be emailed with a discharge summary and will likely reach out to arrange a follow up appointment.    Please follow up with Dr. Scales at your earliest convenience. APPTS ARE READY TO BE MADE: [X] YES    Best Family or Patient Contact (if needed):    Additional Information about above appointments (if needed):    1: Oncology  2: Palliative  3:     Other comments or requests:       Please follow up with Dr. Mcgill on 6/23 at 3pm    Please follow up with Dr. Hall at your earliest convenience. She will also be emailed with a discharge summary and will likely reach out to arrange a follow up appointment.    Please follow up with Dr. Scales at your earliest convenience. APPTS ARE READY TO BE MADE: [X] YES    Best Family or Patient Contact (if needed):    Additional Information about above appointments (if needed):    1: Oncology  2: Palliative  3:     Other comments or requests:       Please follow up with Dr. Mcgill on 6/23 at 3pm    Please follow up with Dr. Hall at your earliest convenience. She will also be emailed with a discharge summary and will likely reach out to arrange a follow up appointment.    Please follow up with Dr. Scales at your earliest convenience.    Prior to outreaching the patient, it was visible that the patient has secured a follow up appointment which was not scheduled by our team. Patient was previously scheduled for an appointment on 07/24/25 2:00PM at [address] [place] [address] with Dr. Mahsa Scales. APPTS ARE READY TO BE MADE: [X] YES    Best Family or Patient Contact (if needed):    Additional Information about above appointments (if needed):    1: Oncology  2: Palliative  3:     Other comments or requests:       Please follow up with Dr. Mcgill on 6/23 at 3pm    Please follow up with Dr. Hall at your earliest convenience. She will also be emailed with a discharge summary and will likely reach out to arrange a follow up appointment.    Please follow up with Dr. Scales at your earliest convenience.    Prior to outreaching the patient, it was visible that the patient has secured a follow up appointment which was not scheduled by our team. Patient was previously scheduled for an appointment on 07/24/25 2:00PM at [address] with Dr. Mahsa Scales.    Prior to outreaching the patient, it was visible that the patient has secured a follow up appointment which was not scheduled by our team. Patient was previously scheduled for an appointment on 06/23/25 3:00PM at [address] with Dr. Chano Mcgill.    Patient advises they do not want our assistance and prefer to coordinate the [facility] appointments on their own. Patient is waiting for a response from Rheumatology prior to calling Dr. Hall.

## 2025-06-18 NOTE — DIETITIAN INITIAL EVALUATION ADULT - ORAL NUTRITION SUPPLEMENTS
Pt refuses all supplements at this time (ensures. plant based supplements, magic cup mighty shake) Nutrition remains available to follow;  to honor food preferences as able.

## 2025-06-18 NOTE — PROGRESS NOTE ADULT - PROBLEM SELECTOR PLAN 3
Relistor x1 for constipation secondary to narcotics      DVT proph---> already on Eliquis Relistor x1 for constipation secondary to narcotics      DVT proph---> already on Eliquis    Discharge  dc planning for 6/19 after Fulphila has been ordered by the Onc .  Oiut patient f/u with Dr Hall.  Patient already has Oxygen at home

## 2025-06-18 NOTE — DIETITIAN INITIAL EVALUATION ADULT - ORAL INTAKE PTA/DIET HISTORY
PT w. fatigue speaking with exertion. minimally interactive. Patient reports poor appetite and intake x atleast 1 year reports she only has 1 meal which consists of vegetables rice or past and mostly chicken. Denied any additional snacks and supplements. Recent admit notes pt presents with poor appetite and intake. Reports she has an aide 7 days a week for 8-10 hours a day to help with meal prep. Denied N/V/D or abdominal pain however reports chest and back pain associated w. chemo. Reports concerns for swallowing difficulty needs soft foods. Additionally reports constipation x2 weeks. Shellfish allergy endorsed. Stated 30 lbs wt loss over 1 year.

## 2025-06-18 NOTE — DIETITIAN INITIAL EVALUATION ADULT - NSFNSPHYEXAMSKINFT_GEN_A_CORE
admitted with shortness of breath   currently on home O2   CXR small pleural effusion. BNP 5000  D-dimer 390 and 880 is cutoff adjusted for age -> VTE unlikely   wells score 0-> low risk for PE  TTE: calcified mitral leaflets with normal diastolic opening. Normal left ventricular internal dimensions and wall  thicknesses. Hyperdynamic left ventricle. Normal right ventricular size and function.      - continue O2 and duonebs prn   - Monitor spO2  - continue to monitor No noted pressure injuries per RN flowsheets   admitted with shortness of breath   currently on home O2   CXR small pleural effusion. BNP 5000  D-dimer 390 and 880 is cutoff adjusted for age -> VTE unlikely   wells score 0-> low risk for PE  TTE: calcified mitral leaflets with normal diastolic opening. Normal left ventricular internal dimensions and wall thicknesses. Hyperdynamic left ventricle. Normal right ventricular size and function.      - continue O2 and duonebs prn   - Monitor spO2  - continue to monitor

## 2025-06-18 NOTE — DIETITIAN INITIAL EVALUATION ADULT - PERTINENT MEDS FT
MEDICATIONS  (STANDING):  apixaban 5 milliGRAM(s) Oral every 12 hours  bisacodyl 5 milliGRAM(s) Oral at bedtime  chlorhexidine 2% Cloths 1 Application(s) Topical <User Schedule>  clonazePAM  Tablet 0.5 milliGRAM(s) Oral <User Schedule>  clonazePAM  Tablet 1 milliGRAM(s) Oral at bedtime  colchicine 0.6 milliGRAM(s) Oral daily  gabapentin 100 milliGRAM(s) Oral three times a day  levothyroxine 112 MICROGram(s) Oral daily  midodrine 10 milliGRAM(s) Oral every 8 hours  morphine ER Tablet 30 milliGRAM(s) Oral every 8 hours  naloxegol 25 milliGRAM(s) Oral daily  OLANZapine 5 milliGRAM(s) Oral at bedtime  pantoprazole    Tablet 40 milliGRAM(s) Oral every 12 hours  polyethylene glycol 3350 17 Gram(s) Oral two times a day  senna 2 Tablet(s) Oral at bedtime    MEDICATIONS  (PRN):  albuterol    90 MICROgram(s) HFA Inhaler 2 Puff(s) Inhalation every 6 hours PRN Shortness of Breath and/or Wheezing  oxyCODONE    IR 30 milliGRAM(s) Oral every 4 hours PRN Moderate Pain (4 - 6)

## 2025-06-18 NOTE — DIETITIAN INITIAL EVALUATION ADULT - PERTINENT LABORATORY DATA
06-18    139  |  106  |  13  ----------------------------<  116[H]  3.7   |  22  |  0.46[L]    Ca    8.2[L]      18 Jun 2025 06:52  Phos  3.1     06-18  Mg     2.10     06-18    TPro  5.7[L]  /  Alb  3.5  /  TBili  0.4  /  DBili  x   /  AST  14  /  ALT  6   /  AlkPhos  83  06-18

## 2025-06-18 NOTE — DISCHARGE NOTE PROVIDER - NSFOLLOWUPCLINICS_GEN_ALL_ED_FT
[facility]  Hematology/Oncology  [address] [id number]  Phone: [phone]  Fax:

## 2025-06-18 NOTE — DIETITIAN INITIAL EVALUATION ADULT - OTHER INFO
Pt reports having some pasta brought in, dislike of hospital foods and reports poor intake in house. Intake noted poor (0-25% to 26-50%)to fair (51-75%) during admit per RN flowsheets. Pt currently denied concerns for N/V/D or abdominal pain however reporting constipation currently ordered for bisacodyl senna. Attempted discussion with patient regarding tips to increase kcal and protein intake, increasing protein foods, suggestions to help increase F/V. Pt minimally interested in discussion stated "cant teach an old dog new tricks" and that cancer and chemo has taken a toll on her. Writers expressed understanding of pt situation, encouraged intake of preferable foods, and emphasized meeting nutritional needs.     HIE wt hx in kg 67.1 (12/20/24) 65.9 (2/20/25) 63.5 (3/11/25) 65.8 (4/28/25) 65.6 (5/19/25) 66.7 (6/16/25) reflecting no overt wt loss over approx. 6 months time frame however per patient  reports concerns for 30 lbs wt loss over 1 year would indicate 16% loss x1 year.  Pt reports having some pasta brought in, dislike of hospital foods and reports poor intake in house. Intake noted poor (0-25% to 26-50%)to fair (51-75%) during admit per RN flowsheets. Pt currently denied concerns for N/V/D or abdominal pain however reporting constipation currently ordered for bisacodyl senna. Attempted discussion with patient regarding tips to increase kcal and protein intake, increasing protein foods, suggestions to help increase F/V. Pt minimally interested in discussion stated "cant teach an old dog new tricks" and that cancer and chemo has taken a toll on her. Writers expressed understanding of pt situation, encouraged intake of preferable foods, and emphasized meeting nutritional needs. Of note last BM noted 6/8, may benefit from GI eval or abdominal exams.     HIE wt hx in kg 67.1 (12/20/24) 65.9 (2/20/25) 63.5 (3/11/25) 65.8 (4/28/25) 65.6 (5/19/25) 66.7 (6/16/25) reflecting no overt wt loss over approx. 6 months time frame however per patient  reports concerns for 30 lbs wt loss over 1 year would indicate 16% loss x1 year.

## 2025-06-18 NOTE — DIETITIAN INITIAL EVALUATION ADULT - REASON FOR ADMISSION
for C5 Carbo/Etop.65 yo woman, former smoker (47py; quit 12/2024) and well known to the OncHos service, with h/o pAfib (on Eliquis), Hypothyroidism, ? 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 Pt developed shortness of breath and palmar erythema with C3 (VSS); her infusion rate was slowed and she subsequently tolerated chemo without significant adverse effect.  Pt is now electively admitted for C5 Carbo/Etop per chart review

## 2025-06-18 NOTE — DIETITIAN INITIAL EVALUATION ADULT - ADD RECOMMEND
Please monitor % PO intake on flowsheets  Honor food preferences as able within therapeutic diet order.  Monitor weights, labs, BM's, skin integrity, p.o. intake.   Defer fluid management to medical team    Please monitor % PO intake on flowsheets  Honor food preferences as able within therapeutic diet order.  Monitor weights, labs, BM's, skin integrity, p.o. intake.   Defer fluid management to medical team   Consider GI eval or abdominal exams (c/f ongoing constipation)

## 2025-06-18 NOTE — DISCHARGE NOTE PROVIDER - NSDCMRMEDTOKEN_GEN_ALL_CORE_FT
albuterol 90 mcg/inh inhalation aerosol: 2 puff(s) inhaled every 6 hours As needed Shortness of Breath and/or Wheezing  aluminum hydroxide-magnesium hydroxide 200 mg-200 mg/5 mL oral suspension: 30 milliliter(s) orally every 4 hours As needed Dyspepsia  apixaban 5 mg oral tablet: 1 tab(s) orally every 12 hours  baclofen 5 mg oral tablet: 1 tab(s) orally every 8 hours  bisacodyl 5 mg oral delayed release tablet: 1 tab(s) orally once a day (at bedtime)  clonazePAM 0.5 mg oral tablet: 1 tab(s) orally 2 times a day 7am, 3pm MDD: 2  clonazePAM 1 mg oral tablet: 1 tab(s) orally once a day (at bedtime) MDD: 1  colchicine 0.6 mg oral tablet: 1 tab(s) orally once a day  fluticasone-salmeterol: 1 dose(s) inhaled 2 times a day 100-50 mcg diskus  gabapentin 100 mg oral capsule: 1 cap(s) orally 3 times a day  levothyroxine 112 mcg (0.112 mg) oral tablet: 1 tab(s) orally once a day  menthol-benzocaine: 1 lozenge orally 3 times a day as needed for SORE THROAT  midodrine 10 mg oral tablet: 1 tab(s) orally every 8 hours for orthostatic hypotension  morphine 30 mg/8 to 12 hr oral tablet, extended release: 1 tab(s) orally every 8 hours MDD: 3  naloxegol 25 mg oral tablet: 1 tab(s) orally once a day  nicotine 21 mg/24 hr transdermal film, extended release: 1 patch transdermal once a day  OLANZapine 5 mg oral tablet: 1 tab(s) orally once a day (at bedtime)  oxyCODONE 20 mg oral tablet: 1 tab(s) orally every 4 hours as needed for Moderate Pain (4 - 6) MDD: 6  oxyCODONE 30 mg oral tablet: 1 tab(s) orally every 4 hours as needed for Severe Pain (7 - 10) MDD: 6  pantoprazole 40 mg oral delayed release tablet: 1 tab(s) orally every 12 hours  polyethylene glycol 3350 oral powder for reconstitution: 17 gram(s) orally 2 times a day  senna leaf extract oral tablet: 2 tab(s) orally once a day (at bedtime)

## 2025-06-18 NOTE — DISCHARGE NOTE PROVIDER - CARE PROVIDERS DIRECT ADDRESSES
[email or web address]

## 2025-06-18 NOTE — PROGRESS NOTE ADULT - SUBJECTIVE AND OBJECTIVE BOX
f/u low bp overnight  f/u [place] oxygen Onc Hosp Solid Tumor Note  f/u low bp overnight  f/u [place] oxygen Onc Hosp Solid Tumor Note    Patient is a 64y old  Female who presents with a chief complaint of for C5 Carbo/Etop. (18 Jun 2025 09:01)      SUBJECTIVE / OVERNIGHT EVENTS:  resting in bed, noted a tough night because of low bp and getting IVF.  Doing better today.        MEDICATIONS  (STANDING):  apixaban 5 milliGRAM(s) Oral every 12 hours  bisacodyl 5 milliGRAM(s) Oral at bedtime  chlorhexidine 2% Cloths 1 Application(s) Topical <User Schedule>  clonazePAM  Tablet 0.5 milliGRAM(s) Oral <User Schedule>  clonazePAM  Tablet 1 milliGRAM(s) Oral at bedtime  colchicine 0.6 milliGRAM(s) Oral daily  dexAMETHasone  IVPB 8 milliGRAM(s) IV Intermittent every 24 hours  etoposide (TOPOSAR) IVPB (eMAR) 135 milliGRAM(s) IV Intermittent every 24 hours  gabapentin 100 milliGRAM(s) Oral three times a day  levothyroxine 112 MICROGram(s) Oral daily  midodrine 10 milliGRAM(s) Oral every 8 hours  morphine ER Tablet 30 milliGRAM(s) Oral every 8 hours  naloxegol 25 milliGRAM(s) Oral daily  OLANZapine 5 milliGRAM(s) Oral at bedtime  pantoprazole    Tablet 40 milliGRAM(s) Oral every 12 hours  polyethylene glycol 3350 17 Gram(s) Oral two times a day  senna 2 Tablet(s) Oral at bedtime    MEDICATIONS  (PRN):  albuterol    90 MICROgram(s) HFA Inhaler 2 Puff(s) Inhalation every 6 hours PRN Shortness of Breath and/or Wheezing  oxyCODONE    IR 30 milliGRAM(s) Oral every 4 hours PRN Moderate Pain (4 - 6)        I&O's Summary    17 Jun 2025 07:01  -  18 Jun 2025 07:00  --------------------------------------------------------  IN: 0 mL / OUT: 700 mL / NET: -700 mL      Vital Signs Last 24 Hrs  T(F): 97.4 (18 Jun 2025 05:31), Max: 98 (17 Jun 2025 15:05)  HR: 55 BP: 106/53 , RR: 18 , SpO2: 95%nasal cannula O2 Flow (L/min): 2    PHYSICAL EXAM:  Patient is on [place] oxygen., Patient has [place] oxygen at home  GENERAL: NAD,   HEAD:  Atraumatic, Normocephalic  EYES: EOMI, PERRLA, conjunctiva and sclera clear  NECK: Supple, No JVD  CHEST/LUNG: Clear to auscultation bilaterally; No wheeze  HEART: Regular rate and rhythm; No murmurs, rubs, or gallops  ABDOMEN: Soft, Nontender, Nondistended; Bowel sounds present  EXTREMITIES:  2+ Peripheral Pulses, No clubbing, cyanosis, or edema  PSYCH: AAOx3  NEUROLOGY: non-focal      LABS:                        9.7    3.85  )-----------( 212      ( 18 Jun 2025 06:52 )             29.4     06-18    139  |  106  |  13  ----------------------------<  116[H]  3.7   |  22  |  0.46[L]    Ca    8.2[L]      18 Jun 2025 06:52  Phos  3.1     06-18  Mg     2.10     06-18    TPro  5.7[L]  /  Alb  3.5  /  TBili  0.4  /  DBili  x   /  AST  14  /  ALT  6   /  AlkPhos  83  06-18        Care Discussed with Consultants/Other Providers:    f/u low bp overnight  f/u [place] oxygen

## 2025-06-18 NOTE — PROGRESS NOTE ADULT - PROBLEM SELECTOR PLAN 1
< from: CT Chest No Cont (03.20.25 @ 21:31) >  1.  Moderate-sized pericardial effusion appears unchanged compared to   partially imaged heart on 3/18/2025 but increase insize since 2/15/2025.  2.  Interval increase in size of previously described left suprahilar   mediastinal mass which extends into the AP window and paramediastinal   left upper lobe.  3.  Some of the other left upper lobe nodules are decrease and some   increase.  4.  Unchanged sclerotic lesions within the sternum and T12 vertebral body.  C/w  C5 Carbo/Etop chemotherapy  6/19 plan to give--> Fulphila has been ordered by the Onc .  Out patient f/u with Dr Hall at [facility] on d/c

## 2025-06-18 NOTE — DISCHARGE NOTE PROVIDER - HOSPITAL COURSE
63 yo F, former smoker (47py; quit 12/2024), pAfib (on Eliquis), Hypothyroidism, ? h/o Sleroderma, h/o pericardial effusion s/p pericardiocentesis (dx in 12/2024; ? 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 while hospitalized at [facility]- she tolerated tx without significant adverse effect. She presented as an outpt for C2 on 4/14- however, pt received Cosela and developed "severe headache" for which she required ED eval, but was ultimately sent home (she did not receive Etop or carbo on d1); on day 2 she received Etoposide for 5 minutes and started to have chest tightness and shortness of breath which was treated as a hypersensitivity reaction (she was not re-challenged due to patient preference at the time; she did not receive carbo or cosela on D2). Pt developed shortness of breath and palmar erythema with C3 (VSS); her infusion rate was slowed and she subsequently tolerated chemo without significant adverse effect. Pt is now electively admitted for C5 Carbo/Etop with Fulphila support. C/c/b hypotension which occured during previous admission during chemo, cont midodrine, s/p IVF PRN. C/c/b constipation s/p relistor.    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 S/p Fluphila x1 on 5/22  -- Now directly admitted for C5 Carbo/Etop 6/ with fulphila 6/19  -- Overall plan per Dr. Hall    #Cancer related pain  -- Continuing Morphine ER 30mg q12  -- Continuing Oxy IR 30mg q8/prn  -- Continuing Gabapentin to 300mg TID    --CV--  #Orthostatic Hypotension  -- Cont Midodrine to 10mg TID   -- S/p IV bolus PRN    #H/o pericardial effusion  -- Most likely inflammatory; continuing Colchicine 0.6mg daily    #pAfib  -- Continuing Eliquis 5mg BID    --PSYCH--  #Anxiety/depression  -- Continuing Klonopin 0.5mg BID and 1mg nightly  -- Continuing Zyprexa 5mg nightly    --PULM--  #Chronic hypoxic resp failure  #H/o Asthma/COPD (former smoker)  -- Continuing supplemental O2 via NC and supportive resp care prn  -- Continuing Advair 100-50mcg MDI BID  -- Continuing duonebs q6/prn   -- Mgmt of pericardial effusion as above    --GI--  #Constipation  -- Possibly due to opioids +/- AID  -- Continuing Movantik 25mg daily  -- S/p relistor 1x 6/16    --ENDO--  #Hypothyroidism  -- Continuing LT4 112mcg daily   65 yo F, former smoker (47py; quit 12/2024), pAfib (on Eliquis), Hypothyroidism, ? h/o Sleroderma, h/o pericardial effusion s/p pericardiocentesis (dx in 12/2024; ? 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 while hospitalized at [facility]- she tolerated tx without significant adverse effect. She presented as an outpt for C2 on 4/14- however, pt received Cosela and developed "severe headache" for which she required ED eval, but was ultimately sent home (she did not receive Etop or carbo on d1); on day 2 she received Etoposide for 5 minutes and started to have chest tightness and shortness of breath which was treated as a hypersensitivity reaction (she was not re-challenged due to patient preference at the time; she did not receive carbo or cosela on D2). Pt developed shortness of breath and palmar erythema with C3 (VSS); her infusion rate was slowed and she subsequently tolerated chemo without significant adverse effect. Pt is now electively admitted for C5 Carbo/Etop with Fulphila support. C/c/b hypotension which occured during previous admission during chemo, cont midodrine, s/p IVF PRN. C/c/b constipation s/p relistor.    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 S/p Fluphila x1 on 5/22  -- Now directly admitted for C5 Carbo/Etop 6/ with fulphila 6/19  -- Overall plan per Dr. Hall    #Cancer related pain  -- Continuing Morphine ER 30mg q12  -- Continuing Oxy IR 30mg q8/prn  -- Continuing Gabapentin to 300mg TID    --CV--  #Orthostatic Hypotension  -- Cont Midodrine to 10mg TID   -- S/p IV bolus PRN    #H/o pericardial effusion  -- Most likely inflammatory; continuing Colchicine 0.6mg daily    #pAfib  -- Continuing Eliquis 5mg BID    --PSYCH--  #Anxiety/depression  -- Continuing Klonopin 0.5mg BID and 1mg nightly  -- Continuing Zyprexa 5mg nightly    --PULM--  #Chronic hypoxic resp failure  #H/o Asthma/COPD (former smoker)  -- Continuing supplemental O2 via NC and supportive resp care prn  -- Continuing Advair 100-50mcg MDI BID  -- Continuing duonebs q6/prn   -- Mgmt of pericardial effusion as above    --GI--  #Constipation  -- Possibly due to opioids +/- AID  -- Continuing Movantik 25mg daily  -- S/p relistor 1x 6/16    --ENDO--  #Hypothyroidism  -- Continuing LT4 112mcg daily    Patient seen and evaluated, no acute somatic complaints. Reviewed discharge medications with patient; All new medications requiring new prescriptions were sent to the pharmacy of patient's choice. Reviewed need for prescription for previous home medications and new prescriptions sent if requested. Medically cleared/stable for discharge as per Dr. Gaspar on 6/20/25 with close outpatient follow up within 1-2 weeks of discharge. Patient understands and agrees with plan of care.

## 2025-06-18 NOTE — DISCHARGE NOTE PROVIDER - NSDCCPCAREPLAN_GEN_ALL_CORE_FT
PRINCIPAL DISCHARGE DIAGNOSIS  Diagnosis: Squamous cell lung cancer  Assessment and Plan of Treatment: You were directly admitted for cycle 5 of carboplatin/etoposide which completed on 6/18, and had fulphila injection to boost your white blood cell count on 6/19. You tolerated it well despite your scleroderma pain and some episodes of low blood pressure, which occurred the previous cycle as well. It improved with midodrine and IV fluids. Please continue to take your midodrine as directed. Follow up with Dr. Hall for further management.      SECONDARY DISCHARGE DIAGNOSES  Diagnosis: Constipation  Assessment and Plan of Treatment: You were noted to have constipation likely relsated to your opioids. You were given movantik and one dose of relistor with improvement. Please continue your home bowel regimen.     PRINCIPAL DISCHARGE DIAGNOSIS  Diagnosis: Squamous cell lung cancer  Assessment and Plan of Treatment: You were directly admitted for cycle 5 of carboplatin/etoposide which completed on 6/18, and had fulphila injection to boost your white blood cell count on 6/19. You tolerated it well despite your scleroderma pain and some episodes of low blood pressure, which occurred the previous cycle as well. It improved with midodrine and IV fluids. Please continue to take your midodrine as directed. Follow up with Dr. Hall for further management.      SECONDARY DISCHARGE DIAGNOSES  Diagnosis: Constipation  Assessment and Plan of Treatment: You were noted to have constipation likely related to your opioids. You were given miralax and deferred relistor as you preferred to do it when your partner was present. Please continue your home bowel regimen.

## 2025-06-19 LAB
ALBUMIN SERPL ELPH-MCNC: 3.4 G/DL — SIGNIFICANT CHANGE UP (ref 3.3–5)
ALP SERPL-CCNC: 90 U/L — SIGNIFICANT CHANGE UP (ref 40–120)
ALT FLD-CCNC: <5 U/L — SIGNIFICANT CHANGE UP (ref 4–33)
ANION GAP SERPL CALC-SCNC: 11 MMOL/L — SIGNIFICANT CHANGE UP (ref 7–14)
AST SERPL-CCNC: 11 U/L — SIGNIFICANT CHANGE UP (ref 4–32)
BASOPHILS # BLD AUTO: 0.01 K/UL — SIGNIFICANT CHANGE UP (ref 0–0.2)
BASOPHILS NFR BLD AUTO: 0.2 % — SIGNIFICANT CHANGE UP (ref 0–2)
BILIRUB SERPL-MCNC: 0.3 MG/DL — SIGNIFICANT CHANGE UP (ref 0.2–1.2)
BUN SERPL-MCNC: 13 MG/DL — SIGNIFICANT CHANGE UP (ref 7–23)
CALCIUM SERPL-MCNC: 8.3 MG/DL — LOW (ref 8.4–10.5)
CHLORIDE SERPL-SCNC: 107 MMOL/L — SIGNIFICANT CHANGE UP (ref 98–107)
CO2 SERPL-SCNC: 23 MMOL/L — SIGNIFICANT CHANGE UP (ref 22–31)
CREAT SERPL-MCNC: 0.5 MG/DL — SIGNIFICANT CHANGE UP (ref 0.5–1.3)
EGFR: 103 ML/MIN/1.73M2 — SIGNIFICANT CHANGE UP
EGFR: 103 ML/MIN/1.73M2 — SIGNIFICANT CHANGE UP
EOSINOPHIL # BLD AUTO: 0 K/UL — SIGNIFICANT CHANGE UP (ref 0–0.5)
EOSINOPHIL NFR BLD AUTO: 0 % — SIGNIFICANT CHANGE UP (ref 0–6)
GLUCOSE SERPL-MCNC: 116 MG/DL — HIGH (ref 70–99)
HCT VFR BLD CALC: 31.3 % — LOW (ref 34.5–45)
HGB BLD-MCNC: 10 G/DL — LOW (ref 11.5–15.5)
IMM GRANULOCYTES # BLD AUTO: 0.01 K/UL — SIGNIFICANT CHANGE UP (ref 0–0.07)
IMM GRANULOCYTES NFR BLD AUTO: 0.2 % — SIGNIFICANT CHANGE UP (ref 0–0.9)
LYMPHOCYTES # BLD AUTO: 1.09 K/UL — SIGNIFICANT CHANGE UP (ref 1–3.3)
LYMPHOCYTES NFR BLD AUTO: 27 % — SIGNIFICANT CHANGE UP (ref 13–44)
MAGNESIUM SERPL-MCNC: 1.9 MG/DL — SIGNIFICANT CHANGE UP (ref 1.6–2.6)
MCHC RBC-ENTMCNC: 31.9 G/DL — LOW (ref 32–36)
MCHC RBC-ENTMCNC: 31.9 PG — SIGNIFICANT CHANGE UP (ref 27–34)
MCV RBC AUTO: 100 FL — SIGNIFICANT CHANGE UP (ref 80–100)
MONOCYTES # BLD AUTO: 0.32 K/UL — SIGNIFICANT CHANGE UP (ref 0–0.9)
MONOCYTES NFR BLD AUTO: 7.9 % — SIGNIFICANT CHANGE UP (ref 2–14)
NEUTROPHILS # BLD AUTO: 2.61 K/UL — SIGNIFICANT CHANGE UP (ref 1.8–7.4)
NEUTROPHILS NFR BLD AUTO: 64.7 % — SIGNIFICANT CHANGE UP (ref 43–77)
NRBC # BLD AUTO: 0 K/UL — SIGNIFICANT CHANGE UP (ref 0–0)
NRBC # FLD: 0 K/UL — SIGNIFICANT CHANGE UP (ref 0–0)
NRBC BLD AUTO-RTO: 0 /100 WBCS — SIGNIFICANT CHANGE UP (ref 0–0)
PHOSPHATE SERPL-MCNC: 3.1 MG/DL — SIGNIFICANT CHANGE UP (ref 2.5–4.5)
PLATELET # BLD AUTO: 226 K/UL — SIGNIFICANT CHANGE UP (ref 150–400)
PMV BLD: 12.3 FL — SIGNIFICANT CHANGE UP (ref 7–13)
POTASSIUM SERPL-MCNC: 3.5 MMOL/L — SIGNIFICANT CHANGE UP (ref 3.5–5.3)
POTASSIUM SERPL-SCNC: 3.5 MMOL/L — SIGNIFICANT CHANGE UP (ref 3.5–5.3)
PROT SERPL-MCNC: 5.5 G/DL — LOW (ref 6–8.3)
RBC # BLD: 3.13 M/UL — LOW (ref 3.8–5.2)
RBC # FLD: 16.6 % — HIGH (ref 10.3–14.5)
SODIUM SERPL-SCNC: 141 MMOL/L — SIGNIFICANT CHANGE UP (ref 135–145)
WBC # BLD: 4.04 K/UL — SIGNIFICANT CHANGE UP (ref 3.8–10.5)
WBC # FLD AUTO: 4.04 K/UL — SIGNIFICANT CHANGE UP (ref 3.8–10.5)

## 2025-06-19 PROCEDURE — 99239 HOSP IP/OBS DSCHRG MGMT >30: CPT

## 2025-06-19 RX ORDER — METHYLNALTREXONE BROMIDE 12 MG/.6ML
12 INJECTION, SOLUTION SUBCUTANEOUS ONCE
Refills: 0 | Status: COMPLETED | OUTPATIENT
Start: 2025-06-19 | End: 2025-06-19

## 2025-06-19 RX ADMIN — OXYCODONE HYDROCHLORIDE 30 MILLIGRAM(S): 30 TABLET ORAL at 07:08

## 2025-06-19 RX ADMIN — OXYCODONE HYDROCHLORIDE 30 MILLIGRAM(S): 30 TABLET ORAL at 23:30

## 2025-06-19 RX ADMIN — Medication 30 MILLIGRAM(S): at 06:20

## 2025-06-19 RX ADMIN — Medication 30 MILLIGRAM(S): at 07:20

## 2025-06-19 RX ADMIN — MIDODRINE HYDROCHLORIDE 10 MILLIGRAM(S): 5 TABLET ORAL at 06:21

## 2025-06-19 RX ADMIN — MIDODRINE HYDROCHLORIDE 10 MILLIGRAM(S): 5 TABLET ORAL at 23:29

## 2025-06-19 RX ADMIN — MIDODRINE HYDROCHLORIDE 10 MILLIGRAM(S): 5 TABLET ORAL at 15:37

## 2025-06-19 RX ADMIN — Medication 112 MICROGRAM(S): at 06:21

## 2025-06-19 RX ADMIN — Medication 30 MILLIGRAM(S): at 16:37

## 2025-06-19 RX ADMIN — NICOTINE POLACRILEX 1 PATCH: 4 GUM, CHEWING ORAL at 13:31

## 2025-06-19 RX ADMIN — CLONAZEPAM 0.5 MILLIGRAM(S): 0.5 TABLET ORAL at 15:36

## 2025-06-19 RX ADMIN — Medication 30 MILLIGRAM(S): at 15:37

## 2025-06-19 RX ADMIN — Medication 5 MILLIGRAM(S): at 21:14

## 2025-06-19 RX ADMIN — OXYCODONE HYDROCHLORIDE 30 MILLIGRAM(S): 30 TABLET ORAL at 14:28

## 2025-06-19 RX ADMIN — Medication 40 MILLIGRAM(S): at 06:21

## 2025-06-19 RX ADMIN — APIXABAN 5 MILLIGRAM(S): 2.5 TABLET, FILM COATED ORAL at 06:21

## 2025-06-19 RX ADMIN — OXYCODONE HYDROCHLORIDE 30 MILLIGRAM(S): 30 TABLET ORAL at 19:19

## 2025-06-19 RX ADMIN — OXYCODONE HYDROCHLORIDE 30 MILLIGRAM(S): 30 TABLET ORAL at 13:28

## 2025-06-19 RX ADMIN — METHYLNALTREXONE BROMIDE 12 MILLIGRAM(S): 12 INJECTION, SOLUTION SUBCUTANEOUS at 18:21

## 2025-06-19 RX ADMIN — APIXABAN 5 MILLIGRAM(S): 2.5 TABLET, FILM COATED ORAL at 18:19

## 2025-06-19 RX ADMIN — OXYCODONE HYDROCHLORIDE 30 MILLIGRAM(S): 30 TABLET ORAL at 18:19

## 2025-06-19 RX ADMIN — GABAPENTIN 100 MILLIGRAM(S): 400 CAPSULE ORAL at 21:14

## 2025-06-19 RX ADMIN — Medication 30 MILLIGRAM(S): at 00:14

## 2025-06-19 RX ADMIN — GABAPENTIN 100 MILLIGRAM(S): 400 CAPSULE ORAL at 13:30

## 2025-06-19 RX ADMIN — COLCHICINE 0.6 MILLIGRAM(S): 0.6 TABLET, FILM COATED ORAL at 13:29

## 2025-06-19 RX ADMIN — Medication 40 MILLIGRAM(S): at 18:20

## 2025-06-19 RX ADMIN — OLANZAPINE 5 MILLIGRAM(S): 10 TABLET ORAL at 21:14

## 2025-06-19 RX ADMIN — OXYCODONE HYDROCHLORIDE 30 MILLIGRAM(S): 30 TABLET ORAL at 08:08

## 2025-06-19 RX ADMIN — GABAPENTIN 100 MILLIGRAM(S): 400 CAPSULE ORAL at 06:20

## 2025-06-19 RX ADMIN — CLONAZEPAM 0.5 MILLIGRAM(S): 0.5 TABLET ORAL at 07:47

## 2025-06-19 RX ADMIN — NALOXEGOL OXALATE 25 MILLIGRAM(S): 12.5 TABLET, FILM COATED ORAL at 21:16

## 2025-06-19 RX ADMIN — Medication 1 APPLICATION(S): at 06:22

## 2025-06-19 RX ADMIN — NICOTINE POLACRILEX 1 PATCH: 4 GUM, CHEWING ORAL at 18:14

## 2025-06-19 RX ADMIN — PEGFILGRASTIM-CBQV 6 MILLIGRAM(S): 6 INJECTION, SOLUTION SUBCUTANEOUS at 18:21

## 2025-06-19 RX ADMIN — CLONAZEPAM 1 MILLIGRAM(S): 0.5 TABLET ORAL at 21:14

## 2025-06-19 NOTE — PHYSICAL THERAPY INITIAL EVALUATION ADULT - PERTINENT HX OF CURRENT PROBLEM, REHAB EVAL
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 Pt is now electively admitted for C5 Carbo/Etop.

## 2025-06-19 NOTE — PROGRESS NOTE ADULT - PROBLEM SELECTOR PLAN 1
< from: CT Chest No Cont (03.20.25 @ 21:31) >  1.  Moderate-sized pericardial effusion appears unchanged compared to   partially imaged heart on 3/18/2025 but increase insize since 2/15/2025.  2.  Interval increase in size of previously described left suprahilar   mediastinal mass which extends into the AP window and paramediastinal   left upper lobe.  3.  Some of the other left upper lobe nodules are decrease and some   increase.  4.  Unchanged sclerotic lesions within the sternum and T12 vertebral body.  C/w  C5 Carbo/Etop chemotherapy  6/19 plan to give--> Fulphila has been ordered by the Onc .  Out patient f/u with Dr Hall at [facility] on d/c

## 2025-06-19 NOTE — PROGRESS NOTE ADULT - SUBJECTIVE AND OBJECTIVE BOX
Onc Hosp Solid Tumor Note    Patient is a 64y old  Female who presents with a chief complaint of for C5 Carbo/Etop.63 yo woman, former smoker (47py; quit 12/2024) and well known to the OncHos service, with h/o pAfib (on Eliquis), Hypothyroidism, ? 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 Pt developed shortness of breath and palmar erythema with C3 (VSS); her infusion rate was slowed and she subsequently tolerated chemo without significant adverse effect.  Pt is now electively admitted for C5 Carbo/Etop per chart review (18 Jun 2025 15:47)      SUBJECTIVE / OVERNIGHT EVENTS:    MEDICATIONS  (STANDING):  apixaban 5 milliGRAM(s) Oral every 12 hours  bisacodyl 5 milliGRAM(s) Oral at bedtime  chlorhexidine 2% Cloths 1 Application(s) Topical <User Schedule>  clonazePAM  Tablet 0.5 milliGRAM(s) Oral <User Schedule>  clonazePAM  Tablet 1 milliGRAM(s) Oral at bedtime  colchicine 0.6 milliGRAM(s) Oral daily  gabapentin 100 milliGRAM(s) Oral three times a day  lactulose Syrup 20 Gram(s) Oral every 12 hours  levothyroxine 112 MICROGram(s) Oral daily  midodrine 10 milliGRAM(s) Oral every 8 hours  morphine ER Tablet 30 milliGRAM(s) Oral every 8 hours  naloxegol 25 milliGRAM(s) Oral daily  nicotine - 21 mG/24Hr(s) Patch 1 Patch Transdermal daily  OLANZapine 5 milliGRAM(s) Oral at bedtime  pantoprazole    Tablet 40 milliGRAM(s) Oral every 12 hours  pegfilgrastim-jmdb (FULPHILA) Injectable 6 milliGRAM(s) SubCutaneous once  polyethylene glycol 3350 17 Gram(s) Oral two times a day  senna 2 Tablet(s) Oral at bedtime    MEDICATIONS  (PRN):  albuterol    90 MICROgram(s) HFA Inhaler 2 Puff(s) Inhalation every 6 hours PRN Shortness of Breath and/or Wheezing  oxyCODONE    IR 30 milliGRAM(s) Oral every 4 hours PRN Moderate Pain (4 - 6)        I&O's Summary    18 Jun 2025 07:01  -  19 Jun 2025 07:00  --------------------------------------------------------  IN: 950 mL / OUT: 650 mL / NET: 300 mL      Vital Signs Last 24 Hrs  T(F): 97.6 (19 Jun 2025 05:27), Max: 98 (18 Jun 2025 18:40)  HR: 59 (19 Jun 2025 05:27) (59 - 77)  BP: 107/54 (19 Jun 2025 05:27) (98/50 - 110/52)  RR: 18 (19 Jun 2025 05:27) (18 - 18)  SpO2: 99% (19 Jun 2025 05:27) (95% - 99%)    Parameters below as of 19 Jun 2025 05:27  Patient On (Oxygen Delivery Method): nasal cannula  O2 Flow (L/min): 2    PHYSICAL EXAM:  GENERAL: NAD,   HEAD:  Atraumatic, Normocephalic  EYES: EOMI, PERRLA, conjunctiva and sclera clear  NECK: Supple, No JVD  CHEST/LUNG: Clear to auscultation bilaterally; No wheeze  HEART: Regular rate and rhythm; No murmurs, rubs, or gallops  ABDOMEN: Soft, Nontender, Nondistended; Bowel sounds present  EXTREMITIES:  2+ Peripheral Pulses, No clubbing, cyanosis, or edema  PSYCH: AAOx3  NEUROLOGY: non-focal  SKIN: No rashes or lesions    LABS:                        10.0   4.04  )-----------( 226      ( 19 Jun 2025 07:01 )             31.3     06-19    141  |  107  |  13  ----------------------------<  116[H]  3.5   |  23  |  0.50    Ca    8.3[L]      19 Jun 2025 07:01  Phos  3.1     06-19  Mg     1.90     06-19    TPro  5.5[L]  /  Alb  3.4  /  TBili  0.3  /  DBili  x   /  AST  11  /  ALT  <5  /  AlkPhos  90  06-19        Care Discussed with Consultants/Other Providers:   Onc Hosp Solid Tumor Note        SUBJECTIVE / OVERNIGHT EVENTS:  Patient seen tearfull.  She missed her son who  at age 35  She believe in God and was comforted that she will see her son in [facility].  She is going home today after Fulphila  at 6 pm      MEDICATIONS  (STANDING):  apixaban 5 milliGRAM(s) Oral every 12 hours  bisacodyl 5 milliGRAM(s) Oral at bedtime  chlorhexidine 2% Cloths 1 Application(s) Topical <User Schedule>  clonazePAM  Tablet 0.5 milliGRAM(s) Oral <User Schedule>  clonazePAM  Tablet 1 milliGRAM(s) Oral at bedtime  colchicine 0.6 milliGRAM(s) Oral daily  gabapentin 100 milliGRAM(s) Oral three times a day  lactulose Syrup 20 Gram(s) Oral every 12 hours  levothyroxine 112 MICROGram(s) Oral daily  midodrine 10 milliGRAM(s) Oral every 8 hours  morphine ER Tablet 30 milliGRAM(s) Oral every 8 hours  naloxegol 25 milliGRAM(s) Oral daily  nicotine - 21 mG/24Hr(s) Patch 1 Patch Transdermal daily  OLANZapine 5 milliGRAM(s) Oral at bedtime  pantoprazole    Tablet 40 milliGRAM(s) Oral every 12 hours  pegfilgrastim-jmdb (FULPHILA) Injectable 6 milliGRAM(s) SubCutaneous once  polyethylene glycol 3350 17 Gram(s) Oral two times a day  senna 2 Tablet(s) Oral at bedtime    MEDICATIONS  (PRN):  albuterol    90 MICROgram(s) HFA Inhaler 2 Puff(s) Inhalation every 6 hours PRN Shortness of Breath and/or Wheezing  oxyCODONE    IR 30 milliGRAM(s) Oral every 4 hours PRN Moderate Pain (4 - 6)        I&O's Summary    2025 07:01  -  2025 07:00  --------------------------------------------------------  IN: 950 mL / OUT: 650 mL / NET: 300 mL      Vital Signs Last 24 Hrs  T(F): 97.6 (2025 05:27), Max: 98 (2025 18:40)  HR: 59 (2025 05:27) (59 - 77)  BP: 107/54 (2025 05:27) (98/50 - 110/52)  RR: 18 (2025 05:27) (18 - 18)  SpO2: 99% (2025 05:27) (95% - 99%)    Parameters below as of 2025 05:27  Patient On (Oxygen Delivery Method): nasal cannula  O2 Flow (L/min): 2    PHYSICAL EXAM:  GENERAL: NAD,   HEAD:  Atraumatic, Normocephalic  EYES: EOMI, PERRLA, conjunctiva and sclera clear  NECK: Supple, No JVD  CHEST/LUNG: Clear to auscultation bilaterally; No wheeze  HEART: Regular rate and rhythm; No murmurs, rubs, or gallops  ABDOMEN: Soft, Nontender, Nondistended; Bowel sounds present  EXTREMITIES:  2+ Peripheral Pulses, No clubbing, cyanosis, or edema  PSYCH: AAOx3  NEUROLOGY: non-focal  SKIN: No rashes or lesions    LABS:                        10.0   4.04  )-----------( 226      ( 2025 07:01 )             31.3     06-19    141  |  107  |  13  ----------------------------<  116[H]  3.5   |  23  |  0.50    Ca    8.3[L]      2025 07:01  Phos  3.1     06-19  Mg     1.90     06-19    TPro  5.5[L]  /  Alb  3.4  /  TBili  0.3  /  DBili  x   /  AST  11  /  ALT  <5  /  AlkPhos  90  06-19        Care Discussed with Consultants/Other Providers:

## 2025-06-19 NOTE — PHYSICAL THERAPY INITIAL EVALUATION ADULT - ADDITIONAL COMMENTS
Patient report lives alone, has home health aide for 20 hours daily x 7 days, 3 steps to enter the house, ambulated short distance with a cane and assistance of home health aide, wheel chair for long distance locomotion.

## 2025-06-19 NOTE — PROGRESS NOTE ADULT - PROBLEM SELECTOR PLAN 3
Relistor x1 for constipation secondary to narcotics      DVT proph---> already on Eliquis    Discharge  dc planning for 6/19 after Fulphila has been ordered by the Onc .  Out patient f/u with Dr Hall.  Patient already has Oxygen at home  60 min to coord d/c

## 2025-06-20 ENCOUNTER — TRANSCRIPTION ENCOUNTER (OUTPATIENT)
Age: 65
End: 2025-06-20

## 2025-06-20 VITALS
TEMPERATURE: 98 F | SYSTOLIC BLOOD PRESSURE: 103 MMHG | RESPIRATION RATE: 18 BRPM | OXYGEN SATURATION: 95 % | HEART RATE: 88 BPM | DIASTOLIC BLOOD PRESSURE: 60 MMHG

## 2025-06-20 LAB
ALBUMIN SERPL ELPH-MCNC: 3.4 G/DL — SIGNIFICANT CHANGE UP (ref 3.3–5)
ALP SERPL-CCNC: 94 U/L — SIGNIFICANT CHANGE UP (ref 40–120)
ALT FLD-CCNC: 8 U/L — SIGNIFICANT CHANGE UP (ref 4–33)
ANION GAP SERPL CALC-SCNC: 12 MMOL/L — SIGNIFICANT CHANGE UP (ref 7–14)
AST SERPL-CCNC: 12 U/L — SIGNIFICANT CHANGE UP (ref 4–32)
BASOPHILS # BLD AUTO: 0.13 K/UL — SIGNIFICANT CHANGE UP (ref 0–0.2)
BASOPHILS NFR BLD AUTO: 0.3 % — SIGNIFICANT CHANGE UP (ref 0–2)
BILIRUB SERPL-MCNC: 0.5 MG/DL — SIGNIFICANT CHANGE UP (ref 0.2–1.2)
BUN SERPL-MCNC: 12 MG/DL — SIGNIFICANT CHANGE UP (ref 7–23)
CALCIUM SERPL-MCNC: 8.3 MG/DL — LOW (ref 8.4–10.5)
CHLORIDE SERPL-SCNC: 105 MMOL/L — SIGNIFICANT CHANGE UP (ref 98–107)
CO2 SERPL-SCNC: 26 MMOL/L — SIGNIFICANT CHANGE UP (ref 22–31)
CREAT SERPL-MCNC: 0.47 MG/DL — LOW (ref 0.5–1.3)
EGFR: 105 ML/MIN/1.73M2 — SIGNIFICANT CHANGE UP
EGFR: 105 ML/MIN/1.73M2 — SIGNIFICANT CHANGE UP
EOSINOPHIL # BLD AUTO: 0.03 K/UL — SIGNIFICANT CHANGE UP (ref 0–0.5)
EOSINOPHIL NFR BLD AUTO: 0.1 % — SIGNIFICANT CHANGE UP (ref 0–6)
GLUCOSE SERPL-MCNC: 83 MG/DL — SIGNIFICANT CHANGE UP (ref 70–99)
HCT VFR BLD CALC: 32.9 % — LOW (ref 34.5–45)
HGB BLD-MCNC: 10.7 G/DL — LOW (ref 11.5–15.5)
IMM GRANULOCYTES # BLD AUTO: 0.95 K/UL — HIGH (ref 0–0.07)
IMM GRANULOCYTES NFR BLD AUTO: 2.5 % — HIGH (ref 0–0.9)
LYMPHOCYTES # BLD AUTO: 1.97 K/UL — SIGNIFICANT CHANGE UP (ref 1–3.3)
LYMPHOCYTES NFR BLD AUTO: 5.2 % — LOW (ref 13–44)
MAGNESIUM SERPL-MCNC: 1.7 MG/DL — SIGNIFICANT CHANGE UP (ref 1.6–2.6)
MCHC RBC-ENTMCNC: 32.2 PG — SIGNIFICANT CHANGE UP (ref 27–34)
MCHC RBC-ENTMCNC: 32.5 G/DL — SIGNIFICANT CHANGE UP (ref 32–36)
MCV RBC AUTO: 99.1 FL — SIGNIFICANT CHANGE UP (ref 80–100)
MONOCYTES # BLD AUTO: 0.35 K/UL — SIGNIFICANT CHANGE UP (ref 0–0.9)
MONOCYTES NFR BLD AUTO: 0.9 % — LOW (ref 2–14)
NEUTROPHILS # BLD AUTO: 34.46 K/UL — HIGH (ref 1.8–7.4)
NEUTROPHILS NFR BLD AUTO: 91 % — HIGH (ref 43–77)
NRBC # BLD AUTO: 0 K/UL — SIGNIFICANT CHANGE UP (ref 0–0)
NRBC # FLD: 0 K/UL — SIGNIFICANT CHANGE UP (ref 0–0)
NRBC BLD AUTO-RTO: 0 /100 WBCS — SIGNIFICANT CHANGE UP (ref 0–0)
PHOSPHATE SERPL-MCNC: 3.7 MG/DL — SIGNIFICANT CHANGE UP (ref 2.5–4.5)
PLATELET # BLD AUTO: 187 K/UL — SIGNIFICANT CHANGE UP (ref 150–400)
PMV BLD: 12.3 FL — SIGNIFICANT CHANGE UP (ref 7–13)
POTASSIUM SERPL-MCNC: 2.9 MMOL/L — CRITICAL LOW (ref 3.5–5.3)
POTASSIUM SERPL-SCNC: 2.9 MMOL/L — CRITICAL LOW (ref 3.5–5.3)
PROT SERPL-MCNC: 5.6 G/DL — LOW (ref 6–8.3)
RBC # BLD: 3.32 M/UL — LOW (ref 3.8–5.2)
RBC # FLD: 16.3 % — HIGH (ref 10.3–14.5)
SODIUM SERPL-SCNC: 143 MMOL/L — SIGNIFICANT CHANGE UP (ref 135–145)
WBC # BLD: 37.89 K/UL — HIGH (ref 3.8–10.5)
WBC # FLD AUTO: 37.89 K/UL — HIGH (ref 3.8–10.5)

## 2025-06-20 PROCEDURE — 99232 SBSQ HOSP IP/OBS MODERATE 35: CPT

## 2025-06-20 RX ORDER — MAGNESIUM OXIDE 400 MG
400 TABLET ORAL EVERY 4 HOURS
Refills: 0 | Status: COMPLETED | OUTPATIENT
Start: 2025-06-20 | End: 2025-06-20

## 2025-06-20 RX ADMIN — MIDODRINE HYDROCHLORIDE 10 MILLIGRAM(S): 5 TABLET ORAL at 06:37

## 2025-06-20 RX ADMIN — OXYCODONE HYDROCHLORIDE 30 MILLIGRAM(S): 30 TABLET ORAL at 05:56

## 2025-06-20 RX ADMIN — LACTULOSE 20 GRAM(S): 10 SOLUTION ORAL at 06:37

## 2025-06-20 RX ADMIN — Medication 1 APPLICATION(S): at 05:29

## 2025-06-20 RX ADMIN — Medication 30 MILLIGRAM(S): at 00:32

## 2025-06-20 RX ADMIN — Medication 30 MILLIGRAM(S): at 05:58

## 2025-06-20 RX ADMIN — APIXABAN 5 MILLIGRAM(S): 2.5 TABLET, FILM COATED ORAL at 06:37

## 2025-06-20 RX ADMIN — POLYETHYLENE GLYCOL 3350 17 GRAM(S): 17 POWDER, FOR SOLUTION ORAL at 05:28

## 2025-06-20 RX ADMIN — Medication 112 MICROGRAM(S): at 05:27

## 2025-06-20 RX ADMIN — OXYCODONE HYDROCHLORIDE 30 MILLIGRAM(S): 30 TABLET ORAL at 00:01

## 2025-06-20 RX ADMIN — Medication 40 MILLIGRAM(S): at 06:37

## 2025-06-20 RX ADMIN — CLONAZEPAM 0.5 MILLIGRAM(S): 0.5 TABLET ORAL at 06:37

## 2025-06-20 RX ADMIN — Medication 40 MILLIEQUIVALENT(S): at 10:24

## 2025-06-20 RX ADMIN — OXYCODONE HYDROCHLORIDE 30 MILLIGRAM(S): 30 TABLET ORAL at 05:26

## 2025-06-20 RX ADMIN — GABAPENTIN 100 MILLIGRAM(S): 400 CAPSULE ORAL at 05:27

## 2025-06-20 RX ADMIN — Medication 30 MILLIGRAM(S): at 00:02

## 2025-06-20 RX ADMIN — Medication 30 MILLIGRAM(S): at 05:28

## 2025-06-20 RX ADMIN — Medication 400 MILLIGRAM(S): at 10:24

## 2025-06-20 NOTE — DISCHARGE NOTE NURSING/CASE MANAGEMENT/SOCIAL WORK - PATIENT PORTAL LINK FT
You can access the FollowMyHealth Patient Portal offered by [facility] by registering at the following website: [email or web address]. By joining [facility]’s FollowMyHealth portal, you will also be able to view your health information using other applications (apps) compatible with our system.

## 2025-06-20 NOTE — PROGRESS NOTE ADULT - SUBJECTIVE AND OBJECTIVE BOX
Onc Hosp Solid tumor Note    Patient is a 64y old  Female who presents with a chief complaint of for C5 Carbo/Etop. (2025 08:54)      SUBJECTIVE / OVERNIGHT EVENTS:  Patient was tearful yesterday over her son who  years ago. sh was missing him. she wanted to go home today.    MEDICATIONS  (STANDING):  apixaban 5 milliGRAM(s) Oral every 12 hours  bisacodyl 5 milliGRAM(s) Oral at bedtime  chlorhexidine 2% Cloths 1 Application(s) Topical <User Schedule>  clonazePAM  Tablet 0.5 milliGRAM(s) Oral <User Schedule>  clonazePAM  Tablet 1 milliGRAM(s) Oral at bedtime  colchicine 0.6 milliGRAM(s) Oral daily  gabapentin 100 milliGRAM(s) Oral three times a day  lactulose Syrup 20 Gram(s) Oral every 12 hours  levothyroxine 112 MICROGram(s) Oral daily  midodrine 10 milliGRAM(s) Oral every 8 hours  morphine ER Tablet 30 milliGRAM(s) Oral every 8 hours  naloxegol 25 milliGRAM(s) Oral daily  nicotine - 21 mG/24Hr(s) Patch 1 Patch Transdermal daily  OLANZapine 5 milliGRAM(s) Oral at bedtime  pantoprazole    Tablet 40 milliGRAM(s) Oral every 12 hours  polyethylene glycol 3350 17 Gram(s) Oral two times a day  senna 2 Tablet(s) Oral at bedtime    MEDICATIONS  (PRN):  albuterol    90 MICROgram(s) HFA Inhaler 2 Puff(s) Inhalation every 6 hours PRN Shortness of Breath and/or Wheezing  oxyCODONE    IR 30 milliGRAM(s) Oral every 4 hours PRN Moderate Pain (4 - 6)        CAPILLARY BLOOD GLUCOSE        I&O's Summary    2025 07:01  -  2025 07:00  --------------------------------------------------------  IN: 500 mL / OUT: 540 mL / NET: -40 mL      Vital Signs Last 24 Hrs  T(C): 36.8 (2025 06:00), Max: 36.8 (2025 06:00)  T(F): 98.2 (2025 06:00), Max: 98.2 (2025 06:00)  HR: 73 (2025 06:00) (58 - 73)  BP: 121/56 (2025 06:00) (98/53 - 121/56)  BP(mean): --  RR: 18 (2025 06:00) (17 - 18)  SpO2: 95% (:00) (95% - 100%)    Parameters below as of 2025 06:00  Patient On (Oxygen Delivery Method): nasal cannula  O2 Flow (L/min): 2    PHYSICAL EXAM:  GENERAL: NAD,   HEAD:  Atraumatic, Normocephalic  EYES: EOMI, PERRLA, conjunctiva and sclera clear  NECK: Supple, No JVD  CHEST/LUNG: Clear to auscultation bilaterally; No wheeze  HEART: Regular rate and rhythm; No murmurs, rubs, or gallops  ABDOMEN: Soft, Nontender, Nondistended; Bowel sounds present  EXTREMITIES:  2+ Peripheral Pulses, No clubbing, cyanosis, or edema  PSYCH: AAOx3  NEUROLOGY: non-focal      LABS:                        10.0   4.04  )-----------( 226      ( 2025 07:01 )             31.3     -    141  |  107  |  13  ----------------------------<  116[H]  3.5   |  23  |  0.50    Ca    8.3[L]      2025 07:01  Phos  3.1     -  Mg     1.90     -    TPro  5.5[L]  /  Alb  3.4  /  TBili  0.3  /  DBili  x   /  AST  11  /  ALT  <5  /  AlkPhos  90        Care Discussed with Consultants/Other Providers:

## 2025-06-20 NOTE — DISCHARGE NOTE NURSING/CASE MANAGEMENT/SOCIAL WORK - NSDCFUADDAPPT_GEN_ALL_CORE_FT
APPTS ARE READY TO BE MADE: [X] YES    Best Family or Patient Contact (if needed):    Additional Information about above appointments (if needed):    1: Oncology  2: Palliative  3:     Other comments or requests:       Please follow up with Dr. Mcgill on 6/23 at 3pm    Please follow up with Dr. Hall at your earliest convenience. She will also be emailed with a discharge summary and will likely reach out to arrange a follow up appointment.    Please follow up with Dr. Scales at your earliest convenience.

## 2025-06-20 NOTE — PROGRESS NOTE ADULT - NUTRITIONAL ASSESSMENT
This patient has been assessed with a concern for Malnutrition and has been determined to have a diagnosis/diagnoses of Moderate protein-calorie malnutrition.    This patient is being managed with:   Diet Regular-  Entered: Jun 16 2025 10:59AM  
This patient has been assessed with a concern for Malnutrition and has been determined to have a diagnosis/diagnoses of Moderate protein-calorie malnutrition.    This patient is being managed with:   Diet Regular-  Entered: Jun 16 2025 10:59AM

## 2025-06-20 NOTE — PROGRESS NOTE ADULT - PROBLEM SELECTOR PLAN 1
< from: CT Chest No Cont (03.20.25 @ 21:31) >  1.  Moderate-sized pericardial effusion appears unchanged compared to   partially imaged heart on 3/18/2025 but increase insize since 2/15/2025.  2.  Interval increase in size of previously described left suprahilar   mediastinal mass which extends into the AP window and paramediastinal   left upper lobe.  3.  Some of the other left upper lobe nodules are decrease and some   increase.  4.  Unchanged sclerotic lesions within the sternum and T12 vertebral body.  C/w  C5 Carbo/Etop chemotherapy  6/19 plan to give--> Fulphila has been ordered by the Onc .  Out patient f/u with Dr Hall at [facility] on d/c

## 2025-06-20 NOTE — DISCHARGE NOTE NURSING/CASE MANAGEMENT/SOCIAL WORK - FINANCIAL ASSISTANCE
[facility] provides services at a reduced cost to those who are determined to be eligible through [facility]’s financial assistance program. Information regarding [facility]’s financial assistance program can be found by going to [email or web address] or by calling [phone].

## 2025-06-20 NOTE — PROGRESS NOTE ADULT - PROBLEM SELECTOR PROBLEM 1
Squamous cell lung cancer

## 2025-06-20 NOTE — PROGRESS NOTE ADULT - PROBLEM SELECTOR PROBLEM 2
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

## 2025-06-20 NOTE — PROGRESS NOTE ADULT - ASSESSMENT
65 yo woman, former smoker (47py; quit 12/2024) and well known to the [facility] service, with h/o pAfib (on Eliquis), Hypothyroidism, ? h/o Sleroderma, h/o pericardial effusion s/p pericardiocentesis (dx in 12/2024; ? 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    Pt developed shortness of breath and palmar erythema with C3 (VSS); her infusion rate was slowed and she subsequently tolerated chemo without significant adverse effect.  Pt is now electively admitted for C5 Carbo/Etop.        ·   ACTIVE PROBLEMS  SCLCa (presumed limited stage)  L chest chronic pain  constipation   Admission for chemotherapy  Anxiety/depression  pAfib  Hypercoag state  Immunocompromised due to chemotherapy  Hypercoagulable state   Orthostatic hypotension      SCLCa (presumed limited stage). Lung cancer  Will proceed with C5 Carbo/Etop today (20% dose reduced)  Monitoring and with plan to treat hypersensitivity reaction as per protocol  Fluphila x1 dose to be given on day 4 (5/22)  Pt to continue outpt fu with Dr. Hall after discharge  Long-term prognosis: guarded; pt is full code    Anxiety/depression  Continuing Klonopin 0.5mg BID and 1mg nightly  Continuing Zyprexa 5mg nightly      h/o Asthma/COPD (former smoker)---> No acute exacerbation.   Continuing supplemental O2 via NC and supportive resp care prn  Continuing Advair 100-50mcg MDI BID  Continuing duonebs q6/prn     Cancer related pain  Continuing Morphine ER 30mg q12  Continuing Oxy IR 20/30mg q8/prn  Continuing Gabapentin 100mg TID  Continuing Baclofen 5mg q8/prn    Constipation  Possibly due to opioids +/- AID  Pt denied abd pain, diarrhea  Can get Relistor after chmo    Hypothyroidism: continuing LT4 112mcg daily    h/o pericardial effusion: most likely inflammatory; continuing Colchicine 0.6mg daily    pAfib  Hypercoag state  Continuing Eliquis 5mg BID    Orthostatic hypotension---> c/w midodrine 10 mg q8.  DVT proph---> already on Eliquis.    
63 yo woman, former smoker (47py; quit 12/2024) and well known to the [facility] service, with h/o pAfib (on Eliquis), Hypothyroidism, ? h/o Sleroderma, h/o pericardial effusion s/p pericardiocentesis (dx in 12/2024; ? 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    Pt developed shortness of breath and palmar erythema with C3 (VSS); her infusion rate was slowed and she subsequently tolerated chemo without significant adverse effect.  Pt is now electively admitted for C5 Carbo/Etop.        ·   ACTIVE PROBLEMS  SCLCa (presumed limited stage)  L chest chronic pain  constipation   Admission for chemotherapy  Anxiety/depression  pAfib  Hypercoag state  Immunocompromised due to chemotherapy  Hypercoagulable state   Orthostatic hypotension      SCLCa (presumed limited stage). Lung cancer  Will proceed with C5 Carbo/Etop today (20% dose reduced)  Monitoring and with plan to treat hypersensitivity reaction as per protocol  Fluphila x1 dose to be given on day 4 (5/22)  Pt to continue outpt fu with Dr. Hall after discharge  Long-term prognosis: guarded; pt is full code    Anxiety/depression  Continuing Klonopin 0.5mg BID and 1mg nightly  Continuing Zyprexa 5mg nightly      h/o Asthma/COPD (former smoker)---> No acute exacerbation.   Continuing supplemental O2 via NC and supportive resp care prn  Continuing Advair 100-50mcg MDI BID  Continuing duonebs q6/prn     Cancer related pain  Continuing Morphine ER 30mg q12  Continuing Oxy IR 20/30mg q8/prn  Continuing Gabapentin 100mg TID  Continuing Baclofen 5mg q8/prn    Constipation  Possibly due to opioids +/- AID  Pt denied abd pain, diarrhea  Can get Relistor after chmo    Hypothyroidism: continuing LT4 112mcg daily    h/o pericardial effusion: most likely inflammatory; continuing Colchicine 0.6mg daily    pAfib  Hypercoag state  Continuing Eliquis 5mg BID    Orthostatic hypotension---> c/w midodrine 10 mg q8.  DVT proph---> already on Eliquis.    
65 yo woman, former smoker (47py; quit 12/2024) and well known to the [facility] service, with h/o pAfib (on Eliquis), Hypothyroidism, ? h/o Sleroderma, h/o pericardial effusion s/p pericardiocentesis (dx in 12/2024; ? 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    Pt developed shortness of breath and palmar erythema with C3 (VSS); her infusion rate was slowed and she subsequently tolerated chemo without significant adverse effect.  Pt is now electively admitted for C5 Carbo/Etop.        ·   ACTIVE PROBLEMS  SCLCa (presumed limited stage)  L chest chronic pain  constipation   Admission for chemotherapy  Anxiety/depression  pAfib  Hypercoag state  Immunocompromised due to chemotherapy  Hypercoagulable state   Orthostatic hypotension      SCLCa (presumed limited stage). Lung cancer  Will proceed with C5 Carbo/Etop today (20% dose reduced)  Monitoring and with plan to treat hypersensitivity reaction as per protocol  Fluphila x1 dose to be given on day 4 (5/22)  Pt to continue outpt fu with Dr. Hall after discharge  Long-term prognosis: guarded; pt is full code    Anxiety/depression  Continuing Klonopin 0.5mg BID and 1mg nightly  Continuing Zyprexa 5mg nightly      h/o Asthma/COPD (former smoker)---> No acute exacerbation.   Continuing supplemental O2 via NC and supportive resp care prn  Continuing Advair 100-50mcg MDI BID  Continuing duonebs q6/prn     Cancer related pain  Continuing Morphine ER 30mg q12  Continuing Oxy IR 20/30mg q8/prn  Continuing Gabapentin 100mg TID  Continuing Baclofen 5mg q8/prn    Constipation  Possibly due to opioids +/- AID  Pt denied abd pain, diarrhea  Can get Relistor after chmo    Hypothyroidism: continuing LT4 112mcg daily    h/o pericardial effusion: most likely inflammatory; continuing Colchicine 0.6mg daily    pAfib  Hypercoag state  Continuing Eliquis 5mg BID    Orthostatic hypotension---> c/w midodrine 10 mg q8.  DVT proph---> already on Eliquis.    
63 yo woman, former smoker (47py; quit 12/2024) and well known to the [facility] service, with h/o pAfib (on Eliquis), Hypothyroidism, ? h/o Sleroderma, h/o pericardial effusion s/p pericardiocentesis (dx in 12/2024; ? 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    Pt developed shortness of breath and palmar erythema with C3 (VSS); her infusion rate was slowed and she subsequently tolerated chemo without significant adverse effect.  Pt is now electively admitted for C5 Carbo/Etop.        ·   ACTIVE PROBLEMS  SCLCa (presumed limited stage)  L chest chronic pain  constipation   Admission for chemotherapy  Anxiety/depression  pAfib  Hypercoag state  Immunocompromised due to chemotherapy  Hypercoagulable state   Orthostatic hypotension      SCLCa (presumed limited stage). Lung cancer  Will proceed with C5 Carbo/Etop today (20% dose reduced)  Monitoring and with plan to treat hypersensitivity reaction as per protocol  Fluphila x1 dose to be given on day 4 (5/22)  Pt to continue outpt fu with Dr. Hall after discharge  Long-term prognosis: guarded; pt is full code    Anxiety/depression  Continuing Klonopin 0.5mg BID and 1mg nightly  Continuing Zyprexa 5mg nightly      h/o Asthma/COPD (former smoker)---> No acute exacerbation.   Continuing supplemental O2 via NC and supportive resp care prn  Continuing Advair 100-50mcg MDI BID  Continuing duonebs q6/prn     Cancer related pain  Continuing Morphine ER 30mg q12  Continuing Oxy IR 20/30mg q8/prn  Continuing Gabapentin 100mg TID  Continuing Baclofen 5mg q8/prn    Constipation  Possibly due to opioids +/- AID  Pt denied abd pain, diarrhea  Can get Relistor after chmo    Hypothyroidism: continuing LT4 112mcg daily    h/o pericardial effusion: most likely inflammatory; continuing Colchicine 0.6mg daily    pAfib  Hypercoag state  Continuing Eliquis 5mg BID    Orthostatic hypotension---> c/w midodrine 10 mg q8.  DVT proph---> already on Eliquis.

## 2025-06-23 ENCOUNTER — APPOINTMENT (OUTPATIENT)
Dept: RADIATION ONCOLOGY | Facility: CLINIC | Age: 65
End: 2025-06-23
Payer: MEDICARE

## 2025-06-23 VITALS
RESPIRATION RATE: 19 BRPM | WEIGHT: 293 LBS | HEIGHT: 69 IN | OXYGEN SATURATION: 86 % | TEMPERATURE: 97.3 F | DIASTOLIC BLOOD PRESSURE: 59 MMHG | HEART RATE: 89 BPM | SYSTOLIC BLOOD PRESSURE: 95 MMHG | BODY MASS INDEX: 43.4 KG/M2

## 2025-06-23 PROBLEM — Z82.49 FAMILY HISTORY OF CARDIAC DISORDER: Status: ACTIVE | Noted: 2025-06-23

## 2025-06-23 PROBLEM — Z86.39 HISTORY OF HYPOTHYROIDISM: Status: RESOLVED | Noted: 2025-06-23 | Resolved: 2025-06-23

## 2025-06-23 PROCEDURE — 99215 OFFICE O/P EST HI 40 MIN: CPT | Mod: 25

## 2025-06-26 ENCOUNTER — APPOINTMENT (OUTPATIENT)
Dept: RHEUMATOLOGY | Facility: CLINIC | Age: 65
End: 2025-06-26

## 2025-07-24 ENCOUNTER — APPOINTMENT (OUTPATIENT)
Dept: GERIATRICS | Facility: CLINIC | Age: 65
End: 2025-07-24
Payer: MEDICARE

## 2025-07-24 VITALS
OXYGEN SATURATION: 89 % | WEIGHT: 145 LBS | RESPIRATION RATE: 19 BRPM | TEMPERATURE: 98 F | BODY MASS INDEX: 21.41 KG/M2 | SYSTOLIC BLOOD PRESSURE: 105 MMHG | HEART RATE: 99 BPM | DIASTOLIC BLOOD PRESSURE: 71 MMHG

## 2025-07-24 DIAGNOSIS — Z51.5 ENCOUNTER FOR PALLIATIVE CARE: ICD-10-CM

## 2025-07-24 DIAGNOSIS — K59.00 CONSTIPATION, UNSPECIFIED: ICD-10-CM

## 2025-07-24 DIAGNOSIS — F41.9 ANXIETY DISORDER, UNSPECIFIED: ICD-10-CM

## 2025-07-24 DIAGNOSIS — G89.3 NEOPLASM RELATED PAIN (ACUTE) (CHRONIC): ICD-10-CM

## 2025-07-24 PROCEDURE — 99214 OFFICE O/P EST MOD 30 MIN: CPT

## 2025-07-24 RX ORDER — MORPHINE SULFATE 15 MG/1
15 TABLET, FILM COATED, EXTENDED RELEASE ORAL
Qty: 30 | Refills: 0 | Status: ACTIVE | COMMUNITY
Start: 2025-07-24 | End: 1900-01-01

## 2025-08-07 ENCOUNTER — APPOINTMENT (OUTPATIENT)
Dept: PULMONOLOGY | Facility: CLINIC | Age: 65
End: 2025-08-07
Payer: MEDICARE

## 2025-08-07 VITALS
BODY MASS INDEX: 21.33 KG/M2 | WEIGHT: 144 LBS | HEIGHT: 69 IN | DIASTOLIC BLOOD PRESSURE: 77 MMHG | SYSTOLIC BLOOD PRESSURE: 108 MMHG | HEART RATE: 112 BPM | OXYGEN SATURATION: 89 %

## 2025-08-07 PROCEDURE — ZZZZZ: CPT

## 2025-08-07 PROCEDURE — 94726 PLETHYSMOGRAPHY LUNG VOLUMES: CPT

## 2025-08-07 PROCEDURE — 94729 DIFFUSING CAPACITY: CPT

## 2025-08-07 PROCEDURE — 94010 BREATHING CAPACITY TEST: CPT

## 2025-08-07 PROCEDURE — 99215 OFFICE O/P EST HI 40 MIN: CPT | Mod: 25

## 2025-08-11 DIAGNOSIS — J44.9 CHRONIC OBSTRUCTIVE PULMONARY DISEASE, UNSPECIFIED: ICD-10-CM

## 2025-08-12 RX ORDER — UMECLIDINIUM BROMIDE AND VILANTEROL TRIFENATATE 62.5; 25 UG/1; UG/1
62.5-25 POWDER RESPIRATORY (INHALATION) DAILY
Qty: 1 | Refills: 3 | Status: COMPLETED | COMMUNITY
Start: 2025-08-11 | End: 2025-08-12

## 2025-08-12 RX ORDER — UMECLIDINIUM BROMIDE AND VILANTEROL TRIFENATATE 62.5; 25 UG/1; UG/1
62.5-25 POWDER RESPIRATORY (INHALATION) DAILY
Qty: 1 | Refills: 10 | Status: ACTIVE | COMMUNITY
Start: 2025-08-12 | End: 1900-01-01

## 2025-08-15 ENCOUNTER — APPOINTMENT (OUTPATIENT)
Dept: HEMATOLOGY ONCOLOGY | Facility: CLINIC | Age: 65
End: 2025-08-15

## 2025-08-15 ENCOUNTER — RESULT REVIEW (OUTPATIENT)
Age: 65
End: 2025-08-15

## 2025-08-15 VITALS
HEART RATE: 56 BPM | HEIGHT: 69 IN | DIASTOLIC BLOOD PRESSURE: 66 MMHG | SYSTOLIC BLOOD PRESSURE: 98 MMHG | TEMPERATURE: 97.2 F | WEIGHT: 145 LBS | BODY MASS INDEX: 21.48 KG/M2 | OXYGEN SATURATION: 98 % | RESPIRATION RATE: 18 BRPM

## 2025-08-15 DIAGNOSIS — C80.1 MALIGNANT (PRIMARY) NEOPLASM, UNSPECIFIED: ICD-10-CM

## 2025-08-15 PROCEDURE — G2211 COMPLEX E/M VISIT ADD ON: CPT

## 2025-08-15 PROCEDURE — 99214 OFFICE O/P EST MOD 30 MIN: CPT

## 2025-08-18 LAB
ALBUMIN SERPL ELPH-MCNC: 4.5 G/DL
ALP BLD-CCNC: 105 U/L
ALT SERPL-CCNC: 13 U/L
ANION GAP SERPL CALC-SCNC: 12 MMOL/L
AST SERPL-CCNC: 28 U/L
BILIRUB SERPL-MCNC: 0.4 MG/DL
BUN SERPL-MCNC: 14 MG/DL
CALCIUM SERPL-MCNC: 10 MG/DL
CHLORIDE SERPL-SCNC: 101 MMOL/L
CO2 SERPL-SCNC: 28 MMOL/L
CREAT SERPL-MCNC: 0.66 MG/DL
EGFRCR SERPLBLD CKD-EPI 2021: 97 ML/MIN/1.73M2
GLUCOSE SERPL-MCNC: 110 MG/DL
LDH SERPL-CCNC: 217 U/L
POTASSIUM SERPL-SCNC: 4.2 MMOL/L
PROT SERPL-MCNC: 7.3 G/DL
SODIUM SERPL-SCNC: 141 MMOL/L
URATE SERPL-MCNC: 3.8 MG/DL

## 2025-09-11 ENCOUNTER — RESULT REVIEW (OUTPATIENT)
Age: 65
End: 2025-09-11

## 2025-09-11 ENCOUNTER — APPOINTMENT (OUTPATIENT)
Dept: HEMATOLOGY ONCOLOGY | Facility: CLINIC | Age: 65
End: 2025-09-11
Payer: MEDICARE

## 2025-09-11 ENCOUNTER — APPOINTMENT (OUTPATIENT)
Dept: PULMONOLOGY | Facility: CLINIC | Age: 65
End: 2025-09-11
Payer: MEDICARE

## 2025-09-11 VITALS
BODY MASS INDEX: 21.48 KG/M2 | WEIGHT: 145 LBS | HEART RATE: 86 BPM | DIASTOLIC BLOOD PRESSURE: 75 MMHG | SYSTOLIC BLOOD PRESSURE: 125 MMHG | HEIGHT: 69 IN | OXYGEN SATURATION: 86 % | TEMPERATURE: 97 F | RESPIRATION RATE: 17 BRPM

## 2025-09-11 PROCEDURE — 99215 OFFICE O/P EST HI 40 MIN: CPT

## 2025-09-11 PROCEDURE — 99214 OFFICE O/P EST MOD 30 MIN: CPT

## 2025-09-12 ENCOUNTER — APPOINTMENT (OUTPATIENT)
Dept: GERIATRICS | Facility: CLINIC | Age: 65
End: 2025-09-12

## 2025-09-12 LAB
ALBUMIN SERPL ELPH-MCNC: 4.8 G/DL
ALP BLD-CCNC: 102 U/L
ALT SERPL-CCNC: 13 U/L
ANION GAP SERPL CALC-SCNC: 14 MMOL/L
AST SERPL-CCNC: 30 U/L
BILIRUB SERPL-MCNC: 0.5 MG/DL
BUN SERPL-MCNC: 13 MG/DL
CALCIUM SERPL-MCNC: 10.3 MG/DL
CHLORIDE SERPL-SCNC: 99 MMOL/L
CO2 SERPL-SCNC: 27 MMOL/L
CREAT SERPL-MCNC: 0.62 MG/DL
EGFRCR SERPLBLD CKD-EPI 2021: 98 ML/MIN/1.73M2
GLUCOSE SERPL-MCNC: 101 MG/DL
LDH SERPL-CCNC: 203 U/L
POTASSIUM SERPL-SCNC: 3.9 MMOL/L
PROT SERPL-MCNC: 7.3 G/DL
SODIUM SERPL-SCNC: 140 MMOL/L

## 2025-09-12 PROCEDURE — 99214 OFFICE O/P EST MOD 30 MIN: CPT | Mod: 2W

## (undated) DEVICE — SYR LUER SLIP TIP 30CC

## (undated) DEVICE — FORCEP RADIAL JAW 4 PULMONARY

## (undated) DEVICE — VALVE BIOPSY BRONCHOVIDEOSCOPE

## (undated) DEVICE — VALVE SUCTION EVIS 160/200/240

## (undated) DEVICE — DRSG CURITY GAUZE SPONGE 4 X 4" 12-PLY

## (undated) DEVICE — SOL IRR POUR NS 0.9% 500ML

## (undated) DEVICE — ADAPTER FIBEROPTIC BRONCHOSCOPE DUAL AXIS SWIVEL

## (undated) DEVICE — FORCEP BIOPSY 1.8MM JAW X 100CM DISP

## (undated) DEVICE — BRUSH CYTO DISP

## (undated) DEVICE — LABELS BLANK W PEN

## (undated) DEVICE — PACK BRONCHOSCOPY

## (undated) DEVICE — NDL ASPIRATION VIZISHOT2 22G

## (undated) DEVICE — FORCEP BIOPSY BRONCHOSCOPE DISP

## (undated) DEVICE — TRAP SPECIMEN SPUTUM 40CC

## (undated) DEVICE — MASK SURGICAL WITH EYESHIELD ANTIFOG (ORANGE)

## (undated) DEVICE — SUTURE REMOVAL KIT